# Patient Record
Sex: MALE | Race: WHITE | NOT HISPANIC OR LATINO | Employment: OTHER | URBAN - METROPOLITAN AREA
[De-identification: names, ages, dates, MRNs, and addresses within clinical notes are randomized per-mention and may not be internally consistent; named-entity substitution may affect disease eponyms.]

---

## 2017-08-17 ENCOUNTER — HISTORICAL (OUTPATIENT)
Dept: RADIOLOGY | Facility: HOSPITAL | Age: 57
End: 2017-08-17

## 2017-09-06 ENCOUNTER — HISTORICAL (OUTPATIENT)
Dept: RADIOLOGY | Facility: HOSPITAL | Age: 57
End: 2017-09-06

## 2017-10-04 ENCOUNTER — HISTORICAL (OUTPATIENT)
Dept: RADIOLOGY | Facility: HOSPITAL | Age: 57
End: 2017-10-04

## 2017-11-01 ENCOUNTER — HISTORICAL (OUTPATIENT)
Dept: RADIOLOGY | Facility: HOSPITAL | Age: 57
End: 2017-11-01

## 2018-01-12 ENCOUNTER — HISTORICAL (OUTPATIENT)
Dept: RADIOLOGY | Facility: HOSPITAL | Age: 58
End: 2018-01-12

## 2019-11-20 ENCOUNTER — HISTORICAL (OUTPATIENT)
Dept: LAB | Facility: HOSPITAL | Age: 59
End: 2019-11-20

## 2019-11-20 LAB
ABS NEUT (OLG): 5.55 X10(3)/MCL (ref 2.1–9.2)
ALBUMIN SERPL-MCNC: 4 GM/DL (ref 3.4–5)
ALBUMIN/GLOB SERPL: 1.3 RATIO (ref 1.1–2)
ALP SERPL-CCNC: 67 UNIT/L (ref 46–116)
ALT SERPL-CCNC: 67 UNIT/L (ref 12–78)
AST SERPL-CCNC: 42 UNIT/L (ref 15–37)
BASOPHILS # BLD AUTO: 0 X10(3)/MCL (ref 0–0.2)
BASOPHILS NFR BLD AUTO: 0 %
BILIRUB SERPL-MCNC: 1.8 MG/DL (ref 0.2–1)
BILIRUBIN DIRECT+TOT PNL SERPL-MCNC: 0.42 MG/DL (ref 0–0.2)
BILIRUBIN DIRECT+TOT PNL SERPL-MCNC: 1.38 MG/DL (ref 0–0.8)
BUN SERPL-MCNC: 13.2 MG/DL (ref 7–18)
CALCIUM SERPL-MCNC: 9.2 MG/DL (ref 8.5–10.1)
CHLORIDE SERPL-SCNC: 102 MMOL/L (ref 98–107)
CHOLEST SERPL-MCNC: 152 MG/DL (ref 0–200)
CHOLEST/HDLC SERPL: 4.2 {RATIO} (ref 0–5)
CO2 SERPL-SCNC: 26.8 MMOL/L (ref 21–32)
CREAT SERPL-MCNC: 0.88 MG/DL (ref 0.6–1.3)
EOSINOPHIL # BLD AUTO: 0.1 X10(3)/MCL (ref 0–0.9)
EOSINOPHIL NFR BLD AUTO: 1 %
ERYTHROCYTE [DISTWIDTH] IN BLOOD BY AUTOMATED COUNT: 11.8 % (ref 11.5–17)
FT4I SERPL CALC-MCNC: 2.9
GLOBULIN SER-MCNC: 3.1 GM/DL (ref 2.4–3.5)
GLUCOSE SERPL-MCNC: 172 MG/DL (ref 74–106)
HCT VFR BLD AUTO: 46.2 % (ref 42–52)
HDLC SERPL-MCNC: 36 MG/DL (ref 40–60)
HGB BLD-MCNC: 16 GM/DL (ref 14–18)
IMM GRANULOCYTES # BLD AUTO: 0.01 % (ref 0–0.02)
IMM GRANULOCYTES NFR BLD AUTO: 0.1 % (ref 0–0.43)
LDLC SERPL CALC-MCNC: 42 MG/DL (ref 0–129)
LYMPHOCYTES # BLD AUTO: 3.2 X10(3)/MCL (ref 0.6–4.6)
LYMPHOCYTES NFR BLD AUTO: 34 %
MCH RBC QN AUTO: 31.3 PG (ref 27–31)
MCHC RBC AUTO-ENTMCNC: 34.6 GM/DL (ref 33–36)
MCV RBC AUTO: 90.4 FL (ref 80–94)
MONOCYTES # BLD AUTO: 0.7 X10(3)/MCL (ref 0.1–1.3)
MONOCYTES NFR BLD AUTO: 7 %
NEUTROPHILS # BLD AUTO: 5.55 X10(3)/MCL (ref 1.4–7.9)
NEUTROPHILS NFR BLD AUTO: 58 %
PLATELET # BLD AUTO: 181 X10(3)/MCL (ref 130–400)
PMV BLD AUTO: 9.7 FL (ref 9.4–12.4)
POTASSIUM SERPL-SCNC: 4 MMOL/L (ref 3.5–5.1)
PROT SERPL-MCNC: 7.1 GM/DL (ref 6.4–8.2)
PSA SERPL-MCNC: 0.79 NG/ML (ref 0–4)
RBC # BLD AUTO: 5.11 X10(6)/MCL (ref 4.7–6.1)
SODIUM SERPL-SCNC: 140 MMOL/L (ref 136–145)
T3RU NFR SERPL: 33 % (ref 31–39)
T4 SERPL-MCNC: 8.8 MCG/DL (ref 4.7–13.3)
TRIGL SERPL-MCNC: 371 MG/DL
TSH SERPL-ACNC: 2.02 MIU/ML (ref 0.36–3.74)
VLDLC SERPL CALC-MCNC: 74 MG/DL
WBC # SPEC AUTO: 9.6 X10(3)/MCL (ref 4.5–11.5)

## 2021-01-26 LAB
BILIRUB SERPL-MCNC: NEGATIVE MG/DL
BLOOD URINE, POC: NEGATIVE
CLARITY, POC UA: CLEAR
COLOR, POC UA: NORMAL
GLUCOSE UR QL STRIP: NORMAL
KETONES UR QL STRIP: NEGATIVE
LEUKOCYTE EST, POC UA: NEGATIVE
NITRITE, POC UA: NEGATIVE
PH, POC UA: 6
PROTEIN, POC: NEGATIVE
SPECIFIC GRAVITY, POC UA: 1.02
UROBILINOGEN, POC UA: NORMAL

## 2021-02-03 ENCOUNTER — HISTORICAL (OUTPATIENT)
Dept: RADIOLOGY | Facility: HOSPITAL | Age: 61
End: 2021-02-03

## 2021-03-02 ENCOUNTER — HISTORICAL (OUTPATIENT)
Dept: ADMINISTRATIVE | Facility: HOSPITAL | Age: 61
End: 2021-03-02

## 2021-06-08 ENCOUNTER — HISTORICAL (OUTPATIENT)
Dept: LAB | Facility: HOSPITAL | Age: 61
End: 2021-06-08

## 2021-06-08 LAB
ALBUMIN SERPL-MCNC: 3.8 GM/DL (ref 3.4–4.8)
ALBUMIN/GLOB SERPL: 1.3 RATIO (ref 1.1–2)
ALP SERPL-CCNC: 78 UNIT/L (ref 40–150)
ALT SERPL-CCNC: 55 UNIT/L (ref 0–55)
APPEARANCE, UA: CLEAR
AST SERPL-CCNC: 38 UNIT/L (ref 5–34)
BACTERIA SPEC CULT: NORMAL
BILIRUB SERPL-MCNC: 2.1 MG/DL
BILIRUB UR QL STRIP: NEGATIVE
BILIRUBIN DIRECT+TOT PNL SERPL-MCNC: 0.7 MG/DL (ref 0–0.5)
BILIRUBIN DIRECT+TOT PNL SERPL-MCNC: 1.4 MG/DL (ref 0–0.8)
BUN SERPL-MCNC: 11.6 MG/DL (ref 8.4–25.7)
CALCIUM SERPL-MCNC: 8.9 MG/DL (ref 8.8–10)
CHLORIDE SERPL-SCNC: 103 MMOL/L (ref 98–107)
CHOLEST SERPL-MCNC: 133 MG/DL
CHOLEST/HDLC SERPL: 4 {RATIO} (ref 0–5)
CO2 SERPL-SCNC: 23 MMOL/L (ref 23–31)
COLOR UR: YELLOW
CREAT SERPL-MCNC: 0.8 MG/DL (ref 0.73–1.18)
CREAT UR-MCNC: 118.4 MG/DL (ref 58–161)
EST. AVERAGE GLUCOSE BLD GHB EST-MCNC: 182.9 MG/DL
GGT SERPL-CCNC: 125 U/L (ref 12–64)
GLOBULIN SER-MCNC: 3 GM/DL (ref 2.4–3.5)
GLUCOSE (UA): NEGATIVE
GLUCOSE SERPL-MCNC: 201 MG/DL (ref 82–115)
HBA1C MFR BLD: 8 %
HDLC SERPL-MCNC: 31 MG/DL (ref 35–60)
HGB UR QL STRIP: NEGATIVE
KETONES UR QL STRIP: NEGATIVE
LDLC SERPL CALC-MCNC: 32 MG/DL (ref 50–140)
LEUKOCYTE ESTERASE UR QL STRIP: NEGATIVE
MICROALBUMIN UR-MCNC: 8.2 UG/ML
MICROALBUMIN/CREAT RATIO PNL UR: 6.9 MG/GM CR (ref 0–30)
NITRITE UR QL STRIP: NEGATIVE
PH UR STRIP: 6 [PH] (ref 5–9)
POTASSIUM SERPL-SCNC: 4.2 MMOL/L (ref 3.5–5.1)
PROT SERPL-MCNC: 6.8 GM/DL (ref 5.8–7.6)
PROT UR QL STRIP: NEGATIVE
RBC #/AREA URNS HPF: NORMAL /[HPF]
SODIUM SERPL-SCNC: 137 MMOL/L (ref 136–145)
SP GR UR STRIP: 1.02 (ref 1–1.03)
SQUAMOUS EPITHELIAL, UA: NORMAL
TRIGL SERPL-MCNC: 348 MG/DL (ref 34–140)
TSH SERPL-ACNC: 2 UIU/ML (ref 0.35–4.94)
UROBILINOGEN UR STRIP-ACNC: 2
VLDLC SERPL CALC-MCNC: 70 MG/DL
WBC #/AREA URNS HPF: NORMAL /[HPF]

## 2022-04-11 ENCOUNTER — HISTORICAL (OUTPATIENT)
Dept: ADMINISTRATIVE | Facility: HOSPITAL | Age: 62
End: 2022-04-11

## 2022-04-29 VITALS
BODY MASS INDEX: 41.06 KG/M2 | DIASTOLIC BLOOD PRESSURE: 70 MMHG | OXYGEN SATURATION: 96 % | SYSTOLIC BLOOD PRESSURE: 110 MMHG | WEIGHT: 286.81 LBS | HEIGHT: 70 IN

## 2022-06-21 ENCOUNTER — HOSPITAL ENCOUNTER (OUTPATIENT)
Facility: HOSPITAL | Age: 62
Discharge: NURSING, REHAB OR LONG-TERM CARE | End: 2022-06-23
Attending: EMERGENCY MEDICINE | Admitting: FAMILY MEDICINE
Payer: MEDICARE

## 2022-06-21 DIAGNOSIS — R60.9 SWELLING: ICD-10-CM

## 2022-06-21 DIAGNOSIS — I73.9 PERIPHERAL ARTERY DISEASE: ICD-10-CM
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
## 2022-06-21 DIAGNOSIS — E87.5 HYPERKALEMIA: ICD-10-CM
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
## 2022-06-21 DIAGNOSIS — N17.9 ACUTE KIDNEY INJURY: ICD-10-CM

## 2022-06-21 DIAGNOSIS — I50.9 CONGESTIVE HEART FAILURE OF UNKNOWN ETIOLOGY: ICD-10-CM

## 2022-06-21 PROBLEM — M51.06 INTERVERTEBRAL DISC DISORDER OF LUMBAR REGION WITH MYELOPATHY: Status: ACTIVE | Noted: 2022-06-21

## 2022-06-21 PROBLEM — I77.9 PERIPHERAL ARTERIAL OCCLUSIVE DISEASE: Status: ACTIVE | Noted: 2022-06-21

## 2022-06-21 PROBLEM — K21.9 GASTROESOPHAGEAL REFLUX DISEASE: Status: ACTIVE | Noted: 2022-06-21

## 2022-06-21 PROBLEM — E78.5 HYPERLIPIDEMIA: Status: ACTIVE | Noted: 2022-06-21

## 2022-06-21 PROBLEM — N40.0 BENIGN PROSTATIC HYPERPLASIA: Status: ACTIVE | Noted: 2022-06-21

## 2022-06-21 PROBLEM — M79.89 SWELLING OF LOWER EXTREMITY: Status: ACTIVE | Noted: 2022-06-21

## 2022-06-21 PROBLEM — E11.9 TYPE 2 DIABETES MELLITUS: Status: ACTIVE | Noted: 2022-06-21

## 2022-06-21 PROBLEM — E66.01 MORBID OBESITY: Status: ACTIVE | Noted: 2022-06-21

## 2022-06-21 PROBLEM — R74.8 ELEVATED LIVER ENZYMES: Status: ACTIVE | Noted: 2022-06-21

## 2022-06-21 PROBLEM — I10 HYPERTENSION: Status: ACTIVE | Noted: 2022-06-21

## 2022-06-21 LAB
ALBUMIN SERPL-MCNC: 2.8 GM/DL (ref 3.4–4.8)
ALBUMIN/GLOB SERPL: 0.5 RATIO (ref 1.1–2)
ALP SERPL-CCNC: 100 UNIT/L (ref 40–150)
ALT SERPL-CCNC: 41 UNIT/L (ref 0–55)
ANION GAP SERPL CALC-SCNC: 11 MEQ/L
APPEARANCE UR: ABNORMAL
AST SERPL-CCNC: 66 UNIT/L (ref 5–34)
BACTERIA #/AREA URNS AUTO: ABNORMAL /HPF
BASOPHILS # BLD AUTO: 0.04 X10(3)/MCL (ref 0–0.2)
BASOPHILS NFR BLD AUTO: 0.3 %
BILIRUB UR QL STRIP.AUTO: NEGATIVE MG/DL
BILIRUBIN DIRECT+TOT PNL SERPL-MCNC: 0.9 MG/DL
BUN SERPL-MCNC: 45.9 MG/DL (ref 8.4–25.7)
BUN SERPL-MCNC: 46.3 MG/DL (ref 8.4–25.7)
CALCIUM SERPL-MCNC: 8.9 MG/DL (ref 8.8–10)
CALCIUM SERPL-MCNC: 8.9 MG/DL (ref 8.8–10)
CHLORIDE SERPL-SCNC: 103 MMOL/L (ref 98–107)
CHLORIDE SERPL-SCNC: 103 MMOL/L (ref 98–107)
CO2 SERPL-SCNC: 21 MMOL/L (ref 23–31)
CO2 SERPL-SCNC: 24 MMOL/L (ref 23–31)
COLOR UR AUTO: ABNORMAL
CREAT SERPL-MCNC: 2.82 MG/DL (ref 0.73–1.18)
CREAT SERPL-MCNC: 3.28 MG/DL (ref 0.73–1.18)
CREAT/UREA NIT SERPL: 16
EOSINOPHIL # BLD AUTO: 0.13 X10(3)/MCL (ref 0–0.9)
EOSINOPHIL NFR BLD AUTO: 1 %
ERYTHROCYTE [DISTWIDTH] IN BLOOD BY AUTOMATED COUNT: 12.1 % (ref 11.5–17)
GLOBULIN SER-MCNC: 5.3 GM/DL (ref 2.4–3.5)
GLUCOSE SERPL-MCNC: 82 MG/DL (ref 82–115)
GLUCOSE SERPL-MCNC: 85 MG/DL (ref 82–115)
GLUCOSE UR QL STRIP.AUTO: NEGATIVE MG/DL
HCT VFR BLD AUTO: 38.9 % (ref 42–52)
HGB BLD-MCNC: 12.5 GM/DL (ref 14–18)
KETONES UR QL STRIP.AUTO: NEGATIVE MG/DL
LEUKOCYTE ESTERASE UR QL STRIP.AUTO: ABNORMAL UNIT/L
LYMPHOCYTES # BLD AUTO: 2.1 X10(3)/MCL (ref 0.6–4.6)
LYMPHOCYTES NFR BLD AUTO: 16.7 %
MCH RBC QN AUTO: 30.8 PG (ref 27–31)
MCHC RBC AUTO-ENTMCNC: 32.1 MG/DL (ref 33–36)
MCV RBC AUTO: 95.8 FL (ref 80–94)
MONOCYTES # BLD AUTO: 1.09 X10(3)/MCL (ref 0.1–1.3)
MONOCYTES NFR BLD AUTO: 8.6 %
NEUTROPHILS # BLD AUTO: 9.2 X10(3)/MCL (ref 2.1–9.2)
NEUTROPHILS NFR BLD AUTO: 73.1 %
NITRITE UR QL STRIP.AUTO: NEGATIVE
PH UR STRIP.AUTO: 5 [PH]
PLATELET # BLD AUTO: 407 X10(3)/MCL (ref 130–400)
PMV BLD AUTO: 9.7 FL (ref 9.4–12.4)
POCT GLUCOSE: 128 MG/DL (ref 70–110)
POTASSIUM SERPL-SCNC: 4.9 MMOL/L (ref 3.5–5.1)
POTASSIUM SERPL-SCNC: 6.1 MMOL/L (ref 3.5–5.1)
PROT SERPL-MCNC: 8.1 GM/DL (ref 5.8–7.6)
PROT UR QL STRIP.AUTO: 30 MG/DL
RBC # BLD AUTO: 4.06 X10(6)/MCL (ref 4.7–6.1)
RBC #/AREA URNS AUTO: >=100 /HPF
RBC UR QL AUTO: ABNORMAL UNIT/L
SODIUM SERPL-SCNC: 136 MMOL/L (ref 136–145)
SODIUM SERPL-SCNC: 138 MMOL/L (ref 136–145)
SP GR UR STRIP.AUTO: 1.02
SQUAMOUS #/AREA URNS AUTO: ABNORMAL /HPF
UROBILINOGEN UR STRIP-ACNC: 0.2 MG/DL
WBC # SPEC AUTO: 12.6 X10(3)/MCL (ref 4.5–11.5)
WBC #/AREA URNS AUTO: ABNORMAL /HPF

## 2022-06-21 PROCEDURE — 96361 HYDRATE IV INFUSION ADD-ON: CPT

## 2022-06-21 PROCEDURE — 87635 SARS-COV-2 COVID-19 AMP PRB: CPT | Performed by: STUDENT IN AN ORGANIZED HEALTH CARE EDUCATION/TRAINING PROGRAM

## 2022-06-21 PROCEDURE — G0378 HOSPITAL OBSERVATION PER HR: HCPCS

## 2022-06-21 PROCEDURE — 63600175 PHARM REV CODE 636 W HCPCS: Performed by: STUDENT IN AN ORGANIZED HEALTH CARE EDUCATION/TRAINING PROGRAM

## 2022-06-21 PROCEDURE — 25000003 PHARM REV CODE 250: Performed by: FAMILY MEDICINE

## 2022-06-21 PROCEDURE — 85025 COMPLETE CBC W/AUTO DIFF WBC: CPT | Performed by: EMERGENCY MEDICINE

## 2022-06-21 PROCEDURE — 80053 COMPREHEN METABOLIC PANEL: CPT | Performed by: EMERGENCY MEDICINE

## 2022-06-21 PROCEDURE — 81001 URINALYSIS AUTO W/SCOPE: CPT | Performed by: EMERGENCY MEDICINE

## 2022-06-21 PROCEDURE — 25000003 PHARM REV CODE 250: Performed by: STUDENT IN AN ORGANIZED HEALTH CARE EDUCATION/TRAINING PROGRAM

## 2022-06-21 PROCEDURE — 96372 THER/PROPH/DIAG INJ SC/IM: CPT | Performed by: STUDENT IN AN ORGANIZED HEALTH CARE EDUCATION/TRAINING PROGRAM

## 2022-06-21 PROCEDURE — 96374 THER/PROPH/DIAG INJ IV PUSH: CPT

## 2022-06-21 PROCEDURE — 36415 COLL VENOUS BLD VENIPUNCTURE: CPT | Performed by: FAMILY MEDICINE

## 2022-06-21 PROCEDURE — 36415 COLL VENOUS BLD VENIPUNCTURE: CPT | Performed by: EMERGENCY MEDICINE

## 2022-06-21 PROCEDURE — 99285 EMERGENCY DEPT VISIT HI MDM: CPT | Mod: 25,CS

## 2022-06-21 RX ORDER — LISINOPRIL 10 MG/1
10 TABLET ORAL DAILY
COMMUNITY
Start: 2022-02-09 | End: 2022-06-21

## 2022-06-21 RX ORDER — INSULIN ASPART 100 [IU]/ML
1-10 INJECTION, SOLUTION INTRAVENOUS; SUBCUTANEOUS
Status: DISCONTINUED | OUTPATIENT
Start: 2022-06-21 | End: 2022-06-22

## 2022-06-21 RX ORDER — CARVEDILOL 6.25 MG/1
6.25 TABLET ORAL 2 TIMES DAILY
COMMUNITY
Start: 2022-02-09

## 2022-06-21 RX ORDER — TALC
6 POWDER (GRAM) TOPICAL NIGHTLY PRN
Status: DISCONTINUED | OUTPATIENT
Start: 2022-06-21 | End: 2022-06-23 | Stop reason: HOSPADM

## 2022-06-21 RX ORDER — DICLOFENAC SODIUM 10 MG/G
4 GEL TOPICAL 2 TIMES DAILY PRN
Status: DISCONTINUED | OUTPATIENT
Start: 2022-06-21 | End: 2022-06-23 | Stop reason: HOSPADM

## 2022-06-21 RX ORDER — ENOXAPARIN SODIUM 100 MG/ML
40 INJECTION SUBCUTANEOUS EVERY 24 HOURS
Status: DISCONTINUED | OUTPATIENT
Start: 2022-06-21 | End: 2022-06-23 | Stop reason: HOSPADM

## 2022-06-21 RX ORDER — RIFAMPIN 300 MG/1
300 CAPSULE ORAL 2 TIMES DAILY
Status: ON HOLD | COMMUNITY
Start: 2022-06-17 | End: 2022-07-08 | Stop reason: HOSPADM

## 2022-06-21 RX ORDER — LISINOPRIL 5 MG/1
5 TABLET ORAL 2 TIMES DAILY
Status: DISCONTINUED | OUTPATIENT
Start: 2022-06-21 | End: 2022-06-22

## 2022-06-21 RX ORDER — GLIPIZIDE 10 MG/1
10 TABLET, FILM COATED, EXTENDED RELEASE ORAL
Status: ON HOLD | COMMUNITY
End: 2022-07-08 | Stop reason: HOSPADM

## 2022-06-21 RX ORDER — ATORVASTATIN CALCIUM 40 MG/1
80 TABLET, FILM COATED ORAL DAILY
Status: DISCONTINUED | OUTPATIENT
Start: 2022-06-22 | End: 2022-06-23 | Stop reason: HOSPADM

## 2022-06-21 RX ORDER — CEFTRIAXONE 1 G/1
1 INJECTION, POWDER, FOR SOLUTION INTRAMUSCULAR; INTRAVENOUS
Status: DISCONTINUED | OUTPATIENT
Start: 2022-06-21 | End: 2022-06-22

## 2022-06-21 RX ORDER — ONDANSETRON 2 MG/ML
4 INJECTION INTRAMUSCULAR; INTRAVENOUS EVERY 8 HOURS PRN
Status: DISCONTINUED | OUTPATIENT
Start: 2022-06-21 | End: 2022-06-23 | Stop reason: HOSPADM

## 2022-06-21 RX ORDER — GABAPENTIN 300 MG/1
300 CAPSULE ORAL 2 TIMES DAILY
Status: DISCONTINUED | OUTPATIENT
Start: 2022-06-21 | End: 2022-06-23 | Stop reason: HOSPADM

## 2022-06-21 RX ORDER — TAMSULOSIN HYDROCHLORIDE 0.4 MG/1
1 CAPSULE ORAL DAILY
COMMUNITY
Start: 2022-04-25

## 2022-06-21 RX ORDER — IBUPROFEN 200 MG
16 TABLET ORAL
Status: DISCONTINUED | OUTPATIENT
Start: 2022-06-21 | End: 2022-06-22

## 2022-06-21 RX ORDER — INSULIN GLARGINE 300 U/ML
32 INJECTION, SOLUTION SUBCUTANEOUS DAILY
COMMUNITY
Start: 2022-04-21

## 2022-06-21 RX ORDER — CARVEDILOL 3.12 MG/1
6.25 TABLET ORAL 2 TIMES DAILY
Status: DISCONTINUED | OUTPATIENT
Start: 2022-06-21 | End: 2022-06-23 | Stop reason: HOSPADM

## 2022-06-21 RX ORDER — FUROSEMIDE 40 MG/1
40 TABLET ORAL EVERY MORNING
COMMUNITY
Start: 2022-06-09 | End: 2022-07-08

## 2022-06-21 RX ORDER — CIPROFLOXACIN 750 MG/1
750 TABLET, FILM COATED ORAL
COMMUNITY
Start: 2022-06-13 | End: 2022-06-23

## 2022-06-21 RX ORDER — LISINOPRIL 5 MG/1
5 TABLET ORAL 2 TIMES DAILY
Status: ON HOLD | COMMUNITY
End: 2022-07-08 | Stop reason: HOSPADM

## 2022-06-21 RX ORDER — TAMSULOSIN HYDROCHLORIDE 0.4 MG/1
1 CAPSULE ORAL DAILY
Status: DISCONTINUED | OUTPATIENT
Start: 2022-06-22 | End: 2022-06-23 | Stop reason: HOSPADM

## 2022-06-21 RX ORDER — GLUCAGON 1 MG
1 KIT INJECTION
Status: DISCONTINUED | OUTPATIENT
Start: 2022-06-21 | End: 2022-06-22

## 2022-06-21 RX ORDER — LIDOCAINE 50 MG/G
1 PATCH TOPICAL DAILY PRN
Status: DISCONTINUED | OUTPATIENT
Start: 2022-06-21 | End: 2022-06-23 | Stop reason: HOSPADM

## 2022-06-21 RX ORDER — SODIUM CHLORIDE 9 MG/ML
INJECTION, SOLUTION INTRAVENOUS CONTINUOUS
Status: DISCONTINUED | OUTPATIENT
Start: 2022-06-21 | End: 2022-06-23

## 2022-06-21 RX ORDER — METFORMIN HYDROCHLORIDE 500 MG/1
500 TABLET ORAL 2 TIMES DAILY
COMMUNITY
Start: 2021-05-25 | End: 2022-07-08

## 2022-06-21 RX ORDER — ATORVASTATIN CALCIUM 80 MG/1
80 TABLET, FILM COATED ORAL NIGHTLY
COMMUNITY
Start: 2022-02-09

## 2022-06-21 RX ORDER — GLIPIZIDE 10 MG/1
10 TABLET ORAL 2 TIMES DAILY
COMMUNITY
Start: 2022-02-09 | End: 2022-06-21

## 2022-06-21 RX ORDER — SODIUM CHLORIDE 0.9 % (FLUSH) 0.9 %
10 SYRINGE (ML) INJECTION
Status: DISCONTINUED | OUTPATIENT
Start: 2022-06-21 | End: 2022-06-23 | Stop reason: HOSPADM

## 2022-06-21 RX ORDER — PANTOPRAZOLE SODIUM 40 MG/1
40 TABLET, DELAYED RELEASE ORAL DAILY
COMMUNITY
Start: 2022-02-09 | End: 2022-06-21

## 2022-06-21 RX ORDER — IBUPROFEN 200 MG
24 TABLET ORAL
Status: DISCONTINUED | OUTPATIENT
Start: 2022-06-21 | End: 2022-06-22

## 2022-06-21 RX ORDER — GABAPENTIN 300 MG/1
1 CAPSULE ORAL 2 TIMES DAILY
COMMUNITY
Start: 2022-02-09

## 2022-06-21 RX ADMIN — GABAPENTIN 300 MG: 300 CAPSULE ORAL at 10:06

## 2022-06-21 RX ADMIN — ENOXAPARIN SODIUM 40 MG: 40 INJECTION SUBCUTANEOUS at 10:06

## 2022-06-21 RX ADMIN — LIDOCAINE 1 PATCH: 50 PATCH TOPICAL at 10:06

## 2022-06-21 RX ADMIN — SODIUM CHLORIDE: 9 INJECTION, SOLUTION INTRAVENOUS at 10:06

## 2022-06-21 RX ADMIN — CEFTRIAXONE 1 G: 1 INJECTION, POWDER, FOR SOLUTION INTRAMUSCULAR; INTRAVENOUS at 10:06

## 2022-06-21 RX ADMIN — SODIUM CHLORIDE 500 ML: 9 INJECTION, SOLUTION INTRAVENOUS at 06:06

## 2022-06-21 RX ADMIN — CARVEDILOL 6.25 MG: 3.12 TABLET, FILM COATED ORAL at 10:06

## 2022-06-21 RX ADMIN — LISINOPRIL 5 MG: 5 TABLET ORAL at 10:06

## 2022-06-21 NOTE — ED PROVIDER NOTES
"Encounter Date: 6/21/2022       History     Chief Complaint   Patient presents with    Fall     Fell yesterday, no loc. C/o back pain today     This 60 y/o man c/o back pain after falling yesterday. He states his right leg gave out on him. He called the ambulance then and they helped him up and he was ambulatory at home until today when he developed back pain which he states is from his shoulders down.        Review of patient's allergies indicates:   Allergen Reactions    Butorphanol Swelling     "it almost killed me"    Povidone-iodine Shortness Of Breath and Rash     History reviewed. No pertinent past medical history.  History reviewed. No pertinent surgical history.  History reviewed. No pertinent family history.     Review of Systems   Constitutional: Negative.    HENT: Negative.    Eyes: Negative.    Respiratory: Negative.    Cardiovascular: Negative.    Gastrointestinal: Negative.    Endocrine: Negative.    Genitourinary: Negative.    Musculoskeletal: Negative.    Allergic/Immunologic: Negative.    Neurological: Positive for weakness.   Hematological: Negative.    Psychiatric/Behavioral: Negative.        Physical Exam     Initial Vitals [06/21/22 1401]   BP Pulse Resp Temp SpO2   (!) 142/86 103 20 98.8 °F (37.1 °C) 95 %      MAP       --         Physical Exam    Nursing note and vitals reviewed.  Constitutional: He appears well-developed and well-nourished.   HENT:   Head: Normocephalic and atraumatic.   Mouth/Throat: Oropharynx is clear and moist.   Eyes: Conjunctivae and EOM are normal. Pupils are equal, round, and reactive to light.   Neck: Neck supple.   Normal range of motion.  Cardiovascular: Normal rate, regular rhythm and normal heart sounds.   Pulmonary/Chest: Breath sounds normal.   Abdominal: Abdomen is soft. Bowel sounds are normal.   Musculoskeletal:         General: Edema (chronic bilateral lower extremity edema) present.      Cervical back: Normal range of motion and neck supple. "     Neurological: He is alert and oriented to person, place, and time. He has normal strength.   Psychiatric: He has a normal mood and affect. His behavior is normal. Judgment and thought content normal.         ED Course   Procedures  Labs Reviewed   COMPREHENSIVE METABOLIC PANEL - Abnormal; Notable for the following components:       Result Value    Potassium Level 6.1 (*)     Carbon Dioxide 21 (*)     Blood Urea Nitrogen 46.3 (*)     Creatinine 3.28 (*)     Protein Total 8.1 (*)     Albumin Level 2.8 (*)     Globulin 5.3 (*)     Albumin/Globulin Ratio 0.5 (*)     Aspartate Aminotransferase 66 (*)     All other components within normal limits   CBC WITH DIFFERENTIAL - Abnormal; Notable for the following components:    WBC 12.6 (*)     RBC 4.06 (*)     Hgb 12.5 (*)     Hct 38.9 (*)     MCV 95.8 (*)     MCHC 32.1 (*)     Platelet 407 (*)     All other components within normal limits   BASIC METABOLIC PANEL - Abnormal; Notable for the following components:    Blood Urea Nitrogen 45.9 (*)     Creatinine 2.82 (*)     All other components within normal limits   CBC W/ AUTO DIFFERENTIAL    Narrative:     The following orders were created for panel order CBC auto differential.  Procedure                               Abnormality         Status                     ---------                               -----------         ------                     CBC with Differential[[id number]]        Abnormal            Final result                 Please view results for these tests on the individual orders.   URINALYSIS, REFLEX TO URINE CULTURE          Imaging Results          X-Ray Thoracic Spine AP And Lateral (Final result)  Result time 06/21/22 15:38:27    Final result by Juan Rodriguez MD (06/21/22 15:38:27)                 Impression:      No acute osseous abnormality.      Electronically signed by: Juan Rodriguez  Date:    06/21/2022  Time:    15:38             Narrative:    EXAMINATION:  XR THORACIC SPINE AP  LATERAL    CLINICAL HISTORY:  Unspecified fall, initial encounter    COMPARISON:  None.    FINDINGS:  No acute displaced fractures, compression deformities or subluxations.    Fused spine.    Mild multilevel disc space narrowing.    No blastic or lytic lesions.    Soft tissues within normal limits.    Partially visualized lungs clear.                               X-Ray Lumbar Spine Ap And Lateral (Final result)  Result time 06/21/22 15:37:36    Final result by Juan Rodriguez MD (06/21/22 15:37:36)                 Impression:      No acute osseous abnormality.    No significant interval change.      Electronically signed by: Juan Rodriguez  Date:    06/21/2022  Time:    15:37             Narrative:    EXAMINATION:  XR LUMBAR SPINE AP AND LATERAL    CLINICAL HISTORY:  fall with pain;    COMPARISON:  Lumbar spine radiographs 02/03/2021    FINDINGS:  There are the usual 5 nonrib bearing lumbar type vertebral bodies.    No acute displaced fractures, compression deformities or subluxations.    Mild multilevel disc space narrowing.  Lower lumbar predominant facet hypertrophy.    No blastic or lytic lesions.    Surgical clips project over the right upper abdomen.    Soft tissues within normal limits.    The sacrum is partially obscured by overlying stool and bowel gas.                                 Medications   sodium chloride 0.9% bolus 500 mL (500 mLs Intravenous New Bag 6/21/22 1830)                          Clinical Impression:   Final diagnoses:  [W19.XXXA, Y92.009] Fall at home (Primary)  [N17.9] Acute kidney injury  [E87.5] Hyperkalemia          ED Disposition Condition    Observation               Bruce Jeffries MD  06/21/22 1920

## 2022-06-22 PROBLEM — D72.829 LEUKOCYTOSIS: Status: ACTIVE | Noted: 2022-06-22

## 2022-06-22 PROBLEM — R60.0 BILATERAL LOWER EXTREMITY EDEMA: Status: ACTIVE | Noted: 2022-06-22

## 2022-06-22 LAB
ALBUMIN SERPL-MCNC: 2.2 GM/DL (ref 3.4–4.8)
ALBUMIN/GLOB SERPL: 0.5 RATIO (ref 1.1–2)
ALP SERPL-CCNC: 84 UNIT/L (ref 40–150)
ALT SERPL-CCNC: 35 UNIT/L (ref 0–55)
AST SERPL-CCNC: 67 UNIT/L (ref 5–34)
BASOPHILS # BLD AUTO: 0.03 X10(3)/MCL (ref 0–0.2)
BASOPHILS NFR BLD AUTO: 0.3 %
BILIRUBIN DIRECT+TOT PNL SERPL-MCNC: <0.5 MG/DL
BUN SERPL-MCNC: 44.2 MG/DL (ref 8.4–25.7)
CALCIUM SERPL-MCNC: 8.1 MG/DL (ref 8.8–10)
CHLORIDE SERPL-SCNC: 107 MMOL/L (ref 98–107)
CO2 SERPL-SCNC: 22 MMOL/L (ref 23–31)
CREAT SERPL-MCNC: 2.63 MG/DL (ref 0.73–1.18)
EOSINOPHIL # BLD AUTO: 0.15 X10(3)/MCL (ref 0–0.9)
EOSINOPHIL NFR BLD AUTO: 1.3 %
ERYTHROCYTE [DISTWIDTH] IN BLOOD BY AUTOMATED COUNT: 11.9 % (ref 11.5–17)
EST. AVERAGE GLUCOSE BLD GHB EST-MCNC: 111.2 MG/DL
GLOBULIN SER-MCNC: 4.3 GM/DL (ref 2.4–3.5)
GLUCOSE SERPL-MCNC: 70 MG/DL (ref 82–115)
HBA1C MFR BLD: 5.5 %
HCT VFR BLD AUTO: 36.9 % (ref 42–52)
HGB BLD-MCNC: 11.4 GM/DL (ref 14–18)
IMM GRANULOCYTES # BLD AUTO: 0.02 X10(3)/MCL (ref 0–0.02)
IMM GRANULOCYTES NFR BLD AUTO: 0.2 % (ref 0–0.43)
LYMPHOCYTES # BLD AUTO: 2.27 X10(3)/MCL (ref 0.6–4.6)
LYMPHOCYTES NFR BLD AUTO: 19.9 %
MCH RBC QN AUTO: 30.3 PG (ref 27–31)
MCHC RBC AUTO-ENTMCNC: 30.9 MG/DL (ref 33–36)
MCV RBC AUTO: 98.1 FL (ref 80–94)
MONOCYTES # BLD AUTO: 1.32 X10(3)/MCL (ref 0.1–1.3)
MONOCYTES NFR BLD AUTO: 11.6 %
NEUTROPHILS # BLD AUTO: 7.6 X10(3)/MCL (ref 2.1–9.2)
NEUTROPHILS NFR BLD AUTO: 66.7 %
PLATELET # BLD AUTO: 372 X10(3)/MCL (ref 130–400)
PMV BLD AUTO: 9.2 FL (ref 9.4–12.4)
POCT GLUCOSE: 108 MG/DL (ref 70–110)
POCT GLUCOSE: 127 MG/DL (ref 70–110)
POCT GLUCOSE: 84 MG/DL (ref 70–110)
POTASSIUM SERPL-SCNC: 4.8 MMOL/L (ref 3.5–5.1)
PROT SERPL-MCNC: 6.5 GM/DL (ref 5.8–7.6)
RBC # BLD AUTO: 3.76 X10(6)/MCL (ref 4.7–6.1)
SARS-COV-2 RDRP RESP QL NAA+PROBE: NEGATIVE
SODIUM SERPL-SCNC: 140 MMOL/L (ref 136–145)
WBC # SPEC AUTO: 11.4 X10(3)/MCL (ref 4.5–11.5)

## 2022-06-22 PROCEDURE — 94640 AIRWAY INHALATION TREATMENT: CPT

## 2022-06-22 PROCEDURE — 93005 ELECTROCARDIOGRAM TRACING: CPT

## 2022-06-22 PROCEDURE — 25000003 PHARM REV CODE 250: Performed by: STUDENT IN AN ORGANIZED HEALTH CARE EDUCATION/TRAINING PROGRAM

## 2022-06-22 PROCEDURE — 96372 THER/PROPH/DIAG INJ SC/IM: CPT | Performed by: STUDENT IN AN ORGANIZED HEALTH CARE EDUCATION/TRAINING PROGRAM

## 2022-06-22 PROCEDURE — G0378 HOSPITAL OBSERVATION PER HR: HCPCS

## 2022-06-22 PROCEDURE — 97530 THERAPEUTIC ACTIVITIES: CPT

## 2022-06-22 PROCEDURE — 96361 HYDRATE IV INFUSION ADD-ON: CPT

## 2022-06-22 PROCEDURE — 93010 EKG 12-LEAD: ICD-10-PCS | Mod: ,,, | Performed by: INTERNAL MEDICINE

## 2022-06-22 PROCEDURE — 85025 COMPLETE CBC W/AUTO DIFF WBC: CPT | Performed by: STUDENT IN AN ORGANIZED HEALTH CARE EDUCATION/TRAINING PROGRAM

## 2022-06-22 PROCEDURE — 94760 N-INVAS EAR/PLS OXIMETRY 1: CPT

## 2022-06-22 PROCEDURE — 99900035 HC TECH TIME PER 15 MIN (STAT)

## 2022-06-22 PROCEDURE — 25000242 PHARM REV CODE 250 ALT 637 W/ HCPCS: Performed by: STUDENT IN AN ORGANIZED HEALTH CARE EDUCATION/TRAINING PROGRAM

## 2022-06-22 PROCEDURE — 97535 SELF CARE MNGMENT TRAINING: CPT

## 2022-06-22 PROCEDURE — 97165 OT EVAL LOW COMPLEX 30 MIN: CPT

## 2022-06-22 PROCEDURE — 99215 OFFICE O/P EST HI 40 MIN: CPT | Mod: 25

## 2022-06-22 PROCEDURE — 93010 ELECTROCARDIOGRAM REPORT: CPT | Mod: ,,, | Performed by: INTERNAL MEDICINE

## 2022-06-22 PROCEDURE — 63600175 PHARM REV CODE 636 W HCPCS: Performed by: STUDENT IN AN ORGANIZED HEALTH CARE EDUCATION/TRAINING PROGRAM

## 2022-06-22 PROCEDURE — 99900031 HC PATIENT EDUCATION (STAT)

## 2022-06-22 PROCEDURE — 80053 COMPREHEN METABOLIC PANEL: CPT | Performed by: STUDENT IN AN ORGANIZED HEALTH CARE EDUCATION/TRAINING PROGRAM

## 2022-06-22 PROCEDURE — 97161 PT EVAL LOW COMPLEX 20 MIN: CPT

## 2022-06-22 PROCEDURE — 27000221 HC OXYGEN, UP TO 24 HOURS

## 2022-06-22 PROCEDURE — 83036 HEMOGLOBIN GLYCOSYLATED A1C: CPT | Performed by: STUDENT IN AN ORGANIZED HEALTH CARE EDUCATION/TRAINING PROGRAM

## 2022-06-22 RX ORDER — GLIPIZIDE 5 MG/1
10 TABLET, FILM COATED, EXTENDED RELEASE ORAL
Status: DISCONTINUED | OUTPATIENT
Start: 2022-06-23 | End: 2022-06-23 | Stop reason: HOSPADM

## 2022-06-22 RX ORDER — LABETALOL HCL 20 MG/4 ML
10 SYRINGE (ML) INTRAVENOUS ONCE
Status: DISCONTINUED | OUTPATIENT
Start: 2022-06-22 | End: 2022-06-22

## 2022-06-22 RX ORDER — HYDRALAZINE HYDROCHLORIDE 20 MG/ML
10 INJECTION INTRAMUSCULAR; INTRAVENOUS EVERY 4 HOURS PRN
Status: DISCONTINUED | OUTPATIENT
Start: 2022-06-22 | End: 2022-06-23 | Stop reason: HOSPADM

## 2022-06-22 RX ORDER — NIFEDIPINE 30 MG/1
30 TABLET, EXTENDED RELEASE ORAL DAILY
Status: DISCONTINUED | OUTPATIENT
Start: 2022-06-22 | End: 2022-06-23 | Stop reason: HOSPADM

## 2022-06-22 RX ORDER — ACETAMINOPHEN 325 MG/1
650 TABLET ORAL EVERY 4 HOURS PRN
Status: DISCONTINUED | OUTPATIENT
Start: 2022-06-22 | End: 2022-06-23 | Stop reason: HOSPADM

## 2022-06-22 RX ORDER — IPRATROPIUM BROMIDE AND ALBUTEROL SULFATE 2.5; .5 MG/3ML; MG/3ML
3 SOLUTION RESPIRATORY (INHALATION) EVERY 4 HOURS PRN
Status: DISCONTINUED | OUTPATIENT
Start: 2022-06-22 | End: 2022-06-23 | Stop reason: HOSPADM

## 2022-06-22 RX ADMIN — ACETAMINOPHEN 650 MG: 325 TABLET ORAL at 10:06

## 2022-06-22 RX ADMIN — LISINOPRIL 5 MG: 5 TABLET ORAL at 08:06

## 2022-06-22 RX ADMIN — NIFEDIPINE 30 MG: 30 TABLET, FILM COATED, EXTENDED RELEASE ORAL at 10:06

## 2022-06-22 RX ADMIN — GABAPENTIN 300 MG: 300 CAPSULE ORAL at 08:06

## 2022-06-22 RX ADMIN — ACETAMINOPHEN 650 MG: 325 TABLET ORAL at 04:06

## 2022-06-22 RX ADMIN — MELATONIN TAB 3 MG 6 MG: 3 TAB at 08:06

## 2022-06-22 RX ADMIN — ATORVASTATIN CALCIUM 80 MG: 40 TABLET, FILM COATED ORAL at 08:06

## 2022-06-22 RX ADMIN — CARVEDILOL 6.25 MG: 3.12 TABLET, FILM COATED ORAL at 08:06

## 2022-06-22 RX ADMIN — SODIUM CHLORIDE: 9 INJECTION, SOLUTION INTRAVENOUS at 04:06

## 2022-06-22 RX ADMIN — SODIUM CHLORIDE: 9 INJECTION, SOLUTION INTRAVENOUS at 05:06

## 2022-06-22 RX ADMIN — ENOXAPARIN SODIUM 40 MG: 40 INJECTION SUBCUTANEOUS at 04:06

## 2022-06-22 RX ADMIN — IPRATROPIUM BROMIDE AND ALBUTEROL SULFATE 3 ML: 2.5; .5 SOLUTION RESPIRATORY (INHALATION) at 10:06

## 2022-06-22 RX ADMIN — TAMSULOSIN HYDROCHLORIDE 0.4 MG: 0.4 CAPSULE ORAL at 08:06

## 2022-06-22 NOTE — ASSESSMENT & PLAN NOTE
-pt has known history of PAD, however it sounds like he has had venous ablation previously as he states he had something in his legs burned off  -patient has several pill bottles for Lasix 40 mg daily at bedside that he says was recently told to stop taking however he states his primary care disagrees and he should not have stop taking it  -at this point in time patient is requiring IV fluids therefore will hydrate however suspect patient likely needs to return back to Lasix 40 mg daily, will resume for CATY edema as condition allows

## 2022-06-22 NOTE — PROGRESS NOTES
[facility]  Wound Care    Patient Name:  Neno Watkins   MRN:  [id number]  Date: 6/22/2022  Diagnosis: Fall at home          WOC Assessment Details/Treatment   Hx DM, PAD, PVD noted.   Pt reports having wraps to BLE changed 3x weekly at Dr. Messi Weston's office in [place].  No open ulcerations noted to Bilateral lower legs, crust/scale and hemosiderin staining present to both.   DM foot ulcers present to plantar R great toe and R plantar foot.  Incontinence associated dermatitis present to groin, scrotum, gluteal cleft.  Satellite lesions present with suspected fungal involvement.   Pressure prevention measures needed while hospitalized with turn q 2 hr assist, moisture management.  External catheter in use at this time.      06/22/22 1737        Altered Skin Integrity 06/21/22 2300 Right anterior;lower Leg   Date First Assessed/Time First Assessed: 06/21/22 2300   Altered Skin Integrity Present on Admission: yes  Side: Right  Orientation: anterior;lower  Location: Leg  Is this injury device related?: No   Wound Image    Dressing Appearance Intact   Drainage Amount None   Drainage Characteristics/Odor No odor   Appearance Red;Tan;Dry;Closed/resurfaced;Other (see comments)  (stasis dermatitis/crust)   Tissue loss description Not applicable   Periwound Area Dry;Edematous;Hemosiderin Staining   Wound Width (cm) 49.5 cm   Care Cleansed with:;Sterile normal saline   Dressing Applied;Other (comment)  (zguard paste)   Compression Other (see comments)  (kerlix, light ace wrap from base of toes to below knee)   Dressing Change Due 06/23/22        Altered Skin Integrity Left anterior;distal;lower Leg   No Date First Assessed or Time First Assessed found.   Side: Left  Orientation: anterior;distal;lower  Location: Leg   Wound Image    Dressing Appearance Intact   Drainage Amount None   Appearance Red;Epithelialization;Closed/resurfaced;Other (see comments)  (stasis dermatitis/crust)   Tissue  loss description Not applicable   Periwound Area Hemosiderin Staining;Dry;Edematous   Wound Width (cm) 49 cm  (calf circumference 49cm)   Care Cleansed with:;Sterile normal saline   Dressing Applied;Other (comment)  (zguard paste to stasis dermatitis)   Compression Other (see comments)  (wrapped with kerlix/light ace wrap from base of toes to below knee)   Dressing Change Due 06/23/22        Wound 06/21/22 2300 Diabetic Ulcer Right plantar Toe, first #1   Date First Assessed/Time First Assessed: 06/21/22 2300   Pre-existing: Yes  Primary Wound Type: Diabetic Ulcer  Side: Right  Orientation: plantar  Location: Toe, first  Wound Number: #1   Dressing Appearance Open to air   Drainage Amount Scant   Drainage Characteristics/Odor No odor   Appearance Dry;Black;Tan;Smooth;Not granulating;Slough   Tissue loss description Full thickness   Wound Length (cm) 1.5 cm   Wound Width (cm) 1.2 cm   Wound Depth (cm) 0.1 cm   Wound Volume (cm^3) 0.18 cm^3   Wound Surface Area (cm^2) 1.8 cm^2   Care Cleansed with:;Wound cleanser   Dressing Applied;Honey;Sodium chloride impregnated;Bandaid   Dressing Change Due 06/23/22        Wound 06/22/22 1736 Diabetic Ulcer Right plantar Foot   Date First Assessed/Time First Assessed: 06/22/22 1736   Primary Wound Type: Diabetic Ulcer  Side: Right  Orientation: plantar  Location: Foot   Wound Image    Dressing Appearance Open to air   Drainage Amount Scant   Drainage Characteristics/Odor Serous   Appearance Pink;Red;Necrotic;Slough;Fibrin;Not granulating   Tissue loss description Full thickness   Black (%), Wound Tissue Color 5 %   Red (%), Wound Tissue Color 60 %   Yellow (%), Wound Tissue Color 10 %   Periwound Area Other (see comments)  (callus, dry)   Wound Edges Callused   Wound Length (cm) 4.5 cm   Wound Width (cm) 3.5 cm   Wound Depth (cm) 0.2 cm   Wound Volume (cm^3) 3.15 cm^3   Wound Surface Area (cm^2) 15.75 cm^2   Care Cleansed with:;Wound cleanser   Dressing Applied;Honey;Sodium  chloride impregnated;Gauze;Rolled gauze;Elastic bandage   Compression Other (see comments)  (light compression only with kerlix, ace wrap)   Dressing Change Due 06/23/22    (Insert flowsheet data here)    Recommendations made to primary team for Z-guard barrier paste to groin TID and prn.  Recommend oral or topical antifungal medication as well to area.  Medihoney/mesalt applied to DM R foot / toe plantar ulcerations.  Recommend daily dressing changes.  Applied zguard paste to stasis dermatitis of both lower legs, wrapped from base of toes to 2 fingers below knee with kerlix,light ace wrap.  Change daily.  Recommend only light wrap d/t comorbidities at this time.  06/22/2022

## 2022-06-22 NOTE — ASSESSMENT & PLAN NOTE
-PT/OT  -spine xrays did not show any acute osseous abnormality  -pending CT head is patient reports he did hit his head  -PRN pain control with lidocaine patch and voltaren; pt unable to clearly explain his allergies however states he takes tylenol at home, therefore will also use while here

## 2022-06-22 NOTE — ED NOTES
Patient lying in bed quietly. He reports that he fell yesterday and today his back and legs are hurting him. He has ace wrap bandage to his RLE that was applied by his doctor. He have compression wrap to LLE. No acute distress noted.

## 2022-06-22 NOTE — NURSING
Patient on admission found to have multiple wounds as noted to BLE. Patient reports that wound care was changed by doctor, but on appearence patient wounds dressings appearing to be soiled. Patient appearing unkept with multiple spoiled underwear. He reports that he was unable to wipe himself after voiding.     . Patient unable to turn without complaining of Pain. Provider ordered lidocaine patch for pain. At 0300- noted to be effective at this moment for pain management. Patient reports that at home he lives alone and able to perform task independently. Patient unable to perform at this time without 2 person assist.       Emergency RN reports that she obtained an urinalysis prior to patient being admitted to floor, unable to recall how much obtained. At 0300- Patient reports he has the urge to void and will try at this time. Patient with external catheter in place to monitor output.

## 2022-06-22 NOTE — ASSESSMENT & PLAN NOTE
-continue home dose of glipizide, hold metformin due to SHANDRA, sliding scale insulin and duct tumor well renal function improves

## 2022-06-22 NOTE — H&P
"[facility]  [facility]  History & Physical    Patient Name: Neno Watkins  MRN: [id number]  Patient Class: OP- Observation  Admission Date: 6/21/2022  Attending Physician: Thairy G Reyes, DO   Primary Care Provider: Primary Doctor No         Patient information was obtained from patient and ER records.     Subjective:     Principal Problem:Fall at home    Chief Complaint:   Chief Complaint   Patient presents with    Fall     Fell yesterday, no loc. C/o back pain today        HPI: 61-year-old male with a past medical history of diabetes, hypertension, BPH, morbid obesity who presents with complaint a fall at home 2 days ago.  Patient reports he was walking when his right leg gave out and he fell backwards.  Patient could not walk and decided to come to the ED as this was a new development for him.  In the ED thoracic spine x-rays showed no acute osseous abnormality and lumbar spine x-rays also showed no acute abnormality.  This morning patient affirm to hitting his head therefore will obtain CT head.  In the ED patient was found to have several electrolyte abnormalities (K of 6) and SHANDRA with a creatinine of 3.28. Pt's labs improved in the ED to a Cr of 2.82, BUN 45.9.  Admitted for rehydration under observation as patient has significant bilateral lower extremity swelling that is chronic.  Patient has documented peripheral arterial disease in his history.  Patient reports leg swelling is approximately at baseline today.      History reviewed. No pertinent past medical history.    History reviewed. No pertinent surgical history.    Review of patient's allergies indicates:   Allergen Reactions    Butorphanol Swelling     "it almost killed me"    Hydrocodone-acetaminophen Other (See Comments)     "They mess with my heart"    Povidone-iodine Shortness Of Breath and Rash       No current facility-administered medications on file prior to encounter.     Current Outpatient " Medications on File Prior to Encounter   Medication Sig    atorvastatin (LIPITOR) 80 MG tablet Take 80 mg by mouth once daily.    carvediloL (COREG) 6.25 MG tablet Take 6.25 mg by mouth 2 (two) times daily.    ciprofloxacin HCl (CIPRO) 750 MG tablet Take 750 mg by mouth every 12 (twelve) hours.    furosemide (LASIX) 40 MG tablet Take 40 mg by mouth every morning.    gabapentin (NEURONTIN) 300 MG capsule Take 1 capsule by mouth 2 (two) times a day.    glipiZIDE (GLUCOTROL) 10 MG TR24 Take 10 mg by mouth daily with breakfast.    lisinopriL (PRINIVIL,ZESTRIL) 5 MG tablet Take 5 mg by mouth 2 (two) times a day.    metFORMIN (GLUCOPHAGE) 500 MG tablet Take 500 mg by mouth 2 (two) times a day.    rifAMpin (RIFADIN) 300 MG capsule Take 300 mg by mouth 2 (two) times daily.    tamsulosin (FLOMAX) 0.4 mg Cap Take 1 capsule by mouth once daily.    TOUJEO SOLOSTAR U-300 INSULIN 300 unit/mL (1.5 mL) InPn pen Inject 32 Units into the skin once daily at 6am.     Family History    None       Tobacco Use    Smoking status: Not on file    Smokeless tobacco: Not on file   Substance and Sexual Activity    Alcohol use: Not on file    Drug use: Not on file    Sexual activity: Not on file     Review of Systems   Constitutional:  Negative for fatigue and fever.   HENT:  Negative for congestion, ear pain, postnasal drip, sinus pain and sore throat.    Eyes:  Negative for pain, redness and visual disturbance.   Respiratory:  Negative for cough, shortness of breath and wheezing.    Cardiovascular:  Negative for chest pain, palpitations and leg swelling.   Gastrointestinal:  Negative for abdominal pain, diarrhea, nausea and vomiting.   Endocrine: Negative for cold intolerance and heat intolerance.   Musculoskeletal:  Positive for back pain and gait problem. Negative for arthralgias, joint swelling and myalgias.   Skin:  Positive for color change. Negative for rash and wound.   Neurological:  Positive for weakness. Negative for  dizziness, numbness and headaches.   Objective:     Vital Signs (Most Recent):  Temp: 98.6 °F (37 °C) (22 0330)  Pulse: 93 (22 0729)  Resp: 17 (22 0330)  BP: (!) 165/83 (22 0729)  SpO2: 95 % (22 07)   Vital Signs (24h Range):  Temp:  [98 °F (36.7 °C)-98.8 °F (37.1 °C)] 98.6 °F (37 °C)  Pulse:  [] 93  Resp:  [17-20] 17  SpO2:  [95 %-96 %] 95 %  BP: (139-165)/(73-86) 165/83     Weight: 133.5 kg (294 lb 5 oz)  Body mass index is 42.23 kg/m².    Physical Exam  Constitutional:       General: He is not in acute distress.     Appearance: Normal appearance. He is morbidly obese.   HENT:      Mouth/Throat:      Mouth: Mucous membranes are moist.      Pharynx: Oropharynx is clear. No oropharyngeal exudate or posterior oropharyngeal erythema.   Eyes:      Extraocular Movements: Extraocular movements intact.      Conjunctiva/sclera: Conjunctivae normal.      Pupils: Pupils are equal, round, and reactive to light.   Neck:      Thyroid: No thyromegaly.   Cardiovascular:      Rate and Rhythm: Normal rate and regular rhythm.      Heart sounds: No murmur heard.    No friction rub. No gallop.   Pulmonary:      Breath sounds: Normal breath sounds.   Abdominal:      General: Bowel sounds are normal. There is no distension.      Palpations: Abdomen is soft.      Tenderness: There is no abdominal tenderness.   Musculoskeletal:      Right lower le+ Edema present.      Left lower le+ Edema present.   Lymphadenopathy:      Cervical: No cervical adenopathy.   Skin:     General: Skin is warm.      Findings: No rash.   Neurological:      General: No focal deficit present.      Mental Status: He is oriented to person, place, and time.      Cranial Nerves: No cranial nerve deficit.   Psychiatric:         Mood and Affect: Mood is not anxious or depressed. Affect is not inappropriate.         CRANIAL NERVES     CN III, IV, VI   Pupils are equal, round, and reactive to light.     Significant Labs: All  pertinent labs within the past 24 hours have been reviewed.    Significant Imaging: I have reviewed all pertinent imaging results/findings within the past 24 hours.    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 Labs reviewed- Renal function/electrolytes with Estimated Creatinine Clearance: 40 mL/min (A) (based on SCr of 2.63 mg/dL (H)). according to latest data. Monitor urine output and serial BMP and adjust therapy as needed. Avoid nephrotoxins and renally dose meds for GFR listed above.     -IV fluids, monitored lower extremity swelling  -hold patient's lisinopril and metformin until renal function improves, in the meantime treat with nifedipine and insulin      Type 2 diabetes mellitus  -continue home dose of glipizide, hold metformin due to SHANDRA, sliding scale insulin and duct tumor well renal function improves      Hypertension        Leukocytosis  -Patient received 1 dose of ceftriaxone in the ED, leukocytosis has resolved with hydration, do not suspect any acute infectious process at this time therefore will monitor off antibiotics      Benign prostatic hyperplasia  -continue tamsulosin      Bilateral lower extremity edema  -pt has known history of PAD, however it sounds like he has had venous ablation previously as he states he had something in his legs burned off  -patient has several pill bottles for Lasix 40 mg daily at bedside that he says was recently told to stop taking however he states his primary care disagrees and he should not  have stop taking it  -at this point in time patient is requiring IV fluids therefore will hydrate however suspect patient likely needs to return back to Lasix 40 mg daily, will resume for CATY edema as condition allows         VTE Risk Mitigation (From admission, onward)         Ordered     enoxaparin injection 40 mg  Daily         06/21/22 1925     IP VTE HIGH RISK PATIENT  Once         06/21/22 1925     Place sequential compression device  Until discontinued         06/21/22 1925                   Thairy G Reyes, DO  Department of Hospital Medicine   [facility]

## 2022-06-22 NOTE — ASSESSMENT & PLAN NOTE
Patient with acute kidney injury likely d/t Pre-renal azotemia Which is currently improving. Labs reviewed- Renal function/electrolytes with Estimated Creatinine Clearance: 40 mL/min (A) (based on SCr of 2.63 mg/dL (H)). according to latest data. Monitor urine output and serial BMP and adjust therapy as needed. Avoid nephrotoxins and renally dose meds for GFR listed above.     -IV fluids, monitored lower extremity swelling  -hold patient's lisinopril and metformin until renal function improves, in the meantime treat with nifedipine and insulin

## 2022-06-22 NOTE — HPI
[facility], EMERGENCY DEPT   [address], [place] [address]  Phone: Dept: [phone] - Fax:        [facility] Progress Report and Disability Certification  Date of Service: 10/1/2020   No Show:  No  Date / Time of Next Visit:     Claim Information   Patient Name: Fede Carrillo  Claim Number:     Employer:    Date of Injury: 7/19/2020     Insurer / TPA:   ID / SSN: [id number]    Occupation: [employer] Diagnosis: Diagnoses of Polyarthralgia and Gait instability were pertinent to this visit.    Medical Information   Related to Industrial Injury?   Comments:Unknown, difficult to blame multiple areas of arthralgia on an accident 4 years ago with exacerbation 2 months ago ***   Subjective Complaints:  Patient complaining of multiple areas of arthralgia, inability to walk secondary to pain in his neck, back, left hip, left knee, some extent ankles.  He states it was a work-related injury 4 years ago, he states 2 months ago at work he felt a twinge of pain in his back which is worsened.  Patient is currently on prednisone.  With help of , I am unable to identify a new accident at work.   Objective Findings: Polyarthralgia, with pain in thoracic and lumbar spine, left shoulder, left knee, bilateral hips.  Very difficult for patient to transfer from wheelchair to bed secondary to arthralgia.  Pain is unresponsive to Toradol and morphine.   Pre-Existing Condition(s): Patient states back injury 4 years ago   Assessment:   Condition Worsened    Status: Additional Care Required  Comments:Patient hospitalized  Permanent Disability:  Comments:Unknown    Plan:   Comments:Admitted to the hospital for ongoing work-up, patient with multiple elevated inflammatory markers    Diagnostics: [facility]  Comments:CT scan thoracic and lumbar spine evidence of degenerative disease, no acute fracture, no destructive lesions     61-year-old male with a past medical history of diabetes, hypertension, BPH, morbid obesity who presents with complaint a fall at home 2 days ago.  Patient reports he was walking when his right leg gave out and he fell backwards.  Patient could not walk and decided to come to the ED as this was a new development for him.  In the ED thoracic spine x-rays showed no acute osseous abnormality and lumbar spine x-rays also showed no acute abnormality.  This morning patient affirm to hitting his head therefore will obtain CT head.  In the ED patient was found to have several electrolyte abnormalities (K of 6) and SHANDRA with a creatinine of 3.28. Pt's labs improved in the ED to a Cr of 2.82, BUN 45.9.  Admitted for rehydration under observation as patient has significant bilateral lower extremity swelling that is chronic.  Patient has documented peripheral arterial disease in his history.  Patient reports leg swelling is approximately at baseline today.     Comments:  Elevated sedimentation rate and CRP, consider autoimmune disorder, rheumatologic disease    Disability Information   Status: Temporarily Totally Disabled    From:     Through:   Restrictions are: Temporary  Comments:Patient requires further work-up as to the exact etiology of his disability.   Physical Restrictions   Sitting:    Standing:    Stooping:    Bending:      Squatting:    Walking:    Climbing:    Pushing:      Pulling:    Other:    Reaching Above Shoulder (L):   Reaching Above Shoulder (R):       Reaching Below Shoulder (L):    Reaching Below Shoulder (R):      Not to exceed Weight Limits   Carrying(hrs):   Weight Limit(lb):   Lifting(hrs):   Weight  Limit(lb):     Comments: It is unknown whether his disability today, the polyarthralgia and increased inflammatory markers, are related to a work injury or not.  It seems given the multiple areas it is multifactorial and difficult to blame this completely on a work-related injury.  I am unable to answer several of the questions above given the lack of definitive diagnosis.  Patient is hospitalized as he is currently unable to walk secondary to pain for further evaluation and work-up    Repetitive Actions   Hands: i.e. Fine Manipulations from Grasping:     Feet: i.e. Operating Foot Controls:     Driving / Operate Machinery:     Physician Name: Killian Lucas Physician Signature: isidoro-KILLIAN Zaragoza M.D. e-Signature:  , Medical Director   Clinic Name / Location: [facility], EMERGENCY DEPT  [address] [place] [address]  [phone]     Clinic Phone Number: Dept: [phone]   Appointment Time:  Visit Start Time:    Check-In Time:  12:32 PM Visit Discharge Time:    Original-Treating Physician or Chiropractor    Page 2-Insurer/TPA    Page 3-Employer    Page 4-Employee

## 2022-06-22 NOTE — PLAN OF CARE
[facility] - Medical Surgical Unit  Initial Discharge Assessment       Primary Care Provider: Primary Doctor No    Admission Diagnosis: Hyperkalemia [E87.5]  Fall at home [W19.XXXA, Y92.009]  Acute kidney injury [N17.9]    Admission Date: 6/21/2022  Expected Discharge Date:     Discharge Barriers Identified: None    Payor: HU[employer] MANAGED MEDICARE / Plan: HU[employer] MEDICARE HMO / Product Type: Capitation /     No emergency contact information on file.    Discharge Plan A: Home with family, Home Health         [id number] - [facility], [place] - [address] AT [facility] [address]  [address]  [place] [address]  Phone: [phone] Fax: [phone]      Initial Assessment (most recent)     Adult Discharge Assessment - 06/22/22 0750        Discharge Assessment    Assessment Type Discharge Planning Assessment     Confirmed/corrected address, phone number and insurance Yes     Confirmed Demographics Correct on Facesheet     Source of Information patient     If unable to respond/provide information was family/caregiver contacted? No Contact Information Available     Does patient/caregiver understand observation status Yes     Communicated MATT with patient/caregiver Date not available/Unable to determine     Reason For Admission SHANDRA  Fall     Lives With alone     Facility Arrived From: home     Do you expect to return to your current living situation? Yes     Do you have help at home or someone to help you manage your care at home? No     Prior to hospitilization cognitive status: Alert/Oriented     Current cognitive status: Alert/Oriented     Walking or Climbing Stairs Difficulty none     Dressing/Bathing Difficulty none     Home Accessibility wheelchair accessible     Home Layout Able to live on 1st floor     Equipment Currently Used at Home none     Readmission within 30 days? No     Patient currently being followed by outpatient case management? No     Do you currently have service(s)  that help you manage your care at home? No     Do you take prescription medications? Yes     Do you have prescription coverage? Yes     Do you have any problems affording any of your prescribed medications? TBD     Is the patient taking medications as prescribed? yes     How do you get to doctors appointments? family or friend will provide     Are you on dialysis? No     Do you take coumadin? No     Discharge Plan A Home with family;Home Health     DME Needed Upon Discharge  none     Discharge Barriers Identified None

## 2022-06-22 NOTE — ASSESSMENT & PLAN NOTE
-Patient received 1 dose of ceftriaxone in the ED, leukocytosis has resolved with hydration, do not suspect any acute infectious process at this time therefore will monitor off antibiotics

## 2022-06-22 NOTE — PLAN OF CARE
Problem: Physical Therapy  Goal: Physical Therapy Goal  Description: Goals to be met by: Discharge     Patient will increase functional independence with mobility by performin. Supine to sit with Modified [place]  2. Sit to supine with Modified [place]  3. Sit to stand transfer with Modified [place]  4. Bed to chair transfer with Modified [place] using Rolling Walker  5. Gait  x 50 feet with Modified [place] using Rolling Walker.   6. Ascend/descend 5 stair with bilateral Handrails Supervision using No Assistive Device.     Outcome: Ongoing, Progressing

## 2022-06-22 NOTE — PLAN OF CARE
Problem: Adult Inpatient Plan of Care  Goal: Plan of Care Review  Outcome: Ongoing, Progressing  Goal: Patient-Specific Goal (Individualized)  Outcome: Ongoing, Progressing  Goal: Absence of Hospital-Acquired Illness or Injury  Outcome: Ongoing, Progressing  Goal: Optimal Comfort and Wellbeing  Outcome: Ongoing, Progressing  Goal: Readiness for Transition of Care  Outcome: Ongoing, Progressing     Problem: Bariatric Environmental Safety  Goal: Safety Maintained with Care  Outcome: Ongoing, Progressing     Problem: Diabetes Comorbidity  Goal: Blood Glucose Level Within Targeted Range  Outcome: Ongoing, Progressing     Problem: Fluid and Electrolyte Imbalance (Acute Kidney Injury/Impairment)  Goal: Fluid and Electrolyte Balance  Outcome: Ongoing, Progressing     Problem: Oral Intake Inadequate (Acute Kidney Injury/Impairment)  Goal: Optimal Nutrition Intake  Outcome: Ongoing, Progressing     Problem: Fall Injury Risk  Goal: Absence of Fall and Fall-Related Injury  Outcome: Ongoing, Progressing

## 2022-06-22 NOTE — PT/OT/SLP EVAL
Physical Therapy Swingbed Evaluation      Patient Name:  Neno Watkins   MRN:  [id number]    History:     History reviewed. No pertinent past medical history.    History reviewed. No pertinent surgical history.      Subjective     Chief Complaint: Upper thoracic pain  Patient/Family Comments/goals: Regain functional independence  Pain/Comfort:  · Pain Rating 1: other (see comments) (30/10)  · Location - Side 1: Bilateral  · Location - Orientation 1: upper  · Location 1: back  · Pain Addressed 1: Pre-medicate for activity, Reposition      Living Environment:  Lives with: Alone  Home Environment: mobile home  Previous level of function: Modified Independent using quad cane for mobility and Independent with ADLs/IADLs  Equipment used at home: cane, quad.        Objective:     Communicated with nursing prior to session.  Patient found supine with bed alarm, peripheral IV, PureWick  upon PT entry to room.    General Precautions: Standard, fall   Orthopedic Precautions:    Braces:    Respiratory Status: Room air     Vitals Value   x Room air  89-93% SpO2    Oxygen (L)     Blood pressure    x Heart rate  100bpm       Exams:  · RLE ROM: WFL  · RLE Strength: WFL  · LLE ROM: WFL  · LLE Strength: WFL    Functional Mobility:  · Bed Mobility:     · Supine to Sit: moderate assistance  · Sit to Supine: moderate assistance  · Transfers:     · Sit to Stand:  minimum assistance with hand-held assist and rolling walker  · Bed to Chair: minimum assistance with  rolling walker  using  Step Transfer  · Gait: pt able to take ~5 steps HOB using RW and CGA  · Balance: Fair (-)    Therapeutic Activities and Exercises:       Additional information: Patient is s/p fall at home and reports his R leg gave out resulting in him falling backwards. X-ray of lumbar and thoracic spine both negative for any acute fx    Patient left supine with all lines intact, call button in reach and bed alarm on.      Assessment:     Neno Watkins is a 61 y.o.  male admitted with a medical diagnosis of Fall at home.  He presents with the following impairments/functional limitations:  weakness, impaired endurance, impaired functional mobilty, decreased lower extremity function, gait instability, decreased safety awareness, impaired balance.    Rehab Prognosis: Good; patient would benefit from acute skilled PT services to address these deficits and reach maximum level of function.    Recent Surgery: * No surgery found *        Recommendations:     Discharge Recommendations:  home with home health   Discharge Equipment Recommendations: walker, rolling, bedside commode, wheelchair       Plan:     During this hospitalization, patient to be seen 6 x/week to address the identified rehab impairments via gait training, therapeutic activities, therapeutic exercises, wheelchair management/training and progress toward the following goals:    GOALS:   Multidisciplinary Problems     Physical Therapy Goals        Problem: Physical Therapy    Goal Priority Disciplines Outcome Goal Variances Interventions   Physical Therapy Goal     PT, PT/OT Ongoing, Progressing     Description: Goals to be met by: Discharge     Patient will increase functional independence with mobility by performin. Supine to sit with Modified [place]  2. Sit to supine with Modified [place]  3. Sit to stand transfer with Modified [place]  4. Bed to chair transfer with Modified [place] using Rolling Walker  5. Gait  x 50 feet with Modified [place] using Rolling Walker.   6. Ascend/descend 5 stair with bilateral Handrails Supervision using No Assistive Device.                      Time Tracking:       PT Start Time: 1340     PT Stop Time: 1413  PT Total Time (min): 33 min     Billable Minutes: Evaluation 33 minutes      2022

## 2022-06-22 NOTE — PT/OT/SLP EVAL
Occupational Therapy   Acute Care Evaluation    Name: Neno Watkins  MRN: [id number]  Admitting Diagnosis:  Fall at home      History:   Neno Watkins is a 61 y.o. male with a medical diagnosis of Fall at home.    History reviewed. No pertinent past medical history.    History reviewed. No pertinent surgical history.    Subjective     Chief Complaint: back pain and SOB   Patient/Family Comments/goals: decrease pain and increase strength strength      Occupational Profile:  Lives with: alone  Home Environment: Mobile home with 5 steps to enter (+) R handrail   Previous level of function: Mod (I) with quad cane for ambulation however reports lately has been getting harder to ambulate; reports performs ADLs as best as he can but has difficulty as well. Denies driving.   Equipment Used at Home:  cane, quad      Pain/Comfort:  Pain Rating 1: 10/10  Location - Orientation 1: upper  Location 1: back  Pain Addressed 1: Pre-medicate for activity, Reposition  Location - Side 2: Bilateral  Location 2: leg  Pain Addressed 2: Reposition      Objective:     Communicated with: RN and CNA prior to session.  Patient found supine with bed alarm upon OT entry to room.    General Precautions: Standard,     Orthopedic Precautions:    Braces:    Respiratory Status: Room air - O2 levels at 92-93% at rest with wheezing noted; dropped to 89% while seated EOB and Pt reported SOB. Increased HR to  bpm at rest.     Occupational Performance:    Mobility  Assist level Comments    Bed mobility maximal assist    Transfer moderate assist    Functional mobility minimal assist    Sit to stand transitions moderate assist    Other:          Activities of Daily Living Assist level Comments    Feeding independent    Grooming/hygiene set up    Bathing maximal assist    Upper body dressing minimal assist    Lower body dressing maximal assist    Toileting maximal assist    Toilet transfer moderate assist    Adaptive equipment training     Other:        Physical Exam:  Upper Extremity Range of Motion:  -       Right Upper Extremity: WFL  -       Left Upper Extremity: WFL  Upper Extremity Strength: -       Right Upper Extremity: WFL  -       Left Upper Extremity: WFL    Cognitive/Visual Perceptual:  Cognitive/Psychosocial Skills:     -       Oriented to: Person, Place, Time and Situation       Treatment & Education:  Noted increased BLE swelling/edema with BLEs wrapped with ace bandages.     Patient left supine with all lines intact, call button in reach and bed alarm on    Assessment:     He presents with overall decreased endurance and strengthening affecting his ADL performance. Performance deficits affecting function: weakness, impaired endurance, impaired self care skills, decreased upper extremity function, impaired functional mobilty, decreased lower extremity function, gait instability, decreased safety awareness, edema, impaired balance, pain.      Rehab Prognosis: Good; patient would benefit from acute skilled OT services to address these deficits and reach maximum level of function.       Plan:     Patient to be seen daily to address the above listed problems via self-care/home management, therapeutic activities, therapeutic exercises  · Plan of Care Reviewed with: patient      GOALS:   Multidisciplinary Problems     Occupational Therapy Goals        Problem: Occupational Therapy    Goal Priority Disciplines Outcome Interventions   Occupational Therapy Goal     OT, PT/OT Ongoing, Progressing    Description: Goals to be met by: 6/29/22     Patient will increase functional independence with ADLs by performing:    UE Dressing with Supervision.  LE Dressing with Moderate Assistance.  Toileting from toilet with Moderate Assistance for hygiene and clothing management.   Toilet transfer to bedside commode with Contact Guard Assistance.                       Recommendations:     Discharge Recommendations: TBD  Discharge Equipment Recommendations:  walker,  rolling, bedside commode, wheelchair      Time Tracking:     OT Date of Treatment: 06/22/22  OT Start Time: 1335  OT Stop Time: 1415  OT Total Time (min): 40 min    6/22/2022

## 2022-06-22 NOTE — SUBJECTIVE & OBJECTIVE
"History reviewed. No pertinent past medical history.    History reviewed. No pertinent surgical history.    Review of patient's allergies indicates:   Allergen Reactions    Butorphanol Swelling     "it almost killed me"    Hydrocodone-acetaminophen Other (See Comments)     "They mess with my heart"    Povidone-iodine Shortness Of Breath and Rash       No current facility-administered medications on file prior to encounter.     Current Outpatient Medications on File Prior to Encounter   Medication Sig    atorvastatin (LIPITOR) 80 MG tablet Take 80 mg by mouth once daily.    carvediloL (COREG) 6.25 MG tablet Take 6.25 mg by mouth 2 (two) times daily.    ciprofloxacin HCl (CIPRO) 750 MG tablet Take 750 mg by mouth every 12 (twelve) hours.    furosemide (LASIX) 40 MG tablet Take 40 mg by mouth every morning.    gabapentin (NEURONTIN) 300 MG capsule Take 1 capsule by mouth 2 (two) times a day.    glipiZIDE (GLUCOTROL) 10 MG TR24 Take 10 mg by mouth daily with breakfast.    lisinopriL (PRINIVIL,ZESTRIL) 5 MG tablet Take 5 mg by mouth 2 (two) times a day.    metFORMIN (GLUCOPHAGE) 500 MG tablet Take 500 mg by mouth 2 (two) times a day.    rifAMpin (RIFADIN) 300 MG capsule Take 300 mg by mouth 2 (two) times daily.    tamsulosin (FLOMAX) 0.4 mg Cap Take 1 capsule by mouth once daily.    TOUJEO SOLOSTAR U-300 INSULIN 300 unit/mL (1.5 mL) InPn pen Inject 32 Units into the skin once daily at 6am.     Family History    None       Tobacco Use    Smoking status: Not on file    Smokeless tobacco: Not on file   Substance and Sexual Activity    Alcohol use: Not on file    Drug use: Not on file    Sexual activity: Not on file     Review of Systems   Constitutional:  Negative for fatigue and fever.   HENT:  Negative for congestion, ear pain, postnasal drip, sinus pain and sore throat.    Eyes:  Negative for pain, redness and visual disturbance.   Respiratory:  Negative for cough, shortness of breath and wheezing.    Cardiovascular:  " Negative for chest pain, palpitations and leg swelling.   Gastrointestinal:  Negative for abdominal pain, diarrhea, nausea and vomiting.   Endocrine: Negative for cold intolerance and heat intolerance.   Musculoskeletal:  Positive for back pain and gait problem. Negative for arthralgias, joint swelling and myalgias.   Skin:  Positive for color change. Negative for rash and wound.   Neurological:  Positive for weakness. Negative for dizziness, numbness and headaches.   Objective:     Vital Signs (Most Recent):  Temp: 98.6 °F (37 °C) (22 033)  Pulse: 93 (22)  Resp: 17 (22)  BP: (!) 165/83 (22)  SpO2: 95 % (22)   Vital Signs (24h Range):  Temp:  [98 °F (36.7 °C)-98.8 °F (37.1 °C)] 98.6 °F (37 °C)  Pulse:  [] 93  Resp:  [17-20] 17  SpO2:  [95 %-96 %] 95 %  BP: (139-165)/(73-86) 165/83     Weight: 133.5 kg (294 lb 5 oz)  Body mass index is 42.23 kg/m².    Physical Exam  Constitutional:       General: He is not in acute distress.     Appearance: Normal appearance. He is morbidly obese.   HENT:      Mouth/Throat:      Mouth: Mucous membranes are moist.      Pharynx: Oropharynx is clear. No oropharyngeal exudate or posterior oropharyngeal erythema.   Eyes:      Extraocular Movements: Extraocular movements intact.      Conjunctiva/sclera: Conjunctivae normal.      Pupils: Pupils are equal, round, and reactive to light.   Neck:      Thyroid: No thyromegaly.   Cardiovascular:      Rate and Rhythm: Normal rate and regular rhythm.      Heart sounds: No murmur heard.    No friction rub. No gallop.   Pulmonary:      Breath sounds: Normal breath sounds.   Abdominal:      General: Bowel sounds are normal. There is no distension.      Palpations: Abdomen is soft.      Tenderness: There is no abdominal tenderness.   Musculoskeletal:      Right lower le+ Edema present.      Left lower le+ Edema present.   Lymphadenopathy:      Cervical: No cervical adenopathy.   Skin:      General: Skin is warm.      Findings: No rash.   Neurological:      General: No focal deficit present.      Mental Status: He is oriented to person, place, and time.      Cranial Nerves: No cranial nerve deficit.   Psychiatric:         Mood and Affect: Mood is not anxious or depressed. Affect is not inappropriate.         CRANIAL NERVES     CN III, IV, VI   Pupils are equal, round, and reactive to light.     Significant Labs: All pertinent labs within the past 24 hours have been reviewed.    Significant Imaging: I have reviewed all pertinent imaging results/findings within the past 24 hours.

## 2022-06-22 NOTE — PLAN OF CARE
Problem: Occupational Therapy  Goal: Occupational Therapy Goal  Description: Goals to be met by: 6/29/22     Patient will increase functional independence with ADLs by performing:    UE Dressing with Supervision.  LE Dressing with Moderate Assistance.  Toileting from toilet with Moderate Assistance for hygiene and clothing management.   Toilet transfer to bedside commode with Contact Guard Assistance.    Outcome: Ongoing, Progressing

## 2022-06-23 ENCOUNTER — HOSPITAL ENCOUNTER (INPATIENT)
Facility: HOSPITAL | Age: 62
LOS: 4 days | Discharge: ANOTHER HOSPITAL | DRG: 683 | End: 2022-06-27
Attending: INTERNAL MEDICINE | Admitting: INTERNAL MEDICINE
Payer: MEDICARE

## 2022-06-23 VITALS
BODY MASS INDEX: 42.13 KG/M2 | HEART RATE: 91 BPM | HEIGHT: 70 IN | WEIGHT: 294.31 LBS | TEMPERATURE: 98 F | DIASTOLIC BLOOD PRESSURE: 80 MMHG | SYSTOLIC BLOOD PRESSURE: 176 MMHG | RESPIRATION RATE: 20 BRPM | OXYGEN SATURATION: 97 %

## 2022-06-23 DIAGNOSIS — R33.9 URINARY RETENTION: ICD-10-CM

## 2022-06-23 DIAGNOSIS — I48.91 ATRIAL FIBRILLATION AND FLUTTER: ICD-10-CM

## 2022-06-23 DIAGNOSIS — N17.9 ACUTE KIDNEY INJURY: ICD-10-CM

## 2022-06-23 DIAGNOSIS — R07.9 CHEST PAIN: ICD-10-CM

## 2022-06-23 DIAGNOSIS — I48.92 ATRIAL FIBRILLATION AND FLUTTER: ICD-10-CM

## 2022-06-23 LAB
ANION GAP SERPL CALC-SCNC: 9 MEQ/L
BASOPHILS # BLD AUTO: 0.03 X10(3)/MCL (ref 0–0.2)
BASOPHILS NFR BLD AUTO: 0.3 %
BNP BLD-MCNC: 109.4 PG/ML
BUN SERPL-MCNC: 44.1 MG/DL (ref 8.4–25.7)
CALCIUM SERPL-MCNC: 8.2 MG/DL (ref 8.8–10)
CHLORIDE SERPL-SCNC: 107 MMOL/L (ref 98–107)
CO2 SERPL-SCNC: 22 MMOL/L (ref 23–31)
CREAT SERPL-MCNC: 2.41 MG/DL (ref 0.73–1.18)
CREAT/UREA NIT SERPL: 18
EOSINOPHIL # BLD AUTO: 0.13 X10(3)/MCL (ref 0–0.9)
EOSINOPHIL NFR BLD AUTO: 1.1 %
ERYTHROCYTE [DISTWIDTH] IN BLOOD BY AUTOMATED COUNT: 12 % (ref 11.5–17)
GLUCOSE SERPL-MCNC: 82 MG/DL (ref 82–115)
HCT VFR BLD AUTO: 38.4 % (ref 42–52)
HGB BLD-MCNC: 11.8 GM/DL (ref 14–18)
IMM GRANULOCYTES # BLD AUTO: 0.02 X10(3)/MCL (ref 0–0.02)
IMM GRANULOCYTES NFR BLD AUTO: 0.2 % (ref 0–0.43)
LYMPHOCYTES # BLD AUTO: 2.08 X10(3)/MCL (ref 0.6–4.6)
LYMPHOCYTES NFR BLD AUTO: 18.1 %
MAGNESIUM SERPL-MCNC: 2.3 MG/DL (ref 1.6–2.6)
MCH RBC QN AUTO: 30.6 PG (ref 27–31)
MCHC RBC AUTO-ENTMCNC: 30.7 MG/DL (ref 33–36)
MCV RBC AUTO: 99.5 FL (ref 80–94)
MONOCYTES # BLD AUTO: 1.27 X10(3)/MCL (ref 0.1–1.3)
MONOCYTES NFR BLD AUTO: 11 %
NEUTROPHILS # BLD AUTO: 8 X10(3)/MCL (ref 2.1–9.2)
NEUTROPHILS NFR BLD AUTO: 69.3 %
PLATELET # BLD AUTO: 367 X10(3)/MCL (ref 130–400)
PMV BLD AUTO: 9 FL (ref 9.4–12.4)
POCT GLUCOSE: 109 MG/DL (ref 70–110)
POCT GLUCOSE: 118 MG/DL (ref 70–110)
POCT GLUCOSE: 96 MG/DL (ref 70–110)
POTASSIUM SERPL-SCNC: 4.8 MMOL/L (ref 3.5–5.1)
RBC # BLD AUTO: 3.86 X10(6)/MCL (ref 4.7–6.1)
SODIUM SERPL-SCNC: 138 MMOL/L (ref 136–145)
WBC # SPEC AUTO: 11.5 X10(3)/MCL (ref 4.5–11.5)

## 2022-06-23 PROCEDURE — 25000242 PHARM REV CODE 250 ALT 637 W/ HCPCS: Performed by: INTERNAL MEDICINE

## 2022-06-23 PROCEDURE — 97116 GAIT TRAINING THERAPY: CPT

## 2022-06-23 PROCEDURE — 96375 TX/PRO/DX INJ NEW DRUG ADDON: CPT | Mod: 59

## 2022-06-23 PROCEDURE — 25000003 PHARM REV CODE 250: Performed by: STUDENT IN AN ORGANIZED HEALTH CARE EDUCATION/TRAINING PROGRAM

## 2022-06-23 PROCEDURE — 83880 ASSAY OF NATRIURETIC PEPTIDE: CPT | Performed by: NURSE PRACTITIONER

## 2022-06-23 PROCEDURE — 96361 HYDRATE IV INFUSION ADD-ON: CPT | Mod: 59

## 2022-06-23 PROCEDURE — 36415 COLL VENOUS BLD VENIPUNCTURE: CPT | Performed by: NURSE PRACTITIONER

## 2022-06-23 PROCEDURE — 97530 THERAPEUTIC ACTIVITIES: CPT

## 2022-06-23 PROCEDURE — 85025 COMPLETE CBC W/AUTO DIFF WBC: CPT | Performed by: STUDENT IN AN ORGANIZED HEALTH CARE EDUCATION/TRAINING PROGRAM

## 2022-06-23 PROCEDURE — 63600175 PHARM REV CODE 636 W HCPCS: Performed by: INTERNAL MEDICINE

## 2022-06-23 PROCEDURE — G0378 HOSPITAL OBSERVATION PER HR: HCPCS

## 2022-06-23 PROCEDURE — 25000003 PHARM REV CODE 250: Performed by: INTERNAL MEDICINE

## 2022-06-23 PROCEDURE — 97535 SELF CARE MNGMENT TRAINING: CPT

## 2022-06-23 PROCEDURE — 51702 INSERT TEMP BLADDER CATH: CPT

## 2022-06-23 PROCEDURE — 27000221 HC OXYGEN, UP TO 24 HOURS

## 2022-06-23 PROCEDURE — 51798 US URINE CAPACITY MEASURE: CPT

## 2022-06-23 PROCEDURE — 63600175 PHARM REV CODE 636 W HCPCS: Performed by: NURSE PRACTITIONER

## 2022-06-23 PROCEDURE — 80048 BASIC METABOLIC PNL TOTAL CA: CPT | Performed by: STUDENT IN AN ORGANIZED HEALTH CARE EDUCATION/TRAINING PROGRAM

## 2022-06-23 PROCEDURE — 11000004 HC SNF PRIVATE

## 2022-06-23 PROCEDURE — 94640 AIRWAY INHALATION TREATMENT: CPT

## 2022-06-23 PROCEDURE — 83735 ASSAY OF MAGNESIUM: CPT | Performed by: STUDENT IN AN ORGANIZED HEALTH CARE EDUCATION/TRAINING PROGRAM

## 2022-06-23 PROCEDURE — 94760 N-INVAS EAR/PLS OXIMETRY 1: CPT

## 2022-06-23 RX ORDER — ENOXAPARIN SODIUM 100 MG/ML
40 INJECTION SUBCUTANEOUS EVERY 24 HOURS
Status: DISCONTINUED | OUTPATIENT
Start: 2022-06-23 | End: 2022-06-24

## 2022-06-23 RX ORDER — SODIUM CHLORIDE 0.9 % (FLUSH) 0.9 %
10 SYRINGE (ML) INJECTION
Status: DISCONTINUED | OUTPATIENT
Start: 2022-06-23 | End: 2022-06-27 | Stop reason: HOSPADM

## 2022-06-23 RX ORDER — CARVEDILOL 3.12 MG/1
6.25 TABLET ORAL 2 TIMES DAILY
Status: DISCONTINUED | OUTPATIENT
Start: 2022-06-23 | End: 2022-06-27 | Stop reason: HOSPADM

## 2022-06-23 RX ORDER — ONDANSETRON 2 MG/ML
4 INJECTION INTRAMUSCULAR; INTRAVENOUS EVERY 8 HOURS PRN
Status: CANCELLED | OUTPATIENT
Start: 2022-06-23

## 2022-06-23 RX ORDER — ACETAMINOPHEN 325 MG/1
650 TABLET ORAL EVERY 4 HOURS PRN
Status: CANCELLED | OUTPATIENT
Start: 2022-06-23

## 2022-06-23 RX ORDER — ATORVASTATIN CALCIUM 40 MG/1
80 TABLET, FILM COATED ORAL DAILY
Status: DISCONTINUED | OUTPATIENT
Start: 2022-06-24 | End: 2022-06-27 | Stop reason: HOSPADM

## 2022-06-23 RX ORDER — GLUCAGON 1 MG
1 KIT INJECTION
Status: DISCONTINUED | OUTPATIENT
Start: 2022-06-23 | End: 2022-06-27 | Stop reason: HOSPADM

## 2022-06-23 RX ORDER — TALC
6 POWDER (GRAM) TOPICAL NIGHTLY PRN
Status: DISCONTINUED | OUTPATIENT
Start: 2022-06-23 | End: 2022-06-27 | Stop reason: HOSPADM

## 2022-06-23 RX ORDER — DICLOFENAC SODIUM 10 MG/G
4 GEL TOPICAL 2 TIMES DAILY PRN
Status: CANCELLED | OUTPATIENT
Start: 2022-06-23

## 2022-06-23 RX ORDER — FUROSEMIDE 10 MG/ML
40 INJECTION INTRAMUSCULAR; INTRAVENOUS ONCE
Status: COMPLETED | OUTPATIENT
Start: 2022-06-23 | End: 2022-06-23

## 2022-06-23 RX ORDER — LABETALOL HCL 20 MG/4 ML
20 SYRINGE (ML) INTRAVENOUS ONCE
Status: COMPLETED | OUTPATIENT
Start: 2022-06-23 | End: 2022-06-23

## 2022-06-23 RX ORDER — ATORVASTATIN CALCIUM 40 MG/1
80 TABLET, FILM COATED ORAL DAILY
Status: CANCELLED | OUTPATIENT
Start: 2022-06-24

## 2022-06-23 RX ORDER — IPRATROPIUM BROMIDE AND ALBUTEROL SULFATE 2.5; .5 MG/3ML; MG/3ML
3 SOLUTION RESPIRATORY (INHALATION) EVERY 4 HOURS PRN
Status: CANCELLED | OUTPATIENT
Start: 2022-06-23

## 2022-06-23 RX ORDER — GABAPENTIN 300 MG/1
300 CAPSULE ORAL 2 TIMES DAILY
Status: DISCONTINUED | OUTPATIENT
Start: 2022-06-23 | End: 2022-06-27 | Stop reason: HOSPADM

## 2022-06-23 RX ORDER — IBUPROFEN 200 MG
16 TABLET ORAL
Status: DISCONTINUED | OUTPATIENT
Start: 2022-06-23 | End: 2022-06-27 | Stop reason: HOSPADM

## 2022-06-23 RX ORDER — TAMSULOSIN HYDROCHLORIDE 0.4 MG/1
1 CAPSULE ORAL DAILY
Status: CANCELLED | OUTPATIENT
Start: 2022-06-24

## 2022-06-23 RX ORDER — TALC
6 POWDER (GRAM) TOPICAL NIGHTLY PRN
Status: CANCELLED | OUTPATIENT
Start: 2022-06-23

## 2022-06-23 RX ORDER — ONDANSETRON 2 MG/ML
4 INJECTION INTRAMUSCULAR; INTRAVENOUS EVERY 8 HOURS PRN
Status: DISCONTINUED | OUTPATIENT
Start: 2022-06-23 | End: 2022-06-27 | Stop reason: HOSPADM

## 2022-06-23 RX ORDER — LIDOCAINE 50 MG/G
1 PATCH TOPICAL DAILY PRN
Status: CANCELLED | OUTPATIENT
Start: 2022-06-23

## 2022-06-23 RX ORDER — HYDRALAZINE HYDROCHLORIDE 20 MG/ML
10 INJECTION INTRAMUSCULAR; INTRAVENOUS EVERY 4 HOURS PRN
Status: CANCELLED | OUTPATIENT
Start: 2022-06-23

## 2022-06-23 RX ORDER — ENOXAPARIN SODIUM 100 MG/ML
40 INJECTION SUBCUTANEOUS EVERY 24 HOURS
Status: CANCELLED | OUTPATIENT
Start: 2022-06-23

## 2022-06-23 RX ORDER — ACETAMINOPHEN 325 MG/1
650 TABLET ORAL EVERY 4 HOURS PRN
Status: DISCONTINUED | OUTPATIENT
Start: 2022-06-23 | End: 2022-06-27 | Stop reason: HOSPADM

## 2022-06-23 RX ORDER — INSULIN ASPART 100 [IU]/ML
0-5 INJECTION, SOLUTION INTRAVENOUS; SUBCUTANEOUS
Status: DISCONTINUED | OUTPATIENT
Start: 2022-06-23 | End: 2022-06-27 | Stop reason: HOSPADM

## 2022-06-23 RX ORDER — NIFEDIPINE 30 MG/1
30 TABLET, EXTENDED RELEASE ORAL DAILY
Status: DISCONTINUED | OUTPATIENT
Start: 2022-06-24 | End: 2022-06-24

## 2022-06-23 RX ORDER — IPRATROPIUM BROMIDE AND ALBUTEROL SULFATE 2.5; .5 MG/3ML; MG/3ML
3 SOLUTION RESPIRATORY (INHALATION) EVERY 4 HOURS PRN
Status: DISCONTINUED | OUTPATIENT
Start: 2022-06-23 | End: 2022-06-27 | Stop reason: HOSPADM

## 2022-06-23 RX ORDER — SODIUM CHLORIDE 0.9 % (FLUSH) 0.9 %
10 SYRINGE (ML) INJECTION
Status: CANCELLED | OUTPATIENT
Start: 2022-06-23

## 2022-06-23 RX ORDER — LIDOCAINE 50 MG/G
1 PATCH TOPICAL DAILY PRN
Status: DISCONTINUED | OUTPATIENT
Start: 2022-06-23 | End: 2022-06-27 | Stop reason: HOSPADM

## 2022-06-23 RX ORDER — NIFEDIPINE 30 MG/1
30 TABLET, EXTENDED RELEASE ORAL DAILY
Status: CANCELLED | OUTPATIENT
Start: 2022-06-24

## 2022-06-23 RX ORDER — HYDRALAZINE HYDROCHLORIDE 20 MG/ML
10 INJECTION INTRAMUSCULAR; INTRAVENOUS EVERY 4 HOURS PRN
Status: DISCONTINUED | OUTPATIENT
Start: 2022-06-23 | End: 2022-06-27 | Stop reason: HOSPADM

## 2022-06-23 RX ORDER — IBUPROFEN 200 MG
24 TABLET ORAL
Status: DISCONTINUED | OUTPATIENT
Start: 2022-06-23 | End: 2022-06-27 | Stop reason: HOSPADM

## 2022-06-23 RX ORDER — GLIPIZIDE 5 MG/1
10 TABLET, FILM COATED, EXTENDED RELEASE ORAL
Status: DISCONTINUED | OUTPATIENT
Start: 2022-06-24 | End: 2022-06-27 | Stop reason: HOSPADM

## 2022-06-23 RX ORDER — TAMSULOSIN HYDROCHLORIDE 0.4 MG/1
1 CAPSULE ORAL DAILY
Status: DISCONTINUED | OUTPATIENT
Start: 2022-06-24 | End: 2022-06-27 | Stop reason: HOSPADM

## 2022-06-23 RX ORDER — GLIPIZIDE 5 MG/1
10 TABLET, FILM COATED, EXTENDED RELEASE ORAL
Status: CANCELLED | OUTPATIENT
Start: 2022-06-24

## 2022-06-23 RX ORDER — DICLOFENAC SODIUM 10 MG/G
4 GEL TOPICAL 2 TIMES DAILY PRN
Status: DISCONTINUED | OUTPATIENT
Start: 2022-06-23 | End: 2022-06-27 | Stop reason: HOSPADM

## 2022-06-23 RX ORDER — CARVEDILOL 3.12 MG/1
6.25 TABLET ORAL 2 TIMES DAILY
Status: CANCELLED | OUTPATIENT
Start: 2022-06-23

## 2022-06-23 RX ORDER — GABAPENTIN 300 MG/1
300 CAPSULE ORAL 2 TIMES DAILY
Status: CANCELLED | OUTPATIENT
Start: 2022-06-23

## 2022-06-23 RX ADMIN — GLIPIZIDE 10 MG: 5 TABLET, FILM COATED, EXTENDED RELEASE ORAL at 10:06

## 2022-06-23 RX ADMIN — ATORVASTATIN CALCIUM 80 MG: 40 TABLET, FILM COATED ORAL at 10:06

## 2022-06-23 RX ADMIN — GABAPENTIN 300 MG: 300 CAPSULE ORAL at 10:06

## 2022-06-23 RX ADMIN — GABAPENTIN 300 MG: 300 CAPSULE ORAL at 08:06

## 2022-06-23 RX ADMIN — IPRATROPIUM BROMIDE AND ALBUTEROL SULFATE 3 ML: 2.5; .5 SOLUTION RESPIRATORY (INHALATION) at 08:06

## 2022-06-23 RX ADMIN — FUROSEMIDE 40 MG: 10 INJECTION, SOLUTION INTRAMUSCULAR; INTRAVENOUS at 01:06

## 2022-06-23 RX ADMIN — HYDRALAZINE HYDROCHLORIDE 10 MG: 20 INJECTION INTRAMUSCULAR; INTRAVENOUS at 04:06

## 2022-06-23 RX ADMIN — NIFEDIPINE 30 MG: 30 TABLET, FILM COATED, EXTENDED RELEASE ORAL at 10:06

## 2022-06-23 RX ADMIN — ENOXAPARIN SODIUM 40 MG: 100 INJECTION SUBCUTANEOUS at 04:06

## 2022-06-23 RX ADMIN — MELATONIN TAB 3 MG 6 MG: 3 TAB at 08:06

## 2022-06-23 RX ADMIN — SODIUM CHLORIDE: 9 INJECTION, SOLUTION INTRAVENOUS at 01:06

## 2022-06-23 RX ADMIN — LABETALOL HYDROCHLORIDE 20 MG: 5 INJECTION, SOLUTION INTRAVENOUS at 06:06

## 2022-06-23 RX ADMIN — CARVEDILOL 6.25 MG: 3.12 TABLET, FILM COATED ORAL at 08:06

## 2022-06-23 RX ADMIN — TAMSULOSIN HYDROCHLORIDE 0.4 MG: 0.4 CAPSULE ORAL at 10:06

## 2022-06-23 RX ADMIN — CARVEDILOL 6.25 MG: 3.12 TABLET, FILM COATED ORAL at 10:06

## 2022-06-23 NOTE — CONSULTS
Inpatient consult to Cardiology  Consult performed by: MARY ELLEN Marie  Consult ordered by: Thairy G Reyes, DO         [facility]  Cardiology  Consult Note    Patient Name: Neno Watkins  MRN: [id number]  Admission Date: 6/21/2022  Hospital Length of Stay: 0 days  Code Status: Full Code   Attending Provider: Thairy G Reyes, DO   Consulting Provider: MARY ELLEN Marie  Primary Care Physician: Primary Doctor No  Principal Problem:Fall at home    Patient information was obtained from patient and ER records.     Subjective:   Consultation Reason: Peripheral Workup    HPI:  Mr. Watkins is a 61 year old male, known to Dr. Dejon Dye, who presented to the hospital with worsening bilateral lower extremity edema. This precluded his ability to mobilize for any length of time. Patient is sedentary at baseline, used a cane, now having to use a walker and spends much his time in recliner and bed. He exhibits significant edema of lower extremities and scrotum on exam (6.23.22). Patient does have bilateral lower extremity ACE wrapping in place, which he reports were placed by his PCP. Patient noted to have SHANDRA and is currently with IVF(s) infusing. [facility] is consulted for peripheral workup given his lower extremity weakness and history of venous insufficiency.    PMH: Chronic Venous Insufficiency, Lower Extremity Edema, Hypertension, DM II, Elevated BMI/Obesity, Sedentary Lifestyle  PSH: Tonsillectomy, Thumb Surgery  Family History: Father- Cancer, Mother- Alzheimer's Disease, Daughter- Hyperthyroidism  Social History: Tobacco- Negative, Alcohol- Negative, Substance Abuse- Negative    Previous Cardiac Diagnostics:   Echocardiogram (2.11.21):  The study quality is below average.   Global left ventricular systolic function is normal. The left ventricular ejection fraction is 65%. Optison contrast echo was performed following the intravenous injection of 3ml of Optison diluted into 7ml of saline  "via the right forearm IV access. The ejection fraction using the Fay's equation is 65.8%.  Mild calcification of the aortic valve is noted with adequate cuspal excursion.   Trace mitral and tricuspid regurgitation.   The estimated pulmonary artery systolic pressure is 17 mmHg assuming a right atrial pressure of 3 mmHg.     Review of patient's allergies indicates:   Allergen Reactions    Butorphanol Swelling     "it almost killed me"    Hydrocodone-acetaminophen Other (See Comments)     "They mess with my heart"    Povidone-iodine Shortness Of Breath and Rash       No current facility-administered medications on file prior to encounter.     Current Outpatient Medications on File Prior to Encounter   Medication Sig    atorvastatin (LIPITOR) 80 MG tablet Take 80 mg by mouth once daily.    carvediloL (COREG) 6.25 MG tablet Take 6.25 mg by mouth 2 (two) times daily.    ciprofloxacin HCl (CIPRO) 750 MG tablet Take 750 mg by mouth every 12 (twelve) hours.    furosemide (LASIX) 40 MG tablet Take 40 mg by mouth every morning.    gabapentin (NEURONTIN) 300 MG capsule Take 1 capsule by mouth 2 (two) times a day.    glipiZIDE (GLUCOTROL) 10 MG TR24 Take 10 mg by mouth daily with breakfast.    lisinopriL (PRINIVIL,ZESTRIL) 5 MG tablet Take 5 mg by mouth 2 (two) times a day.    metFORMIN (GLUCOPHAGE) 500 MG tablet Take 500 mg by mouth 2 (two) times a day.    rifAMpin (RIFADIN) 300 MG capsule Take 300 mg by mouth 2 (two) times daily.    tamsulosin (FLOMAX) 0.4 mg Cap Take 1 capsule by mouth once daily.    TOUJEO SOLOSTAR U-300 INSULIN 300 unit/mL (1.5 mL) InPn pen Inject 32 Units into the skin once daily at 6am.     Review of Systems:  Review of Systems   Respiratory: Positive for shortness of breath. Negative for chest tightness.    Cardiovascular: Positive for leg swelling. Negative for chest pain.   All other systems reviewed and are negative.    Objective:     Vital Signs (Most Recent):  Temp: 98.3 °F (36.8 " °C) (06/23/22 0711)  Pulse: 87 (06/23/22 0711)  Resp: (!) 22 (06/23/22 0306)  BP: (!) 169/81 (06/23/22 0711)  SpO2: 98 % (06/23/22 0711) Vital Signs (24h Range):  Temp:  [97.7 °F (36.5 °C)-99.5 °F (37.5 °C)] 98.3 °F (36.8 °C)  Pulse:  [87-96] 87  Resp:  [18-22] 22  SpO2:  [92 %-98 %] 98 %  BP: (143-169)/(68-81) 169/81     Weight: 133.5 kg (294 lb 5 oz)  Body mass index is 42.23 kg/m².    SpO2: 98 %  O2 Device (Oxygen Therapy): nasal cannula      Intake/Output Summary (Last 24 hours) at 6/23/2022 0934  Last data filed at 6/23/2022 0800  Gross per 24 hour   Intake 5385 ml   Output 1600 ml   Net 3785 ml       Lines/Drains/Airways     Drain  Duration                Urethral Catheter 06/23/22 0530 Straight-tip 16 Fr. <1 day          Peripheral Intravenous Line  Duration                Peripheral IV - Single Lumen 06/21/22 1840 20 G Right Antecubital 1 day              Significant Labs:  Recent Results (from the past 72 hour(s))   Comprehensive metabolic panel    Collection Time: 06/21/22  2:45 PM   Result Value Ref Range    Sodium Level 136 136 - 145 mmol/L    Potassium Level 6.1 (H) 3.5 - 5.1 mmol/L    Chloride 103 98 - 107 mmol/L    Carbon Dioxide 21 (L) 23 - 31 mmol/L    Glucose Level 85 82 - 115 mg/dL    Blood Urea Nitrogen 46.3 (H) 8.4 - 25.7 mg/dL    Creatinine 3.28 (H) 0.73 - 1.18 mg/dL    Calcium Level Total 8.9 8.8 - 10.0 mg/dL    Protein Total 8.1 (H) 5.8 - 7.6 gm/dL    Albumin Level 2.8 (L) 3.4 - 4.8 gm/dL    Globulin 5.3 (H) 2.4 - 3.5 gm/dL    Albumin/Globulin Ratio 0.5 (L) 1.1 - 2.0 ratio    Bilirubin Total 0.9 <=1.5 mg/dL    Alkaline Phosphatase 100 40 - 150 unit/L    Alanine Aminotransferase 41 0 - 55 unit/L    Aspartate Aminotransferase 66 (H) 5 - 34 unit/L    Estimated GFR-Non  20 mls/min/1.73/m2   CBC with Differential    Collection Time: 06/21/22  2:45 PM   Result Value Ref Range    WBC 12.6 (H) 4.5 - 11.5 x10(3)/mcL    RBC 4.06 (L) 4.70 - 6.10 x10(6)/mcL    Hgb 12.5 (L) 14.0 - 18.0  gm/dL    Hct 38.9 (L) 42.0 - 52.0 %    MCV 95.8 (H) 80.0 - 94.0 fL    MCH 30.8 27.0 - 31.0 pg    MCHC 32.1 (L) 33.0 - 36.0 mg/dL    RDW 12.1 11.5 - 17.0 %    Platelet 407 (H) 130 - 400 x10(3)/mcL    MPV 9.7 9.4 - 12.4 fL    Neut % 73.1 %    Lymph % 16.7 %    Mono % 8.6 %    Eos % 1.0 %    Basophil % 0.3 %    Lymph # 2.10 0.6 - 4.6 x10(3)/mcL    Neut # 9.2 2.1 - 9.2 x10(3)/mcL    Mono # 1.09 0.1 - 1.3 x10(3)/mcL    Eos # 0.13 0 - 0.9 x10(3)/mcL    Baso # 0.04 0 - 0.2 x10(3)/mcL   Basic metabolic panel    Collection Time: 06/21/22  6:34 PM   Result Value Ref Range    Sodium Level 138 136 - 145 mmol/L    Potassium Level 4.9 3.5 - 5.1 mmol/L    Chloride 103 98 - 107 mmol/L    Carbon Dioxide 24 23 - 31 mmol/L    Glucose Level 82 82 - 115 mg/dL    Blood Urea Nitrogen 45.9 (H) 8.4 - 25.7 mg/dL    Creatinine 2.82 (H) 0.73 - 1.18 mg/dL    BUN/Creatinine Ratio 16     Calcium Level Total 8.9 8.8 - 10.0 mg/dL    Estimated GFR-Non  24 mls/min/1.73/m2    Anion Gap 11.0 mEq/L   Urinalysis, Reflex to Urine Culture Urine, Clean Catch    Collection Time: 06/21/22  8:14 PM    Specimen: Urine   Result Value Ref Range    Color, UA Lluvia (A) Yellow, Colorless, Other, Clear    Appearance, UA Cloudy (A) Clear    Specific Gravity, UA 1.020     pH, UA 5.0 5.0, 5.5, 6.0, 6.5, 7.0, 7.5, 8.0, 8.5    Protein, UA 30  (A) Negative, 300  mg/dL    Glucose, UA Negative Negative, Normal mg/dL    Ketones, UA Negative Negative, +1, +2, +3, +4, +5, >=160, >=80 mg/dL    Blood, UA Large (A) Negative unit/L    Bilirubin, UA Negative Negative mg/dL    Urobilinogen, UA 0.2 0.2, 1.0, Normal mg/dL    Nitrites, UA Negative Negative    Leukocyte Esterase, UA Small (A) Negative, 75  unit/L   Urinalysis, Microscopic    Collection Time: 06/21/22  8:14 PM   Result Value Ref Range    Bacteria, UA Moderate (A) None Seen, Rare, Occasional /HPF    RBC, UA >=100 (A) None Seen, 0-2, 3-5, 0-5 /HPF    WBC, UA 51-99 (A) None Seen, 0-2, 3-5, 0-5 /HPF     Squamous Epithelial Cells, UA None Seen None Seen, Rare, Occasional, Occ /HPF   Urine culture    Collection Time: 06/21/22  8:14 PM    Specimen: Urine   Result Value Ref Range    Urine Culture No Growth At 24 Hours    POCT glucose    Collection Time: 06/21/22 10:34 PM   Result Value Ref Range    POCT Glucose 128 (H) 70 - 110 mg/dL   COVID-19 Rapid Screening    Collection Time: 06/21/22 11:44 PM   Result Value Ref Range    SARS COV-2 MOLECULAR Negative Negative   POCT glucose    Collection Time: 06/22/22  5:28 AM   Result Value Ref Range    POCT Glucose 84 70 - 110 mg/dL   Hemoglobin A1c if not done in past 3 months    Collection Time: 06/22/22  5:33 AM   Result Value Ref Range    Hemoglobin A1c 5.5 <=7.0 %    Estimated Average Glucose 111.2 mg/dL   Comprehensive metabolic panel    Collection Time: 06/22/22  5:33 AM   Result Value Ref Range    Sodium Level 140 136 - 145 mmol/L    Potassium Level 4.8 3.5 - 5.1 mmol/L    Chloride 107 98 - 107 mmol/L    Carbon Dioxide 22 (L) 23 - 31 mmol/L    Glucose Level 70 (L) 82 - 115 mg/dL    Blood Urea Nitrogen 44.2 (H) 8.4 - 25.7 mg/dL    Creatinine 2.63 (H) 0.73 - 1.18 mg/dL    Calcium Level Total 8.1 (L) 8.8 - 10.0 mg/dL    Protein Total 6.5 5.8 - 7.6 gm/dL    Albumin Level 2.2 (L) 3.4 - 4.8 gm/dL    Globulin 4.3 (H) 2.4 - 3.5 gm/dL    Albumin/Globulin Ratio 0.5 (L) 1.1 - 2.0 ratio    Bilirubin Total <0.5 <=1.5 mg/dL    Alkaline Phosphatase 84 40 - 150 unit/L    Alanine Aminotransferase 35 0 - 55 unit/L    Aspartate Aminotransferase 67 (H) 5 - 34 unit/L    Estimated GFR-Non  26 mls/min/1.73/m2   CBC with Differential    Collection Time: 06/22/22  5:33 AM   Result Value Ref Range    WBC 11.4 4.5 - 11.5 x10(3)/mcL    RBC 3.76 (L) 4.70 - 6.10 x10(6)/mcL    Hgb 11.4 (L) 14.0 - 18.0 gm/dL    Hct 36.9 (L) 42.0 - 52.0 %    MCV 98.1 (H) 80.0 - 94.0 fL    MCH 30.3 27.0 - 31.0 pg    MCHC 30.9 (L) 33.0 - 36.0 mg/dL    RDW 11.9 11.5 - 17.0 %    Platelet 372 130 - 400  x10(3)/mcL    MPV 9.2 (L) 9.4 - 12.4 fL    Neut % 66.7 %    Lymph % 19.9 %    Mono % 11.6 %    Eos % 1.3 %    Basophil % 0.3 %    Lymph # 2.27 0.6 - 4.6 x10(3)/mcL    Neut # 7.6 2.1 - 9.2 x10(3)/mcL    Mono # 1.32 (H) 0.1 - 1.3 x10(3)/mcL    Eos # 0.15 0 - 0.9 x10(3)/mcL    Baso # 0.03 0 - 0.2 x10(3)/mcL    IG# 0.02 (H) 0 - 0.0155 x10(3)/mcL    IG% 0.2 0 - 0.43 %   POCT glucose    Collection Time: 06/22/22 10:51 AM   Result Value Ref Range    POCT Glucose 108 70 - 110 mg/dL   POCT glucose    Collection Time: 06/22/22  4:06 PM   Result Value Ref Range    POCT Glucose 127 (H) 70 - 110 mg/dL   Basic Metabolic Panel    Collection Time: 06/23/22  5:55 AM   Result Value Ref Range    Sodium Level 138 136 - 145 mmol/L    Potassium Level 4.8 3.5 - 5.1 mmol/L    Chloride 107 98 - 107 mmol/L    Carbon Dioxide 22 (L) 23 - 31 mmol/L    Glucose Level 82 82 - 115 mg/dL    Blood Urea Nitrogen 44.1 (H) 8.4 - 25.7 mg/dL    Creatinine 2.41 (H) 0.73 - 1.18 mg/dL    BUN/Creatinine Ratio 18     Calcium Level Total 8.2 (L) 8.8 - 10.0 mg/dL    Estimated GFR-Non  29 mls/min/1.73/m2    Anion Gap 9.0 mEq/L   Magnesium    Collection Time: 06/23/22  5:55 AM   Result Value Ref Range    Magnesium Level 2.30 1.60 - 2.60 mg/dL   CBC with Differential    Collection Time: 06/23/22  5:55 AM   Result Value Ref Range    WBC 11.5 4.5 - 11.5 x10(3)/mcL    RBC 3.86 (L) 4.70 - 6.10 x10(6)/mcL    Hgb 11.8 (L) 14.0 - 18.0 gm/dL    Hct 38.4 (L) 42.0 - 52.0 %    MCV 99.5 (H) 80.0 - 94.0 fL    MCH 30.6 27.0 - 31.0 pg    MCHC 30.7 (L) 33.0 - 36.0 mg/dL    RDW 12.0 11.5 - 17.0 %    Platelet 367 130 - 400 x10(3)/mcL    MPV 9.0 (L) 9.4 - 12.4 fL    Neut % 69.3 %    Lymph % 18.1 %    Mono % 11.0 %    Eos % 1.1 %    Basophil % 0.3 %    Lymph # 2.08 0.6 - 4.6 x10(3)/mcL    Neut # 8.0 2.1 - 9.2 x10(3)/mcL    Mono # 1.27 0.1 - 1.3 x10(3)/mcL    Eos # 0.13 0 - 0.9 x10(3)/mcL    Baso # 0.03 0 - 0.2 x10(3)/mcL    IG# 0.02 (H) 0 - 0.0155 x10(3)/mcL    IG% 0.2  0 - 0.43 %     Significant Imaging:   Imaging Results          X-Ray Thoracic Spine AP And Lateral (Final result)  Result time 06/21/22 15:38:27    Final result by Juan Rodriguez MD (06/21/22 15:38:27)                 Impression:      No acute osseous abnormality.      Electronically signed by: Juan Rodriguez  Date:    06/21/2022  Time:    15:38             Narrative:    EXAMINATION:  XR THORACIC SPINE AP LATERAL    CLINICAL HISTORY:  Unspecified fall, initial encounter    COMPARISON:  None.    FINDINGS:  No acute displaced fractures, compression deformities or subluxations.    Fused spine.    Mild multilevel disc space narrowing.    No blastic or lytic lesions.    Soft tissues within normal limits.    Partially visualized lungs clear.                               X-Ray Lumbar Spine Ap And Lateral (Final result)  Result time 06/21/22 15:37:36    Final result by Juan Rodriguez MD (06/21/22 15:37:36)                 Impression:      No acute osseous abnormality.    No significant interval change.      Electronically signed by: Juan Rodriguez  Date:    06/21/2022  Time:    15:37             Narrative:    EXAMINATION:  XR LUMBAR SPINE AP AND LATERAL    CLINICAL HISTORY:  fall with pain;    COMPARISON:  Lumbar spine radiographs 02/03/2021    FINDINGS:  There are the usual 5 nonrib bearing lumbar type vertebral bodies.    No acute displaced fractures, compression deformities or subluxations.    Mild multilevel disc space narrowing.  Lower lumbar predominant facet hypertrophy.    No blastic or lytic lesions.    Surgical clips project over the right upper abdomen.    Soft tissues within normal limits.    The sacrum is partially obscured by overlying stool and bowel gas.                                Last Lipids:  Lab Results   Component Value Date    CHOL 133 06/08/2021    CHOL 152 11/20/2019     Lab Results   Component Value Date    HDL 31 (L) 06/08/2021    HDL 36 (L) 11/20/2019     No results found for: LDLCALC  Lab  Results   Component Value Date    TRIG 348 (H) 06/08/2021    TRIG 371 (H) 11/20/2019     No results found for: CHOLHDL    EKG:      Results for orders placed or performed during the hospital encounter of 06/21/22   EKG 12-lead    Narrative    Test Reason : E87.5,    Vent. Rate : 093 BPM     Atrial Rate : 093 BPM     P-R Int : 142 ms          QRS Dur : 080 ms      QT Int : 354 ms       P-R-T Axes : 066 023 053 degrees     QTc Int : 440 ms    Normal sinus rhythm  Normal ECG  No previous ECGs available    Referred By: DAPHNEY   SELF           Confirmed By:      Physical Exam  Constitutional:       Appearance: He is obese.   HENT:      Head: Normocephalic.      Mouth/Throat:      Mouth: Mucous membranes are moist.      Pharynx: Oropharynx is clear.   Cardiovascular:      Rate and Rhythm: Normal rate and regular rhythm.      Heart sounds: Normal heart sounds.   Pulmonary:      Effort: No respiratory distress.      Breath sounds: Wheezing and rales present.      Comments: Nasal Cannula 2 L/Min  Genitourinary:     Comments: Scrotal Edema. Baldwin Catheter in Place.  Musculoskeletal:      Cervical back: Neck supple.      Right lower leg: Edema present.      Left lower leg: Edema present.   Skin:     General: Skin is warm and dry.      Comments: BLE Edema with wrapping in Place. BLE Warm to Touch.   Neurological:      General: No focal deficit present.      Mental Status: He is alert.   Psychiatric:         Behavior: Behavior normal.       Home Medications:   No current facility-administered medications on file prior to encounter.     Current Outpatient Medications on File Prior to Encounter   Medication Sig Dispense Refill    atorvastatin (LIPITOR) 80 MG tablet Take 80 mg by mouth once daily.      carvediloL (COREG) 6.25 MG tablet Take 6.25 mg by mouth 2 (two) times daily.      ciprofloxacin HCl (CIPRO) 750 MG tablet Take 750 mg by mouth every 12 (twelve) hours.      furosemide (LASIX) 40 MG tablet Take 40 mg by mouth  every morning.      gabapentin (NEURONTIN) 300 MG capsule Take 1 capsule by mouth 2 (two) times a day.      glipiZIDE (GLUCOTROL) 10 MG TR24 Take 10 mg by mouth daily with breakfast.      lisinopriL (PRINIVIL,ZESTRIL) 5 MG tablet Take 5 mg by mouth 2 (two) times a day.      metFORMIN (GLUCOPHAGE) 500 MG tablet Take 500 mg by mouth 2 (two) times a day.      rifAMpin (RIFADIN) 300 MG capsule Take 300 mg by mouth 2 (two) times daily.      tamsulosin (FLOMAX) 0.4 mg Cap Take 1 capsule by mouth once daily.      TOUJEO SOLOSTAR U-300 INSULIN 300 unit/mL (1.5 mL) InPn pen Inject 32 Units into the skin once daily at 6am.       Current Inpatient Medications:    Current Facility-Administered Medications:     0.9%  NaCl infusion, , Intravenous, Continuous, Thairy G Reyes, DO, Last Rate: 100 mL/hr at 06/23/22 0130, New Bag at 06/23/22 0130    acetaminophen tablet 650 mg, 650 mg, Oral, Q4H PRN, Thairy G Reyes, DO, 650 mg at 06/22/22 1607    albuterol-ipratropium 2.5 mg-0.5 mg/3 mL nebulizer solution 3 mL, 3 mL, Nebulization, Q4H PRN, Thairy G Reyes, DO, 3 mL at 06/22/22 2205    atorvastatin tablet 80 mg, 80 mg, Oral, Daily, Thairy G Reyes, DO, 80 mg at 06/22/22 0810    carvediloL tablet 6.25 mg, 6.25 mg, Oral, BID, Thairy G Reyes, DO, 6.25 mg at 06/22/22 2013    dextrose 10% bolus 125 mL, 12.5 g, Intravenous, PRN, Remigiory G Reyes, DO    dextrose 10% bolus 250 mL, 25 g, Intravenous, PRN, Remigiory G Reyes, DO    diclofenac sodium 1 % gel 4 g, 4 g, Topical (Top), BID PRN, Thairy G Reyes, DO    enoxaparin injection 40 mg, 40 mg, Subcutaneous, Daily, Remigiory G Reyes, DO, 40 mg at 06/22/22 1604    gabapentin capsule 300 mg, 300 mg, Oral, BID, Thairy G Reyes, DO, 300 mg at 06/22/22 2013    glipiZIDE 24 hr tablet 10 mg, 10 mg, Oral, Daily with breakfast, Thairy G Reyes, DO    hydrALAZINE injection 10 mg, 10 mg, Intravenous, Q4H PRN, Thairy G Reyes, DO    LIDOcaine 5 % patch 1 patch, 1 patch, Transdermal, Daily PRN,  Thairy G Reyes, DO, 1 patch at 06/21/22 2226    melatonin tablet 6 mg, 6 mg, Oral, Nightly PRN, Thairy G Reyes, DO, 6 mg at 06/22/22 2028    NIFEdipine 24 hr tablet 30 mg, 30 mg, Oral, Daily, Thairy G Reyes, DO, 30 mg at 06/22/22 1052    ondansetron injection 4 mg, 4 mg, Intravenous, Q8H PRN, Thairy G Reyes, DO    sodium chloride 0.9% flush 10 mL, 10 mL, Intravenous, PRN, Thairy G Reyes, DO    tamsulosin 24 hr capsule 0.4 mg, 1 capsule, Oral, Daily, Thairy G Reyes, DO, 0.4 mg at 06/22/22 0810    VTE Risk Mitigation (From admission, onward)         Ordered     enoxaparin injection 40 mg  Daily         06/21/22 1925     IP VTE HIGH RISK PATIENT  Once         06/21/22 1925     Place sequential compression device  Until discontinued         06/21/22 1925              Assessment:   Acute Heart Failure with Preserved EF    - Fluid Overload on Exam    -  EF 65% (February 2021)  Chronic Venous Insufficiency    - Recent Lower Extremity Weakness Precluding Ambulation/Noted Debility  Fall (Mechanical)    - CT Head Negative/Spine Radiograph Negative for Osseus Abnormality  Hypertension  Hyperlipidemia  DM II    - Hgb A1C 5.5%  Chronic Back Pain  Acute Kidney Insufficiency (SHANDRA)- ? Obstructive Etiology  Sedentary Lifestyle  Elevated BMI/Obesity  BPH with Urinary Retention    - Baldwin in Place  Anemia    Plan:   Check Venous/Arterial US of Bilateral Lower Extremities  Check Echocardiogram Re: Edema/Suspect HF  Discontinue IV Fluids  Give Lasix 40 Mg IVP x 1 Dose Today  Follow Renal Function Closely  Ensure Accurate I/O & Daily Weights  Labs in AM (BMP/Mag Level)    Thank you for your consult.     Armando Napier, MARY ELLEN  Cardiology  [facility]  06/23/2022 9:34 AM

## 2022-06-23 NOTE — PT/OT/SLP PROGRESS
Physical Therapy Treatment Note           Name: Neno Watkins    : 1960 (61 y.o.)  MRN: [id number]           TREATMENT SUMMARY AND RECOMMENDATIONS:    PT Received On: 22  PT Start Time: 1115     PT Stop Time: 1133  PT Total Time (min): 18 min     Subjective Assessment:   No complaints  Lethargic   x Awake, alert, cooperative  Uncooperative    Agitated  c/o pain    Appropriate  c/o fatigue    Confused x Treated at bedside     Emotionally labile  Treated in gym/dept.    Impulsive  Other:    Flat affect       Therapy Precautions:    Cognitive deficits  Spinal precautions    Collar - hard  Sternal precautions    Collar - soft   TLSO   x Fall risk  LSO    Hip precautions - posterior  Knee immobilizer    Hip precautions - anterior  WBAT    Impaired communication  Partial weightbearing   x Oxygen  TTWB    PEG tube  NWB    Visual deficits  Other:    Hearing deficits          Treatment Objectives:     Mobility Training:   Assist level Comments    Bed mobility Max a Supine > sit . Requires HHA to pull up to sitting as well as assist with trunk and LE's to complete transfer.    Transfer Mod A Stand pivot transfer from bed > bedside commode. Patient able to stand upright and pivot feet.    Gait     Sit to stand transitions Mod A  From EOB and Bedside commode.    Sitting balance CGA Seated @  EOB   Standing balance      Wheelchair mobility     Car transfer     Other:          Therapeutic Exercise:   Exercise Sets Reps Comments                               Additional Comments:  Patient transferred to recliner from bedside commode stand pivot transfer. Up in chair with legs elevated. Moderate SOB following treatment.     Assessment: Patient tolerated session fair. Difficulty utilizing UE's due to limitations caused by soft tissue approximation. .    PT Plan: Will continue TX plan with focus on strengthening exercises and functional mobility.   Revisions made to plan of care: No    GOALS:   Multidisciplinary  Problems     Physical Therapy Goals        Problem: Physical Therapy    Goal Priority Disciplines Outcome Goal Variances Interventions   Physical Therapy Goal     PT, PT/OT Ongoing, Progressing     Description: Goals to be met by: Discharge     Patient will increase functional independence with mobility by performin. Supine to sit with Modified [place]  2. Sit to supine with Modified [place]  3. Sit to stand transfer with Modified [place]  4. Bed to chair transfer with Modified [place] using Rolling Walker  5. Gait  x 50 feet with Modified [place] using Rolling Walker.   6. Ascend/descend 5 stair with bilateral Handrails Supervision using No Assistive Device.                      Skilled PT Minutes Provided: 18 mins   Communication with Treatment Team: cotreat with [facility] OT  Equipment recommendations:       At end of treatment, patient remained:  x Up in chair     Up in wheelchair in room    In bed    With alarm activated    Bed rails up   x Call bell in reach     Family/friends present    Restraints secured properly    In bathroom with CNA/RN notified   x Nurse aware    In gym with therapist/tech    Other:

## 2022-06-23 NOTE — PROGRESS NOTES
Occupational Therapy  Treatment    Name: Neno Watkins    : 1960 (61 y.o.)  MRN: [id number]           TREATMENT SUMMARY AND RECOMMENDATIONS:      OT Date of Treatment: 22  OT Start Time: 1115  OT Stop Time: 1133  OT Total Time (min): 18 min      Subjective Assessment:   No complaints  Lethargic   x Awake, alert, cooperative  Impulsive    Uncooperative   Flat affect    Agitated x c/o pain    Appropriate  c/o fatigue    Confused x Treated at bedside     Emotionally labile  Treated in gym/dept.      Other:        Therapy Precautions:    Cognitive deficits  Spinal precautions    Collar - hard  Sternal precautions    Collar - soft   TLSO   x Fall risk  LSO    Hip precautions - posterior  Knee immobilizer    Hip precautions - anterior  WBAT    Impaired communication  Partial weightbearing    Oxygen  TTWB    PEG tube  NWB    Visual deficits      Hearing deficits   Other:        Treatment Objectives:     Mobility Training:    Mobility task Assist level Comments    Bed mobility Max A  Supine>EOB    Transfer Mod A  Stand/pivot t/f with RW to BSchair    Sit to stands transitions     Functional mobility     Sitting balance     Standing balance      Other:        ADL Training:    ADL Assist level Comments    Feeding     Grooming/hygiene     Bathing     Upper body dressing     Lower body dressing     Toileting Max A  Pt required clothing assist and wiping following BM. Pt able to tolerate standing with RW for cleaning   Toilet transfer Mod A Stand/pivot t/f with RW to BSC   Adaptive equipment training     Other:         Additional Comments:      Assessment: Patient tolerated session fair.    OT Plan: Continue POC  Revisions made to plan of care: No    GOALS:   Multidisciplinary Problems     Occupational Therapy Goals        Problem: Occupational Therapy    Goal Priority Disciplines Outcome Interventions   Occupational Therapy Goal     OT, PT/OT Ongoing, Progressing    Description: Goals to be met by: 22      Patient will increase functional independence with ADLs by performing:    UE Dressing with Supervision.  LE Dressing with Moderate Assistance.  Toileting from toilet with Moderate Assistance for hygiene and clothing management.   Toilet transfer to bedside commode with Contact Guard Assistance.                     Skilled OT Minutes Provided: 18  Communication with Treatment Team: co-tx with PT     Equipment recommendations:       At end of treatment, patient remained:  x Up in chair     Up in wheelchair in room    In bed   x With alarm activated    Bed rails up   x Call bell in reach     Family/friends present    Restraints secured properly    In bathroom with CNA/RN notified    In gym with PT/PTA/tech    Nurse aware    Other:

## 2022-06-23 NOTE — PT/OT/SLP PROGRESS
Physical Therapy Treatment Note           Name: Neno Watkins    : 1960 (61 y.o.)  MRN: [id number]           TREATMENT SUMMARY AND RECOMMENDATIONS:    PT Received On: 22  PT Start Time: 1425     PT Stop Time: 1440  PT Total Time (min): 15 min     Subjective Assessment:   No complaints  Lethargic   x Awake, alert, cooperative  Uncooperative    Agitated  c/o pain    Appropriate  c/o fatigue    Confused x Treated at bedside     Emotionally labile  Treated in gym/dept.    Impulsive  Other:    Flat affect       Therapy Precautions:    Cognitive deficits  Spinal precautions    Collar - hard  Sternal precautions    Collar - soft   TLSO   x Fall risk  LSO    Hip precautions - posterior  Knee immobilizer    Hip precautions - anterior  WBAT    Impaired communication  Partial weightbearing   x Oxygen  TTWB    PEG tube  NWB    Visual deficits  Other:    Hearing deficits          Treatment Objectives:     Mobility Training:   Assist level Comments    Bed mobility Min A Sit>supine  . Required HHA to descend into bed. Patient was able to manage LE's with min VC. Required min A to reposition in bed.    Transfer     Gait Min A RW x 100 ft with multiple standing rest breaks. Patient reported feeling increased HR and mod SOB. No LOB noted.    Sit to stand transitions Min- Mod A  From recliner . Assisted needed to faciliate fwd lean.    Sitting balance CGA Seated @  EOB   Standing balance      Wheelchair mobility     Car transfer     Other:          Therapeutic Exercise:   Exercise Sets Reps Comments                               Additional Comments:  - Post Walk   HR = 93 bpm   O2 = 95% on 2 L     Assessment: Patient tolerated session well. Initiation of mobility presents difficulty due to trunk range limitations.     PT Plan: Will continue TX plan with focus on improving functional mobility and gait . .   Revisions made to plan of care: No    GOALS:   Multidisciplinary Problems     Physical Therapy Goals      Not on file                Skilled PT Minutes Provided: 15 mins   Communication with Treatment Team: cotreat with DEBORAH OT  Equipment recommendations:       At end of treatment, patient remained:   Up in chair     Up in wheelchair in room   x In bed   x With alarm activated   x Bed rails up   x Call bell in reach     Family/friends present    Restraints secured properly    In bathroom with CNA/RN notified   x Nurse aware    In gym with therapist/tech    Other:

## 2022-06-23 NOTE — H&P
[facility]  History & Physical Examination       Patient: Neno Watkins  MRN: [id number]  STATUS: IP- Swing   DOS: 6/23/2022   PCP: Primary Doctor No      CC: Leg swelling       HISTORY OF PRESENT ILLNESS   61 y.o. male with a history that includes, hypertension, BPH, and morbid obesity, presented to ED s/p falls at home.  Patient reports last of which, he was walking when his right leg gave out and he fell backwards.  Patient could not walk and decided to come to the ED.  In the ED thoracic spine x-rays showed no acute osseous abnormality and lumbar spine x-rays also showed no acute abnormality. CT head was negative.  In the ED patient was found to have several electrolyte abnormalities (K of 6) and SHANDRA with a creatinine of 3.28.  He was admitted to observation and started on IVF resuscitation.  Renal indices were improving, but overnight noted to have urinary retention, requiring placement of Baldwin catheter.  Also noted to have significant bilateral lower extremity swelling, though is chronic in nature.   Additionally patient has documented peripheral arterial disease in his history.  However also began requiring supplemental oxygen due to hypoxia.  Seen by Cardiology this morning, with concerns for fluid overload.  In the interim, due to recurrent falls and deconditioning, patient would benefit from ongoing therapy services, for which he's transferred to swing bed services.      REVIEW OF SYSTEMS   Except as documented, all other systems reviewed and negative       PAST MEDICAL HISTORY   HTN  DM II  Chronic LE venous insufficiency      PAST SURGICAL HISTORY   Tonsillectomy  Thumb surgery      FAMILY HISTORY   Reviewed, negative in relation to patient's current condition.      SOCIAL HISTORY   Denies any alcohol, tobacco or drug use.  Lives in private residence and previously performing ADLs independently      ALLERGIES   Butorphanol, Hydrocodone-acetaminophen, and Povidone-iodine      HOME  MEDICATIONS     Current Outpatient Medications   Medication Instructions    atorvastatin (LIPITOR) 80 mg, Oral, Daily    carvediloL (COREG) 6.25 mg, Oral, 2 times daily    ciprofloxacin HCl (CIPRO) 750 mg, Oral, Every 12 hours (non-standard times)    furosemide (LASIX) 40 mg, Oral, Every morning    gabapentin (NEURONTIN) 300 MG capsule 1 capsule, Oral, 2 times daily    glipiZIDE (GLUCOTROL) 10 mg, Oral, With breakfast    lisinopriL (PRINIVIL,ZESTRIL) 5 mg, Oral, 2 times daily    metFORMIN (GLUCOPHAGE) 500 mg, Oral, 2 times daily    rifAMpin (RIFADIN) 300 mg, Oral, 2 times daily    tamsulosin (FLOMAX) 0.4 mg Cap 1 capsule, Oral, Daily    TOUJEO SOLOSTAR U-300 INSULIN 32 Units, Subcutaneous, Daily          PHYSICAL EXAM   VITALS: T 98.2F   /80   P 91   RR 18   O2 96% on 2L  GENERAL: Awake and in NAD  HEENT: NC/AT, EOMI, PERRL.  NECK: Supple, + JVD  LUNGS: Respirations nonlabored  CVS: RRR, S1S2 positive  GI/: Soft, NT/ND, bowel sounds positive.  EXTREMITIES: Peripheral pulses 2+, 2+ peripheral edema  DERM: Warm, dry.  No rashes or lesions noted.  NEURO: AAOx3, no focal neurologic deficit  PSYCH: Cooperative, appropriate mood and affect       DIAGNOSTICS     Recent Labs     06/22/22  0533 06/23/22  0555   WBC 11.4 11.5   RBC 3.76* 3.86*   HGB 11.4* 11.8*   HCT 36.9* 38.4*   MCV 98.1* 99.5*   MCH 30.3 30.6   MCHC 30.9* 30.7*   RDW 11.9 12.0    367     Recent Labs     06/22/22  0533   HGBA1C 5.5      Recent Labs     06/21/22  1445 06/21/22  1834 06/22/22  0533 06/23/22  0555      < > 140 138   K 6.1*   < > 4.8 4.8   CHLORIDE 103   < > 107 107   CO2 21*   < > 22* 22*   BUN 46.3*   < > 44.2* 44.1*   CREATININE 3.28*   < > 2.63* 2.41*   GLUCOSE 85   < > 70* 82   CALCIUM 8.9   < > 8.1* 8.2*   MG  --   --   --  2.30   ALBUMIN 2.8*  --  2.2*  --    GLOBULIN 5.3*  --  4.3*  --    ALKPHOS 100  --  84  --    ALT 41  --  35  --    AST 66*  --  67*  --    BILITOT 0.9  --  <0.5  --     < > = values in  this interval not displayed.     Recent Labs     06/23/22  1006   .4*            US Lower Extremity Arteries Bilateral  Impression: No hemodynamically significant stenosis demonstrated in the right or left lower extremity arterial system.    US Lower Extremity Veins Bilateral  Impression: No evidence of deep venous thrombosis in either lower extremity.    X-Ray Chest 1 View  Impression: Peribronchial thickening is noted at the right hilum. Developing infectious process is not excluded.      ASSESSMENT   Acute decompensated heart failure, EF unknown  Acute urinary retention, h/o BPH  SHANDRA secondary to above  Physical deconditioning  Degenerative disease of the lumbar spine  IDDM II  Essential HTN    PLAN:   Discontinue IV fluids, will diuresis with 40 IV Lasix  Repeat renal function in AM; will continue to monitor along with Strict I&Os, and daily electrolytes  Appreciate cardiology input  Continue Flomax, and monitor urine out, voiding trial possible in next 24-48hrs.  Otherwise continue previous management, including PT/OT to facilitate functional independence      Prophylaxis: SC Lovenox  Code Status: Full      Josue Francois MD  [facility] Medicine  06/23/2022

## 2022-06-23 NOTE — PROGRESS NOTES
"[facility]  Adult Nutrition  Progress Note    SUMMARY       Recommendations    Recommendation/Intervention: 1. continue 2 gm sodium diet. 2. modified to diabetic, on diabetes medications.  Goals: 75% of meals.  Nutrition Goal Status: new    Assessment and Plan    No new Assessment & Plan notes have been filed under this hospital service since the last note was generated.  Service: Nutrition       Malnutrition Assessment                                       Reason for Assessment    Reason For Assessment: length of stay  Diagnosis: diabetes diagnosis/complications, other (see comments) (Fall at home     Benign prostatic hyperplasia Hypertension  Peripheral arterial occlusive disease    Swelling of lower extremity  Type 2 diabetes mellitus  Hyperkalemia Acute kidney injury     Leukocytosis Bilateral lower extremity edema)  General Information Comments: Pt admitted with Fall at home 6/21/2022    Benign prostatic hyperplasia 6/21/2022    Hypertension 6/21/2022    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 Pt reports intake decrease, eating lunch during rounds. Pt requested ketchup with all meals. Trial boost glucose control and connor. Will adjust meals.    Nutrition Risk Screen    Nutrition Risk Screen: no indicators present    Nutrition/Diet History    Factors Affecting Nutritional Intake: decreased appetite    Anthropometrics    Temp: 98.2 °F (36.8 °C)  Height Method: Stated  Height: 5' 10" (177.8 cm)  Height (inches): 70 in  Weight Method: Bed Scale  Weight: 133.5 kg (294 lb 5 oz)  Weight (lb): 294.32 lb  Ideal Body Weight (IBW), Male: 166 lb  % Ideal Body Weight, Male (lb): 177.3 %  BMI (Calculated): 42.2  BMI " Grade: greater than 40 - morbid obesity       Lab/Procedures/Meds    Pertinent Labs Reviewed: reviewed  Pertinent Labs Comments: 6/23/22: RBC 3.86 L, H/H 11.8/38.4 L, BUN 44.1 H, Crea 2.41 H, Ca 8.2 L  Pertinent Medications Reviewed: reviewed  Pertinent Medications Comments: metformin, glipizide, lasix    Physical Findings/Assessment         Estimated/Assessed Needs    Weight Used For Calorie Calculations: 133.5 kg (294 lb 5 oz)  Energy Calorie Requirements (kcal): 2,141.25 (MSJ x 1.0 stress factor)  Energy Need Method: [place]-St Jeor  Protein Requirements: 107.02 (0.8 gm/kg/CBW)  Weight Used For Protein Calculations: 133.5 kg (294 lb 5 oz)        RDA Method (mL): 2         Nutrition Prescription Ordered    Current Diet Order: 2gm low sodium    Evaluation of Received Nutrient/Fluid Intake    Energy Calories Required: not meeting needs  Protein Required: not meeting needs  Comments: PO intake 25-75% of meals. Pt reports having wound to both feet. Discussed nutriton supplment for wound healing. Pt willing to try anything.  Tolerance: tolerating    Nutrition Risk    Level of Risk/Frequency of Follow-up: low - moderate     Monitor and Evaluation    Food and Nutrient Intake: food and beverage intake  Food and Nutrient Adminstration: diet order     Nutrition Follow-Up    RD Follow-up?: Yes

## 2022-06-23 NOTE — PROGRESS NOTES
Occupational Therapy  Treatment    Name: Neno Watkins    : 1960 (61 y.o.)  MRN: [id number]           TREATMENT SUMMARY AND RECOMMENDATIONS:      OT Date of Treatment: 22  OT Start Time:   OT Stop Time: 1440  OT Total Time (min): 15 min      Subjective Assessment:   No complaints  Lethargic   x Awake, alert, cooperative  Impulsive    Uncooperative   Flat affect    Agitated  c/o pain    Appropriate  c/o fatigue    Confused x Treated at bedside     Emotionally labile  Treated in gym/dept.      Other:        Therapy Precautions:    Cognitive deficits  Spinal precautions    Collar - hard  Sternal precautions    Collar - soft   TLSO   x Fall risk  LSO    Hip precautions - posterior  Knee immobilizer    Hip precautions - anterior  WBAT    Impaired communication  Partial weightbearing    Oxygen  TTWB    PEG tube  NWB    Visual deficits      Hearing deficits   Other:        Treatment Objectives:     Mobility Training:    Mobility task Assist level Comments    Bed mobility Min A  EOB>supine min A   Transfer Min A  Stand/pivot t/f wth RW to EOB    Sit to stands transitions Min-mod A Lifting from low surface    Functional mobility Min A x100 feet with RW; standing rest breaks with c/o elevated HR and SOB    Sitting balance     Standing balance      Other:            Additional Comments:  HR 93 bpm and 95% O2 following gait     Assessment: Patient tolerated session well.    OT Plan: Continue POC  Revisions made to plan of care: No    GOALS:   Multidisciplinary Problems     Occupational Therapy Goals        Problem: Occupational Therapy    Goal Priority Disciplines Outcome Interventions   Occupational Therapy Goal     OT, PT/OT Ongoing, Progressing    Description: Goals to be met by: 22     Patient will increase functional independence with ADLs by performing:    UE Dressing with Supervision.  LE Dressing with Moderate Assistance.  Toileting from toilet with Moderate Assistance for hygiene and clothing  management.   Toilet transfer to bedside commode with Contact Guard Assistance.                     Skilled OT Minutes Provided: 15  Communication with Treatment Team:     Equipment recommendations:       At end of treatment, patient remained:   Up in chair     Up in wheelchair in room   x In bed   x With alarm activated   x Bed rails up   x Call bell in reach     Family/friends present    Restraints secured properly    In bathroom with CNA/RN notified    In gym with PT/PTA/tech    Nurse aware    Other:

## 2022-06-23 NOTE — PLAN OF CARE
Problem: Adult Inpatient Plan of Care  Goal: Plan of Care Review  Outcome: Ongoing, Progressing  Goal: Patient-Specific Goal (Individualized)  Outcome: Ongoing, Progressing  Goal: Absence of Hospital-Acquired Illness or Injury  Outcome: Ongoing, Progressing  Goal: Optimal Comfort and Wellbeing  Outcome: Ongoing, Progressing  Goal: Readiness for Transition of Care  Outcome: Ongoing, Progressing     Problem: Bariatric Environmental Safety  Goal: Safety Maintained with Care  Outcome: Ongoing, Progressing     Problem: Diabetes Comorbidity  Goal: Blood Glucose Level Within Targeted Range  Outcome: Ongoing, Progressing     Problem: Fluid and Electrolyte Imbalance (Acute Kidney Injury/Impairment)  Goal: Fluid and Electrolyte Balance  Outcome: Ongoing, Progressing     Problem: Oral Intake Inadequate (Acute Kidney Injury/Impairment)  Goal: Optimal Nutrition Intake  Outcome: Ongoing, Progressing     Problem: Renal Function Impairment (Acute Kidney Injury/Impairment)  Goal: Effective Renal Function  Outcome: Ongoing, Progressing     Problem: Fall Injury Risk  Goal: Absence of Fall and Fall-Related Injury  Outcome: Ongoing, Progressing     Problem: Skin Injury Risk Increased  Goal: Skin Health and Integrity  Outcome: Ongoing, Progressing     Problem: Impaired Wound Healing  Goal: Optimal Wound Healing  Outcome: Ongoing, Progressing     Problem: Infection  Goal: Absence of Infection Signs and Symptoms  Outcome: Ongoing, Progressing

## 2022-06-23 NOTE — PLAN OF CARE
[facility]  Discharge Final Note    Primary Care Provider: Primary Doctor No    Expected Discharge Date:     Final Discharge Note (most recent)     Final Note - 06/23/22 1029        Final Note    Assessment Type Final Discharge Note     Anticipated Discharge Disposition Swing Bed     What phone number can be called within the next 1-3 days to see how you are doing after discharge? [phone]     Hospital Resources/Appts/Education Provided Provided patient/caregiver with written discharge plan information        Post-Acute Status    Post-Acute Authorization Placement     Post-Acute Placement Status Set-up Complete/Auth obtained     Patient choice form signed by patient/caregiver List from System Post-Acute Care     Discharge Delays None known at this time                 Important Message from Medicare

## 2022-06-23 NOTE — DISCHARGE SUMMARY
[facility]  Discharge Summary - Hospital Medicine     Patient Name: Neno aWtkins  MRN: [id number]  Admit Date: 6/21/2022  Discharge Date:  06/23/2022  Status: OP- Observation   Length of Stay: 0      PHYSICIANS   Admitting Physician: Bruce Jeffries MD  Discharging Physician: Josue Francois MD.  Primary Care Physician: Primary Doctor No  Consults: Cardiology      DISCHARGE DIAGNOSES   Acute decompensated heart failure, EF unknown  Acute urinary retention, h/o BPH  SHANDRA secondary to above  Physical deconditioning  Degenerative disease of the lumbar spine  IDDM II  Essential HTN      PROCEDURES   None      HOSPITAL COURSE    61 y.o. male with a history that includes, hypertension, BPH, and morbid obesity, presented to ED s/p falls at home.  Patient reports last of which, he was walking when his right leg gave out and he fell backwards.  Patient could not walk and decided to come to the ED.  In the ED thoracic spine x-rays showed no acute osseous abnormality and lumbar spine x-rays also showed no acute abnormality. CT head was negative.  In the ED patient was found to have several electrolyte abnormalities (K of 6) and SHANDRA with a creatinine of 3.28.  He was admitted to observation and started on IVF resuscitation.  Renal indices were improving, but overnight noted to have urinary retention, requiring placement of Baldwin catheter.  Also noted to have significant bilateral lower extremity swelling, though is chronic in nature.   Additionally patient has documented peripheral arterial disease in his history.  However also began requiring supplemental oxygen due to hypoxia.  Seen by Cardiology this morning, with concerns for fluid overload.  In the interim, due to recurrent falls and deconditioning, patient would benefit from ongoing therapy services, for which he's transferred to swing bed services.      STATUS  Stable    DISPOSITION  Discharge to Swing bed  unit    DIET  Cardiac    ACTIVITY  As tolerated      FOLLOW-UP   None      DISCHARGE MEDICATION RECONCILIATION     atorvastatin 80 MG tablet  Commonly known as: LIPITOR     carvediloL 6.25 MG tablet  Commonly known as: COREG     ciprofloxacin HCl 750 MG tablet  Commonly known as: CIPRO     furosemide 40 MG tablet  Commonly known as: LASIX     gabapentin 300 MG capsule  Commonly known as: NEURONTIN     glipiZIDE 10 MG Tr24  Commonly known as: GLUCOTROL  Ask about: Which instructions should I use?     lisinopriL 5 MG tablet  Commonly known as: PRINIVIL,ZESTRIL  Ask about: Which instructions should I use?     metFORMIN 500 MG tablet  Commonly known as: GLUCOPHAGE     rifAMpin 300 MG capsule  Commonly known as: RIFADIN     tamsulosin 0.4 mg Cap  Commonly known as: FLOMAX     TOUJEO SOLOSTAR U-300 INSULIN 300 unit/mL (1.5 mL) Inpn pen  Generic drug: insulin glargine (TOUJEO)          PHYSICAL EXAM   VITALS:  Temp: 98.2 °F (36.8 °C) (06/23/22 1113)  Pulse: 86 (06/23/22 1203)  Resp: 20 (06/23/22 1203)  BP: (!) 149/73 (06/23/22 1203)  SpO2: 97 % (06/23/22 1203)    GENERAL: Awake and in NAD  LUNGS: CTA B/L  CVS: Normal rate  GI/: Soft, NT, bowel sounds positive.  EXTREMITIES: No peripheral edema, 1-2+ B/L LE edema  NEURO: AAOx3  PSYCH: Cooperative        Discharge time: 33 minutes     Josue Francois MD  [facility]  06/23/2022        DIAGNOSITCS   CBC:   Recent Labs   Lab 06/21/22  1445 06/22/22  0533 06/23/22  0555   WBC 12.6* 11.4 11.5   HGB 12.5* 11.4* 11.8*   HCT 38.9* 36.9* 38.4*   * 372 367     CMP:   Recent Labs   Lab 06/21/22  1445 06/21/22  1834 06/22/22  0533 06/23/22  0555   CALCIUM 8.9 8.9 8.1* 8.2*   ALBUMIN 2.8*  --  2.2*  --     138 140 138   K 6.1* 4.9 4.8 4.8   CO2 21* 24 22* 22*   BUN 46.3* 45.9* 44.2* 44.1*   CREATININE 3.28* 2.82* 2.63* 2.41*   ALKPHOS 100  --  84  --    ALT 41  --  35  --    AST 66*  --  67*  --    BILITOT 0.9  --  <0.5  --    MG  --   --   --  2.30       Recent  Labs     06/21/22  2234 06/22/22  0528 06/22/22  1051 06/22/22  1606 06/23/22  1139   POCTGLUCOSE 128* 84 108 127* 109        Microbiology Results (last 7 days)     Procedure Component Value Units Date/Time    Urine culture [[id number]] Collected: 06/21/22 2014    Order Status: Completed Specimen: Urine Updated: 06/23/22 0649     Urine Culture No Growth At 24 Hours             X-Ray Chest 1 View  Result Date: 6/23/2022  EXAMINATION: XR CHEST 1 VIEW CLINICAL HISTORY: congestion; TECHNIQUE: Single view of the chest COMPARISON: 08/31/2017 FINDINGS: Peribronchial thickening is noted at the right hilum.  Developing infectious process is not excluded. The cardiomediastinal silhouette is within normal limits. No acute osseous abnormality.   Peribronchial thickening is noted at the right hilum. Developing infectious process is not excluded. Electronically signed by: Lasha Rowe Date:    06/23/2022 Time:    07:02      X-Ray Thoracic Spine AP And Lateral  Result Date: 6/21/2022  EXAMINATION: XR THORACIC SPINE AP LATERAL CLINICAL HISTORY: Unspecified fall, initial encounter COMPARISON: None. FINDINGS: No acute displaced fractures, compression deformities or subluxations. Fused spine. Mild multilevel disc space narrowing. No blastic or lytic lesions. Soft tissues within normal limits. Partially visualized lungs clear.   No acute osseous abnormality. Electronically signed by: Juan Rodriguez Date:    06/21/2022 Time:    15:38      X-Ray Lumbar Spine Ap And Lateral  Result Date: 6/21/2022  EXAMINATION: XR LUMBAR SPINE AP AND LATERAL CLINICAL HISTORY: fall with pain; COMPARISON: Lumbar spine radiographs 02/03/2021 FINDINGS: There are the usual 5 nonrib bearing lumbar type vertebral bodies. No acute displaced fractures, compression deformities or subluxations. Mild multilevel disc space narrowing.  Lower lumbar predominant facet hypertrophy. No blastic or lytic lesions. Surgical clips project over the right upper abdomen. Soft  tissues within normal limits. The sacrum is partially obscured by overlying stool and bowel gas.   No acute osseous abnormality. No significant interval change. Electronically signed by: Juan Rodriguez Date:    06/21/2022 Time:    15:37      CT Head Without Contrast  Result Date: 6/22/2022  EXAMINATION: CT HEAD WITHOUT CONTRAST CLINICAL HISTORY: Head trauma, skull fracture or hematoma (Age 19-64y);pt fell backwards and hit head, no LOC; TECHNIQUE: Sequential axial images were performed of the brain without contrast. Dose product length of 1243 mGycm. Automated exposure control was utilized to minimize radiation dose. COMPARISON: None available. FINDINGS: There is no intracranial mass effect, midline shift, hydrocephalus or hemorrhage. There is no sulcal effacement or low attenuation changes to suggest recent large vessel territory infarction. Chronic similar periventricular and subcortical white matter low attenuation changes are present and are consistent with marked chronic microangiopathic ischemia. The ventricular system and sulcal markings prominence is consistent with atrophy. There is no acute extra axial fluid collection.  There is no acute depressed calvarial fracture.  Visualized paranasal sinuses are clear without mucosal thickening, polypoidal abnormality or air-fluid levels. Mastoid air cells aeration is optimal.   No acute intracranial findings identified. Electronically signed by: Ravi Ortiz Date:    06/22/2022 Time:    15:33      US Lower Extremity Arteries Bilateral  Result Date: 6/23/2022  EXAMINATION: US LOWER EXTREMITY ARTERIES BILATERAL CLINICAL HISTORY: PAD;  Peripheral vascular disease, unspecified TECHNIQUE: Bilateral lower extremity arterial duplex ultrasound examination performed. Multiple gray scale and color doppler images were obtained in addition to waveform analysis. COMPARISON: None FINDINGS: The peak systolic velocities on the right are as follows, in centimeters/second: Common  femoral artery: 41 Superficial femoral artery, proximal: 152 Superficial femoral artery, mid portion: 118 Superficial femoral artery, distal: 123 Popliteal artery: 117 Posterior tibial artery: 121 Anterior tibial artery: 35 The peak systolic velocities on the left are as follows, in centimeters/second: Common femoral artery: 158 Superficial femoral artery, proximal: 152 Superficial femoral artery, mid portion: 148 Superficial femoral artery, distal: 101 Popliteal artery: 140 Posterior tibial artery: 103 Anterior tibial artery: 125 Normal multiphasic arterial waveforms are demonstrated.   No hemodynamically significant stenosis demonstrated in the right or left lower extremity arterial system. Electronically signed by: Bernice Salazar Date:    06/23/2022 Time:    14:06    US Lower Extremity Veins Bilateral  Result Date: 6/23/2022  EXAMINATION: US LOWER EXTREMITY VEINS BILATERAL CLINICAL HISTORY: Swelling; Edema, unspecified TECHNIQUE: Duplex and color flow Doppler and dynamic compression was performed of the bilateral lower extremity veins was performed. COMPARISON: None FINDINGS: Right thigh veins: The common femoral, femoral, popliteal, upper greater saphenous, and deep femoral veins are patent and free of thrombus. The veins are normally compressible and have normal phasic flow and augmentation response. Right calf veins: The visualized calf veins are patent. Left thigh veins: The common femoral, femoral, popliteal, upper greater saphenous, and deep femoral veins are patent and free of thrombus. The veins are normally compressible and have normal phasic flow and augmentation response. Left calf veins: The visualized calf veins are patent. Miscellaneous: None   No evidence of deep venous thrombosis in either lower extremity. Electronically signed by: Britton Bruce MD Date:    06/23/2022 Time:    12:13

## 2022-06-24 LAB
ANION GAP SERPL CALC-SCNC: 9 MEQ/L
BACTERIA UR CULT: NO GROWTH
BSA FOR ECHO PROCEDURE: 2.57 M2
BUN SERPL-MCNC: 54.7 MG/DL (ref 8.4–25.7)
CALCIUM SERPL-MCNC: 8.1 MG/DL (ref 8.8–10)
CHLORIDE SERPL-SCNC: 106 MMOL/L (ref 98–107)
CO2 SERPL-SCNC: 22 MMOL/L (ref 23–31)
CREAT SERPL-MCNC: 2.57 MG/DL (ref 0.73–1.18)
CREAT/UREA NIT SERPL: 21
EJECTION FRACTION: 55 %
GLUCOSE SERPL-MCNC: 86 MG/DL (ref 82–115)
MAGNESIUM SERPL-MCNC: 2.3 MG/DL (ref 1.6–2.6)
POCT GLUCOSE: 105 MG/DL (ref 70–110)
POCT GLUCOSE: 89 MG/DL (ref 70–110)
POCT GLUCOSE: 95 MG/DL (ref 70–110)
POCT GLUCOSE: 98 MG/DL (ref 70–110)
POTASSIUM SERPL-SCNC: 5 MMOL/L (ref 3.5–5.1)
RA PRESSURE: 3 MMHG
SODIUM SERPL-SCNC: 137 MMOL/L (ref 136–145)

## 2022-06-24 PROCEDURE — 94640 AIRWAY INHALATION TREATMENT: CPT

## 2022-06-24 PROCEDURE — 11000004 HC SNF PRIVATE

## 2022-06-24 PROCEDURE — 94760 N-INVAS EAR/PLS OXIMETRY 1: CPT

## 2022-06-24 PROCEDURE — 27000221 HC OXYGEN, UP TO 24 HOURS

## 2022-06-24 PROCEDURE — 63600175 PHARM REV CODE 636 W HCPCS: Performed by: INTERNAL MEDICINE

## 2022-06-24 PROCEDURE — 36410 VNPNXR 3YR/> PHY/QHP DX/THER: CPT

## 2022-06-24 PROCEDURE — 97530 THERAPEUTIC ACTIVITIES: CPT

## 2022-06-24 PROCEDURE — 97165 OT EVAL LOW COMPLEX 30 MIN: CPT

## 2022-06-24 PROCEDURE — 25000242 PHARM REV CODE 250 ALT 637 W/ HCPCS: Performed by: INTERNAL MEDICINE

## 2022-06-24 PROCEDURE — C1751 CATH, INF, PER/CENT/MIDLINE: HCPCS

## 2022-06-24 PROCEDURE — 25000003 PHARM REV CODE 250: Performed by: NURSE PRACTITIONER

## 2022-06-24 PROCEDURE — 97162 PT EVAL MOD COMPLEX 30 MIN: CPT

## 2022-06-24 PROCEDURE — 25000003 PHARM REV CODE 250: Performed by: INTERNAL MEDICINE

## 2022-06-24 PROCEDURE — 51702 INSERT TEMP BLADDER CATH: CPT

## 2022-06-24 PROCEDURE — 83735 ASSAY OF MAGNESIUM: CPT | Performed by: INTERNAL MEDICINE

## 2022-06-24 PROCEDURE — 80048 BASIC METABOLIC PNL TOTAL CA: CPT | Performed by: INTERNAL MEDICINE

## 2022-06-24 RX ORDER — SODIUM CHLORIDE 0.9 % (FLUSH) 0.9 %
10 SYRINGE (ML) INJECTION EVERY 6 HOURS
Status: DISCONTINUED | OUTPATIENT
Start: 2022-06-25 | End: 2022-06-27 | Stop reason: HOSPADM

## 2022-06-24 RX ORDER — SODIUM CHLORIDE 0.9 % (FLUSH) 0.9 %
10 SYRINGE (ML) INJECTION
Status: DISCONTINUED | OUTPATIENT
Start: 2022-06-24 | End: 2022-06-27 | Stop reason: HOSPADM

## 2022-06-24 RX ORDER — NIFEDIPINE 30 MG/1
60 TABLET, EXTENDED RELEASE ORAL DAILY
Status: DISCONTINUED | OUTPATIENT
Start: 2022-06-24 | End: 2022-06-27 | Stop reason: HOSPADM

## 2022-06-24 RX ADMIN — ENOXAPARIN SODIUM 40 MG: 100 INJECTION SUBCUTANEOUS at 05:06

## 2022-06-24 RX ADMIN — NIFEDIPINE 60 MG: 30 TABLET, FILM COATED, EXTENDED RELEASE ORAL at 09:06

## 2022-06-24 RX ADMIN — CARVEDILOL 6.25 MG: 3.12 TABLET, FILM COATED ORAL at 08:06

## 2022-06-24 RX ADMIN — ATORVASTATIN CALCIUM 80 MG: 40 TABLET, FILM COATED ORAL at 09:06

## 2022-06-24 RX ADMIN — IPRATROPIUM BROMIDE AND ALBUTEROL SULFATE 3 ML: 2.5; .5 SOLUTION RESPIRATORY (INHALATION) at 03:06

## 2022-06-24 RX ADMIN — IPRATROPIUM BROMIDE AND ALBUTEROL SULFATE 3 ML: 2.5; .5 SOLUTION RESPIRATORY (INHALATION) at 06:06

## 2022-06-24 RX ADMIN — GABAPENTIN 300 MG: 300 CAPSULE ORAL at 08:06

## 2022-06-24 RX ADMIN — ACETAMINOPHEN 650 MG: 325 TABLET ORAL at 02:06

## 2022-06-24 RX ADMIN — GLIPIZIDE 10 MG: 5 TABLET, FILM COATED, EXTENDED RELEASE ORAL at 09:06

## 2022-06-24 RX ADMIN — MELATONIN TAB 3 MG 6 MG: 3 TAB at 08:06

## 2022-06-24 RX ADMIN — CARVEDILOL 6.25 MG: 3.12 TABLET, FILM COATED ORAL at 09:06

## 2022-06-24 RX ADMIN — TAMSULOSIN HYDROCHLORIDE 0.4 MG: 0.4 CAPSULE ORAL at 09:06

## 2022-06-24 RX ADMIN — GABAPENTIN 300 MG: 300 CAPSULE ORAL at 09:06

## 2022-06-24 NOTE — PLAN OF CARE
[facility]  Initial Discharge Assessment       Primary Care Provider: Primary Doctor No    Admission Diagnosis: Urinary retention [R33.9]    Admission Date: 6/23/2022  Expected Discharge Date:          Payor: [employer] MANAGED MEDICARE / Plan: [employer] MEDICARE HMO / Product Type: Capitation /     Extended Emergency Contact Information  Primary Emergency Contact: Srinivasa Richardson  Mobile Phone: [phone]  Relation: Sister  Secondary Emergency Contact: Annmarie Watkins  Mobile Phone: [phone]  Relation: Spouse  Preferred language: English    Discharge Plan A: Home with family, Home Health         [id number] - [place], [place] - [address] AT [facility] [address]  [address]  [place] [address]  Phone: [phone] Fax: [phone]      Initial Assessment (most recent)     Adult Discharge Assessment - 06/24/22 1121        Discharge Assessment    Assessment Type Discharge Planning Assessment     Confirmed/corrected address, phone number and insurance Yes     Confirmed Demographics Correct on Facesheet     Source of Information patient     If unable to respond/provide information was family/caregiver contacted? Yes     Contact Name/Number Emilie Richardson sister [phone]     Communicated MATT with patient/caregiver Date not available/Unable to determine     Reason For Admission Weakness     Lives With alone     [facility]     Do you expect to return to your current living situation? Yes     Do you have help at home or someone to help you manage your care at home? Yes     Who are your caregiver(s) and their phone number(s)? Emilie Richardson sister [phone]     Prior to hospitilization cognitive status: Alert/Oriented     Current cognitive status: Alert/Oriented     Walking or Climbing Stairs Difficulty ambulation difficulty, requires equipment     Mobility Management cane     Dressing/Bathing Difficulty none     Home Accessibility  wheelchair accessible     Home Layout Able to live on 1st floor     Equipment Currently Used at Home cane, straight     Readmission within 30 days? No     Patient currently being followed by outpatient case management? No     Do you currently have service(s) that help you manage your care at home? No     Do you take prescription medications? Yes     Do you have prescription coverage? Yes     Do you have any problems affording any of your prescribed medications? TBD     Is the patient taking medications as prescribed? yes     Who is going to help you get home at discharge? Emilie Richardson sister [phone]     How do you get to doctors appointments? family or friend will provide     Are you on dialysis? No     Do you take coumadin? No     Discharge Plan A Home with family;Home Health     DME Needed Upon Discharge  walker, standard;bedside commode     Discharge Plan discussed with: Sibling     Name(s) and Number(s) Emilie Richardson sister [phone]

## 2022-06-24 NOTE — PROGRESS NOTES
[facility]  Cardiology  Progress Note    Patient Name: Neno Watkins  MRN: [id number]  Admission Date: 6/23/2022  Hospital Length of Stay: 1 days  Code Status: Full Code   Attending Physician: Josue Francois MD   Primary Care Physician: Primary Doctor No  Expected Discharge Date:   Principal Problem:<principal problem not specified>    Subjective:     HPI:  Mr. Watkins is a 61 year old male, known to Dr. Dejon Dye, who presented to the hospital with worsening bilateral lower extremity edema. This precluded his ability to mobilize for any length of time. Patient is sedentary at baseline, used a cane, now having to use a walker and spends much his time in recliner and bed. He exhibits significant edema of lower extremities and scrotum on exam (6.23.22). Patient does have bilateral lower extremity ACE wrapping in place, which he reports were placed by his PCP. Patient noted to have SHANDRA and is currently with IVF(s) infusing. [facility] is consulted for peripheral workup given his lower extremity weakness and history of venous insufficiency.     PMH: Chronic Venous Insufficiency, Lower Extremity Edema, Hypertension, DM II, Elevated BMI/Obesity, Sedentary Lifestyle  PSH: Tonsillectomy, Thumb Surgery  Family History: Father- Cancer, Mother- Alzheimer's Disease, Daughter- Hyperthyroidism  Social History: Tobacco- Negative, Alcohol- Negative, Substance Abuse- Negative     Previous Cardiac Diagnostics:   Echocardiogram (6.23.22):  The left ventricle is normal in size with normal systolic function.  Normal left ventricular diastolic function.  The estimated ejection fraction is 55%.  Normal right ventricular size with normal right ventricular systolic function.  Normal central venous pressure (3 mmHg).    Peripheral Artery US (6.23.22):  No hemodynamically significant stenosis demonstrated in the right or left lower extremity arterial system.    Peripheral Venous US (6.23.22)  No evidence of  deep venous thrombosis in either lower extremity.     Hospital Course:  6.24.22: NAD Noted. Patient is resting. Denies SOB/CP. Renal function worse. Hemodynamically stable.    Review of Systems   Cardiovascular: Negative for chest pain.   Respiratory: Negative for shortness of breath.      Objective:     Vital Signs (Most Recent):  Temp: 98.8 °F (37.1 °C) (06/24/22 0745)  Pulse: 81 (06/24/22 0745)  Resp: 19 (06/24/22 0405)  BP: (!) 152/74 (06/24/22 0745)  SpO2: 99 % (06/24/22 0745) Vital Signs (24h Range):  Temp:  [98 °F (36.7 °C)-99.4 °F (37.4 °C)] 98.8 °F (37.1 °C)  Pulse:  [81-98] 81  Resp:  [19-22] 19  SpO2:  [94 %-99 %] 99 %  BP: (131-189)/(70-82) 152/74     Weight: (!) 137 kg (302 lb 0.5 oz)  Body mass index is 43.34 kg/m².    SpO2: 99 %  O2 Device (Oxygen Therapy): nasal cannula      Intake/Output Summary (Last 24 hours) at 6/24/2022 0833  Last data filed at 6/24/2022 0500  Gross per 24 hour   Intake 1400 ml   Output 1600 ml   Net -200 ml       Lines/Drains/Airways     Drain  Duration                Urethral Catheter 06/24/22 0230 Straight-tip 20 Fr. <1 day          Peripheral Intravenous Line  Duration                Peripheral IV - Single Lumen 06/21/22 1840 20 G Right Antecubital 2 days              Significant Labs:   Recent Results (from the past 72 hour(s))   Comprehensive metabolic panel    Collection Time: 06/21/22  2:45 PM   Result Value Ref Range    Sodium Level 136 136 - 145 mmol/L    Potassium Level 6.1 (H) 3.5 - 5.1 mmol/L    Chloride 103 98 - 107 mmol/L    Carbon Dioxide 21 (L) 23 - 31 mmol/L    Glucose Level 85 82 - 115 mg/dL    Blood Urea Nitrogen 46.3 (H) 8.4 - 25.7 mg/dL    Creatinine 3.28 (H) 0.73 - 1.18 mg/dL    Calcium Level Total 8.9 8.8 - 10.0 mg/dL    Protein Total 8.1 (H) 5.8 - 7.6 gm/dL    Albumin Level 2.8 (L) 3.4 - 4.8 gm/dL    Globulin 5.3 (H) 2.4 - 3.5 gm/dL    Albumin/Globulin Ratio 0.5 (L) 1.1 - 2.0 ratio    Bilirubin Total 0.9 <=1.5 mg/dL    Alkaline Phosphatase 100 40 - 150  unit/L    Alanine Aminotransferase 41 0 - 55 unit/L    Aspartate Aminotransferase 66 (H) 5 - 34 unit/L    Estimated GFR-Non  20 mls/min/1.73/m2   CBC with Differential    Collection Time: 06/21/22  2:45 PM   Result Value Ref Range    WBC 12.6 (H) 4.5 - 11.5 x10(3)/mcL    RBC 4.06 (L) 4.70 - 6.10 x10(6)/mcL    Hgb 12.5 (L) 14.0 - 18.0 gm/dL    Hct 38.9 (L) 42.0 - 52.0 %    MCV 95.8 (H) 80.0 - 94.0 fL    MCH 30.8 27.0 - 31.0 pg    MCHC 32.1 (L) 33.0 - 36.0 mg/dL    RDW 12.1 11.5 - 17.0 %    Platelet 407 (H) 130 - 400 x10(3)/mcL    MPV 9.7 9.4 - 12.4 fL    Neut % 73.1 %    Lymph % 16.7 %    Mono % 8.6 %    Eos % 1.0 %    Basophil % 0.3 %    Lymph # 2.10 0.6 - 4.6 x10(3)/mcL    Neut # 9.2 2.1 - 9.2 x10(3)/mcL    Mono # 1.09 0.1 - 1.3 x10(3)/mcL    Eos # 0.13 0 - 0.9 x10(3)/mcL    Baso # 0.04 0 - 0.2 x10(3)/mcL   Basic metabolic panel    Collection Time: 06/21/22  6:34 PM   Result Value Ref Range    Sodium Level 138 136 - 145 mmol/L    Potassium Level 4.9 3.5 - 5.1 mmol/L    Chloride 103 98 - 107 mmol/L    Carbon Dioxide 24 23 - 31 mmol/L    Glucose Level 82 82 - 115 mg/dL    Blood Urea Nitrogen 45.9 (H) 8.4 - 25.7 mg/dL    Creatinine 2.82 (H) 0.73 - 1.18 mg/dL    BUN/Creatinine Ratio 16     Calcium Level Total 8.9 8.8 - 10.0 mg/dL    Estimated GFR-Non  24 mls/min/1.73/m2    Anion Gap 11.0 mEq/L   Urinalysis, Reflex to Urine Culture Urine, Clean Catch    Collection Time: 06/21/22  8:14 PM    Specimen: Urine   Result Value Ref Range    Color, UA Lluvia (A) Yellow, Colorless, Other, Clear    Appearance, UA Cloudy (A) Clear    Specific Gravity, UA 1.020     pH, UA 5.0 5.0, 5.5, 6.0, 6.5, 7.0, 7.5, 8.0, 8.5    Protein, UA 30  (A) Negative, 300  mg/dL    Glucose, UA Negative Negative, Normal mg/dL    Ketones, UA Negative Negative, +1, +2, +3, +4, +5, >=160, >=80 mg/dL    Blood, UA Large (A) Negative unit/L    Bilirubin, UA Negative Negative mg/dL    Urobilinogen, UA 0.2 0.2, 1.0, Normal mg/dL     Nitrites, UA Negative Negative    Leukocyte Esterase, UA Small (A) Negative, 75  unit/L   Urinalysis, Microscopic    Collection Time: 06/21/22  8:14 PM   Result Value Ref Range    Bacteria, UA Moderate (A) None Seen, Rare, Occasional /HPF    RBC, UA >=100 (A) None Seen, 0-2, 3-5, 0-5 /HPF    WBC, UA 51-99 (A) None Seen, 0-2, 3-5, 0-5 /HPF    Squamous Epithelial Cells, UA None Seen None Seen, Rare, Occasional, Occ /HPF   Urine culture    Collection Time: 06/21/22  8:14 PM    Specimen: Urine   Result Value Ref Range    Urine Culture No Growth At 24 Hours    POCT glucose    Collection Time: 06/21/22 10:34 PM   Result Value Ref Range    POCT Glucose 128 (H) 70 - 110 mg/dL   COVID-19 Rapid Screening    Collection Time: 06/21/22 11:44 PM   Result Value Ref Range    SARS COV-2 MOLECULAR Negative Negative   POCT glucose    Collection Time: 06/22/22  5:28 AM   Result Value Ref Range    POCT Glucose 84 70 - 110 mg/dL   Hemoglobin A1c if not done in past 3 months    Collection Time: 06/22/22  5:33 AM   Result Value Ref Range    Hemoglobin A1c 5.5 <=7.0 %    Estimated Average Glucose 111.2 mg/dL   Comprehensive metabolic panel    Collection Time: 06/22/22  5:33 AM   Result Value Ref Range    Sodium Level 140 136 - 145 mmol/L    Potassium Level 4.8 3.5 - 5.1 mmol/L    Chloride 107 98 - 107 mmol/L    Carbon Dioxide 22 (L) 23 - 31 mmol/L    Glucose Level 70 (L) 82 - 115 mg/dL    Blood Urea Nitrogen 44.2 (H) 8.4 - 25.7 mg/dL    Creatinine 2.63 (H) 0.73 - 1.18 mg/dL    Calcium Level Total 8.1 (L) 8.8 - 10.0 mg/dL    Protein Total 6.5 5.8 - 7.6 gm/dL    Albumin Level 2.2 (L) 3.4 - 4.8 gm/dL    Globulin 4.3 (H) 2.4 - 3.5 gm/dL    Albumin/Globulin Ratio 0.5 (L) 1.1 - 2.0 ratio    Bilirubin Total <0.5 <=1.5 mg/dL    Alkaline Phosphatase 84 40 - 150 unit/L    Alanine Aminotransferase 35 0 - 55 unit/L    Aspartate Aminotransferase 67 (H) 5 - 34 unit/L    Estimated GFR-Non  26 mls/min/1.73/m2   CBC with Differential     Collection Time: 06/22/22  5:33 AM   Result Value Ref Range    WBC 11.4 4.5 - 11.5 x10(3)/mcL    RBC 3.76 (L) 4.70 - 6.10 x10(6)/mcL    Hgb 11.4 (L) 14.0 - 18.0 gm/dL    Hct 36.9 (L) 42.0 - 52.0 %    MCV 98.1 (H) 80.0 - 94.0 fL    MCH 30.3 27.0 - 31.0 pg    MCHC 30.9 (L) 33.0 - 36.0 mg/dL    RDW 11.9 11.5 - 17.0 %    Platelet 372 130 - 400 x10(3)/mcL    MPV 9.2 (L) 9.4 - 12.4 fL    Neut % 66.7 %    Lymph % 19.9 %    Mono % 11.6 %    Eos % 1.3 %    Basophil % 0.3 %    Lymph # 2.27 0.6 - 4.6 x10(3)/mcL    Neut # 7.6 2.1 - 9.2 x10(3)/mcL    Mono # 1.32 (H) 0.1 - 1.3 x10(3)/mcL    Eos # 0.15 0 - 0.9 x10(3)/mcL    Baso # 0.03 0 - 0.2 x10(3)/mcL    IG# 0.02 (H) 0 - 0.0155 x10(3)/mcL    IG% 0.2 0 - 0.43 %   POCT glucose    Collection Time: 06/22/22 10:51 AM   Result Value Ref Range    POCT Glucose 108 70 - 110 mg/dL   POCT glucose    Collection Time: 06/22/22  4:06 PM   Result Value Ref Range    POCT Glucose 127 (H) 70 - 110 mg/dL   Basic Metabolic Panel    Collection Time: 06/23/22  5:55 AM   Result Value Ref Range    Sodium Level 138 136 - 145 mmol/L    Potassium Level 4.8 3.5 - 5.1 mmol/L    Chloride 107 98 - 107 mmol/L    Carbon Dioxide 22 (L) 23 - 31 mmol/L    Glucose Level 82 82 - 115 mg/dL    Blood Urea Nitrogen 44.1 (H) 8.4 - 25.7 mg/dL    Creatinine 2.41 (H) 0.73 - 1.18 mg/dL    BUN/Creatinine Ratio 18     Calcium Level Total 8.2 (L) 8.8 - 10.0 mg/dL    Estimated GFR-Non  29 mls/min/1.73/m2    Anion Gap 9.0 mEq/L   Magnesium    Collection Time: 06/23/22  5:55 AM   Result Value Ref Range    Magnesium Level 2.30 1.60 - 2.60 mg/dL   CBC with Differential    Collection Time: 06/23/22  5:55 AM   Result Value Ref Range    WBC 11.5 4.5 - 11.5 x10(3)/mcL    RBC 3.86 (L) 4.70 - 6.10 x10(6)/mcL    Hgb 11.8 (L) 14.0 - 18.0 gm/dL    Hct 38.4 (L) 42.0 - 52.0 %    MCV 99.5 (H) 80.0 - 94.0 fL    MCH 30.6 27.0 - 31.0 pg    MCHC 30.7 (L) 33.0 - 36.0 mg/dL    RDW 12.0 11.5 - 17.0 %    Platelet 367 130 - 400  x10(3)/mcL    MPV 9.0 (L) 9.4 - 12.4 fL    Neut % 69.3 %    Lymph % 18.1 %    Mono % 11.0 %    Eos % 1.1 %    Basophil % 0.3 %    Lymph # 2.08 0.6 - 4.6 x10(3)/mcL    Neut # 8.0 2.1 - 9.2 x10(3)/mcL    Mono # 1.27 0.1 - 1.3 x10(3)/mcL    Eos # 0.13 0 - 0.9 x10(3)/mcL    Baso # 0.03 0 - 0.2 x10(3)/mcL    IG# 0.02 (H) 0 - 0.0155 x10(3)/mcL    IG% 0.2 0 - 0.43 %   BNP    Collection Time: 06/23/22 10:06 AM   Result Value Ref Range    Natriuretic Peptide 109.4 (H) <=100.0 pg/mL   POCT glucose    Collection Time: 06/23/22 11:39 AM   Result Value Ref Range    POCT Glucose 109 70 - 110 mg/dL   POCT glucose    Collection Time: 06/23/22  4:43 PM   Result Value Ref Range    POCT Glucose 118 (H) 70 - 110 mg/dL   Echo Saline Bubble? No    Collection Time: 06/23/22  5:14 PM   Result Value Ref Range    BSA 2.57 m2    Right Atrial Pressure (from IVC) 3 mmHg    EF 55 %   POCT glucose    Collection Time: 06/23/22  7:42 PM   Result Value Ref Range    POCT Glucose 96 70 - 110 mg/dL   Magnesium    Collection Time: 06/24/22  4:49 AM   Result Value Ref Range    Magnesium Level 2.30 1.60 - 2.60 mg/dL   Basic Metabolic Panel    Collection Time: 06/24/22  4:49 AM   Result Value Ref Range    Sodium Level 137 136 - 145 mmol/L    Potassium Level 5.0 3.5 - 5.1 mmol/L    Chloride 106 98 - 107 mmol/L    Carbon Dioxide 22 (L) 23 - 31 mmol/L    Glucose Level 86 82 - 115 mg/dL    Blood Urea Nitrogen 54.7 (H) 8.4 - 25.7 mg/dL    Creatinine 2.57 (H) 0.73 - 1.18 mg/dL    BUN/Creatinine Ratio 21     Calcium Level Total 8.1 (L) 8.8 - 10.0 mg/dL    Estimated GFR-Non  27 mls/min/1.73/m2    Anion Gap 9.0 mEq/L   POCT glucose    Collection Time: 06/24/22  5:42 AM   Result Value Ref Range    POCT Glucose 105 70 - 110 mg/dL     Telemetry:  SR 80's    Physical Exam  Constitutional:       General: He is not in acute distress.     Appearance: He is obese.   HENT:      Head: Normocephalic.      Mouth/Throat:      Mouth: Mucous membranes are moist.       Pharynx: Oropharynx is clear.   Cardiovascular:      Rate and Rhythm: Normal rate and regular rhythm.      Heart sounds: Normal heart sounds.   Pulmonary:      Effort: Pulmonary effort is normal. No respiratory distress.      Comments: Faint Expiratory Wheeze Anteriorly. NC 3 L/Min  Abdominal:      Palpations: Abdomen is soft.      Tenderness: There is no abdominal tenderness.      Comments: Obese Abdomen   Musculoskeletal:      Cervical back: Neck supple.      Right lower leg: Edema present.      Left lower leg: Edema present.   Skin:     General: Skin is warm and dry.   Neurological:      General: No focal deficit present.      Comments: Drowsy. Easily Arousable and Appropriate in Conversation.       Current Inpatient Medications:    Current Facility-Administered Medications:     acetaminophen tablet 650 mg, 650 mg, Oral, Q4H PRN, Josue Francois MD, 650 mg at 06/24/22 0257    albuterol-ipratropium 2.5 mg-0.5 mg/3 mL nebulizer solution 3 mL, 3 mL, Nebulization, Q4H PRN, Josue Francois MD, 3 mL at 06/24/22 0318    atorvastatin tablet 80 mg, 80 mg, Oral, Daily, Josue Francois MD    carvediloL tablet 6.25 mg, 6.25 mg, Oral, BID, Josue Francois MD, 6.25 mg at 06/23/22 2006    dextrose 10% bolus 125 mL, 12.5 g, Intravenous, PRN, Josue Francois MD    dextrose 10% bolus 250 mL, 25 g, Intravenous, PRN, Josue Francois MD    diclofenac sodium 1 % gel 4 g, 4 g, Topical (Top), BID PRN, Josue Francois MD    enoxaparin injection 40 mg, 40 mg, Subcutaneous, Daily, Josue Francois MD, 40 mg at 06/23/22 1613    gabapentin capsule 300 mg, 300 mg, Oral, BID, Josue Francois MD, 300 mg at 06/23/22 2006    glipiZIDE 24 hr tablet 10 mg, 10 mg, Oral, Daily with breakfast, Josue Francois MD    glucagon (human recombinant) injection 1 mg, 1 mg, Intramuscular, PRN, Josue Francois MD    glucose chewable tablet 16 g, 16 g, Oral, PRN, Josue Francois MD    glucose chewable tablet 24 g, 24 g, Oral, PRN, Josue Francois MD     hydrALAZINE injection 10 mg, 10 mg, Intravenous, Q4H PRN, Josue Francois MD, 10 mg at 06/23/22 1614    insulin aspart U-100 injection 0-5 Units, 0-5 Units, Subcutaneous, QID (AC + HS) PRN, Josue Francois MD    LIDOcaine 5 % patch 1 patch, 1 patch, Transdermal, Daily PRN, Josue Francois MD    melatonin tablet 6 mg, 6 mg, Oral, Nightly PRN, Josue Francois MD, 6 mg at 06/23/22 2006    NIFEdipine 24 hr tablet 30 mg, 30 mg, Oral, Daily, Josue Francois MD    ondansetron injection 4 mg, 4 mg, Intravenous, Q8H PRN, Josue Francois MD    sodium chloride 0.9% flush 10 mL, 10 mL, Intravenous, PRN, Josue Francois MD    tamsulosin 24 hr capsule 0.4 mg, 1 capsule, Oral, Daily, Josue Francois MD    VTE Risk Mitigation (From admission, onward)         Ordered     enoxaparin injection 40 mg  Daily         06/23/22 1542     IP VTE HIGH RISK PATIENT  Once         06/23/22 1542              Assessment:   Chronic Peripheral Edema     -  EF 55% (CVP 3 mmHg on Echo Results)    -   Chronic Venous Insufficiency    - Venous US Negative for DVT, No Hemodynamically Significant Obstruction on Peripheral Artery US (6.23.22)    - Recent Lower Extremity Weakness Precluding Ambulation  Fall (Mechanical)    - CT Head Negative/Spine Radiograph Negative for Osseus Abnormality  Hypertension  Hyperlipidemia  DM II    - Hgb A1C 5.5%  Chronic Back Pain  Acute Kidney Injury (SHANDRA)- ? Obstructive Etiology    - Worse with Diuretic Trial  Sedentary Lifestyle/Debility  Elevated BMI/Obesity  BPH with Urinary Retention    - Baldwin in Place  Anemia    Plan:   Hold further Diuretics given worsening Renal Indices.  Renal Optimization as per Primary Team  Advance Procardia XL to 60 Mg Daily  Avoid all nephrotoxic Agents including ARB/ACEI (For now)  Follow up with [facility]. Will need BMP prior to office follow up.  Will sign off. Please call if needed.    MARY ELLEN Marie  Cardiology  [facility]  06/24/2022

## 2022-06-24 NOTE — PT/OT/SLP EVAL
Physical Therapy Swingbed Evaluation      Patient Name:  Neno Watkins   MRN:  [id number]    History:     History reviewed. No pertinent past medical history.    History reviewed. No pertinent surgical history.      Subjective     Chief Complaint: Patient reports SOB and Difficulty moving around.   Patient/Family Comments/goals: return home to Mod I level of function  Pain/Comfort:  ·  lower back   · 1/10  · Reposition/ modify sequencing for mobility      Living Environment:  Lives with: wife   Home Environment: mobile home   Previous level of function: Mod I utilizing quad cane . IND with ADLS  Equipment used at home:  .  DME owned (not currently used): quad cane .        Objective:     Communicated with nurse prior to session.  Patient found HOB elevated with    upon PT entry to room.    General Precautions: Standard,     Orthopedic Precautions:    Braces:    Respiratory Status: Nasal cannula, flow 2 L/min     Vitals Value    Room air      Oxygen (L)     Blood pressure     Heart rate         Exams:  · Cognitive Exam:  Patient is oriented to Person, Place, Time and Situation  · RLE ROM: WFL  · RLE Strength: WFL  · LLE ROM: WFL  · LLE Strength: WFL    Functional Mobility:  · Bed Mobility:     · Supine to Sit: moderate assistance  · Sit to Supine: moderate assistance  · Transfers:     · Sit to Stand:  minimum assistance with rolling walker and elevated bed  · Gait: ambulated with RW x 175 ft on level surfaces with chair following.   · Balance: CGA with RW static standing     Therapeutic Activities and Exercises:     Additional information: ambulated with nasal O2 @ 2 L     Patient left HOB elevated with call button in reach.      Assessment:     Neno Watkins is a 61 y.o. male admitted with a medical diagnosis of <principal problem not specified>.  He presents with the following impairments/functional limitations:    decreased functional mobility, gait endurance, and balance. .    Rehab Prognosis: Fair; patient  would benefit from acute skilled PT services to address these deficits and reach maximum level of function.    Recent Surgery: * No surgery found *        Recommendations:     Discharge Recommendations:      Discharge Equipment Recommendations:         Plan:     During this hospitalization, patient to be seen   to address the identified rehab impairments via   and progress toward the following goals:    GOALS:   Multidisciplinary Problems     Physical Therapy Goals        Problem: Physical Therapy    Goal Priority Disciplines Outcome Goal Variances Interventions   Physical Therapy Goal     PT, PT/OT Ongoing, Progressing     Description: Goals to be met by: 07/08/2022    Patient will increase functional independence with mobility by perform:     1. Supine to sit with Modified [place]  2. Sit to supine with Modified [place]  3. Sit to stand transfer with Modified [place]  4. Bed to chair transfer with Modified [place] using Rolling Walker  5. Gait  x 50 feet with Modified [place] using Rolling Walker.   6. Ascend/descend 5 stair with bilateral Handrails Supervision using No Assistive Device.                      Time Tracking:       PT Start Time: 1105     PT Stop Time: 1133  PT Total Time (min): 28 min     Billable Minutes: Evaluation moderate complexity       06/24/2022

## 2022-06-24 NOTE — PROGRESS NOTES
Name: Neno Watkins    : 1960 (61 y.o.)  MRN: [id number]           Patient Unavailable      Patient unable to be seen at this time secondary to: Pt reported fatigue this PM and returned to bed by PT. Continue POC tomorrow.

## 2022-06-24 NOTE — PT/OT/SLP PROGRESS
Physical Therapy Treatment Note           Name: Neno Watkins    : 1960 (61 y.o.)  MRN: [id number]           TREATMENT SUMMARY AND RECOMMENDATIONS:    PT Received On: 22  PT Start Time: 1315     PT Stop Time: 1336  PT Total Time (min): 21 min     Subjective Assessment:   No complaints  Lethargic   x Awake, alert, cooperative  Uncooperative    Agitated  c/o pain    Appropriate  c/o fatigue    Confused x Treated at bedside     Emotionally labile  Treated in gym/dept.    Impulsive  Other:    Flat affect       Therapy Precautions:    Cognitive deficits  Spinal precautions    Collar - hard  Sternal precautions    Collar - soft   TLSO   x Fall risk  LSO    Hip precautions - posterior  Knee immobilizer    Hip precautions - anterior  WBAT    Impaired communication  Partial weightbearing   x Oxygen  TTWB    PEG tube  NWB    Visual deficits  Other:    Hearing deficits          Treatment Objectives:     Mobility Training:   Assist level Comments    Bed mobility Min A Sit>supine  . Assist needed in repositioning of trunk and LE's .    Transfer     Gait Min A RW x 10 ft from recliner to bed. Good balance noted during turns and positioning.    Sit to stand transitions Min- Mod A  From recliner . Assisted needed to faciliate fwd lean.    Sitting balance CGA Seated @  EOB   Standing balance      Wheelchair mobility     Car transfer     Other:          Therapeutic Exercise:   Exercise Sets Reps Comments   B LE  1 20 AAROM                          Additional Comments:  - Post Walk   HR = 95 bpm   O2 = 95% on 2 L     Assessment: Patient tolerated session well. Improved overall mobility today. Mod SOB.     PT Plan: Will continue TX plan with focus on improving functional mobility and gait . .   Revisions made to plan of care: No    GOALS:   Multidisciplinary Problems     Physical Therapy Goals        Problem: Physical Therapy    Goal Priority Disciplines Outcome Goal Variances Interventions   Physical Therapy  Goal     PT, PT/OT Ongoing, Progressing     Description: Goals to be met by: 07/08/2022    Patient will increase functional independence with mobility by perform:     1. Supine to sit with Modified [place]  2. Sit to supine with Modified [place]  3. Sit to stand transfer with Modified [place]  4. Bed to chair transfer with Modified [place] using Rolling Walker  5. Gait  x 50 feet with Modified [place] using Rolling Walker.   6. Ascend/descend 5 stair with bilateral Handrails Supervision using No Assistive Device.                      Skilled PT Minutes Provided: 21 mins   Communication with Treatment Team: cotreat with [facility] OT  Equipment recommendations:       At end of treatment, patient remained:   Up in chair     Up in wheelchair in room   x In bed   x With alarm activated   x Bed rails up   x Call bell in reach     Family/friends present    Restraints secured properly    In bathroom with CNA/RN notified   x Nurse aware    In gym with therapist/tech    Other:

## 2022-06-24 NOTE — PT/OT/SLP EVAL
Occupational Therapy   Swingbed Evaluation    Name: Neno Watkins  MRN: [id number]  Admitting Diagnosis:  Fall at home due to BLE giving out       History:   Neno Watkins is a 61 y.o. male with a medical diagnosis of debility; fall at home.     History reviewed. No pertinent past medical history.    History reviewed. No pertinent surgical history.    Subjective     Chief Complaint: SOB with mobility  Patient/Family Comments/goals: Increase strength to return home    Occupational Profile:  Lives with: alone (wife is caring for ill family members)   Home Environment: Mobile home with 5 steps to enter (+) R handrail   Previous level of function: Mod (I) with quad cane for ambulation however reports lately has been getting harder to ambulate; reports performs ADLs as best as he can but has difficulty as well. Denies driving.   Equipment Used at Home:  cane, quad       Pain/Comfort:  · Pain Rating 1: 3/10  · Location - Orientation 1: upper  · Location 1: back  · Pain Addressed 1: Pre-medicate for activity, Reposition  · Location - Side 2: Bilateral  · Location 2: leg  · Pain Addressed 2: Reposition        Objective:      Communicated with: RN and CNA prior to session.  Patient found supine with bed alarm upon OT entry to room.     General Precautions: Standard  Respiratory Status: 2L nasal cannula - O2 levels at 92-93% following gait - Pt reported SOB      Occupational Performance:     Mobility  Assist level Comments    Bed mobility maximal assist     Transfer moderate assist     Functional mobility minimal assist     Sit to stand transitions moderate assist     Other:              Activities of Daily Living Assist level Comments    Feeding independent     Grooming/hygiene set up     Bathing maximal assist     Upper body dressing minimal assist     Lower body dressing maximal assist     Toileting maximal assist     Toilet transfer moderate assist     Adaptive equipment training       Other:          Physical  Exam:  Upper Extremity Range of Motion:  -       Right Upper Extremity: WFL  -       Left Upper Extremity: WFL  Upper Extremity Strength: -       Right Upper Extremity: WFL  -       Left Upper Extremity: WFL     Cognitive/Visual Perceptual:  Cognitive/Psychosocial Skills:     -       Oriented to: Person, Place, Time and Situation         Treatment & Education:  Noted increased BLE swelling/edema with BLEs wrapped with ace bandages.      Patient left supine with all lines intact, call button in reach and bed alarm on     Assessment:      He presents with overall decreased endurance and strengthening affecting his ADL performance. Performance deficits affecting function: weakness, impaired endurance, impaired self care skills, decreased upper extremity function, impaired functional mobilty, decreased lower extremity function, gait instability, decreased safety awareness, edema, impaired balance, pain.       Rehab Prognosis: Good; patient would benefit from acute skilled OT services to address these deficits and reach maximum level of function.         Plan:     Patient to be seen daily to address the above listed problems via self-care/home management, therapeutic activities, therapeutic exercises  · Plan of Care Reviewed with: patient      GOALS:   Multidisciplinary Problems     Occupational Therapy Goals        Problem: Occupational Therapy    Goal Priority Disciplines Outcome Interventions   Occupational Therapy Goal     OT, PT/OT Ongoing, Progressing    Description: Goals to be met by: 7/8/22     Patient will increase functional independence with ADLs by performing:    UE Dressing with Modified [place].  LE Dressing with Stand-by Assistance.  Grooming while standing at sink with Stand-by Assistance.  Toileting from bedside commode with Minimal Assistance for hygiene and clothing management.   Bathing from  shower chair/bench with Minimal Assistance.  Toilet transfer to bedside commode with Stand-by  Assistance.                       Recommendations:     Discharge Recommendations: home with home health  Discharge Equipment Recommendations:  walker, rolling, wheelchair, bedside commode      Time Tracking:     OT Date of Treatment: 06/24/22  OT Start Time: 1105  OT Stop Time: 1133  OT Total Time (min): 28 min      6/24/2022

## 2022-06-24 NOTE — PROGRESS NOTES
"[facility]  Adult Nutrition  Progress Note    SUMMARY       Recommendations    Recommendation/Intervention: 1. continue 2 gm low sodium 2. add boost glucose control per patient preference  Nutrition Goal Status: progressing towards goal    Assessment and Plan    No new Assessment & Plan notes have been filed under this hospital service since the last note was generated.  Service: Nutrition       Malnutrition Assessment                                       Reason for Assessment    General Information Comments: 6/24/22: Pt eating breakfast during rounds. Pt requested ketchup with meal. Pt liked boost glucose control-chocolate flavor. Will add to menus.    Nutrition Risk Screen    Nutrition Risk Screen: no indicators present    Nutrition/Diet History         Anthropometrics    Temp: 98.8 °F (37.1 °C)  Height: 5' 10" (177.8 cm)  Height (inches): 70 in  Weight Method: Bed Scale  Weight: (!) 137 kg (302 lb 0.5 oz)  Weight (lb): (!) 302.03 lb  Ideal Body Weight (IBW), Male: 166 lb  % Ideal Body Weight, Male (lb): 177.3 %  BMI (Calculated): 43.3       Lab/Procedures/Meds         Physical Findings/Assessment         Estimated/Assessed Needs                                   Nutrition Prescription Ordered         Evaluation of Received Nutrient/Fluid Intake    Tolerance: tolerating    Nutrition Risk    Level of Risk/Frequency of Follow-up: low - moderate     Monitor and Evaluation    Food and Nutrient Intake: food and beverage intake  Food and Nutrient Adminstration: diet order     Nutrition Follow-Up    RD Follow-up?: Yes    "

## 2022-06-24 NOTE — PLAN OF CARE
Problem: Adult Inpatient Plan of Care  Goal: Plan of Care Review  Outcome: Ongoing, Progressing  Goal: Patient-Specific Goal (Individualized)  Outcome: Ongoing, Progressing  Goal: Absence of Hospital-Acquired Illness or Injury  Outcome: Ongoing, Progressing  Goal: Optimal Comfort and Wellbeing  Outcome: Ongoing, Progressing  Goal: Readiness for Transition of Care  Outcome: Ongoing, Progressing     Problem: Diabetes Comorbidity  Goal: Blood Glucose Level Within Targeted Range  Outcome: Ongoing, Progressing     Problem: Fluid and Electrolyte Imbalance (Acute Kidney Injury/Impairment)  Goal: Fluid and Electrolyte Balance  Outcome: Ongoing, Progressing     Problem: Oral Intake Inadequate (Acute Kidney Injury/Impairment)  Goal: Optimal Nutrition Intake  Outcome: Ongoing, Progressing     Problem: Renal Function Impairment (Acute Kidney Injury/Impairment)  Goal: Effective Renal Function  Outcome: Ongoing, Progressing     Problem: Bariatric Environmental Safety  Goal: Safety Maintained with Care  Outcome: Ongoing, Progressing     Problem: Impaired Wound Healing  Goal: Optimal Wound Healing  Outcome: Ongoing, Progressing     Problem: Infection  Goal: Absence of Infection Signs and Symptoms  Outcome: Ongoing, Progressing     Problem: Skin Injury Risk Increased  Goal: Skin Health and Integrity  Outcome: Ongoing, Progressing     Problem: Fall Injury Risk  Goal: Absence of Fall and Fall-Related Injury  Outcome: Ongoing, Progressing

## 2022-06-24 NOTE — PLAN OF CARE
Problem: Occupational Therapy  Goal: Occupational Therapy Goal  Description: Goals to be met by: 7/8/22     Patient will increase functional independence with ADLs by performing:    UE Dressing with Modified [place].  LE Dressing with Stand-by Assistance.  Grooming while standing at sink with Stand-by Assistance.  Toileting from bedside commode with Minimal Assistance for hygiene and clothing management.   Bathing from  shower chair/bench with Minimal Assistance.  Toilet transfer to bedside commode with Stand-by Assistance.    Outcome: Ongoing, Progressing

## 2022-06-24 NOTE — PLAN OF CARE
Problem: Physical Therapy  Goal: Physical Therapy Goal  Description: Goals to be met by: 07/08/2022    Patient will increase functional independence with mobility by perform:     1. Supine to sit with Modified [place]  2. Sit to supine with Modified [place]  3. Sit to stand transfer with Modified [place]  4. Bed to chair transfer with Modified [place] using Rolling Walker  5. Gait  x 200 feet with Modified [place] using Rolling Walker.   6. Ascend/descend 5 stair with bilateral Handrails Supervision using No Assistive Device.     Outcome: Ongoing, Progressing

## 2022-06-25 LAB
ANION GAP SERPL CALC-SCNC: 9 MEQ/L
BUN SERPL-MCNC: 65.4 MG/DL (ref 8.4–25.7)
CALCIUM SERPL-MCNC: 8.2 MG/DL (ref 8.8–10)
CHLORIDE SERPL-SCNC: 107 MMOL/L (ref 98–107)
CO2 SERPL-SCNC: 22 MMOL/L (ref 23–31)
CREAT SERPL-MCNC: 3.01 MG/DL (ref 0.73–1.18)
CREAT/UREA NIT SERPL: 22
ERYTHROCYTE [DISTWIDTH] IN BLOOD BY AUTOMATED COUNT: 12.2 % (ref 11.5–17)
GLUCOSE SERPL-MCNC: 68 MG/DL (ref 82–115)
HCT VFR BLD AUTO: 34.7 % (ref 42–52)
HGB BLD-MCNC: 10.7 GM/DL (ref 14–18)
MAGNESIUM SERPL-MCNC: 2.4 MG/DL (ref 1.6–2.6)
MCH RBC QN AUTO: 30.6 PG (ref 27–31)
MCHC RBC AUTO-ENTMCNC: 30.8 MG/DL (ref 33–36)
MCV RBC AUTO: 99.1 FL (ref 80–94)
PLATELET # BLD AUTO: 332 X10(3)/MCL (ref 130–400)
PMV BLD AUTO: 9 FL (ref 9.4–12.4)
POCT GLUCOSE: 105 MG/DL (ref 70–110)
POCT GLUCOSE: 67 MG/DL (ref 70–110)
POCT GLUCOSE: 67 MG/DL (ref 70–110)
POCT GLUCOSE: 72 MG/DL (ref 70–110)
POCT GLUCOSE: 72 MG/DL (ref 70–110)
POCT GLUCOSE: 93 MG/DL (ref 70–110)
POTASSIUM SERPL-SCNC: 5.1 MMOL/L (ref 3.5–5.1)
RBC # BLD AUTO: 3.5 X10(6)/MCL (ref 4.7–6.1)
SODIUM SERPL-SCNC: 138 MMOL/L (ref 136–145)
WBC # SPEC AUTO: 12.2 X10(3)/MCL (ref 4.5–11.5)

## 2022-06-25 PROCEDURE — 85027 COMPLETE CBC AUTOMATED: CPT | Performed by: INTERNAL MEDICINE

## 2022-06-25 PROCEDURE — 36410 VNPNXR 3YR/> PHY/QHP DX/THER: CPT

## 2022-06-25 PROCEDURE — 25000003 PHARM REV CODE 250: Performed by: NURSE PRACTITIONER

## 2022-06-25 PROCEDURE — 80048 BASIC METABOLIC PNL TOTAL CA: CPT | Performed by: INTERNAL MEDICINE

## 2022-06-25 PROCEDURE — 11000004 HC SNF PRIVATE

## 2022-06-25 PROCEDURE — 27000221 HC OXYGEN, UP TO 24 HOURS

## 2022-06-25 PROCEDURE — 25000003 PHARM REV CODE 250: Performed by: INTERNAL MEDICINE

## 2022-06-25 PROCEDURE — 94640 AIRWAY INHALATION TREATMENT: CPT

## 2022-06-25 PROCEDURE — 25000242 PHARM REV CODE 250 ALT 637 W/ HCPCS: Performed by: INTERNAL MEDICINE

## 2022-06-25 PROCEDURE — 36415 COLL VENOUS BLD VENIPUNCTURE: CPT | Performed by: INTERNAL MEDICINE

## 2022-06-25 PROCEDURE — 94760 N-INVAS EAR/PLS OXIMETRY 1: CPT

## 2022-06-25 PROCEDURE — A4216 STERILE WATER/SALINE, 10 ML: HCPCS | Performed by: INTERNAL MEDICINE

## 2022-06-25 PROCEDURE — 83735 ASSAY OF MAGNESIUM: CPT | Performed by: INTERNAL MEDICINE

## 2022-06-25 PROCEDURE — 97110 THERAPEUTIC EXERCISES: CPT

## 2022-06-25 PROCEDURE — C1751 CATH, INF, PER/CENT/MIDLINE: HCPCS

## 2022-06-25 PROCEDURE — 97530 THERAPEUTIC ACTIVITIES: CPT

## 2022-06-25 RX ORDER — SODIUM CHLORIDE 9 MG/ML
INJECTION, SOLUTION INTRAVENOUS CONTINUOUS
Status: DISCONTINUED | OUTPATIENT
Start: 2022-06-25 | End: 2022-06-27 | Stop reason: HOSPADM

## 2022-06-25 RX ADMIN — CARVEDILOL 6.25 MG: 3.12 TABLET, FILM COATED ORAL at 08:06

## 2022-06-25 RX ADMIN — TAMSULOSIN HYDROCHLORIDE 0.4 MG: 0.4 CAPSULE ORAL at 09:06

## 2022-06-25 RX ADMIN — IPRATROPIUM BROMIDE AND ALBUTEROL SULFATE 3 ML: 2.5; .5 SOLUTION RESPIRATORY (INHALATION) at 06:06

## 2022-06-25 RX ADMIN — SODIUM CHLORIDE, PRESERVATIVE FREE 10 ML: 5 INJECTION INTRAVENOUS at 06:06

## 2022-06-25 RX ADMIN — NIFEDIPINE 60 MG: 30 TABLET, FILM COATED, EXTENDED RELEASE ORAL at 09:06

## 2022-06-25 RX ADMIN — GABAPENTIN 300 MG: 300 CAPSULE ORAL at 08:06

## 2022-06-25 RX ADMIN — GABAPENTIN 300 MG: 300 CAPSULE ORAL at 09:06

## 2022-06-25 RX ADMIN — IPRATROPIUM BROMIDE AND ALBUTEROL SULFATE 3 ML: 2.5; .5 SOLUTION RESPIRATORY (INHALATION) at 12:06

## 2022-06-25 RX ADMIN — IPRATROPIUM BROMIDE AND ALBUTEROL SULFATE 3 ML: 2.5; .5 SOLUTION RESPIRATORY (INHALATION) at 07:06

## 2022-06-25 RX ADMIN — GLIPIZIDE 10 MG: 5 TABLET, FILM COATED, EXTENDED RELEASE ORAL at 09:06

## 2022-06-25 RX ADMIN — SODIUM CHLORIDE, PRESERVATIVE FREE 10 ML: 5 INJECTION INTRAVENOUS at 11:06

## 2022-06-25 RX ADMIN — SODIUM CHLORIDE: 9 INJECTION, SOLUTION INTRAVENOUS at 10:06

## 2022-06-25 RX ADMIN — ATORVASTATIN CALCIUM 80 MG: 40 TABLET, FILM COATED ORAL at 09:06

## 2022-06-25 RX ADMIN — SODIUM CHLORIDE: 9 INJECTION, SOLUTION INTRAVENOUS at 11:06

## 2022-06-25 RX ADMIN — SODIUM CHLORIDE, PRESERVATIVE FREE 10 ML: 5 INJECTION INTRAVENOUS at 12:06

## 2022-06-25 RX ADMIN — CARVEDILOL 6.25 MG: 3.12 TABLET, FILM COATED ORAL at 09:06

## 2022-06-25 RX ADMIN — MELATONIN TAB 3 MG 6 MG: 3 TAB at 08:06

## 2022-06-25 RX ADMIN — SODIUM CHLORIDE, PRESERVATIVE FREE 10 ML: 5 INJECTION INTRAVENOUS at 05:06

## 2022-06-25 NOTE — PT/OT/SLP PROGRESS
Occupational Therapy  Treatment    Name: Neno Watkins    : 1960 (61 y.o.)  MRN: [id number]          TREATMENT SUMMARY AND RECOMMENDATIONS:      Subjective Assessment:   No complaints  Lethargic   x Awake, alert, cooperative  Uncooperative    Agitated x Flat affect   x Appropriate  c/o fatigue    Confused x Treated at bedside     Emotionally liable  Treated in gym/dept.    Impulsive x Other:Requires encouragement       Pain/Comfort:  · Pain Rating 1: 4/10  · Location - Side 1: Bilateral  · Location 1: back  · Pain Addressed 1: Reposition, Distraction      Therapy Precautions:    Cognitive deficits  Spinal precautions    Collar - hard  Sternal precautions    Collar - soft   TLSO   x Fall risk  LSO    Hip precautions - posterior  Knee immobilizer    Hip precautions - anterior  WBAT    Impaired communication  Partial weightbearing   x Oxygen  TTWB    PEG tube  NWB    Visual deficits  Other:    Hearing deficits           Vitals Value    Room air     2lt Oxygen (L) 94%    Blood pressure     Heart rate         Treatment Objectives:     Mobility Training:    Mobility task Assist level Comments    Bed mobility Mod Transfer training supine too sit mod assist x2 with HOB elevated   Balance training     Transfer Min assist using RW Transfer training bed to recliner min assist for walker function and verbal cuing for hand placement   Functional mobility Min with Rw Functional mobility 150 feet min assist following with wc and O2, required 3 standing rest breaks.   Sit to stand transitions     Other:       ADL Training:    ADL Assist level Comments    Feeding     Grooming/hygiene     Bathing     Upper body dressing     Lower body dressing     Toileting     Toilet transfer     Adaptive equipment training     Other:           Therapeutic Exercise:   Exercise Sets Reps Comments   BLE there ex 2` 10 SLR, Knee flex/ext, ab/ad, ankle pumps with min tactile assist                         Additional Treatment:  Educated pt on  breath control and purse lip breathing ex's. during activity.    Assessment: Patient tolerated session fair.  Pt requiring encouragement to transfer out of bed.  Noted pt with fear of falling and anxiety with transfers.  O2 92% or better during session.    OT Plan: Cont POC.      GOALS:   Multidisciplinary Problems     Occupational Therapy Goals        Problem: Occupational Therapy    Goal Priority Disciplines Outcome Interventions   Occupational Therapy Goal     OT, PT/OT Ongoing, Progressing    Description: Goals to be met by: 7/8/22     Patient will increase functional independence with ADLs by performing:    UE Dressing with Modified [place].  LE Dressing with Stand-by Assistance.  Grooming while standing at sink with Stand-by Assistance.  Toileting from bedside commode with Minimal Assistance for hygiene and clothing management.   Bathing from  shower chair/bench with Minimal Assistance.  Toilet transfer to bedside commode with Stand-by Assistance.                     Communication with Treatment Team:     Discharge Recommendations: home with home health  Discharge Equipment Recommendations:  walker, rolling, wheelchair, bedside commode  Barriers to discharge:         At end of treatment, patient remained:  x Up in chair     In bed   x With alarm activated    Bed rails up   x Call bell in reach     Family/friends present    Restraints secured properly    In bathroom with CNA/RN notified    In gym with PT/PTA/tech   x Nurse aware    Other:         OT Date of Treatment: 06/25/22  OT Start Time: 1035  OT Stop Time: 1105  OT Total Time (min): 30 min    Billable Minutes:Therapeutic Activity 20  Therapeutic Exercise 10      6/25/2022

## 2022-06-25 NOTE — PROCEDURES
"Neno Watkins is a 61 y.o. male patient.    Temp: 98.5 °F (36.9 °C) (06/24/22 1545)  Pulse: 90 (06/24/22 1859)  Resp: 20 (06/24/22 1859)  BP: (!) 159/83 (06/24/22 1545)  SpO2: 97 % (06/24/22 1859)  Weight: (!) 137 kg (302 lb 0.5 oz) (06/24/22 0500)  Height: 5' 10" (177.8 cm) (06/23/22 1609)    PICC  Date/Time: 6/24/2022 7:55 PM  Performed by: Galo Garcia RN  Consent Done: Yes  Time out: Immediately prior to procedure a time out was called to verify the correct patient, procedure, equipment, support staff and site/side marked as required  Indications: med administration and vascular access  Local anesthetic: lidocaine 1% without epinephrine  Anesthetic Total (mL): 5  Preparation: skin prepped with ChloraPrep  Skin prep agent dried: skin prep agent completely dried prior to procedure  Sterile barriers: all five maximum sterile barriers used - cap, mask, sterile gown, sterile gloves, and large sterile sheet  Hand hygiene: hand hygiene performed prior to central venous catheter insertion  Location details: left basilic  Catheter type: single lumen  Catheter size: 4 Fr  Catheter Length: 12cm    Ultrasound guidance: yes  Vessel Caliber: medium and patent, compressibility normal  Needle advanced into vessel with real time Ultrasound guidance.  Guidewire confirmed in vessel.  Sterile sheath used.  Number of attempts: 1  Post-procedure: blood return through all ports, chlorhexidine patch and sterile dressing applied    Complications: none  Comments: Arm circumference 36cm          Galo Garcia RN  6/24/2022  "

## 2022-06-25 NOTE — PROCEDURES
"eNno Watkins is a 61 y.o. male patient.    Temp: 96.9 °F (36.1 °C) (06/25/22 0321)  Pulse: 89 (06/25/22 0733)  Resp: 20 (06/25/22 0602)  BP: (!) 165/78 (06/25/22 0733)  SpO2: (!) 94 % (06/25/22 0733)  Weight: (!) 137.2 kg (302 lb 7.5 oz) (06/25/22 0327)  Height: 5' 10" (177.8 cm) (06/23/22 1609)    PICC  Date/Time: 6/25/2022 8:58 AM  Performed by: Adalgisa Casanova RN  Consent Done: Yes  Indications: vascular access  Preparation: skin prepped with ChloraPrep  Skin prep agent dried: skin prep agent completely dried prior to procedure  Sterile barriers: all five maximum sterile barriers used - cap, mask, sterile gown, sterile gloves, and large sterile sheet  Hand hygiene: hand hygiene performed prior to central venous catheter insertion  Location details: left basilic  Catheter type: single lumen  Catheter size: 4 Fr  Catheter Length: 12cm    noNumber of attempts: 1  Post-procedure: blood return through all ports, chlorhexidine patch and sterile dressing applied    Complications: none  Comments: Midline to left upper arm placed yesterday, sluggish to flush. Assessed upper arm where location of midline terminates, vessel patent. Arm noted to be soft, No swelling or redness noted.  Midline exchange done at this time per protocol. Occluded mid removed easily measuring 12cm. No complications. Arm circumference 34cm.          Adalgisa Casanova RN  6/25/2022  "

## 2022-06-25 NOTE — PLAN OF CARE
Problem: Adult Inpatient Plan of Care  Goal: Plan of Care Review  6/25/2022 1408 by Shama Schneider RN  Outcome: Ongoing, Progressing  Flowsheets (Taken 6/25/2022 1407)  Plan of Care Reviewed With: patient  6/25/2022 1238 by Shama Schneider RN  Outcome: Ongoing, Progressing  Flowsheets (Taken 6/25/2022 1238)  Plan of Care Reviewed With: patient  Goal: Patient-Specific Goal (Individualized)  6/25/2022 1408 by Shama Schneider RN  Outcome: Ongoing, Progressing  6/25/2022 1238 by Shmaa Schneider RN  Outcome: Ongoing, Progressing  Flowsheets (Taken 6/25/2022 1238)  Anxieties, Fears or Concerns: hospitalization  Individualized Care Needs: work with therapy, sit in chair  Patient-Specific Goals (Include Timeframe): Get stronger and improve his kidneys to be able to go home  Goal: Absence of Hospital-Acquired Illness or Injury  6/25/2022 1408 by Shama Schneider RN  Outcome: Ongoing, Progressing  6/25/2022 1238 by Shama Schneider RN  Outcome: Ongoing, Progressing  Intervention: Identify and Manage Fall Risk  Flowsheets (Taken 6/25/2022 1238)  Safety Promotion/Fall Prevention:   assistive device/personal item within reach   bed alarm set   supervised activity  Intervention: Prevent Skin Injury  Flowsheets (Taken 6/25/2022 1238)  Body Position: 30 degrees  Intervention: Prevent and Manage VTE (Venous Thromboembolism) Risk  Flowsheets (Taken 6/25/2022 1238)  Activity Management: Ambulated in room - L4  VTE Prevention/Management:   bleeding precations maintained   bleeding risk assessed   fluids promoted   remove, assess skin, and reapply foot pump   ROM (passive) performed   ROM (active) performed  Intervention: Prevent Infection  Flowsheets (Taken 6/25/2022 1238)  Infection Prevention:   rest/sleep promoted   equipment surfaces disinfected   hand hygiene promoted  Goal: Optimal Comfort and Wellbeing  6/25/2022 1408 by Shama Schneider RN  Outcome: Ongoing, Progressing  6/25/2022 1238 by Shama Schneider RN  Outcome:  Ongoing, Progressing  Intervention: Monitor Pain and Promote Comfort  Flowsheets (Taken 6/25/2022 1238)  Pain Management Interventions:   prescribed exercises encouraged   quiet environment facilitated   relaxation techniques promoted   pillow support provided   position adjusted  Intervention: Provide Person-Centered Care  Flowsheets (Taken 6/25/2022 1238)  Trust Relationship/Rapport: care explained  Goal: Readiness for Transition of Care  6/25/2022 1408 by Shama Schneider RN  Outcome: Ongoing, Progressing  6/25/2022 1238 by Shama Schneider RN  Outcome: Ongoing, Progressing     Problem: Diabetes Comorbidity  Goal: Blood Glucose Level Within Targeted Range  6/25/2022 1408 by Shama Schneider RN  Outcome: Ongoing, Progressing  6/25/2022 1238 by Shama Schneider RN  Outcome: Ongoing, Progressing  Intervention: Monitor and Manage Glycemia  6/25/2022 1408 by Shama Schneider RN  Flowsheets (Taken 6/25/2022 1408)  Glycemic Management:   blood glucose monitored   supplemental insulin given  6/25/2022 1238 by Shama Schneider RN  Flowsheets (Taken 6/25/2022 1238)  Glycemic Management:   blood glucose monitored   carbohydrate replacement provided   oral hydration promoted   supplemental insulin given     Problem: Fluid and Electrolyte Imbalance (Acute Kidney Injury/Impairment)  Goal: Fluid and Electrolyte Balance  6/25/2022 1408 by Shama Schneider RN  Outcome: Ongoing, Progressing  6/25/2022 1238 by Shama Schneider RN  Outcome: Ongoing, Progressing  Intervention: Monitor and Manage Fluid and Electrolyte Balance  6/25/2022 1408 by Shama Schneider RN  Flowsheets (Taken 6/25/2022 1408)  Fluid/Electrolyte Management:   oral rehydration therapy initiated   intravenous fluid replacement initiated  6/25/2022 1238 by Shama Schneider RN  Flowsheets (Taken 6/25/2022 1238)  Fluid/Electrolyte Management:   fluids provided   intravenous fluid replacement initiated     Problem: Oral Intake Inadequate (Acute Kidney  Injury/Impairment)  Goal: Optimal Nutrition Intake  6/25/2022 1408 by Shama Schneider RN  Outcome: Ongoing, Progressing  6/25/2022 1238 by Shama Schneider RN  Outcome: Ongoing, Progressing  Intervention: Promote and Optimize Nutrition  6/25/2022 1408 by Shama Schneider RN  Flowsheets (Taken 6/25/2022 1408)  Oral Nutrition Promotion:   rest periods promoted   physical activity promoted   safe use of adaptive equipment encouraged  6/25/2022 1238 by Shama Schneider RN  Flowsheets (Taken 6/25/2022 1238)  Oral Nutrition Promotion: calorie-dense foods provided     Problem: Renal Function Impairment (Acute Kidney Injury/Impairment)  Goal: Effective Renal Function  6/25/2022 1408 by Shama Schneider RN  Outcome: Ongoing, Progressing  6/25/2022 1238 by Shama Schneider RN  Outcome: Ongoing, Progressing  Intervention: Monitor and Support Renal Function  Flowsheets (Taken 6/25/2022 1408)  Medication Review/Management:   medications reviewed   high-risk medications identified     Problem: Bariatric Environmental Safety  Goal: Safety Maintained with Care  6/25/2022 1408 by Shama Schneider RN  Outcome: Ongoing, Progressing  6/25/2022 1238 by Shama Schneider RN  Outcome: Ongoing, Progressing  Intervention: Promote Safety and Comfort  Flowsheets (Taken 6/25/2022 1408)  Bariatric Safety: other (see comments)     Problem: Impaired Wound Healing  Goal: Optimal Wound Healing  6/25/2022 1408 by Shaam Schneider RN  Outcome: Ongoing, Progressing  6/25/2022 1238 by Shama Schneider RN  Outcome: Ongoing, Progressing  Intervention: Promote Wound Healing  Flowsheets (Taken 6/25/2022 1408)  Oral Nutrition Promotion:   rest periods promoted   physical activity promoted   safe use of adaptive equipment encouraged  Activity Management: Ambulated in room - L4     Problem: Infection  Goal: Absence of Infection Signs and Symptoms  6/25/2022 1408 by Shama Schneider RN  Outcome: Ongoing, Progressing  6/25/2022 1238 by Shama Schneider  RN  Outcome: Ongoing, Progressing  Intervention: Prevent or Manage Infection  Flowsheets (Taken 6/25/2022 1408)  Fever Reduction/Comfort Measures: fluid intake increased  Infection Management: aseptic technique maintained     Problem: Skin Injury Risk Increased  Goal: Skin Health and Integrity  6/25/2022 1408 by Shama Schneider RN  Outcome: Ongoing, Progressing  6/25/2022 1238 by Shama Schneider RN  Outcome: Ongoing, Progressing  Intervention: Optimize Skin Protection  Flowsheets (Taken 6/25/2022 1408)  Head of Bed (HOB) Positioning: HOB at 30 degrees  Intervention: Promote and Optimize Oral Intake  Flowsheets (Taken 6/25/2022 1408)  Oral Nutrition Promotion:   rest periods promoted   physical activity promoted   safe use of adaptive equipment encouraged     Problem: Fall Injury Risk  Goal: Absence of Fall and Fall-Related Injury  6/25/2022 1408 by Shama Schneider RN  Outcome: Ongoing, Progressing  6/25/2022 1238 by Shama Schneider RN  Outcome: Ongoing, Progressing  Intervention: Identify and Manage Contributors  Flowsheets (Taken 6/25/2022 1408)  Self-Care Promotion:   independence encouraged   safe use of adaptive equipment encouraged  Medication Review/Management:   medications reviewed   high-risk medications identified  Intervention: Promote Injury-Free Environment  Flowsheets (Taken 6/25/2022 1408)  Safety Promotion/Fall Prevention: assistive device/personal item within reach

## 2022-06-25 NOTE — PROGRESS NOTES
Hospital Medicine  Progress Note    Patient Name: Neno Watkins  MRN: [id number]  Status: IP- Swing   Admission Date: 6/23/2022  Length of Stay: 2      CC: hospital follow-up for renal dysfunction       SUBJECTIVE   61 y.o. male with a history that includes, hypertension, BPH, and morbid obesity, presented to ED s/p falls at home.  Patient reports last of which, he was walking when his right leg gave out and he fell backwards.  Patient could not walk and decided to come to the ED.  In the ED thoracic spine x-rays showed no acute osseous abnormality and lumbar spine x-rays also showed no acute abnormality. CT head was negative.  In the ED patient was found to have several electrolyte abnormalities (K of 6) and SHANDRA with a creatinine of 3.28.  He was admitted to observation and started on IVF resuscitation.  Renal indices were improving, but overnight noted to have urinary retention, requiring placement of Baldwin catheter.  Also noted to have significant bilateral lower extremity swelling, though is chronic in nature.   Additionally patient has documented peripheral arterial disease in his history.  However also began requiring supplemental oxygen due to hypoxia.  Seen by Cardiology this morning, with concerns for fluid overload.  In the interim, due to recurrent falls and deconditioning, patient would benefit from ongoing therapy services, for which he was transferred to swing bed services.    Today: Patient seen and examined at bedside, and chart reviewed. Renal function bumped further this AM.  Off all fluids and diuretics.  Urine remains dark, blood-tinged.  Urine output has been adequate.  Also complaining of worsening SOB.    MEDICATIONS   Scheduled   atorvastatin  80 mg Oral Daily    carvediloL  6.25 mg Oral BID    gabapentin  300 mg Oral BID    glipiZIDE  10 mg Oral Daily with breakfast    NIFEdipine  60 mg Oral Daily    sodium chloride 0.9%  10 mL Intravenous Q6H    tamsulosin  1 capsule Oral Daily      Continuous Infusions   sodium chloride 0.9%     None    PRN  acetaminophen, albuterol-ipratropium, dextrose 10%, dextrose 10%, diclofenac sodium, glucagon (human recombinant), glucose, glucose, hydrALAZINE, insulin aspart U-100, LIDOcaine, melatonin, ondansetron, sodium chloride 0.9%, Flushing PICC Protocol **AND** sodium chloride 0.9% **AND** sodium chloride 0.9%       PHYSICAL EXAM   VITALS: T 96.9 °F (36.1 °C)   BP (!) 165/78   P 89   RR 20   O2 (!) 94 %  GENERAL: Awake and in NAD  LUNGS: CTA B/L  CVS: Normal rate  GI/: Soft, NT, bowel sounds positive.  EXTREMITIES: No peripheral edema, 1-2+ B/L LE edema  NEURO: AAOx3  PSYCH: Cooperative      LABS   CBC  Recent Labs     06/25/22  0456   WBC 12.2*   HGB 10.7*   HCT 34.7*         CHEM  Recent Labs     06/25/22  0456      K 5.1   MG 2.40   CO2 22*   BUN 65.4*   CREATININE 3.01*   CALCIUM 8.2*          DIAGNOSITICS   Echo  · The left ventricle is normal in size with normal systolic function.  · Normal left ventricular diastolic function.  · The estimated ejection fraction is 55%.  · Normal right ventricular size with normal right ventricular systolic function.  · Normal central venous pressure (3 mmHg).       ASSESSMENT   Acute urinary retention, h/o BPH  SHANDRA secondary to above  LE venous insufficiency  Physical deconditioning  Degenerative disease of the lumbar spine  IDDM II  Essential HTN    PLAN   Will resume IV fluids given Echo results and worsening renal function  Will continue to closely monitor renal function, urine output, and hematuria along with H&H  Continue to hold Lovenox, SCDs for prophylaxis, will add compression stocking to help with LE edema  Otherwise continue current management and cose monitoring      Prophylaxis: B/L SCDs        Josue Francois MD  [facility]  06/25/2022

## 2022-06-25 NOTE — PLAN OF CARE
Problem: Adult Inpatient Plan of Care  Goal: Plan of Care Review  Outcome: Ongoing, Progressing  Goal: Patient-Specific Goal (Individualized)  Outcome: Ongoing, Progressing  Goal: Absence of Hospital-Acquired Illness or Injury  Outcome: Ongoing, Progressing  Goal: Optimal Comfort and Wellbeing  Outcome: Ongoing, Progressing  Goal: Readiness for Transition of Care  Outcome: Ongoing, Progressing     Problem: Diabetes Comorbidity  Goal: Blood Glucose Level Within Targeted Range  Outcome: Ongoing, Progressing     Problem: Fluid and Electrolyte Imbalance (Acute Kidney Injury/Impairment)  Goal: Fluid and Electrolyte Balance  Outcome: Ongoing, Progressing     Problem: Bariatric Environmental Safety  Goal: Safety Maintained with Care  Outcome: Ongoing, Progressing     Problem: Impaired Wound Healing  Goal: Optimal Wound Healing  Outcome: Ongoing, Progressing     Problem: Infection  Goal: Absence of Infection Signs and Symptoms  Outcome: Ongoing, Progressing     Problem: Skin Injury Risk Increased  Goal: Skin Health and Integrity  Outcome: Ongoing, Progressing     Problem: Fall Injury Risk  Goal: Absence of Fall and Fall-Related Injury  Outcome: Ongoing, Progressing

## 2022-06-25 NOTE — NURSING
[facility] notified of midline flushing sluggish. Requested that someone be sent out to evaluate line. Spoke with Renetta.

## 2022-06-25 NOTE — PROGRESS NOTES
Hospital Medicine  Progress Note    Patient Name: Neno Watkins  MRN: [id number]  Status: IP- Swing   Admission Date: 6/23/2022  Length of Stay: 1      CC: hospital follow-up for leg swelling       SUBJECTIVE   61 y.o. male with a history that includes, hypertension, BPH, and morbid obesity, presented to ED s/p falls at home.  Patient reports last of which, he was walking when his right leg gave out and he fell backwards.  Patient could not walk and decided to come to the ED.  In the ED thoracic spine x-rays showed no acute osseous abnormality and lumbar spine x-rays also showed no acute abnormality. CT head was negative.  In the ED patient was found to have several electrolyte abnormalities (K of 6) and SHANDRA with a creatinine of 3.28.  He was admitted to observation and started on IVF resuscitation.  Renal indices were improving, but overnight noted to have urinary retention, requiring placement of Whitehead catheter.  Also noted to have significant bilateral lower extremity swelling, though is chronic in nature.   Additionally patient has documented peripheral arterial disease in his history.  However also began requiring supplemental oxygen due to hypoxia.  Seen by Cardiology this morning, with concerns for fluid overload.  In the interim, due to recurrent falls and deconditioning, patient would benefit from ongoing therapy services, for which he was transferred to swing bed services.    Today: Patient seen and examined at bedside, and chart reviewed. Renal function bumped overnight, though only slightly.  Also having hematuria now, required whitehead change due to clots last night.    MEDICATIONS   Scheduled   atorvastatin  80 mg Oral Daily    carvediloL  6.25 mg Oral BID    enoxaparin  40 mg Subcutaneous Daily    gabapentin  300 mg Oral BID    glipiZIDE  10 mg Oral Daily with breakfast    NIFEdipine  60 mg Oral Daily    [START ON 6/25/2022] sodium chloride 0.9%  10 mL Intravenous Q6H    tamsulosin  1 capsule  Oral Daily     Continuous Infusions  None    PRN  acetaminophen, albuterol-ipratropium, dextrose 10%, dextrose 10%, diclofenac sodium, glucagon (human recombinant), glucose, glucose, hydrALAZINE, insulin aspart U-100, LIDOcaine, melatonin, ondansetron, sodium chloride 0.9%, Flushing PICC Protocol **AND** [START ON 6/25/2022] sodium chloride 0.9% **AND** sodium chloride 0.9%       PHYSICAL EXAM   VITALS: T 98.4 °F (36.9 °C)   BP (!) 164/80   P 94   RR 16   O2 97 %  GENERAL: Awake and in NAD  LUNGS: CTA B/L  CVS: Normal rate  GI/: Soft, NT, bowel sounds positive.  EXTREMITIES: No peripheral edema, 1-2+ B/L LE edema  NEURO: AAOx3  PSYCH: Cooperative      LABS   CBC  Recent Labs     06/23/22  0555   WBC 11.5   HGB 11.8*   HCT 38.4*         CHEM  Recent Labs     06/22/22  0533 06/23/22  0555 06/24/22  0449      < > 137   K 4.8   < > 5.0   MG  --    < > 2.30   CO2 22*   < > 22*   BUN 44.2*   < > 54.7*   CREATININE 2.63*   < > 2.57*   CALCIUM 8.1*   < > 8.1*   BILITOT <0.5  --   --    AST 67*  --   --    ALT 35  --   --    ALKPHOS 84  --   --    ALBUMIN 2.2*  --   --     < > = values in this interval not displayed.          DIAGNOSITICS   Echo  · The left ventricle is normal in size with normal systolic function.  · Normal left ventricular diastolic function.  · The estimated ejection fraction is 55%.  · Normal right ventricular size with normal right ventricular systolic function.  · Normal central venous pressure (3 mmHg).       ASSESSMENT   Acute urinary retention, h/o BPH  SHANDRA secondary to above  LE venous insufficiency  Physical deconditioning  Degenerative disease of the lumbar spine  IDDM II  Essential HTN    PLAN   Hold diuresis, will continue to monitor renal function and urine output  Hold Lovenox, monitor hematuria and H&H as well  Otherwise continue current management.      Prophylaxis: B/L SCDs        Josue Francois MD  [facility]  06/24/2022

## 2022-06-26 LAB
ANION GAP SERPL CALC-SCNC: 9 MEQ/L
APPEARANCE UR: ABNORMAL
BACTERIA #/AREA URNS AUTO: ABNORMAL /HPF
BILIRUB UR QL STRIP.AUTO: ABNORMAL MG/DL
BUN SERPL-MCNC: 77.8 MG/DL (ref 8.4–25.7)
CALCIUM SERPL-MCNC: 8.3 MG/DL (ref 8.8–10)
CHLORIDE SERPL-SCNC: 108 MMOL/L (ref 98–107)
CO2 SERPL-SCNC: 21 MMOL/L (ref 23–31)
COLOR UR AUTO: ABNORMAL
CREAT SERPL-MCNC: 3.76 MG/DL (ref 0.73–1.18)
CREAT/UREA NIT SERPL: 21
ERYTHROCYTE [DISTWIDTH] IN BLOOD BY AUTOMATED COUNT: 12.3 % (ref 11.5–17)
GLUCOSE SERPL-MCNC: 60 MG/DL (ref 82–115)
GLUCOSE UR QL STRIP.AUTO: NEGATIVE MG/DL
HCT VFR BLD AUTO: 34.6 % (ref 42–52)
HGB BLD-MCNC: 10.8 GM/DL (ref 14–18)
KETONES UR QL STRIP.AUTO: NEGATIVE MG/DL
LEUKOCYTE ESTERASE UR QL STRIP.AUTO: ABNORMAL UNIT/L
MCH RBC QN AUTO: 30.8 PG (ref 27–31)
MCHC RBC AUTO-ENTMCNC: 31.2 MG/DL (ref 33–36)
MCV RBC AUTO: 98.6 FL (ref 80–94)
NITRITE UR QL STRIP.AUTO: NEGATIVE
PH UR STRIP.AUTO: 5 [PH]
PLATELET # BLD AUTO: 331 X10(3)/MCL (ref 130–400)
PMV BLD AUTO: 9.3 FL (ref 9.4–12.4)
POCT GLUCOSE: 61 MG/DL (ref 70–110)
POCT GLUCOSE: 64 MG/DL (ref 70–110)
POCT GLUCOSE: 78 MG/DL (ref 70–110)
POCT GLUCOSE: 90 MG/DL (ref 70–110)
POCT GLUCOSE: 92 MG/DL (ref 70–110)
POTASSIUM SERPL-SCNC: 5.2 MMOL/L (ref 3.5–5.1)
PROT UR QL STRIP.AUTO: >=300 MG/DL
RBC # BLD AUTO: 3.51 X10(6)/MCL (ref 4.7–6.1)
RBC #/AREA URNS AUTO: ABNORMAL /HPF
RBC UR QL AUTO: ABNORMAL UNIT/L
SODIUM SERPL-SCNC: 138 MMOL/L (ref 136–145)
SP GR UR STRIP.AUTO: 1.02
SQUAMOUS #/AREA URNS AUTO: ABNORMAL /HPF
UROBILINOGEN UR STRIP-ACNC: 0.2 MG/DL
WBC # SPEC AUTO: 12.7 X10(3)/MCL (ref 4.5–11.5)
WBC #/AREA URNS AUTO: ABNORMAL /HPF

## 2022-06-26 PROCEDURE — 93005 ELECTROCARDIOGRAM TRACING: CPT

## 2022-06-26 PROCEDURE — 25000242 PHARM REV CODE 250 ALT 637 W/ HCPCS: Performed by: INTERNAL MEDICINE

## 2022-06-26 PROCEDURE — 11000004 HC SNF PRIVATE

## 2022-06-26 PROCEDURE — 94760 N-INVAS EAR/PLS OXIMETRY 1: CPT

## 2022-06-26 PROCEDURE — 93010 EKG 12-LEAD: ICD-10-PCS | Mod: ,,, | Performed by: INTERNAL MEDICINE

## 2022-06-26 PROCEDURE — 63600175 PHARM REV CODE 636 W HCPCS: Performed by: INTERNAL MEDICINE

## 2022-06-26 PROCEDURE — 99900035 HC TECH TIME PER 15 MIN (STAT)

## 2022-06-26 PROCEDURE — 81001 URINALYSIS AUTO W/SCOPE: CPT | Performed by: INTERNAL MEDICINE

## 2022-06-26 PROCEDURE — 27000221 HC OXYGEN, UP TO 24 HOURS

## 2022-06-26 PROCEDURE — 94640 AIRWAY INHALATION TREATMENT: CPT

## 2022-06-26 PROCEDURE — A4216 STERILE WATER/SALINE, 10 ML: HCPCS | Performed by: INTERNAL MEDICINE

## 2022-06-26 PROCEDURE — 93041 RHYTHM ECG TRACING: CPT

## 2022-06-26 PROCEDURE — 93010 ELECTROCARDIOGRAM REPORT: CPT | Mod: ,,, | Performed by: INTERNAL MEDICINE

## 2022-06-26 PROCEDURE — 85027 COMPLETE CBC AUTOMATED: CPT | Performed by: INTERNAL MEDICINE

## 2022-06-26 PROCEDURE — 80048 BASIC METABOLIC PNL TOTAL CA: CPT | Performed by: INTERNAL MEDICINE

## 2022-06-26 PROCEDURE — 25000003 PHARM REV CODE 250: Performed by: NURSE PRACTITIONER

## 2022-06-26 PROCEDURE — 82570 ASSAY OF URINE CREATININE: CPT | Performed by: STUDENT IN AN ORGANIZED HEALTH CARE EDUCATION/TRAINING PROGRAM

## 2022-06-26 PROCEDURE — 25000003 PHARM REV CODE 250: Performed by: INTERNAL MEDICINE

## 2022-06-26 PROCEDURE — 36415 COLL VENOUS BLD VENIPUNCTURE: CPT | Performed by: INTERNAL MEDICINE

## 2022-06-26 RX ORDER — ALPRAZOLAM 0.25 MG/1
0.25 TABLET ORAL 4 TIMES DAILY PRN
Status: DISCONTINUED | OUTPATIENT
Start: 2022-06-26 | End: 2022-06-27 | Stop reason: HOSPADM

## 2022-06-26 RX ORDER — LABETALOL HCL 20 MG/4 ML
20 SYRINGE (ML) INTRAVENOUS EVERY 4 HOURS PRN
Status: DISCONTINUED | OUTPATIENT
Start: 2022-06-26 | End: 2022-06-27 | Stop reason: HOSPADM

## 2022-06-26 RX ORDER — FUROSEMIDE 40 MG/1
40 TABLET ORAL DAILY
Status: DISCONTINUED | OUTPATIENT
Start: 2022-06-26 | End: 2022-06-27

## 2022-06-26 RX ADMIN — TAMSULOSIN HYDROCHLORIDE 0.4 MG: 0.4 CAPSULE ORAL at 09:06

## 2022-06-26 RX ADMIN — SODIUM CHLORIDE, PRESERVATIVE FREE 10 ML: 5 INJECTION INTRAVENOUS at 11:06

## 2022-06-26 RX ADMIN — SODIUM CHLORIDE, PRESERVATIVE FREE 10 ML: 5 INJECTION INTRAVENOUS at 06:06

## 2022-06-26 RX ADMIN — GLIPIZIDE 10 MG: 5 TABLET, FILM COATED, EXTENDED RELEASE ORAL at 09:06

## 2022-06-26 RX ADMIN — HYDRALAZINE HYDROCHLORIDE 10 MG: 20 INJECTION INTRAMUSCULAR; INTRAVENOUS at 03:06

## 2022-06-26 RX ADMIN — SODIUM CHLORIDE, PRESERVATIVE FREE 10 ML: 5 INJECTION INTRAVENOUS at 12:06

## 2022-06-26 RX ADMIN — IPRATROPIUM BROMIDE AND ALBUTEROL SULFATE 3 ML: 2.5; .5 SOLUTION RESPIRATORY (INHALATION) at 06:06

## 2022-06-26 RX ADMIN — IPRATROPIUM BROMIDE AND ALBUTEROL SULFATE 3 ML: 2.5; .5 SOLUTION RESPIRATORY (INHALATION) at 03:06

## 2022-06-26 RX ADMIN — CARVEDILOL 6.25 MG: 3.12 TABLET, FILM COATED ORAL at 08:06

## 2022-06-26 RX ADMIN — FUROSEMIDE 40 MG: 40 TABLET ORAL at 11:06

## 2022-06-26 RX ADMIN — IPRATROPIUM BROMIDE AND ALBUTEROL SULFATE 3 ML: 2.5; .5 SOLUTION RESPIRATORY (INHALATION) at 11:06

## 2022-06-26 RX ADMIN — GABAPENTIN 300 MG: 300 CAPSULE ORAL at 08:06

## 2022-06-26 RX ADMIN — LABETALOL HYDROCHLORIDE 20 MG: 5 INJECTION, SOLUTION INTRAVENOUS at 07:06

## 2022-06-26 RX ADMIN — ATORVASTATIN CALCIUM 80 MG: 40 TABLET, FILM COATED ORAL at 09:06

## 2022-06-26 RX ADMIN — NIFEDIPINE 60 MG: 30 TABLET, FILM COATED, EXTENDED RELEASE ORAL at 09:06

## 2022-06-26 RX ADMIN — GABAPENTIN 300 MG: 300 CAPSULE ORAL at 09:06

## 2022-06-26 RX ADMIN — ACETAMINOPHEN 650 MG: 325 TABLET ORAL at 03:06

## 2022-06-26 RX ADMIN — CARVEDILOL 6.25 MG: 3.12 TABLET, FILM COATED ORAL at 09:06

## 2022-06-26 RX ADMIN — ALPRAZOLAM 0.25 MG: 0.25 TABLET ORAL at 07:06

## 2022-06-26 RX ADMIN — IPRATROPIUM BROMIDE AND ALBUTEROL SULFATE 3 ML: 2.5; .5 SOLUTION RESPIRATORY (INHALATION) at 09:06

## 2022-06-26 RX ADMIN — DEXTROSE 1 G: 50 INJECTION, SOLUTION INTRAVENOUS at 11:06

## 2022-06-26 NOTE — HPI
61 y.o. male with a history that includes, hypertension, BPH, and morbid obesity, presented to ED s/p falls at home.  Patient reports last of which, he was walking when his right leg gave out and he fell backwards.  Patient could not walk and decided to come to the ED.  In the ED thoracic spine x-rays showed no acute osseous abnormality and lumbar spine x-rays also showed no acute abnormality. CT head was negative.  In the ED patient was found to have several electrolyte abnormalities (K of 6) and SHANDRA with a creatinine of 3.28.  He was admitted to observation and started on IVF resuscitation.  Renal indices were improving, but overnight noted to have urinary retention, requiring placement of Baldwin catheter.  Also noted to have significant bilateral lower extremity swelling, though is chronic in nature.   Additionally patient has documented peripheral arterial disease in his history.  However also began requiring supplemental oxygen due to hypoxia.  Seen by Cardiology this morning, with concerns for fluid overload.  In the interim, due to recurrent falls and deconditioning, patient would benefit from ongoing therapy services, for which he was transferred to swing bed services.

## 2022-06-26 NOTE — HOSPITAL COURSE
Initially treated with IVFs, but had worsening edema, which required 2L supplemental oxygen.  Patient diuresed but developed worsening renal function. Resumed IV fluid hydration, but conitnues with worsening edema and renal function. CT abd/pelvis and repeat UA performed; CT abdomen and pelvis without contrast showed no hydronephrosis, no urinary calculi. However did see mild symmetric perinephric stranding as well as bladder wall thickening questioning cystitis.  Repeat UA showing no nitrites however positive for leukocyte esterase, significant amount of rbc's and wbc's.  No bacteria.  however given perinephric standing patient placed on ceftriaxone for concern of pyelonephritis, UTI.  Patient also with a persistently elevated leukocytosis therefore agree with empiric treatment at this time.  IV lfuid hydration increased. Attempting to transfer pt for nephrology +/- dialysis as K is increasing to high of 5.5 today. Giving Lokelma and insulin. Case discussed with [facility] for transfer, accepted.

## 2022-06-26 NOTE — PROGRESS NOTES
Hospital Medicine  Progress Note    Patient Name: Neno Watkins  MRN: [id number]  Status: IP- Swing   Admission Date: 6/23/2022  Length of Stay: 3      CC: hospital follow-up for renal dysfunction       SUBJECTIVE   61 y.o. male with a history that includes, hypertension, BPH, and morbid obesity, presented to ED s/p falls at home.  Patient reports last of which, he was walking when his right leg gave out and he fell backwards.  Patient could not walk and decided to come to the ED.  In the ED thoracic spine x-rays showed no acute osseous abnormality and lumbar spine x-rays also showed no acute abnormality. CT head was negative.  In the ED patient was found to have several electrolyte abnormalities (K of 6) and SHANDRA with a creatinine of 3.28.  He was admitted to observation and started on IVF resuscitation.  Renal indices were improving, but overnight noted to have urinary retention, requiring placement of Baldwin catheter.  Also noted to have significant bilateral lower extremity swelling, though is chronic in nature.   Additionally patient has documented peripheral arterial disease in his history.  However also began requiring supplemental oxygen due to hypoxia.  Seen by Cardiology this morning, with concerns for fluid overload.  In the interim, due to recurrent falls and deconditioning, patient would benefit from ongoing therapy services, for which he was transferred to swing bed services.    Today: Patient seen and examined at bedside, and chart reviewed. Renal function continues to trend in the wrong direction despite fluids overnight.  White is bumped up as well.  UOP only about 40cc/hr yesterday.  Urine remains dark, concentrated.  Still complaining of SOB and swelling as well, but saturating adequately on 2L.  .    MEDICATIONS   Scheduled   atorvastatin  80 mg Oral Daily    carvediloL  6.25 mg Oral BID    cefTRIAXone (ROCEPHIN) IVPB  1 g Intravenous Q24H    furosemide  40 mg Oral Daily    gabapentin  300 mg  Oral BID    glipiZIDE  10 mg Oral Daily with breakfast    NIFEdipine  60 mg Oral Daily    sodium chloride 0.9%  10 mL Intravenous Q6H    tamsulosin  1 capsule Oral Daily     Continuous Infusions   sodium chloride 0.9% 100 mL/hr at 06/25/22 5130   None    PRN  acetaminophen, albuterol-ipratropium, dextrose 10%, dextrose 10%, diclofenac sodium, glucagon (human recombinant), glucose, glucose, hydrALAZINE, insulin aspart U-100, LIDOcaine, melatonin, ondansetron, sodium chloride 0.9%, Flushing PICC Protocol **AND** sodium chloride 0.9% **AND** sodium chloride 0.9%       PHYSICAL EXAM   VITALS: T 98.1 °F (36.7 °C)   BP (!) 165/74   P 83   RR (!) 22   O2 95 %  GENERAL: Awake and in NAD  LUNGS: decreased BS bibasilarly  CVS: Normal rate  GI/: Soft, NT, bowel sounds positive.  EXTREMITIES: No peripheral edema, 2+ B/L LE edema  NEURO: AAOx3  PSYCH: Cooperative      LABS   CBC  Recent Labs     06/26/22  0407   WBC 12.7*   HGB 10.8*   HCT 34.6*         CHEM  Recent Labs     06/25/22  0456 06/26/22  0406    138   K 5.1 5.2*   MG 2.40  --    CO2 22* 21*   BUN 65.4* 77.8*   CREATININE 3.01* 3.76*   CALCIUM 8.2* 8.3*          ASSESSMENT   Acute urinary retention, h/o BPH  SHANDRA secondary to above  LE venous insufficiency  Physical deconditioning  Degenerative disease of the lumbar spine  IDDM II  Essential HTN    PLAN   Will continue with with IVFs and add gentle Lasix.  Continue to closely monitor renal function, urine output  Will obtain UA, treat if evidence of UTI  Otherwise continue current management and cose monitoring      Prophylaxis: B/L SCDs        Josue Francois MD  [facility]  06/26/2022

## 2022-06-27 ENCOUNTER — HOSPITAL ENCOUNTER (INPATIENT)
Facility: HOSPITAL | Age: 62
LOS: 11 days | Discharge: HOME | DRG: 682 | End: 2022-07-08
Attending: INTERNAL MEDICINE | Admitting: INTERNAL MEDICINE
Payer: MEDICARE

## 2022-06-27 ENCOUNTER — ANESTHESIA EVENT (OUTPATIENT)
Dept: SURGERY | Facility: HOSPITAL | Age: 62
DRG: 682 | End: 2022-06-27
Payer: MEDICARE

## 2022-06-27 ENCOUNTER — ANESTHESIA (OUTPATIENT)
Dept: SURGERY | Facility: HOSPITAL | Age: 62
DRG: 682 | End: 2022-06-27
Payer: MEDICARE

## 2022-06-27 VITALS
DIASTOLIC BLOOD PRESSURE: 52 MMHG | OXYGEN SATURATION: 97 % | SYSTOLIC BLOOD PRESSURE: 83 MMHG | RESPIRATION RATE: 14 BRPM | HEART RATE: 64 BPM

## 2022-06-27 VITALS
WEIGHT: 310.19 LBS | RESPIRATION RATE: 22 BRPM | SYSTOLIC BLOOD PRESSURE: 187 MMHG | TEMPERATURE: 98 F | OXYGEN SATURATION: 91 % | HEART RATE: 93 BPM | BODY MASS INDEX: 44.41 KG/M2 | DIASTOLIC BLOOD PRESSURE: 90 MMHG | HEIGHT: 70 IN

## 2022-06-27 DIAGNOSIS — N17.9 AKI (ACUTE KIDNEY INJURY): Primary | ICD-10-CM

## 2022-06-27 DIAGNOSIS — R60.0 BILATERAL LOWER EXTREMITY EDEMA: Chronic | ICD-10-CM

## 2022-06-27 DIAGNOSIS — E66.01 MORBID OBESITY: Chronic | ICD-10-CM
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
## 2022-06-27 DIAGNOSIS — M51.06 INTERVERTEBRAL DISC DISORDER OF LUMBAR REGION WITH MYELOPATHY: ICD-10-CM

## 2022-06-27 DIAGNOSIS — L03.116 CELLULITIS OF BOTH LOWER EXTREMITIES: ICD-10-CM
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
## 2022-06-27 DIAGNOSIS — R07.9 CHEST PAIN AT REST: ICD-10-CM

## 2022-06-27 DIAGNOSIS — L03.115 CELLULITIS OF BOTH LOWER EXTREMITIES: ICD-10-CM

## 2022-06-27 DIAGNOSIS — E87.5 HYPERKALEMIA: ICD-10-CM

## 2022-06-27 DIAGNOSIS — I77.9 PERIPHERAL ARTERIAL OCCLUSIVE DISEASE: ICD-10-CM

## 2022-06-27 DIAGNOSIS — R09.02 HYPOXIA: Chronic | ICD-10-CM

## 2022-06-27 PROBLEM — N12 PYELONEPHRITIS: Status: ACTIVE | Noted: 2022-06-27

## 2022-06-27 LAB
ALBUMIN SERPL-MCNC: 2.1 GM/DL (ref 3.4–4.8)
ALBUMIN SERPL-MCNC: 2.3 GM/DL (ref 3.4–4.8)
ALBUMIN/GLOB SERPL: 0.4 RATIO (ref 1.1–2)
ALLENS TEST: ABNORMAL
ALP SERPL-CCNC: 120 UNIT/L (ref 40–150)
ALT SERPL-CCNC: 76 UNIT/L (ref 0–55)
APTT PPP: 38.5 SECONDS (ref 23.2–33.7)
AST SERPL-CCNC: 78 UNIT/L (ref 5–34)
BASOPHILS # BLD AUTO: 0.02 X10(3)/MCL (ref 0–0.2)
BASOPHILS # BLD AUTO: 0.07 X10(3)/MCL (ref 0–0.2)
BASOPHILS NFR BLD AUTO: 0.2 %
BASOPHILS NFR BLD AUTO: 0.4 %
BILIRUBIN DIRECT+TOT PNL SERPL-MCNC: 0.5 MG/DL
BUN SERPL-MCNC: 84 MG/DL (ref 8.4–25.7)
BUN SERPL-MCNC: 86.6 MG/DL (ref 8.4–25.7)
CALCIUM SERPL-MCNC: 8.2 MG/DL (ref 8.8–10)
CALCIUM SERPL-MCNC: 8.3 MG/DL (ref 8.8–10)
CHLORIDE SERPL-SCNC: 105 MMOL/L (ref 98–107)
CHLORIDE SERPL-SCNC: 107 MMOL/L (ref 98–107)
CO2 SERPL-SCNC: 19 MMOL/L (ref 23–31)
CO2 SERPL-SCNC: 21 MMOL/L (ref 23–31)
CREAT SERPL-MCNC: 4.48 MG/DL (ref 0.73–1.18)
CREAT SERPL-MCNC: 4.78 MG/DL (ref 0.73–1.18)
CREAT UR-MCNC: 51.4 MG/DL (ref 63–166)
D DIMER PPP IA.FEU-MCNC: 2.24 UG/ML FEU (ref 0–0.5)
DELSYS: ABNORMAL
EOSINOPHIL # BLD AUTO: 0.06 X10(3)/MCL (ref 0–0.9)
EOSINOPHIL # BLD AUTO: 0.21 X10(3)/MCL (ref 0–0.9)
EOSINOPHIL NFR BLD AUTO: 0.3 %
EOSINOPHIL NFR BLD AUTO: 1.7 %
ERYTHROCYTE [DISTWIDTH] IN BLOOD BY AUTOMATED COUNT: 12.3 % (ref 11.5–17)
ERYTHROCYTE [DISTWIDTH] IN BLOOD BY AUTOMATED COUNT: 12.4 % (ref 11.5–17)
ERYTHROCYTE [SEDIMENTATION RATE] IN BLOOD BY WESTERGREN METHOD: 20 MM/H
FIO2: 100
FLOW: 44
GLOBULIN SER-MCNC: 4.9 GM/DL (ref 2.4–3.5)
GLUCOSE SERPL-MCNC: 139 MG/DL (ref 82–115)
GLUCOSE SERPL-MCNC: 53 MG/DL (ref 82–115)
HCO3 UR-SCNC: 22.1 MMOL/L (ref 24–28)
HCT VFR BLD AUTO: 36.5 % (ref 42–52)
HCT VFR BLD AUTO: 38.2 % (ref 42–52)
HGB BLD-MCNC: 11 GM/DL (ref 14–18)
HGB BLD-MCNC: 11.8 GM/DL (ref 14–18)
IMM GRANULOCYTES # BLD AUTO: 0.03 X10(3)/MCL (ref 0–0.02)
IMM GRANULOCYTES # BLD AUTO: 0.51 X10(3)/MCL (ref 0–0.02)
IMM GRANULOCYTES NFR BLD AUTO: 0.2 % (ref 0–0.43)
IMM GRANULOCYTES NFR BLD AUTO: 2.7 % (ref 0–0.43)
INR BLD: 1.22 (ref 0–1.3)
LACTATE SERPL-SCNC: 0.6 MMOL/L (ref 0.5–2.2)
LYMPHOCYTES # BLD AUTO: 1.53 X10(3)/MCL (ref 0.6–4.6)
LYMPHOCYTES # BLD AUTO: 1.89 X10(3)/MCL (ref 0.6–4.6)
LYMPHOCYTES NFR BLD AUTO: 15.4 %
LYMPHOCYTES NFR BLD AUTO: 8 %
MAGNESIUM SERPL-MCNC: 2.7 MG/DL (ref 1.6–2.6)
MCH RBC QN AUTO: 30.1 PG (ref 27–31)
MCH RBC QN AUTO: 30.6 PG (ref 27–31)
MCHC RBC AUTO-ENTMCNC: 30.1 MG/DL (ref 33–36)
MCHC RBC AUTO-ENTMCNC: 30.9 MG/DL (ref 33–36)
MCV RBC AUTO: 100 FL (ref 80–94)
MCV RBC AUTO: 99.2 FL (ref 80–94)
MODE: ABNORMAL
MONOCYTES # BLD AUTO: 1.27 X10(3)/MCL (ref 0.1–1.3)
MONOCYTES # BLD AUTO: 1.34 X10(3)/MCL (ref 0.1–1.3)
MONOCYTES NFR BLD AUTO: 10.9 %
MONOCYTES NFR BLD AUTO: 6.7 %
NEUTROPHILS # BLD AUTO: 15.6 X10(3)/MCL (ref 2.1–9.2)
NEUTROPHILS # BLD AUTO: 8.8 X10(3)/MCL (ref 2.1–9.2)
NEUTROPHILS NFR BLD AUTO: 71.6 %
NEUTROPHILS NFR BLD AUTO: 81.9 %
OSMOLALITY UR: 290 MOSM/KG (ref 300–1300)
PCO2 BLDA: 56.7 MMHG (ref 35–45)
PEEP: 5
PH SMN: 7.2 [PH] (ref 7.35–7.45)
PHOSPHATE SERPL-MCNC: 8.4 MG/DL (ref 2.3–4.7)
PIP: 28
PLATELET # BLD AUTO: 327 X10(3)/MCL (ref 130–400)
PLATELET # BLD AUTO: 395 X10(3)/MCL (ref 130–400)
PMV BLD AUTO: 9 FL (ref 9.4–12.4)
PMV BLD AUTO: 9.1 FL (ref 9.4–12.4)
PO2 BLDA: 205 MMHG (ref 80–100)
POC BE: -6 MMOL/L
POC SATURATED O2: 99 % (ref 95–100)
POC TCO2: 24 MMOL/L (ref 23–27)
POCT GLUCOSE: 219 MG/DL (ref 70–110)
POCT GLUCOSE: 42 MG/DL (ref 70–110)
POCT GLUCOSE: 57 MG/DL (ref 70–110)
POCT GLUCOSE: 76 MG/DL (ref 70–110)
POCT GLUCOSE: 91 MG/DL (ref 70–110)
POTASSIUM SERPL-SCNC: 5.5 MMOL/L (ref 3.5–5.1)
POTASSIUM SERPL-SCNC: 7.3 MMOL/L (ref 3.5–5.1)
PROT SERPL-MCNC: 7 GM/DL (ref 5.8–7.6)
PROTHROMBIN TIME: 15.4 SECONDS (ref 12.5–14.5)
PS: 10
RBC # BLD AUTO: 3.65 X10(6)/MCL (ref 4.7–6.1)
RBC # BLD AUTO: 3.85 X10(6)/MCL (ref 4.7–6.1)
SAMPLE: ABNORMAL
SITE: ABNORMAL
SODIUM SERPL-SCNC: 134 MMOL/L (ref 136–145)
SODIUM SERPL-SCNC: 138 MMOL/L (ref 136–145)
SODIUM UR-SCNC: 55 MMOL/L
SP02: 97
TROPONIN I SERPL-MCNC: 0.02 NG/ML (ref 0–0.04)
VT: 450
WBC # SPEC AUTO: 12.3 X10(3)/MCL (ref 4.5–11.5)
WBC # SPEC AUTO: 19.1 X10(3)/MCL (ref 4.5–11.5)

## 2022-06-27 PROCEDURE — 63600175 PHARM REV CODE 636 W HCPCS: Performed by: NURSE ANESTHETIST, CERTIFIED REGISTERED

## 2022-06-27 PROCEDURE — 85025 COMPLETE CBC W/AUTO DIFF WBC: CPT | Performed by: INTERNAL MEDICINE

## 2022-06-27 PROCEDURE — 85730 THROMBOPLASTIN TIME PARTIAL: CPT | Performed by: INTERNAL MEDICINE

## 2022-06-27 PROCEDURE — 82803 BLOOD GASES ANY COMBINATION: CPT

## 2022-06-27 PROCEDURE — 27200966 HC CLOSED SUCTION SYSTEM

## 2022-06-27 PROCEDURE — 37000009 HC ANESTHESIA EA ADD 15 MINS: Performed by: SURGERY

## 2022-06-27 PROCEDURE — 27000221 HC OXYGEN, UP TO 24 HOURS

## 2022-06-27 PROCEDURE — 63600175 PHARM REV CODE 636 W HCPCS: Performed by: INTERNAL MEDICINE

## 2022-06-27 PROCEDURE — 93010 EKG 12-LEAD: ICD-10-PCS | Mod: ,,, | Performed by: INTERNAL MEDICINE

## 2022-06-27 PROCEDURE — 37000008 HC ANESTHESIA 1ST 15 MINUTES: Performed by: SURGERY

## 2022-06-27 PROCEDURE — 25000003 PHARM REV CODE 250: Performed by: NURSE PRACTITIONER

## 2022-06-27 PROCEDURE — 80069 RENAL FUNCTION PANEL: CPT | Performed by: INTERNAL MEDICINE

## 2022-06-27 PROCEDURE — 36415 COLL VENOUS BLD VENIPUNCTURE: CPT | Performed by: INTERNAL MEDICINE

## 2022-06-27 PROCEDURE — 93005 ELECTROCARDIOGRAM TRACING: CPT

## 2022-06-27 PROCEDURE — 20000000 HC ICU ROOM

## 2022-06-27 PROCEDURE — 63600175 PHARM REV CODE 636 W HCPCS: Performed by: SURGERY

## 2022-06-27 PROCEDURE — 25000003 PHARM REV CODE 250: Performed by: NURSE ANESTHETIST, CERTIFIED REGISTERED

## 2022-06-27 PROCEDURE — 36600 WITHDRAWAL OF ARTERIAL BLOOD: CPT

## 2022-06-27 PROCEDURE — 87340 HEPATITIS B SURFACE AG IA: CPT | Performed by: INTERNAL MEDICINE

## 2022-06-27 PROCEDURE — 90935 HEMODIALYSIS ONE EVALUATION: CPT

## 2022-06-27 PROCEDURE — 94761 N-INVAS EAR/PLS OXIMETRY MLT: CPT

## 2022-06-27 PROCEDURE — 63600175 PHARM REV CODE 636 W HCPCS: Mod: JG | Performed by: INTERNAL MEDICINE

## 2022-06-27 PROCEDURE — A4216 STERILE WATER/SALINE, 10 ML: HCPCS | Performed by: INTERNAL MEDICINE

## 2022-06-27 PROCEDURE — 25000003 PHARM REV CODE 250: Performed by: SURGERY

## 2022-06-27 PROCEDURE — 83735 ASSAY OF MAGNESIUM: CPT | Performed by: INTERNAL MEDICINE

## 2022-06-27 PROCEDURE — P9047 ALBUMIN (HUMAN), 25%, 50ML: HCPCS | Mod: JG | Performed by: INTERNAL MEDICINE

## 2022-06-27 PROCEDURE — 99900031 HC PATIENT EDUCATION (STAT)

## 2022-06-27 PROCEDURE — A4216 STERILE WATER/SALINE, 10 ML: HCPCS | Performed by: STUDENT IN AN ORGANIZED HEALTH CARE EDUCATION/TRAINING PROGRAM

## 2022-06-27 PROCEDURE — 85379 FIBRIN DEGRADATION QUANT: CPT | Performed by: INTERNAL MEDICINE

## 2022-06-27 PROCEDURE — C1751 CATH, INF, PER/CENT/MIDLINE: HCPCS | Performed by: SURGERY

## 2022-06-27 PROCEDURE — 85610 PROTHROMBIN TIME: CPT | Performed by: INTERNAL MEDICINE

## 2022-06-27 PROCEDURE — 80053 COMPREHEN METABOLIC PANEL: CPT | Performed by: INTERNAL MEDICINE

## 2022-06-27 PROCEDURE — 83605 ASSAY OF LACTIC ACID: CPT | Performed by: INTERNAL MEDICINE

## 2022-06-27 PROCEDURE — 83935 ASSAY OF URINE OSMOLALITY: CPT | Performed by: STUDENT IN AN ORGANIZED HEALTH CARE EDUCATION/TRAINING PROGRAM

## 2022-06-27 PROCEDURE — 94760 N-INVAS EAR/PLS OXIMETRY 1: CPT

## 2022-06-27 PROCEDURE — 25000003 PHARM REV CODE 250: Performed by: INTERNAL MEDICINE

## 2022-06-27 PROCEDURE — 93041 RHYTHM ECG TRACING: CPT

## 2022-06-27 PROCEDURE — 36000707: Performed by: SURGERY

## 2022-06-27 PROCEDURE — 94640 AIRWAY INHALATION TREATMENT: CPT

## 2022-06-27 PROCEDURE — 99900035 HC TECH TIME PER 15 MIN (STAT)

## 2022-06-27 PROCEDURE — 93010 ELECTROCARDIOGRAM REPORT: CPT | Mod: ,,, | Performed by: INTERNAL MEDICINE

## 2022-06-27 PROCEDURE — 25000242 PHARM REV CODE 250 ALT 637 W/ HCPCS: Performed by: INTERNAL MEDICINE

## 2022-06-27 PROCEDURE — 84484 ASSAY OF TROPONIN QUANT: CPT | Performed by: INTERNAL MEDICINE

## 2022-06-27 PROCEDURE — 84300 ASSAY OF URINE SODIUM: CPT | Performed by: STUDENT IN AN ORGANIZED HEALTH CARE EDUCATION/TRAINING PROGRAM

## 2022-06-27 PROCEDURE — 36000706: Performed by: SURGERY

## 2022-06-27 PROCEDURE — 94002 VENT MGMT INPAT INIT DAY: CPT

## 2022-06-27 PROCEDURE — 25000003 PHARM REV CODE 250: Performed by: STUDENT IN AN ORGANIZED HEALTH CARE EDUCATION/TRAINING PROGRAM

## 2022-06-27 RX ORDER — IPRATROPIUM BROMIDE AND ALBUTEROL SULFATE 2.5; .5 MG/3ML; MG/3ML
3 SOLUTION RESPIRATORY (INHALATION) EVERY 4 HOURS PRN
Status: CANCELLED | OUTPATIENT
Start: 2022-06-27

## 2022-06-27 RX ORDER — LIDOCAINE 50 MG/G
1 PATCH TOPICAL DAILY PRN
Status: CANCELLED | OUTPATIENT
Start: 2022-06-27

## 2022-06-27 RX ORDER — TAMSULOSIN HYDROCHLORIDE 0.4 MG/1
1 CAPSULE ORAL DAILY
Status: CANCELLED | OUTPATIENT
Start: 2022-06-28

## 2022-06-27 RX ORDER — SODIUM CHLORIDE 0.9 % (FLUSH) 0.9 %
10 SYRINGE (ML) INJECTION
Status: CANCELLED | OUTPATIENT
Start: 2022-06-27

## 2022-06-27 RX ORDER — MORPHINE SULFATE 4 MG/ML
1 INJECTION, SOLUTION INTRAMUSCULAR; INTRAVENOUS ONCE
Status: COMPLETED | OUTPATIENT
Start: 2022-06-27 | End: 2022-06-27

## 2022-06-27 RX ORDER — LINEZOLID 2 MG/ML
600 INJECTION, SOLUTION INTRAVENOUS
Status: DISCONTINUED | OUTPATIENT
Start: 2022-06-27 | End: 2022-07-08 | Stop reason: HOSPADM

## 2022-06-27 RX ORDER — INSULIN ASPART 100 [IU]/ML
10 INJECTION, SOLUTION INTRAVENOUS; SUBCUTANEOUS ONCE
Status: DISCONTINUED | OUTPATIENT
Start: 2022-06-27 | End: 2022-06-27 | Stop reason: HOSPADM

## 2022-06-27 RX ORDER — ALPRAZOLAM 0.25 MG/1
0.25 TABLET ORAL 4 TIMES DAILY PRN
Status: DISCONTINUED | OUTPATIENT
Start: 2022-06-27 | End: 2022-07-08 | Stop reason: HOSPADM

## 2022-06-27 RX ORDER — DEXTROSE MONOHYDRATE AND SODIUM CHLORIDE 5; .45 G/100ML; G/100ML
INJECTION, SOLUTION INTRAVENOUS CONTINUOUS
Status: DISCONTINUED | OUTPATIENT
Start: 2022-06-27 | End: 2022-06-27

## 2022-06-27 RX ORDER — INSULIN ASPART 100 [IU]/ML
0-5 INJECTION, SOLUTION INTRAVENOUS; SUBCUTANEOUS
Status: DISCONTINUED | OUTPATIENT
Start: 2022-06-27 | End: 2022-06-28

## 2022-06-27 RX ORDER — MUPIROCIN 20 MG/G
OINTMENT TOPICAL 2 TIMES DAILY
Status: COMPLETED | OUTPATIENT
Start: 2022-06-27 | End: 2022-07-02

## 2022-06-27 RX ORDER — SODIUM CHLORIDE 9 MG/ML
INJECTION, SOLUTION INTRAVENOUS CONTINUOUS
Status: CANCELLED | OUTPATIENT
Start: 2022-06-27

## 2022-06-27 RX ORDER — LIDOCAINE 50 MG/G
1 PATCH TOPICAL DAILY PRN
Status: DISCONTINUED | OUTPATIENT
Start: 2022-06-27 | End: 2022-07-08 | Stop reason: HOSPADM

## 2022-06-27 RX ORDER — LIDOCAINE HCL/PF 100 MG/5ML
SYRINGE (ML) INTRAVENOUS
Status: DISCONTINUED | OUTPATIENT
Start: 2022-06-27 | End: 2022-06-27

## 2022-06-27 RX ORDER — GLIPIZIDE 5 MG/1
10 TABLET, FILM COATED, EXTENDED RELEASE ORAL
Status: DISCONTINUED | OUTPATIENT
Start: 2022-06-28 | End: 2022-06-27

## 2022-06-27 RX ORDER — NIFEDIPINE 30 MG/1
60 TABLET, EXTENDED RELEASE ORAL DAILY
Status: CANCELLED | OUTPATIENT
Start: 2022-06-28

## 2022-06-27 RX ORDER — INSULIN ASPART 100 [IU]/ML
0-5 INJECTION, SOLUTION INTRAVENOUS; SUBCUTANEOUS
Status: CANCELLED | OUTPATIENT
Start: 2022-06-27

## 2022-06-27 RX ORDER — ROCURONIUM BROMIDE 10 MG/ML
INJECTION, SOLUTION INTRAVENOUS
Status: DISCONTINUED | OUTPATIENT
Start: 2022-06-27 | End: 2022-06-27

## 2022-06-27 RX ORDER — METOPROLOL TARTRATE 1 MG/ML
5 INJECTION, SOLUTION INTRAVENOUS ONCE
Status: COMPLETED | OUTPATIENT
Start: 2022-06-27 | End: 2022-06-27

## 2022-06-27 RX ORDER — LABETALOL HCL 20 MG/4 ML
20 SYRINGE (ML) INTRAVENOUS EVERY 4 HOURS PRN
Status: CANCELLED | OUTPATIENT
Start: 2022-06-27

## 2022-06-27 RX ORDER — ACETAMINOPHEN 325 MG/1
650 TABLET ORAL EVERY 4 HOURS PRN
Status: DISCONTINUED | OUTPATIENT
Start: 2022-06-27 | End: 2022-07-08 | Stop reason: HOSPADM

## 2022-06-27 RX ORDER — ACETAMINOPHEN 325 MG/1
650 TABLET ORAL EVERY 4 HOURS PRN
Status: CANCELLED | OUTPATIENT
Start: 2022-06-27

## 2022-06-27 RX ORDER — TALC
6 POWDER (GRAM) TOPICAL NIGHTLY PRN
Status: CANCELLED | OUTPATIENT
Start: 2022-06-27

## 2022-06-27 RX ORDER — ATORVASTATIN CALCIUM 40 MG/1
80 TABLET, FILM COATED ORAL DAILY
Status: DISCONTINUED | OUTPATIENT
Start: 2022-06-28 | End: 2022-06-27

## 2022-06-27 RX ORDER — CALCIUM GLUCONATE 98 MG/ML
1 INJECTION, SOLUTION INTRAVENOUS ONCE
Status: COMPLETED | OUTPATIENT
Start: 2022-06-27 | End: 2022-06-27

## 2022-06-27 RX ORDER — CARVEDILOL 6.25 MG/1
6.25 TABLET ORAL 2 TIMES DAILY
Status: DISCONTINUED | OUTPATIENT
Start: 2022-06-27 | End: 2022-07-08 | Stop reason: HOSPADM

## 2022-06-27 RX ORDER — HYDRALAZINE HYDROCHLORIDE 20 MG/ML
10 INJECTION INTRAMUSCULAR; INTRAVENOUS EVERY 4 HOURS PRN
Status: DISCONTINUED | OUTPATIENT
Start: 2022-06-27 | End: 2022-07-08 | Stop reason: HOSPADM

## 2022-06-27 RX ORDER — PROPOFOL 10 MG/ML
VIAL (ML) INTRAVENOUS
Status: DISCONTINUED | OUTPATIENT
Start: 2022-06-27 | End: 2022-06-27

## 2022-06-27 RX ORDER — TALC
6 POWDER (GRAM) TOPICAL NIGHTLY PRN
Status: DISCONTINUED | OUTPATIENT
Start: 2022-06-27 | End: 2022-07-08 | Stop reason: HOSPADM

## 2022-06-27 RX ORDER — HYDRALAZINE HYDROCHLORIDE 20 MG/ML
10 INJECTION INTRAMUSCULAR; INTRAVENOUS EVERY 4 HOURS PRN
Status: CANCELLED | OUTPATIENT
Start: 2022-06-27

## 2022-06-27 RX ORDER — IBUPROFEN 200 MG
16 TABLET ORAL
Status: CANCELLED | OUTPATIENT
Start: 2022-06-27

## 2022-06-27 RX ORDER — ONDANSETRON 2 MG/ML
4 INJECTION INTRAMUSCULAR; INTRAVENOUS EVERY 8 HOURS PRN
Status: CANCELLED | OUTPATIENT
Start: 2022-06-27

## 2022-06-27 RX ORDER — ALBUMIN HUMAN 250 G/1000ML
12.5 SOLUTION INTRAVENOUS ONCE
Status: COMPLETED | OUTPATIENT
Start: 2022-06-27 | End: 2022-06-28

## 2022-06-27 RX ORDER — IBUPROFEN 200 MG
16 TABLET ORAL
Status: DISCONTINUED | OUTPATIENT
Start: 2022-06-27 | End: 2022-06-28

## 2022-06-27 RX ORDER — IBUPROFEN 200 MG
24 TABLET ORAL
Status: CANCELLED | OUTPATIENT
Start: 2022-06-27

## 2022-06-27 RX ORDER — SODIUM CHLORIDE 9 MG/ML
INJECTION, SOLUTION INTRAVENOUS CONTINUOUS
Status: DISCONTINUED | OUTPATIENT
Start: 2022-06-27 | End: 2022-06-27

## 2022-06-27 RX ORDER — LABETALOL HYDROCHLORIDE 5 MG/ML
20 INJECTION, SOLUTION INTRAVENOUS EVERY 4 HOURS PRN
Status: DISCONTINUED | OUTPATIENT
Start: 2022-06-27 | End: 2022-07-08 | Stop reason: HOSPADM

## 2022-06-27 RX ORDER — ONDANSETRON 2 MG/ML
4 INJECTION INTRAMUSCULAR; INTRAVENOUS EVERY 8 HOURS PRN
Status: DISCONTINUED | OUTPATIENT
Start: 2022-06-27 | End: 2022-07-08 | Stop reason: HOSPADM

## 2022-06-27 RX ORDER — SODIUM CHLORIDE 0.9 % (FLUSH) 0.9 %
10 SYRINGE (ML) INJECTION
Status: DISCONTINUED | OUTPATIENT
Start: 2022-06-27 | End: 2022-07-08 | Stop reason: HOSPADM

## 2022-06-27 RX ORDER — CARVEDILOL 3.12 MG/1
6.25 TABLET ORAL 2 TIMES DAILY
Status: CANCELLED | OUTPATIENT
Start: 2022-06-27

## 2022-06-27 RX ORDER — CEFAZOLIN SODIUM 1 G/3ML
1 INJECTION, POWDER, FOR SOLUTION INTRAMUSCULAR; INTRAVENOUS ONCE
Status: COMPLETED | OUTPATIENT
Start: 2022-06-27 | End: 2022-06-27

## 2022-06-27 RX ORDER — ALPRAZOLAM 0.25 MG/1
0.25 TABLET ORAL 4 TIMES DAILY PRN
Status: CANCELLED | OUTPATIENT
Start: 2022-06-27

## 2022-06-27 RX ORDER — IBUPROFEN 200 MG
24 TABLET ORAL
Status: DISCONTINUED | OUTPATIENT
Start: 2022-06-27 | End: 2022-06-28

## 2022-06-27 RX ORDER — SODIUM CHLORIDE 0.9 % (FLUSH) 0.9 %
10 SYRINGE (ML) INJECTION EVERY 6 HOURS
Status: CANCELLED | OUTPATIENT
Start: 2022-06-27

## 2022-06-27 RX ORDER — HEPARIN SODIUM 5000 [USP'U]/ML
INJECTION, SOLUTION INTRAVENOUS; SUBCUTANEOUS
Status: DISCONTINUED | OUTPATIENT
Start: 2022-06-27 | End: 2022-06-27 | Stop reason: HOSPADM

## 2022-06-27 RX ORDER — LIDOCAINE HYDROCHLORIDE AND EPINEPHRINE 10; 10 MG/ML; UG/ML
INJECTION, SOLUTION INFILTRATION; PERINEURAL
Status: DISCONTINUED | OUTPATIENT
Start: 2022-06-27 | End: 2022-06-27 | Stop reason: HOSPADM

## 2022-06-27 RX ORDER — CIPROFLOXACIN 750 MG/1
750 TABLET, FILM COATED ORAL 2 TIMES DAILY
Status: ON HOLD | COMMUNITY
End: 2022-07-08 | Stop reason: HOSPADM

## 2022-06-27 RX ORDER — TAMSULOSIN HYDROCHLORIDE 0.4 MG/1
1 CAPSULE ORAL DAILY
Status: DISCONTINUED | OUTPATIENT
Start: 2022-06-28 | End: 2022-07-08 | Stop reason: HOSPADM

## 2022-06-27 RX ORDER — SODIUM CHLORIDE 0.9 % (FLUSH) 0.9 %
10 SYRINGE (ML) INJECTION EVERY 6 HOURS
Status: DISCONTINUED | OUTPATIENT
Start: 2022-06-27 | End: 2022-07-08 | Stop reason: HOSPADM

## 2022-06-27 RX ORDER — MIDAZOLAM HYDROCHLORIDE 1 MG/ML
INJECTION INTRAMUSCULAR; INTRAVENOUS
Status: DISCONTINUED | OUTPATIENT
Start: 2022-06-27 | End: 2022-06-27

## 2022-06-27 RX ORDER — ATORVASTATIN CALCIUM 40 MG/1
80 TABLET, FILM COATED ORAL DAILY
Status: CANCELLED | OUTPATIENT
Start: 2022-06-28

## 2022-06-27 RX ORDER — GLIPIZIDE 5 MG/1
10 TABLET, FILM COATED, EXTENDED RELEASE ORAL
Status: CANCELLED | OUTPATIENT
Start: 2022-06-28

## 2022-06-27 RX ORDER — GLUCAGON 1 MG
1 KIT INJECTION
Status: DISCONTINUED | OUTPATIENT
Start: 2022-06-27 | End: 2022-06-28

## 2022-06-27 RX ORDER — IPRATROPIUM BROMIDE AND ALBUTEROL SULFATE 2.5; .5 MG/3ML; MG/3ML
3 SOLUTION RESPIRATORY (INHALATION) EVERY 4 HOURS PRN
Status: DISCONTINUED | OUTPATIENT
Start: 2022-06-27 | End: 2022-07-08 | Stop reason: HOSPADM

## 2022-06-27 RX ORDER — NIFEDIPINE 30 MG/1
60 TABLET, EXTENDED RELEASE ORAL DAILY
Status: DISCONTINUED | OUTPATIENT
Start: 2022-06-28 | End: 2022-07-08 | Stop reason: HOSPADM

## 2022-06-27 RX ORDER — GLUCAGON 1 MG
1 KIT INJECTION
Status: CANCELLED | OUTPATIENT
Start: 2022-06-27

## 2022-06-27 RX ADMIN — GABAPENTIN 300 MG: 300 CAPSULE ORAL at 09:06

## 2022-06-27 RX ADMIN — LIDOCAINE HYDROCHLORIDE 50 MG: 20 INJECTION, SOLUTION INTRAVENOUS at 07:06

## 2022-06-27 RX ADMIN — PROPOFOL 70 MG: 10 INJECTION, EMULSION INTRAVENOUS at 07:06

## 2022-06-27 RX ADMIN — MUPIROCIN: 20 OINTMENT TOPICAL at 10:06

## 2022-06-27 RX ADMIN — INSULIN HUMAN 10 UNITS: 100 INJECTION, SOLUTION PARENTERAL at 08:06

## 2022-06-27 RX ADMIN — SODIUM CHLORIDE, PRESERVATIVE FREE 10 ML: 5 INJECTION INTRAVENOUS at 05:06

## 2022-06-27 RX ADMIN — NIFEDIPINE 60 MG: 30 TABLET, FILM COATED, EXTENDED RELEASE ORAL at 09:06

## 2022-06-27 RX ADMIN — SODIUM CHLORIDE, PRESERVATIVE FREE 10 ML: 5 INJECTION INTRAVENOUS at 12:06

## 2022-06-27 RX ADMIN — SODIUM CHLORIDE: 9 INJECTION, SOLUTION INTRAVENOUS at 02:06

## 2022-06-27 RX ADMIN — CALCIUM GLUCONATE 1 G: 98 INJECTION, SOLUTION INTRAVENOUS at 07:06

## 2022-06-27 RX ADMIN — ROCURONIUM BROMIDE 100 MG: 10 INJECTION, SOLUTION INTRAVENOUS at 07:06

## 2022-06-27 RX ADMIN — MIDAZOLAM HYDROCHLORIDE 2 MG: 1 INJECTION, SOLUTION INTRAMUSCULAR; INTRAVENOUS at 08:06

## 2022-06-27 RX ADMIN — CARVEDILOL 6.25 MG: 3.12 TABLET, FILM COATED ORAL at 09:06

## 2022-06-27 RX ADMIN — ALPRAZOLAM 0.25 MG: 0.25 TABLET ORAL at 02:06

## 2022-06-27 RX ADMIN — IPRATROPIUM BROMIDE AND ALBUTEROL SULFATE 3 ML: 2.5; .5 SOLUTION RESPIRATORY (INHALATION) at 07:06

## 2022-06-27 RX ADMIN — CEFAZOLIN 1 G: 330 INJECTION, POWDER, FOR SOLUTION INTRAMUSCULAR; INTRAVENOUS at 07:06

## 2022-06-27 RX ADMIN — FUROSEMIDE 40 MG: 40 TABLET ORAL at 09:06

## 2022-06-27 RX ADMIN — SODIUM CHLORIDE: 9 INJECTION, SOLUTION INTRAVENOUS at 12:06

## 2022-06-27 RX ADMIN — METOPROLOL TARTRATE 5 MG: 5 INJECTION, SOLUTION INTRAVENOUS at 06:06

## 2022-06-27 RX ADMIN — DEXTROSE MONOHYDRATE 25 G: 25 INJECTION, SOLUTION INTRAVENOUS at 07:06

## 2022-06-27 RX ADMIN — DEXTROSE 1 G: 50 INJECTION, SOLUTION INTRAVENOUS at 11:06

## 2022-06-27 RX ADMIN — ALBUMIN (HUMAN) 12.5 G: 12.5 SOLUTION INTRAVENOUS at 11:06

## 2022-06-27 RX ADMIN — MORPHINE SULFATE 1 MG: 4 INJECTION INTRAVENOUS at 05:06

## 2022-06-27 RX ADMIN — METHYLPREDNISOLONE SODIUM SUCCINATE 60 MG: 40 INJECTION, POWDER, FOR SOLUTION INTRAMUSCULAR; INTRAVENOUS at 06:06

## 2022-06-27 RX ADMIN — IPRATROPIUM BROMIDE AND ALBUTEROL SULFATE 3 ML: 2.5; .5 SOLUTION RESPIRATORY (INHALATION) at 11:06

## 2022-06-27 RX ADMIN — TAMSULOSIN HYDROCHLORIDE 0.4 MG: 0.4 CAPSULE ORAL at 09:06

## 2022-06-27 RX ADMIN — ATORVASTATIN CALCIUM 80 MG: 40 TABLET, FILM COATED ORAL at 09:06

## 2022-06-27 NOTE — PROGRESS NOTES
[facility]  [facility]  Progress Note    Patient Name: Neno Watkins  MRN: [id number]  Patient Class: IP- Swing   Admission Date: 6/23/2022  Length of Stay: 4 days  Attending Physician: Josue Francois MD  Primary Care Provider: Primary Doctor No        Subjective:     Principal Problem:Acute kidney injury        HPI:  61 y.o. male with a history that includes, hypertension, BPH, and morbid obesity, presented to ED s/p falls at home.  Patient reports last of which, he was walking when his right leg gave out and he fell backwards.  Patient could not walk and decided to come to the ED.  In the ED thoracic spine x-rays showed no acute osseous abnormality and lumbar spine x-rays also showed no acute abnormality. CT head was negative.  In the ED patient was found to have several electrolyte abnormalities (K of 6) and SHANDRA with a creatinine of 3.28.  He was admitted to observation and started on IVF resuscitation.  Renal indices were improving, but overnight noted to have urinary retention, requiring placement of Baldwin catheter.  Also noted to have significant bilateral lower extremity swelling, though is chronic in nature.   Additionally patient has documented peripheral arterial disease in his history.  However also began requiring supplemental oxygen due to hypoxia.  Seen by Cardiology this morning, with concerns for fluid overload.  In the interim, due to recurrent falls and deconditioning, patient would benefit from ongoing therapy services, for which he was transferred to swing bed services.      Overview/Hospital Course:  Initially treated with IVFs, but had worsening edema, which required 2L supplemental oxygen.  Patient diuresed but developed worsening renal function. Resumed IV fluid hydration, but conitnues with worsening edema and renal function. CT abd/pelvis and repeat UA performed; CT abdomen and pelvis without contrast showed no hydronephrosis, no urinary calculi.  However did see mild symmetric perinephric stranding as well as bladder wall thickening questioning cystitis.  Repeat UA showing no nitrites however positive for leukocyte esterase, significant amount of rbc's and wbc's.  No bacteria.  however given perinephric standing patient placed on ceftriaxone for concern of pyelonephritis, UTI.  Patient also with a persistently elevated leukocytosis therefore agree with empiric treatment at this time.  IV lfuid hydration increased. Attempting to transfer pt for nephrology +/- dialysis as K is increasing to high of 5.5 today. Giving Lokelma and insulin.       Interval History:  No acute complaints, wife at bedside both aware that we are attempting to transfer and agree with current care plan.    Review of Systems   Constitutional:  Negative for activity change, appetite change, fatigue and fever.   HENT:  Negative for congestion, sore throat and trouble swallowing.    Eyes:  Negative for pain and redness.   Respiratory:  Negative for cough, chest tightness, shortness of breath and wheezing.    Cardiovascular:  Positive for leg swelling. Negative for chest pain.   Gastrointestinal:  Negative for abdominal distention, abdominal pain, diarrhea, nausea and vomiting.   Genitourinary:  Positive for scrotal swelling. Negative for dysuria and frequency.   Musculoskeletal:  Negative for back pain and neck pain.   Neurological:  Negative for weakness and headaches.   Psychiatric/Behavioral:  Negative for agitation and confusion.    Objective:     Vital Signs (Most Recent):  Temp: 97.8 °F (36.6 °C) (06/27/22 0746)  Pulse: 86 (06/27/22 0746)  Resp: 18 (06/27/22 0710)  BP: (!) 159/78 (06/27/22 0746)  SpO2: 95 % (06/27/22 0746)   Vital Signs (24h Range):  Temp:  [97.1 °F (36.2 °C)-98.6 °F (37 °C)] 97.8 °F (36.6 °C)  Pulse:  [67-90] 86  Resp:  [18-24] 18  SpO2:  [94 %-98 %] 95 %  BP: (124-178)/(71-90) 159/78     Weight: (!) 140.7 kg (310 lb 3 oz)  Body mass index is 44.51  kg/m².    Intake/Output Summary (Last 24 hours) at 6/27/2022 1035  Last data filed at 6/27/2022 0900  Gross per 24 hour   Intake 480 ml   Output 1500 ml   Net -1020 ml      Physical Exam  Constitutional:       General: He is not in acute distress.     Appearance: Normal appearance. He is morbidly obese. He is ill-appearing.   HENT:      Head: Normocephalic and atraumatic.      Nose: No congestion or rhinorrhea.      Mouth/Throat:      Mouth: Mucous membranes are moist.   Eyes:      Conjunctiva/sclera: Conjunctivae normal.      Pupils: Pupils are equal, round, and reactive to light.   Cardiovascular:      Rate and Rhythm: Normal rate and regular rhythm.      Heart sounds: No murmur heard.  Pulmonary:      Effort: Pulmonary effort is normal.      Breath sounds: Examination of the right-lower field reveals decreased breath sounds. Examination of the left-lower field reveals decreased breath sounds. Decreased breath sounds present. No wheezing.   Abdominal:      General: Bowel sounds are normal. There is distension.      Palpations: Abdomen is soft.      Tenderness: There is no abdominal tenderness.   Genitourinary:     Penis: Swelling present.       Testes:         Right: Swelling present.         Left: Swelling present.   Musculoskeletal:         General: No swelling. Normal range of motion.      Cervical back: Normal range of motion and neck supple.      Right lower leg: 3+ Edema present.      Left lower leg: 3+ Edema present.   Skin:     General: Skin is warm and dry.      Findings: No rash.   Neurological:      General: No focal deficit present.      Mental Status: He is alert and oriented to person, place, and time.   Psychiatric:         Mood and Affect: Mood normal.         Behavior: Behavior normal.       Significant Labs: All pertinent labs within the past 24 hours have been reviewed.    Significant Imaging: I have reviewed all pertinent imaging results/findings within the past 24 hours.      Assessment/Plan:       * Acute kidney injury  Patient with acute kidney injury likely d/t Pre-renal azotemia Which is currently worsening. Labs reviewed- Renal function/electrolytes with Estimated Creatinine Clearance: 22.7 mL/min (A) (based on SCr of 4.78 mg/dL (H)). according to latest data. Monitor urine output and serial BMP and adjust therapy as needed. Avoid nephrotoxins and renally dose meds for GFR listed above.   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  -PT/OT    Bilateral lower extremity edema  -wound care following       Type 2 diabetes mellitus  -well controlled with glipizide from home         Critical care time 40 min  VTE Risk Mitigation (From admission, onward)         Ordered     Place sequential compression device  Until discontinued         06/24/22 2103     IP VTE HIGH RISK PATIENT  Once         06/23/22 1542                Discharge Planning   MATT:      Code Status: Full Code   Is the patient medically ready for discharge?:     Reason for patient still in hospital (select all that apply): Patient unstable, Patient trending condition, Laboratory test, Treatment and Consult recommendations  Discharge Plan A: Home with family, Home Health                  Thairy G Reyes, DO  Department of Hospital Medicine   [facility]

## 2022-06-27 NOTE — NURSING
Patient complained of chest pain, with O2 sats in 8os.  Patient complained of feeling anxious.  PRN xanax given, STAT EKG done.  O2 sats increased to mid 90s.  Patient transferred to [facility] via [facility].  Report given to Batsheva FELIX

## 2022-06-27 NOTE — ASSESSMENT & PLAN NOTE
-uptrending, high of 5.5 today, in the setting of concurrent renal failure  -Lokelma and insulin

## 2022-06-27 NOTE — PROGRESS NOTES
"[facility]  Cardiology  Progress Note    Patient Name: Neno Watkins  MRN: [id number]  Admission Date: 6/23/2022  Hospital Length of Stay: 4 days  Code Status: Full Code   Attending Physician: Josue Francois MD   Primary Care Physician: Primary Doctor No  Expected Discharge Date:   Principal Problem:<principal problem not specified>    Subjective:     HPI:  Mr. Watkins is a 61 year old male, known to Dr. Dejon Dye, who presented to the hospital with worsening bilateral lower extremity edema. This precluded his ability to mobilize for any length of time. Patient is sedentary at baseline, used a cane, now having to use a walker and spends much his time in recliner and bed. He exhibits significant edema of lower extremities and scrotum on exam (6.23.22). Patient does have bilateral lower extremity ACE wrapping in place, which he reports were placed by his PCP. Patient noted to have SHANDRA and is currently with IVF(s) infusing. [facility] is consulted for peripheral workup given his lower extremity weakness and history of venous insufficiency.    Hospital Course:  6.24.22: NAD Noted. Patient is resting. Denies SOB/CP. Renal function worse. Hemodynamically stable.  6.27.22: NAD. Scrotal Edema/BLE Edema Worsening per Nursing Staff. "I am ok, but so weak." Family at Bedside. VSS.     PMH: Chronic Venous Insufficiency, Lower Extremity Edema, Hypertension, DM II, Elevated BMI/Obesity, Sedentary Lifestyle  PSH: Tonsillectomy, Thumb Surgery  Family History: Father- Cancer, Mother- Alzheimer's Disease, Daughter- Hyperthyroidism  Social History: Tobacco- Negative, Alcohol- Negative, Substance Abuse- Negative     Previous Cardiac Diagnostics:   Echocardiogram (6.23.22):  The left ventricle is normal in size with normal systolic function.  Normal left ventricular diastolic function.  The estimated ejection fraction is 55%.  Normal right ventricular size with normal right ventricular systolic " function.  Normal central venous pressure (3 mmHg).    Peripheral Artery US (6.23.22):  No hemodynamically significant stenosis demonstrated in the right or left lower extremity arterial system.    Peripheral Venous US (6.23.22)  No evidence of deep venous thrombosis in either lower extremity.    Review of Systems   Constitutional: Positive for malaise/fatigue and weight gain. Negative for fever.   Cardiovascular: Positive for leg swelling. Negative for chest pain.   Respiratory: Positive for wheezing. Negative for shortness of breath.    All other systems reviewed and are negative.    Objective:     Vital Signs (Most Recent):  Temp: 97.8 °F (36.6 °C) (06/27/22 0746)  Pulse: 86 (06/27/22 0746)  Resp: 18 (06/27/22 0710)  BP: (!) 159/78 (06/27/22 0746)  SpO2: 95 % (06/27/22 0746) Vital Signs (24h Range):  Temp:  [97.1 °F (36.2 °C)-98.6 °F (37 °C)] 97.8 °F (36.6 °C)  Pulse:  [67-90] 86  Resp:  [18-24] 18  SpO2:  [94 %-98 %] 95 %  BP: (124-178)/(71-90) 159/78     Weight: (!) 140.7 kg (310 lb 3 oz)  Body mass index is 44.51 kg/m².    SpO2: 95 %  O2 Device (Oxygen Therapy): nasal cannula w/ humidification      Intake/Output Summary (Last 24 hours) at 6/27/2022 1012  Last data filed at 6/27/2022 0900  Gross per 24 hour   Intake 480 ml   Output 1500 ml   Net -1020 ml       Lines/Drains/Airways     Drain  Duration                Urethral Catheter 06/24/22 0230 Straight-tip 20 Fr. 3 days          Peripheral Intravenous Line  Duration                Midline Catheter Insertion/Assessment  - Single Lumen 06/25/22 0844 Left basilic vein (medial side of arm) 2 days              Significant Labs:   Recent Results (from the past 72 hour(s))   POCT glucose    Collection Time: 06/24/22 11:51 AM   Result Value Ref Range    POCT Glucose 89 70 - 110 mg/dL   POCT glucose    Collection Time: 06/24/22  5:10 PM   Result Value Ref Range    POCT Glucose 98 70 - 110 mg/dL   POCT glucose    Collection Time: 06/24/22  8:45 PM   Result Value Ref  Range    POCT Glucose 95 70 - 110 mg/dL   CBC Without Differential    Collection Time: 06/25/22  4:56 AM   Result Value Ref Range    WBC 12.2 (H) 4.5 - 11.5 x10(3)/mcL    RBC 3.50 (L) 4.70 - 6.10 x10(6)/mcL    Hgb 10.7 (L) 14.0 - 18.0 gm/dL    Hct 34.7 (L) 42.0 - 52.0 %    Platelet 332 130 - 400 x10(3)/mcL    MCV 99.1 (H) 80.0 - 94.0 fL    MCH 30.6 27.0 - 31.0 pg    MCHC 30.8 (L) 33.0 - 36.0 mg/dL    RDW 12.2 11.5 - 17.0 %    MPV 9.0 (L) 9.4 - 12.4 fL   Basic Metabolic Panel    Collection Time: 06/25/22  4:56 AM   Result Value Ref Range    Sodium Level 138 136 - 145 mmol/L    Potassium Level 5.1 3.5 - 5.1 mmol/L    Chloride 107 98 - 107 mmol/L    Carbon Dioxide 22 (L) 23 - 31 mmol/L    Glucose Level 68 (L) 82 - 115 mg/dL    Blood Urea Nitrogen 65.4 (H) 8.4 - 25.7 mg/dL    Creatinine 3.01 (H) 0.73 - 1.18 mg/dL    BUN/Creatinine Ratio 22     Calcium Level Total 8.2 (L) 8.8 - 10.0 mg/dL    Estimated GFR-Non  23 mls/min/1.73/m2    Anion Gap 9.0 mEq/L   Magnesium    Collection Time: 06/25/22  4:56 AM   Result Value Ref Range    Magnesium Level 2.40 1.60 - 2.60 mg/dL   POCT glucose    Collection Time: 06/25/22  5:13 AM   Result Value Ref Range    POCT Glucose 67 (L) 70 - 110 mg/dL   POCT glucose    Collection Time: 06/25/22  5:47 AM   Result Value Ref Range    POCT Glucose 93 70 - 110 mg/dL   POCT glucose    Collection Time: 06/25/22 11:21 AM   Result Value Ref Range    POCT Glucose 72 70 - 110 mg/dL   POCT glucose    Collection Time: 06/25/22  4:14 PM   Result Value Ref Range    POCT Glucose 67 (L) 70 - 110 mg/dL   POCT glucose    Collection Time: 06/25/22  5:07 PM   Result Value Ref Range    POCT Glucose 105 70 - 110 mg/dL   POCT glucose    Collection Time: 06/25/22  7:56 PM   Result Value Ref Range    POCT Glucose 72 70 - 110 mg/dL   Basic Metabolic Panel    Collection Time: 06/26/22  4:06 AM   Result Value Ref Range    Sodium Level 138 136 - 145 mmol/L    Potassium Level 5.2 (H) 3.5 - 5.1 mmol/L     Chloride 108 (H) 98 - 107 mmol/L    Carbon Dioxide 21 (L) 23 - 31 mmol/L    Glucose Level 60 (L) 82 - 115 mg/dL    Blood Urea Nitrogen 77.8 (H) 8.4 - 25.7 mg/dL    Creatinine 3.76 (H) 0.73 - 1.18 mg/dL    BUN/Creatinine Ratio 21     Calcium Level Total 8.3 (L) 8.8 - 10.0 mg/dL    Estimated GFR-Non  17 mls/min/1.73/m2    Anion Gap 9.0 mEq/L   CBC Without Differential    Collection Time: 06/26/22  4:07 AM   Result Value Ref Range    WBC 12.7 (H) 4.5 - 11.5 x10(3)/mcL    RBC 3.51 (L) 4.70 - 6.10 x10(6)/mcL    Hgb 10.8 (L) 14.0 - 18.0 gm/dL    Hct 34.6 (L) 42.0 - 52.0 %    Platelet 331 130 - 400 x10(3)/mcL    MCV 98.6 (H) 80.0 - 94.0 fL    MCH 30.8 27.0 - 31.0 pg    MCHC 31.2 (L) 33.0 - 36.0 mg/dL    RDW 12.3 11.5 - 17.0 %    MPV 9.3 (L) 9.4 - 12.4 fL   POCT glucose    Collection Time: 06/26/22  5:16 AM   Result Value Ref Range    POCT Glucose 64 (L) 70 - 110 mg/dL   POCT glucose    Collection Time: 06/26/22  6:04 AM   Result Value Ref Range    POCT Glucose 92 70 - 110 mg/dL   POCT glucose    Collection Time: 06/26/22 10:27 AM   Result Value Ref Range    POCT Glucose 78 70 - 110 mg/dL   Urinalysis, Reflex to Urine Culture Urine, Clean Catch    Collection Time: 06/26/22 11:35 AM    Specimen: Urine   Result Value Ref Range    Color, UA Lluvia (A) Yellow, Colorless, Other, Clear    Appearance, UA Cloudy (A) Clear    Specific Gravity, UA 1.020     pH, UA 5.0 5.0, 5.5, 6.0, 6.5, 7.0, 7.5, 8.0, 8.5    Protein, UA >=300 (A) Negative, 300  mg/dL    Glucose, UA Negative Negative, Normal mg/dL    Ketones, UA Negative Negative, +1, +2, +3, +4, +5, >=160, >=80 mg/dL    Blood, UA Large (A) Negative unit/L    Bilirubin, UA Small (A) Negative mg/dL    Urobilinogen, UA 0.2 0.2, 1.0, Normal mg/dL    Nitrites, UA Negative Negative    Leukocyte Esterase, UA Small (A) Negative, 75  unit/L   Urinalysis, Microscopic    Collection Time: 06/26/22 11:35 AM   Result Value Ref Range    Bacteria, UA None Seen None Seen, Rare,  Occasional /HPF    RBC, UA 51-99 (A) None Seen, 0-2, 3-5, 0-5 /HPF    WBC, UA 6-10 (A) None Seen, 0-2, 3-5, 0-5 /HPF    Squamous Epithelial Cells, UA None Seen None Seen, Rare, Occasional, Occ /HPF   POCT glucose    Collection Time: 06/26/22  3:49 PM   Result Value Ref Range    POCT Glucose 61 (L) 70 - 110 mg/dL   POCT glucose    Collection Time: 06/26/22  7:33 PM   Result Value Ref Range    POCT Glucose 90 70 - 110 mg/dL   POCT glucose    Collection Time: 06/27/22  5:15 AM   Result Value Ref Range    POCT Glucose 42 (LL) 70 - 110 mg/dL   POCT glucose    Collection Time: 06/27/22  5:37 AM   Result Value Ref Range    POCT Glucose 57 (L) 70 - 110 mg/dL   Comprehensive Metabolic Panel    Collection Time: 06/27/22  5:55 AM   Result Value Ref Range    Sodium Level 138 136 - 145 mmol/L    Potassium Level 5.5 (H) 3.5 - 5.1 mmol/L    Chloride 107 98 - 107 mmol/L    Carbon Dioxide 21 (L) 23 - 31 mmol/L    Glucose Level 53 (L) 82 - 115 mg/dL    Blood Urea Nitrogen 86.6 (H) 8.4 - 25.7 mg/dL    Creatinine 4.78 (H) 0.73 - 1.18 mg/dL    Calcium Level Total 8.3 (L) 8.8 - 10.0 mg/dL    Protein Total 7.0 5.8 - 7.6 gm/dL    Albumin Level 2.1 (L) 3.4 - 4.8 gm/dL    Globulin 4.9 (H) 2.4 - 3.5 gm/dL    Albumin/Globulin Ratio 0.4 (L) 1.1 - 2.0 ratio    Bilirubin Total 0.5 <=1.5 mg/dL    Alkaline Phosphatase 120 40 - 150 unit/L    Alanine Aminotransferase 76 (H) 0 - 55 unit/L    Aspartate Aminotransferase 78 (H) 5 - 34 unit/L    Estimated GFR-Non  13 mls/min/1.73/m2   CBC with Differential    Collection Time: 06/27/22  5:55 AM   Result Value Ref Range    WBC 12.3 (H) 4.5 - 11.5 x10(3)/mcL    RBC 3.65 (L) 4.70 - 6.10 x10(6)/mcL    Hgb 11.0 (L) 14.0 - 18.0 gm/dL    Hct 36.5 (L) 42.0 - 52.0 %    .0 (H) 80.0 - 94.0 fL    MCH 30.1 27.0 - 31.0 pg    MCHC 30.1 (L) 33.0 - 36.0 mg/dL    RDW 12.3 11.5 - 17.0 %    Platelet 327 130 - 400 x10(3)/mcL    MPV 9.1 (L) 9.4 - 12.4 fL    Neut % 71.6 %    Lymph % 15.4 %    Mono % 10.9 %     Eos % 1.7 %    Basophil % 0.2 %    Lymph # 1.89 0.6 - 4.6 x10(3)/mcL    Neut # 8.8 2.1 - 9.2 x10(3)/mcL    Mono # 1.34 (H) 0.1 - 1.3 x10(3)/mcL    Eos # 0.21 0 - 0.9 x10(3)/mcL    Baso # 0.02 0 - 0.2 x10(3)/mcL    IG# 0.03 (H) 0 - 0.0155 x10(3)/mcL    IG% 0.2 0 - 0.43 %   POCT glucose    Collection Time: 06/27/22  6:08 AM   Result Value Ref Range    POCT Glucose 91 70 - 110 mg/dL     Telemetry:  SR 70s's-80's    Physical Exam  Vitals reviewed.   Constitutional:       General: He is not in acute distress.     Appearance: He is obese.   HENT:      Head: Normocephalic.      Mouth/Throat:      Mouth: Mucous membranes are moist.   Cardiovascular:      Rate and Rhythm: Normal rate and regular rhythm.      Heart sounds: Normal heart sounds.   Pulmonary:      Effort: Pulmonary effort is normal. No respiratory distress.      Comments: Faint Expiratory Wheeze Upper Anterior/Upper Airway, Clearing with Cough. NC 3 L/Min  Abdominal:      Palpations: Abdomen is soft.      Tenderness: There is no abdominal tenderness.      Comments: Obese Abdomen   Genitourinary:      Musculoskeletal:      Cervical back: Neck supple.      Right lower leg: Edema present.      Left lower leg: Edema present.   Skin:     General: Skin is warm and dry.   Neurological:      General: No focal deficit present.      Mental Status: He is oriented to person, place, and time.       Current Inpatient Medications:    Current Facility-Administered Medications:     0.9%  NaCl infusion, , Intravenous, Continuous, Josue Francois MD, Last Rate: 150 mL/hr at 06/27/22 0208, New Bag at 06/27/22 0208    acetaminophen tablet 650 mg, 650 mg, Oral, Q4H PRN, Josue Francois MD, 650 mg at 06/26/22 1555    albuterol-ipratropium 2.5 mg-0.5 mg/3 mL nebulizer solution 3 mL, 3 mL, Nebulization, Q4H PRN, Josue Francois MD, 3 mL at 06/27/22 0710    ALPRAZolam tablet 0.25 mg, 0.25 mg, Oral, QID PRN, Josue Francois MD, 0.25 mg at 06/26/22 1951    atorvastatin tablet 80 mg, 80  mg, Oral, Daily, Josue Francois MD, 80 mg at 06/27/22 0943    carvediloL tablet 6.25 mg, 6.25 mg, Oral, BID, Josue Francois MD, 6.25 mg at 06/27/22 0943    cefTRIAXone (ROCEPHIN) 1 g in dextrose 5 % in water (D5W) 5 % 50 mL IVPB (MB+), 1 g, Intravenous, Q24H, Josue Francois MD, Stopped at 06/26/22 1143    dextrose 10% bolus 125 mL, 12.5 g, Intravenous, PRN, Jouse Francois MD    dextrose 10% bolus 250 mL, 25 g, Intravenous, PRN, Josue Francois MD    diclofenac sodium 1 % gel 4 g, 4 g, Topical (Top), BID PRN, Josue Francois MD    gabapentin capsule 300 mg, 300 mg, Oral, BID, Josue Francois MD, 300 mg at 06/27/22 0942    glipiZIDE 24 hr tablet 10 mg, 10 mg, Oral, Daily with breakfast, Josue Francois MD, 10 mg at 06/26/22 0918    glucagon (human recombinant) injection 1 mg, 1 mg, Intramuscular, PRN, Josue Francois MD    glucose chewable tablet 16 g, 16 g, Oral, PRN, Josue Francois MD    glucose chewable tablet 24 g, 24 g, Oral, PRN, Josue Francois MD    hydrALAZINE injection 10 mg, 10 mg, Intravenous, Q4H PRN, Josue Francois MD, 10 mg at 06/26/22 1541    insulin aspart U-100 injection 0-5 Units, 0-5 Units, Subcutaneous, QID (AC + HS) PRN, Josue Francois MD    insulin aspart U-100 injection 10 Units, 10 Units, Subcutaneous, Once, Thairy G Reyes, DO    labetalol 20 mg/4 mL (5 mg/mL) IV syring, 20 mg, Intravenous, Q4H PRN, Josue Francois MD, 20 mg at 06/26/22 1951    LIDOcaine 5 % patch 1 patch, 1 patch, Transdermal, Daily PRN, Josue Francois MD    melatonin tablet 6 mg, 6 mg, Oral, Nightly PRN, Josue Francois MD, 6 mg at 06/25/22 2004    NIFEdipine 24 hr tablet 60 mg, 60 mg, Oral, Daily, MARY ELLEN Marie, 60 mg at 06/27/22 0943    ondansetron injection 4 mg, 4 mg, Intravenous, Q8H PRN, Josue Francois MD    sodium chloride 0.9% flush 10 mL, 10 mL, Intravenous, PRN, Josue Francois MD    Flushing PICC Protocol, , , Until Discontinued **AND** sodium chloride 0.9% flush 10 mL, 10 mL, Intravenous, Q6H, 10  mL at 06/27/22 0524 **AND** sodium chloride 0.9% flush 10 mL, 10 mL, Intravenous, PRN, Josue Francois MD    sodium zirconium cyclosilicate packet 10 g, 10 g, Oral, TID, Thairy G Reyes, DO    tamsulosin 24 hr capsule 0.4 mg, 1 capsule, Oral, Daily, Josue Francois MD, 0.4 mg at 06/27/22 0943    VTE Risk Mitigation (From admission, onward)         Ordered     Place sequential compression device  Until discontinued         06/24/22 2103     IP VTE HIGH RISK PATIENT  Once         06/23/22 1542              Assessment:   Chronic Peripheral Edema     -  EF 55% (CVP 3 mmHg on ECHO Results)    -  (This Admission)  Chronic Venous Insufficiency    - Venous US Negative for DVT, No Hemodynamically Significant Obstruction on Peripheral Artery US (6.23.22)    - Recent Lower Extremity Weakness/Edema Precluding Ambulation  Acute Kidney Injury (SHANDRA) - Worsening  Electrolyte Derangement - Hyperkalemia  Fall (Mechanical)    - CT Head Negative/Spine Radiograph Negative for Osseus Abnormality  HTN  HLD  DM II    - Hgb A1C 5.5%  Chronic Back Pain  Sedentary Lifestyle/Debility  Elevated BMI/Obesity  BPH with Urinary Retention    - Baldwin in Place  Anemia    Plan:   Hold further Diuretics given worsening Renal Indices  Recommend/Agree with Transfer to Higher Level of Care for Nephrology Consultation  Continue Procardia XL and BB for BP/HR Control  Avoid all Nephrotoxic Agents including ARB/ACEI/ARNI (For Now)  Electrolyte Management per Primary Team  Labs in AM: CBC, CMP and Mg    Titi Maurer, STEPHY  Cardiology  [facility]  06/27/2022

## 2022-06-27 NOTE — ASSESSMENT & PLAN NOTE
-questionable, b/l perinephric stranding seen on CT with bladder wall thickening and persistent leukocytosis  -empiric ceftriaxone started 6/26

## 2022-06-27 NOTE — ASSESSMENT & PLAN NOTE
Patient with acute kidney injury likely d/t Pre-renal azotemia Which is currently worsening. Labs reviewed- Renal function/electrolytes with Estimated Creatinine Clearance: 22.7 mL/min (A) (based on SCr of 4.78 mg/dL (H)). according to latest data. Monitor urine output and serial BMP and adjust therapy as needed. Avoid nephrotoxins and renally dose meds for GFR listed above.   -Continue with IVF, worsened with diuretics  -avoid nephrotoxic medications  -pending urine sodium, creatinine, osmolality  -Attempting to transfer to higher level of care ofr nephro/dialysis   -of note, pt did have home med of rifampin and has bottle in med bag, unable to state why it was prescribed

## 2022-06-27 NOTE — PLAN OF CARE
[facility]  Discharge Final Note    Primary Care Provider: Primary Doctor No    Expected Discharge Date:     Final Discharge Note (most recent)     Final Note - 06/27/22 1309        Final Note    Assessment Type Final Discharge Note     Anticipated Discharge Disposition Discharged to Other Facility with Planned Readmission     What phone number can be called within the next 1-3 days to see how you are doing after discharge? [phone]     Hospital Resources/Appts/Education Provided Provided patient/caregiver with written discharge plan information        Post-Acute Status    Post-Acute Authorization Placement;Other     Post-Acute Placement Status Set-up Complete/Auth obtained     Discharge Delays None known at this time                 Important Message from Medicare             Contact Info     Messi Weston MD   Specialty: Family Medicine    [address] [place] [address]   Phone: [phone]       Next Steps: Follow up

## 2022-06-27 NOTE — PLAN OF CARE
Patient had respiration rate of 28-30 this morning and labored breathing.  Patient O2 sats was >94% and VS were within normal limits. Patient edematous and tight throughout the body.  The scrotum very edematous, whitehead in place. CT scan of abd performed int he morning. Po lasix ordered and administered.    At 1500 BP was elevated and IV hydralazine administered. Urine more red in appearance and urine leaking from around the whitehead.    At 1600 patient complained of chest pain and tightness. BP still elevated. Physician notified.   A stat Ekg and CXR were ordered. Patient anxious, diaphoretic, with increased respiratory effort.  Respiratory was called and neb treatment administered.  Patient improved with neb treatment.    Whitehead flushed and a blood clot was  removed from the whitehead tubing.

## 2022-06-27 NOTE — SUBJECTIVE & OBJECTIVE
Interval History:  No acute complaints, wife at bedside both aware that we are attempting to transfer and agree with current care plan.    Review of Systems   Constitutional:  Negative for activity change, appetite change, fatigue and fever.   HENT:  Negative for congestion, sore throat and trouble swallowing.    Eyes:  Negative for pain and redness.   Respiratory:  Negative for cough, chest tightness, shortness of breath and wheezing.    Cardiovascular:  Positive for leg swelling. Negative for chest pain.   Gastrointestinal:  Negative for abdominal distention, abdominal pain, diarrhea, nausea and vomiting.   Genitourinary:  Positive for scrotal swelling. Negative for dysuria and frequency.   Musculoskeletal:  Negative for back pain and neck pain.   Neurological:  Negative for weakness and headaches.   Psychiatric/Behavioral:  Negative for agitation and confusion.    Objective:     Vital Signs (Most Recent):  Temp: 97.8 °F (36.6 °C) (06/27/22 0746)  Pulse: 86 (06/27/22 0746)  Resp: 18 (06/27/22 0710)  BP: (!) 159/78 (06/27/22 0746)  SpO2: 95 % (06/27/22 0746)   Vital Signs (24h Range):  Temp:  [97.1 °F (36.2 °C)-98.6 °F (37 °C)] 97.8 °F (36.6 °C)  Pulse:  [67-90] 86  Resp:  [18-24] 18  SpO2:  [94 %-98 %] 95 %  BP: (124-178)/(71-90) 159/78     Weight: (!) 140.7 kg (310 lb 3 oz)  Body mass index is 44.51 kg/m².    Intake/Output Summary (Last 24 hours) at 6/27/2022 1035  Last data filed at 6/27/2022 0900  Gross per 24 hour   Intake 480 ml   Output 1500 ml   Net -1020 ml      Physical Exam  Constitutional:       General: He is not in acute distress.     Appearance: Normal appearance. He is morbidly obese. He is ill-appearing.   HENT:      Head: Normocephalic and atraumatic.      Nose: No congestion or rhinorrhea.      Mouth/Throat:      Mouth: Mucous membranes are moist.   Eyes:      Conjunctiva/sclera: Conjunctivae normal.      Pupils: Pupils are equal, round, and reactive to light.   Cardiovascular:      Rate and  Rhythm: Normal rate and regular rhythm.      Heart sounds: No murmur heard.  Pulmonary:      Effort: Pulmonary effort is normal.      Breath sounds: Examination of the right-lower field reveals decreased breath sounds. Examination of the left-lower field reveals decreased breath sounds. Decreased breath sounds present. No wheezing.   Abdominal:      General: Bowel sounds are normal. There is distension.      Palpations: Abdomen is soft.      Tenderness: There is no abdominal tenderness.   Genitourinary:     Penis: Swelling present.       Testes:         Right: Swelling present.         Left: Swelling present.   Musculoskeletal:         General: No swelling. Normal range of motion.      Cervical back: Normal range of motion and neck supple.      Right lower leg: 3+ Edema present.      Left lower leg: 3+ Edema present.   Skin:     General: Skin is warm and dry.      Findings: No rash.   Neurological:      General: No focal deficit present.      Mental Status: He is alert and oriented to person, place, and time.   Psychiatric:         Mood and Affect: Mood normal.         Behavior: Behavior normal.       Significant Labs: All pertinent labs within the past 24 hours have been reviewed.    Significant Imaging: I have reviewed all pertinent imaging results/findings within the past 24 hours.

## 2022-06-27 NOTE — PLAN OF CARE
[facility]  Discharge Reassessment    Primary Care Provider: Primary Doctor No    Expected Discharge Date:     Reassessment (most recent)     Discharge Reassessment - 06/27/22 1114        Discharge Reassessment    Assessment Type Discharge Planning Reassessment     Did the patient's condition or plan change since previous assessment? Yes     Discharge Plan discussed with: Sibling     Name(s) and Number(s) Emilie Richardson [phone]     Communicated MATT with patient/caregiver Date not available/Unable to determine     Discharge Plan A Other   Higher level care [facility] with Renal    DME Needed Upon Discharge  none     Discharge Barriers Identified None     Why the patient remains in the hospital Requires continued medical care        Post-Acute Status    Post-Acute Authorization Placement     Post-Acute Placement Status Referrals Sent     Hospital Resources/Appts/Education Provided Provided patient/caregiver with written discharge plan information     Discharge Delays None known at this time

## 2022-06-27 NOTE — DISCHARGE SUMMARY
[facility] - [facility] Surgical Unit  Hospital Medicine  Discharge Summary      Patient Name: Neno Watkins  MRN: [id number]  Patient Class: IP- Swing  Admission Date: 6/23/2022  Hospital Length of Stay: 4 days  Discharge Date and Time:  06/27/2022 12:45 PM  Attending Physician: Josue Francois MD   Discharging Provider: Thairy G Reyes, DO  Primary Care Provider: Primary Doctor No      HPI:   61 y.o. male with a history that includes, hypertension, BPH, and morbid obesity, presented to ED s/p falls at home.  Patient reports last of which, he was walking when his right leg gave out and he fell backwards.  Patient could not walk and decided to come to the ED.  In the ED thoracic spine x-rays showed no acute osseous abnormality and lumbar spine x-rays also showed no acute abnormality. CT head was negative.  In the ED patient was found to have several electrolyte abnormalities (K of 6) and SHANDRA with a creatinine of 3.28.  He was admitted to observation and started on IVF resuscitation.  Renal indices were improving, but overnight noted to have urinary retention, requiring placement of Baldwin catheter.  Also noted to have significant bilateral lower extremity swelling, though is chronic in nature.   Additionally patient has documented peripheral arterial disease in his history.  However also began requiring supplemental oxygen due to hypoxia.  Seen by Cardiology this morning, with concerns for fluid overload.  In the interim, due to recurrent falls and deconditioning, patient would benefit from ongoing therapy services, for which he was transferred to swing bed services.      * No surgery found *      Hospital Course:   Initially treated with IVFs, but had worsening edema, which required 2L supplemental oxygen.  Patient diuresed but developed worsening renal function. Resumed IV fluid hydration, but conitnues with worsening edema and renal function. CT abd/pelvis and repeat UA performed; CT abdomen and pelvis without  contrast showed no hydronephrosis, no urinary calculi. However did see mild symmetric perinephric stranding as well as bladder wall thickening questioning cystitis.  Repeat UA showing no nitrites however positive for leukocyte esterase, significant amount of rbc's and wbc's.  No bacteria.  however given perinephric standing patient placed on ceftriaxone for concern of pyelonephritis, UTI.  Patient also with a persistently elevated leukocytosis therefore agree with empiric treatment at this time.  IV lfuid hydration increased. Attempting to transfer pt for nephrology +/- dialysis as K is increasing to high of 5.5 today. Giving Lokelma and insulin. Case discussed with [facility] for transfer, accepted.        Goals of Care Treatment Preferences:  Code Status: Full Code      Consults:   Consults (From admission, onward)        Status Ordering Provider     IP consult to case management  Once        Provider:  (Not yet assigned)    Acknowledged CJ MARSH          * Acute kidney injury  Patient with acute kidney injury likely d/t Pre-renal azotemia Which is currently worsening. Labs reviewed- Renal function/electrolytes with Estimated Creatinine Clearance: 22.7 mL/min (A) (based on SCr of 4.78 mg/dL (H)). according to latest data. Monitor urine output and serial BMP and adjust therapy as needed. Avoid nephrotoxins and renally dose meds for GFR listed above.   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    Specialty: Family Medicine  Contact information:  [place] [address] [phone]                       Patient Instructions:   No discharge procedures on file.    Significant Diagnostic Studies: Labs:   BMP:   Recent Labs   Lab 06/26/22  0406 06/27/22  0555    138   K 5.2* 5.5*   CO2 21* 21*   BUN 77.8* 86.6*   CREATININE 3.76* 4.78*   CALCIUM 8.3* 8.3*       Pending Diagnostic Studies:     Procedure Component Value Units Date/Time    Creatinine, Random Urine [[id number]] Collected: 06/26/22 1135    Order Status: Sent Lab Status: In process Updated: 06/27/22 1201    Specimen: Urine     Osmolality, Urine [[id number]] Collected: 06/27/22 1137    Order Status: Sent Lab Status: In process Updated: 06/27/22 1145    Specimen: Urine, Catheterized     Sodium, Random Urine [[id number]] Collected: 06/27/22 1137    Order Status: Sent Lab Status: In process Updated: 06/27/22 1145    Specimen: Urine, Catheterized          Medications:  Transfer Medications (for Discharge Readmit only):   Current Facility-Administered Medications   Medication Dose Route Frequency Provider Last Rate Last Admin    0.9%  NaCl infusion   Intravenous Continuous Josue Francois  mL/hr at 06/27/22 1202 New Bag at 06/27/22 1202    acetaminophen tablet 650 mg  650 mg Oral Q4H PRN Josue Francois MD   650 mg at 06/26/22 1555    albuterol-ipratropium 2.5 mg-0.5 mg/3 mL nebulizer solution 3 mL  3 mL Nebulization Q4H PRN Josue Francois MD   3 mL at 06/27/22 1114    ALPRAZolam tablet 0.25 mg  0.25 mg Oral QID PRN Josue Francois MD   0.25 mg at 06/26/22 1951    atorvastatin tablet 80 mg  80 mg Oral Daily Ojsue W  MD Priscila   80 mg at 06/27/22 0943    carvediloL tablet 6.25 mg  6.25 mg Oral BID Josue Francois MD   6.25 mg at 06/27/22 0943    cefTRIAXone (ROCEPHIN) 1 g in dextrose 5 % in water (D5W) 5 % 50 mL IVPB (MB+)  1 g Intravenous Q24H Josue Francois  mL/hr at 06/27/22 1159 1 g at 06/27/22 1159    dextrose 10% bolus 125 mL  12.5 g Intravenous PRN Josue Francois MD        dextrose 10% bolus 250 mL  25 g Intravenous PRN Josue Francois MD        diclofenac sodium 1 % gel 4 g  4 g Topical (Top) BID PRN Josue Francois MD        gabapentin capsule 300 mg  300 mg Oral BID Josue Francois MD   300 mg at 06/27/22 0942    glipiZIDE 24 hr tablet 10 mg  10 mg Oral Daily with breakfast Josue Francois MD   10 mg at 06/26/22 0918    glucagon (human recombinant) injection 1 mg  1 mg Intramuscular PRN Josue Francois MD        glucose chewable tablet 16 g  16 g Oral PRN Josue Francois MD        glucose chewable tablet 24 g  24 g Oral PRN Josue Francois MD        hydrALAZINE injection 10 mg  10 mg Intravenous Q4H PRN Josue Francois MD   10 mg at 06/26/22 1541    insulin aspart U-100 injection 0-5 Units  0-5 Units Subcutaneous QID (AC + HS) LEVAR Francois MD        insulin aspart U-100 injection 10 Units  10 Units Subcutaneous Once Thairy G Reyes, DO        labetalol 20 mg/4 mL (5 mg/mL) IV syring  20 mg Intravenous Q4H PRN Josue Francois MD   20 mg at 06/26/22 1951    LIDOcaine 5 % patch 1 patch  1 patch Transdermal Daily PRN Josue Francois MD        melatonin tablet 6 mg  6 mg Oral Nightly PRN Josue Francois MD   6 mg at 06/25/22 2004    NIFEdipine 24 hr tablet 60 mg  60 mg Oral Daily Rein SOHAN Napier, FNP   60 mg at 06/27/22 0943    ondansetron injection 4 mg  4 mg Intravenous Q8H PRN Josue Francois MD        sodium chloride 0.9% flush 10 mL  10 mL Intravenous PRN Josue Francois MD        sodium chloride 0.9% flush 10 mL  10 mL Intravenous Q6H Josue Francois MD   10 mL at 06/27/22 1200    And    sodium chloride  0.9% flush 10 mL  10 mL Intravenous PRN Josue Francois MD        sodium zirconium cyclosilicate packet 10 g  10 g Oral TID Thairy G Reyes, DO        tamsulosin 24 hr capsule 0.4 mg  1 capsule Oral Daily Josue Francois MD   0.4 mg at 06/27/22 0943       Indwelling Lines/Drains at time of discharge:   Lines/Drains/Airways     Drain  Duration                Urethral Catheter 06/24/22 0230 Straight-tip 20 Fr. 3 days                Time spent on the discharge of patient: 35 minutes         Thairy G Reyes, DO  Department of Hospital Medicine  [facility]

## 2022-06-27 NOTE — ASSESSMENT & PLAN NOTE
Patient with acute kidney injury likely d/t Pre-renal azotemia Which is currently worsening. Labs reviewed- Renal function/electrolytes with Estimated Creatinine Clearance: 22.7 mL/min (A) (based on SCr of 4.78 mg/dL (H)). according to latest data. Monitor urine output and serial BMP and adjust therapy as needed. Avoid nephrotoxins and renally dose meds for GFR listed above.   -Continue with IVF, worsened with diuretics  -avoid nephrotoxic medications  -pending urine sodium, creatinine, osmolality  -Attempting to transfer to higher level of care ofr nephro/dialysis

## 2022-06-27 NOTE — NURSING
Order received to transfer patient to a facility for a higher level of care and nephrology services. [facility] transfer center notified. Received word that patient has been accepted to transfer to [facility] to room 340. Family and patient updated and agreed to transfer. [facility] notified for transport.

## 2022-06-27 NOTE — NURSING
Informed Dr. Francois of pt respiratory status and pt feeling anxious.  Also informed Dr. francois of pt BP.  I also let dr. francois know that we had to irrigate pt whitehead and blood clots were returned and then flowed well after.

## 2022-06-28 PROBLEM — J90 PLEURAL EFFUSION, BILATERAL: Status: ACTIVE | Noted: 2022-06-28

## 2022-06-28 PROBLEM — R34 OLIGURIA: Status: ACTIVE | Noted: 2022-06-28

## 2022-06-28 PROBLEM — L03.116 CELLULITIS OF BOTH LOWER EXTREMITIES: Status: ACTIVE | Noted: 2022-06-28

## 2022-06-28 PROBLEM — N40.0 BENIGN PROSTATIC HYPERPLASIA: Chronic | Status: ACTIVE | Noted: 2022-06-21

## 2022-06-28 PROBLEM — I10 HYPERTENSION: Chronic | Status: ACTIVE | Noted: 2022-06-21

## 2022-06-28 PROBLEM — E66.01 MORBID OBESITY: Chronic | Status: ACTIVE | Noted: 2022-06-21

## 2022-06-28 PROBLEM — L03.115 CELLULITIS OF BOTH LOWER EXTREMITIES: Status: ACTIVE | Noted: 2022-06-28

## 2022-06-28 PROBLEM — R60.0 BILATERAL LOWER EXTREMITY EDEMA: Chronic | Status: ACTIVE | Noted: 2022-06-22

## 2022-06-28 PROBLEM — K21.9 GASTROESOPHAGEAL REFLUX DISEASE: Chronic | Status: ACTIVE | Noted: 2022-06-21

## 2022-06-28 PROBLEM — E78.5 HYPERLIPIDEMIA: Chronic | Status: ACTIVE | Noted: 2022-06-21

## 2022-06-28 PROBLEM — E11.9 TYPE 2 DIABETES MELLITUS: Chronic | Status: ACTIVE | Noted: 2022-06-21

## 2022-06-28 LAB
ALBUMIN SERPL-MCNC: 2.3 GM/DL (ref 3.4–4.8)
ALBUMIN SERPL-MCNC: 2.4 GM/DL (ref 3.4–4.8)
ALBUMIN/GLOB SERPL: 0.5 RATIO (ref 1.1–2)
ALLENS TEST: ABNORMAL
ALP SERPL-CCNC: 118 UNIT/L (ref 40–150)
ALT SERPL-CCNC: 89 UNIT/L (ref 0–55)
AST SERPL-CCNC: 71 UNIT/L (ref 5–34)
BASOPHILS # BLD AUTO: 0.02 X10(3)/MCL (ref 0–0.2)
BASOPHILS NFR BLD AUTO: 0.1 %
BILIRUBIN DIRECT+TOT PNL SERPL-MCNC: 0.5 MG/DL
BUN SERPL-MCNC: 78 MG/DL (ref 8.4–25.7)
BUN SERPL-MCNC: 88 MG/DL (ref 8.4–25.7)
CALCIUM SERPL-MCNC: 7.6 MG/DL (ref 8.8–10)
CALCIUM SERPL-MCNC: 8.1 MG/DL (ref 8.8–10)
CHLORIDE SERPL-SCNC: 105 MMOL/L (ref 98–107)
CHLORIDE SERPL-SCNC: 106 MMOL/L (ref 98–107)
CO2 SERPL-SCNC: 19 MMOL/L (ref 23–31)
CO2 SERPL-SCNC: 21 MMOL/L (ref 23–31)
CREAT SERPL-MCNC: 4.61 MG/DL (ref 0.73–1.18)
CREAT SERPL-MCNC: 5.31 MG/DL (ref 0.73–1.18)
DELSYS: ABNORMAL
EOSINOPHIL # BLD AUTO: 0 X10(3)/MCL (ref 0–0.9)
EOSINOPHIL NFR BLD AUTO: 0 %
ERYTHROCYTE [DISTWIDTH] IN BLOOD BY AUTOMATED COUNT: 12.2 % (ref 11.5–17)
ERYTHROCYTE [SEDIMENTATION RATE] IN BLOOD BY WESTERGREN METHOD: 16 MM/H
ERYTHROCYTE [SEDIMENTATION RATE] IN BLOOD BY WESTERGREN METHOD: 24 MM/H
ERYTHROCYTE [SEDIMENTATION RATE] IN BLOOD BY WESTERGREN METHOD: 24 MM/H
FIO2: 50
FIO2: 50
FIO2: 80
FLOW: 44
GLOBULIN SER-MCNC: 4.3 GM/DL (ref 2.4–3.5)
GLUCOSE SERPL-MCNC: 123 MG/DL (ref 82–115)
GLUCOSE SERPL-MCNC: 241 MG/DL (ref 82–115)
HBV SURFACE AG SERPL QL IA: NONREACTIVE
HCO3 UR-SCNC: 19.3 MMOL/L (ref 24–28)
HCO3 UR-SCNC: 19.5 MMOL/L (ref 24–28)
HCO3 UR-SCNC: 22.4 MMOL/L (ref 24–28)
HCO3 UR-SCNC: 24.7 MMOL/L (ref 24–28)
HCT VFR BLD AUTO: 34.1 % (ref 42–52)
HGB BLD-MCNC: 10.8 GM/DL (ref 14–18)
IMM GRANULOCYTES # BLD AUTO: 0.17 X10(3)/MCL (ref 0–0.02)
IMM GRANULOCYTES NFR BLD AUTO: 1.2 % (ref 0–0.43)
LYMPHOCYTES # BLD AUTO: 0.91 X10(3)/MCL (ref 0.6–4.6)
LYMPHOCYTES NFR BLD AUTO: 6.3 %
MAGNESIUM SERPL-MCNC: 2.6 MG/DL (ref 1.6–2.6)
MCH RBC QN AUTO: 31 PG (ref 27–31)
MCHC RBC AUTO-ENTMCNC: 31.7 MG/DL (ref 33–36)
MCV RBC AUTO: 98 FL (ref 80–94)
MIN VOL: 13.5
MODE: ABNORMAL
MONOCYTES # BLD AUTO: 0.67 X10(3)/MCL (ref 0.1–1.3)
MONOCYTES NFR BLD AUTO: 4.7 %
NEUTROPHILS # BLD AUTO: 12.6 X10(3)/MCL (ref 2.1–9.2)
NEUTROPHILS NFR BLD AUTO: 87.7 %
PCO2 BLDA: 35.6 MMHG (ref 35–45)
PCO2 BLDA: 38.6 MMHG (ref 35–45)
PCO2 BLDA: 53.6 MMHG (ref 35–45)
PCO2 BLDA: 76.1 MMHG (ref 35–45)
PEEP: 5
PH SMN: 7.12 [PH] (ref 7.35–7.45)
PH SMN: 7.17 [PH] (ref 7.35–7.45)
PH SMN: 7.31 [PH] (ref 7.35–7.45)
PH SMN: 7.41 [PH] (ref 7.35–7.45)
PHOSPHATE SERPL-MCNC: 5.7 MG/DL (ref 2.3–4.7)
PIP: 18
PIP: 26
PLATELET # BLD AUTO: 319 X10(3)/MCL (ref 130–400)
PMV BLD AUTO: 9.2 FL (ref 9.4–12.4)
PO2 BLDA: 146 MMHG (ref 80–100)
PO2 BLDA: 155 MMHG (ref 80–100)
PO2 BLDA: 161 MMHG (ref 80–100)
PO2 BLDA: 88 MMHG (ref 80–100)
POC BE: -2 MMOL/L
POC BE: -5 MMOL/L
POC BE: -7 MMOL/L
POC BE: -9 MMOL/L
POC SATURATED O2: 92 % (ref 95–100)
POC SATURATED O2: 99 % (ref 95–100)
POC TCO2: 20 MMOL/L (ref 23–27)
POC TCO2: 21 MMOL/L (ref 23–27)
POC TCO2: 23 MMOL/L (ref 23–27)
POC TCO2: 27 MMOL/L (ref 23–27)
POCT GLUCOSE: 110 MG/DL (ref 70–110)
POCT GLUCOSE: 126 MG/DL (ref 70–110)
POCT GLUCOSE: 126 MG/DL (ref 70–110)
POCT GLUCOSE: 146 MG/DL (ref 70–110)
POCT GLUCOSE: 155 MG/DL (ref 70–110)
POCT GLUCOSE: 186 MG/DL (ref 70–110)
POCT GLUCOSE: 213 MG/DL (ref 70–110)
POTASSIUM SERPL-SCNC: 5 MMOL/L (ref 3.5–5.1)
POTASSIUM SERPL-SCNC: 5.8 MMOL/L (ref 3.5–5.1)
POTASSIUM SERPL-SCNC: 7.4 MMOL/L (ref 3.5–5.1)
PROT SERPL-MCNC: 6.6 GM/DL (ref 5.8–7.6)
PS: 10
PS: 10
RBC # BLD AUTO: 3.48 X10(6)/MCL (ref 4.7–6.1)
SAMPLE: ABNORMAL
SITE: ABNORMAL
SODIUM SERPL-SCNC: 134 MMOL/L (ref 136–145)
SODIUM SERPL-SCNC: 137 MMOL/L (ref 136–145)
SP02: 100
SP02: 99
VT: 450
VT: 550
VT: 550
WBC # SPEC AUTO: 14.4 X10(3)/MCL (ref 4.5–11.5)

## 2022-06-28 PROCEDURE — 94761 N-INVAS EAR/PLS OXIMETRY MLT: CPT

## 2022-06-28 PROCEDURE — 20000000 HC ICU ROOM

## 2022-06-28 PROCEDURE — 27000221 HC OXYGEN, UP TO 24 HOURS

## 2022-06-28 PROCEDURE — 25000003 PHARM REV CODE 250: Performed by: INTERNAL MEDICINE

## 2022-06-28 PROCEDURE — 36415 COLL VENOUS BLD VENIPUNCTURE: CPT | Performed by: STUDENT IN AN ORGANIZED HEALTH CARE EDUCATION/TRAINING PROGRAM

## 2022-06-28 PROCEDURE — 97162 PT EVAL MOD COMPLEX 30 MIN: CPT

## 2022-06-28 PROCEDURE — 80053 COMPREHEN METABOLIC PANEL: CPT | Performed by: STUDENT IN AN ORGANIZED HEALTH CARE EDUCATION/TRAINING PROGRAM

## 2022-06-28 PROCEDURE — A4216 STERILE WATER/SALINE, 10 ML: HCPCS | Performed by: STUDENT IN AN ORGANIZED HEALTH CARE EDUCATION/TRAINING PROGRAM

## 2022-06-28 PROCEDURE — 84132 ASSAY OF SERUM POTASSIUM: CPT | Performed by: INTERNAL MEDICINE

## 2022-06-28 PROCEDURE — 85025 COMPLETE CBC W/AUTO DIFF WBC: CPT | Performed by: STUDENT IN AN ORGANIZED HEALTH CARE EDUCATION/TRAINING PROGRAM

## 2022-06-28 PROCEDURE — 27200966 HC CLOSED SUCTION SYSTEM

## 2022-06-28 PROCEDURE — 94150 VITAL CAPACITY TEST: CPT

## 2022-06-28 PROCEDURE — 99900026 HC AIRWAY MAINTENANCE (STAT)

## 2022-06-28 PROCEDURE — 63600175 PHARM REV CODE 636 W HCPCS: Performed by: INTERNAL MEDICINE

## 2022-06-28 PROCEDURE — 94003 VENT MGMT INPAT SUBQ DAY: CPT

## 2022-06-28 PROCEDURE — 83735 ASSAY OF MAGNESIUM: CPT | Performed by: STUDENT IN AN ORGANIZED HEALTH CARE EDUCATION/TRAINING PROGRAM

## 2022-06-28 PROCEDURE — 36600 WITHDRAWAL OF ARTERIAL BLOOD: CPT

## 2022-06-28 PROCEDURE — 25000003 PHARM REV CODE 250: Performed by: STUDENT IN AN ORGANIZED HEALTH CARE EDUCATION/TRAINING PROGRAM

## 2022-06-28 PROCEDURE — 82803 BLOOD GASES ANY COMBINATION: CPT

## 2022-06-28 PROCEDURE — 90935 HEMODIALYSIS ONE EVALUATION: CPT

## 2022-06-28 PROCEDURE — 63600175 PHARM REV CODE 636 W HCPCS: Performed by: STUDENT IN AN ORGANIZED HEALTH CARE EDUCATION/TRAINING PROGRAM

## 2022-06-28 PROCEDURE — 99900035 HC TECH TIME PER 15 MIN (STAT)

## 2022-06-28 PROCEDURE — 36415 COLL VENOUS BLD VENIPUNCTURE: CPT | Performed by: INTERNAL MEDICINE

## 2022-06-28 PROCEDURE — 93005 ELECTROCARDIOGRAM TRACING: CPT

## 2022-06-28 RX ORDER — CALCIUM GLUCONATE 20 MG/ML
1 INJECTION, SOLUTION INTRAVENOUS ONCE
Status: COMPLETED | OUTPATIENT
Start: 2022-06-28 | End: 2022-06-28

## 2022-06-28 RX ORDER — FUROSEMIDE 10 MG/ML
80 INJECTION INTRAMUSCULAR; INTRAVENOUS
Status: DISCONTINUED | OUTPATIENT
Start: 2022-06-28 | End: 2022-07-07

## 2022-06-28 RX ORDER — LORAZEPAM 2 MG/ML
2 INJECTION INTRAMUSCULAR
Status: DISCONTINUED | OUTPATIENT
Start: 2022-06-28 | End: 2022-07-08 | Stop reason: HOSPADM

## 2022-06-28 RX ORDER — CALCIUM GLUCONATE 20 MG/ML
1 INJECTION, SOLUTION INTRAVENOUS EVERY 10 MIN PRN
Status: DISCONTINUED | OUTPATIENT
Start: 2022-06-28 | End: 2022-07-08 | Stop reason: HOSPADM

## 2022-06-28 RX ORDER — FUROSEMIDE 10 MG/ML
80 INJECTION INTRAMUSCULAR; INTRAVENOUS ONCE
Status: COMPLETED | OUTPATIENT
Start: 2022-06-28 | End: 2022-07-06

## 2022-06-28 RX ORDER — METOLAZONE 2.5 MG/1
10 TABLET ORAL DAILY
Status: DISCONTINUED | OUTPATIENT
Start: 2022-06-28 | End: 2022-07-08 | Stop reason: HOSPADM

## 2022-06-28 RX ADMIN — MUPIROCIN: 20 OINTMENT TOPICAL at 11:06

## 2022-06-28 RX ADMIN — MEROPENEM 1 G: 1 INJECTION, POWDER, FOR SOLUTION INTRAVENOUS at 11:06

## 2022-06-28 RX ADMIN — INSULIN HUMAN 10 UNITS: 100 INJECTION, SOLUTION PARENTERAL at 07:06

## 2022-06-28 RX ADMIN — LINEZOLID 600 MG: 600 INJECTION, SOLUTION INTRAVENOUS at 12:06

## 2022-06-28 RX ADMIN — LORAZEPAM 2 MG: 2 INJECTION INTRAMUSCULAR; INTRAVENOUS at 02:06

## 2022-06-28 RX ADMIN — CARVEDILOL 6.25 MG: 6.25 TABLET, FILM COATED ORAL at 11:06

## 2022-06-28 RX ADMIN — TAMSULOSIN HYDROCHLORIDE 0.4 MG: 0.4 CAPSULE ORAL at 11:06

## 2022-06-28 RX ADMIN — MEROPENEM 1 G: 1 INJECTION, POWDER, FOR SOLUTION INTRAVENOUS at 12:06

## 2022-06-28 RX ADMIN — SODIUM CHLORIDE, PRESERVATIVE FREE 10 ML: 5 INJECTION INTRAVENOUS at 05:06

## 2022-06-28 RX ADMIN — LORAZEPAM 2 MG: 2 INJECTION INTRAMUSCULAR; INTRAVENOUS at 12:06

## 2022-06-28 RX ADMIN — CARVEDILOL 6.25 MG: 6.25 TABLET, FILM COATED ORAL at 09:06

## 2022-06-28 RX ADMIN — LINEZOLID 600 MG: 600 INJECTION, SOLUTION INTRAVENOUS at 09:06

## 2022-06-28 RX ADMIN — MUPIROCIN: 20 OINTMENT TOPICAL at 10:06

## 2022-06-28 RX ADMIN — CALCIUM GLUCONATE 1 G: 20 INJECTION, SOLUTION INTRAVENOUS at 06:06

## 2022-06-28 RX ADMIN — DEXTROSE MONOHYDRATE 50 G: 25 INJECTION, SOLUTION INTRAVENOUS at 06:06

## 2022-06-28 RX ADMIN — MEROPENEM 1 G: 1 INJECTION, POWDER, FOR SOLUTION INTRAVENOUS at 09:06

## 2022-06-28 RX ADMIN — METOLAZONE 10 MG: 2.5 TABLET ORAL at 11:06

## 2022-06-28 RX ADMIN — DEXTROSE MONOHYDRATE 1 G: 5 INJECTION INTRAVENOUS at 03:06

## 2022-06-28 RX ADMIN — FUROSEMIDE 80 MG: 10 INJECTION, SOLUTION INTRAMUSCULAR; INTRAVENOUS at 01:06

## 2022-06-28 RX ADMIN — HYDRALAZINE HYDROCHLORIDE 10 MG: 20 INJECTION INTRAMUSCULAR; INTRAVENOUS at 04:06

## 2022-06-28 RX ADMIN — SODIUM CHLORIDE, PRESERVATIVE FREE 10 ML: 5 INJECTION INTRAVENOUS at 06:06

## 2022-06-28 RX ADMIN — SODIUM CHLORIDE, PRESERVATIVE FREE 10 ML: 5 INJECTION INTRAVENOUS at 12:06

## 2022-06-28 RX ADMIN — LINEZOLID 600 MG: 600 INJECTION, SOLUTION INTRAVENOUS at 11:06

## 2022-06-28 NOTE — PT/OT/SLP PROGRESS
Occupational Therapy      Patient Name:  Neno Watkins   MRN:  [id number]    Patient not seen today secondary to Dialysis. Will follow-up 6/29/22.    6/28/2022

## 2022-06-28 NOTE — ANESTHESIA PREPROCEDURE EVALUATION
06/27/2022  Neno Watkins is a 61 y.o., male.      Pre-op Assessment    I have reviewed the Patient Summary Reports.     I have reviewed the Nursing Notes. I have reviewed the NPO Status.   I have reviewed the Medications.     Review of Systems  Cardiovascular:   Hypertension    Renal/:   Chronic Renal Disease    Hepatic/GI:   GERD    Endocrine:   Diabetes  Obesity / BMI > 30      Physical Exam  General: Lethargic    Dental:  Edentulous        Anesthesia Plan  Type of Anesthesia, risks & benefits discussed:    Anesthesia Type: Gen ETT  Intra-op Monitoring Plan: Standard ASA Monitors  Induction:  IV  Airway Plan: Direct  Informed Consent: Informed consent signed with the Patient representative and all parties understand the risks and agree with anesthesia plan.  All questions answered. Patient consented to blood products? Yes  ASA Score: 4 Emergent  Day of Surgery Review of History & Physical: H&P Update referred to the surgeon/provider.    Ready For Surgery From Anesthesia Perspective.     .

## 2022-06-28 NOTE — HPI
60yo WM with h/o Morbid Obesity, HTN, DM2, HLD, ?MI transferred from [facility] for SHANDRA. Pt originally presented to their ED 7/21 c/o back pain after fall at home. Noted to have SHANDRA with Hyperkalemia and admitted for tx. Pt tx'd with IVF and initially Cr improved but there was concern of CHF/fluid overload so IVF held and pt given and Cr worsened and UOP declined. ECHO however was normal with EF 55% and no e/o diastolic dysfunction. BNP was 109 as well. Pt given Lasix as well with increased UOP but worsening Cr. Today pt was more SOB with incr LE edema. BUN and Cr progressively worsening,hyperkalemic, oliguric, more hypertensive. Has moderate bilat pleural effusion on imaging as well. Transferred to [facility] for Nephrology services.     Pt finally arrived to floor this evening c/o CP/SOB and was diaphoretic, tachycardic and hypertensive. There were no labs repeated after this morning at 04:30. Ordered stat chem, Trop, ABG, EKG, Lopressor IV and IV Morphine. Pt seen soon thereafter and appears ill. Placed on 15L OxyMask for hypoxia O2 sat 88% on 4L NC. Labs returned with K+ 7.3, BUN 84, Cr 4.48, PO4 8.4, Trop 0.018, EKG NSR w/o acute ST-T wave changes (sp no peaked T-waves). Nephrology consulted for urgent HD and GenSurg consulted for urgent Dialysis Catheter placement. Pt will be brought down to OR for procedure and Anesth plans to intubate. Pt will go to ICU after procedure and then receive HD.

## 2022-06-28 NOTE — PT/OT/SLP EVAL
Physical Therapy Evaluation    Patient Name:  Neno Watkins   MRN:  [id number]    Recommendations:     Discharge Recommendations:      Discharge Equipment Recommendations:     Barriers to discharge: None    Assessment:     Neno Watkins is a 61 y.o. male admitted with a medical diagnosis of SHANDRA (acute kidney injury).  He presents with the following impairments/functional limitations:  weakness, impaired endurance, impaired functional mobilty, impaired self care skills, impaired balance, decreased lower extremity function, decreased ROM .    Patient was able to move both LE's in all planes with good strength stronger on the R than L. Patient was very fatigued and required tactile stimulation to stay alert during today's session. Patients knee ROM is limited likely due to immobility.    Rehab Prognosis: Good; patient would benefit from acute skilled PT services to address these deficits and reach maximum level of function.    Recent Surgery: Procedure(s) (LRB):  INSERTION, CATHETER, HEMODIALYSIS, DUAL LUMEN (Right) 1 Day Post-Op    Plan:     During this hospitalization, patient to be seen 5 x/week to address the identified rehab impairments via therapeutic activities, therapeutic exercises and progress toward the following goals:    · Plan of Care Expires:  07/26/22    Subjective     Chief Complaint: Patient expressed discomfort with vent  Patient/Family Comments/goals: to increase functional mobility   Pain/Comfort:  · Pain Rating 1: 0/10    Patients cultural, spiritual, Sikhism conflicts given the current situation:      Living Environment:  Living environment not assessed at this time.   Prior to admission, patients level of function was Modified Independent.  Equipment used at home: other (see comments) (patient stated he walked with a device, but could not specifiy).      Objective:     Communicated with nurse prior to session.  Patient found HOB elevated with ventilator, restraints, whitehead catheter  upon PT  entry to room.    General Precautions: Standard, fall   Orthopedic Precautions:    Braces:    Respiratory Status: vent    Exams:  · RLE ROM: Deficits: Stiff and limited to 50% of normal  · RLE Strength: Deficits: 3/5  · LLE ROM: Deficits: Stiff and limited to 50% of normal  · LLE Strength: Deficits: 3/5    Functional Mobility:  · Not assessed today due to fatigue    Therapeutic Activities and Exercises:   Leg ROM x 10 reps in all planes  SLR's x 5 reps on each leg  Ankle pumps x 20 each  Heel slides x 10 each    AM-PAC 6 CLICK MOBILITY  Total Score:      Patient left HOB elevated with all lines intact, call button in reach and restraints reapplied at end of session.    GOALS:   Multidisciplinary Problems     Physical Therapy Goals        Problem: Physical Therapy    Goal Priority Disciplines Outcome Goal Variances Interventions   Physical Therapy Goal     PT, PT/OT Ongoing, Progressing     Description: Goals to be met by: 22     Patient will increase functional independence with mobility by performin. Supine to sit with Modified [place]  2. Sit to stand transfer with Contact Guard Assistance  3. Gait  x 150 feet with Contact Guard Assistance using Rolling Walker.                      History:     Past Medical History:   Diagnosis Date    BPH (benign prostatic hyperplasia)     Essential (primary) hypertension     Obesity, unspecified     PAD (peripheral artery disease)        Past Surgical History:   Procedure Laterality Date    INSERTION OF TUNNELED CENTRAL VENOUS HEMODIALYSIS CATHETER Right 2022    Procedure: INSERTION, CATHETER, HEMODIALYSIS, DUAL LUMEN;  Surgeon: Molly Garcia MD;  Location: [facility];  Service: General;  Laterality: Right;       Time Tracking:     PT Received On: 22  PT Start Time: 1110     PT Stop Time: 1130  PT Total Time (min): 20 min     Billable Minutes: Evaluation 20 min      2022

## 2022-06-28 NOTE — PROGRESS NOTES
Medicine [facility]    Called for HyperK+ on AM labs K+ 7.4 with increased Creatinine despite HD with filtration, K+ bath and removal of 1.9L fluid last night.  Cycle was cut short 20 mins due to clot in the HD line.  No reported changes to rhythm     P:  HyperK+ treatment  Note Lokelma already started but slow to act  Calcium gluconate  Lasxi IV x 80 mg  Dextrose IV with 10 u R insulin IV  He will need another peripheral IV and/or midline if he is to remain in ICU    Kell Galvin MD

## 2022-06-28 NOTE — SUBJECTIVE & OBJECTIVE
"Past Medical History:   Diagnosis Date    BPH (benign prostatic hyperplasia)     Essential (primary) hypertension     Obesity, unspecified     PAD (peripheral artery disease)        History reviewed. No pertinent surgical history.    Review of patient's allergies indicates:   Allergen Reactions    Butorphanol Swelling     "it almost killed me"    Hydrocodone-acetaminophen Other (See Comments)     "They mess with my heart"    Povidone-iodine Shortness Of Breath and Rash       Current Facility-Administered Medications on File Prior to Encounter   Medication    [DISCONTINUED] 0.9%  NaCl infusion    [DISCONTINUED] acetaminophen tablet 650 mg    [DISCONTINUED] albuterol-ipratropium 2.5 mg-0.5 mg/3 mL nebulizer solution 3 mL    [DISCONTINUED] ALPRAZolam tablet 0.25 mg    [DISCONTINUED] atorvastatin tablet 80 mg    [DISCONTINUED] carvediloL tablet 6.25 mg    [DISCONTINUED] cefTRIAXone (ROCEPHIN) 1 g in dextrose 5 % in water (D5W) 5 % 50 mL IVPB (MB+)    [DISCONTINUED] dextrose 10% bolus 125 mL    [DISCONTINUED] dextrose 10% bolus 250 mL    [DISCONTINUED] diclofenac sodium 1 % gel 4 g    [DISCONTINUED] furosemide tablet 40 mg    [DISCONTINUED] gabapentin capsule 300 mg    [DISCONTINUED] glipiZIDE 24 hr tablet 10 mg    [DISCONTINUED] glucagon (human recombinant) injection 1 mg    [DISCONTINUED] glucose chewable tablet 16 g    [DISCONTINUED] glucose chewable tablet 24 g    [DISCONTINUED] hydrALAZINE injection 10 mg    [DISCONTINUED] insulin aspart U-100 injection 0-5 Units    [DISCONTINUED] insulin aspart U-100 injection 10 Units    [DISCONTINUED] labetalol 20 mg/4 mL (5 mg/mL) IV syring    [DISCONTINUED] LIDOcaine 5 % patch 1 patch    [DISCONTINUED] melatonin tablet 6 mg    [DISCONTINUED] NIFEdipine 24 hr tablet 60 mg    [DISCONTINUED] ondansetron injection 4 mg    [DISCONTINUED] sodium chloride 0.9% flush 10 mL    [DISCONTINUED] sodium chloride 0.9% flush 10 mL    [DISCONTINUED] sodium chloride 0.9% flush 10 mL    " [DISCONTINUED] sodium zirconium cyclosilicate packet 10 g    [DISCONTINUED] tamsulosin 24 hr capsule 0.4 mg     Current Outpatient Medications on File Prior to Encounter   Medication Sig    atorvastatin (LIPITOR) 80 MG tablet Take 80 mg by mouth once daily.    carvediloL (COREG) 6.25 MG tablet Take 6.25 mg by mouth 2 (two) times daily.    ciprofloxacin HCl (CIPRO) 750 MG tablet Take 750 mg by mouth 2 (two) times daily.    furosemide (LASIX) 40 MG tablet Take 40 mg by mouth every morning.    gabapentin (NEURONTIN) 300 MG capsule Take 1 capsule by mouth 2 (two) times a day.    glipiZIDE (GLUCOTROL) 10 MG TR24 Take 10 mg by mouth daily with breakfast.    lisinopriL (PRINIVIL,ZESTRIL) 5 MG tablet Take 5 mg by mouth 2 (two) times a day.    metFORMIN (GLUCOPHAGE) 500 MG tablet Take 500 mg by mouth 2 (two) times a day.    rifAMpin (RIFADIN) 300 MG capsule Take 300 mg by mouth 2 (two) times daily.    tamsulosin (FLOMAX) 0.4 mg Cap Take 1 capsule by mouth once daily.    TOUJEO SOLOSTAR U-300 INSULIN 300 unit/mL (1.5 mL) InPn pen Inject 32 Units into the skin once daily at 6am.     Family History    None       Tobacco Use    Smoking status: Not on file    Smokeless tobacco: Not on file   Substance and Sexual Activity    Alcohol use: Not on file    Drug use: Not on file    Sexual activity: Not on file     Review of Systems   Unable to perform ROS: Acuity of condition   Objective:     Vital Signs (Most Recent):  Pulse: 93 (06/27/22 1735)  Resp: (!) 22 (06/27/22 1742)  BP: (!) 189/90 (06/27/22 1805)  SpO2: 95 % (06/27/22 1735)   Vital Signs (24h Range):  Temp:  [97.1 °F (36.2 °C)-98.6 °F (37 °C)] 97.6 °F (36.4 °C)  Pulse:  [67-93] 93  Resp:  [18-24] 22  SpO2:  [87 %-98 %] 95 %  BP: (124-189)/(71-90) 189/90        There is no height or weight on file to calculate BMI.    Physical Exam  Constitutional:       General: He is awake.      Appearance: He is morbidly obese. He is ill-appearing and diaphoretic.   HENT:      Head:  Normocephalic and atraumatic.      Nose: Nose normal.      Mouth/Throat:      Mouth: Mucous membranes are moist.   Eyes:      Extraocular Movements: Extraocular movements intact.      Conjunctiva/sclera: Conjunctivae normal.      Pupils: Pupils are equal, round, and reactive to light.   Cardiovascular:      Rate and Rhythm: Regular rhythm. Tachycardia present.      Heart sounds: No murmur heard.    No gallop.   Pulmonary:      Effort: Tachypnea and respiratory distress (mild) present.      Breath sounds: Normal air entry. Examination of the right-middle field reveals decreased breath sounds. Examination of the left-middle field reveals decreased breath sounds. Examination of the right-lower field reveals decreased breath sounds. Examination of the left-lower field reveals decreased breath sounds. Decreased breath sounds present. No wheezing or rhonchi.   Abdominal:      General: Abdomen is protuberant. Bowel sounds are normal.      Palpations: Abdomen is soft.      Tenderness: There is no abdominal tenderness.   Genitourinary:     Comments: Massive scrotal edema, Baldwin catheter  Musculoskeletal:      Cervical back: Normal range of motion and neck supple.      Right lower leg: Edema present.      Left lower leg: Edema present.   Skin:     General: Skin is warm.   Neurological:      General: No focal deficit present.      Mental Status: He is lethargic.      Cranial Nerves: Cranial nerves 2-12 are intact.      Motor: Weakness present.   Psychiatric:         Mood and Affect: Mood is anxious.         Behavior: Behavior is cooperative.          Significant Labs: All pertinent labs within the past 24 hours have been reviewed.  ABGs:     CBC:   Recent Labs   Lab 06/26/22  0407 06/27/22  0555 06/27/22  1747   WBC 12.7* 12.3* 19.1*   HGB 10.8* 11.0* 11.8*   HCT 34.6* 36.5* 38.2*    327 395     CMP:   Recent Labs   Lab 06/26/22  0406 06/27/22  0555 06/27/22  1747    138 134*   K 5.2* 5.5* 7.3*   CO2 21* 21* 19*    BUN 77.8* 86.6* 84.0*   CREATININE 3.76* 4.78* 4.48*   CALCIUM 8.3* 8.3* 8.2*   ALBUMIN  --  2.1* 2.3*   BILITOT  --  0.5  --    ALKPHOS  --  120  --    AST  --  78*  --    ALT  --  76*  --    EGFRNONAA 17 13 14       Magnesium:   Recent Labs   Lab 06/27/22  1747   MG 2.70*     Troponin:   Recent Labs   Lab 06/27/22  1747   TROPONINI 0.018             Significant Imaging: I have reviewed all pertinent imaging results/findings within the past 24 hours.

## 2022-06-28 NOTE — PROGRESS NOTES
"[facility] - ICU  Adult Nutrition  Progress Note    SUMMARY       Recommendations    Recommendation/Intervention: 1.) As soon as medically appropriate, rec'd goal diet: Diabetic, Low Na+ as tolerated.  2.) When able to lift NPO status, rec'd adding Eloy, BID to assist with wound healing. 3.) Monitor diet advancement, tolerance, weight, and labs.  Goals: Meet >/= 75% estimated needs by d/c.  Nutrition Goal Status: new  Communication of RD Recs: reviewed with RN    Assessment and Plan    Nutrition Problem  Increased Nutrient Needs    Related to (etiology):   Increased protein needs.     Signs and Symptoms (as evidenced by):   Bilateral wounds to feet  Pt on HD     Interventions(treatment strategy):  Commercial beverage    Nutrition Diagnosis Status:   New         Malnutrition Assessment     Skin (Micronutrient): wounds unhealed                                 Reason for Assessment    Reason For Assessment: other (see comments) (SHANDRA; Bilateral wounds to feet. Was on Vent this morning; however, is now extubated.)  Diagnosis: renal disease  Relevant Medical History: BPH (benign prostatic hyperplasia)     Essential (primary) hypertension     Obesity, unspecified     PAD (peripheral artery disease)  General Information Comments: 6/28: Pt just successfully extubated. Will likely benefit from a Diabetic, Low Na+ Diet as tolerated when able to lift NPO status. Also noted Bilateral wounds to feet. Pt would benefit from Eloy, BID, to assist with wound healing when able to lift NPO status.    Nutrition Risk Screen    Nutrition Risk Screen: no indicators present    Nutrition/Diet History         Anthropometrics    Temp: 96.8 °F (36 °C)  Height Method: Stated  Height: 5' 10" (177.8 cm)  Height (inches): 70 in  Weight Method: Bed Scale  Weight: (!) 145.4 kg (320 lb 8.8 oz)  Weight (lb): (!) 320.55 lb  Ideal Body Weight (IBW), Male: 166 lb  % Ideal Body Weight, Male (lb): 193.1 %  BMI (Calculated): 46  BMI Grade: " greater than 40 - morbid obesity       Lab/Procedures/Meds    Pertinent Labs Reviewed: reviewed  Pertinent Labs Comments: 6/28: (H); BUN 78(H); Crea 4.61(H); GFR 14(L); Alb 2.4(L); Phos 5.7(H); Na+ 134(L); K+ 5.8(H); Na+ 134(L); WBC 14.4(H); H/H 10.8/34.1(L)  Pertinent Medications Reviewed: reviewed    Physical Findings/Assessment         Estimated/Assessed Needs    Weight Used For Calorie Calculations: 75.3 kg (166 lb)  Energy Calorie Requirements (kcal): 1900 to 2100 kcals/day  Energy Need Method: Kcal/kg  Protein Requirements: 110 g/day (1.5g/kg)  Weight Used For Protein Calculations: 75 kg (165 lb 5.5 oz)     Estimated Fluid Requirement Method: RDA Method  RDA Method (mL): 1900         Nutrition Prescription Ordered    Current Diet Order: NPO  Nutrition Order Comments: Pt just extubated.    Evaluation of Received Nutrient/Fluid Intake       % Intake of Estimated Energy Needs: Other: NPO.   % Meal Intake: NPO    Nutrition Risk    Level of Risk/Frequency of Follow-up: low - moderate     Monitor and Evaluation    Food and Nutrient Intake: energy intake, food and beverage intake  Food and Nutrient Adminstration: diet order  Anthropometric Measurements: weight change, weight  Biochemical Data, Medical Tests and Procedures: electrolyte and renal panel, lipid profile, gastrointestinal profile, glucose/endocrine profile, inflammatory profile     Nutrition Follow-Up    RD Follow-up?: Yes

## 2022-06-28 NOTE — H&P
[facility] - Medical Surgical Unit  Hospital Medicine  History & Physical    Patient Name: Neno Watkins  MRN: [id number]  Patient Class: IP- Inpatient  Admission Date: 6/27/2022  Attending Physician: Cam Medina MD   Primary Care Provider: Primary Doctor No         Patient information was obtained from spouse/SO, past medical records and ER records.     Subjective:     Principal Problem:SHANDRA (acute kidney injury)    Chief Complaint:   Chief Complaint   Patient presents with    Acute Renal Failure        HPI: 60yo WM with h/o Morbid Obesity, HTN, DM2, HLD, ?MI transferred from [facility] for SHANDRA. Pt originally presented to their ED 7/21 c/o back pain after fall at home. Noted to have SHANDRA with Hyperkalemia and admitted for tx. Pt tx'd with IVF and initially Cr improved but there was concern of CHF/fluid overload so IVF held and pt given and Cr worsened and UOP declined. ECHO however was normal with EF 55% and no e/o diastolic dysfunction. BNP was 109 as well. Pt given Lasix as well with increased UOP but worsening Cr. Today pt was more SOB with incr LE edema. BUN and Cr progressively worsening,hyperkalemic, oliguric, more hypertensive. Has moderate bilat pleural effusion on imaging as well. Transferred to [facility] for Nephrology services.     Pt finally arrived to floor this evening c/o CP/SOB and was diaphoretic, tachycardic and hypertensive. There were no labs repeated after this morning at 04:30. Ordered stat chem, Trop, ABG, EKG, Lopressor IV and IV Morphine. Pt seen soon thereafter and appears ill. Placed on 15L OxyMask for hypoxia O2 sat 88% on 4L NC. Labs returned with K+ 7.3, BUN 84, Cr 4.48, PO4 8.4, Trop 0.018, EKG NSR w/o acute ST-T wave changes (sp no peaked T-waves). Nephrology consulted for urgent HD and GenSurg consulted for urgent Dialysis Catheter placement. Pt will be brought down to OR for procedure and Anesth plans to intubate. Pt will go to ICU after  "procedure and then receive HD.       Past Medical History:   Diagnosis Date    BPH (benign prostatic hyperplasia)     Essential (primary) hypertension     Obesity, unspecified     PAD (peripheral artery disease)        History reviewed. No pertinent surgical history.    Review of patient's allergies indicates:   Allergen Reactions    Butorphanol Swelling     "it almost killed me"    Hydrocodone-acetaminophen Other (See Comments)     "They mess with my heart"    Povidone-iodine Shortness Of Breath and Rash       Current Facility-Administered Medications on File Prior to Encounter   Medication    [DISCONTINUED] 0.9%  NaCl infusion    [DISCONTINUED] acetaminophen tablet 650 mg    [DISCONTINUED] albuterol-ipratropium 2.5 mg-0.5 mg/3 mL nebulizer solution 3 mL    [DISCONTINUED] ALPRAZolam tablet 0.25 mg    [DISCONTINUED] atorvastatin tablet 80 mg    [DISCONTINUED] carvediloL tablet 6.25 mg    [DISCONTINUED] cefTRIAXone (ROCEPHIN) 1 g in dextrose 5 % in water (D5W) 5 % 50 mL IVPB (MB+)    [DISCONTINUED] dextrose 10% bolus 125 mL    [DISCONTINUED] dextrose 10% bolus 250 mL    [DISCONTINUED] diclofenac sodium 1 % gel 4 g    [DISCONTINUED] furosemide tablet 40 mg    [DISCONTINUED] gabapentin capsule 300 mg    [DISCONTINUED] glipiZIDE 24 hr tablet 10 mg    [DISCONTINUED] glucagon (human recombinant) injection 1 mg    [DISCONTINUED] glucose chewable tablet 16 g    [DISCONTINUED] glucose chewable tablet 24 g    [DISCONTINUED] hydrALAZINE injection 10 mg    [DISCONTINUED] insulin aspart U-100 injection 0-5 Units    [DISCONTINUED] insulin aspart U-100 injection 10 Units    [DISCONTINUED] labetalol 20 mg/4 mL (5 mg/mL) IV syring    [DISCONTINUED] LIDOcaine 5 % patch 1 patch    [DISCONTINUED] melatonin tablet 6 mg    [DISCONTINUED] NIFEdipine 24 hr tablet 60 mg    [DISCONTINUED] ondansetron injection 4 mg    [DISCONTINUED] sodium chloride 0.9% flush 10 mL    [DISCONTINUED] sodium chloride 0.9% flush " 10 mL    [DISCONTINUED] sodium chloride 0.9% flush 10 mL    [DISCONTINUED] sodium zirconium cyclosilicate packet 10 g    [DISCONTINUED] tamsulosin 24 hr capsule 0.4 mg     Current Outpatient Medications on File Prior to Encounter   Medication Sig    atorvastatin (LIPITOR) 80 MG tablet Take 80 mg by mouth once daily.    carvediloL (COREG) 6.25 MG tablet Take 6.25 mg by mouth 2 (two) times daily.    ciprofloxacin HCl (CIPRO) 750 MG tablet Take 750 mg by mouth 2 (two) times daily.    furosemide (LASIX) 40 MG tablet Take 40 mg by mouth every morning.    gabapentin (NEURONTIN) 300 MG capsule Take 1 capsule by mouth 2 (two) times a day.    glipiZIDE (GLUCOTROL) 10 MG TR24 Take 10 mg by mouth daily with breakfast.    lisinopriL (PRINIVIL,ZESTRIL) 5 MG tablet Take 5 mg by mouth 2 (two) times a day.    metFORMIN (GLUCOPHAGE) 500 MG tablet Take 500 mg by mouth 2 (two) times a day.    rifAMpin (RIFADIN) 300 MG capsule Take 300 mg by mouth 2 (two) times daily.    tamsulosin (FLOMAX) 0.4 mg Cap Take 1 capsule by mouth once daily.    TOUJEO SOLOSTAR U-300 INSULIN 300 unit/mL (1.5 mL) InPn pen Inject 32 Units into the skin once daily at 6am.     Family History    None       Tobacco Use    Smoking status: Not on file    Smokeless tobacco: Not on file   Substance and Sexual Activity    Alcohol use: Not on file    Drug use: Not on file    Sexual activity: Not on file     Review of Systems   Unable to perform ROS: Acuity of condition   Objective:     Vital Signs (Most Recent):  Pulse: 93 (06/27/22 1735)  Resp: (!) 22 (06/27/22 1742)  BP: (!) 189/90 (06/27/22 1805)  SpO2: 95 % (06/27/22 1735)   Vital Signs (24h Range):  Temp:  [97.1 °F (36.2 °C)-98.6 °F (37 °C)] 97.6 °F (36.4 °C)  Pulse:  [67-93] 93  Resp:  [18-24] 22  SpO2:  [87 %-98 %] 95 %  BP: (124-189)/(71-90) 189/90        There is no height or weight on file to calculate BMI.    Physical Exam  Constitutional:       General: He is awake.      Appearance: He is  morbidly obese. He is ill-appearing and diaphoretic.   HENT:      Head: Normocephalic and atraumatic.      Nose: Nose normal.      Mouth/Throat:      Mouth: Mucous membranes are moist.   Eyes:      Extraocular Movements: Extraocular movements intact.      Conjunctiva/sclera: Conjunctivae normal.      Pupils: Pupils are equal, round, and reactive to light.   Cardiovascular:      Rate and Rhythm: Regular rhythm. Tachycardia present.      Heart sounds: No murmur heard.    No gallop.   Pulmonary:      Effort: Tachypnea and respiratory distress (mild) present.      Breath sounds: Normal air entry. Examination of the right-middle field reveals decreased breath sounds. Examination of the left-middle field reveals decreased breath sounds. Examination of the right-lower field reveals decreased breath sounds. Examination of the left-lower field reveals decreased breath sounds. Decreased breath sounds present. No wheezing or rhonchi.   Abdominal:      General: Abdomen is protuberant. Bowel sounds are normal.      Palpations: Abdomen is soft.      Tenderness: There is no abdominal tenderness.   Genitourinary:     Comments: Massive scrotal edema, Baldwin catheter  Musculoskeletal:      Cervical back: Normal range of motion and neck supple.      Right lower leg: Edema present.      Left lower leg: Edema present.   Skin:     General: Skin is warm.   Neurological:      General: No focal deficit present.      Mental Status: He is lethargic.      Cranial Nerves: Cranial nerves 2-12 are intact.      Motor: Weakness present.   Psychiatric:         Mood and Affect: Mood is anxious.         Behavior: Behavior is cooperative.          Significant Labs: All pertinent labs within the past 24 hours have been reviewed.  ABGs:     CBC:   Recent Labs   Lab 06/26/22  0407 06/27/22  0555 06/27/22  1747   WBC 12.7* 12.3* 19.1*   HGB 10.8* 11.0* 11.8*   HCT 34.6* 36.5* 38.2*    327 395     CMP:   Recent Labs   Lab 06/26/22  0406 06/27/22  0555  06/27/22  1747    138 134*   K 5.2* 5.5* 7.3*   CO2 21* 21* 19*   BUN 77.8* 86.6* 84.0*   CREATININE 3.76* 4.78* 4.48*   CALCIUM 8.3* 8.3* 8.2*   ALBUMIN  --  2.1* 2.3*   BILITOT  --  0.5  --    ALKPHOS  --  120  --    AST  --  78*  --    ALT  --  76*  --    EGFRNONAA 17 13 14       Magnesium:   Recent Labs   Lab 06/27/22  1747   MG 2.70*     Troponin:   Recent Labs   Lab 06/27/22  1747   TROPONINI 0.018             Significant Imaging: I have reviewed all pertinent imaging results/findings within the past 24 hours.    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 06/21/2022    Morbid obesity 06/21/2022    Peripheral arterial occlusive disease 06/21/2022    Swelling of lower extremity 06/21/2022    Type 2 diabetes mellitus 06/21/2022    Hyperkalemia 06/21/2022    Fall at home 06/21/2022     - admit to ICU  - Neph consulted for urgent HD  - GenSurg consulted for temp HD catheter urgent placement  - D50 + IV Insulin + Ca Gluconate ordered for hyperkalemia  - vent mgt  - IV abx  - am labs  - wound care  - monitor for hypoglycemia  - may need thoracentesis bilat      VTE Risk Mitigation (From admission, onward)         Ordered     IP VTE HIGH RISK PATIENT  Once         06/27/22 1739     Place sequential compression device  Until discontinued         06/27/22 1739                   Cam Medina MD  Department of Hospital Medicine   [facility] - Medical Surgical Unit

## 2022-06-28 NOTE — OP NOTE
Procedure date 06/27/2022    Indications:  61-year-old white male transferred from outside facility and acute respiratory distress and renal failure in need of emergent dialysis and dialysis catheter placement      Preoperative diagnosis:1.  Acute respiratory failure 2. Acute renal failure  Postoperative diagnosis:  Same    Procedure performed:  Non tunneled central venous catheter placement to the right internal jugular vein    Procedure in detail:  Patient brought to the operative theater laid in the supine position and general endotracheal intubation anesthesia was provided for protection of the airway in a patient with acute respiratory failure and recent p.o. intake.  Patient's right neck and chest were then sterilely prepped and draped in normal surgical fashion using chlorhexidine.  1% lidocaine with epinephrine useful treat the subcutaneous tissues overlying the right anterior triangle of the neck.  Ultrasound guidance used to cannulate the right internal jugular vein on the 1st pass.  A Seldinger wire was then placed within the can identified in the correct position with fluoroscopy.  A nick was made in the right insertion point with an 11 blade.  It was then dilated serially.  15 cm dual lumen venous dialysis catheter was in place within the lumen.  It was positioned at the superior vena cava atrial junction.  It was able to flush and aspirate without difficulty.  He was then heparin locked.  It was sutured to the skin using 2-0 silk.  A sterile bandage was then placed over the wound.  The patient was then relieved of anesthesia stable condition and transferred to postanesthesia care unit.    Specimens:  None  Estimated blood loss:  5 cc  Complications none    Molly Garcia MD

## 2022-06-28 NOTE — PLAN OF CARE
Chart reviewed.  Pt came from [facility] for renal services.  D/ C disposition will be determined on whether or not pt will need HD>  Will follow.

## 2022-06-28 NOTE — ANESTHESIA PROCEDURE NOTES
Intubation    Date/Time: 6/27/2022 7:52 PM  Performed by: Jefferson Ramos CRNA  Authorized by: Henry Acevedo DO     Intubation:     Induction:  Rapid sequence induction    Intubated:  Postinduction    Mask Ventilation:  Easy mask    Attempts:  1    Attempted By:  CRNA    Method of Intubation:  Direct    Blade:  Swartz 2    Laryngeal View Grade: Grade I - full view of cords      Difficult Airway Encountered?: No      Complications:  None    Airway Device:  Oral endotracheal tube    Airway Device Size:  7.5    Style/Cuff Inflation:  Cuffed (inflated to minimal occlusive pressure)    Secured at:  The lips    Placement Verified By:  Capnometry and Colorimetric ETCO2 device    Complicating Factors:  None    Findings Post-Intubation:  BS equal bilateral and atraumatic/condition of teeth unchanged

## 2022-06-28 NOTE — ANESTHESIA POSTPROCEDURE EVALUATION
Anesthesia Post Evaluation    Patient: Neno Watkins    Procedure(s) Performed: Procedure(s) (LRB):  INSERTION, CATHETER, HEMODIALYSIS, DUAL LUMEN (Right)    Final Anesthesia Type: general      Patient location during evaluation: ICU  Patient participation: No - Unable to Participate, Intubation  Post-procedure vital signs: reviewed and stable    PONV status at discharge: No PONV  Anesthetic complications: no      Cardiovascular status: hemodynamically stable  Respiratory status: ETT and intubated  Hydration status: hypervolemic  Follow-up not needed.          Vitals Value Taken Time   BP 98/59 06/27/22 2101     06/27/22 2110   Pulse 65 06/27/22 2110   Resp 16 06/27/22 2110   SpO2 97 % 06/27/22 2110   Vitals shown include unvalidated device data.      No case tracking events are documented in the log.      Pain/Aylin Score: Pain Rating Prior to Med Admin: 10 (6/27/2022  5:42 PM)  Pain Rating Post Med Admin: 0 (6/27/2022  6:12 PM)

## 2022-06-28 NOTE — NURSING
"I received the patient around 1700 this afternoon. Within thirty minutes of admission, the patient was obtunded, liable, hypertensive, and diaphoretic. The patient c/o SOB and chest pain. CBG, ABG, EKG, cardiac enzymes and routine labs were obtained. Patient was placed on 15L via oxymask. Morphine 1 mg was given. MD Adam consulted both nephrology and surgery for placement of dialysis cath for emergent dialysis needs due to fluid volume overload and SHANDRA. Potassium was critical at a value of 7.7 and was reported to MD Adam. Orders for 1 amp of D50W, 1 amp of calcium gluconate, and 10 units of IV insulin were obtained. All orders were carried out prior to procedure. The transfer will be made to ICU bed 4 after the procedure. I gave ELVIRA Mahmood (Chuck) report via verbal method in the unit.   "

## 2022-06-28 NOTE — PROGRESS NOTES
[facility] - ICU Hospital Medicine Progress Note    Patient Name: Neno Watkins  Age: 61 y.o.   MRN: [id number]  Admission Date: 6/27/2022  5:06 PM   Hospital Length of Stay: 1 days  Code Status: Full Code  Attending Provider: Cam Medina MD  Primary Care Provider: Primary Doctor No     Chief Complaint:   Chief Complaint   Patient presents with    Transfer from [facility]     SHANDRA           SUBJECTIVE:     Follow-up For:   SHANDRA (acute kidney injury)    HPI:  60yo WM with h/o Morbid Obesity, HTN, DM2, HLD, ?MI transferred from [facility] for SHANDRA. Pt originally presented to their ED 7/21 c/o back pain after fall at home. Noted to have SHANDRA with Hyperkalemia and admitted for tx. Pt tx'd with IVF and initially Cr improved but there was concern of CHF/fluid overload so IVF held and pt given and Cr worsened and UOP declined. ECHO however was normal with EF 55% and no e/o diastolic dysfunction. BNP was 109 as well. Pt given Lasix as well with increased UOP but worsening Cr. Today pt was more SOB with incr LE edema. BUN and Cr progressively worsening,hyperkalemic, oliguric, more hypertensive. Has moderate bilat pleural effusion on imaging as well. Transferred to [facility] for Nephrology services.     Pt finally arrived to floor this evening c/o CP/SOB and was diaphoretic, tachycardic and hypertensive. There were no labs repeated after this morning at 04:30. Ordered stat chem, Trop, ABG, EKG, Lopressor IV and IV Morphine. Pt seen soon thereafter and appears ill. Placed on 15L OxyMask for hypoxia O2 sat 88% on 4L NC. Labs returned with K+ 7.3, BUN 84, Cr 4.48, PO4 8.4, Trop 0.018, EKG NSR w/o acute ST-T wave changes (sp no peaked T-waves). Nephrology consulted for urgent HD and GenSurg consulted for urgent Dialysis Catheter placement. Pt will be brought down to OR for procedure and Anesth plans to intubate. Pt will go to ICU after procedure and then receive HD.        Last  "24h: Pt had HD catheter placed in OR and admitted to ICU on vent. Had HD last night (UF 1.9L) and again this morning (UF 3L). Resp acidosis now resolved with HCO3 trending down. Pt awake/alert/responsive on vent this morning. Extubated successfully. K+ 5.0. Minimal UOP. On Zyvox/Meropenem.      Scheduled Meds:   carvediloL  6.25 mg Oral BID    cefTRIAXone (ROCEPHIN) IVPB  1 g Intravenous Q24H    dextrose 5 % and 0.45% NaCl  500 mL Intravenous Once    dextrose 50%        furosemide (LASIX) injection  80 mg Intravenous Once    furosemide (LASIX) injection  80 mg Intravenous Q12H    linezolid  600 mg Intravenous Q12H    meropenem (MERREM) IVPB  1 g Intravenous Q12H    metOLazone  10 mg Oral Daily    mupirocin   Nasal BID    NIFEdipine  60 mg Oral Daily    sodium chloride 0.9%  10 mL Intravenous Q6H    tamsulosin  1 capsule Oral Daily     Continuous Infusions:   N/A    PRN Meds:   acetaminophen    albuterol-ipratropium    ALPRAZolam    calcium gluconate IVPB    dextrose 10%    dextrose 10%    dextrose 10%    dextrose 10%    hydrALAZINE    labetalol    LIDOcaine    lorazepam    melatonin    ondansetron    sodium chloride 0.9%    sodium chloride 0.9%      Lines/Drains:   Lines/Drains/Airways     Central Venous Catheter Line  Duration                Hemodialysis Catheter 06/27/22 2025 right internal jugular <1 day          Drain  Duration                Urethral Catheter 06/24/22 0230 Straight-tip 20 Fr. 4 days                    Review of patient's allergies indicates:   Allergen Reactions    Butorphanol Swelling     "it almost killed me"    Hydrocodone-acetaminophen Other (See Comments)     "They mess with my heart"    Povidone-iodine Shortness Of Breath and Rash         Review of Systems: 10 systems reviewed all pertinent positives and negatives stated in HPI, otherwise negative.       OBJECTIVE:     Vital Signs (Most Recent)  Temp: 97.9 °F (36.6 °C) (06/28/22 1149)  Pulse: 88 (06/28/22 " 1324)  Resp: (!) 22 (06/28/22 1324)  BP: (!) 140/69 (06/28/22 1149)  SpO2: (!) 91 % (06/28/22 1324)    Vital Signs Range (Last 24H):  Temp:  [95.4 °F (35.2 °C)-97.9 °F (36.6 °C)]   Pulse:  [55-93]   Resp:  [14-25]   BP: ()/(52-95)   SpO2:  [87 %-100 %]     I & O (Last 24H):    Intake/Output Summary (Last 24 hours) at 6/28/2022 1407  Last data filed at 6/28/2022 1042  Gross per 24 hour   Intake 450 ml   Output 5450 ml   Net -5000 ml       Physical Exam:  Physical Exam  Constitutional:       General: He is awake. He is not in acute distress.     Appearance: He is well-developed. He is morbidly obese. He is not toxic-appearing.   HENT:      Head: Normocephalic and atraumatic.      Nose: Nose normal.      Mouth/Throat:      Mouth: Mucous membranes are moist.      Pharynx: Oropharynx is clear.   Eyes:      Extraocular Movements: Extraocular movements intact.      Conjunctiva/sclera: Conjunctivae normal.      Pupils: Pupils are equal, round, and reactive to light.   Cardiovascular:      Rate and Rhythm: Normal rate and regular rhythm.      Heart sounds: No murmur heard.    No gallop.   Pulmonary:      Effort: Pulmonary effort is normal.      Breath sounds: Examination of the right-lower field reveals decreased breath sounds. Examination of the left-lower field reveals decreased breath sounds. Decreased breath sounds present.   Abdominal:      General: Bowel sounds are normal.      Palpations: Abdomen is soft.      Tenderness: There is no abdominal tenderness.   Musculoskeletal:         General: Normal range of motion.      Cervical back: Normal range of motion and neck supple.      Right lower leg: Edema present.      Left lower leg: Edema present.   Skin:     General: Skin is warm and dry.      Comments: BLE bandaged below knees   Neurological:      General: No focal deficit present.      Mental Status: He is alert and oriented to person, place, and time. Mental status is at baseline.   Psychiatric:         Mood  and Affect: Mood normal.         Behavior: Behavior normal. Behavior is cooperative.         Thought Content: Thought content normal.         Judgment: Judgment normal.          Laboratory:  Recent Labs   Lab 06/27/22  0555 06/27/22  1747 06/28/22  0401   WBC 12.3* 19.1* 14.4*   HGB 11.0* 11.8* 10.8*   HCT 36.5* 38.2* 34.1*    395 319        Recent Labs   Lab 06/27/22  0555 06/27/22  1747 06/28/22  0402 06/28/22  0814 06/28/22  1156    134* 134*  --  137   K 5.5* 7.3* 7.4* 5.8* 5.0   CO2 21* 19* 19*  --  21*   BUN 86.6* 84.0* 88.0*  --  78.0*   CREATININE 4.78* 4.48* 5.31*  --  4.61*   CALCIUM 8.3* 8.2* 7.6*  --  8.1*   ALBUMIN 2.1* 2.3* 2.3*  --  2.4*   BILITOT 0.5  --  0.5  --   --    ALKPHOS 120  --  118  --   --    ALT 76*  --  89*  --   --    AST 78*  --  71*  --   --    GLUCOSE 53* 139* 241*  --  123*   MG  --  2.70* 2.60  --   --    PHOS  --  8.4*  --   --  5.7*           ASSESSMENT/PLAN:     Patient Active Problem List    Diagnosis Date Noted    Oliguria 06/28/2022    Hyperkalemia 06/21/2022    SHANDRA (acute kidney injury) 06/21/2022    Pyelonephritis 06/27/2022    Hypoxia 06/27/2022    Cellulitis of both lower extremities 06/28/2022    Leukocytosis 06/22/2022    Bilateral lower extremity edema 06/22/2022    Benign prostatic hyperplasia 06/21/2022    Elevated liver enzymes 06/21/2022    Gastroesophageal reflux disease 06/21/2022    Hyperlipidemia 06/21/2022    Hypertension 06/21/2022    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 06/21/2022    Morbid obesity 06/21/2022    Peripheral arterial occlusive disease 06/21/2022    Swelling of lower extremity 06/21/2022    Type 2 diabetes mellitus 06/21/2022    Fall at home 06/21/2022        - admitted to ICU  - likely able to transfer to floor soon  - extubated today  - Neph w/u  - HD per Neph  - Renal U/S  - strict I&O's  - ECHO 6/23 nml  - tx hyperK+ prn  - Zyvox/Meropenem  - wound care  - am labs  - PT    Critical  Care Time = 34 min        VTE Risk Mitigation (From admission, onward)         Ordered     IP VTE HIGH RISK PATIENT  Once         06/27/22 1739     Place sequential compression device  Until discontinued         06/27/22 1739                       Cam Medina MD  Department of Hospital Medicine   [facility]

## 2022-06-28 NOTE — CONSULTS
Patient with bilateral leg wraps, thick layer of zinc barrier paste noted to both lower legs. Right anterior lower leg with venous wound 2.0cm x 4.0cm x 0.2cm, multiple scattered areas,  wound bed red, non granulating tissue, draining small amounts of clear, serous fluid. Sherry wound edematous with hemosiderin staining noted. Right plantar great toe with Diabetic Foot Ulcer, 1.4cm x 1.2cm x 0.2cm, wound bed 75% granulation tissue, 25% slough, wound edges attached, draining clear, serous fluid, sherry wound callous. Right plantar mid foot with Diabetic Foot Ulcer, 2.0cm x 2.5cm x 0.2cm,multiple scattered areas, wound bed with 75% granulation tissue, 25% slough,wound edges attached, draining clear serous fluid, sherry wound callous. Left medial lower leg with venous wound, 1.0cm x 1.5cm x 0.2cm, wound bed 100% smooth non granulating tissue, draining clear, serous fluid. Sherry wound edematous with hemosiderin staining noted. Bilateral lower legs washed and dried well, remains with zinc paste well imbedded in legs, will need to gradually remove to avoid skin damage. Telfa applied to open wound, wrapped with kerlix and ace wrap. Right great toe and right plantar foot, wound bed cleansed with saline, silver alginate applied to wound bed, covered with gauze and wrapped with kerlix. right lower leg    Right plantar foot    Right plantar great toe    Left lower leg

## 2022-06-29 LAB
ALBUMIN SERPL-MCNC: 2.3 GM/DL (ref 3.4–4.8)
ALBUMIN/GLOB SERPL: 0.6 RATIO (ref 1.1–2)
ALP SERPL-CCNC: 100 UNIT/L (ref 40–150)
ALT SERPL-CCNC: 70 UNIT/L (ref 0–55)
AST SERPL-CCNC: 60 UNIT/L (ref 5–34)
BASOPHILS # BLD AUTO: 0.06 X10(3)/MCL (ref 0–0.2)
BASOPHILS NFR BLD AUTO: 0.4 %
BILIRUBIN DIRECT+TOT PNL SERPL-MCNC: 0.3 MG/DL
BUN SERPL-MCNC: 91 MG/DL (ref 8.4–25.7)
CALCIUM SERPL-MCNC: 7.3 MG/DL (ref 8.8–10)
CHLORIDE SERPL-SCNC: 105 MMOL/L (ref 98–107)
CO2 SERPL-SCNC: 22 MMOL/L (ref 23–31)
CREAT SERPL-MCNC: 5.42 MG/DL (ref 0.73–1.18)
EOSINOPHIL # BLD AUTO: 0.27 X10(3)/MCL (ref 0–0.9)
EOSINOPHIL NFR BLD AUTO: 1.7 %
ERYTHROCYTE [DISTWIDTH] IN BLOOD BY AUTOMATED COUNT: 12.7 % (ref 11.5–17)
GLOBULIN SER-MCNC: 3.9 GM/DL (ref 2.4–3.5)
GLUCOSE SERPL-MCNC: 117 MG/DL (ref 82–115)
GLUCOSE SERPL-MCNC: 88 MG/DL (ref 70–110)
HCT VFR BLD AUTO: 32.4 % (ref 42–52)
HGB BLD-MCNC: 10.1 GM/DL (ref 14–18)
IMM GRANULOCYTES # BLD AUTO: 0.11 X10(3)/MCL (ref 0–0.02)
IMM GRANULOCYTES NFR BLD AUTO: 0.7 % (ref 0–0.43)
LYMPHOCYTES # BLD AUTO: 3.4 X10(3)/MCL (ref 0.6–4.6)
LYMPHOCYTES NFR BLD AUTO: 21.1 %
MAGNESIUM SERPL-MCNC: 2.5 MG/DL (ref 1.6–2.6)
MCH RBC QN AUTO: 30.5 PG (ref 27–31)
MCHC RBC AUTO-ENTMCNC: 31.2 MG/DL (ref 33–36)
MCV RBC AUTO: 97.9 FL (ref 80–94)
MONOCYTES # BLD AUTO: 1.96 X10(3)/MCL (ref 0.1–1.3)
MONOCYTES NFR BLD AUTO: 12.1 %
NEUTROPHILS # BLD AUTO: 10.4 X10(3)/MCL (ref 2.1–9.2)
NEUTROPHILS NFR BLD AUTO: 64 %
PHOSPHATE SERPL-MCNC: 7.4 MG/DL (ref 2.3–4.7)
PLATELET # BLD AUTO: 306 X10(3)/MCL (ref 130–400)
PMV BLD AUTO: 9.2 FL (ref 9.4–12.4)
POCT GLUCOSE: 148 MG/DL (ref 70–110)
POCT GLUCOSE: 177 MG/DL (ref 70–110)
POCT GLUCOSE: 88 MG/DL (ref 70–110)
POTASSIUM SERPL-SCNC: 5 MMOL/L (ref 3.5–5.1)
PROT SERPL-MCNC: 6.2 GM/DL (ref 5.8–7.6)
RBC # BLD AUTO: 3.31 X10(6)/MCL (ref 4.7–6.1)
SODIUM SERPL-SCNC: 137 MMOL/L (ref 136–145)
WBC # SPEC AUTO: 16.2 X10(3)/MCL (ref 4.5–11.5)

## 2022-06-29 PROCEDURE — A4216 STERILE WATER/SALINE, 10 ML: HCPCS | Performed by: STUDENT IN AN ORGANIZED HEALTH CARE EDUCATION/TRAINING PROGRAM

## 2022-06-29 PROCEDURE — 25000003 PHARM REV CODE 250: Performed by: INTERNAL MEDICINE

## 2022-06-29 PROCEDURE — 27000221 HC OXYGEN, UP TO 24 HOURS

## 2022-06-29 PROCEDURE — 83735 ASSAY OF MAGNESIUM: CPT | Performed by: INTERNAL MEDICINE

## 2022-06-29 PROCEDURE — 85025 COMPLETE CBC W/AUTO DIFF WBC: CPT | Performed by: INTERNAL MEDICINE

## 2022-06-29 PROCEDURE — 51702 INSERT TEMP BLADDER CATH: CPT

## 2022-06-29 PROCEDURE — 80100014 HC HEMODIALYSIS 1:1

## 2022-06-29 PROCEDURE — 99900035 HC TECH TIME PER 15 MIN (STAT)

## 2022-06-29 PROCEDURE — 94640 AIRWAY INHALATION TREATMENT: CPT

## 2022-06-29 PROCEDURE — 63600175 PHARM REV CODE 636 W HCPCS: Performed by: INTERNAL MEDICINE

## 2022-06-29 PROCEDURE — 36415 COLL VENOUS BLD VENIPUNCTURE: CPT | Performed by: INTERNAL MEDICINE

## 2022-06-29 PROCEDURE — 80053 COMPREHEN METABOLIC PANEL: CPT | Performed by: INTERNAL MEDICINE

## 2022-06-29 PROCEDURE — 94761 N-INVAS EAR/PLS OXIMETRY MLT: CPT

## 2022-06-29 PROCEDURE — 84100 ASSAY OF PHOSPHORUS: CPT | Performed by: INTERNAL MEDICINE

## 2022-06-29 PROCEDURE — 97166 OT EVAL MOD COMPLEX 45 MIN: CPT

## 2022-06-29 PROCEDURE — 20000000 HC ICU ROOM

## 2022-06-29 PROCEDURE — 27100171 HC OXYGEN HIGH FLOW UP TO 24 HOURS

## 2022-06-29 PROCEDURE — 25000003 PHARM REV CODE 250: Performed by: STUDENT IN AN ORGANIZED HEALTH CARE EDUCATION/TRAINING PROGRAM

## 2022-06-29 PROCEDURE — 97530 THERAPEUTIC ACTIVITIES: CPT

## 2022-06-29 RX ORDER — TRAMADOL HYDROCHLORIDE 50 MG/1
50 TABLET ORAL EVERY 6 HOURS PRN
Status: DISCONTINUED | OUTPATIENT
Start: 2022-06-29 | End: 2022-07-08 | Stop reason: HOSPADM

## 2022-06-29 RX ADMIN — SODIUM CHLORIDE, PRESERVATIVE FREE 10 ML: 5 INJECTION INTRAVENOUS at 12:06

## 2022-06-29 RX ADMIN — MEROPENEM 1 G: 1 INJECTION, POWDER, FOR SOLUTION INTRAVENOUS at 08:06

## 2022-06-29 RX ADMIN — METOLAZONE 10 MG: 2.5 TABLET ORAL at 08:06

## 2022-06-29 RX ADMIN — CARVEDILOL 6.25 MG: 6.25 TABLET, FILM COATED ORAL at 08:06

## 2022-06-29 RX ADMIN — SODIUM CHLORIDE, PRESERVATIVE FREE 10 ML: 5 INJECTION INTRAVENOUS at 05:06

## 2022-06-29 RX ADMIN — FUROSEMIDE 80 MG: 10 INJECTION, SOLUTION INTRAMUSCULAR; INTRAVENOUS at 03:06

## 2022-06-29 RX ADMIN — SODIUM CHLORIDE, PRESERVATIVE FREE 10 ML: 5 INJECTION INTRAVENOUS at 10:06

## 2022-06-29 RX ADMIN — DEXTROSE MONOHYDRATE 1 G: 5 INJECTION INTRAVENOUS at 03:06

## 2022-06-29 RX ADMIN — NIFEDIPINE 60 MG: 30 TABLET, EXTENDED RELEASE ORAL at 08:06

## 2022-06-29 RX ADMIN — LABETALOL HYDROCHLORIDE 20 MG: 5 INJECTION INTRAVENOUS at 05:06

## 2022-06-29 RX ADMIN — LINEZOLID 600 MG: 600 INJECTION, SOLUTION INTRAVENOUS at 08:06

## 2022-06-29 RX ADMIN — MUPIROCIN: 20 OINTMENT TOPICAL at 08:06

## 2022-06-29 RX ADMIN — TAMSULOSIN HYDROCHLORIDE 0.4 MG: 0.4 CAPSULE ORAL at 08:06

## 2022-06-29 RX ADMIN — FUROSEMIDE 80 MG: 10 INJECTION, SOLUTION INTRAMUSCULAR; INTRAVENOUS at 01:06

## 2022-06-29 RX ADMIN — TRAMADOL HYDROCHLORIDE 50 MG: 50 TABLET, COATED ORAL at 08:06

## 2022-06-29 NOTE — CONSULTS
"[facility]  [address] [place] [address]  Phone: [phone]    Department of Nephrology  Consult Note      PATIENT NAME: Neno Watkins    MRN: [id number]  TODAY'S DATE: 6/28/2022  ADMIT DATE: 6/27/2022                         CONSULT/HD note REQUESTED BY: Cam Medina MD    SUBJECTIVE     PRINCIPAL PROBLEM: SHANDRA (acute kidney injury)/      REASON FOR CONSULT:  Volume overload, chf and hyperkalemia.      HPI:  Pt is a 60 y/o WM wih a h/o DM htn and morbid obesity with oliguric renal failure requiring emergent hd last pm/am. Pt is currently on his second run with a Qb 250,  tolerating uf well we are attempting another 3 liters today.        Review of patient's allergies indicates:   Allergen Reactions    Butorphanol Swelling     "it almost killed me"    Hydrocodone-acetaminophen Other (See Comments)     "They mess with my heart"    Povidone-iodine Shortness Of Breath and Rash       Past Medical History:   Diagnosis Date    BPH (benign prostatic hyperplasia)     Essential (primary) hypertension     Obesity, unspecified     PAD (peripheral artery disease)      Past Surgical History:   Procedure Laterality Date    INSERTION OF TUNNELED CENTRAL VENOUS HEMODIALYSIS CATHETER Right 6/27/2022    Procedure: INSERTION, CATHETER, HEMODIALYSIS, DUAL LUMEN;  Surgeon: Molly Garcia MD;  Location: [facility];  Service: General;  Laterality: Right;           Review of Systems   Unable to perform ROS: Intubated       OBJECTIVE     VITAL SIGNS (Most Recent)  Temp: 97.2 °F (36.2 °C) (06/29/22 0802)  Pulse: 77 (06/29/22 0802)  Resp: (!) 24 (06/29/22 0802)  BP: (!) 161/84 (06/29/22 0802)  SpO2: (!) 92 % (06/29/22 0802)    VENTILATION STATUS  Resp: (!) 24 (06/29/22 0802)  SpO2: (!) 92 % (06/29/22 0802)  Vent Mode: SIMV  Oxygen Concentration (%):  [50] 50  Resp Rate Total:  [16 br/min-24 br/min] 16 br/min  Vt Set:  [550 mL] 550 mL  PEEP/CPAP:  [5 cmH20] 5 cmH20  Pressure Support:  " [10 cmH20] 10 cmH20  Mean Airway Pressure:  [15 fmZ86-14 cmH20] 15 cmH20    I & O (Last 24H):    Intake/Output Summary (Last 24 hours) at 2022 0848  Last data filed at 2022 0500  Gross per 24 hour   Intake 650 ml   Output 3800 ml   Net -3150 ml       WEIGHTS  Wt Readings from Last 3 Encounters:   22 0500 (!) 144.5 kg (318 lb 9 oz)   22 1430 (!) 145.4 kg (320 lb 8.8 oz)   22 0500 (!) 145.4 kg (320 lb 8.8 oz)   22 2300 (!) 140.7 kg (310 lb 3 oz)   22 0433 (!) 140.7 kg (310 lb 3 oz)   22 0321 (!) 138.7 kg (305 lb 12.5 oz)   22 0327 (!) 137.2 kg (302 lb 7.5 oz)   22 0500 (!) 137 kg (302 lb 0.5 oz)   22 1609 133.5 kg (294 lb 5 oz)   22 1345 133.5 kg (294 lb 5 oz)   22 2355 133.5 kg (294 lb 5 oz)   22 1401 132.9 kg (293 lb)       Physical Exam  Constitutional:       General: He is in acute distress.      Appearance: He is obese.   HENT:      Head: Normocephalic and atraumatic.      Nose: Nose normal. No rhinorrhea.      Mouth/Throat:      Mouth: Mucous membranes are moist.   Eyes:      Extraocular Movements: Extraocular movements intact.      Conjunctiva/sclera: Conjunctivae normal.      Pupils: Pupils are equal, round, and reactive to light.   Neck:      Vascular: No carotid bruit.   Cardiovascular:      Rate and Rhythm: Normal rate and regular rhythm.      Heart sounds: Normal heart sounds. No murmur heard.    No gallop.   Pulmonary:      Effort: Pulmonary effort is normal.   Abdominal:      General: Bowel sounds are normal. There is distension.      Tenderness: There is no guarding.   Genitourinary:     Penis: Normal.    Musculoskeletal:      Cervical back: Normal range of motion.      Right lower le+ Edema present.      Left lower le+ Edema present.   Skin:     General: Skin is warm.      Capillary Refill: Capillary refill takes less than 2 seconds.   Neurological:      General: No focal deficit present.      Mental Status: He is  alert.      Cranial Nerves: Cranial nerves 2-12 are intact. No cranial nerve deficit.         HOME MEDICATIONS:  No current facility-administered medications on file prior to encounter.     Current Outpatient Medications on File Prior to Encounter   Medication Sig Dispense Refill    atorvastatin (LIPITOR) 80 MG tablet Take 80 mg by mouth once daily.      carvediloL (COREG) 6.25 MG tablet Take 6.25 mg by mouth 2 (two) times daily.      ciprofloxacin HCl (CIPRO) 750 MG tablet Take 750 mg by mouth 2 (two) times daily.      furosemide (LASIX) 40 MG tablet Take 40 mg by mouth every morning.      gabapentin (NEURONTIN) 300 MG capsule Take 1 capsule by mouth 2 (two) times a day.      glipiZIDE (GLUCOTROL) 10 MG TR24 Take 10 mg by mouth daily with breakfast.      lisinopriL (PRINIVIL,ZESTRIL) 5 MG tablet Take 5 mg by mouth 2 (two) times a day.      metFORMIN (GLUCOPHAGE) 500 MG tablet Take 500 mg by mouth 2 (two) times a day.      rifAMpin (RIFADIN) 300 MG capsule Take 300 mg by mouth 2 (two) times daily.      tamsulosin (FLOMAX) 0.4 mg Cap Take 1 capsule by mouth once daily.      TOUJEO SOLOSTAR U-300 INSULIN 300 unit/mL (1.5 mL) InPn pen Inject 32 Units into the skin once daily at 6am.         SCHEDULED MEDS:   carvediloL  6.25 mg Oral BID    cefTRIAXone (ROCEPHIN) IVPB  1 g Intravenous Q24H    dextrose 5 % and 0.45% NaCl  500 mL Intravenous Once    furosemide (LASIX) injection  80 mg Intravenous Once    furosemide (LASIX) injection  80 mg Intravenous Q12H    linezolid  600 mg Intravenous Q12H    meropenem (MERREM) IVPB  1 g Intravenous Q12H    metOLazone  10 mg Oral Daily    mupirocin   Nasal BID    NIFEdipine  60 mg Oral Daily    sodium chloride 0.9%  10 mL Intravenous Q6H    tamsulosin  1 capsule Oral Daily       CONTINUOUS INFUSIONS:    PRN MEDS:acetaminophen, albuterol-ipratropium, ALPRAZolam, [COMPLETED] calcium gluconate IVPB **AND** calcium gluconate IVPB, dextrose 10%, dextrose 10%,  dextrose 10%, dextrose 10%, hydrALAZINE, labetalol, LIDOcaine, lorazepam, melatonin, ondansetron, sodium chloride 0.9%, Flushing PICC Protocol **AND** sodium chloride 0.9% **AND** sodium chloride 0.9%    LABS AND DIAGNOSTICS     CBC LAST 3 DAYS  Recent Labs   Lab 06/27/22  1747 06/28/22  0401 06/29/22  0322   WBC 19.1* 14.4* 16.2*   RBC 3.85* 3.48* 3.31*   HGB 11.8* 10.8* 10.1*   HCT 38.2* 34.1* 32.4*   MCV 99.2* 98.0* 97.9*   MCH 30.6 31.0 30.5   MCHC 30.9* 31.7* 31.2*   RDW 12.4 12.2 12.7    319 306   MPV 9.0* 9.2* 9.2*       COAGULATION LAST 3 DAYS  Recent Labs   Lab 06/27/22  1759   INR 1.22       CHEMISTRY LAST 3 DAYS  Recent Labs   Lab 06/27/22  0555 06/27/22  1747 06/27/22  1850 06/28/22  0128 06/28/22  0402 06/28/22  0447 06/28/22  0814 06/28/22  1156 06/28/22  1209 06/29/22  0322    134*  --   --  134*  --   --  137  --  137   K 5.5* 7.3*  --   --  7.4*  --  5.8* 5.0  --  5.0   CO2 21* 19*  --   --  19*  --   --  21*  --  22*   BUN 86.6* 84.0*  --   --  88.0*  --   --  78.0*  --  91.0*   CREATININE 4.78* 4.48*  --   --  5.31*  --   --  4.61*  --  5.42*   CALCIUM 8.3* 8.2*  --   --  7.6*  --   --  8.1*  --  7.3*   PH  --   --    < > 7.169*  --  7.306*  --   --  7.406  --    MG  --  2.70*  --   --  2.60  --   --   --   --  2.50   ALBUMIN 2.1* 2.3*  --   --  2.3*  --   --  2.4*  --  2.3*   ALKPHOS 120  --   --   --  118  --   --   --   --  100   ALT 76*  --   --   --  89*  --   --   --   --  70*   AST 78*  --   --   --  71*  --   --   --   --  60*   BILITOT 0.5  --   --   --  0.5  --   --   --   --  0.3    < > = values in this interval not displayed.       CARDIAC PROFILE LAST 3 DAYS  Recent Labs   Lab 06/23/22  1006 06/27/22  1747   .4*  --    TROPONINI  --  0.018       ENDOCRINE LAST 3 DAYS  No results for input(s): TSH, PROCAL in the last 168 hours.    LAST ARTERIAL BLOOD GAS  ABG  Recent Labs   Lab 06/28/22  1209   PH 7.406   PO2 155*   PCO2 35.6   HCO3 22.4*   BE -2       LAST 7 DAYS  MICROBIOLOGY   Microbiology Results (last 7 days)     ** No results found for the last 168 hours. **          MOST RECENT IMAGING  US Retroperitoneal Complete  Narrative: EXAMINATION:  RENAL/RETROPERITONEAL SONOGRAM:    CLINICAL HISTORY:  SHANDRA;, .    COMPARISON:  None available    FINDINGS:  Real-time imaging was performed through the retroperitoneum evaluating the kidneys. Arterial and venous flow are identified within the kidneys. No hydronephrosis is seen.  No free fluid collection is identified.  The bladder is partially filled with fluid.  A Baldwin catheter is present.  There is bladder wall thickening versus underdistention.    MEASUREMENTS:    Right Kidney: 11.0 cm    RI: 0.73    Left Kidney: 11.5 cm    RI: 0.76  Impression: 1. Baldwin catheter within the bladder with thickened bladder mucosa versus underdistention  2. Slightly elevated renal resistive indices bilaterally.    Electronically signed by: William Mejia  Date:    06/28/2022  Time:    16:24  XR Non-Rad Performed NG/Gastric Tube Check  Narrative: EXAMINATION:  XR NON-RADIOLOGIST PERFORMED NG/GASTRIC TUBE CHECK    CLINICAL HISTORY:  ; ng tube placement;og tube placement;.    COMPARISON:  None available.    FINDINGS:  Single AP view reveals a nonspecific bowel gas pattern without evidence of obstruction.  An NG tube is present with the tip at the left upper abdomen.  Impression: 1. Nonspecific bowel gas pattern without obstruction  2. NG tube left upper abdomen    Electronically signed by: William Mejia  Date:    06/28/2022  Time:    14:46  X-Ray Chest 1 View  Narrative: EXAMINATION:  XR CHEST 1 VIEW    CLINICAL HISTORY:  medcomp placement;, .    COMPARISON:  06/26/2022    FINDINGS:  An AP view or more reveals the heart to be mildly enlarged.  There is interim placement of an ET tube with the tip below the thoracic inlet and approximately 4 cm above the noreen.  There is interim placement of a right central line with the tip superimposed over the SVC.   There is hazy opacification of the mid lower lungs bilaterally with increasing opacification of the right lung base  Impression: 1. Interim placement of ET tube and right central line  2. Mild cardiomegaly  3. Hazy opacification of the mid lower lungs with increasing opacification at the right lung base suspicious for infiltrate and pleural reaction    Electronically signed by: William Mejia  Date:    06/28/2022  Time:    09:02  FL Flouro Usage  Narrative: EXAMINATION:  FL FLOURO USAGE    CLINICAL HISTORY:  ; Surgery-dialysis catheter insertion.;.    Fluoroscopy time: 1.5 minutes    Number of images: None    COMPARISON:  None available.    FINDINGS:  Fluoroscopy was provided for a procedure with no images obtained.  Impression: 1. Fluoroscopy provided for a procedure    Electronically signed by: William Mejia  Date:    06/28/2022  Time:    09:00      ECHOCARDIOGRAM RESULTS (last 5)  Results for orders placed during the hospital encounter of 06/21/22    Echo Saline Bubble? No    Interpretation Summary  · The left ventricle is normal in size with normal systolic function.  · Normal left ventricular diastolic function.  · The estimated ejection fraction is 55%.  · Normal right ventricular size with normal right ventricular systolic function.  · Normal central venous pressure (3 mmHg).      CURRENT/PREVIOUS VISIT EKG  Results for orders placed or performed during the hospital encounter of 06/27/22   EKG 12-lead    Collection Time: 06/27/22  5:38 PM    Narrative    Test Reason : R07.9,    Vent. Rate : 093 BPM     Atrial Rate : 093 BPM     P-R Int : 158 ms          QRS Dur : 082 ms      QT Int : 354 ms       P-R-T Axes : 071 074 043 degrees     QTc Int : 440 ms    Normal sinus rhythm  Normal ECG  When compared with ECG of 27-JUN-2022 14:45,  No significant change was found    Referred By: THAIRY REYES           Confirmed By:            ASSESSMENT/PLAN:     Active Hospital Problems    Diagnosis    *SHANDRA (acute kidney  injury)    Cellulitis of both lower extremities    Oliguria    Pleural effusion, bilateral    Pyelonephritis    Hypoxia    Leukocytosis    Bilateral lower extremity edema    Hypertension    Morbid obesity    Type 2 diabetes mellitus    Hyperkalemia       ASSESSMENT & PLAN:   62 y/o male with anuric renal failure,hyperkalemia and volume overload      RECOMMENDATIONS:  Emergent hemodialysis and uf until pt is at a normal ecfv        Meir Gillis MD  Department of Nephrology  Date of Service: 6/28/2022 10:00 AM

## 2022-06-29 NOTE — SUBJECTIVE & OBJECTIVE
Interval History:     Review of Systems   Constitutional:  Positive for activity change and fatigue.   HENT: Negative.     Eyes: Negative.    Respiratory:  Positive for shortness of breath.    Cardiovascular:  Positive for leg swelling.   Gastrointestinal: Negative.    Endocrine: Negative.    Genitourinary: Negative.    Musculoskeletal: Negative.    Skin:  Positive for wound.   Allergic/Immunologic: Negative.    Neurological:  Positive for weakness.   Hematological: Negative.    Psychiatric/Behavioral: Negative.     Objective:     Vital Signs (Most Recent):  Temp: 97.7 °F (36.5 °C) (06/29/22 1112)  Pulse: 68 (06/29/22 1300)  Resp: 18 (06/29/22 1300)  BP: (!) 147/72 (06/29/22 1300)  SpO2: 95 % (06/29/22 1300)   Vital Signs (24h Range):  Temp:  [96.8 °F (36 °C)-98.8 °F (37.1 °C)] 97.7 °F (36.5 °C)  Pulse:  [68-99] 68  Resp:  [12-27] 18  SpO2:  [87 %-96 %] 95 %  BP: (119-170)/(62-84) 147/72     Weight: (!) 144.5 kg (318 lb 9 oz)  Body mass index is 45.71 kg/m².    Intake/Output Summary (Last 24 hours) at 6/29/2022 1456  Last data filed at 6/29/2022 0500  Gross per 24 hour   Intake 650 ml   Output 800 ml   Net -150 ml      Physical Exam  Constitutional:       Appearance: Normal appearance. He is obese.   HENT:      Head: Normocephalic and atraumatic.      Nose: Nose normal.      Mouth/Throat:      Mouth: Mucous membranes are dry.      Pharynx: Oropharynx is clear.   Eyes:      Extraocular Movements: Extraocular movements intact.      Conjunctiva/sclera: Conjunctivae normal.      Pupils: Pupils are equal, round, and reactive to light.   Cardiovascular:      Rate and Rhythm: Normal rate and regular rhythm.      Pulses: Normal pulses.      Heart sounds: Normal heart sounds.   Abdominal:      General: Bowel sounds are normal.      Palpations: Abdomen is soft.   Musculoskeletal:         General: Swelling present.      Cervical back: Normal range of motion and neck supple.      Right lower leg: Edema present.      Left lower  leg: Edema present.   Skin:     General: Skin is warm and dry.      Findings: Erythema and lesion present.   Neurological:      General: No focal deficit present.      Mental Status: He is alert and oriented to person, place, and time. Mental status is at baseline.   Psychiatric:         Mood and Affect: Mood normal.         Behavior: Behavior normal.         Thought Content: Thought content normal.         Judgment: Judgment normal.       Significant Labs: All pertinent labs within the past 24 hours have been reviewed.  BMP:   Recent Labs   Lab 06/29/22  0322      K 5.0   CO2 22*   BUN 91.0*   CREATININE 5.42*   CALCIUM 7.3*   MG 2.50     CBC:   Recent Labs   Lab 06/27/22 1747 06/28/22  0401 06/29/22  0322   WBC 19.1* 14.4* 16.2*   HGB 11.8* 10.8* 10.1*   HCT 38.2* 34.1* 32.4*    319 306     CMP:   Recent Labs   Lab 06/28/22  0402 06/28/22  0814 06/28/22  1156 06/29/22  0322   *  --  137 137   K 7.4* 5.8* 5.0 5.0   CO2 19*  --  21* 22*   BUN 88.0*  --  78.0* 91.0*   CREATININE 5.31*  --  4.61* 5.42*   CALCIUM 7.6*  --  8.1* 7.3*   ALBUMIN 2.3*  --  2.4* 2.3*   BILITOT 0.5  --   --  0.3   ALKPHOS 118  --   --  100   AST 71*  --   --  60*   ALT 89*  --   --  70*   EGFRNONAA 12  --  14 11     Magnesium:   Recent Labs   Lab 06/27/22 1747 06/28/22  0402 06/29/22  0322   MG 2.70* 2.60 2.50       Significant Imaging: I have reviewed all pertinent imaging results/findings within the past 24 hours.

## 2022-06-29 NOTE — PROGRESS NOTES
"[facility]  [address] [place] [address]  Phone: [phone]    Department of Nephrology  Progress Note      PATIENT NAME: Neno Watkins  MRN: [id number]  TODAY'S DATE: 06/29/2022  ADMIT DATE: 6/27/2022    SUBJECTIVE     PRINCIPLE PROBLEM: SHANDRA (acute kidney injury)    INTERVAL HISTORY:    6/29/2022  Pt tolerated second HD session well with good UF results. Pt is currently extubated.    Review of patient's allergies indicates:   Allergen Reactions    Butorphanol Swelling     "it almost killed me"    Hydrocodone-acetaminophen Other (See Comments)     "They mess with my heart"    Povidone-iodine Shortness Of Breath and Rash       Review of Systems   Constitutional: Negative.    HENT: Negative.    Eyes: Negative.    Respiratory: Positive for shortness of breath.    Cardiovascular: Positive for leg swelling.   Gastrointestinal: Negative.    Genitourinary: Negative.    Skin: Negative.    Neurological: Negative.        OBJECTIVE     VITAL SIGNS (Most Recent)  Temp: 97.2 °F (36.2 °C) (06/29/22 0802)  Pulse: 77 (06/29/22 0802)  Resp: (!) 24 (06/29/22 0802)  BP: (!) 161/84 (06/29/22 0802)  SpO2: (!) 92 % (06/29/22 0802)    VENTILATION STATUS  Resp: (!) 24 (06/29/22 0802)  SpO2: (!) 92 % (06/29/22 0802)  Vent Mode: SIMV  Oxygen Concentration (%):  [50] 50  Resp Rate Total:  [16 br/min-24 br/min] 16 br/min  Vt Set:  [550 mL] 550 mL  PEEP/CPAP:  [5 cmH20] 5 cmH20  Pressure Support:  [10 cmH20] 10 cmH20  Mean Airway Pressure:  [15 bdL25-89 cmH20] 15 cmH20    I & O (Last 24H):    Intake/Output Summary (Last 24 hours) at 6/29/2022 0901  Last data filed at 6/29/2022 0500  Gross per 24 hour   Intake 650 ml   Output 3800 ml   Net -3150 ml       WEIGHTS  Wt Readings from Last 3 Encounters:   06/29/22 0500 (!) 144.5 kg (318 lb 9 oz)   06/28/22 1430 (!) 145.4 kg (320 lb 8.8 oz)   06/28/22 0500 (!) 145.4 kg (320 lb 8.8 oz)   06/27/22 2300 (!) 140.7 kg (310 lb 3 oz)   06/27/22 0433 (!) 140.7 kg " (310 lb 3 oz)   06/26/22 0321 (!) 138.7 kg (305 lb 12.5 oz)   06/25/22 0327 (!) 137.2 kg (302 lb 7.5 oz)   06/24/22 0500 (!) 137 kg (302 lb 0.5 oz)   06/23/22 1609 133.5 kg (294 lb 5 oz)   06/23/22 1345 133.5 kg (294 lb 5 oz)   06/21/22 2355 133.5 kg (294 lb 5 oz)   06/21/22 1401 132.9 kg (293 lb)       Physical Exam  Constitutional:       Appearance: He is obese.   HENT:      Head: Normocephalic and atraumatic.      Nose: No rhinorrhea.      Mouth/Throat:      Mouth: Mucous membranes are moist.   Eyes:      Extraocular Movements: Extraocular movements intact.      Pupils: Pupils are equal, round, and reactive to light.      Comments: corneal edema   Pulmonary:      Breath sounds: Normal breath sounds.   Abdominal:      General: There is distension.      Tenderness: There is no guarding or rebound.   Genitourinary:     Penis: Normal.    Musculoskeletal:      Right lower leg: Edema present.      Left lower leg: Edema present.   Skin:     General: Skin is warm.      Capillary Refill: Capillary refill takes less than 2 seconds.   Neurological:      General: No focal deficit present.      Mental Status: He is alert and oriented to person, place, and time.   Psychiatric:         Judgment: Judgment normal.         SCHEDULED MEDS:   carvediloL  6.25 mg Oral BID    cefTRIAXone (ROCEPHIN) IVPB  1 g Intravenous Q24H    dextrose 5 % and 0.45% NaCl  500 mL Intravenous Once    furosemide (LASIX) injection  80 mg Intravenous Once    furosemide (LASIX) injection  80 mg Intravenous Q12H    linezolid  600 mg Intravenous Q12H    meropenem (MERREM) IVPB  1 g Intravenous Q12H    metOLazone  10 mg Oral Daily    mupirocin   Nasal BID    NIFEdipine  60 mg Oral Daily    sodium chloride 0.9%  10 mL Intravenous Q6H    tamsulosin  1 capsule Oral Daily       CONTINUOUS INFUSIONS:    PRN MEDS:acetaminophen, albuterol-ipratropium, ALPRAZolam, [COMPLETED] calcium gluconate IVPB **AND** calcium gluconate IVPB, dextrose 10%, dextrose  10%, dextrose 10%, dextrose 10%, hydrALAZINE, labetalol, LIDOcaine, lorazepam, melatonin, ondansetron, sodium chloride 0.9%, Flushing PICC Protocol **AND** sodium chloride 0.9% **AND** sodium chloride 0.9%    LABS AND DIAGNOSTICS     CBC LAST 3 DAYS  Recent Labs   Lab 06/27/22  1747 06/28/22  0401 06/29/22  0322   WBC 19.1* 14.4* 16.2*   RBC 3.85* 3.48* 3.31*   HGB 11.8* 10.8* 10.1*   HCT 38.2* 34.1* 32.4*   MCV 99.2* 98.0* 97.9*   MCH 30.6 31.0 30.5   MCHC 30.9* 31.7* 31.2*   RDW 12.4 12.2 12.7    319 306   MPV 9.0* 9.2* 9.2*       COAGULATION LAST 3 DAYS  Recent Labs   Lab 06/27/22  1759   INR 1.22       CHEMISTRY LAST 3 DAYS  Recent Labs   Lab 06/27/22  0555 06/27/22  1747 06/27/22  1850 06/28/22  0128 06/28/22  0402 06/28/22  0447 06/28/22  0814 06/28/22  1156 06/28/22  1209 06/29/22  0322    134*  --   --  134*  --   --  137  --  137   K 5.5* 7.3*  --   --  7.4*  --  5.8* 5.0  --  5.0   CO2 21* 19*  --   --  19*  --   --  21*  --  22*   BUN 86.6* 84.0*  --   --  88.0*  --   --  78.0*  --  91.0*   CREATININE 4.78* 4.48*  --   --  5.31*  --   --  4.61*  --  5.42*   CALCIUM 8.3* 8.2*  --   --  7.6*  --   --  8.1*  --  7.3*   PH  --   --    < > 7.169*  --  7.306*  --   --  7.406  --    MG  --  2.70*  --   --  2.60  --   --   --   --  2.50   ALBUMIN 2.1* 2.3*  --   --  2.3*  --   --  2.4*  --  2.3*   ALKPHOS 120  --   --   --  118  --   --   --   --  100   ALT 76*  --   --   --  89*  --   --   --   --  70*   AST 78*  --   --   --  71*  --   --   --   --  60*   BILITOT 0.5  --   --   --  0.5  --   --   --   --  0.3    < > = values in this interval not displayed.       CARDIAC PROFILE LAST 3 DAYS  Recent Labs   Lab 06/23/22  1006 06/27/22  1747   .4*  --    TROPONINI  --  0.018       ENDOCRINE LAST 3 DAYS  No results for input(s): TSH, PROCAL in the last 168 hours.    LAST ARTERIAL BLOOD GAS  ABG  Recent Labs   Lab 06/28/22  1209   PH 7.406   PO2 155*   PCO2 35.6   HCO3 22.4*   BE -2       LAST 7  DAYS MICROBIOLOGY   Microbiology Results (last 7 days)     ** No results found for the last 168 hours. **          MOST RECENT IMAGING  US Retroperitoneal Complete  Narrative: EXAMINATION:  RENAL/RETROPERITONEAL SONOGRAM:    CLINICAL HISTORY:  SHANDRA;, .    COMPARISON:  None available    FINDINGS:  Real-time imaging was performed through the retroperitoneum evaluating the kidneys. Arterial and venous flow are identified within the kidneys. No hydronephrosis is seen.  No free fluid collection is identified.  The bladder is partially filled with fluid.  A Baldwin catheter is present.  There is bladder wall thickening versus underdistention.    MEASUREMENTS:    Right Kidney: 11.0 cm    RI: 0.73    Left Kidney: 11.5 cm    RI: 0.76  Impression: 1. Baldwin catheter within the bladder with thickened bladder mucosa versus underdistention  2. Slightly elevated renal resistive indices bilaterally.    Electronically signed by: William Mejia  Date:    06/28/2022  Time:    16:24  XR Non-Rad Performed NG/Gastric Tube Check  Narrative: EXAMINATION:  XR NON-RADIOLOGIST PERFORMED NG/GASTRIC TUBE CHECK    CLINICAL HISTORY:  ; ng tube placement;og tube placement;.    COMPARISON:  None available.    FINDINGS:  Single AP view reveals a nonspecific bowel gas pattern without evidence of obstruction.  An NG tube is present with the tip at the left upper abdomen.  Impression: 1. Nonspecific bowel gas pattern without obstruction  2. NG tube left upper abdomen    Electronically signed by: William Mejia  Date:    06/28/2022  Time:    14:46  X-Ray Chest 1 View  Narrative: EXAMINATION:  XR CHEST 1 VIEW    CLINICAL HISTORY:  medcomp placement;, .    COMPARISON:  06/26/2022    FINDINGS:  An AP view or more reveals the heart to be mildly enlarged.  There is interim placement of an ET tube with the tip below the thoracic inlet and approximately 4 cm above the noreen.  There is interim placement of a right central line with the tip superimposed over the  SVC.  There is hazy opacification of the mid lower lungs bilaterally with increasing opacification of the right lung base  Impression: 1. Interim placement of ET tube and right central line  2. Mild cardiomegaly  3. Hazy opacification of the mid lower lungs with increasing opacification at the right lung base suspicious for infiltrate and pleural reaction    Electronically signed by: William Mejia  Date:    06/28/2022  Time:    09:02  FL Flouro Usage  Narrative: EXAMINATION:  FL FLOURO USAGE    CLINICAL HISTORY:  ; Surgery-dialysis catheter insertion.;.    Fluoroscopy time: 1.5 minutes    Number of images: None    COMPARISON:  None available.    FINDINGS:  Fluoroscopy was provided for a procedure with no images obtained.  Impression: 1. Fluoroscopy provided for a procedure    Electronically signed by: William Mejia  Date:    06/28/2022  Time:    09:00      LASTECHO  Results for orders placed during the hospital encounter of 06/21/22    Echo Saline Bubble? No    Interpretation Summary  · The left ventricle is normal in size with normal systolic function.  · Normal left ventricular diastolic function.  · The estimated ejection fraction is 55%.  · Normal right ventricular size with normal right ventricular systolic function.  · Normal central venous pressure (3 mmHg).      CURRENT/PREVIOUS VISIT EKG  Results for orders placed or performed during the hospital encounter of 06/27/22   EKG 12-lead    Collection Time: 06/27/22  5:38 PM    Narrative    Test Reason : R07.9,    Vent. Rate : 093 BPM     Atrial Rate : 093 BPM     P-R Int : 158 ms          QRS Dur : 082 ms      QT Int : 354 ms       P-R-T Axes : 071 074 043 degrees     QTc Int : 440 ms    Normal sinus rhythm  Normal ECG  When compared with ECG of 27-JUN-2022 14:45,  No significant change was found    Referred By: THAIRY REYES           Confirmed By:        ASSESSMENT/PLAN:     Active Hospital Problems    Diagnosis    *SHANDRA (acute kidney injury)    Cellulitis of  both lower extremities    Oliguria    Pleural effusion, bilateral    Pyelonephritis    Hypoxia    Leukocytosis    Bilateral lower extremity edema    Hypertension    Morbid obesity    Type 2 diabetes mellitus    Hyperkalemia       ASSESSMENT & PLAN:   60 y/o WM with hyperkalemia, respiratory failure,and chf requiring emergent HD.      RECOMMENDATIONS:  Daily HD/UF until pt at normal ecfv,avoidance of nephrotoxins.        Meir Gillis MD  Department of Nephrology  Date of Service: 06/29/2022  9:01 AM

## 2022-06-29 NOTE — PROGRESS NOTES
[facility] - ICU Hospital Medicine  Progress Note    Patient Name: Neno Watkins  MRN: [id number]  Patient Class: IP- Inpatient   Admission Date: 6/27/2022  Length of Stay: 2 days  Attending Physician: Cam Medina MD  Primary Care Provider: Primary Doctor No        Subjective:     Principal Problem:SHANDRA (acute kidney injury)        HPI:  62yo WM with h/o Morbid Obesity, HTN, DM2, HLD, ?MI transferred from [facility] for SHANDRA. Pt originally presented to their ED 7/21 c/o back pain after fall at home. Noted to have SHANDRA with Hyperkalemia and admitted for tx. Pt tx'd with IVF and initially Cr improved but there was concern of CHF/fluid overload so IVF held and pt given and Cr worsened and UOP declined. ECHO however was normal with EF 55% and no e/o diastolic dysfunction. BNP was 109 as well. Pt given Lasix as well with increased UOP but worsening Cr. Today pt was more SOB with incr LE edema. BUN and Cr progressively worsening,hyperkalemic, oliguric, more hypertensive. Has moderate bilat pleural effusion on imaging as well. Transferred to [facility] for Nephrology services.     Pt finally arrived to floor this evening c/o CP/SOB and was diaphoretic, tachycardic and hypertensive. There were no labs repeated after this morning at 04:30. Ordered stat chem, Trop, ABG, EKG, Lopressor IV and IV Morphine. Pt seen soon thereafter and appears ill. Placed on 15L OxyMask for hypoxia O2 sat 88% on 4L NC. Labs returned with K+ 7.3, BUN 84, Cr 4.48, PO4 8.4, Trop 0.018, EKG NSR w/o acute ST-T wave changes (sp no peaked T-waves). Nephrology consulted for urgent HD and GenSurg consulted for urgent Dialysis Catheter placement. Pt will be brought down to OR for procedure and Anesth plans to intubate. Pt will go to ICU after procedure and then receive HD.       Overview/Hospital Course:  6/29/22-Patient is sitting up in the chair at this time.  He will undergo HD today.  He is feeling better since  admit.  Will likely watch at least one more day in the ICU.        Interval History:     Review of Systems   Constitutional:  Positive for activity change and fatigue.   HENT: Negative.     Eyes: Negative.    Respiratory:  Positive for shortness of breath.    Cardiovascular:  Positive for leg swelling.   Gastrointestinal: Negative.    Endocrine: Negative.    Genitourinary: Negative.    Musculoskeletal: Negative.    Skin:  Positive for wound.   Allergic/Immunologic: Negative.    Neurological:  Positive for weakness.   Hematological: Negative.    Psychiatric/Behavioral: Negative.     Objective:     Vital Signs (Most Recent):  Temp: 97.7 °F (36.5 °C) (06/29/22 1112)  Pulse: 68 (06/29/22 1300)  Resp: 18 (06/29/22 1300)  BP: (!) 147/72 (06/29/22 1300)  SpO2: 95 % (06/29/22 1300)   Vital Signs (24h Range):  Temp:  [96.8 °F (36 °C)-98.8 °F (37.1 °C)] 97.7 °F (36.5 °C)  Pulse:  [68-99] 68  Resp:  [12-27] 18  SpO2:  [87 %-96 %] 95 %  BP: (119-170)/(62-84) 147/72     Weight: (!) 144.5 kg (318 lb 9 oz)  Body mass index is 45.71 kg/m².    Intake/Output Summary (Last 24 hours) at 6/29/2022 1456  Last data filed at 6/29/2022 0500  Gross per 24 hour   Intake 650 ml   Output 800 ml   Net -150 ml      Physical Exam  Constitutional:       Appearance: Normal appearance. He is obese.   HENT:      Head: Normocephalic and atraumatic.      Nose: Nose normal.      Mouth/Throat:      Mouth: Mucous membranes are dry.      Pharynx: Oropharynx is clear.   Eyes:      Extraocular Movements: Extraocular movements intact.      Conjunctiva/sclera: Conjunctivae normal.      Pupils: Pupils are equal, round, and reactive to light.   Cardiovascular:      Rate and Rhythm: Normal rate and regular rhythm.      Pulses: Normal pulses.      Heart sounds: Normal heart sounds.   Abdominal:      General: Bowel sounds are normal.      Palpations: Abdomen is soft.   Musculoskeletal:         General: Swelling present.      Cervical back: Normal range of motion and  neck supple.      Right lower leg: Edema present.      Left lower leg: Edema present.   Skin:     General: Skin is warm and dry.      Findings: Erythema and lesion present.   Neurological:      General: No focal deficit present.      Mental Status: He is alert and oriented to person, place, and time. Mental status is at baseline.   Psychiatric:         Mood and Affect: Mood normal.         Behavior: Behavior normal.         Thought Content: Thought content normal.         Judgment: Judgment normal.       Significant Labs: All pertinent labs within the past 24 hours have been reviewed.  BMP:   Recent Labs   Lab 06/29/22  0322      K 5.0   CO2 22*   BUN 91.0*   CREATININE 5.42*   CALCIUM 7.3*   MG 2.50     CBC:   Recent Labs   Lab 06/27/22  1747 06/28/22  0401 06/29/22  0322   WBC 19.1* 14.4* 16.2*   HGB 11.8* 10.8* 10.1*   HCT 38.2* 34.1* 32.4*    319 306     CMP:   Recent Labs   Lab 06/28/22  0402 06/28/22  0814 06/28/22  1156 06/29/22  0322   *  --  137 137   K 7.4* 5.8* 5.0 5.0   CO2 19*  --  21* 22*   BUN 88.0*  --  78.0* 91.0*   CREATININE 5.31*  --  4.61* 5.42*   CALCIUM 7.6*  --  8.1* 7.3*   ALBUMIN 2.3*  --  2.4* 2.3*   BILITOT 0.5  --   --  0.3   ALKPHOS 118  --   --  100   AST 71*  --   --  60*   ALT 89*  --   --  70*   EGFRNONAA 12  --  14 11     Magnesium:   Recent Labs   Lab 06/27/22 1747 06/28/22  0402 06/29/22  0322   MG 2.70* 2.60 2.50       Significant Imaging: I have reviewed all pertinent imaging results/findings within the past 24 hours.      Assessment/Plan:      * SHANDRA (acute kidney injury)  Follow labs  DVT prophylaxis  resume HD for now        Hypoxia        Pyelonephritis        Bilateral lower extremity edema        Leukocytosis        Hyperkalemia        Type 2 diabetes mellitus  ISS      Morbid obesity        Hypertension  Current meds  follow        VTE Risk Mitigation (From admission, onward)         Ordered     IP VTE HIGH RISK PATIENT  Once         06/27/22 9249      Place sequential compression device  Until discontinued         06/27/22 1739                Discharge Planning   MATT: 6/27/2022     Code Status: Full Code   Is the patient medically ready for discharge?:     Reason for patient still in hospital (select all that apply): Laboratory test, Treatment, Consult recommendations and PT / OT recommendations                     Otilio Valero MD  Department of Hospital Medicine   [facility] - ICU   R/O DVT prophylaxis

## 2022-06-29 NOTE — PT/OT/SLP PROGRESS
Physical Therapy Treatment    Patient Name:  Neno Watkins   MRN:  [id number]    Recommendations:     Discharge Recommendations:      Discharge Equipment Recommendations:     Barriers to discharge: None    Assessment:     Neno Watkins is a 61 y.o. male admitted with a medical diagnosis of SHANDRA (acute kidney injury).  He presents with the following impairments/functional limitations:  weakness, impaired endurance, gait instability, impaired balance.    Pt extubated yesterday, awake and ready to get up. He required mod-max A to get up to sitting at EOB, min A for balance in unsupported sitting. With RW and mod A, pt was able to stand and side step and then was able to transfer to reclining chair.     Rehab Prognosis: Good and Fair; patient would benefit from acute skilled PT services to address these deficits and reach maximum level of function.    Recent Surgery: Procedure(s) (LRB):  INSERTION, CATHETER, HEMODIALYSIS, DUAL LUMEN (Right) 2 Days Post-Op    Plan:     During this hospitalization, patient to be seen daily to address the identified rehab impairments via gait training, therapeutic activities, therapeutic exercises and progress toward the following goals:    · Plan of Care Expires:  07/26/22    Subjective     Chief Complaint: fatigue/weakness  Patient/Family Comments/goals: to increase I and safety with functional mobility  Pain/Comfort:  · Location - Orientation 1: lower  · Location 1: back      Objective:     Communicated with patient prior to session.  Patient found HOB elevated with telemetry, pulse ox (continuous), whitehead catheter, blood pressure cuff upon PT entry to room.     General Precautions: Standard, fall   Orthopedic Precautions:    Braces:    Respiratory Status: Nasal cannula, flow   L/min     Functional Mobility:  · Bed Mobility:     · Supine to Sit: moderate assistance  · Transfers:     · Sit to Stand:  moderate assistance with rolling walker  · Bed to Chair: moderate assistance with   rolling walker  using  Step Transfer  · Balance: mod A in standing with RW      AM-PAC 6 CLICK MOBILITY          Therapeutic Activities and Exercises:   see above    Patient left up in chair with all lines intact, call button in reach and nurse present..    GOALS:   Multidisciplinary Problems     Physical Therapy Goals        Problem: Physical Therapy    Goal Priority Disciplines Outcome Goal Variances Interventions   Physical Therapy Goal     PT, PT/OT Ongoing, Progressing     Description: Goals to be met by: 22     Patient will increase functional independence with mobility by performin. Supine to sit with Modified [place]  2. Sit to stand transfer with Contact Guard Assistance  3. Gait  x 150 feet with Contact Guard Assistance using Rolling Walker.                      Time Tracking:     PT Received On: 22  PT Start Time: 1000     PT Stop Time: 1020  PT Total Time (min): 20 min     Billable Minutes: Therapeutic Activity 20    Treatment Type: Treatment  PT/PTA: PTA           2022

## 2022-06-29 NOTE — PT/OT/SLP EVAL
Occupational Therapy   Evaluation    Name: Neno Watkins  MRN: [id number]  Admitting Diagnosis:  SHANDRA (acute kidney injury)  Recent Surgery: Procedure(s) (LRB):  INSERTION, CATHETER, HEMODIALYSIS, DUAL LUMEN (Right) 2 Days Post-Op    Recommendations:     Discharge Recommendations: LTACH (long-term acute care hospital), home, home with home health  Discharge Equipment Recommendations:  walker, rolling, wheelchair, bath bench, bedside commode  Barriers to discharge:       Assessment:     Neno Watkins is a 61 y.o. male with a medical diagnosis of SHANDRA (acute kidney injury).  He presents with sitting up in w/c recently finished with dialsysis. Performance deficits affecting function: weakness, impaired endurance, impaired balance, impaired functional mobilty, impaired self care skills.      Rehab Prognosis: Good; patient would benefit from acute skilled OT services to address these deficits and reach maximum level of function.       Plan:     Patient to be seen 3 x/week to address the above listed problems via self-care/home management, therapeutic activities, therapeutic exercises  · Plan of Care Expires: 07/26/22  · Plan of Care Reviewed with: patient    Subjective     Chief Complaint: Pt with increased weakness and debility from hospitalization  Patient/Family Comments/goals: I'm ready to get stronger and go back home.     Occupational Profile:  Living Environment: Pt lives with wife  Previous level of function: Pt was ambulating without AD per pt and was (I) with all aspects of ADLs and IADLs and still driving.    Equipment Used at Home:  other (see comments) (quad cane)  Assistance upon Discharge: Wife is unable to provide much support at home    Pain/Comfort:  · Pain Rating 1: 0/10    Patients cultural, spiritual, Yazdanism conflicts given the current situation:      Objective:     Communicated with: patient and nurse prior to session.  Patient found up in chair with blood pressure cuff, central line, whitehead  catheter, oxygen, peripheral IV, pulse ox (continuous), telemetry upon OT entry to room.    General Precautions: Standard, fall   Orthopedic Precautions:    Braces:    Respiratory Status: Nasal cannula, flow 4 L/min    Occupational Performance:    Bed Mobility:    · Patient completed Rolling/Turning to Left with  moderate assistance  · Patient completed Rolling/Turning to Right with moderate assistance    Functional Mobility/Transfers:  · Patient completed Sit <> Stand Transfer with moderate assistance  with  rolling walker and standard walker   · Patient completed Bed <> Chair Transfer using Stand Pivot technique with moderate assistance with standard walker  ·     Activities of Daily Living:  · toileting with maxA for clothing management and personal hygiene; able to manage urinal with Tramaine        Physical Exam:  Upper Extremity Strength:    -       Right Upper Extremity: Deficits: 3/5  -       Left Upper Extremity: Deficits: 3/5    AMPAC 6 Click ADL:  AMPAC Total Score:      Treatment & Education:  Pt required Mod/Tramaine form OT and nurse to transition sitting in chair to stand and then safely complete t/f to bed. Pt with maxA to reposition self in bed. Education:    Patient left HOB elevated with all lines intact and call button in reach    GOALS:   Multidisciplinary Problems     Occupational Therapy Goals        Problem: Occupational Therapy    Goal Priority Disciplines Outcome Interventions   Occupational Therapy Goal     OT, PT/OT Ongoing, Progressing    Description: Goals to be met by: 7/27/22    Patient will increase functional independence with ADLs by performing:    Toileting from bedside commode with Supervision for hygiene and clothing management.   Increased functional strength to 4/5 for increase (I) with ADLs.  Upper extremity exercise program 20 reps per handout, with assistance as needed.                     History:     Past Medical History:   Diagnosis Date    BPH (benign prostatic hyperplasia)      Essential (primary) hypertension     Obesity, unspecified     PAD (peripheral artery disease)        Past Surgical History:   Procedure Laterality Date    INSERTION OF TUNNELED CENTRAL VENOUS HEMODIALYSIS CATHETER Right 6/27/2022    Procedure: INSERTION, CATHETER, HEMODIALYSIS, DUAL LUMEN;  Surgeon: Molly Garcia MD;  Location: [facility];  Service: General;  Laterality: Right;       Time Tracking:     OT Date of Treatment: 06/29/22  OT Start Time: 1530  OT Stop Time: 1600  OT Total Time (min): 30 min    Billable Minutes:Evaluation 30    6/29/2022

## 2022-06-30 LAB
ALBUMIN SERPL-MCNC: 2.3 GM/DL (ref 3.4–4.8)
ALBUMIN/GLOB SERPL: 0.6 RATIO (ref 1.1–2)
ALP SERPL-CCNC: 95 UNIT/L (ref 40–150)
ALT SERPL-CCNC: 55 UNIT/L (ref 0–55)
AST SERPL-CCNC: 50 UNIT/L (ref 5–34)
BASOPHILS # BLD AUTO: 0.07 X10(3)/MCL (ref 0–0.2)
BASOPHILS NFR BLD AUTO: 0.5 %
BILIRUBIN DIRECT+TOT PNL SERPL-MCNC: 0.4 MG/DL
BUN SERPL-MCNC: 87 MG/DL (ref 8.4–25.7)
CALCIUM SERPL-MCNC: 7.8 MG/DL (ref 8.8–10)
CHLORIDE SERPL-SCNC: 103 MMOL/L (ref 98–107)
CO2 SERPL-SCNC: 21 MMOL/L (ref 23–31)
CREAT SERPL-MCNC: 5.2 MG/DL (ref 0.73–1.18)
EOSINOPHIL # BLD AUTO: 0.43 X10(3)/MCL (ref 0–0.9)
EOSINOPHIL NFR BLD AUTO: 3.2 %
ERYTHROCYTE [DISTWIDTH] IN BLOOD BY AUTOMATED COUNT: 12.5 % (ref 11.5–17)
GLOBULIN SER-MCNC: 3.9 GM/DL (ref 2.4–3.5)
GLUCOSE SERPL-MCNC: 102 MG/DL (ref 82–115)
HCT VFR BLD AUTO: 32.5 % (ref 42–52)
HGB BLD-MCNC: 10.1 GM/DL (ref 14–18)
IMM GRANULOCYTES # BLD AUTO: 0.12 X10(3)/MCL (ref 0–0.02)
IMM GRANULOCYTES NFR BLD AUTO: 0.9 % (ref 0–0.43)
LYMPHOCYTES # BLD AUTO: 2.61 X10(3)/MCL (ref 0.6–4.6)
LYMPHOCYTES NFR BLD AUTO: 19.2 %
MAGNESIUM SERPL-MCNC: 2.3 MG/DL (ref 1.6–2.6)
MCH RBC QN AUTO: 30.1 PG (ref 27–31)
MCHC RBC AUTO-ENTMCNC: 31.1 MG/DL (ref 33–36)
MCV RBC AUTO: 97 FL (ref 80–94)
MONOCYTES # BLD AUTO: 1.64 X10(3)/MCL (ref 0.1–1.3)
MONOCYTES NFR BLD AUTO: 12.1 %
NEUTROPHILS # BLD AUTO: 8.7 X10(3)/MCL (ref 2.1–9.2)
NEUTROPHILS NFR BLD AUTO: 64.1 %
PLATELET # BLD AUTO: 270 X10(3)/MCL (ref 130–400)
PMV BLD AUTO: 9.2 FL (ref 7.4–10.4)
POCT GLUCOSE: 104 MG/DL (ref 70–110)
POCT GLUCOSE: 146 MG/DL (ref 70–110)
POCT GLUCOSE: 147 MG/DL (ref 70–110)
POCT GLUCOSE: 164 MG/DL (ref 70–110)
POTASSIUM SERPL-SCNC: 5.3 MMOL/L (ref 3.5–5.1)
PROT SERPL-MCNC: 6.2 GM/DL (ref 5.8–7.6)
RBC # BLD AUTO: 3.35 X10(6)/MCL (ref 4.7–6.1)
SODIUM SERPL-SCNC: 136 MMOL/L (ref 136–145)
WBC # SPEC AUTO: 13.6 X10(3)/MCL (ref 4.5–11.5)

## 2022-06-30 PROCEDURE — 25000003 PHARM REV CODE 250: Performed by: STUDENT IN AN ORGANIZED HEALTH CARE EDUCATION/TRAINING PROGRAM

## 2022-06-30 PROCEDURE — 94640 AIRWAY INHALATION TREATMENT: CPT

## 2022-06-30 PROCEDURE — 25000003 PHARM REV CODE 250: Performed by: INTERNAL MEDICINE

## 2022-06-30 PROCEDURE — 80100014 HC HEMODIALYSIS 1:1

## 2022-06-30 PROCEDURE — A4216 STERILE WATER/SALINE, 10 ML: HCPCS | Performed by: STUDENT IN AN ORGANIZED HEALTH CARE EDUCATION/TRAINING PROGRAM

## 2022-06-30 PROCEDURE — 80053 COMPREHEN METABOLIC PANEL: CPT | Performed by: INTERNAL MEDICINE

## 2022-06-30 PROCEDURE — 63600175 PHARM REV CODE 636 W HCPCS: Performed by: INTERNAL MEDICINE

## 2022-06-30 PROCEDURE — 94761 N-INVAS EAR/PLS OXIMETRY MLT: CPT

## 2022-06-30 PROCEDURE — 83735 ASSAY OF MAGNESIUM: CPT | Performed by: INTERNAL MEDICINE

## 2022-06-30 PROCEDURE — 36415 COLL VENOUS BLD VENIPUNCTURE: CPT | Performed by: INTERNAL MEDICINE

## 2022-06-30 PROCEDURE — 97530 THERAPEUTIC ACTIVITIES: CPT

## 2022-06-30 PROCEDURE — 99900035 HC TECH TIME PER 15 MIN (STAT)

## 2022-06-30 PROCEDURE — 27000221 HC OXYGEN, UP TO 24 HOURS

## 2022-06-30 PROCEDURE — 85025 COMPLETE CBC W/AUTO DIFF WBC: CPT | Performed by: INTERNAL MEDICINE

## 2022-06-30 PROCEDURE — 11000001 HC ACUTE MED/SURG PRIVATE ROOM

## 2022-06-30 RX ORDER — FUROSEMIDE 10 MG/ML
INJECTION INTRAMUSCULAR; INTRAVENOUS
Status: DISPENSED
Start: 2022-06-30 | End: 2022-07-01

## 2022-06-30 RX ADMIN — Medication 6 MG: at 11:06

## 2022-06-30 RX ADMIN — MEROPENEM 1 G: 1 INJECTION, POWDER, FOR SOLUTION INTRAVENOUS at 09:06

## 2022-06-30 RX ADMIN — MEROPENEM 1 G: 1 INJECTION, POWDER, FOR SOLUTION INTRAVENOUS at 10:06

## 2022-06-30 RX ADMIN — TAMSULOSIN HYDROCHLORIDE 0.4 MG: 0.4 CAPSULE ORAL at 10:06

## 2022-06-30 RX ADMIN — SODIUM CHLORIDE, PRESERVATIVE FREE 10 ML: 5 INJECTION INTRAVENOUS at 06:06

## 2022-06-30 RX ADMIN — SODIUM CHLORIDE, PRESERVATIVE FREE 10 ML: 5 INJECTION INTRAVENOUS at 12:06

## 2022-06-30 RX ADMIN — NIFEDIPINE 60 MG: 30 TABLET, EXTENDED RELEASE ORAL at 10:06

## 2022-06-30 RX ADMIN — LINEZOLID 600 MG: 600 INJECTION, SOLUTION INTRAVENOUS at 09:06

## 2022-06-30 RX ADMIN — TRAMADOL HYDROCHLORIDE 50 MG: 50 TABLET, COATED ORAL at 02:06

## 2022-06-30 RX ADMIN — SODIUM CHLORIDE, PRESERVATIVE FREE 10 ML: 5 INJECTION INTRAVENOUS at 01:06

## 2022-06-30 RX ADMIN — FUROSEMIDE 80 MG: 10 INJECTION, SOLUTION INTRAMUSCULAR; INTRAVENOUS at 01:06

## 2022-06-30 RX ADMIN — LINEZOLID 600 MG: 600 INJECTION, SOLUTION INTRAVENOUS at 10:06

## 2022-06-30 RX ADMIN — METOLAZONE 10 MG: 2.5 TABLET ORAL at 10:06

## 2022-06-30 RX ADMIN — FUROSEMIDE 80 MG: 10 INJECTION, SOLUTION INTRAMUSCULAR; INTRAVENOUS at 12:06

## 2022-06-30 RX ADMIN — DEXTROSE MONOHYDRATE 1 G: 5 INJECTION INTRAVENOUS at 03:06

## 2022-06-30 RX ADMIN — CARVEDILOL 6.25 MG: 6.25 TABLET, FILM COATED ORAL at 10:06

## 2022-06-30 RX ADMIN — CARVEDILOL 6.25 MG: 6.25 TABLET, FILM COATED ORAL at 09:06

## 2022-06-30 RX ADMIN — MUPIROCIN: 20 OINTMENT TOPICAL at 10:06

## 2022-06-30 RX ADMIN — MUPIROCIN: 20 OINTMENT TOPICAL at 09:06

## 2022-06-30 RX ADMIN — ALPRAZOLAM 0.25 MG: 0.25 TABLET ORAL at 11:06

## 2022-06-30 RX ADMIN — ALPRAZOLAM 0.25 MG: 0.25 TABLET ORAL at 02:06

## 2022-06-30 NOTE — SUBJECTIVE & OBJECTIVE
Interval History:     Review of Systems   Constitutional:  Positive for fatigue.   HENT: Negative.     Eyes: Negative.    Respiratory:  Positive for shortness of breath.    Cardiovascular:  Positive for leg swelling.   Gastrointestinal: Negative.    Endocrine: Negative.    Genitourinary: Negative.    Musculoskeletal: Negative.    Skin: Negative.    Allergic/Immunologic: Negative.    Neurological: Negative.    Hematological: Negative.    Psychiatric/Behavioral: Negative.     Objective:     Vital Signs (Most Recent):  Temp: 97.7 °F (36.5 °C) (06/30/22 1100)  Pulse: 65 (06/30/22 1100)  Resp: 17 (06/30/22 1100)  BP: 129/67 (06/30/22 1100)  SpO2: 97 % (06/30/22 1112) Vital Signs (24h Range):  Temp:  [97.7 °F (36.5 °C)-98.8 °F (37.1 °C)] 97.7 °F (36.5 °C)  Pulse:  [60-84] 65  Resp:  [12-31] 17  SpO2:  [90 %-98 %] 97 %  BP: (109-184)/(53-84) 129/67     Weight: (!) 141.7 kg (312 lb 6.3 oz)  Body mass index is 44.82 kg/m².    Intake/Output Summary (Last 24 hours) at 6/30/2022 1240  Last data filed at 6/30/2022 1000  Gross per 24 hour   Intake 800 ml   Output 9300 ml   Net -8500 ml      Physical Exam  Constitutional:       Appearance: Normal appearance. He is obese.   HENT:      Head: Normocephalic and atraumatic.      Nose: Nose normal.      Mouth/Throat:      Mouth: Mucous membranes are moist.      Pharynx: Oropharynx is clear.   Eyes:      Extraocular Movements: Extraocular movements intact.      Conjunctiva/sclera: Conjunctivae normal.      Pupils: Pupils are equal, round, and reactive to light.   Cardiovascular:      Rate and Rhythm: Normal rate and regular rhythm.      Pulses: Normal pulses.      Heart sounds: Normal heart sounds.   Pulmonary:      Effort: Pulmonary effort is normal.      Breath sounds: Rales present.   Abdominal:      General: Bowel sounds are normal.      Palpations: Abdomen is soft.   Musculoskeletal:         General: Swelling present. Normal range of motion.      Cervical back: Normal range of  motion and neck supple.      Right lower leg: Edema present.      Left lower leg: Edema present.   Skin:     General: Skin is warm and dry.      Capillary Refill: Capillary refill takes 2 to 3 seconds.   Neurological:      General: No focal deficit present.      Mental Status: He is alert and oriented to person, place, and time. Mental status is at baseline.   Psychiatric:         Mood and Affect: Mood normal.         Behavior: Behavior normal.         Thought Content: Thought content normal.         Judgment: Judgment normal.       Significant Labs: All pertinent labs within the past 24 hours have been reviewed.  BMP:   Recent Labs   Lab 06/30/22  0402      K 5.3*   CO2 21*   BUN 87.0*   CREATININE 5.20*   CALCIUM 7.8*   MG 2.30     CBC:   Recent Labs   Lab 06/29/22 0322 06/30/22  0402   WBC 16.2* 13.6*   HGB 10.1* 10.1*   HCT 32.4* 32.5*    270     CMP:   Recent Labs   Lab 06/29/22 0322 06/30/22  0402    136   K 5.0 5.3*   CO2 22* 21*   BUN 91.0* 87.0*   CREATININE 5.42* 5.20*   CALCIUM 7.3* 7.8*   ALBUMIN 2.3* 2.3*   BILITOT 0.3 0.4   ALKPHOS 100 95   AST 60* 50*   ALT 70* 55   EGFRNONAA 11 12     Magnesium:   Recent Labs   Lab 06/29/22 0322 06/30/22  0402   MG 2.50 2.30       Significant Imaging: I have reviewed all pertinent imaging results/findings within the past 24 hours.

## 2022-06-30 NOTE — PT/OT/SLP PROGRESS
Physical Therapy Treatment    Patient Name:  Neno Watkins   MRN:  [id number]    Recommendations:     Discharge Recommendations:      Discharge Equipment Recommendations:     Barriers to discharge: None    Assessment:     Neno Watkins is a 61 y.o. male admitted with a medical diagnosis of SHANDRA (acute kidney injury).  He presents with the following impairments/functional limitations:  weakness, impaired endurance, gait instability, impaired balance.    Pt able to stand and side step along bedside L and R today with RW and min A. He had improved standing tolerance and was able to stand with decreased assistance.     Rehab Prognosis: Good; patient would benefit from acute skilled PT services to address these deficits and reach maximum level of function.    Recent Surgery: Procedure(s) (LRB):  INSERTION, CATHETER, HEMODIALYSIS, DUAL LUMEN (Right) 3 Days Post-Op    Plan:     During this hospitalization, patient to be seen daily to address the identified rehab impairments via gait training, therapeutic activities, therapeutic exercises and progress toward the following goals:    · Plan of Care Expires:  07/26/22    Subjective     Chief Complaint: weakness  Patient/Family Comments/goals: to increase strength and I with mobility  Pain/Comfort:  ·        Objective:     Communicated with patient prior to session.  Patient found right sidelying with telemetry, pulse ox (continuous), whitehead catheter, blood pressure cuff upon PT entry to room.     General Precautions: Standard, fall   Orthopedic Precautions:    Braces:    Respiratory Status: Nasal cannula, flow 2 L/min     Functional Mobility:  · Bed Mobility:     · Supine to Sit: moderate assistance  · Transfers:     · Sit to Stand:  moderate assistance with rolling walker  · Gait: side stepping L and R with min A and RW  · Balance: min A supported standing      AM-PAC 6 CLICK MOBILITY          Therapeutic Activities and Exercises:   see above    Patient left HOB elevated with  all lines intact and call button in reach..    GOALS:   Multidisciplinary Problems     Physical Therapy Goals        Problem: Physical Therapy    Goal Priority Disciplines Outcome Goal Variances Interventions   Physical Therapy Goal     PT, PT/OT Ongoing, Progressing     Description: Goals to be met by: 22     Patient will increase functional independence with mobility by performin. Supine to sit with Modified [place]  2. Sit to stand transfer with Contact Guard Assistance  3. Gait  x 150 feet with Contact Guard Assistance using Rolling Walker.                      Time Tracking:     PT Received On: 22  PT Start Time: 1520     PT Stop Time: 1540  PT Total Time (min): 20 min     Billable Minutes: Therapeutic Activity 20    Treatment Type: Treatment  PT/PTA: PTA           2022

## 2022-06-30 NOTE — PROGRESS NOTES
[facility] - ICU Hospital Medicine  Progress Note    Patient Name: Neno Watkins  MRN: [id number]  Patient Class: IP- Inpatient   Admission Date: 6/27/2022  Length of Stay: 3 days  Attending Physician: Cam Medina MD  Primary Care Provider: Primary Doctor No        Subjective:     Principal Problem:SAHNDRA (acute kidney injury)        HPI:  60yo WM with h/o Morbid Obesity, HTN, DM2, HLD, ?MI transferred from [facility] for SHANDRA. Pt originally presented to their ED 7/21 c/o back pain after fall at home. Noted to have SHANDRA with Hyperkalemia and admitted for tx. Pt tx'd with IVF and initially Cr improved but there was concern of CHF/fluid overload so IVF held and pt given and Cr worsened and UOP declined. ECHO however was normal with EF 55% and no e/o diastolic dysfunction. BNP was 109 as well. Pt given Lasix as well with increased UOP but worsening Cr. Today pt was more SOB with incr LE edema. BUN and Cr progressively worsening,hyperkalemic, oliguric, more hypertensive. Has moderate bilat pleural effusion on imaging as well. Transferred to [facility] for Nephrology services.     Pt finally arrived to floor this evening c/o CP/SOB and was diaphoretic, tachycardic and hypertensive. There were no labs repeated after this morning at 04:30. Ordered stat chem, Trop, ABG, EKG, Lopressor IV and IV Morphine. Pt seen soon thereafter and appears ill. Placed on 15L OxyMask for hypoxia O2 sat 88% on 4L NC. Labs returned with K+ 7.3, BUN 84, Cr 4.48, PO4 8.4, Trop 0.018, EKG NSR w/o acute ST-T wave changes (sp no peaked T-waves). Nephrology consulted for urgent HD and GenSurg consulted for urgent Dialysis Catheter placement. Pt will be brought down to OR for procedure and Anesth plans to intubate. Pt will go to ICU after procedure and then receive HD.       Overview/Hospital Course:  6/29/22-Patient is sitting up in the chair at this time.  He will undergo HD today.  He is feeling better since  admit.  Will likely watch at least one more day in the ICU.      6/30/22-Today is day 4 of HD.  A large amount of fluid has been removed.  He is still producing urine as well.  Overall he is feeling better.      Interval History:     Review of Systems   Constitutional:  Positive for fatigue.   HENT: Negative.     Eyes: Negative.    Respiratory:  Positive for shortness of breath.    Cardiovascular:  Positive for leg swelling.   Gastrointestinal: Negative.    Endocrine: Negative.    Genitourinary: Negative.    Musculoskeletal: Negative.    Skin: Negative.    Allergic/Immunologic: Negative.    Neurological: Negative.    Hematological: Negative.    Psychiatric/Behavioral: Negative.     Objective:     Vital Signs (Most Recent):  Temp: 97.7 °F (36.5 °C) (06/30/22 1100)  Pulse: 65 (06/30/22 1100)  Resp: 17 (06/30/22 1100)  BP: 129/67 (06/30/22 1100)  SpO2: 97 % (06/30/22 1112) Vital Signs (24h Range):  Temp:  [97.7 °F (36.5 °C)-98.8 °F (37.1 °C)] 97.7 °F (36.5 °C)  Pulse:  [60-84] 65  Resp:  [12-31] 17  SpO2:  [90 %-98 %] 97 %  BP: (109-184)/(53-84) 129/67     Weight: (!) 141.7 kg (312 lb 6.3 oz)  Body mass index is 44.82 kg/m².    Intake/Output Summary (Last 24 hours) at 6/30/2022 1240  Last data filed at 6/30/2022 1000  Gross per 24 hour   Intake 800 ml   Output 9300 ml   Net -8500 ml      Physical Exam  Constitutional:       Appearance: Normal appearance. He is obese.   HENT:      Head: Normocephalic and atraumatic.      Nose: Nose normal.      Mouth/Throat:      Mouth: Mucous membranes are moist.      Pharynx: Oropharynx is clear.   Eyes:      Extraocular Movements: Extraocular movements intact.      Conjunctiva/sclera: Conjunctivae normal.      Pupils: Pupils are equal, round, and reactive to light.   Cardiovascular:      Rate and Rhythm: Normal rate and regular rhythm.      Pulses: Normal pulses.      Heart sounds: Normal heart sounds.   Pulmonary:      Effort: Pulmonary effort is normal.      Breath sounds: Rales  present.   Abdominal:      General: Bowel sounds are normal.      Palpations: Abdomen is soft.   Musculoskeletal:         General: Swelling present. Normal range of motion.      Cervical back: Normal range of motion and neck supple.      Right lower leg: Edema present.      Left lower leg: Edema present.   Skin:     General: Skin is warm and dry.      Capillary Refill: Capillary refill takes 2 to 3 seconds.   Neurological:      General: No focal deficit present.      Mental Status: He is alert and oriented to person, place, and time. Mental status is at baseline.   Psychiatric:         Mood and Affect: Mood normal.         Behavior: Behavior normal.         Thought Content: Thought content normal.         Judgment: Judgment normal.       Significant Labs: All pertinent labs within the past 24 hours have been reviewed.  BMP:   Recent Labs   Lab 06/30/22  0402      K 5.3*   CO2 21*   BUN 87.0*   CREATININE 5.20*   CALCIUM 7.8*   MG 2.30     CBC:   Recent Labs   Lab 06/29/22  0322 06/30/22  0402   WBC 16.2* 13.6*   HGB 10.1* 10.1*   HCT 32.4* 32.5*    270     CMP:   Recent Labs   Lab 06/29/22  0322 06/30/22  0402    136   K 5.0 5.3*   CO2 22* 21*   BUN 91.0* 87.0*   CREATININE 5.42* 5.20*   CALCIUM 7.3* 7.8*   ALBUMIN 2.3* 2.3*   BILITOT 0.3 0.4   ALKPHOS 100 95   AST 60* 50*   ALT 70* 55   EGFRNONAA 11 12     Magnesium:   Recent Labs   Lab 06/29/22  0322 06/30/22  0402   MG 2.50 2.30       Significant Imaging: I have reviewed all pertinent imaging results/findings within the past 24 hours.      Assessment/Plan:      * SHANDRA (acute kidney injury)  Follow labs  DVT prophylaxis  resume HD for now        Hypoxia        Pyelonephritis        Bilateral lower extremity edema        Leukocytosis        Hyperkalemia        Type 2 diabetes mellitus  ISS      Morbid obesity        Hypertension  Current meds  follow        VTE Risk Mitigation (From admission, onward)         Ordered     IP VTE HIGH RISK PATIENT   Once         06/27/22 1739     Place sequential compression device  Until discontinued         06/27/22 1739                Discharge Planning   MATT: 6/27/2022     Code Status: Full Code   Is the patient medically ready for discharge?:     Reason for patient still in hospital (select all that apply): Laboratory test, Treatment, Consult recommendations and PT / OT recommendations   Follow labs  DVt prophylaxis  HD as per nephrology  OOB  Therapy  Resume current meds                     Otilio Valero MD  Department of Hospital Medicine   [facility]

## 2022-07-01 LAB
ALBUMIN SERPL-MCNC: 2.3 GM/DL (ref 3.4–4.8)
ALBUMIN/GLOB SERPL: 0.6 RATIO (ref 1.1–2)
ALP SERPL-CCNC: 104 UNIT/L (ref 40–150)
ALT SERPL-CCNC: 45 UNIT/L (ref 0–55)
AST SERPL-CCNC: 44 UNIT/L (ref 5–34)
BASOPHILS # BLD AUTO: 0.06 X10(3)/MCL (ref 0–0.2)
BASOPHILS NFR BLD AUTO: 0.4 %
BILIRUBIN DIRECT+TOT PNL SERPL-MCNC: 0.4 MG/DL
BUN SERPL-MCNC: 80 MG/DL (ref 8.4–25.7)
CALCIUM SERPL-MCNC: 7.9 MG/DL (ref 8.8–10)
CHLORIDE SERPL-SCNC: 101 MMOL/L (ref 98–107)
CO2 SERPL-SCNC: 22 MMOL/L (ref 23–31)
CREAT SERPL-MCNC: 5.14 MG/DL (ref 0.73–1.18)
EOSINOPHIL # BLD AUTO: 0.5 X10(3)/MCL (ref 0–0.9)
EOSINOPHIL NFR BLD AUTO: 3.3 %
ERYTHROCYTE [DISTWIDTH] IN BLOOD BY AUTOMATED COUNT: 12.2 % (ref 11.5–17)
GLOBULIN SER-MCNC: 3.9 GM/DL (ref 2.4–3.5)
GLUCOSE SERPL-MCNC: 105 MG/DL (ref 82–115)
HCT VFR BLD AUTO: 32.6 % (ref 42–52)
HGB BLD-MCNC: 10.5 GM/DL (ref 14–18)
IMM GRANULOCYTES # BLD AUTO: 0.12 X10(3)/MCL (ref 0–0.04)
IMM GRANULOCYTES NFR BLD AUTO: 0.8 %
LYMPHOCYTES # BLD AUTO: 2.53 X10(3)/MCL (ref 0.6–4.6)
LYMPHOCYTES NFR BLD AUTO: 16.5 %
MAGNESIUM SERPL-MCNC: 2.3 MG/DL (ref 1.6–2.6)
MCH RBC QN AUTO: 30.6 PG (ref 27–31)
MCHC RBC AUTO-ENTMCNC: 32.2 MG/DL (ref 33–36)
MCV RBC AUTO: 95 FL (ref 80–94)
MONOCYTES # BLD AUTO: 1.77 X10(3)/MCL (ref 0.1–1.3)
MONOCYTES NFR BLD AUTO: 11.6 %
NEUTROPHILS # BLD AUTO: 10.3 X10(3)/MCL (ref 2.1–9.2)
NEUTROPHILS NFR BLD AUTO: 67.4 %
PLATELET # BLD AUTO: 247 X10(3)/MCL (ref 130–400)
PMV BLD AUTO: 9.2 FL (ref 7.4–10.4)
POCT GLUCOSE: 122 MG/DL (ref 70–110)
POCT GLUCOSE: 126 MG/DL (ref 70–110)
POCT GLUCOSE: 131 MG/DL (ref 70–110)
POCT GLUCOSE: 187 MG/DL (ref 70–110)
POTASSIUM SERPL-SCNC: 5.6 MMOL/L (ref 3.5–5.1)
PROT SERPL-MCNC: 6.2 GM/DL (ref 5.8–7.6)
RBC # BLD AUTO: 3.43 X10(6)/MCL (ref 4.7–6.1)
SODIUM SERPL-SCNC: 135 MMOL/L (ref 136–145)
WBC # SPEC AUTO: 15.3 X10(3)/MCL (ref 4.5–11.5)

## 2022-07-01 PROCEDURE — 83735 ASSAY OF MAGNESIUM: CPT | Performed by: INTERNAL MEDICINE

## 2022-07-01 PROCEDURE — 85025 COMPLETE CBC W/AUTO DIFF WBC: CPT | Performed by: INTERNAL MEDICINE

## 2022-07-01 PROCEDURE — 94761 N-INVAS EAR/PLS OXIMETRY MLT: CPT

## 2022-07-01 PROCEDURE — 97110 THERAPEUTIC EXERCISES: CPT

## 2022-07-01 PROCEDURE — 25000003 PHARM REV CODE 250: Performed by: STUDENT IN AN ORGANIZED HEALTH CARE EDUCATION/TRAINING PROGRAM

## 2022-07-01 PROCEDURE — 99900035 HC TECH TIME PER 15 MIN (STAT)

## 2022-07-01 PROCEDURE — 11000001 HC ACUTE MED/SURG PRIVATE ROOM

## 2022-07-01 PROCEDURE — 97530 THERAPEUTIC ACTIVITIES: CPT

## 2022-07-01 PROCEDURE — 25000003 PHARM REV CODE 250: Performed by: INTERNAL MEDICINE

## 2022-07-01 PROCEDURE — 80100014 HC HEMODIALYSIS 1:1

## 2022-07-01 PROCEDURE — 63600175 PHARM REV CODE 636 W HCPCS: Performed by: INTERNAL MEDICINE

## 2022-07-01 PROCEDURE — A4216 STERILE WATER/SALINE, 10 ML: HCPCS | Performed by: STUDENT IN AN ORGANIZED HEALTH CARE EDUCATION/TRAINING PROGRAM

## 2022-07-01 PROCEDURE — 80053 COMPREHEN METABOLIC PANEL: CPT | Performed by: INTERNAL MEDICINE

## 2022-07-01 PROCEDURE — 36415 COLL VENOUS BLD VENIPUNCTURE: CPT | Performed by: INTERNAL MEDICINE

## 2022-07-01 PROCEDURE — 27000221 HC OXYGEN, UP TO 24 HOURS

## 2022-07-01 RX ADMIN — FUROSEMIDE 80 MG: 10 INJECTION, SOLUTION INTRAMUSCULAR; INTRAVENOUS at 01:07

## 2022-07-01 RX ADMIN — ALPRAZOLAM 0.25 MG: 0.25 TABLET ORAL at 09:07

## 2022-07-01 RX ADMIN — NIFEDIPINE 60 MG: 30 TABLET, EXTENDED RELEASE ORAL at 11:07

## 2022-07-01 RX ADMIN — TAMSULOSIN HYDROCHLORIDE 0.4 MG: 0.4 CAPSULE ORAL at 11:07

## 2022-07-01 RX ADMIN — LINEZOLID 600 MG: 600 INJECTION, SOLUTION INTRAVENOUS at 09:07

## 2022-07-01 RX ADMIN — METOLAZONE 10 MG: 2.5 TABLET ORAL at 11:07

## 2022-07-01 RX ADMIN — CARVEDILOL 6.25 MG: 6.25 TABLET, FILM COATED ORAL at 09:07

## 2022-07-01 RX ADMIN — MEROPENEM 1 G: 1 INJECTION, POWDER, FOR SOLUTION INTRAVENOUS at 09:07

## 2022-07-01 RX ADMIN — CARVEDILOL 6.25 MG: 6.25 TABLET, FILM COATED ORAL at 11:07

## 2022-07-01 RX ADMIN — MUPIROCIN: 20 OINTMENT TOPICAL at 09:07

## 2022-07-01 RX ADMIN — LINEZOLID 600 MG: 600 INJECTION, SOLUTION INTRAVENOUS at 12:07

## 2022-07-01 RX ADMIN — FUROSEMIDE 80 MG: 10 INJECTION, SOLUTION INTRAMUSCULAR; INTRAVENOUS at 12:07

## 2022-07-01 RX ADMIN — MUPIROCIN: 20 OINTMENT TOPICAL at 11:07

## 2022-07-01 RX ADMIN — SODIUM CHLORIDE, PRESERVATIVE FREE 10 ML: 5 INJECTION INTRAVENOUS at 06:07

## 2022-07-01 RX ADMIN — Medication 6 MG: at 09:07

## 2022-07-01 RX ADMIN — SODIUM CHLORIDE, PRESERVATIVE FREE 10 ML: 5 INJECTION INTRAVENOUS at 11:07

## 2022-07-01 RX ADMIN — MEROPENEM 1 G: 1 INJECTION, POWDER, FOR SOLUTION INTRAVENOUS at 11:07

## 2022-07-01 NOTE — PLAN OF CARE
Problem: Physical Therapy  Goal: Physical Therapy Goal  Description: Goals to be met by: 07/08/2022    Patient will increase functional independence with mobility by perform:     1. Supine to sit with Modified [place]  2. Sit to supine with Modified [place]  3. Sit to stand transfer with Modified [place]  4. Bed to chair transfer with Modified [place] using Rolling Walker  5. Gait  x 50 feet with Modified [place] using Rolling Walker.   6. Ascend/descend 5 stair with bilateral Handrails Supervision using No Assistive Device.     Outcome: Adequate for Care Transition

## 2022-07-01 NOTE — PROGRESS NOTES
[facility] - Medical Surgical Unit  Hospital Medicine  Progress Note    Patient Name: Neno Watkins  MRN: [id number]  Patient Class: IP- Inpatient   Admission Date: 6/27/2022  Length of Stay: 4 days  Attending Physician: Cam Medina MD  Primary Care Provider: Otilio Valero MD        Subjective:     Principal Problem:SHANDRA (acute kidney injury)        HPI:  62yo WM with h/o Morbid Obesity, HTN, DM2, HLD, ?MI transferred from [facility] for SHANDRA. Pt originally presented to their ED 7/21 c/o back pain after fall at home. Noted to have SHANDRA with Hyperkalemia and admitted for tx. Pt tx'd with IVF and initially Cr improved but there was concern of CHF/fluid overload so IVF held and pt given and Cr worsened and UOP declined. ECHO however was normal with EF 55% and no e/o diastolic dysfunction. BNP was 109 as well. Pt given Lasix as well with increased UOP but worsening Cr. Today pt was more SOB with incr LE edema. BUN and Cr progressively worsening,hyperkalemic, oliguric, more hypertensive. Has moderate bilat pleural effusion on imaging as well. Transferred to [facility] for Nephrology services.     Pt finally arrived to floor this evening c/o CP/SOB and was diaphoretic, tachycardic and hypertensive. There were no labs repeated after this morning at 04:30. Ordered stat chem, Trop, ABG, EKG, Lopressor IV and IV Morphine. Pt seen soon thereafter and appears ill. Placed on 15L OxyMask for hypoxia O2 sat 88% on 4L NC. Labs returned with K+ 7.3, BUN 84, Cr 4.48, PO4 8.4, Trop 0.018, EKG NSR w/o acute ST-T wave changes (sp no peaked T-waves). Nephrology consulted for urgent HD and GenSurg consulted for urgent Dialysis Catheter placement. Pt will be brought down to OR for procedure and Anesth plans to intubate. Pt will go to ICU after procedure and then receive HD.       Overview/Hospital Course:  6/29/22-Patient is sitting up in the chair at this time.  He will undergo HD today.  He is  feeling better since admit.  Will likely watch at least one more day in the ICU.      6/30/22-Today is day 4 of HD.  A large amount of fluid has been removed.  He is still producing urine as well.  Overall he is feeling better.    7/1/22-No changes today.  He is undergoing HD at this time.  His legs do appear to be softening up some with less edema.  Spoke with Dr. Gillis and will continue with ultrafiltration for now.  Will likely need OP treatment but will decide next week.      Interval History:     Review of Systems   Constitutional:  Positive for fatigue.   HENT: Negative.     Eyes: Negative.    Respiratory:  Positive for shortness of breath.    Cardiovascular:  Positive for leg swelling.   Gastrointestinal: Negative.    Endocrine: Negative.    Genitourinary: Negative.    Musculoskeletal:  Positive for gait problem.   Skin:  Positive for wound.   Allergic/Immunologic: Negative.    Hematological: Negative.    Psychiatric/Behavioral: Negative.     Objective:     Vital Signs (Most Recent):  Temp: 98.3 °F (36.8 °C) (07/01/22 1112)  Pulse: 75 (07/01/22 1112)  Resp: 18 (06/30/22 2000)  BP: (!) 142/67 (07/01/22 1112)  SpO2: 96 % (07/01/22 1112)   Vital Signs (24h Range):  Temp:  [98.2 °F (36.8 °C)-98.3 °F (36.8 °C)] 98.3 °F (36.8 °C)  Pulse:  [67-87] 75  Resp:  [13-24] 18  SpO2:  [95 %-98 %] 96 %  BP: (118-152)/(58-78) 142/67     Weight: (!) 141.7 kg (312 lb 6.3 oz)  Body mass index is 44.82 kg/m².    Intake/Output Summary (Last 24 hours) at 7/1/2022 1203  Last data filed at 7/1/2022 1121  Gross per 24 hour   Intake --   Output 5950 ml   Net -5950 ml      Physical Exam  Constitutional:       Appearance: Normal appearance. He is obese.   HENT:      Head: Normocephalic and atraumatic.      Nose: Nose normal.      Mouth/Throat:      Mouth: Mucous membranes are moist.      Pharynx: Oropharynx is clear.   Eyes:      Extraocular Movements: Extraocular movements intact.      Conjunctiva/sclera: Conjunctivae normal.       Pupils: Pupils are equal, round, and reactive to light.   Cardiovascular:      Rate and Rhythm: Normal rate and regular rhythm.      Pulses: Normal pulses.      Heart sounds: Normal heart sounds.   Pulmonary:      Effort: Pulmonary effort is normal.      Breath sounds: Normal breath sounds.   Abdominal:      General: Bowel sounds are normal.      Palpations: Abdomen is soft.   Musculoskeletal:         General: Normal range of motion.      Cervical back: Normal range of motion and neck supple.      Right lower leg: Edema present.      Left lower leg: Edema present.   Skin:     General: Skin is warm and dry.      Capillary Refill: Capillary refill takes 2 to 3 seconds.      Findings: Lesion present.   Neurological:      General: No focal deficit present.      Mental Status: He is alert and oriented to person, place, and time. Mental status is at baseline.   Psychiatric:         Mood and Affect: Mood normal.         Behavior: Behavior normal.         Thought Content: Thought content normal.         Judgment: Judgment normal.       Significant Labs: All pertinent labs within the past 24 hours have been reviewed.  BMP:   Recent Labs   Lab 07/01/22  0520   *   K 5.6*   CO2 22*   BUN 80.0*   CREATININE 5.14*   CALCIUM 7.9*   MG 2.30     CBC:   Recent Labs   Lab 06/30/22  0402 07/01/22  0520   WBC 13.6* 15.3*   HGB 10.1* 10.5*   HCT 32.5* 32.6*    247     CMP:   Recent Labs   Lab 06/30/22  0402 07/01/22  0520    135*   K 5.3* 5.6*   CO2 21* 22*   BUN 87.0* 80.0*   CREATININE 5.20* 5.14*   CALCIUM 7.8* 7.9*   ALBUMIN 2.3* 2.3*   BILITOT 0.4 0.4   ALKPHOS 95 104   AST 50* 44*   ALT 55 45   EGFRNONAA 12 12     Magnesium:   Recent Labs   Lab 06/30/22  0402 07/01/22  0520   MG 2.30 2.30       Significant Imaging: I have reviewed all pertinent imaging results/findings within the past 24 hours.      Assessment/Plan:      * SHANDRA (acute kidney injury)  Follow labs  DVT prophylaxis  resume HD for now  Will likely  require additional UF as OP  Nephrology following      Hypoxia        Pyelonephritis        Bilateral lower extremity edema        Leukocytosis        Hyperkalemia        Type 2 diabetes mellitus  ISS      Morbid obesity        Hypertension  Current meds  follow        VTE Risk Mitigation (From admission, onward)         Ordered     IP VTE HIGH RISK PATIENT  Once         06/27/22 1739     Place sequential compression device  Until discontinued         06/27/22 1739                Discharge Planning   MATT: 6/27/2022     Code Status: Full Code   Is the patient medically ready for discharge?:     Reason for patient still in hospital (select all that apply): Laboratory test, Treatment, Consult recommendations and PT / OT recommendations   PT/OT  OOB  Follow labs  Resume UF as per nephrology  Deescalate antibiotics                       Otilio Valero MD  Department of Hospital Medicine   [facility] - Medical Surgical Unit

## 2022-07-01 NOTE — SUBJECTIVE & OBJECTIVE
Interval History:     Review of Systems   Constitutional:  Positive for fatigue.   HENT: Negative.     Eyes: Negative.    Respiratory:  Positive for shortness of breath.    Cardiovascular:  Positive for leg swelling.   Gastrointestinal: Negative.    Endocrine: Negative.    Genitourinary: Negative.    Musculoskeletal:  Positive for gait problem.   Skin:  Positive for wound.   Allergic/Immunologic: Negative.    Hematological: Negative.    Psychiatric/Behavioral: Negative.     Objective:     Vital Signs (Most Recent):  Temp: 98.3 °F (36.8 °C) (07/01/22 1112)  Pulse: 75 (07/01/22 1112)  Resp: 18 (06/30/22 2000)  BP: (!) 142/67 (07/01/22 1112)  SpO2: 96 % (07/01/22 1112)   Vital Signs (24h Range):  Temp:  [98.2 °F (36.8 °C)-98.3 °F (36.8 °C)] 98.3 °F (36.8 °C)  Pulse:  [67-87] 75  Resp:  [13-24] 18  SpO2:  [95 %-98 %] 96 %  BP: (118-152)/(58-78) 142/67     Weight: (!) 141.7 kg (312 lb 6.3 oz)  Body mass index is 44.82 kg/m².    Intake/Output Summary (Last 24 hours) at 7/1/2022 1203  Last data filed at 7/1/2022 1121  Gross per 24 hour   Intake --   Output 5950 ml   Net -5950 ml      Physical Exam  Constitutional:       Appearance: Normal appearance. He is obese.   HENT:      Head: Normocephalic and atraumatic.      Nose: Nose normal.      Mouth/Throat:      Mouth: Mucous membranes are moist.      Pharynx: Oropharynx is clear.   Eyes:      Extraocular Movements: Extraocular movements intact.      Conjunctiva/sclera: Conjunctivae normal.      Pupils: Pupils are equal, round, and reactive to light.   Cardiovascular:      Rate and Rhythm: Normal rate and regular rhythm.      Pulses: Normal pulses.      Heart sounds: Normal heart sounds.   Pulmonary:      Effort: Pulmonary effort is normal.      Breath sounds: Normal breath sounds.   Abdominal:      General: Bowel sounds are normal.      Palpations: Abdomen is soft.   Musculoskeletal:         General: Normal range of motion.      Cervical back: Normal range of motion and neck  supple.      Right lower leg: Edema present.      Left lower leg: Edema present.   Skin:     General: Skin is warm and dry.      Capillary Refill: Capillary refill takes 2 to 3 seconds.      Findings: Lesion present.   Neurological:      General: No focal deficit present.      Mental Status: He is alert and oriented to person, place, and time. Mental status is at baseline.   Psychiatric:         Mood and Affect: Mood normal.         Behavior: Behavior normal.         Thought Content: Thought content normal.         Judgment: Judgment normal.       Significant Labs: All pertinent labs within the past 24 hours have been reviewed.  BMP:   Recent Labs   Lab 07/01/22  0520   *   K 5.6*   CO2 22*   BUN 80.0*   CREATININE 5.14*   CALCIUM 7.9*   MG 2.30     CBC:   Recent Labs   Lab 06/30/22 0402 07/01/22  0520   WBC 13.6* 15.3*   HGB 10.1* 10.5*   HCT 32.5* 32.6*    247     CMP:   Recent Labs   Lab 06/30/22 0402 07/01/22  0520    135*   K 5.3* 5.6*   CO2 21* 22*   BUN 87.0* 80.0*   CREATININE 5.20* 5.14*   CALCIUM 7.8* 7.9*   ALBUMIN 2.3* 2.3*   BILITOT 0.4 0.4   ALKPHOS 95 104   AST 50* 44*   ALT 55 45   EGFRNONAA 12 12     Magnesium:   Recent Labs   Lab 06/30/22 0402 07/01/22  0520   MG 2.30 2.30       Significant Imaging: I have reviewed all pertinent imaging results/findings within the past 24 hours.

## 2022-07-01 NOTE — PT/OT/SLP DISCHARGE
Physical Therapy Discharge Summary    Name: Neno Watkins  MRN: [id number]   Principal Problem: SHANDRA (acute kidney injury)     Patient Discharged from acute Physical Therapy on 6/27/2022.  Please refer to prior PT noted date on 6/26/2022 for functional status.     Assessment:     Patient was discharged unexpectedly.  Information required to complete an accurate discharge summary is unknown.  Refer to therapy initial evaluation and last progress note for initial and most recent functional status and goal achievement.  Recommendations made may be found in medical record.    Objective:     GOALS:   Multidisciplinary Problems     Physical Therapy Goals        Problem: Physical Therapy    Goal Priority Disciplines Outcome Goal Variances Interventions   Physical Therapy Goal     PT, PT/OT Adequate for Care Transition     Description: Goals to be met by: 07/08/2022    Patient will increase functional independence with mobility by perform:     1. Supine to sit with Modified [place]  2. Sit to supine with Modified [place]  3. Sit to stand transfer with Modified [place]  4. Bed to chair transfer with Modified [place] using Rolling Walker  5. Gait  x 50 feet with Modified [place] using Rolling Walker.   6. Ascend/descend 5 stair with bilateral Handrails Supervision using No Assistive Device.                      Reasons for Discontinuation of Therapy Services  Transfer to alternate level of care.      Plan:     Patient Discharged to: patient transferred out for nephrology services.      7/1/2022

## 2022-07-01 NOTE — PROGRESS NOTES
"[facility]  [address] [place] [address]  Phone: [phone]    Department of Nephrology  Progress Note      PATIENT NAME: Neno Watkins  MRN: [id number]  TODAY'S DATE: 07/01/2022  ADMIT DATE: 6/27/2022    SUBJECTIVE     PRINCIPLE PROBLEM: SHANDRA (acute kidney injury)    INTERVAL HISTORY:    7/1/2022  61-year-old  male with morbid obesity, hypertension, diabetes mellitus type 2 and dyslipidemia has been undergoing daily hemodialysis with ultrafiltration with a total of approximately 16 L remove at this point in time tolerating the treatments well very well with significant reduction in his expanded extracellular fluid volume.    Review of patient's allergies indicates:   Allergen Reactions    Butorphanol Swelling     "it almost killed me"    Hydrocodone-acetaminophen Other (See Comments)     "They mess with my heart"    Povidone-iodine Shortness Of Breath and Rash       Review of Systems   Constitutional: Negative.    HENT: Negative.    Eyes: Negative.    Respiratory: Negative.         Sob improving   Cardiovascular: Negative.         Significant improvement in edema level.   Gastrointestinal: Negative.    Genitourinary: Negative.    Musculoskeletal: Negative.    Skin: Negative.    Neurological: Negative.    Endo/Heme/Allergies: Negative.    Psychiatric/Behavioral: Negative.        OBJECTIVE     VITAL SIGNS (Most Recent)  Temp: 98.3 °F (36.8 °C) (07/01/22 1112)  Pulse: 75 (07/01/22 1112)  Resp: 18 (06/30/22 2000)  BP: (!) 142/67 (07/01/22 1112)  SpO2: 96 % (07/01/22 1112)    VENTILATION STATUS  Resp: 18 (06/30/22 2000)  SpO2: 96 % (07/01/22 1112)       I & O (Last 24H):    Intake/Output Summary (Last 24 hours) at 7/1/2022 1211  Last data filed at 7/1/2022 1121  Gross per 24 hour   Intake --   Output 5950 ml   Net -5950 ml       WEIGHTS  Wt Readings from Last 3 Encounters:   06/30/22 0600 (!) 141.7 kg (312 lb 6.3 oz)   06/29/22 0500 (!) 144.5 kg (318 lb 9 oz)   06/28/22 " 1430 (!) 145.4 kg (320 lb 8.8 oz)   22 0500 (!) 145.4 kg (320 lb 8.8 oz)   22 2300 (!) 140.7 kg (310 lb 3 oz)   22 0433 (!) 140.7 kg (310 lb 3 oz)   22 0321 (!) 138.7 kg (305 lb 12.5 oz)   22 0327 (!) 137.2 kg (302 lb 7.5 oz)   22 0500 (!) 137 kg (302 lb 0.5 oz)   22 1609 133.5 kg (294 lb 5 oz)   22 1345 133.5 kg (294 lb 5 oz)   22 2355 133.5 kg (294 lb 5 oz)   22 1401 132.9 kg (293 lb)       Physical Exam  Constitutional:       Appearance: Normal appearance. He is obese.   HENT:      Head: Normocephalic and atraumatic.      Nose: No congestion.      Mouth/Throat:      Mouth: Mucous membranes are moist.   Eyes:      General: No scleral icterus.     Extraocular Movements: Extraocular movements intact.      Conjunctiva/sclera: Conjunctivae normal.      Pupils: Pupils are equal, round, and reactive to light.   Neck:      Vascular: No carotid bruit.   Cardiovascular:      Rate and Rhythm: Normal rate and regular rhythm.      Pulses: Normal pulses.   Pulmonary:      Effort: No respiratory distress.      Breath sounds: No wheezing or rhonchi.   Abdominal:      General: There is distension.   Musculoskeletal:      Cervical back: Normal range of motion and neck supple.      Right lower le+ Edema present.      Left lower le+ Edema present.   Skin:     General: Skin is warm.   Neurological:      General: No focal deficit present.      Mental Status: He is alert.      Deep Tendon Reflexes: Reflexes normal.   Psychiatric:         Judgment: Judgment normal.         SCHEDULED MEDS:   carvediloL  6.25 mg Oral BID    dextrose 5 % and 0.45% NaCl  500 mL Intravenous Once    furosemide (LASIX) injection  80 mg Intravenous Once    furosemide (LASIX) injection  80 mg Intravenous Q12H    linezolid  600 mg Intravenous Q12H    meropenem (MERREM) IVPB  1 g Intravenous Q12H    metOLazone  10 mg Oral Daily    mupirocin   Nasal BID    NIFEdipine  60 mg Oral Daily     sodium chloride 0.9%  10 mL Intravenous Q6H    tamsulosin  1 capsule Oral Daily       CONTINUOUS INFUSIONS:    PRN MEDS:acetaminophen, albuterol-ipratropium, ALPRAZolam, [COMPLETED] calcium gluconate IVPB **AND** calcium gluconate IVPB, dextrose 10%, dextrose 10%, dextrose 10%, dextrose 10%, hydrALAZINE, labetalol, LIDOcaine, lorazepam, melatonin, ondansetron, sodium chloride 0.9%, Flushing PICC Protocol **AND** sodium chloride 0.9% **AND** sodium chloride 0.9%, traMADoL    LABS AND DIAGNOSTICS     CBC LAST 3 DAYS  Recent Labs   Lab 06/29/22  0322 06/30/22  0402 07/01/22  0520   WBC 16.2* 13.6* 15.3*   RBC 3.31* 3.35* 3.43*   HGB 10.1* 10.1* 10.5*   HCT 32.4* 32.5* 32.6*   MCV 97.9* 97.0* 95.0*   MCH 30.5 30.1 30.6   MCHC 31.2* 31.1* 32.2*   RDW 12.7 12.5 12.2    270 247   MPV 9.2* 9.2 9.2       COAGULATION LAST 3 DAYS  Recent Labs   Lab 06/27/22  1759   INR 1.22       CHEMISTRY LAST 3 DAYS  Recent Labs   Lab 06/28/22  0128 06/28/22  0402 06/28/22  0447 06/28/22  0814 06/28/22  1209 06/29/22  0322 06/30/22  0402 07/01/22  0520   NA  --    < >  --    < >  --  137 136 135*   K  --    < >  --    < >  --  5.0 5.3* 5.6*   CO2  --    < >  --    < >  --  22* 21* 22*   BUN  --    < >  --    < >  --  91.0* 87.0* 80.0*   CREATININE  --    < >  --    < >  --  5.42* 5.20* 5.14*   CALCIUM  --    < >  --    < >  --  7.3* 7.8* 7.9*   PH 7.169*  --  7.306*  --  7.406  --   --   --    MG  --    < >  --   --   --  2.50 2.30 2.30   ALBUMIN  --    < >  --    < >  --  2.3* 2.3* 2.3*   ALKPHOS  --    < >  --   --   --  100 95 104   ALT  --    < >  --   --   --  70* 55 45   AST  --    < >  --   --   --  60* 50* 44*   BILITOT  --    < >  --   --   --  0.3 0.4 0.4    < > = values in this interval not displayed.       CARDIAC PROFILE LAST 3 DAYS  Recent Labs   Lab 06/27/22  1747   TROPONINI 0.018       ENDOCRINE LAST 3 DAYS  No results for input(s): TSH, PROCAL in the last 168 hours.    LAST ARTERIAL BLOOD GAS  ABG  Recent Labs   Lab  06/28/22  1209   PH 7.406   PO2 155*   PCO2 35.6   HCO3 22.4*   BE -2       LAST 7 DAYS MICROBIOLOGY   Microbiology Results (last 7 days)     ** No results found for the last 168 hours. **          MOST RECENT IMAGING  X-Ray Chest 1 View  Narrative: EXAMINATION:  XR CHEST 1 VIEW    CLINICAL HISTORY:  PNA;, .    COMPARISON:  06/27/2022    FINDINGS:  An AP view or more reveals the heart to be mildly enlarged.  The trachea is midline.  Right central line is unchanged.  There is interim removal of the ET tube since the prior exam.  There are persistent hazy opacities throughout the mid and lower lungs.  Impression: 1. Mild cardiomegaly  2. Interim removal of ET tube  3. Right central line  4. Mild hazy opacities again evident at the mid and lower lungs suggesting infiltrate and or pleural reactions    Electronically signed by: William Mejia  Date:    06/29/2022  Time:    09:36      [facility]  Results for orders placed during the hospital encounter of 06/21/22    Echo Saline Bubble? No    Interpretation Summary  · The left ventricle is normal in size with normal systolic function.  · Normal left ventricular diastolic function.  · The estimated ejection fraction is 55%.  · Normal right ventricular size with normal right ventricular systolic function.  · Normal central venous pressure (3 mmHg).      CURRENT/PREVIOUS VISIT EKG  Results for orders placed or performed during the hospital encounter of 06/27/22   EKG 12-lead    Collection Time: 06/27/22  5:38 PM    Narrative    Test Reason : R07.9,    Vent. Rate : 093 BPM     Atrial Rate : 093 BPM     P-R Int : 158 ms          QRS Dur : 082 ms      QT Int : 354 ms       P-R-T Axes : 071 074 043 degrees     QTc Int : 440 ms    Normal sinus rhythm  Normal ECG  When compared with ECG of 27-JUN-2022 14:45,  No significant change was found    Referred By: THAIRY REYES           Confirmed By:        ASSESSMENT/PLAN:     Active Hospital Problems    Diagnosis    *SHANDRA (acute kidney  injury)    Cellulitis of both lower extremities    Oliguria    Pleural effusion, bilateral    Pyelonephritis    Hypoxia    Leukocytosis    Bilateral lower extremity edema    Hypertension    Morbid obesity    Type 2 diabetes mellitus    Hyperkalemia       ASSESSMENT & PLAN:   61-year-old  male with history of morbid obesity hypertension diabetes type 2 and dyslipidemia with an uric renal failure extreme volume overload and CHF.  Patient has been undergoing daily hemodialysis and ultrafiltration with approximately 16 kilos removed so far.  Will continue with daily HD and UF with exception of Sunday and monitor her endogenous clearances so that  We may withdrawal hemodialysis.      RECOMMENDATIONS:  Would continue with daily hemodialysis and ultrafiltration until endogenous clearances improve and patient is able to maintain electrolytes and extracellular fluid volume on his own.        Meir Gillis MD  Department of Nephrology  Date of Service: 07/01/2022  12:11 PM

## 2022-07-01 NOTE — PROGRESS NOTES
"[facility]  Medical Surgical Unit  Adult Nutrition  Progress Note    SUMMARY       Recommendations    Recommendation/Intervention: 1.) Rec'd continue current diet as tolerated.  2.) Novasource Renal, BID, to assist with intake.  3.) Eloy BID to assist with wound healing. 4.) Monitor po intake, weight, and labs.  Goals: Meet >/= 75% estimated needs by d/c.  Nutrition Goal Status: progressing towards goal  Communication of RD Recs: discussed on rounds    Assessment and Plan    Nutrition Problem  Inadequate Energy Intake and Increased Nutrient Needs    Related to (etiology):   Increased protein needs 2/2 current condition.     Signs and Symptoms (as evidenced by):   Wounds to feet.      Interventions(treatment strategy):  Commercial beverage and Multivitamin / Mineral supplement therapy    Nutrition Diagnosis Status:   New        Malnutrition Assessment     Skin (Micronutrient): wounds unhealed                                 Reason for Assessment    Reason For Assessment: RD follow-up  Diagnosis: renal disease  Relevant Medical History: BPH (benign prostatic hyperplasia)     Essential (primary) hypertension     Obesity, unspecified     PAD (peripheral artery disease)  General Information Comments: 7/1:  Pt is now on Med  Surg floor. Received Ultrafiltration today.  Fair appetite today.  Consumed 25%.  Will add Novasource Renal, BID to assist with intake and continue to monitor during stay.    Nutrition Risk Screen    Nutrition Risk Screen: no indicators present    Nutrition/Diet History         Anthropometrics    Temp: 98.4 °F (36.9 °C)  Height Method: Stated  Height: 5' 10" (177.8 cm)  Height (inches): 70 in  Weight Method: Bed Scale  Weight: (!) 141.7 kg (312 lb 6.3 oz)  Weight (lb): (!) 312.39 lb  Ideal Body Weight (IBW), Male: 166 lb  % Ideal Body Weight, Male (lb): 193.1 %  BMI (Calculated): 44.8  BMI Grade: greater than 40 - morbid obesity       Lab/Procedures/Meds    Pertinent Labs Reviewed: " reviewed  Pertinent Labs Comments: 7/1: Na+ 135(L); K+ 5.6(H); BUN 80(H); Crea 5.14(H); GFR 12(L); Ca+ 7.9(L); AST 44(H); WBC 15.3(H); H/H 10.5/32.6(L)  Pertinent Medications Reviewed: reviewed  Pertinent Medications Comments: Calcium Gluconate.    Physical Findings/Assessment         Estimated/Assessed Needs    Weight Used For Calorie Calculations: 75.3 kg (166 lb)  Energy Calorie Requirements (kcal): 1900 to 2100 kcals/day  Energy Need Method: Kcal/kg  Protein Requirements: 110 g/day (1.5g/kg)  Weight Used For Protein Calculations: 75 kg (165 lb 5.5 oz)     Estimated Fluid Requirement Method: RDA Method  RDA Method (mL): 1900         Nutrition Prescription Ordered    Current Diet Order: Renal Dialysis, Diabetic.  Nutrition Order Comments: Pt with fair appetite today. Consumed 25% today. Will add ONS.    Evaluation of Received Nutrient/Fluid Intake       % Intake of Estimated Energy Needs: 25 - 50 %  % Meal Intake: 25 - 50 %    Nutrition Risk    Level of Risk/Frequency of Follow-up: moderate     Monitor and Evaluation    Food and Nutrient Intake: energy intake, food and beverage intake  Food and Nutrient Adminstration: diet order  Anthropometric Measurements: weight change, weight  Biochemical Data, Medical Tests and Procedures: electrolyte and renal panel, lipid profile, gastrointestinal profile, glucose/endocrine profile, inflammatory profile     Nutrition Follow-Up    RD Follow-up?: Yes

## 2022-07-01 NOTE — PLAN OF CARE
Problem: Diabetes Comorbidity  Goal: Blood Glucose Level Within Targeted Range  Outcome: Ongoing, Progressing  Intervention: Monitor and Manage Glycemia  Flowsheets (Taken 7/1/2022 4529)  Glycemic Management: blood glucose monitored

## 2022-07-01 NOTE — ASSESSMENT & PLAN NOTE
Follow labs  DVT prophylaxis  resume HD for now  Will likely require additional UF as OP  Nephrology following

## 2022-07-01 NOTE — PT/OT/SLP PROGRESS
Physical Therapy Treatment    Patient Name:  Neno Watkins   MRN:  [id number]    Recommendations:     Discharge Recommendations:      Discharge Equipment Recommendations:     Barriers to discharge: None    Assessment:     Neno Watkins is a 61 y.o. male admitted with a medical diagnosis of SHANDRA (acute kidney injury).  He presents with the following impairments/functional limitations:  weakness, impaired endurance, impaired balance, gait instability, impaired functional mobilty, decreased lower extremity function, pain .    Patient was able to tolerate 20ft of walking in his room with CGA and RW. Patient ambulated slow and cautious with very short step length. Patient c/o dizziness during walk, so therapist instructed him to stop and take some deep breaths before walking back to the chair. Once seated, patient displayed SOB and signs of fatigue. All symptoms resolved after 5 min of sitting up in chair.     Rehab Prognosis: Good; patient would benefit from acute skilled PT services to address these deficits and reach maximum level of function.    Recent Surgery: Procedure(s) (LRB):  INSERTION, CATHETER, HEMODIALYSIS, DUAL LUMEN (Right) 4 Days Post-Op    Plan:     During this hospitalization, patient to be seen daily to address the identified rehab impairments via gait training, therapeutic activities, therapeutic exercises and progress toward the following goals:    · Plan of Care Expires:  07/26/22    Subjective     Chief Complaint: Pain and weakness  Patient/Family Comments/goals: to return to PLOF  Pain/Comfort:  · Pain Rating 1: 5/10  · Location - Side 1: Bilateral  · Location 1: leg      Objective:     Communicated with nurse prior to session.  Patient found HOB elevated with central line, peripheral IV, whitehead catheter, SCD, telemetry upon PT entry to room.     General Precautions: Standard, fall   Orthopedic Precautions:    Braces:    Respiratory Status: Nasal cannula, flow 2 L/min     Functional  Mobility:  · Bed Mobility:     · Scooting: moderate assistance  · Supine to Sit: moderate assistance  · Transfers:     · Sit to Stand:  moderate assistance with rolling walker  · Gait: Patient ambulated 20ft with CGA and RW      AM-PAC 6 CLICK MOBILITY          Therapeutic Activities and Exercises:   see mobility above    Patient left up in chair with all lines intact and call button in reach..    GOALS:   Multidisciplinary Problems     Physical Therapy Goals        Problem: Physical Therapy    Goal Priority Disciplines Outcome Goal Variances Interventions   Physical Therapy Goal     PT, PT/OT Ongoing, Progressing     Description: Goals to be met by: 22     Patient will increase functional independence with mobility by performin. Supine to sit with Modified [place]  2. Sit to stand transfer with Contact Guard Assistance  3. Gait  x 150 feet with Contact Guard Assistance using Rolling Walker.                      Time Tracking:     PT Received On: 22  PT Start Time: 1320     PT Stop Time: 1345  PT Total Time (min): 25 min     Billable Minutes: Therapeutic Activity 25 min    Treatment Type: Treatment  PT/PTA: PT           2022

## 2022-07-01 NOTE — PT/OT/SLP PROGRESS
Occupational Therapy   Treatment    Name: Neno Watkins  MRN: [id number]  Admitting Diagnosis:  SHANDRA (acute kidney injury)  4 Days Post-Op    Recommendations:     Discharge Recommendations: home, home health OT  Discharge Equipment Recommendations:  walker, rolling, wheelchair, bath bench, bedside commode  Barriers to discharge:       Assessment:     Neno Watkins is a 61 y.o. male with a medical diagnosis of SHANDRA (acute kidney injury).  He presents with improving motivation towards all therapy today. Pt states that he is starting to feel better and preforming BUE AROM both sitting up in bed and while supine in bed. Performance deficits affecting function are weakness, impaired endurance, impaired self care skills.     Rehab Prognosis:  Good; patient would benefit from acute skilled OT services to address these deficits and reach maximum level of function.       Plan:     Patient to be seen 3 x/week to address the above listed problems via therapeutic exercises, therapeutic activities, self-care/home management  · Plan of Care Expires: 07/26/22  · Plan of Care Reviewed with: patient    Subjective     Pain/Comfort:  · Pain Rating 1: 0/10    Objective:     Communicated with: patient prior to session.  Patient found up in chair with central line, pulse ox (continuous), telemetry upon OT entry to room.    General Precautions: Standard, fall   Orthopedic Precautions:    Braces:    Respiratory Status: Nasal cannula, flow 2 L/min     Occupational Performance:         AMPAC 6 Click ADL:      Treatment & Education:  Pt was seen for BUE AROM 1X10 reps shoulder flexion, abduction, bicep curls and tricep ext. Pt reports dizziness and woozy feeling in head follow 3 sets and requires rest. Pt rebounded to feeling back to normal with brief rest. Pt then able to complete another 2 sets with LUE only without adverse side effects.     Patient left up in chair with all lines intactEducation:      GOALS:   Multidisciplinary Problems      Occupational Therapy Goals        Problem: Occupational Therapy    Goal Priority Disciplines Outcome Interventions   Occupational Therapy Goal     OT, PT/OT Ongoing, Progressing    Description: Goals to be met by: 7/27/22    Patient will increase functional independence with ADLs by performing:    Toileting from bedside commode with Supervision for hygiene and clothing management.   Increased functional strength to 4/5 for increase (I) with ADLs.  Upper extremity exercise program 20 reps per handout, with assistance as needed.                     Time Tracking:     OT Date of Treatment: 07/01/22  OT Start Time: 1415  OT Stop Time: 1430  OT Total Time (min): 15 min    Billable Minutes:Therapeutic Exercise 15    OT/PAUL: OT          7/1/2022

## 2022-07-02 LAB
ALBUMIN SERPL-MCNC: 2.1 GM/DL (ref 3.4–4.8)
ALBUMIN/GLOB SERPL: 0.5 RATIO (ref 1.1–2)
ALP SERPL-CCNC: 100 UNIT/L (ref 40–150)
ALT SERPL-CCNC: 38 UNIT/L (ref 0–55)
AST SERPL-CCNC: 43 UNIT/L (ref 5–34)
BASOPHILS # BLD AUTO: 0.06 X10(3)/MCL (ref 0–0.2)
BASOPHILS NFR BLD AUTO: 0.4 %
BILIRUBIN DIRECT+TOT PNL SERPL-MCNC: 0.4 MG/DL
BUN SERPL-MCNC: 73 MG/DL (ref 8.4–25.7)
CALCIUM SERPL-MCNC: 8.3 MG/DL (ref 8.8–10)
CHLORIDE SERPL-SCNC: 100 MMOL/L (ref 98–107)
CO2 SERPL-SCNC: 22 MMOL/L (ref 23–31)
CREAT SERPL-MCNC: 4.77 MG/DL (ref 0.73–1.18)
EOSINOPHIL # BLD AUTO: 0.43 X10(3)/MCL (ref 0–0.9)
EOSINOPHIL NFR BLD AUTO: 2.6 %
ERYTHROCYTE [DISTWIDTH] IN BLOOD BY AUTOMATED COUNT: 12.1 % (ref 11.5–17)
GLOBULIN SER-MCNC: 4.3 GM/DL (ref 2.4–3.5)
GLUCOSE SERPL-MCNC: 127 MG/DL (ref 82–115)
HCT VFR BLD AUTO: 32.8 % (ref 42–52)
HGB BLD-MCNC: 10.8 GM/DL (ref 14–18)
IMM GRANULOCYTES # BLD AUTO: 0.16 X10(3)/MCL (ref 0–0.04)
IMM GRANULOCYTES NFR BLD AUTO: 1 %
LYMPHOCYTES # BLD AUTO: 2.95 X10(3)/MCL (ref 0.6–4.6)
LYMPHOCYTES NFR BLD AUTO: 17.6 %
MAGNESIUM SERPL-MCNC: 2.3 MG/DL (ref 1.6–2.6)
MCH RBC QN AUTO: 30.9 PG (ref 27–31)
MCHC RBC AUTO-ENTMCNC: 32.9 MG/DL (ref 33–36)
MCV RBC AUTO: 93.7 FL (ref 80–94)
MONOCYTES # BLD AUTO: 1.73 X10(3)/MCL (ref 0.1–1.3)
MONOCYTES NFR BLD AUTO: 10.3 %
NEUTROPHILS # BLD AUTO: 11.4 X10(3)/MCL (ref 2.1–9.2)
NEUTROPHILS NFR BLD AUTO: 68.1 %
PLATELET # BLD AUTO: 241 X10(3)/MCL (ref 130–400)
PMV BLD AUTO: 9.2 FL (ref 7.4–10.4)
POCT GLUCOSE: 141 MG/DL (ref 70–110)
POCT GLUCOSE: 141 MG/DL (ref 70–110)
POCT GLUCOSE: 99 MG/DL (ref 70–110)
POTASSIUM SERPL-SCNC: 4.7 MMOL/L (ref 3.5–5.1)
PROT SERPL-MCNC: 6.4 GM/DL (ref 5.8–7.6)
RBC # BLD AUTO: 3.5 X10(6)/MCL (ref 4.7–6.1)
SODIUM SERPL-SCNC: 134 MMOL/L (ref 136–145)
WBC # SPEC AUTO: 16.7 X10(3)/MCL (ref 4.5–11.5)

## 2022-07-02 PROCEDURE — 63600175 PHARM REV CODE 636 W HCPCS: Performed by: INTERNAL MEDICINE

## 2022-07-02 PROCEDURE — 85025 COMPLETE CBC W/AUTO DIFF WBC: CPT | Performed by: INTERNAL MEDICINE

## 2022-07-02 PROCEDURE — A4216 STERILE WATER/SALINE, 10 ML: HCPCS | Performed by: STUDENT IN AN ORGANIZED HEALTH CARE EDUCATION/TRAINING PROGRAM

## 2022-07-02 PROCEDURE — 25000003 PHARM REV CODE 250: Performed by: STUDENT IN AN ORGANIZED HEALTH CARE EDUCATION/TRAINING PROGRAM

## 2022-07-02 PROCEDURE — 83735 ASSAY OF MAGNESIUM: CPT | Performed by: INTERNAL MEDICINE

## 2022-07-02 PROCEDURE — 11000001 HC ACUTE MED/SURG PRIVATE ROOM

## 2022-07-02 PROCEDURE — 80053 COMPREHEN METABOLIC PANEL: CPT | Performed by: INTERNAL MEDICINE

## 2022-07-02 PROCEDURE — 25000003 PHARM REV CODE 250: Performed by: INTERNAL MEDICINE

## 2022-07-02 PROCEDURE — 36415 COLL VENOUS BLD VENIPUNCTURE: CPT | Performed by: INTERNAL MEDICINE

## 2022-07-02 PROCEDURE — 99900035 HC TECH TIME PER 15 MIN (STAT)

## 2022-07-02 PROCEDURE — 94761 N-INVAS EAR/PLS OXIMETRY MLT: CPT

## 2022-07-02 PROCEDURE — 27000221 HC OXYGEN, UP TO 24 HOURS

## 2022-07-02 PROCEDURE — 90935 HEMODIALYSIS ONE EVALUATION: CPT

## 2022-07-02 RX ADMIN — TAMSULOSIN HYDROCHLORIDE 0.4 MG: 0.4 CAPSULE ORAL at 10:07

## 2022-07-02 RX ADMIN — SODIUM CHLORIDE, PRESERVATIVE FREE 10 ML: 5 INJECTION INTRAVENOUS at 02:07

## 2022-07-02 RX ADMIN — MUPIROCIN: 20 OINTMENT TOPICAL at 10:07

## 2022-07-02 RX ADMIN — Medication 6 MG: at 09:07

## 2022-07-02 RX ADMIN — ALPRAZOLAM 0.25 MG: 0.25 TABLET ORAL at 09:07

## 2022-07-02 RX ADMIN — CARVEDILOL 6.25 MG: 6.25 TABLET, FILM COATED ORAL at 10:07

## 2022-07-02 RX ADMIN — SODIUM CHLORIDE, PRESERVATIVE FREE 10 ML: 5 INJECTION INTRAVENOUS at 01:07

## 2022-07-02 RX ADMIN — FUROSEMIDE 80 MG: 10 INJECTION, SOLUTION INTRAMUSCULAR; INTRAVENOUS at 02:07

## 2022-07-02 RX ADMIN — FUROSEMIDE 80 MG: 10 INJECTION, SOLUTION INTRAMUSCULAR; INTRAVENOUS at 01:07

## 2022-07-02 RX ADMIN — LINEZOLID 600 MG: 600 INJECTION, SOLUTION INTRAVENOUS at 01:07

## 2022-07-02 RX ADMIN — MEROPENEM 1 G: 1 INJECTION, POWDER, FOR SOLUTION INTRAVENOUS at 03:07

## 2022-07-02 RX ADMIN — CARVEDILOL 6.25 MG: 6.25 TABLET, FILM COATED ORAL at 09:07

## 2022-07-02 RX ADMIN — NIFEDIPINE 60 MG: 30 TABLET, EXTENDED RELEASE ORAL at 10:07

## 2022-07-02 RX ADMIN — METOLAZONE 10 MG: 2.5 TABLET ORAL at 10:07

## 2022-07-02 RX ADMIN — SODIUM CHLORIDE, PRESERVATIVE FREE 10 ML: 5 INJECTION INTRAVENOUS at 05:07

## 2022-07-02 NOTE — NURSING
Patient had large soft brown bowel movement. Gely and catheter care provided and dressing to medial buttocks changed.

## 2022-07-02 NOTE — NURSING
Wound care to BLE, Right great toe, groin, and buttocks done per wound care orders. Refer to wound care orders.

## 2022-07-02 NOTE — PROGRESS NOTES
[facility] - Medical Surgical Unit  Hospital Medicine  Progress Note    Patient Name: Neno Watkins  MRN: [id number]  Patient Class: IP- Inpatient   Admission Date: 6/27/2022  Length of Stay: 5 days  Attending Physician: Cam Medina MD  Primary Care Provider: No primary care provider on file.        Subjective:     Principal Problem:SHANDRA (acute kidney injury)        HPI:  62yo WM with h/o Morbid Obesity, HTN, DM2, HLD, ?MI transferred from [facility] for SHANDRA. Pt originally presented to their ED 7/21 c/o back pain after fall at home. Noted to have SHANDRA with Hyperkalemia and admitted for tx. Pt tx'd with IVF and initially Cr improved but there was concern of CHF/fluid overload so IVF held and pt given and Cr worsened and UOP declined. ECHO however was normal with EF 55% and no e/o diastolic dysfunction. BNP was 109 as well. Pt given Lasix as well with increased UOP but worsening Cr. Today pt was more SOB with incr LE edema. BUN and Cr progressively worsening,hyperkalemic, oliguric, more hypertensive. Has moderate bilat pleural effusion on imaging as well. Transferred to [facility] for Nephrology services.     Pt finally arrived to floor this evening c/o CP/SOB and was diaphoretic, tachycardic and hypertensive. There were no labs repeated after this morning at 04:30. Ordered stat chem, Trop, ABG, EKG, Lopressor IV and IV Morphine. Pt seen soon thereafter and appears ill. Placed on 15L OxyMask for hypoxia O2 sat 88% on 4L NC. Labs returned with K+ 7.3, BUN 84, Cr 4.48, PO4 8.4, Trop 0.018, EKG NSR w/o acute ST-T wave changes (sp no peaked T-waves). Nephrology consulted for urgent HD and GenSurg consulted for urgent Dialysis Catheter placement. Pt will be brought down to OR for procedure and Anesth plans to intubate. Pt will go to ICU after procedure and then receive HD.       Overview/Hospital Course:  6/29/22-Patient is sitting up in the chair at this time.  He will undergo HD today.   He is feeling better since admit.  Will likely watch at least one more day in the ICU.      6/30/22-Today is day 4 of HD.  A large amount of fluid has been removed.  He is still producing urine as well.  Overall he is feeling better.    7/1/22-No changes today.  He is undergoing HD at this time.  His legs do appear to be softening up some with less edema.  Spoke with Dr. Gillis and will continue with ultrafiltration for now.  Will likely need OP treatment but will decide next week.    7/2/22-Patient is doing good today.  His edema is improving.  He has had 16+ liters of fluid removed with UF.  He also states his breathing is improving.      Interval History:     Review of Systems   Constitutional:  Positive for activity change and fatigue.   HENT: Negative.     Eyes: Negative.    Respiratory:  Positive for shortness of breath.    Cardiovascular:  Positive for leg swelling.   Gastrointestinal: Negative.    Endocrine: Negative.    Genitourinary:  Positive for difficulty urinating.   Musculoskeletal: Negative.    Skin:  Positive for rash.   Allergic/Immunologic: Negative.    Neurological: Negative.    Hematological: Negative.    Psychiatric/Behavioral: Negative.     Objective:     Vital Signs (Most Recent):  Temp: 98.5 °F (36.9 °C) (07/02/22 1032)  Pulse: 79 (07/02/22 1248)  Resp: 18 (07/02/22 1032)  BP: 122/70 (07/02/22 1248)  SpO2: 96 % (07/02/22 1248) Vital Signs (24h Range):  Temp:  [98.4 °F (36.9 °C)-98.5 °F (36.9 °C)] 98.5 °F (36.9 °C)  Pulse:  [68-84] 79  Resp:  [18-20] 18  SpO2:  [96 %] 96 %  BP: (119-144)/(67-77) 122/70     Weight: (!) 141.7 kg (312 lb 6.3 oz)  Body mass index is 44.82 kg/m².    Intake/Output Summary (Last 24 hours) at 7/2/2022 1432  Last data filed at 7/2/2022 1322  Gross per 24 hour   Intake 730 ml   Output 3450 ml   Net -2720 ml      Physical Exam  Constitutional:       Appearance: Normal appearance. He is obese.   HENT:      Head: Normocephalic and atraumatic.      Nose: Nose normal.       Mouth/Throat:      Mouth: Mucous membranes are moist.      Pharynx: Oropharynx is clear.   Eyes:      Extraocular Movements: Extraocular movements intact.      Conjunctiva/sclera: Conjunctivae normal.      Pupils: Pupils are equal, round, and reactive to light.   Cardiovascular:      Rate and Rhythm: Normal rate and regular rhythm.      Pulses: Normal pulses.      Heart sounds: Normal heart sounds.   Pulmonary:      Effort: Pulmonary effort is normal.      Breath sounds: Normal breath sounds.   Abdominal:      General: Bowel sounds are normal.      Palpations: Abdomen is soft.   Musculoskeletal:         General: Normal range of motion.      Cervical back: Normal range of motion and neck supple.      Right lower leg: Edema present.      Left lower leg: Edema present.   Skin:     General: Skin is warm and dry.      Capillary Refill: Capillary refill takes 2 to 3 seconds.      Findings: Lesion present.   Neurological:      General: No focal deficit present.      Mental Status: He is alert and oriented to person, place, and time. Mental status is at baseline.   Psychiatric:         Mood and Affect: Mood normal.         Behavior: Behavior normal.         Thought Content: Thought content normal.         Judgment: Judgment normal.       Significant Labs: All pertinent labs within the past 24 hours have been reviewed.  BMP:   Recent Labs   Lab 07/02/22 0157   *   K 4.7   CO2 22*   BUN 73.0*   CREATININE 4.77*   CALCIUM 8.3*   MG 2.30     CBC:   Recent Labs   Lab 07/01/22 0520 07/02/22 0157   WBC 15.3* 16.7*   HGB 10.5* 10.8*   HCT 32.6* 32.8*    241     CMP:   Recent Labs   Lab 07/01/22 0520 07/02/22 0157   * 134*   K 5.6* 4.7   CO2 22* 22*   BUN 80.0* 73.0*   CREATININE 5.14* 4.77*   CALCIUM 7.9* 8.3*   ALBUMIN 2.3* 2.1*   BILITOT 0.4 0.4   ALKPHOS 104 100   AST 44* 43*   ALT 45 38   EGFRNONAA 12 13     Magnesium:   Recent Labs   Lab 07/01/22 0520 07/02/22 0157   MG 2.30 2.30       Significant  Imaging: I have reviewed all pertinent imaging results/findings within the past 24 hours.      Assessment/Plan:      * SHANDRA (acute kidney injury)  Follow labs  DVT prophylaxis  resume HD for now  Will likely require additional UF as OP  Nephrology following      Hypoxia        Pyelonephritis        Bilateral lower extremity edema        Leukocytosis        Hyperkalemia        Type 2 diabetes mellitus  ISS      Morbid obesity        Hypertension  Current meds  follow        VTE Risk Mitigation (From admission, onward)         Ordered     IP VTE HIGH RISK PATIENT  Once         06/27/22 1739     Place sequential compression device  Until discontinued         06/27/22 1739                Discharge Planning   MATT: 6/27/2022     Code Status: Full Code   Is the patient medically ready for discharge?:     Reason for patient still in hospital (select all that apply): Laboratory test, Treatment, Consult recommendations and PT / OT recommendations     Follow labs  DVT prophylaxis  Resume UF per nephrology  OOB  Wean O2                     Otilio Valero MD  Department of Hospital Medicine   [facility] - [facility]

## 2022-07-02 NOTE — PT/OT/SLP PROGRESS
Physical Therapy      Patient Name:  Neno Watkins   MRN:  [id number]    Patient not seen today secondary to Dialysis. Will follow-up tomorrow.

## 2022-07-02 NOTE — SUBJECTIVE & OBJECTIVE
Interval History:     Review of Systems   Constitutional:  Positive for activity change and fatigue.   HENT: Negative.     Eyes: Negative.    Respiratory:  Positive for shortness of breath.    Cardiovascular:  Positive for leg swelling.   Gastrointestinal: Negative.    Endocrine: Negative.    Genitourinary:  Positive for difficulty urinating.   Musculoskeletal: Negative.    Skin:  Positive for rash.   Allergic/Immunologic: Negative.    Neurological: Negative.    Hematological: Negative.    Psychiatric/Behavioral: Negative.     Objective:     Vital Signs (Most Recent):  Temp: 98.5 °F (36.9 °C) (07/02/22 1032)  Pulse: 79 (07/02/22 1248)  Resp: 18 (07/02/22 1032)  BP: 122/70 (07/02/22 1248)  SpO2: 96 % (07/02/22 1248) Vital Signs (24h Range):  Temp:  [98.4 °F (36.9 °C)-98.5 °F (36.9 °C)] 98.5 °F (36.9 °C)  Pulse:  [68-84] 79  Resp:  [18-20] 18  SpO2:  [96 %] 96 %  BP: (119-144)/(67-77) 122/70     Weight: (!) 141.7 kg (312 lb 6.3 oz)  Body mass index is 44.82 kg/m².    Intake/Output Summary (Last 24 hours) at 7/2/2022 1432  Last data filed at 7/2/2022 1322  Gross per 24 hour   Intake 730 ml   Output 3450 ml   Net -2720 ml      Physical Exam  Constitutional:       Appearance: Normal appearance. He is obese.   HENT:      Head: Normocephalic and atraumatic.      Nose: Nose normal.      Mouth/Throat:      Mouth: Mucous membranes are moist.      Pharynx: Oropharynx is clear.   Eyes:      Extraocular Movements: Extraocular movements intact.      Conjunctiva/sclera: Conjunctivae normal.      Pupils: Pupils are equal, round, and reactive to light.   Cardiovascular:      Rate and Rhythm: Normal rate and regular rhythm.      Pulses: Normal pulses.      Heart sounds: Normal heart sounds.   Pulmonary:      Effort: Pulmonary effort is normal.      Breath sounds: Normal breath sounds.   Abdominal:      General: Bowel sounds are normal.      Palpations: Abdomen is soft.   Musculoskeletal:         General: Normal range of motion.       Cervical back: Normal range of motion and neck supple.      Right lower leg: Edema present.      Left lower leg: Edema present.   Skin:     General: Skin is warm and dry.      Capillary Refill: Capillary refill takes 2 to 3 seconds.      Findings: Lesion present.   Neurological:      General: No focal deficit present.      Mental Status: He is alert and oriented to person, place, and time. Mental status is at baseline.   Psychiatric:         Mood and Affect: Mood normal.         Behavior: Behavior normal.         Thought Content: Thought content normal.         Judgment: Judgment normal.       Significant Labs: All pertinent labs within the past 24 hours have been reviewed.  BMP:   Recent Labs   Lab 07/02/22 0157   *   K 4.7   CO2 22*   BUN 73.0*   CREATININE 4.77*   CALCIUM 8.3*   MG 2.30     CBC:   Recent Labs   Lab 07/01/22 0520 07/02/22 0157   WBC 15.3* 16.7*   HGB 10.5* 10.8*   HCT 32.6* 32.8*    241     CMP:   Recent Labs   Lab 07/01/22 0520 07/02/22 0157   * 134*   K 5.6* 4.7   CO2 22* 22*   BUN 80.0* 73.0*   CREATININE 5.14* 4.77*   CALCIUM 7.9* 8.3*   ALBUMIN 2.3* 2.1*   BILITOT 0.4 0.4   ALKPHOS 104 100   AST 44* 43*   ALT 45 38   EGFRNONAA 12 13     Magnesium:   Recent Labs   Lab 07/01/22 0520 07/02/22 0157   MG 2.30 2.30       Significant Imaging: I have reviewed all pertinent imaging results/findings within the past 24 hours.

## 2022-07-03 PROBLEM — N17.9 AKI (ACUTE KIDNEY INJURY): Chronic | Status: ACTIVE | Noted: 2022-06-21

## 2022-07-03 PROBLEM — J90 PLEURAL EFFUSION, BILATERAL: Chronic | Status: ACTIVE | Noted: 2022-06-28

## 2022-07-03 LAB
ANION GAP SERPL CALC-SCNC: 10 MEQ/L
BASOPHILS # BLD AUTO: 0.07 X10(3)/MCL (ref 0–0.2)
BASOPHILS NFR BLD AUTO: 0.4 %
BUN SERPL-MCNC: 64 MG/DL (ref 8.4–25.7)
CALCIUM SERPL-MCNC: 8.4 MG/DL (ref 8.8–10)
CHLORIDE SERPL-SCNC: 101 MMOL/L (ref 98–107)
CO2 SERPL-SCNC: 23 MMOL/L (ref 23–31)
CREAT SERPL-MCNC: 4.46 MG/DL (ref 0.73–1.18)
CREAT/UREA NIT SERPL: 14
EOSINOPHIL # BLD AUTO: 0.38 X10(3)/MCL (ref 0–0.9)
EOSINOPHIL NFR BLD AUTO: 2 %
ERYTHROCYTE [DISTWIDTH] IN BLOOD BY AUTOMATED COUNT: 12.3 % (ref 11.5–17)
GLUCOSE SERPL-MCNC: 143 MG/DL (ref 82–115)
HCT VFR BLD AUTO: 33.1 % (ref 42–52)
HGB BLD-MCNC: 10.8 GM/DL (ref 14–18)
IMM GRANULOCYTES # BLD AUTO: 0.19 X10(3)/MCL (ref 0–0.04)
IMM GRANULOCYTES NFR BLD AUTO: 1 %
LYMPHOCYTES # BLD AUTO: 2.63 X10(3)/MCL (ref 0.6–4.6)
LYMPHOCYTES NFR BLD AUTO: 13.9 %
MAGNESIUM SERPL-MCNC: 2.2 MG/DL (ref 1.6–2.6)
MCH RBC QN AUTO: 30.7 PG (ref 27–31)
MCHC RBC AUTO-ENTMCNC: 32.6 MG/DL (ref 33–36)
MCV RBC AUTO: 94 FL (ref 80–94)
MONOCYTES # BLD AUTO: 1.64 X10(3)/MCL (ref 0.1–1.3)
MONOCYTES NFR BLD AUTO: 8.6 %
NEUTROPHILS # BLD AUTO: 14.1 X10(3)/MCL (ref 2.1–9.2)
NEUTROPHILS NFR BLD AUTO: 74.1 %
PLATELET # BLD AUTO: 237 X10(3)/MCL (ref 130–400)
PMV BLD AUTO: 9.2 FL (ref 7.4–10.4)
POCT GLUCOSE: 110 MG/DL (ref 70–110)
POCT GLUCOSE: 110 MG/DL (ref 70–110)
POCT GLUCOSE: 157 MG/DL (ref 70–110)
POTASSIUM SERPL-SCNC: 4.8 MMOL/L (ref 3.5–5.1)
RBC # BLD AUTO: 3.52 X10(6)/MCL (ref 4.7–6.1)
SODIUM SERPL-SCNC: 134 MMOL/L (ref 136–145)
TSH SERPL-ACNC: 1.18 UIU/ML (ref 0.35–4.94)
WBC # SPEC AUTO: 19 X10(3)/MCL (ref 4.5–11.5)

## 2022-07-03 PROCEDURE — 25000003 PHARM REV CODE 250: Performed by: INTERNAL MEDICINE

## 2022-07-03 PROCEDURE — 84443 ASSAY THYROID STIM HORMONE: CPT | Performed by: EMERGENCY MEDICINE

## 2022-07-03 PROCEDURE — 63600175 PHARM REV CODE 636 W HCPCS: Performed by: INTERNAL MEDICINE

## 2022-07-03 PROCEDURE — 83735 ASSAY OF MAGNESIUM: CPT | Performed by: INTERNAL MEDICINE

## 2022-07-03 PROCEDURE — 80048 BASIC METABOLIC PNL TOTAL CA: CPT | Performed by: INTERNAL MEDICINE

## 2022-07-03 PROCEDURE — 11000001 HC ACUTE MED/SURG PRIVATE ROOM

## 2022-07-03 PROCEDURE — 25000003 PHARM REV CODE 250: Performed by: STUDENT IN AN ORGANIZED HEALTH CARE EDUCATION/TRAINING PROGRAM

## 2022-07-03 PROCEDURE — 85025 COMPLETE CBC W/AUTO DIFF WBC: CPT | Performed by: INTERNAL MEDICINE

## 2022-07-03 PROCEDURE — 27000221 HC OXYGEN, UP TO 24 HOURS

## 2022-07-03 PROCEDURE — 99900035 HC TECH TIME PER 15 MIN (STAT)

## 2022-07-03 PROCEDURE — A4216 STERILE WATER/SALINE, 10 ML: HCPCS | Performed by: STUDENT IN AN ORGANIZED HEALTH CARE EDUCATION/TRAINING PROGRAM

## 2022-07-03 PROCEDURE — 94761 N-INVAS EAR/PLS OXIMETRY MLT: CPT

## 2022-07-03 PROCEDURE — 36415 COLL VENOUS BLD VENIPUNCTURE: CPT | Performed by: INTERNAL MEDICINE

## 2022-07-03 PROCEDURE — 97530 THERAPEUTIC ACTIVITIES: CPT

## 2022-07-03 RX ADMIN — TRAMADOL HYDROCHLORIDE 50 MG: 50 TABLET, COATED ORAL at 09:07

## 2022-07-03 RX ADMIN — CARVEDILOL 6.25 MG: 6.25 TABLET, FILM COATED ORAL at 08:07

## 2022-07-03 RX ADMIN — FUROSEMIDE 80 MG: 10 INJECTION, SOLUTION INTRAMUSCULAR; INTRAVENOUS at 02:07

## 2022-07-03 RX ADMIN — NIFEDIPINE 60 MG: 30 TABLET, EXTENDED RELEASE ORAL at 08:07

## 2022-07-03 RX ADMIN — SODIUM CHLORIDE, PRESERVATIVE FREE 10 ML: 5 INJECTION INTRAVENOUS at 02:07

## 2022-07-03 RX ADMIN — METOLAZONE 10 MG: 2.5 TABLET ORAL at 08:07

## 2022-07-03 RX ADMIN — Medication 6 MG: at 08:07

## 2022-07-03 RX ADMIN — ALPRAZOLAM 0.25 MG: 0.25 TABLET ORAL at 08:07

## 2022-07-03 RX ADMIN — MEROPENEM 1 G: 1 INJECTION, POWDER, FOR SOLUTION INTRAVENOUS at 02:07

## 2022-07-03 RX ADMIN — FUROSEMIDE 80 MG: 10 INJECTION, SOLUTION INTRAMUSCULAR; INTRAVENOUS at 01:07

## 2022-07-03 RX ADMIN — MEROPENEM 1 G: 1 INJECTION, POWDER, FOR SOLUTION INTRAVENOUS at 03:07

## 2022-07-03 RX ADMIN — TAMSULOSIN HYDROCHLORIDE 0.4 MG: 0.4 CAPSULE ORAL at 08:07

## 2022-07-03 RX ADMIN — LINEZOLID 600 MG: 600 INJECTION, SOLUTION INTRAVENOUS at 02:07

## 2022-07-03 RX ADMIN — LINEZOLID 600 MG: 600 INJECTION, SOLUTION INTRAVENOUS at 01:07

## 2022-07-03 RX ADMIN — SODIUM CHLORIDE, PRESERVATIVE FREE 10 ML: 5 INJECTION INTRAVENOUS at 06:07

## 2022-07-03 NOTE — ASSESSMENT & PLAN NOTE
GFR and 15.  Continue with hemodialysis.  Suspect patient to have underlying condition including congestive heart failure.  Outside obtain a copy of 2D echocardiogram.

## 2022-07-03 NOTE — SUBJECTIVE & OBJECTIVE
Interval History:  Patient is still weak, short of breath with ambulation denies any chest pain    Review of Systems   Constitutional:  Positive for activity change, appetite change and fatigue. Negative for chills, diaphoresis and fever.   HENT: Negative.     Eyes: Negative.    Respiratory:  Positive for cough and shortness of breath. Negative for chest tightness.    Cardiovascular:  Positive for leg swelling. Negative for chest pain.   Gastrointestinal:  Positive for abdominal distention. Negative for abdominal pain and diarrhea.   Musculoskeletal:  Positive for joint swelling.   Hematological: Negative.    All other systems reviewed and are negative.  Objective:     Vital Signs (Most Recent):  Temp: 98.5 °F (36.9 °C) (07/03/22 1625)  Pulse: 73 (07/03/22 1625)  Resp: 17 (07/03/22 1625)  BP: 128/71 (07/03/22 1625)  SpO2: 95 % (07/03/22 1625) Vital Signs (24h Range):  Temp:  [97.5 °F (36.4 °C)-98.8 °F (37.1 °C)] 98.5 °F (36.9 °C)  Pulse:  [64-87] 73  Resp:  [17-20] 17  SpO2:  [93 %-97 %] 95 %  BP: (128-162)/(58-80) 128/71     Weight: (!) 141.7 kg (312 lb 6.3 oz)  Body mass index is 44.82 kg/m².    Intake/Output Summary (Last 24 hours) at 7/3/2022 1711  Last data filed at 7/3/2022 1639  Gross per 24 hour   Intake 1230 ml   Output 2400 ml   Net -1170 ml      Physical Exam  Vitals and nursing note reviewed.   Constitutional:       Appearance: He is obese.   HENT:      Head: Normocephalic.      Nose: Nose normal.      Mouth/Throat:      Mouth: Mucous membranes are moist.   Eyes:      Extraocular Movements: Extraocular movements intact.      Pupils: Pupils are equal, round, and reactive to light.   Neck:      Comments: Positive JVD  Cardiovascular:      Rate and Rhythm: Normal rate and regular rhythm.      Comments: +3 edema  Pulmonary:      Effort: Pulmonary effort is normal.      Breath sounds: Rhonchi and rales present.      Comments: No air movement in bilateral basilar area.  Abdominal:      General: There is no  distension.      Tenderness: There is no abdominal tenderness.      Comments: Obese, no hepatosplenomegaly.   Musculoskeletal:         General: Swelling present. No deformity.      Cervical back: Normal range of motion and neck supple.      Right lower leg: Edema present.      Left lower leg: Edema present.   Skin:     Capillary Refill: Capillary refill takes less than 2 seconds.   Neurological:      General: No focal deficit present.      Mental Status: He is alert and oriented to person, place, and time.   Psychiatric:         Mood and Affect: Mood normal.       Significant Labs: All pertinent labs within the past 24 hours have been reviewed.  Recent Lab Results  (Last 5 results in the past 24 hours)        07/03/22  1622   07/03/22  1133   07/03/22  0607   07/03/22  0200   07/02/22  2151        Anion Gap       10.0         Baso #       0.07         Basophil %       0.4         BUN       64.0         BUN/CREAT RATIO       14         Calcium       8.4         Chloride       101         CO2       23         Creatinine       4.46         eGFR if non        14         Eos #       0.38         Eosinophil %       2.0         Glucose       143         Hematocrit       33.1         Hemoglobin       10.8         Immature Grans (Abs)       0.19         Immature Granulocytes       1.0         Lymph #       2.63         LYMPH %       13.9         Magnesium       2.20         MCH       30.7         MCHC       32.6         MCV       94.0         Mono #       1.64         Mono %       8.6         MPV       9.2         Neut #       14.1         Neut %       74.1         Platelets       237         POCT Glucose 157   110   110     99       Potassium       4.8         RBC       3.52         RDW       12.3         Sodium       134         WBC       19.0                                Significant Imaging: I have reviewed all pertinent imaging results/findings within the past 24 hours.

## 2022-07-03 NOTE — ASSESSMENT & PLAN NOTE
Repeat chest x-ray in the morning  If pleural effusion persists recommend thoracocentesis for evaluation of pleural effusion.  Suspect pleural effusion secondary to congestive heart failure versus renal failure.  Will follow-up with nephrology.

## 2022-07-03 NOTE — PROGRESS NOTES
[facility] - Medical Surgical Unit  Hospital Medicine  Progress Note    Patient Name: Neno Watkins  MRN: [id number]  Patient Class: IP- Inpatient   Admission Date: 6/27/2022  Length of Stay: 6 days  Attending Physician: Cam Medina MD  Primary Care Provider: No primary care provider on file.        Subjective:     Principal Problem:SHANDRA (acute kidney injury)        HPI:  60yo WM with h/o Morbid Obesity, HTN, DM2, HLD, ?MI transferred from [facility] for SHANDRA. Pt originally presented to their ED 7/21 c/o back pain after fall at home. Noted to have SHANDRA with Hyperkalemia and admitted for tx. Pt tx'd with IVF and initially Cr improved but there was concern of CHF/fluid overload so IVF held and pt given and Cr worsened and UOP declined. ECHO however was normal with EF 55% and no e/o diastolic dysfunction. BNP was 109 as well. Pt given Lasix as well with increased UOP but worsening Cr. Today pt was more SOB with incr LE edema. BUN and Cr progressively worsening,hyperkalemic, oliguric, more hypertensive. Has moderate bilat pleural effusion on imaging as well. Transferred to [facility] for Nephrology services.     Pt finally arrived to floor this evening c/o CP/SOB and was diaphoretic, tachycardic and hypertensive. There were no labs repeated after this morning at 04:30. Ordered stat chem, Trop, ABG, EKG, Lopressor IV and IV Morphine. Pt seen soon thereafter and appears ill. Placed on 15L OxyMask for hypoxia O2 sat 88% on 4L NC. Labs returned with K+ 7.3, BUN 84, Cr 4.48, PO4 8.4, Trop 0.018, EKG NSR w/o acute ST-T wave changes (sp no peaked T-waves). Nephrology consulted for urgent HD and GenSurg consulted for urgent Dialysis Catheter placement. Pt will be brought down to OR for procedure and Anesth plans to intubate. Pt will go to ICU after procedure and then receive HD.       Overview/Hospital Course:  6/29/22-Patient is sitting up in the chair at this time.  He will undergo HD today.   He is feeling better since admit.  Will likely watch at least one more day in the ICU.      6/30/22-Today is day 4 of HD.  A large amount of fluid has been removed.  He is still producing urine as well.  Overall he is feeling better.    7/1/22-No changes today.  He is undergoing HD at this time.  His legs do appear to be softening up some with less edema.  Spoke with Dr. Gillis and will continue with ultrafiltration for now.  Will likely need OP treatment but will decide next week.    7/2/22-Patient is doing good today.  His edema is improving.  He has had 16+ liters of fluid removed with UF.  He also states his breathing is improving.    07/03/2022.    Patient at present time is on room air supine.  Minimal shortness of breath with ambulation.  Underwent hemodialysis.  Baldwin catheter still with good urine output.  Chart was reviewed in detail.  Somehow patient had no diastolic dysfunction but mild decrease of ejection fraction on 2D echocardiogram  Not sure exactly patient source of pleural effusion other than congestive heart failure.  Will continue current treatment.  Repeat chest x-ray in the morning.  If patient persists to have pleural effusion will recommend the thoracocentesis for evaluation pleural effusion source.        Interval History:  Patient is still weak, short of breath with ambulation denies any chest pain    Review of Systems   Constitutional:  Positive for activity change, appetite change and fatigue. Negative for chills, diaphoresis and fever.   HENT: Negative.     Eyes: Negative.    Respiratory:  Positive for cough and shortness of breath. Negative for chest tightness.    Cardiovascular:  Positive for leg swelling. Negative for chest pain.   Gastrointestinal:  Positive for abdominal distention. Negative for abdominal pain and diarrhea.   Musculoskeletal:  Positive for joint swelling.   Hematological: Negative.    All other systems reviewed and are negative.  Objective:     Vital Signs (Most  Recent):  Temp: 98.5 °F (36.9 °C) (07/03/22 1625)  Pulse: 73 (07/03/22 1625)  Resp: 17 (07/03/22 1625)  BP: 128/71 (07/03/22 1625)  SpO2: 95 % (07/03/22 1625) Vital Signs (24h Range):  Temp:  [97.5 °F (36.4 °C)-98.8 °F (37.1 °C)] 98.5 °F (36.9 °C)  Pulse:  [64-87] 73  Resp:  [17-20] 17  SpO2:  [93 %-97 %] 95 %  BP: (128-162)/(58-80) 128/71     Weight: (!) 141.7 kg (312 lb 6.3 oz)  Body mass index is 44.82 kg/m².    Intake/Output Summary (Last 24 hours) at 7/3/2022 1711  Last data filed at 7/3/2022 1639  Gross per 24 hour   Intake 1230 ml   Output 2400 ml   Net -1170 ml      Physical Exam  Vitals and nursing note reviewed.   Constitutional:       Appearance: He is obese.   HENT:      Head: Normocephalic.      Nose: Nose normal.      Mouth/Throat:      Mouth: Mucous membranes are moist.   Eyes:      Extraocular Movements: Extraocular movements intact.      Pupils: Pupils are equal, round, and reactive to light.   Neck:      Comments: Positive JVD  Cardiovascular:      Rate and Rhythm: Normal rate and regular rhythm.      Comments: +3 edema  Pulmonary:      Effort: Pulmonary effort is normal.      Breath sounds: Rhonchi and rales present.      Comments: No air movement in bilateral basilar area.  Abdominal:      General: There is no distension.      Tenderness: There is no abdominal tenderness.      Comments: Obese, no hepatosplenomegaly.   Musculoskeletal:         General: Swelling present. No deformity.      Cervical back: Normal range of motion and neck supple.      Right lower leg: Edema present.      Left lower leg: Edema present.   Skin:     Capillary Refill: Capillary refill takes less than 2 seconds.   Neurological:      General: No focal deficit present.      Mental Status: He is alert and oriented to person, place, and time.   Psychiatric:         Mood and Affect: Mood normal.       Significant Labs: All pertinent labs within the past 24 hours have been reviewed.  Recent Lab Results  (Last 5 results in the  past 24 hours)        07/03/22  1622   07/03/22  1133   07/03/22  0607   07/03/22  0200   07/02/22  2151        Anion Gap       10.0         Baso #       0.07         Basophil %       0.4         BUN       64.0         BUN/CREAT RATIO       14         Calcium       8.4         Chloride       101         CO2       23         Creatinine       4.46         eGFR if non        14         Eos #       0.38         Eosinophil %       2.0         Glucose       143         Hematocrit       33.1         Hemoglobin       10.8         Immature Grans (Abs)       0.19         Immature Granulocytes       1.0         Lymph #       2.63         LYMPH %       13.9         Magnesium       2.20         MCH       30.7         MCHC       32.6         MCV       94.0         Mono #       1.64         Mono %       8.6         MPV       9.2         Neut #       14.1         Neut %       74.1         Platelets       237         POCT Glucose 157   110   110     99       Potassium       4.8         RBC       3.52         RDW       12.3         Sodium       134         WBC       19.0                                Significant Imaging: I have reviewed all pertinent imaging results/findings within the past 24 hours.      Assessment/Plan:      * SHANDRA (acute kidney injury)  GFR and 15.  Continue with hemodialysis.  Suspect patient to have underlying condition including congestive heart failure.  Outside obtain a copy of 2D echocardiogram.      Pleural effusion, bilateral  Repeat chest x-ray in the morning  If pleural effusion persists recommend thoracocentesis for evaluation of pleural effusion.  Suspect pleural effusion secondary to congestive heart failure versus renal failure.  Will follow-up with nephrology.      Morbid obesity  Morbid obese body mass index more than 40. Educated patient about weight loss.      Hypoxia        Pyelonephritis        Bilateral lower extremity edema        Leukocytosis        Hyperkalemia        Type 2  diabetes mellitus  ISS      Hypertension  Current meds  follow      VTE Risk Mitigation (From admission, onward)         Ordered     IP VTE HIGH RISK PATIENT  Once         06/27/22 1739     Place sequential compression device  Until discontinued         06/27/22 1739                Discharge Planning   MATT: 6/27/2022     Code Status: Full Code   Is the patient medically ready for discharge?:     Reason for patient still in hospital (select all that apply): Patient trending condition                     Jarod Roca MD  Department of Hospital Medicine   [facility] - [facility] Surgical Unit

## 2022-07-03 NOTE — PT/OT/SLP PROGRESS
Physical Therapy Treatment    Patient Name:  Neno Watkins   MRN:  [id number]    Recommendations:     Discharge Recommendations:      Discharge Equipment Recommendations:     Barriers to discharge: None    Assessment:     Neno Watkins is a 61 y.o. male admitted with a medical diagnosis of SHANDRA (acute kidney injury).  He presents with the following impairments/functional limitations:  weakness, impaired endurance, impaired balance, gait instability.    Patient motivated to participate today.  He was able to stand EOB for up to 10 minutes performing BLE marching and mini squats.  He also was able to ambulate 50 ft in room with walker, min A on room air.  He did require many verbal cues for safety awareness while performing mobility tasks to decrease fall risk such as proper  and handling of standard walker.    Rehab Prognosis: Good; patient would benefit from acute skilled PT services to address these deficits and reach maximum level of function.    Recent Surgery: Procedure(s) (LRB):  INSERTION, CATHETER, HEMODIALYSIS, DUAL LUMEN (Right) 6 Days Post-Op    Plan:     During this hospitalization, patient to be seen daily to address the identified rehab impairments via gait training, therapeutic activities, therapeutic exercises and progress toward the following goals:    · Plan of Care Expires:  07/26/22    Subjective     Chief Complaint: Weakness    Pain/Comfort:  · Pain Rating 1: 0/10      Objective:     Communicated with nurse prior to session.  Patient found HOB elevated with telemetry, whitehead catheter, oxygen upon PT entry to room.     General Precautions: Standard, fall   Orthopedic Precautions:    Braces:    Respiratory Status: Nasal cannula, flow 1 L/min     Functional Mobility:  · Bed Mobility:     · Scooting: minimum assistance  · Supine to Sit: moderate assistance  · Sit to Supine: moderate assistance  · Transfers:     · Sit to Stand:  minimum assistance with standard walker  · Gait: 50 ft, min A,  standard walker  · Balance: standing with standard walker, min A          Patient left HOB elevated with call button in reach..    GOALS:   Multidisciplinary Problems     Physical Therapy Goals        Problem: Physical Therapy    Goal Priority Disciplines Outcome Goal Variances Interventions   Physical Therapy Goal     PT, PT/OT Ongoing, Progressing     Description: Goals to be met by: 22     Patient will increase functional independence with mobility by performin. Supine to sit with Modified [place]  2. Sit to stand transfer with Contact Guard Assistance  3. Gait  x 150 feet with Contact Guard Assistance using Rolling Walker.                      Time Tracking:     PT Received On: 22  PT Start Time: 45     PT Stop Time: 15  PT Total Time (min): 30 min     Billable Minutes: Therapeutic Activity 30    Treatment Type: Treatment  PT/PTA: PT           2022

## 2022-07-04 PROBLEM — Z99.2 HEMODIALYSIS STATUS: Status: ACTIVE | Noted: 2022-07-04

## 2022-07-04 PROBLEM — Z99.2 HEMODIALYSIS STATUS: Chronic | Status: ACTIVE | Noted: 2022-07-04

## 2022-07-04 PROBLEM — R09.02 HYPOXIA: Chronic | Status: ACTIVE | Noted: 2022-06-27

## 2022-07-04 LAB
ALBUMIN SERPL-MCNC: 2.3 GM/DL (ref 3.4–4.8)
ALBUMIN/GLOB SERPL: 0.5 RATIO (ref 1.1–2)
ALP SERPL-CCNC: 109 UNIT/L (ref 40–150)
ALT SERPL-CCNC: 31 UNIT/L (ref 0–55)
AST SERPL-CCNC: 38 UNIT/L (ref 5–34)
BILIRUBIN DIRECT+TOT PNL SERPL-MCNC: 0.6 MG/DL
BUN SERPL-MCNC: 73 MG/DL (ref 8.4–25.7)
CALCIUM SERPL-MCNC: 8.5 MG/DL (ref 8.8–10)
CHLORIDE SERPL-SCNC: 98 MMOL/L (ref 98–107)
CO2 SERPL-SCNC: 23 MMOL/L (ref 23–31)
CREAT SERPL-MCNC: 5.28 MG/DL (ref 0.73–1.18)
EST. AVERAGE GLUCOSE BLD GHB EST-MCNC: 111.2 MG/DL
GLOBULIN SER-MCNC: 4.5 GM/DL (ref 2.4–3.5)
GLUCOSE SERPL-MCNC: 137 MG/DL (ref 82–115)
HBA1C MFR BLD: 5.5 %
POCT GLUCOSE: 105 MG/DL (ref 70–110)
POCT GLUCOSE: 116 MG/DL (ref 70–110)
POCT GLUCOSE: 149 MG/DL (ref 70–110)
POCT GLUCOSE: 153 MG/DL (ref 70–110)
POTASSIUM SERPL-SCNC: 5.1 MMOL/L (ref 3.5–5.1)
PROT SERPL-MCNC: 6.8 GM/DL (ref 5.8–7.6)
SODIUM SERPL-SCNC: 133 MMOL/L (ref 136–145)

## 2022-07-04 PROCEDURE — 97110 THERAPEUTIC EXERCISES: CPT

## 2022-07-04 PROCEDURE — 80100014 HC HEMODIALYSIS 1:1

## 2022-07-04 PROCEDURE — 97116 GAIT TRAINING THERAPY: CPT

## 2022-07-04 PROCEDURE — 36415 COLL VENOUS BLD VENIPUNCTURE: CPT | Performed by: EMERGENCY MEDICINE

## 2022-07-04 PROCEDURE — 63600175 PHARM REV CODE 636 W HCPCS: Performed by: INTERNAL MEDICINE

## 2022-07-04 PROCEDURE — 83036 HEMOGLOBIN GLYCOSYLATED A1C: CPT | Performed by: EMERGENCY MEDICINE

## 2022-07-04 PROCEDURE — 80053 COMPREHEN METABOLIC PANEL: CPT | Performed by: EMERGENCY MEDICINE

## 2022-07-04 PROCEDURE — 97530 THERAPEUTIC ACTIVITIES: CPT

## 2022-07-04 PROCEDURE — 11000001 HC ACUTE MED/SURG PRIVATE ROOM

## 2022-07-04 PROCEDURE — A4216 STERILE WATER/SALINE, 10 ML: HCPCS | Performed by: STUDENT IN AN ORGANIZED HEALTH CARE EDUCATION/TRAINING PROGRAM

## 2022-07-04 PROCEDURE — 94761 N-INVAS EAR/PLS OXIMETRY MLT: CPT

## 2022-07-04 PROCEDURE — 25000003 PHARM REV CODE 250: Performed by: INTERNAL MEDICINE

## 2022-07-04 PROCEDURE — 99900035 HC TECH TIME PER 15 MIN (STAT)

## 2022-07-04 PROCEDURE — 25000003 PHARM REV CODE 250: Performed by: STUDENT IN AN ORGANIZED HEALTH CARE EDUCATION/TRAINING PROGRAM

## 2022-07-04 RX ADMIN — FUROSEMIDE 80 MG: 10 INJECTION, SOLUTION INTRAMUSCULAR; INTRAVENOUS at 02:07

## 2022-07-04 RX ADMIN — NIFEDIPINE 60 MG: 30 TABLET, EXTENDED RELEASE ORAL at 08:07

## 2022-07-04 RX ADMIN — MEROPENEM 1 G: 1 INJECTION, POWDER, FOR SOLUTION INTRAVENOUS at 02:07

## 2022-07-04 RX ADMIN — TAMSULOSIN HYDROCHLORIDE 0.4 MG: 0.4 CAPSULE ORAL at 08:07

## 2022-07-04 RX ADMIN — CARVEDILOL 6.25 MG: 6.25 TABLET, FILM COATED ORAL at 09:07

## 2022-07-04 RX ADMIN — CARVEDILOL 6.25 MG: 6.25 TABLET, FILM COATED ORAL at 08:07

## 2022-07-04 RX ADMIN — METOLAZONE 10 MG: 2.5 TABLET ORAL at 08:07

## 2022-07-04 RX ADMIN — LINEZOLID 600 MG: 600 INJECTION, SOLUTION INTRAVENOUS at 01:07

## 2022-07-04 RX ADMIN — Medication 6 MG: at 09:07

## 2022-07-04 RX ADMIN — FUROSEMIDE 80 MG: 10 INJECTION, SOLUTION INTRAMUSCULAR; INTRAVENOUS at 12:07

## 2022-07-04 RX ADMIN — LINEZOLID 600 MG: 600 INJECTION, SOLUTION INTRAVENOUS at 02:07

## 2022-07-04 RX ADMIN — SODIUM CHLORIDE, PRESERVATIVE FREE 10 ML: 5 INJECTION INTRAVENOUS at 06:07

## 2022-07-04 RX ADMIN — ACETAMINOPHEN 325MG 650 MG: 325 TABLET ORAL at 09:07

## 2022-07-04 RX ADMIN — MEROPENEM 1 G: 1 INJECTION, POWDER, FOR SOLUTION INTRAVENOUS at 01:07

## 2022-07-04 RX ADMIN — SODIUM CHLORIDE, PRESERVATIVE FREE 10 ML: 5 INJECTION INTRAVENOUS at 12:07

## 2022-07-04 NOTE — PT/OT/SLP PROGRESS
Physical Therapy Treatment    Patient Name:  Neno Watkins   MRN:  [id number]    Recommendations:     Discharge Recommendations:      Discharge Equipment Recommendations:     Barriers to discharge: None    Assessment:     Neno Watkins is a 61 y.o. male admitted with a medical diagnosis of SHANDRA (acute kidney injury).  He presents with the following impairments/functional limitations:     Rehab Prognosis: Good; patient would benefit from acute skilled PT services to address these deficits and reach maximum level of function.    Recent Surgery: Procedure(s) (LRB):  INSERTION, CATHETER, HEMODIALYSIS, DUAL LUMEN (Right) 7 Days Post-Op    Plan:     During this hospitalization, patient to be seen daily to address the identified rehab impairments via gait training, therapeutic activities, therapeutic exercises and progress toward the following goals:    · Plan of Care Expires:  22    Subjective     Chief Complaint: LBP  Patient/Family Comments/goals:  Pain/Comfort:  ·        Objective:     Patient found supine with   upon PT entry to room.     General Precautions: Standard, fall   Orthopedic Precautions:    Braces:    Respiratory Status: Room air     Functional Mobility:  · Bed Mobility:     · Rolling Left:  moderate assistance  · Gait: 125'      AM-PAC 6 CLICK MOBILITY          Therapeutic Activities and Exercises:   Sit<>stands x5 trials EOB<>RW Min A  BLE seated and standing therapeutic exercises to improve pt strength and endurance to facilitate ability to return to PLOF.    Patient left up in chair with call button in reach..    GOALS:   Multidisciplinary Problems     Physical Therapy Goals        Problem: Physical Therapy    Goal Priority Disciplines Outcome Goal Variances Interventions   Physical Therapy Goal     PT, PT/OT Ongoing, Progressing     Description: Goals to be met by: 22     Patient will increase functional independence with mobility by performin. Supine to sit with Modified  [place]  2. Sit to stand transfer with Contact Guard Assistance  3. Gait  x 150 feet with Contact Guard Assistance using Rolling Walker.                      Time Tracking:     PT Received On: 07/04/22  PT Start Time: 0930     PT Stop Time: 1015  PT Total Time (min): 45 min     Billable Minutes: Gait Training 15, Therapeutic Activity 15 and Therapeutic Exercise 15                   07/04/2022

## 2022-07-04 NOTE — SUBJECTIVE & OBJECTIVE
Interval History:  Patient feeling the same.  Weak.  Denies any shortness of breath.  Denies nausea vomiting.  Wants to know if diabetes can be cured.    Review of Systems   Constitutional:  Positive for activity change, appetite change and fatigue. Negative for chills and unexpected weight change.   HENT: Negative.     Eyes: Negative.    Respiratory:  Positive for cough. Negative for shortness of breath.    Cardiovascular:  Positive for chest pain. Negative for palpitations.   Gastrointestinal:  Positive for abdominal distention. Negative for abdominal pain and anal bleeding.   Genitourinary:         Decreased urine output   Musculoskeletal:  Positive for arthralgias and back pain.   Skin: Negative.    All other systems reviewed and are negative.  Objective:     Vital Signs (Most Recent):  Temp: 98.4 °F (36.9 °C) (07/04/22 1300)  Pulse: 62 (07/04/22 1300)  Resp: 18 (07/03/22 2100)  BP: 139/65 (07/04/22 1300)  SpO2: 95 % (07/04/22 1300) Vital Signs (24h Range):  Temp:  [98.4 °F (36.9 °C)-98.8 °F (37.1 °C)] 98.4 °F (36.9 °C)  Pulse:  [62-85] 62  Resp:  [17-18] 18  SpO2:  [93 %-96 %] 95 %  BP: (128-153)/(63-75) 139/65     Weight: (!) 141.7 kg (312 lb 6.3 oz)  Body mass index is 44.82 kg/m².    Intake/Output Summary (Last 24 hours) at 7/4/2022 1443  Last data filed at 7/4/2022 1400  Gross per 24 hour   Intake 2180 ml   Output 1000 ml   Net 1180 ml      Physical Exam  Nursing note reviewed.   Constitutional:       Appearance: He is obese. He is ill-appearing.   HENT:      Head: Normocephalic and atraumatic.      Comments: Dialysis catheter on the right upper side of the neck  Eyes:      General: Lids are normal. Lids are everted, no foreign bodies appreciated.   Cardiovascular:      Rate and Rhythm: Normal rate and regular rhythm.      Chest Wall: PMI is not displaced.   Pulmonary:      Effort: Pulmonary effort is normal.      Breath sounds: Decreased air movement present.   Chest:      Chest wall: No mass or  lacerations.   Abdominal:      General: Abdomen is protuberant.      Palpations: Abdomen is soft. There is no shifting dullness, hepatomegaly or mass.      Tenderness: There is no abdominal tenderness.   Musculoskeletal:      Cervical back: Full passive range of motion without pain and normal range of motion.   Lymphadenopathy:      Head:      Right side of head: No submental adenopathy.      Left side of head: No submental adenopathy.   Neurological:      General: No focal deficit present.      Mental Status: He is alert and oriented to person, place, and time.       Significant Labs: All pertinent labs within the past 24 hours have been reviewed.  Recent Lab Results  (Last 5 results in the past 24 hours)        07/04/22  1112   07/04/22  0321   07/04/22  0253   07/03/22  2057   07/03/22  1622        Albumin/Globulin Ratio     0.5           Albumin     2.3           Alkaline Phosphatase     109           ALT     31           AST     38           BILIRUBIN TOTAL     0.6           BUN     73.0           Calcium     8.5           Chloride     98           CO2     23           Creatinine     5.28           eGFR if non      12           Estimated Avg Glucose   111.2             Globulin, Total     4.5           Glucose     137           Hemoglobin A1C External   5.5             POCT Glucose 149       116   157       Potassium     5.1           PROTEIN TOTAL     6.8           Sodium     133                                  Significant Imaging: I have reviewed all pertinent imaging results/findings within the past 24 hours.

## 2022-07-04 NOTE — PROGRESS NOTES
[facility] - [facility] Surgical Unit  Hospital Medicine  Progress Note    Patient Name: Neno Watkins  MRN: [id number]  Patient Class: IP- Inpatient   Admission Date: 6/27/2022  Length of Stay: 7 days  Attending Physician: Cam Medina MD  Primary Care Provider: No primary care provider on file.        Subjective:     Principal Problem:SHANDRA (acute kidney injury)        HPI:  62yo WM with h/o Morbid Obesity, HTN, DM2, HLD, ?MI transferred from [facility] for SHANDRA. Pt originally presented to their ED 7/21 c/o back pain after fall at home. Noted to have SHANDRA with Hyperkalemia and admitted for tx. Pt tx'd with IVF and initially Cr improved but there was concern of CHF/fluid overload so IVF held and pt given and Cr worsened and UOP declined. ECHO however was normal with EF 55% and no e/o diastolic dysfunction. BNP was 109 as well. Pt given Lasix as well with increased UOP but worsening Cr. Today pt was more SOB with incr LE edema. BUN and Cr progressively worsening,hyperkalemic, oliguric, more hypertensive. Has moderate bilat pleural effusion on imaging as well. Transferred to [facility] for Nephrology services.     Pt finally arrived to floor this evening c/o CP/SOB and was diaphoretic, tachycardic and hypertensive. There were no labs repeated after this morning at 04:30. Ordered stat chem, Trop, ABG, EKG, Lopressor IV and IV Morphine. Pt seen soon thereafter and appears ill. Placed on 15L OxyMask for hypoxia O2 sat 88% on 4L NC. Labs returned with K+ 7.3, BUN 84, Cr 4.48, PO4 8.4, Trop 0.018, EKG NSR w/o acute ST-T wave changes (sp no peaked T-waves). Nephrology consulted for urgent HD and GenSurg consulted for urgent Dialysis Catheter placement. Pt will be brought down to OR for procedure and Anesth plans to intubate. Pt will go to ICU after procedure and then receive HD.       Overview/Hospital Course:  6/29/22-Patient is sitting up in the chair at this time.  He will undergo HD today.   He is feeling better since admit.  Will likely watch at least one more day in the ICU.      6/30/22-Today is day 4 of HD.  A large amount of fluid has been removed.  He is still producing urine as well.  Overall he is feeling better.    7/1/22-No changes today.  He is undergoing HD at this time.  His legs do appear to be softening up some with less edema.  Spoke with Dr. Gillis and will continue with ultrafiltration for now.  Will likely need OP treatment but will decide next week.    7/2/22-Patient is doing good today.  His edema is improving.  He has had 16+ liters of fluid removed with UF.  He also states his breathing is improving.    07/03/2022.  Patient at present time is on room air supine.  Minimal shortness of breath with ambulation.  Underwent hemodialysis.  Baldwin catheter still with good urine output.  Chart was reviewed in detail.  Somehow patient had no diastolic dysfunction but mild decrease of ejection fraction on 2D echocardiogram  Not sure exactly patient source of pleural effusion other than congestive heart failure.  Will continue current treatment.  Repeat chest x-ray in the morning.  If patient persists to have pleural effusion will recommend the thoracocentesis for evaluation pleural effusion source.    07/04/2022.  Patient doing the same.  Appears to be weak fatigued tired.  Going through hemodialysis  Worsening BUN and creatinine  Continue current treatment.  I think patient will require long-term hemodialysis.  Follow-up with PT OT and arrange for disposition/discharge by arranging outpatient hemodialysis if okay with Nephrology.        Interval History:  Patient feeling the same.  Weak.  Denies any shortness of breath.  Denies nausea vomiting.  Wants to know if diabetes can be cured.    Review of Systems   Constitutional:  Positive for activity change, appetite change and fatigue. Negative for chills and unexpected weight change.   HENT: Negative.     Eyes: Negative.    Respiratory:   Positive for cough. Negative for shortness of breath.    Cardiovascular:  Positive for chest pain. Negative for palpitations.   Gastrointestinal:  Positive for abdominal distention. Negative for abdominal pain and anal bleeding.   Genitourinary:         Decreased urine output   Musculoskeletal:  Positive for arthralgias and back pain.   Skin: Negative.    All other systems reviewed and are negative.  Objective:     Vital Signs (Most Recent):  Temp: 98.4 °F (36.9 °C) (07/04/22 1300)  Pulse: 62 (07/04/22 1300)  Resp: 18 (07/03/22 2100)  BP: 139/65 (07/04/22 1300)  SpO2: 95 % (07/04/22 1300) Vital Signs (24h Range):  Temp:  [98.4 °F (36.9 °C)-98.8 °F (37.1 °C)] 98.4 °F (36.9 °C)  Pulse:  [62-85] 62  Resp:  [17-18] 18  SpO2:  [93 %-96 %] 95 %  BP: (128-153)/(63-75) 139/65     Weight: (!) 141.7 kg (312 lb 6.3 oz)  Body mass index is 44.82 kg/m².    Intake/Output Summary (Last 24 hours) at 7/4/2022 1443  Last data filed at 7/4/2022 1400  Gross per 24 hour   Intake 2180 ml   Output 1000 ml   Net 1180 ml      Physical Exam  Nursing note reviewed.   Constitutional:       Appearance: He is obese. He is ill-appearing.   HENT:      Head: Normocephalic and atraumatic.      Comments: Dialysis catheter on the right upper side of the neck  Eyes:      General: Lids are normal. Lids are everted, no foreign bodies appreciated.   Cardiovascular:      Rate and Rhythm: Normal rate and regular rhythm.      Chest Wall: PMI is not displaced.   Pulmonary:      Effort: Pulmonary effort is normal.      Breath sounds: Decreased air movement present.   Chest:      Chest wall: No mass or lacerations.   Abdominal:      General: Abdomen is protuberant.      Palpations: Abdomen is soft. There is no shifting dullness, hepatomegaly or mass.      Tenderness: There is no abdominal tenderness.   Musculoskeletal:      Cervical back: Full passive range of motion without pain and normal range of motion.   Lymphadenopathy:      Head:      Right side of head:  No submental adenopathy.      Left side of head: No submental adenopathy.   Neurological:      General: No focal deficit present.      Mental Status: He is alert and oriented to person, place, and time.       Significant Labs: All pertinent labs within the past 24 hours have been reviewed.  Recent Lab Results  (Last 5 results in the past 24 hours)        07/04/22  1112   07/04/22  0321   07/04/22  0253   07/03/22  2057   07/03/22  1622        Albumin/Globulin Ratio     0.5           Albumin     2.3           Alkaline Phosphatase     109           ALT     31           AST     38           BILIRUBIN TOTAL     0.6           BUN     73.0           Calcium     8.5           Chloride     98           CO2     23           Creatinine     5.28           eGFR if non      12           Estimated Avg Glucose   111.2             Globulin, Total     4.5           Glucose     137           Hemoglobin A1C External   5.5             POCT Glucose 149       116   157       Potassium     5.1           PROTEIN TOTAL     6.8           Sodium     133                                  Significant Imaging: I have reviewed all pertinent imaging results/findings within the past 24 hours.      Assessment/Plan:      * SHANDRA (acute kidney injury)  Worsening and not improving acute kidney injury.  Suspect to be not improved will and require hemodialysis.  To follow up with Nephrology      Hemodialysis status  Secondary to worsening BUN and creatinine.  Follow-up with Nephrology      Pleural effusion, bilateral  Chest x-ray in the morning      Hypoxia  Hypoxemia present on arrival improved the present time      Type 2 diabetes mellitus  ISS  Check hemoglobin A1c in the morning.    Morbid obesity  Morbid obese body mass index more than 40. Educated patient about weight loss.      Pyelonephritis        Bilateral lower extremity edema        Leukocytosis        Hyperkalemia        Hypertension  Current meds  follow      VTE Risk  Mitigation (From admission, onward)         Ordered     IP VTE HIGH RISK PATIENT  Once         06/27/22 1739     Place sequential compression device  Until discontinued         06/27/22 1739                Discharge Planning   MATT: 6/27/2022     Code Status: Full Code   Is the patient medically ready for discharge?:     Reason for patient still in hospital (select all that apply): Patient trending condition                     Jarod Roca MD  Department of Hospital Medicine   [facility]

## 2022-07-04 NOTE — ASSESSMENT & PLAN NOTE
Worsening and not improving acute kidney injury.  Suspect to be not improved will and require hemodialysis.  To follow up with Nephrology

## 2022-07-05 LAB
ALBUMIN SERPL-MCNC: 2.2 GM/DL (ref 3.4–4.8)
ALBUMIN/GLOB SERPL: 0.5 RATIO (ref 1.1–2)
ALP SERPL-CCNC: 107 UNIT/L (ref 40–150)
ALT SERPL-CCNC: 24 UNIT/L (ref 0–55)
AST SERPL-CCNC: 32 UNIT/L (ref 5–34)
BILIRUBIN DIRECT+TOT PNL SERPL-MCNC: 0.6 MG/DL
BUN SERPL-MCNC: 69 MG/DL (ref 8.4–25.7)
CALCIUM SERPL-MCNC: 8.4 MG/DL (ref 8.8–10)
CHLORIDE SERPL-SCNC: 98 MMOL/L (ref 98–107)
CO2 SERPL-SCNC: 21 MMOL/L (ref 23–31)
CREAT SERPL-MCNC: 5.88 MG/DL (ref 0.73–1.18)
GLOBULIN SER-MCNC: 4.3 GM/DL (ref 2.4–3.5)
GLUCOSE SERPL-MCNC: 135 MG/DL (ref 82–115)
POCT GLUCOSE: 108 MG/DL (ref 70–110)
POCT GLUCOSE: 127 MG/DL (ref 70–110)
POCT GLUCOSE: 131 MG/DL (ref 70–110)
POCT GLUCOSE: 159 MG/DL (ref 70–110)
POTASSIUM SERPL-SCNC: 5 MMOL/L (ref 3.5–5.1)
PROT SERPL-MCNC: 6.5 GM/DL (ref 5.8–7.6)
SODIUM SERPL-SCNC: 133 MMOL/L (ref 136–145)

## 2022-07-05 PROCEDURE — 80053 COMPREHEN METABOLIC PANEL: CPT | Performed by: EMERGENCY MEDICINE

## 2022-07-05 PROCEDURE — 25000003 PHARM REV CODE 250: Performed by: INTERNAL MEDICINE

## 2022-07-05 PROCEDURE — 25000003 PHARM REV CODE 250: Performed by: STUDENT IN AN ORGANIZED HEALTH CARE EDUCATION/TRAINING PROGRAM

## 2022-07-05 PROCEDURE — 97110 THERAPEUTIC EXERCISES: CPT

## 2022-07-05 PROCEDURE — 94761 N-INVAS EAR/PLS OXIMETRY MLT: CPT

## 2022-07-05 PROCEDURE — 36415 COLL VENOUS BLD VENIPUNCTURE: CPT | Performed by: EMERGENCY MEDICINE

## 2022-07-05 PROCEDURE — 11000001 HC ACUTE MED/SURG PRIVATE ROOM

## 2022-07-05 PROCEDURE — 63600175 PHARM REV CODE 636 W HCPCS: Performed by: INTERNAL MEDICINE

## 2022-07-05 PROCEDURE — 97530 THERAPEUTIC ACTIVITIES: CPT

## 2022-07-05 PROCEDURE — 99900035 HC TECH TIME PER 15 MIN (STAT)

## 2022-07-05 PROCEDURE — A4216 STERILE WATER/SALINE, 10 ML: HCPCS | Performed by: STUDENT IN AN ORGANIZED HEALTH CARE EDUCATION/TRAINING PROGRAM

## 2022-07-05 PROCEDURE — 97535 SELF CARE MNGMENT TRAINING: CPT

## 2022-07-05 RX ADMIN — LINEZOLID 600 MG: 600 INJECTION, SOLUTION INTRAVENOUS at 01:07

## 2022-07-05 RX ADMIN — CARVEDILOL 6.25 MG: 6.25 TABLET, FILM COATED ORAL at 09:07

## 2022-07-05 RX ADMIN — LINEZOLID 600 MG: 600 INJECTION, SOLUTION INTRAVENOUS at 03:07

## 2022-07-05 RX ADMIN — FUROSEMIDE 80 MG: 10 INJECTION, SOLUTION INTRAMUSCULAR; INTRAVENOUS at 03:07

## 2022-07-05 RX ADMIN — SODIUM CHLORIDE, PRESERVATIVE FREE 10 ML: 5 INJECTION INTRAVENOUS at 02:07

## 2022-07-05 RX ADMIN — TRAMADOL HYDROCHLORIDE 50 MG: 50 TABLET, COATED ORAL at 08:07

## 2022-07-05 RX ADMIN — FUROSEMIDE 80 MG: 10 INJECTION, SOLUTION INTRAMUSCULAR; INTRAVENOUS at 01:07

## 2022-07-05 RX ADMIN — NIFEDIPINE 60 MG: 30 TABLET, EXTENDED RELEASE ORAL at 09:07

## 2022-07-05 RX ADMIN — CARVEDILOL 6.25 MG: 6.25 TABLET, FILM COATED ORAL at 08:07

## 2022-07-05 RX ADMIN — SODIUM CHLORIDE, PRESERVATIVE FREE 10 ML: 5 INJECTION INTRAVENOUS at 05:07

## 2022-07-05 RX ADMIN — MEROPENEM 500 MG: 500 INJECTION, POWDER, FOR SOLUTION INTRAVENOUS at 02:07

## 2022-07-05 RX ADMIN — METOLAZONE 10 MG: 2.5 TABLET ORAL at 09:07

## 2022-07-05 RX ADMIN — MEROPENEM 500 MG: 500 INJECTION, POWDER, FOR SOLUTION INTRAVENOUS at 01:07

## 2022-07-05 RX ADMIN — TAMSULOSIN HYDROCHLORIDE 0.4 MG: 0.4 CAPSULE ORAL at 09:07

## 2022-07-05 NOTE — PT/OT/SLP PROGRESS
Occupational Therapy   Treatment    Name: Neno Watkins  MRN: [id number]  Admitting Diagnosis:  SHANDRA (acute kidney injury)  8 Days Post-Op    Recommendations:     Discharge Recommendations: home, home health OT  Discharge Equipment Recommendations:  walker, rolling, wheelchair, bath bench, bedside commode  Barriers to discharge:       Assessment:     Neno Watkins is a 61 y.o. male with a medical diagnosis of SHANDRA (acute kidney injury).  He presents with wife present. Performance deficits affecting function are weakness, impaired endurance, impaired self care skills, impaired balance.     Rehab Prognosis:  Good; patient would benefit from acute skilled OT services to address these deficits and reach maximum level of function.       Plan:     Patient to be seen 3 x/week to address the above listed problems via self-care/home management, therapeutic activities, therapeutic exercises  · Plan of Care Expires: 07/26/22  · Plan of Care Reviewed with: patient    Subjective     Pain/Comfort:  · Pain Rating 1: 0/10    Objective:     Communicated with: patient prior to session.  Patient found up in chair with central line, PICC line upon OT entry to room.    General Precautions: Standard, fall   Orthopedic Precautions:    Braces:    Respiratory Status: Room air     Occupational Performance:     Functional Mobility/Transfers:  · Patient completed Toilet Transfer Stand Pivot technique with minimum assistance with  standard walker  ·     Activities of Daily Living:  · Toileting: minimum assistance for personal hygiene and clothing management      [facility] 6 Click ADL:      Treatment & Education:  Pt completed BUE AROM without resistance and no c/o of shoulder or headache with arm ex. Pt able to complete 2X10 reps all joints/all planes. Pt able to complete 4 sets before requiring rest. Pt with sit<>stand X2; t/f to commode for toileting. Pt with frequent verbal cues to follow simple one step directions for ADLs tasks and required  visual demo to follow arm ex due to difficulty following verbal directions for tasks without visual cues.     Patient left up in chair with all lines intact and call button in reachEducation:      GOALS:   Multidisciplinary Problems     Occupational Therapy Goals        Problem: Occupational Therapy    Goal Priority Disciplines Outcome Interventions   Occupational Therapy Goal     OT, PT/OT Ongoing, Progressing    Description: Goals to be met by: 7/27/22    Patient will increase functional independence with ADLs by performing:    Toileting from bedside commode with Supervision for hygiene and clothing management.   Increased functional strength to 4/5 for increase (I) with ADLs.  Upper extremity exercise program 20 reps per handout, with assistance as needed.                     Time Tracking:     OT Date of Treatment: 07/05/22  OT Start Time: 1105  OT Stop Time: 1135  OT Total Time (min): 30 min    Billable Minutes:Self Care/Home Management 15  Therapeutic Exercise 15    OT/PAUL: OT          7/5/2022

## 2022-07-05 NOTE — PROGRESS NOTES
[facility]  Hospital Medicine Progress Note    Patient Name: Neno Watkins  Age: 61 y.o.   MRN: [id number]  Admission Date: 6/27/2022  5:06 PM   Hospital Length of Stay: 8 days  Code Status: Full Code  Attending Provider: Cam Medina MD  Primary Care Provider: No primary care provider on file.   Chief Complaint:   Chief Complaint   Patient presents with    Transfer from [facility]     SHANDRA    f/u hypoxemia    f/u renal failure            SUBJECTIVE:     Follow-up For:   SHANDRA (acute kidney injury)    HPI:  60yo WM with h/o Morbid Obesity, HTN, DM2, HLD, ?MI transferred from [facility] for SHANDRA. Pt originally presented to their ED 7/21 c/o back pain after fall at home. Noted to have SHANDRA with Hyperkalemia and admitted for tx. Pt tx'd with IVF and initially Cr improved but there was concern of CHF/fluid overload so IVF held and pt given and Cr worsened and UOP declined. ECHO however was normal with EF 55% and no e/o diastolic dysfunction. BNP was 109 as well. Pt given Lasix as well with increased UOP but worsening Cr. Today pt was more SOB with incr LE edema. BUN and Cr progressively worsening,hyperkalemic, oliguric, more hypertensive. Has moderate bilat pleural effusion on imaging as well. Transferred to [facility] for Nephrology services.     Pt finally arrived to floor this evening c/o CP/SOB and was diaphoretic, tachycardic and hypertensive. There were no labs repeated after this morning at 04:30. Ordered stat chem, Trop, ABG, EKG, Lopressor IV and IV Morphine. Pt seen soon thereafter and appears ill. Placed on 15L OxyMask for hypoxia O2 sat 88% on 4L NC. Labs returned with K+ 7.3, BUN 84, Cr 4.48, PO4 8.4, Trop 0.018, EKG NSR w/o acute ST-T wave changes (sp no peaked T-waves). Nephrology consulted for urgent HD and GenSurg consulted for urgent Dialysis Catheter placement. Pt will be brought down to OR for procedure and Anesth plans to  intubate. Pt will go to ICU after procedure and then receive HD.        Hospital Coarse:  6/29/22-Patient is sitting up in the chair at this time.  He will undergo HD today.  He is feeling better since admit.  Will likely watch at least one more day in the ICU.      6/30/22-Today is day 4 of HD.  A large amount of fluid has been removed.  He is still producing urine as well.  Overall he is feeling better.    7/1/22-No changes today.  He is undergoing HD at this time.  His legs do appear to be softening up some with less edema.  Spoke with Dr. Gillis and will continue with ultrafiltration for now.  Will likely need OP treatment but will decide next week.    7/2/22-Patient is doing good today.  His edema is improving.  He has had 16+ liters of fluid removed with UF.  He also states his breathing is improving.    07/03/2022.  Patient at present time is on room air supine.  Minimal shortness of breath with ambulation.  Underwent hemodialysis.  Baldwin catheter still with good urine output.  Chart was reviewed in detail.  Somehow patient had no diastolic dysfunction but mild decrease of ejection fraction on 2D echocardiogram  Not sure exactly patient source of pleural effusion other than congestive heart failure.  Will continue current treatment.  Repeat chest x-ray in the morning.  If patient persists to have pleural effusion will recommend the thoracocentesis for evaluation pleural effusion source.    07/04/2022.  Patient doing the same.  Appears to be weak fatigued tired.  Going through hemodialysis  Worsening BUN and creatinine  Continue current treatment.  I think patient will require long-term hemodialysis.  Follow-up with PT OT and arrange for disposition/discharge by arranging outpatient hemodialysis if okay with Nephrology.         Last 24h: No acute events. No new complaints. Participating with PT.         Scheduled Meds:   carvediloL  6.25 mg Oral BID    dextrose 5 % and 0.45% NaCl  500 mL Intravenous Once  "   furosemide (LASIX) injection  80 mg Intravenous Once    furosemide (LASIX) injection  80 mg Intravenous Q12H    linezolid  600 mg Intravenous Q12H    meropenem (MERREM) IVPB  500 mg Intravenous Q12H    metOLazone  10 mg Oral Daily    NIFEdipine  60 mg Oral Daily    sodium chloride 0.9%  10 mL Intravenous Q6H    tamsulosin  1 capsule Oral Daily     Continuous Infusions:  PRN Meds:   acetaminophen    albuterol-ipratropium    ALPRAZolam    calcium gluconate IVPB    dextrose 10%    dextrose 10%    dextrose 10%    dextrose 10%    hydrALAZINE    labetalol    LIDOcaine    lorazepam    melatonin    ondansetron    sodium chloride 0.9%    sodium chloride 0.9%    traMADoL      Lines/Drains:   Lines/Drains/Airways     Central Venous Catheter Line  Duration                Hemodialysis Catheter 06/27/22 2025 right internal jugular 7 days          Drain  Duration                Urethral Catheter 06/24/22 0230 Straight-tip 20 Fr. 11 days                    Review of patient's allergies indicates:   Allergen Reactions    Butorphanol Swelling     "it almost killed me"    Hydrocodone-acetaminophen Other (See Comments)     "They mess with my heart"    Povidone-iodine Shortness Of Breath and Rash         Review of Systems: 10 systems reviewed all pertinent positives and negatives stated in HPI, otherwise negative.       OBJECTIVE:     Vital Signs (Most Recent)  Temp: 98.2 °F (36.8 °C) (07/05/22 1200)  Pulse: 74 (07/05/22 1200)  Resp: 20 (07/05/22 1200)  BP: (!) 148/71 (07/05/22 1200)  SpO2: 95 % (07/05/22 1200)    Vital Signs Range (Last 24H):  Temp:  [97.9 °F (36.6 °C)-99 °F (37.2 °C)]   Pulse:  [74-82]   Resp:  [18-20]   BP: (145-156)/(57-76)   SpO2:  [94 %-97 %]     I & O (Last 24H):    Intake/Output Summary (Last 24 hours) at 7/5/2022 1650  Last data filed at 7/5/2022 1200  Gross per 24 hour   Intake 1440 ml   Output 4001 ml   Net -2561 ml       Physical Exam:  Physical Exam  Constitutional:       " General: He is awake. He is not in acute distress.     Appearance: Normal appearance. He is well-developed. He is obese. He is not toxic-appearing.   HENT:      Head: Normocephalic and atraumatic.      Nose: Nose normal. No congestion.      Mouth/Throat:      Mouth: Mucous membranes are moist.      Pharynx: Oropharynx is clear.   Eyes:      General: No scleral icterus.     Extraocular Movements: Extraocular movements intact.      Conjunctiva/sclera: Conjunctivae normal.      Pupils: Pupils are equal, round, and reactive to light.   Neck:      Vascular: No carotid bruit.   Cardiovascular:      Rate and Rhythm: Normal rate and regular rhythm.      Pulses: Normal pulses.      Heart sounds: No murmur heard.    No gallop.   Pulmonary:      Effort: Pulmonary effort is normal. No respiratory distress.      Breath sounds: Examination of the right-lower field reveals decreased breath sounds. Examination of the left-lower field reveals decreased breath sounds. Decreased breath sounds present. No wheezing or rhonchi.   Abdominal:      General: Bowel sounds are normal. There is distension.      Palpations: Abdomen is soft.      Tenderness: There is no abdominal tenderness.   Musculoskeletal:         General: Normal range of motion.      Cervical back: Normal range of motion and neck supple.      Right lower le+ Edema present.      Left lower le+ Edema present.   Skin:     General: Skin is warm and dry.   Neurological:      General: No focal deficit present.      Mental Status: He is alert and oriented to person, place, and time. Mental status is at baseline.      Deep Tendon Reflexes: Reflexes normal.   Psychiatric:         Mood and Affect: Mood normal.         Behavior: Behavior normal. Behavior is cooperative.         Thought Content: Thought content normal.         Judgment: Judgment normal.          Laboratory:  Recent Labs   Lab 22  0520 22  0157 22  0200   WBC 15.3* 16.7* 19.0*   HGB 10.5* 10.8*  10.8*   HCT 32.6* 32.8* 33.1*    241 237        Recent Labs   Lab 07/04/22  0253 07/05/22  0215   * 133*   K 5.1 5.0   CO2 23 21*   BUN 73.0* 69.0*   CREATININE 5.28* 5.88*   CALCIUM 8.5* 8.4*   ALBUMIN 2.3* 2.2*   BILITOT 0.6 0.6   ALKPHOS 109 107   ALT 31 24   AST 38* 32   GLUCOSE 137* 135*           ASSESSMENT/PLAN:     Patient Active Problem List    Diagnosis Date Noted    Oliguria 06/28/2022    Hyperkalemia 06/21/2022    SHANDRA (acute kidney injury) 06/21/2022    Pyelonephritis 06/27/2022    Hypoxia 06/27/2022    Cellulitis of both lower extremities 06/28/2022    Pleural effusion, bilateral 06/28/2022    Leukocytosis 06/22/2022    Bilateral lower extremity edema 06/22/2022    Hemodialysis status 07/04/2022    Benign prostatic hyperplasia 06/21/2022    Elevated liver enzymes 06/21/2022    Gastroesophageal reflux disease 06/21/2022    Hyperlipidemia 06/21/2022    Hypertension 06/21/2022    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 06/21/2022    Morbid obesity 06/21/2022    Peripheral arterial occlusive disease 06/21/2022    Swelling of lower extremity 06/21/2022    Type 2 diabetes mellitus 06/21/2022    Fall at home 06/21/2022        - pt requires outpt HD; will get arranged  - likely home with HH unless pt wants SNF  - cont current  - cont PT/OT  - am labs          VTE Risk Mitigation (From admission, onward)         Ordered     IP VTE HIGH RISK PATIENT  Once         06/27/22 1739     Place sequential compression device  Until discontinued         06/27/22 1739                       Cam Medina MD  Department of Hospital Medicine   [facility]

## 2022-07-05 NOTE — PROGRESS NOTES
"[facility]  Medical Surgical Unit  Adult Nutrition  Progress Note    SUMMARY       Recommendations    Recommendation/Intervention: 1.) Rec'd continue current diet as tolerated. 2.) Continue Eloy, BID.  3.) Daily weights.  4.) Monitor intake, weight, and labs.  Goals: Meet >/= 75% estimated needs by d/c.  Nutrition Goal Status: progressing towards goal  Communication of RD Recs: discussed on rounds    Assessment and Plan    No new Assessment & Plan notes have been filed under this hospital service since the last note was generated.  Service: Nutrition       Malnutrition Assessment     Skin (Micronutrient): wounds unhealed                                 Reason for Assessment    Reason For Assessment: RD follow-up  Diagnosis: renal disease  Relevant Medical History: BPH (benign prostatic hyperplasia)     Essential (primary) hypertension     Obesity, unspecified     PAD (peripheral artery disease)  General Information Comments: 7/5:  Pt with 100% po intake as noted in EMR. On HD. CM working on placement.    Nutrition Risk Screen    Nutrition Risk Screen: no indicators present    Nutrition/Diet History         Anthropometrics    Temp: 98.2 °F (36.8 °C)  Height Method: Stated  Height: 5' 10" (177.8 cm)  Height (inches): 70 in  Weight Method: Bed Scale  Weight: (!) 141.7 kg (312 lb 6.3 oz)  Weight (lb): (!) 312.39 lb  Ideal Body Weight (IBW), Male: 166 lb  % Ideal Body Weight, Male (lb): 193.1 %  BMI (Calculated): 44.8  BMI Grade: greater than 40 - morbid obesity       Lab/Procedures/Meds    Pertinent Labs Reviewed: reviewed  Pertinent Labs Comments: 7/5: Na+ 133(L); BUN 69(H); Crea 5.88(H); GFR 10(L); (H)  Pertinent Medications Reviewed: reviewed  Pertinent Medications Comments: Calcium Gluconate.    Physical Findings/Assessment         Estimated/Assessed Needs    Weight Used For Calorie Calculations: 75.3 kg (166 lb)  Energy Calorie Requirements (kcal): 1900 to 2100 kcals/day  Energy Need Method: " Kcal/kg  Protein Requirements: 110 g/day (1.5g/kg)  Weight Used For Protein Calculations: 75 kg (165 lb 5.5 oz)     Estimated Fluid Requirement Method: RDA Method  RDA Method (mL): 1900         Nutrition Prescription Ordered    Current Diet Order: Renal Dialysis, Diabetic.  Nutrition Order Comments: Pt with good appetite.  Consuming 100% avg of meals per recorded po intake.    Evaluation of Received Nutrient/Fluid Intake    Energy Calories Required: meeting needs  Protein Required: meeting needs  Fluid Required: meeting needs  Tolerance: tolerating  % Intake of Estimated Energy Needs: 75 - 100 %  % Meal Intake: 75 - 100 %    Nutrition Risk    Level of Risk/Frequency of Follow-up: low     Monitor and Evaluation    Food and Nutrient Intake: energy intake, food and beverage intake  Food and Nutrient Adminstration: diet order  Anthropometric Measurements: weight change, weight  Biochemical Data, Medical Tests and Procedures: electrolyte and renal panel, lipid profile, gastrointestinal profile, glucose/endocrine profile, inflammatory profile     Nutrition Follow-Up    RD Follow-up?: Yes

## 2022-07-05 NOTE — PLAN OF CARE
Waiting on nephrology to decide on whether or not pt will need long term HD, in order for me to let [facility]wo[facility] at [facility], HD coordinator know, that he needs to be arranged with a HD center.

## 2022-07-05 NOTE — PT/OT/SLP PROGRESS
Physical Therapy Treatment    Patient Name:  Neno Watkins   MRN:  [id number]    Recommendations:     Discharge Recommendations:      Discharge Equipment Recommendations: walker, rolling   Barriers to discharge: None    Assessment:     Neno Watkins is a 61 y.o. male admitted with a medical diagnosis of SHANDRA (acute kidney injury).  He presents with the following impairments/functional limitations:  weakness, impaired endurance, gait instability, impaired balance.    Pt reports BLE pain and discomfort but overall slowly feeling better. He required min A for mobility this morning and was able to ambulate into hallway. Pt had very slow giovanni and took several standing rest breaks with 125' of gait.Encouraged pt to increase activity throughout the day with sitting up in chair and in BLE ROM.     Rehab Prognosis: Good; patient would benefit from acute skilled PT services to address these deficits and reach maximum level of function.    Recent Surgery: Procedure(s) (LRB):  INSERTION, CATHETER, HEMODIALYSIS, DUAL LUMEN (Right) 8 Days Post-Op    Plan:     During this hospitalization, patient to be seen daily to address the identified rehab impairments via gait training, therapeutic activities, therapeutic exercises and progress toward the following goals:    · Plan of Care Expires:  07/26/22    Subjective     Chief Complaint: fatigue and weakness  Patient/Family Comments/goals: to gait strength in order to return to PLOF  Pain/Comfort:  · Pain Rating 1: 4/10  · Location - Side 1: Bilateral  · Location 1: leg      Objective:     Communicated with patient prior to session.  Patient found HOB elevated with whitehead catheter upon PT entry to room.     General Precautions: Standard, fall   Orthopedic Precautions:    Braces:    Respiratory Status: Room air     Functional Mobility:  · Bed Mobility:     · Supine to Sit: minimum assistance  · Transfers:     · Sit to Stand:  minimum assistance with rolling walker  · Gait: 125' min A  with RW  · Balance: min A in standing with RW for support      AM-PAC 6 CLICK MOBILITY          Therapeutic Activities and Exercises:   see above    Patient left up in chair with all lines intact and call button in reach..    GOALS:   Multidisciplinary Problems     Physical Therapy Goals        Problem: Physical Therapy    Goal Priority Disciplines Outcome Goal Variances Interventions   Physical Therapy Goal     PT, PT/OT Ongoing, Progressing     Description: Goals to be met by: 22     Patient will increase functional independence with mobility by performin. Supine to sit with Modified [place]  2. Sit to stand transfer with Contact Guard Assistance  3. Gait  x 150 feet with Contact Guard Assistance using Rolling Walker.                      Time Tracking:     PT Received On: 22  PT Start Time: 0900     PT Stop Time: 925  PT Total Time (min): 25 min     Billable Minutes: Therapeutic Activity 25    Treatment Type: Treatment  PT/PTA: PTA           2022

## 2022-07-06 LAB
ALBUMIN SERPL-MCNC: 2.2 GM/DL (ref 3.4–4.8)
BASOPHILS # BLD AUTO: 0.08 X10(3)/MCL (ref 0–0.2)
BASOPHILS NFR BLD AUTO: 0.5 %
BUN SERPL-MCNC: 80 MG/DL (ref 8.4–25.7)
CALCIUM SERPL-MCNC: 8.4 MG/DL (ref 8.8–10)
CHLORIDE SERPL-SCNC: 98 MMOL/L (ref 98–107)
CO2 SERPL-SCNC: 22 MMOL/L (ref 23–31)
CREAT SERPL-MCNC: 7.23 MG/DL (ref 0.73–1.18)
EOSINOPHIL # BLD AUTO: 0.23 X10(3)/MCL (ref 0–0.9)
EOSINOPHIL NFR BLD AUTO: 1.4 %
ERYTHROCYTE [DISTWIDTH] IN BLOOD BY AUTOMATED COUNT: 12.4 % (ref 11.5–17)
GLUCOSE SERPL-MCNC: 116 MG/DL (ref 82–115)
GLUCOSE SERPL-MCNC: NORMAL MG/DL (ref 70–110)
HBV CORE AB SERPL QL IA: NONREACTIVE
HBV SURFACE AB SER-ACNC: 0.57 MIU/ML
HBV SURFACE AB SERPL IA-ACNC: NONREACTIVE M[IU]/ML
HCT VFR BLD AUTO: 33.4 % (ref 42–52)
HGB BLD-MCNC: 11 GM/DL (ref 14–18)
IMM GRANULOCYTES # BLD AUTO: 0.16 X10(3)/MCL (ref 0–0.04)
IMM GRANULOCYTES NFR BLD AUTO: 0.9 %
LYMPHOCYTES # BLD AUTO: 3.01 X10(3)/MCL (ref 0.6–4.6)
LYMPHOCYTES NFR BLD AUTO: 17.8 %
MAGNESIUM SERPL-MCNC: 2.3 MG/DL (ref 1.6–2.6)
MCH RBC QN AUTO: 30.9 PG (ref 27–31)
MCHC RBC AUTO-ENTMCNC: 32.9 MG/DL (ref 33–36)
MCV RBC AUTO: 93.8 FL (ref 80–94)
MONOCYTES # BLD AUTO: 1.45 X10(3)/MCL (ref 0.1–1.3)
MONOCYTES NFR BLD AUTO: 8.6 %
NEUTROPHILS # BLD AUTO: 11.9 X10(3)/MCL (ref 2.1–9.2)
NEUTROPHILS NFR BLD AUTO: 70.8 %
PHOSPHATE SERPL-MCNC: 8.1 MG/DL (ref 2.3–4.7)
PLATELET # BLD AUTO: 224 X10(3)/MCL (ref 130–400)
PMV BLD AUTO: 8.7 FL (ref 7.4–10.4)
POCT GLUCOSE: 138 MG/DL (ref 70–110)
POCT GLUCOSE: 95 MG/DL (ref 70–110)
POCT GLUCOSE: 97 MG/DL (ref 70–110)
POTASSIUM SERPL-SCNC: 5 MMOL/L (ref 3.5–5.1)
RBC # BLD AUTO: 3.56 X10(6)/MCL (ref 4.7–6.1)
SODIUM SERPL-SCNC: 133 MMOL/L (ref 136–145)
WBC # SPEC AUTO: 16.9 X10(3)/MCL (ref 4.5–11.5)

## 2022-07-06 PROCEDURE — 97530 THERAPEUTIC ACTIVITIES: CPT

## 2022-07-06 PROCEDURE — 25000003 PHARM REV CODE 250: Performed by: INTERNAL MEDICINE

## 2022-07-06 PROCEDURE — 25000003 PHARM REV CODE 250: Performed by: STUDENT IN AN ORGANIZED HEALTH CARE EDUCATION/TRAINING PROGRAM

## 2022-07-06 PROCEDURE — 94640 AIRWAY INHALATION TREATMENT: CPT

## 2022-07-06 PROCEDURE — 63600175 PHARM REV CODE 636 W HCPCS: Performed by: INTERNAL MEDICINE

## 2022-07-06 PROCEDURE — 80100014 HC HEMODIALYSIS 1:1

## 2022-07-06 PROCEDURE — 86704 HEP B CORE ANTIBODY TOTAL: CPT | Performed by: INTERNAL MEDICINE

## 2022-07-06 PROCEDURE — 36415 COLL VENOUS BLD VENIPUNCTURE: CPT | Performed by: INTERNAL MEDICINE

## 2022-07-06 PROCEDURE — 11000001 HC ACUTE MED/SURG PRIVATE ROOM

## 2022-07-06 PROCEDURE — 85025 COMPLETE CBC W/AUTO DIFF WBC: CPT | Performed by: INTERNAL MEDICINE

## 2022-07-06 PROCEDURE — 99900035 HC TECH TIME PER 15 MIN (STAT)

## 2022-07-06 PROCEDURE — 86706 HEP B SURFACE ANTIBODY: CPT | Performed by: INTERNAL MEDICINE

## 2022-07-06 PROCEDURE — 80069 RENAL FUNCTION PANEL: CPT | Performed by: INTERNAL MEDICINE

## 2022-07-06 PROCEDURE — 83735 ASSAY OF MAGNESIUM: CPT | Performed by: INTERNAL MEDICINE

## 2022-07-06 PROCEDURE — 94761 N-INVAS EAR/PLS OXIMETRY MLT: CPT

## 2022-07-06 PROCEDURE — A4216 STERILE WATER/SALINE, 10 ML: HCPCS | Performed by: STUDENT IN AN ORGANIZED HEALTH CARE EDUCATION/TRAINING PROGRAM

## 2022-07-06 RX ADMIN — FUROSEMIDE 80 MG: 10 INJECTION, SOLUTION INTRAMUSCULAR; INTRAVENOUS at 03:07

## 2022-07-06 RX ADMIN — LINEZOLID 600 MG: 600 INJECTION, SOLUTION INTRAVENOUS at 01:07

## 2022-07-06 RX ADMIN — TRAMADOL HYDROCHLORIDE 50 MG: 50 TABLET, COATED ORAL at 10:07

## 2022-07-06 RX ADMIN — FUROSEMIDE 80 MG: 10 INJECTION, SOLUTION INTRAMUSCULAR; INTRAVENOUS at 05:07

## 2022-07-06 RX ADMIN — SODIUM CHLORIDE, PRESERVATIVE FREE 10 ML: 5 INJECTION INTRAVENOUS at 01:07

## 2022-07-06 RX ADMIN — CARVEDILOL 6.25 MG: 6.25 TABLET, FILM COATED ORAL at 11:07

## 2022-07-06 RX ADMIN — CARVEDILOL 6.25 MG: 6.25 TABLET, FILM COATED ORAL at 10:07

## 2022-07-06 RX ADMIN — NIFEDIPINE 60 MG: 30 TABLET, EXTENDED RELEASE ORAL at 11:07

## 2022-07-06 RX ADMIN — LINEZOLID 600 MG: 600 INJECTION, SOLUTION INTRAVENOUS at 02:07

## 2022-07-06 RX ADMIN — MEROPENEM 500 MG: 500 INJECTION, POWDER, FOR SOLUTION INTRAVENOUS at 01:07

## 2022-07-06 RX ADMIN — FUROSEMIDE 80 MG: 10 INJECTION, SOLUTION INTRAMUSCULAR; INTRAVENOUS at 02:07

## 2022-07-06 RX ADMIN — METOLAZONE 10 MG: 2.5 TABLET ORAL at 11:07

## 2022-07-06 RX ADMIN — TAMSULOSIN HYDROCHLORIDE 0.4 MG: 0.4 CAPSULE ORAL at 11:07

## 2022-07-06 NOTE — PT/OT/SLP PROGRESS
Physical Therapy Treatment    Patient Name:  Neno Watkins   MRN:  [id number]    Recommendations:     Discharge Recommendations:  home with home health   Discharge Equipment Recommendations: walker, rolling   Barriers to discharge: None    Assessment:     Neno Watkins is a 61 y.o. male admitted with a medical diagnosis of SHANDRA (acute kidney injury).  He presents with the following impairments/functional limitations:  weakness, impaired endurance, gait instability, impaired balance.    Pt ambulated x 165' today in hallway with CGA and RW. He had improved gait quality and increased giovanni. Pt is requiring decreased assistance with transfers and mobility tasks, mostly CGA.    Rehab Prognosis: Good; patient would benefit from acute skilled PT services to address these deficits and reach maximum level of function.    Recent Surgery: Procedure(s) (LRB):  INSERTION, CATHETER, HEMODIALYSIS, DUAL LUMEN (Right) 9 Days Post-Op    Plan:     During this hospitalization, patient to be seen daily to address the identified rehab impairments via gait training, therapeutic activities, therapeutic exercises and progress toward the following goals:    · Plan of Care Expires:  07/26/22    Subjective     Chief Complaint: no complaints  Patient/Family Comments/goals: return home at [facility]  Pain/Comfort:         Objective:     Communicated with patient prior to session.  Patient found HOB elevated with whitehead catheter upon PT entry to room.     General Precautions: Standard, fall   Orthopedic Precautions:    Braces:    Respiratory Status: Room air     Functional Mobility:  · Bed Mobility:     · Supine to Sit: minimum assistance  · Transfers:     · Bed to Chair: contact guard assistance with  rolling walker  using  Step Transfer  · Gait: 165' with CGA and RW  · Balance: CGA in supported standing      AM-PAC 6 CLICK MOBILITY          Therapeutic Activities and Exercises:   see above    Patient left up in chair with all lines intact and call  button in reach..    GOALS:   Multidisciplinary Problems     Physical Therapy Goals        Problem: Physical Therapy    Goal Priority Disciplines Outcome Goal Variances Interventions   Physical Therapy Goal     PT, PT/OT Ongoing, Progressing     Description: Goals to be met by: 22     Patient will increase functional independence with mobility by performin. Supine to sit with Modified [place]  2. Sit to stand transfer with Contact Guard Assistance  3. Gait  x 150 feet with Contact Guard Assistance using Rolling Walker.                      Time Tracking:     PT Received On: 22  PT Start Time: 1115  PT Stop Time: 1135  PT Total Time (min): 20 min     Billable Minutes: Therapeutic Activity 20    Treatment Type: Treatment  PT/PTA: PTA           2022

## 2022-07-06 NOTE — PROGRESS NOTES
[facility] - Medical Surgical Unit  Hospital Medicine Progress Note    Patient Name: Neno Watkins  Age: 61 y.o.   MRN: [id number]  Admission Date: 6/27/2022  5:06 PM   Hospital Length of Stay: 9 days  Code Status: Full Code  Attending Provider: Cam Medina MD  Primary Care Provider: No primary care provider on file.   Chief Complaint:   Chief Complaint   Patient presents with    Transfer from [facility]     SHANDRA    f/u hypoxemia    f/u renal failure            SUBJECTIVE:     Follow-up For:   SHANDRA (acute kidney injury)    HPI:  60yo WM with h/o Morbid Obesity, HTN, DM2, HLD, ?MI transferred from [facility] for SHANDRA. Pt originally presented to their ED 7/21 c/o back pain after fall at home. Noted to have SHANDRA with Hyperkalemia and admitted for tx. Pt tx'd with IVF and initially Cr improved but there was concern of CHF/fluid overload so IVF held and pt given and Cr worsened and UOP declined. ECHO however was normal with EF 55% and no e/o diastolic dysfunction. BNP was 109 as well. Pt given Lasix as well with increased UOP but worsening Cr. Today pt was more SOB with incr LE edema. BUN and Cr progressively worsening,hyperkalemic, oliguric, more hypertensive. Has moderate bilat pleural effusion on imaging as well. Transferred to [facility] for Nephrology services.     Pt finally arrived to floor this evening c/o CP/SOB and was diaphoretic, tachycardic and hypertensive. There were no labs repeated after this morning at 04:30. Ordered stat chem, Trop, ABG, EKG, Lopressor IV and IV Morphine. Pt seen soon thereafter and appears ill. Placed on 15L OxyMask for hypoxia O2 sat 88% on 4L NC. Labs returned with K+ 7.3, BUN 84, Cr 4.48, PO4 8.4, Trop 0.018, EKG NSR w/o acute ST-T wave changes (sp no peaked T-waves). Nephrology consulted for urgent HD and GenSurg consulted for urgent Dialysis Catheter placement. Pt will be brought down to OR for procedure and Anesth plans to  intubate. Pt will go to ICU after procedure and then receive HD.        Hospital Coarse:  6/29/22-Patient is sitting up in the chair at this time.  He will undergo HD today.  He is feeling better since admit.  Will likely watch at least one more day in the ICU.      6/30/22-Today is day 4 of HD.  A large amount of fluid has been removed.  He is still producing urine as well.  Overall he is feeling better.    7/1/22-No changes today.  He is undergoing HD at this time.  His legs do appear to be softening up some with less edema.  Spoke with Dr. Gillis and will continue with ultrafiltration for now.  Will likely need OP treatment but will decide next week.    7/2/22-Patient is doing good today.  His edema is improving.  He has had 16+ liters of fluid removed with UF.  He also states his breathing is improving.    07/03/2022.  Patient at present time is on room air supine.  Minimal shortness of breath with ambulation.  Underwent hemodialysis.  Baldwin catheter still with good urine output.  Chart was reviewed in detail.  Somehow patient had no diastolic dysfunction but mild decrease of ejection fraction on 2D echocardiogram  Not sure exactly patient source of pleural effusion other than congestive heart failure.  Will continue current treatment.  Repeat chest x-ray in the morning.  If patient persists to have pleural effusion will recommend the thoracocentesis for evaluation pleural effusion source.    07/04/2022.  Patient doing the same.  Appears to be weak fatigued tired.  Going through hemodialysis  Worsening BUN and creatinine  Continue current treatment.  I think patient will require long-term hemodialysis.  Follow-up with PT OT and arrange for disposition/discharge by arranging outpatient hemodialysis if okay with Nephrology.         Last 24h: No new complaints or acute events. Awaiting outpt HD to be set up. Improving performance with PT.     Scheduled Meds:   carvediloL  6.25 mg Oral BID    dextrose 5 % and  "0.45% NaCl  500 mL Intravenous Once    furosemide (LASIX) injection  80 mg Intravenous Q12H    linezolid  600 mg Intravenous Q12H    meropenem (MERREM) IVPB  500 mg Intravenous Q12H    metOLazone  10 mg Oral Daily    NIFEdipine  60 mg Oral Daily    sodium chloride 0.9%  10 mL Intravenous Q6H    tamsulosin  1 capsule Oral Daily     Continuous Infusions:  PRN Meds:   acetaminophen    albuterol-ipratropium    ALPRAZolam    calcium gluconate IVPB    dextrose 10%    dextrose 10%    dextrose 10%    dextrose 10%    hydrALAZINE    labetalol    LIDOcaine    lorazepam    melatonin    ondansetron    sodium chloride 0.9%    sodium chloride 0.9%    traMADoL      Lines/Drains:   Lines/Drains/Airways     Central Venous Catheter Line  Duration                Hemodialysis Catheter 06/27/22 2025 right internal jugular 8 days          Drain  Duration                Urethral Catheter 06/24/22 0230 Straight-tip 20 Fr. 12 days                    Review of patient's allergies indicates:   Allergen Reactions    Butorphanol Swelling     "it almost killed me"    Hydrocodone-acetaminophen Other (See Comments)     "They mess with my heart"    Povidone-iodine Shortness Of Breath and Rash         Review of Systems: 10 systems reviewed all pertinent positives and negatives stated in HPI, otherwise negative.       OBJECTIVE:     Vital Signs (Most Recent)  Temp: 98.5 °F (36.9 °C) (07/06/22 1659)  Pulse: 79 (07/06/22 1659)  Resp: 18 (07/06/22 0100)  BP: 135/68 (07/06/22 1659)  SpO2: 95 % (07/06/22 1659)    Vital Signs Range (Last 24H):  Temp:  [97.7 °F (36.5 °C)-98.5 °F (36.9 °C)]   Pulse:  [75-90]   Resp:  [18]   BP: (118-154)/(54-73)   SpO2:  [93 %-99 %]     I & O (Last 24H):    Intake/Output Summary (Last 24 hours) at 7/6/2022 1709  Last data filed at 7/6/2022 1200  Gross per 24 hour   Intake --   Output 4050 ml   Net -4050 ml       Physical Exam:  Physical Exam  Constitutional:       General: He is awake. He is not in " acute distress.     Appearance: Normal appearance. He is well-developed. He is obese. He is not toxic-appearing.   HENT:      Head: Normocephalic and atraumatic.      Nose: Nose normal. No congestion.      Mouth/Throat:      Mouth: Mucous membranes are moist.      Pharynx: Oropharynx is clear.   Eyes:      General: No scleral icterus.     Extraocular Movements: Extraocular movements intact.      Conjunctiva/sclera: Conjunctivae normal.      Pupils: Pupils are equal, round, and reactive to light.   Neck:      Vascular: No carotid bruit.   Cardiovascular:      Rate and Rhythm: Normal rate and regular rhythm.      Pulses: Normal pulses.      Heart sounds: No murmur heard.    No gallop.   Pulmonary:      Effort: Pulmonary effort is normal. No respiratory distress.      Breath sounds: Examination of the right-lower field reveals decreased breath sounds. Examination of the left-lower field reveals decreased breath sounds. Decreased breath sounds present. No wheezing or rhonchi.   Abdominal:      General: Bowel sounds are normal. There is distension.      Palpations: Abdomen is soft.      Tenderness: There is no abdominal tenderness.   Musculoskeletal:         General: Normal range of motion.      Cervical back: Normal range of motion and neck supple.      Right lower le+ Edema present.      Left lower le+ Edema present.   Skin:     General: Skin is warm and dry.      Findings: Erythema (BLE) and rash (BLE) present.   Neurological:      General: No focal deficit present.      Mental Status: He is alert and oriented to person, place, and time. Mental status is at baseline.      Cranial Nerves: Cranial nerves 2-12 are intact.      Deep Tendon Reflexes: Reflexes normal.   Psychiatric:         Attention and Perception: Attention normal.         Mood and Affect: Mood is depressed. Affect is blunt and inappropriate.         Speech: Speech normal.         Behavior: Behavior is uncooperative.         Thought Content:  Thought content normal.         Cognition and Memory: Cognition is impaired.         Judgment: Judgment normal.          Laboratory:  Recent Labs   Lab 07/02/22  0157 07/03/22  0200 07/06/22  0207   WBC 16.7* 19.0* 16.9*   HGB 10.8* 10.8* 11.0*   HCT 32.8* 33.1* 33.4*    237 224        Recent Labs   Lab 07/05/22  0215 07/06/22  0207   * 133*   K 5.0 5.0   CO2 21* 22*   BUN 69.0* 80.0*   CREATININE 5.88* 7.23*   CALCIUM 8.4* 8.4*   ALBUMIN 2.2* 2.2*   BILITOT 0.6  --    ALKPHOS 107  --    ALT 24  --    AST 32  --    GLUCOSE 135* 116*   MG  --  2.30   PHOS  --  8.1*           ASSESSMENT/PLAN:     Patient Active Problem List    Diagnosis Date Noted    Oliguria 06/28/2022    Hyperkalemia 06/21/2022    SHANDRA (acute kidney injury) 06/21/2022    Pyelonephritis 06/27/2022    Hypoxia 06/27/2022    Cellulitis of both lower extremities 06/28/2022    Pleural effusion, bilateral 06/28/2022    Leukocytosis 06/22/2022    Bilateral lower extremity edema 06/22/2022    Hemodialysis status 07/04/2022    Benign prostatic hyperplasia 06/21/2022    Elevated liver enzymes 06/21/2022    Gastroesophageal reflux disease 06/21/2022    Hyperlipidemia 06/21/2022    Hypertension 06/21/2022    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 06/21/2022    Morbid obesity 06/21/2022    Peripheral arterial occlusive disease 06/21/2022    Swelling of lower extremity 06/21/2022    Type 2 diabetes mellitus 06/21/2022    Fall at home 06/21/2022           - DC pending arrangement for outpt HD  - likely home with HH unless pt wants SNF  - cont current  - cont PT/OT  - am labs             VTE Risk Mitigation (From admission, onward)         Ordered     IP VTE HIGH RISK PATIENT  Once         06/27/22 1739     Place sequential compression device  Until discontinued         06/27/22 1739                       Cam Medina MD  Department of Hospital Medicine   [facility]

## 2022-07-06 NOTE — PT/OT/SLP PROGRESS
Physical Therapy Treatment    Patient Name:  Neno Watkins   MRN:  [id number]    Recommendations:     Discharge Recommendations:  home with home health   Discharge Equipment Recommendations: walker, rolling   Barriers to discharge: None    Assessment:     Neno Watkins is a 61 y.o. male admitted with a medical diagnosis of SHANDRA (acute kidney injury).  He presents with the following impairments/functional limitations:  weakness, impaired endurance, gait instability, impaired balance.    Pt ambulated x 165' today in hallway with CGA and RW. He had improved gait quality and increased giovanni. Pt is requiring decreased assistance with transfers and mobility tasks, mostly CGA    Rehab Prognosis: Good; patient would benefit from acute skilled PT services to address these deficits and reach maximum level of function.    Recent Surgery: Procedure(s) (LRB):  INSERTION, CATHETER, HEMODIALYSIS, DUAL LUMEN (Right) 9 Days Post-Op    Plan:     During this hospitalization, patient to be seen daily to address the identified rehab impairments via gait training, therapeutic activities, therapeutic exercises and progress toward the following goals:    · Plan of Care Expires:  07/26/22    Subjective     Chief Complaint: no complaints  Patient/Family Comments/goals: return home at [facility]  Pain/Comfort:  ·        Objective:     Communicated with patient prior to session.  Patient found HOB elevated with telemetry, whitehead catheter, oxygen upon PT entry to room.     General Precautions: Standard, fall   Orthopedic Precautions:    Braces:    Respiratory Status: Room air     Functional Mobility:  · Bed Mobility:     · Supine to Sit: minimum assistance  · Transfers:     · Bed to Chair: contact guard assistance with  rolling walker  using  Step Transfer  · Gait: 165' with CGA and RW  · Balance: CGA in supported standing      AM-PAC 6 CLICK MOBILITY          Therapeutic Activities and Exercises:   see above    Patient left up in chair with all  lines intact and call button in reach..    GOALS:   Multidisciplinary Problems     Physical Therapy Goals        Problem: Physical Therapy    Goal Priority Disciplines Outcome Goal Variances Interventions   Physical Therapy Goal     PT, PT/OT Ongoing, Progressing     Description: Goals to be met by: 22     Patient will increase functional independence with mobility by performin. Supine to sit with Modified [place]  2. Sit to stand transfer with Contact Guard Assistance  3. Gait  x 150 feet with Contact Guard Assistance using Rolling Walker.                      Time Tracking:     PT Received On: 22  PT Start Time: 1115     PT Stop Time: 1135  PT Total Time (min): 20 min     Billable Minutes: Therapeutic Activity 20    Treatment Type: Treatment  PT/PTA: PTA           2022

## 2022-07-06 NOTE — PLAN OF CARE
Pt information faxed to [facility] to fax # [phone].  Waiting on results of more labwork that is needed for outpt HD chair to be arranged.  Will send labs once results are in.

## 2022-07-07 LAB
ALBUMIN SERPL-MCNC: 2.3 GM/DL (ref 3.4–4.8)
BASOPHILS # BLD AUTO: 0.06 X10(3)/MCL (ref 0–0.2)
BASOPHILS NFR BLD AUTO: 0.4 %
BUN SERPL-MCNC: 69 MG/DL (ref 8.4–25.7)
CALCIUM SERPL-MCNC: 8.4 MG/DL (ref 8.8–10)
CHLORIDE SERPL-SCNC: 98 MMOL/L (ref 98–107)
CO2 SERPL-SCNC: 21 MMOL/L (ref 23–31)
CREAT SERPL-MCNC: 7.23 MG/DL (ref 0.73–1.18)
EOSINOPHIL # BLD AUTO: 0.28 X10(3)/MCL (ref 0–0.9)
EOSINOPHIL NFR BLD AUTO: 1.7 %
ERYTHROCYTE [DISTWIDTH] IN BLOOD BY AUTOMATED COUNT: 12.5 % (ref 11.5–17)
GLUCOSE SERPL-MCNC: 90 MG/DL (ref 82–115)
HCT VFR BLD AUTO: 33.9 % (ref 42–52)
HGB BLD-MCNC: 11.1 GM/DL (ref 14–18)
IMM GRANULOCYTES # BLD AUTO: 0.1 X10(3)/MCL (ref 0–0.04)
IMM GRANULOCYTES NFR BLD AUTO: 0.6 %
LYMPHOCYTES # BLD AUTO: 2.87 X10(3)/MCL (ref 0.6–4.6)
LYMPHOCYTES NFR BLD AUTO: 17.4 %
MAGNESIUM SERPL-MCNC: 2.3 MG/DL (ref 1.6–2.6)
MCH RBC QN AUTO: 30.7 PG (ref 27–31)
MCHC RBC AUTO-ENTMCNC: 32.7 MG/DL (ref 33–36)
MCV RBC AUTO: 93.9 FL (ref 80–94)
MONOCYTES # BLD AUTO: 1.72 X10(3)/MCL (ref 0.1–1.3)
MONOCYTES NFR BLD AUTO: 10.4 %
NEUTROPHILS # BLD AUTO: 11.5 X10(3)/MCL (ref 2.1–9.2)
NEUTROPHILS NFR BLD AUTO: 69.5 %
PHOSPHATE SERPL-MCNC: 7.5 MG/DL (ref 2.3–4.7)
PLATELET # BLD AUTO: 224 X10(3)/MCL (ref 130–400)
PMV BLD AUTO: 9 FL (ref 7.4–10.4)
POCT GLUCOSE: 107 MG/DL (ref 70–110)
POCT GLUCOSE: 113 MG/DL (ref 70–110)
POCT GLUCOSE: 132 MG/DL (ref 70–110)
POTASSIUM SERPL-SCNC: 5.1 MMOL/L (ref 3.5–5.1)
RBC # BLD AUTO: 3.61 X10(6)/MCL (ref 4.7–6.1)
SODIUM SERPL-SCNC: 134 MMOL/L (ref 136–145)
WBC # SPEC AUTO: 16.5 X10(3)/MCL (ref 4.5–11.5)

## 2022-07-07 PROCEDURE — 25000003 PHARM REV CODE 250: Performed by: INTERNAL MEDICINE

## 2022-07-07 PROCEDURE — 94761 N-INVAS EAR/PLS OXIMETRY MLT: CPT

## 2022-07-07 PROCEDURE — 63600175 PHARM REV CODE 636 W HCPCS: Performed by: INTERNAL MEDICINE

## 2022-07-07 PROCEDURE — 36415 COLL VENOUS BLD VENIPUNCTURE: CPT | Performed by: INTERNAL MEDICINE

## 2022-07-07 PROCEDURE — 97530 THERAPEUTIC ACTIVITIES: CPT

## 2022-07-07 PROCEDURE — 80069 RENAL FUNCTION PANEL: CPT | Performed by: INTERNAL MEDICINE

## 2022-07-07 PROCEDURE — 99900035 HC TECH TIME PER 15 MIN (STAT)

## 2022-07-07 PROCEDURE — 97535 SELF CARE MNGMENT TRAINING: CPT

## 2022-07-07 PROCEDURE — 94640 AIRWAY INHALATION TREATMENT: CPT

## 2022-07-07 PROCEDURE — 25000003 PHARM REV CODE 250: Performed by: STUDENT IN AN ORGANIZED HEALTH CARE EDUCATION/TRAINING PROGRAM

## 2022-07-07 PROCEDURE — 83735 ASSAY OF MAGNESIUM: CPT | Performed by: INTERNAL MEDICINE

## 2022-07-07 PROCEDURE — 11000001 HC ACUTE MED/SURG PRIVATE ROOM

## 2022-07-07 PROCEDURE — 85025 COMPLETE CBC W/AUTO DIFF WBC: CPT | Performed by: INTERNAL MEDICINE

## 2022-07-07 PROCEDURE — A4216 STERILE WATER/SALINE, 10 ML: HCPCS | Performed by: STUDENT IN AN ORGANIZED HEALTH CARE EDUCATION/TRAINING PROGRAM

## 2022-07-07 RX ORDER — FUROSEMIDE 10 MG/ML
80 INJECTION INTRAMUSCULAR; INTRAVENOUS
Status: DISCONTINUED | OUTPATIENT
Start: 2022-07-07 | End: 2022-07-08 | Stop reason: HOSPADM

## 2022-07-07 RX ORDER — SUCRALFATE 1 G/1
1 TABLET ORAL EVERY 6 HOURS PRN
Status: DISCONTINUED | OUTPATIENT
Start: 2022-07-07 | End: 2022-07-07

## 2022-07-07 RX ORDER — CALCIUM CARBONATE 200(500)MG
500 TABLET,CHEWABLE ORAL EVERY 6 HOURS PRN
Status: DISCONTINUED | OUTPATIENT
Start: 2022-07-07 | End: 2022-07-08 | Stop reason: HOSPADM

## 2022-07-07 RX ADMIN — FUROSEMIDE 80 MG: 10 INJECTION, SOLUTION INTRAMUSCULAR; INTRAVENOUS at 03:07

## 2022-07-07 RX ADMIN — SODIUM CHLORIDE, PRESERVATIVE FREE 10 ML: 5 INJECTION INTRAVENOUS at 03:07

## 2022-07-07 RX ADMIN — CALCIUM CARBONATE (ANTACID) CHEW TAB 500 MG 500 MG: 500 CHEW TAB at 02:07

## 2022-07-07 RX ADMIN — FUROSEMIDE 80 MG: 10 INJECTION, SOLUTION INTRAMUSCULAR; INTRAVENOUS at 04:07

## 2022-07-07 RX ADMIN — MEROPENEM 500 MG: 500 INJECTION, POWDER, FOR SOLUTION INTRAVENOUS at 02:07

## 2022-07-07 RX ADMIN — MEROPENEM 500 MG: 500 INJECTION, POWDER, FOR SOLUTION INTRAVENOUS at 03:07

## 2022-07-07 RX ADMIN — CARVEDILOL 6.25 MG: 6.25 TABLET, FILM COATED ORAL at 09:07

## 2022-07-07 RX ADMIN — TAMSULOSIN HYDROCHLORIDE 0.4 MG: 0.4 CAPSULE ORAL at 09:07

## 2022-07-07 RX ADMIN — LINEZOLID 600 MG: 600 INJECTION, SOLUTION INTRAVENOUS at 01:07

## 2022-07-07 RX ADMIN — SODIUM CHLORIDE, PRESERVATIVE FREE 10 ML: 5 INJECTION INTRAVENOUS at 02:07

## 2022-07-07 RX ADMIN — LINEZOLID 600 MG: 600 INJECTION, SOLUTION INTRAVENOUS at 02:07

## 2022-07-07 RX ADMIN — SODIUM CHLORIDE, PRESERVATIVE FREE 10 ML: 5 INJECTION INTRAVENOUS at 05:07

## 2022-07-07 RX ADMIN — NIFEDIPINE 60 MG: 30 TABLET, EXTENDED RELEASE ORAL at 09:07

## 2022-07-07 RX ADMIN — CALCIUM CARBONATE (ANTACID) CHEW TAB 500 MG 500 MG: 500 CHEW TAB at 09:07

## 2022-07-07 RX ADMIN — METOLAZONE 10 MG: 2.5 TABLET ORAL at 09:07

## 2022-07-07 NOTE — PROGRESS NOTES
[facility] - Medical Surgical Unit  Hospital Medicine Progress Note    Patient Name: Neno Watkins  Age: 61 y.o.   MRN: [id number]  Admission Date: 6/27/2022  5:06 PM   Hospital Length of Stay: 10 days  Code Status: Full Code  Attending Provider: Cam Medina MD  Primary Care Provider: No primary care provider on file.   Chief Complaint:   Chief Complaint   Patient presents with    Transfer from [facility]     SHANDRA    f/u hypoxemia    f/u renal failure            SUBJECTIVE:     Follow-up For:   SHANDRA (acute kidney injury)    HPI:  62yo WM with h/o Morbid Obesity, HTN, DM2, HLD, ?MI transferred from [facility] for SHANDRA. Pt originally presented to their ED 7/21 c/o back pain after fall at home. Noted to have SHANDRA with Hyperkalemia and admitted for tx. Pt tx'd with IVF and initially Cr improved but there was concern of CHF/fluid overload so IVF held and pt given and Cr worsened and UOP declined. ECHO however was normal with EF 55% and no e/o diastolic dysfunction. BNP was 109 as well. Pt given Lasix as well with increased UOP but worsening Cr. Today pt was more SOB with incr LE edema. BUN and Cr progressively worsening,hyperkalemic, oliguric, more hypertensive. Has moderate bilat pleural effusion on imaging as well. Transferred to [facility] for Nephrology services.     Pt finally arrived to floor this evening c/o CP/SOB and was diaphoretic, tachycardic and hypertensive. There were no labs repeated after this morning at 04:30. Ordered stat chem, Trop, ABG, EKG, Lopressor IV and IV Morphine. Pt seen soon thereafter and appears ill. Placed on 15L OxyMask for hypoxia O2 sat 88% on 4L NC. Labs returned with K+ 7.3, BUN 84, Cr 4.48, PO4 8.4, Trop 0.018, EKG NSR w/o acute ST-T wave changes (sp no peaked T-waves). Nephrology consulted for urgent HD and GenSurg consulted for urgent Dialysis Catheter placement. Pt will be brought down to OR for procedure and Anesth plans to  "intubate. Pt will go to ICU after procedure and then receive HD.       Last 24h: States his right foot started bleeding this morning while walking from the bathroom. Foot bandaged. No other acute complaints.         Scheduled Meds:   carvediloL  6.25 mg Oral BID    dextrose 5 % and 0.45% NaCl  500 mL Intravenous Once    furosemide (LASIX) injection  80 mg Intravenous Q12H    linezolid  600 mg Intravenous Q12H    meropenem (MERREM) IVPB  500 mg Intravenous Q12H    metOLazone  10 mg Oral Daily    NIFEdipine  60 mg Oral Daily    sodium chloride 0.9%  10 mL Intravenous Q6H    tamsulosin  1 capsule Oral Daily     Continuous Infusions:  PRN Meds:   acetaminophen    albuterol-ipratropium    ALPRAZolam    calcium carbonate    calcium gluconate IVPB    dextrose 10%    dextrose 10%    dextrose 10%    dextrose 10%    hydrALAZINE    labetalol    LIDOcaine    lorazepam    melatonin    ondansetron    sodium chloride 0.9%    sodium chloride 0.9%    traMADoL      Lines/Drains:   Lines/Drains/Airways     Central Venous Catheter Line  Duration                Hemodialysis Catheter 06/27/22 2025 right internal jugular 9 days                    Review of patient's allergies indicates:   Allergen Reactions    Butorphanol Swelling     "it almost killed me"    Hydrocodone-acetaminophen Other (See Comments)     "They mess with my heart"    Povidone-iodine Shortness Of Breath and Rash         Review of Systems: 10 systems reviewed all pertinent positives and negatives stated in HPI, otherwise negative.       OBJECTIVE:     Vital Signs (Most Recent)  Temp: 98.8 °F (37.1 °C) (07/07/22 1204)  Pulse: 64 (07/07/22 1204)  Resp: 16 (07/07/22 0727)  BP: 116/64 (07/07/22 1204)  SpO2: (!) 94 % (07/07/22 1204)    Vital Signs Range (Last 24H):  Temp:  [97.8 °F (36.6 °C)-98.8 °F (37.1 °C)]   Pulse:  [64-82]   Resp:  [16-18]   BP: (116-153)/(56-68)   SpO2:  [93 %-98 %]     I & O (Last 24H):    Intake/Output Summary (Last 24 " hours) at 2022 1500  Last data filed at 2022 1353  Gross per 24 hour   Intake 720 ml   Output 125 ml   Net 595 ml       Physical Exam:  Physical Exam  Constitutional:       General: He is awake. He is not in acute distress.     Appearance: Normal appearance. He is well-developed. He is obese. He is not toxic-appearing.   HENT:      Head: Normocephalic and atraumatic.      Nose: Nose normal. No congestion.      Mouth/Throat:      Mouth: Mucous membranes are moist.      Pharynx: Oropharynx is clear.   Eyes:      General: No scleral icterus.     Extraocular Movements: Extraocular movements intact.      Conjunctiva/sclera: Conjunctivae normal.      Pupils: Pupils are equal, round, and reactive to light.   Neck:      Vascular: No carotid bruit.   Cardiovascular:      Rate and Rhythm: Normal rate and regular rhythm.      Pulses: Normal pulses.      Heart sounds: No murmur heard.    No gallop.   Pulmonary:      Effort: Pulmonary effort is normal. No respiratory distress.      Breath sounds: Examination of the right-lower field reveals decreased breath sounds. Examination of the left-lower field reveals decreased breath sounds. Decreased breath sounds present. No wheezing or rhonchi.   Abdominal:      General: Bowel sounds are normal. There is distension.      Palpations: Abdomen is soft.      Tenderness: There is no abdominal tenderness.   Musculoskeletal:         General: Normal range of motion.      Cervical back: Normal range of motion and neck supple.      Right lower le+ Edema present.      Left lower le+ Edema present.   Skin:     General: Skin is warm and dry.      Findings: Erythema (BLE) and rash (BLE) present.   Neurological:      General: No focal deficit present.      Mental Status: He is alert and oriented to person, place, and time. Mental status is at baseline.      Cranial Nerves: Cranial nerves 2-12 are intact.      Deep Tendon Reflexes: Reflexes normal.   Psychiatric:         Attention and  Perception: Attention normal.         Mood and Affect: Mood is depressed. Affect is blunt and inappropriate.         Speech: Speech normal.         Behavior: Behavior is uncooperative.         Thought Content: Thought content normal.         Cognition and Memory: Cognition is impaired.         Judgment: Judgment normal.          Laboratory:  Recent Labs   Lab 07/03/22  0200 07/06/22  0207 07/07/22  0217   WBC 19.0* 16.9* 16.5*   HGB 10.8* 11.0* 11.1*   HCT 33.1* 33.4* 33.9*    224 224        Recent Labs   Lab 07/06/22  0207 07/07/22 0217   * 134*   K 5.0 5.1   CO2 22* 21*   BUN 80.0* 69.0*   CREATININE 7.23* 7.23*   CALCIUM 8.4* 8.4*   ALBUMIN 2.2* 2.3*   GLUCOSE 116* 90   MG 2.30 2.30   PHOS 8.1* 7.5*           ASSESSMENT/PLAN:     Patient Active Problem List    Diagnosis Date Noted    Oliguria 06/28/2022    Hyperkalemia 06/21/2022    SHANDRA (acute kidney injury) 06/21/2022    Pyelonephritis 06/27/2022    Hypoxia 06/27/2022    Cellulitis of both lower extremities 06/28/2022    Pleural effusion, bilateral 06/28/2022    Leukocytosis 06/22/2022    Bilateral lower extremity edema 06/22/2022    Hemodialysis status 07/04/2022    Benign prostatic hyperplasia 06/21/2022    Elevated liver enzymes 06/21/2022    Gastroesophageal reflux disease 06/21/2022    Hyperlipidemia 06/21/2022    Hypertension 06/21/2022    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 06/21/2022    Morbid obesity 06/21/2022    Peripheral arterial occlusive disease 06/21/2022    Swelling of lower extremity 06/21/2022    Type 2 diabetes mellitus 06/21/2022    Fall at home 06/21/2022              - DC pending arrangement for outpt HD  - home with HH when dc'd  - cont current  - cont PT/OT  - am labs                VTE Risk Mitigation (From admission, onward)         Ordered     IP VTE HIGH RISK PATIENT  Once         06/27/22 1739     Place sequential compression device  Until discontinued         06/27/22  3139                       Cam Medina MD  Department of [facility] Medicine   [facility]

## 2022-07-07 NOTE — PT/OT/SLP PROGRESS
Physical Therapy Treatment    Patient Name:  Neno Watkins   MRN:  [id number]    Recommendations:     Discharge Recommendations:  home with home health   Discharge Equipment Recommendations: walker, rolling   Barriers to discharge: None    Assessment:     Neno Watkins is a 61 y.o. male admitted with a medical diagnosis of SHANDRA (acute kidney injury).  He presents with the following impairments/functional limitations:  weakness, impaired endurance, gait instability, impaired balance.    Pt tolerated gait over 300' this afternoon with RW and CGA. He was able to perform transfers with CGA, slow to complete them but is requiring decreased assistance.    Rehab Prognosis: Good; patient would benefit from acute skilled PT services to address these deficits and reach maximum level of function.    Recent Surgery: Procedure(s) (LRB):  INSERTION, CATHETER, HEMODIALYSIS, DUAL LUMEN (Right) 10 Days Post-Op    Plan:     During this hospitalization, patient to be seen daily to address the identified rehab impairments via gait training, therapeutic activities, therapeutic exercises and progress toward the following goals:    · Plan of Care Expires:  07/26/22    Subjective     Chief Complaint: fatigue  Patient/Family Comments/goals: to return to PLOF  Pain/Comfort:  ·        Objective:     Communicated with patient prior to session.  Patient found HOB elevated with whitehead catheter upon PT entry to room.     General Precautions: Standard, fall   Orthopedic Precautions:    Braces:    Respiratory Status: Room air     Functional Mobility:  · Bed Mobility:     · Supine to Sit: minimum assistance  · Transfers:     · Sit to Stand:  contact guard assistance with rolling walker  · Gait: 300' with RW and CGA  · Balance: CGA in supported standing      AM-PAC 6 CLICK MOBILITY          Therapeutic Activities and Exercises:   see above    Patient left HOB elevated with all lines intact and call button in reach..    GOALS:   Multidisciplinary  Problems     Physical Therapy Goals        Problem: Physical Therapy    Goal Priority Disciplines Outcome Goal Variances Interventions   Physical Therapy Goal     PT, PT/OT Ongoing, Progressing     Description: Goals to be met by: 22     Patient will increase functional independence with mobility by performin. Supine to sit with Modified [place]  2. Sit to stand transfer with Contact Guard Assistance  3. Gait  x 150 feet with Contact Guard Assistance using Rolling Walker.                      Time Tracking:     PT Received On: 22  PT Start Time: 1530     PT Stop Time: 1555  PT Total Time (min): 25 min     Billable Minutes: Therapeutic Activity 25    Treatment Type: Treatment  PT/PTA: PTA           2022

## 2022-07-07 NOTE — PT/OT/SLP PROGRESS
Occupational Therapy   Treatment    Name: Neno Watkins  MRN: [id number]  Admitting Diagnosis:  SHANDRA (acute kidney injury)  10 Days Post-Op    Recommendations:     Discharge Recommendations: home with home health, home  Discharge Equipment Recommendations:  walker, rolling, wheelchair, bath bench, bedside commode  Barriers to discharge:       Assessment:     Neno Watkins is a 61 y.o. male with a medical diagnosis of SHANDRA (acute kidney injury).  He presents with incontinence and decreased balance/endurance. Performance deficits affecting function are  .     Rehab Prognosis:  Good; patient would benefit from acute skilled OT services to address these deficits and reach maximum level of function.       Plan:     Patient to be seen 3 x/week to address the above listed problems via therapeutic exercises, therapeutic activities, self-care/home management  · Plan of Care Expires: 07/26/22  · Plan of Care Reviewed with: patient    Subjective     Pain/Comfort:  · Pain Rating 1: 0/10    Objective:     Communicated with: nurse prior to session.  Patient found supine with   upon OT entry to room.    General Precautions: Standard, fall   Orthopedic Precautions:    Braces: N/A  Respiratory Status: Room air     Occupational Performance:     Bed Mobility:    · Patient completed Rolling/Turning to Left with  modified independence  · Patient completed Rolling/Turning to Right with modified independence  · Patient completed Supine to Sit with contact guard assistance and minimum assistance     Functional Mobility/Transfers:  · Patient completed Sit <> Stand Transfer with modified independence and supervision  with  rolling walker   · Functional Mobility: patient performed supine to sit with cga /min A ambulation with rolling walker to perform toileting.    Activities of Daily Living:  · Toileting: maximal assistance and total assistance for hygiene      [facility] 6 Click ADL: 11    Treatment & Education:  Patient tolerated oob with  rolling walker for ambulation to toileting with max assist for performance and hygiene tasks.     Patient left supine with all lines intact and call button in reachEducation:      GOALS:   Multidisciplinary Problems     Occupational Therapy Goals        Problem: Occupational Therapy    Goal Priority Disciplines Outcome Interventions   Occupational Therapy Goal     OT, PT/OT Ongoing, Progressing    Description: Goals to be met by: 7/27/22    Patient will increase functional independence with ADLs by performing:    Toileting from bedside commode with Supervision for hygiene and clothing management.   Increased functional strength to 4/5 for increase (I) with ADLs.  Upper extremity exercise program 20 reps per handout, with assistance as needed.                     Time Tracking:     OT Date of Treatment: 07/07/22  OT Start Time: 0940  OT Stop Time: 1005  OT Total Time (min): 25 min    Billable Minutes:Self Care/Home Management 25    OT/PAUL: OT          7/7/2022

## 2022-07-08 VITALS
DIASTOLIC BLOOD PRESSURE: 72 MMHG | HEART RATE: 82 BPM | OXYGEN SATURATION: 97 % | TEMPERATURE: 98 F | BODY MASS INDEX: 44.25 KG/M2 | HEIGHT: 70 IN | WEIGHT: 309.06 LBS | RESPIRATION RATE: 20 BRPM | SYSTOLIC BLOOD PRESSURE: 149 MMHG

## 2022-07-08 LAB
POCT GLUCOSE: 120 MG/DL (ref 70–110)
POCT GLUCOSE: 80 MG/DL (ref 70–110)

## 2022-07-08 PROCEDURE — 25000003 PHARM REV CODE 250: Performed by: INTERNAL MEDICINE

## 2022-07-08 PROCEDURE — 80100014 HC HEMODIALYSIS 1:1

## 2022-07-08 PROCEDURE — 63600175 PHARM REV CODE 636 W HCPCS: Performed by: INTERNAL MEDICINE

## 2022-07-08 PROCEDURE — 99900035 HC TECH TIME PER 15 MIN (STAT)

## 2022-07-08 PROCEDURE — A4216 STERILE WATER/SALINE, 10 ML: HCPCS | Performed by: STUDENT IN AN ORGANIZED HEALTH CARE EDUCATION/TRAINING PROGRAM

## 2022-07-08 PROCEDURE — 97530 THERAPEUTIC ACTIVITIES: CPT

## 2022-07-08 PROCEDURE — 25000003 PHARM REV CODE 250: Performed by: STUDENT IN AN ORGANIZED HEALTH CARE EDUCATION/TRAINING PROGRAM

## 2022-07-08 RX ORDER — NIFEDIPINE 60 MG/1
60 TABLET, EXTENDED RELEASE ORAL DAILY
Qty: 30 TABLET | Refills: 0 | Status: SHIPPED | OUTPATIENT
Start: 2022-07-09 | End: 2022-08-13

## 2022-07-08 RX ADMIN — LINEZOLID 600 MG: 600 INJECTION, SOLUTION INTRAVENOUS at 02:07

## 2022-07-08 RX ADMIN — MEROPENEM 500 MG: 500 INJECTION, POWDER, FOR SOLUTION INTRAVENOUS at 01:07

## 2022-07-08 RX ADMIN — SODIUM CHLORIDE, PRESERVATIVE FREE 10 ML: 5 INJECTION INTRAVENOUS at 02:07

## 2022-07-08 RX ADMIN — CARVEDILOL 6.25 MG: 6.25 TABLET, FILM COATED ORAL at 10:07

## 2022-07-08 RX ADMIN — METOLAZONE 10 MG: 2.5 TABLET ORAL at 10:07

## 2022-07-08 RX ADMIN — SODIUM CHLORIDE, PRESERVATIVE FREE 10 ML: 5 INJECTION INTRAVENOUS at 01:07

## 2022-07-08 RX ADMIN — MEROPENEM 500 MG: 500 INJECTION, POWDER, FOR SOLUTION INTRAVENOUS at 02:07

## 2022-07-08 RX ADMIN — CALCIUM CARBONATE (ANTACID) CHEW TAB 500 MG 500 MG: 500 CHEW TAB at 02:07

## 2022-07-08 RX ADMIN — TAMSULOSIN HYDROCHLORIDE 0.4 MG: 0.4 CAPSULE ORAL at 10:07

## 2022-07-08 RX ADMIN — FUROSEMIDE 80 MG: 10 INJECTION, SOLUTION INTRAMUSCULAR; INTRAVENOUS at 05:07

## 2022-07-08 RX ADMIN — NIFEDIPINE 60 MG: 30 TABLET, EXTENDED RELEASE ORAL at 10:07

## 2022-07-08 NOTE — PLAN OF CARE
Problem: Fall Injury Risk  Goal: Absence of Fall and Fall-Related Injury  Outcome: Ongoing, Progressing  Intervention: Promote Injury-Free Environment  Flowsheets (Taken 7/8/2022 1839)  Safety Promotion/Fall Prevention:   assistive device/personal item within reach   bed alarm set   side rails raised x 3   instructed to call staff for mobility

## 2022-07-08 NOTE — PLAN OF CARE
Problem: Adult Inpatient Plan of Care  Goal: Plan of Care Review  Outcome: Met  Goal: Patient-Specific Goal (Individualized)  Outcome: Met  Goal: Absence of Hospital-Acquired Illness or Injury  Outcome: Met  Goal: Optimal Comfort and Wellbeing  Outcome: Met  Goal: Readiness for Transition of Care  Outcome: Met     Problem: Diabetes Comorbidity  Goal: Blood Glucose Level Within Targeted Range  Outcome: Met     Problem: Fluid and Electrolyte Imbalance (Acute Kidney Injury/Impairment)  Goal: Fluid and Electrolyte Balance  Outcome: Met     Problem: Oral Intake Inadequate (Acute Kidney Injury/Impairment)  Goal: Optimal Nutrition Intake  Outcome: Met     Problem: Renal Function Impairment (Acute Kidney Injury/Impairment)  Goal: Effective Renal Function  Outcome: Met     Problem: Bariatric Environmental Safety  Goal: Safety Maintained with Care  Outcome: Met     Problem: Infection  Goal: Absence of Infection Signs and Symptoms  Outcome: Met     Problem: Impaired Wound Healing  Goal: Optimal Wound Healing  Outcome: Met     Problem: Skin Injury Risk Increased  Goal: Skin Health and Integrity  Outcome: Met     Problem: Device-Related Complication Risk (Hemodialysis)  Goal: Safe, Effective Therapy Delivery  Outcome: Met     Problem: Hemodynamic Instability (Hemodialysis)  Goal: Effective Tissue Perfusion  Outcome: Met     Problem: Infection (Hemodialysis)  Goal: Absence of Infection Signs and Symptoms  Outcome: Met     Problem: Nutrition Impairment (Mechanical Ventilation, Invasive)  Goal: Optimal Nutrition Delivery  Outcome: Met     Problem: Fall Injury Risk  Goal: Absence of Fall and Fall-Related Injury  7/8/2022 1553 by Deneen Sandoval RN  Outcome: Met  7/8/2022 1550 by Deneen Sandoval RN  Outcome: Ongoing, Progressing  Intervention: Promote Injury-Free Environment  Flowsheets (Taken 7/8/2022 1550)  Safety Promotion/Fall Prevention:   assistive device/personal item within reach   bed alarm set   side rails raised x 3    instructed to call staff for mobility     Problem: Balance Impairment (Functional Deficit)  Goal: Improved Balance and Postural Control  Outcome: Met     Problem: Muscle Strength Impairment (Functional Deficit)  Goal: Improved Muscle Strength  Outcome: Met     Problem: Range of Motion Impairment (Functional Deficit)  Goal: Optimal Range of Motion  Outcome: Met

## 2022-07-08 NOTE — PROGRESS NOTES
Patient family is present and stated that he was fatigued and requested therapy to come back on Monday.

## 2022-07-08 NOTE — PLAN OF CARE
[place] [facility] at # [phone], and spoke to Sweta.  She stated that she was fixing to call me and fax me the HD arrangements.  Pt will be going to [facility] and can start tomorrow July 9th, 2022 at 11:45am.  This information given to pt and wife.  Nurse also aware.  Called Dr. Medina for d/c orders.  Called [facility] for them to deliver the RW to the room for d/c today.

## 2022-07-08 NOTE — PT/OT/SLP PROGRESS
Physical Therapy Treatment    Patient Name:  Neno Watkins   MRN:  [id number]    Recommendations:     Discharge Recommendations:  home with home health   Discharge Equipment Recommendations: walker, rolling   Barriers to discharge: None    Assessment:     Neno Watkins is a 61 y.o. male admitted with a medical diagnosis of SHANDRA (acute kidney injury).  He presents with the following impairments/functional limitations:  weakness, impaired endurance, gait instability, impaired balance.    Pt states that he feels okay, but is dizzy from dialysis. He tolerated gait x 100' with CGA and RW. Pt not able to ambulate as far as he did yesterday but is still doing well. He has been able to demonstrate consistent gains with transfers and mobility.    Rehab Prognosis: Good; patient would benefit from acute skilled PT services to address these deficits and reach maximum level of function.    Recent Surgery: Procedure(s) (LRB):  INSERTION, CATHETER, HEMODIALYSIS, DUAL LUMEN (Right) 11 Days Post-Op    Plan:     During this hospitalization, patient to be seen daily to address the identified rehab impairments via gait training, therapeutic exercises and progress toward the following goals:    · Plan of Care Expires:  07/26/22    Subjective     Chief Complaint: dizziness  Patient/Family Comments/goals: to increase I with functional mobility  Pain/Comfort:  ·        Objective:     Communicated with patient prior to session.  Patient found sitting edge of bed with whitehead catheter upon PT entry to room.     General Precautions: Standard, fall   Orthopedic Precautions:    Braces:    Respiratory Status: Room air     Functional Mobility:  · Bed Mobility:     · Supine to Sit: contact guard assistance  · Transfers:     · Sit to Stand:  contact guard assistance with rolling walker  · Gait: CGA x 100' wiht RW and CGA  · Balance: CGA in standing with RW      AM-PAC 6 CLICK MOBILITY          Therapeutic Activities and Exercises:   see  above    Patient left HOB elevated with all lines intact and call button in reach..    GOALS:   Multidisciplinary Problems     Physical Therapy Goals        Problem: Physical Therapy    Goal Priority Disciplines Outcome Goal Variances Interventions   Physical Therapy Goal     PT, PT/OT Ongoing, Progressing     Description: Goals to be met by: 22     Patient will increase functional independence with mobility by performin. Supine to sit with Modified [place]  2. Sit to stand transfer with Contact Guard Assistance  3. Gait  x 150 feet with Contact Guard Assistance using Rolling Walker.                      Time Tracking:     PT Received On: 22  PT Start Time: 1250     PT Stop Time: 1310  PT Total Time (min): 20 min     Billable Minutes: Therapeutic Activity 20    Treatment Type: Treatment  PT/PTA: PTA           2022

## 2022-07-08 NOTE — PT/OT/SLP PROGRESS
Occupational Therapy      Patient Name:  Neno Watkins   MRN:  [id number]    Patient not seen today secondary to Unarousable, Other (Comment). Will follow-up on Monday per Family request.    7/8/2022

## 2022-07-08 NOTE — NURSING
Wound care done per orders. D/C paperwork discussed with wife and patient, verbalized understanding. D/C per wheelchair with wife.

## 2022-07-09 ENCOUNTER — PATIENT OUTREACH (OUTPATIENT)
Dept: ADMINISTRATIVE | Facility: CLINIC | Age: 62
End: 2022-07-09
Payer: MEDICARE

## 2022-07-09 NOTE — PROGRESS NOTES
C3 nurse attempted to contact Neno Watkins for a TCC post hospital discharge follow up call. The patient is unable to conduct the call @ this time. The patient requested a callback.    The patient does not have a scheduled HOSFU appointment within 7-14 days post hospital discharge date 7/8/2022

## 2022-07-10 NOTE — DISCHARGE SUMMARY
[facility] - Medical Surgical Unit  Hospital Medicine  Discharge Summary      Patient Name: Neno Watkins  MRN: [id number]  Patient Class: IP- Inpatient  Admission Date: 6/27/2022  5:06 PM  Hospital Length of Stay: 11 days  Discharge Date and Time: 7/8/2022  5:25 PM  Attending Physician: Cam Medina MD  Discharging Provider: Cam Medina MD  Primary Care Provider: No primary care provider on file.      HPI:   62yo WM with h/o Morbid Obesity, HTN, DM2, HLD, ?MI transferred from [facility] for SHANDRA. Pt originally presented to their ED 7/21 c/o back pain after fall at home. Noted to have SHANDRA with Hyperkalemia and admitted for tx. Pt tx'd with IVF and initially Cr improved but there was concern of CHF/fluid overload so IVF held and pt given and Cr worsened and UOP declined. ECHO however was normal with EF 55% and no e/o diastolic dysfunction. BNP was 109 as well. Pt given Lasix as well with increased UOP but worsening Cr. Today pt was more SOB with incr LE edema. BUN and Cr progressively worsening,hyperkalemic, oliguric, more hypertensive. Has moderate bilat pleural effusion on imaging as well. Transferred to [facility] for Nephrology services.     Pt finally arrived to floor this evening c/o CP/SOB and was diaphoretic, tachycardic and hypertensive. There were no labs repeated after this morning at 04:30. Ordered stat chem, Trop, ABG, EKG, Lopressor IV and IV Morphine. Pt seen soon thereafter and appears ill. Placed on 15L OxyMask for hypoxia O2 sat 88% on 4L NC. Labs returned with K+ 7.3, BUN 84, Cr 4.48, PO4 8.4, Trop 0.018, EKG NSR w/o acute ST-T wave changes (sp no peaked T-waves). Nephrology consulted for urgent HD and GenSurg consulted for urgent Dialysis Catheter placement. Pt will be brought down to OR for procedure and Anesth plans to intubate. Pt will go to ICU after procedure and then receive HD.       Procedure(s) (LRB):  INSERTION, CATHETER, HEMODIALYSIS, DUAL LUMEN  (Right)        Hospital Course:   Pt admitted to Hospital Medicine and initially treated in ICU. Pt received HD daily with large volume UF. He was eventually able to decr HD to TIW. His respiratory failure resolved as well. He remained on Zosyn/Zyvox til discharge. Cx's remained negative. He received wound care to BLE. Outpatient HD set up and pt discharged to home in stable condition.         Consults:   Consults (From admission, onward)        Status Ordering Provider     Inpatient consult to Registered Dietitian/Nutritionist  Once        Provider:  (Not yet assigned)    Completed RAMON MOORE     Inpatient consult to Nephrology  Once        Provider:  Meir Gillis MD    Completed RAMON MOORE          Final Active Diagnoses:    Diagnosis Date Noted POA    PRINCIPAL PROBLEM:  SHANDRA (acute kidney injury) [N17.9] 06/21/2022 Yes     Chronic    Oliguria [R34] 06/28/2022 Yes    Hyperkalemia [E87.5] 06/21/2022 Yes    Pyelonephritis [N12] 06/27/2022 Yes    Hypoxia [R09.02] 06/27/2022 Yes     Chronic    Cellulitis of both lower extremities [L03.115, L03.116] 06/28/2022 Yes    Pleural effusion, bilateral [J90] 06/28/2022 Yes     Chronic    Leukocytosis [D72.829] 06/22/2022 Yes    Bilateral lower extremity edema [R60.0] 06/22/2022 Yes     Chronic    Hemodialysis status [Z99.2] 07/04/2022 Not Applicable     Chronic    Hypertension [I10] 06/21/2022 Yes     Chronic    Morbid obesity [E66.01] 06/21/2022 Yes     Chronic    Type 2 diabetes mellitus [E11.9] 06/21/2022 Yes     Chronic      Problems Resolved During this Admission:       Discharged Condition: fair    Disposition: Home or Self Care    Follow Up:   Follow-up Information     Meir Gillis MD Follow up in 1 week(s).    Specialty: Nephrology  Contact information:  [facility] [facility] [address] [phone]                       Patient Instructions:      WALKER FOR HOME USE     Order Specific Question Answer Comments   Type  "of Walker: Heavy duty (300+ lbs)    With wheels? Yes    Height: 5' 10" (1.778 m)    Weight: 141.7 kg (312 lb 6.3 oz)    Length of need (1-99 months): 99    Does patient have medical equipment at home? other (see comments) quad cane   Please check all that apply: Patient's condition impairs ambulation.    Please check all that apply: Patient is unable to safely ambulate without equipment.    Please check all that apply: Patient needs help to get in and out of chair.    Please check all that apply: Walker will be used for gait training.      Diet renal     Diet diabetic     Activity as tolerated     Physical Exam  Constitutional:       General: He is awake. He is not in acute distress.     Appearance: Normal appearance. He is well-developed. He is obese. He is not toxic-appearing.   HENT:      Head: Normocephalic and atraumatic.      Nose: Nose normal. No congestion.      Mouth/Throat:      Mouth: Mucous membranes are moist.      Pharynx: Oropharynx is clear.   Eyes:      General: No scleral icterus.     Extraocular Movements: Extraocular movements intact.      Conjunctiva/sclera: Conjunctivae normal.      Pupils: Pupils are equal, round, and reactive to light.   Neck:      Vascular: No carotid bruit.   Cardiovascular:      Rate and Rhythm: Normal rate and regular rhythm.      Pulses: Normal pulses.      Heart sounds: No murmur heard.    No gallop.   Pulmonary:      Effort: Pulmonary effort is normal. No respiratory distress.      Breath sounds: Examination of the right-lower field reveals decreased breath sounds. Examination of the left-lower field reveals decreased breath sounds. Decreased breath sounds present. No wheezing or rhonchi.   Abdominal:      General: Bowel sounds are normal. There is distension.      Palpations: Abdomen is soft.      Tenderness: There is no abdominal tenderness.   Musculoskeletal:         General: Normal range of motion.      Cervical back: Normal range of motion and neck supple.      " Right lower le+ Edema present.      Left lower le+ Edema present.   Skin:     General: Skin is warm and dry.      Findings: Erythema (BLE) and rash (BLE) present.   Neurological:      General: No focal deficit present.      Mental Status: He is alert and oriented to person, place, and time. Mental status is at baseline.      Cranial Nerves: Cranial nerves 2-12 are intact.      Deep Tendon Reflexes: Reflexes normal.   Psychiatric:         Attention and Perception: Attention normal.         Mood and Affect: Mood is depressed. Affect is blunt.         Speech: Speech normal.         Behavior: Behavior is slightly arumentative.         Thought Content: Thought content normal.         Cognition and Memory: Cognition is normal         Judgment: Judgment normal.          Significant Diagnostic Studies:    RENAL/RETROPERITONEAL SONOGRAM:     CLINICAL HISTORY:  SHANDRA;, .     COMPARISON:  None available     FINDINGS:  Real-time imaging was performed through the retroperitoneum evaluating the kidneys. Arterial and venous flow are identified within the kidneys. No hydronephrosis is seen.  No free fluid collection is identified.  The bladder is partially filled with fluid.  A Baldwin catheter is present.  There is bladder wall thickening versus underdistention.     MEASUREMENTS:     Right Kidney: 11.0 cm     RI: 0.73     Left Kidney: 11.5 cm     RI: 0.76     Impression:     1. Baldwin catheter within the bladder with thickened bladder mucosa versus underdistention  2. Slightly elevated renal resistive indices bilaterally.        Electronically signed by: William Mejia  Date:                                            2022  Time:                                           16:24          CT ABDOMEN PELVIS WITHOUT CONTRAST     CLINICAL HISTORY:  Flank pain, kidney stone suspected;     TECHNIQUE:  Helical acquisition through the abdomen and pelvis without IV contrast.  Lack of contrast limits evaluation of solid organs and  vascular structures .  Three plane reconstructions were provided for review. DLP 1644 mGycm. Automatic exposure control, adjustment of mA/kV or iterative reconstruction technique was used to reduce radiation.     COMPARISON:  No prior CT     FINDINGS:  There are moderate bilateral pleural effusions.  There is bibasilar consolidation most likely compressive atelectasis.  There are coronary artery calcifications.     Liver is mildly enlarged.  Lobular hepatic contour, question chronic liver disease.  Gallbladder surgically absent.  No significant abnormality of the spleen, pancreas or adrenals.     No hydronephrosis.  No urinary tract calculi are seen.  There is mild symmetric perinephric stranding.     No bowel obstruction. No significant inflammatory changes of the bowel.  Moderate stool scattered in the colon.  No free air.     There is a Baldwin catheter in place.  There is bladder wall thickening.  Prostate not significantly enlarged.  No pelvic free fluid.  Abdominal aorta normal in caliber.  Mild atherosclerotic disease.  Scattered retroperitoneal lymph nodes may be reactive.     Moderate degenerative change of the spine.     Impression:     1. Bladder wall thickening, question cystitis.  2. Moderate bilateral pleural effusions.  3. Other chronic findings above.        Latest Reference Range & Units 07/07/22 02:17   Sodium 136 - 145 mmol/L 134 (L)   Potassium 3.5 - 5.1 mmol/L 5.1   Chloride 98 - 107 mmol/L 98   CO2 23 - 31 mmol/L 21 (L)   BUN 8.4 - 25.7 mg/dL 69.0 (H)   Creatinine 0.73 - 1.18 mg/dL 7.23 (H)   eGFR if non African American mls/min/1.73/m2 8   Glucose 82 - 115 mg/dL 90   Calcium 8.8 - 10.0 mg/dL 8.4 (L)   Phosphorus 2.3 - 4.7 mg/dL 7.5 (H)   Magnesium 1.60 - 2.60 mg/dL 2.30   Albumin 3.4 - 4.8 gm/dL 2.3 (L)      Latest Reference Range & Units 07/07/22 02:17   WBC 4.5 - 11.5 x10(3)/mcL 16.5 (H)   RBC 4.70 - 6.10 x10(6)/mcL 3.61 (L)   Hemoglobin 14.0 - 18.0 gm/dL 11.1 (L)   Hematocrit 42.0 - 52.0 %  33.9 (L)   MCV 80.0 - 94.0 fL 93.9   MCH 27.0 - 31.0 pg 30.7   MCHC 33.0 - 36.0 mg/dL 32.7 (L)   RDW 11.5 - 17.0 % 12.5   Platelets 130 - 400 x10(3)/mcL 224   MPV 7.4 - 10.4 fL 9.0   Neut % % 69.5   LYMPH % % 17.4   Mono % % 10.4   Eosinophil % % 1.7   Basophil % % 0.4   Immature Granulocytes % 0.6   Neut # 2.1 - 9.2 x10(3)/mcL 11.5 (H)   Lymph # 0.6 - 4.6 x10(3)/mcL 2.87   Mono # 0.1 - 1.3 x10(3)/mcL 1.72 (H)   Eos # 0 - 0.9 x10(3)/mcL 0.28   Baso # 0 - 0.2 x10(3)/mcL 0.06   Immature Grans (Abs) 0 - 0.04 x10(3)/mcL 0.10 (H)        Latest Reference Range & Units 07/03/22 02:00   Thyroid Stimulating Hormone 0.3500 - 4.9400 uIU/mL 1.1757      Latest Reference Range & Units 07/04/22 03:21   Hemoglobin A1C External <=7.0 % 5.5         Pending Diagnostic Studies:     Procedure Component Value Units Date/Time    Hemoglobin A1C [[id number]] Collected: 07/05/22 0215    Order Status: Sent Lab Status: No result     Specimen: Blood          Medications:  Reconciled Home Medications:      Medication List      START taking these medications    NIFEdipine 60 MG (OSM) 24 hr tablet  Commonly known as: PROCARDIA-XL  Take 1 tablet (60 mg total) by mouth once daily.        CONTINUE taking these medications    atorvastatin 80 MG tablet  Commonly known as: LIPITOR  Take 80 mg by mouth once daily.     carvediloL 6.25 MG tablet  Commonly known as: COREG  Take 6.25 mg by mouth 2 (two) times daily.     gabapentin 300 MG capsule  Commonly known as: NEURONTIN  Take 1 capsule by mouth 2 (two) times a day.     tamsulosin 0.4 mg Cap  Commonly known as: FLOMAX  Take 1 capsule by mouth once daily.     TOUJEO SOLOSTAR U-300 INSULIN 300 unit/mL (1.5 mL) Inpn pen  Generic drug: insulin glargine (TOUJEO)  Inject 32 Units into the skin once daily at 6am.        STOP taking these medications    ciprofloxacin HCl 750 MG tablet  Commonly known as: CIPRO     furosemide 40 MG tablet  Commonly known as: LASIX     glipiZIDE 10 MG Tr24  Commonly known as:  GLUCOTROL     lisinopriL 5 MG tablet  Commonly known as: PRINIVIL,ZESTRIL     metFORMIN 500 MG tablet  Commonly known as: GLUCOPHAGE     rifAMpin 300 MG capsule  Commonly known as: RIFADIN            Indwelling Lines/Drains at time of discharge:   Lines/Drains/Airways     Central Venous Catheter Line  Duration                Hemodialysis Catheter 06/27/22 2025 right internal jugular 12 days                Time spent on the discharge of patient: 40 minutes         Cam Medina MD  Department of Hospital Medicine  [facility] - Medical Surgical Unit

## 2022-07-11 NOTE — PROGRESS NOTES
C3 nurse attempted to contact Neno Watkins for a TCC post hospital discharge follow up call. No answer. LVM with CB information. The patient does not have a scheduled HOSFU appointment, and the pt does not have an [facility] PCP.

## 2022-07-12 NOTE — PROGRESS NOTES
C3 nurse spoke with Neno Watkins's wife, Nikki, for a TCC post hospital discharge follow up call. The patient reports does not have a scheduled HOSFU appointment. C3 nurse was unable to schedule [facility]OSFU appointment [facility]. Patient advised to contact their PCP to schedule a HOSPFU within 7-14 days. Patient's wife states she will call to make a HOSFU appt with his PCP and cardiologist.

## 2022-07-19 NOTE — PHYSICIAN QUERY
PT Name: Neno Watkins  MR #: [id number]    DOCUMENTATION CLARIFICATION     CDS/: Fadi GARIBAY, RN-BC  Contact information: [email or web address]  This form is a permanent document in the medical record.    Query Date: July 19, 2022      By submitting this query, we are merely seeking further clarification of documentation.  Please utilize your independent clinical judgment when addressing the question(s) below.    The Medical Record reflects the following:    Clinical Information Location in Medical Record   Right anterior lower leg with venous wound 2.0cm x 4.0cm x 0.2cm, multiple scattered areas,  wound bed red, non granulating tissue, draining small amounts of clear, serous fluid. Gely wound edematous with hemosiderin staining noted.     Right plantar great toe with Diabetic Foot Ulcer, 1.4cm x 1.2cm x 0.2cm, wound bed 75% granulation tissue, 25% slough, wound edges attached, draining clear, serous fluid, gely wound callous.    Right plantar mid foot with Diabetic Foot Ulcer, 2.0cm x 2.5cm x 0.2cm,multiple scattered areas, wound bed with 75% granulation tissue, 25% slough,wound edges attached, draining clear serous fluid, gely wound callous.    Left medial lower leg with venous wound, 1.0cm x 1.5cm x 0.2cm, wound bed 100% smooth non granulating tissue, draining clear, serous fluid. Gely wound edematous with hemosiderin staining noted.     Bilateral lower legs washed and dried well, remains with zinc paste well imbedded in legs, will need to gradually remove to avoid skin damage. Telfa applied to open wound, wrapped with kerlix and ace wrap. Right great toe and right plantar foot, wound bed cleansed with saline, silver alginate applied to wound bed, covered with gauze and wrapped with kerlix.  Wound Care 6/28     Please clarify/confirm the Consultants diagnosis of each of the Altered skin integrity below:     #1 Right anterior lower leg venous wound  [ x ] Diagnosis ruled in   [  ] Diagnosis ruled out   [  ]  Other diagnosis (please specify): _____________________________   [  ] Clinically undetermined     #2  Right plantar great toe with Diabetic Foot Ulcer  [ x ] Diagnosis ruled in   [  ] Diagnosis ruled out   [  ] Other diagnosis (please specify): _____________________________   [  ] Clinically undetermined     #3 Right plantar mid foot with Diabetic Foot Ulcer  [ x ] Diagnosis ruled in   [  ] Diagnosis ruled out   [  ] Other diagnosis (please specify): _____________________________   [  ] Clinically undetermined     #4 Left medial lower leg with venous wound  [ x ] Diagnosis ruled in   [  ] Diagnosis ruled out   [  ] Other diagnosis (please specify): _____________________________   [  ] Clinically undetermined

## 2022-07-27 ENCOUNTER — HOSPITAL ENCOUNTER (INPATIENT)
Facility: HOSPITAL | Age: 62
LOS: 1 days | Discharge: NURSING, REHAB OR LONG-TERM CARE | DRG: 684 | End: 2022-07-28
Attending: EMERGENCY MEDICINE | Admitting: INTERNAL MEDICINE
Payer: MEDICARE

## 2022-07-27 DIAGNOSIS — R52 PAIN: ICD-10-CM

## 2022-07-27 DIAGNOSIS — L89.322 PRESSURE INJURY OF LEFT BUTTOCK, STAGE 2: ICD-10-CM

## 2022-07-27 DIAGNOSIS — R26.2 AMBULATORY DYSFUNCTION: ICD-10-CM

## 2022-07-27 DIAGNOSIS — N17.9 AKI (ACUTE KIDNEY INJURY): ICD-10-CM

## 2022-07-27 DIAGNOSIS — R53.1 WEAKNESS: Primary | ICD-10-CM

## 2022-07-27 DIAGNOSIS — Z99.2 DIALYSIS PATIENT: ICD-10-CM
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
## 2022-07-27 LAB
ANION GAP SERPL CALC-SCNC: 13 MEQ/L
BASOPHILS # BLD AUTO: 0.04 X10(3)/MCL (ref 0–0.2)
BASOPHILS NFR BLD AUTO: 0.3 %
BUN SERPL-MCNC: 52.9 MG/DL (ref 8.4–25.7)
CALCIUM SERPL-MCNC: 8.2 MG/DL (ref 8.8–10)
CHLORIDE SERPL-SCNC: 96 MMOL/L (ref 98–107)
CO2 SERPL-SCNC: 28 MMOL/L (ref 23–31)
CREAT SERPL-MCNC: 8.5 MG/DL (ref 0.73–1.18)
CREAT/UREA NIT SERPL: 6
EOSINOPHIL # BLD AUTO: 0.27 X10(3)/MCL (ref 0–0.9)
EOSINOPHIL NFR BLD AUTO: 2.1 %
ERYTHROCYTE [DISTWIDTH] IN BLOOD BY AUTOMATED COUNT: 12.7 % (ref 11.5–17)
GLUCOSE SERPL-MCNC: 99 MG/DL (ref 82–115)
HCT VFR BLD AUTO: 29.5 % (ref 42–52)
HGB BLD-MCNC: 9.4 GM/DL (ref 14–18)
IMM GRANULOCYTES # BLD AUTO: 0.08 X10(3)/MCL (ref 0–0.04)
IMM GRANULOCYTES NFR BLD AUTO: 0.6 %
LYMPHOCYTES # BLD AUTO: 2.54 X10(3)/MCL (ref 0.6–4.6)
LYMPHOCYTES NFR BLD AUTO: 19.6 %
MCH RBC QN AUTO: 30.3 PG (ref 27–31)
MCHC RBC AUTO-ENTMCNC: 31.9 MG/DL (ref 33–36)
MCV RBC AUTO: 95.2 FL (ref 80–94)
MONOCYTES # BLD AUTO: 1.06 X10(3)/MCL (ref 0.1–1.3)
MONOCYTES NFR BLD AUTO: 8.2 %
NEUTROPHILS # BLD AUTO: 9 X10(3)/MCL (ref 2.1–9.2)
NEUTROPHILS NFR BLD AUTO: 69.2 %
PLATELET # BLD AUTO: 292 X10(3)/MCL (ref 130–400)
PMV BLD AUTO: 9 FL (ref 7.4–10.4)
POTASSIUM SERPL-SCNC: 4.5 MMOL/L (ref 3.5–5.1)
RBC # BLD AUTO: 3.1 X10(6)/MCL (ref 4.7–6.1)
SARS-COV-2 RDRP RESP QL NAA+PROBE: NEGATIVE
SODIUM SERPL-SCNC: 137 MMOL/L (ref 136–145)
WBC # SPEC AUTO: 13 X10(3)/MCL (ref 4.5–11.5)

## 2022-07-27 PROCEDURE — 85025 COMPLETE CBC W/AUTO DIFF WBC: CPT | Performed by: EMERGENCY MEDICINE

## 2022-07-27 PROCEDURE — 99285 EMERGENCY DEPT VISIT HI MDM: CPT | Mod: 25

## 2022-07-27 PROCEDURE — 93010 EKG 12-LEAD: ICD-10-PCS | Mod: ,,, | Performed by: INTERNAL MEDICINE

## 2022-07-27 PROCEDURE — 93010 ELECTROCARDIOGRAM REPORT: CPT | Mod: ,,, | Performed by: INTERNAL MEDICINE

## 2022-07-27 PROCEDURE — 93005 ELECTROCARDIOGRAM TRACING: CPT

## 2022-07-27 PROCEDURE — 80048 BASIC METABOLIC PNL TOTAL CA: CPT | Performed by: EMERGENCY MEDICINE

## 2022-07-27 PROCEDURE — 36415 COLL VENOUS BLD VENIPUNCTURE: CPT | Performed by: EMERGENCY MEDICINE

## 2022-07-27 PROCEDURE — 87635 SARS-COV-2 COVID-19 AMP PRB: CPT | Performed by: EMERGENCY MEDICINE

## 2022-07-27 PROCEDURE — 11000001 HC ACUTE MED/SURG PRIVATE ROOM

## 2022-07-27 NOTE — ED PROVIDER NOTES
"Encounter Date: 7/27/2022       History     Chief Complaint   Patient presents with    Extremity Weakness     Aasi reports that pt has leg weakness after doctor's appointment pt also hypotensive      This 62 y/o man fell while trying to climb the 5 steps into his house. He was recently begun on dialysis while inpatient in [place]. He reports that they removed 80 pound of water. He has been home for a month. His wife reports that he is total care. She has to pull him into standing. He waddles with a walker to the restroom and she has to help him to sit down and then get up. He was able to go to the podiatrist this morning and have his toenails ground down. He dialyses on Tuesday Thursday and Saturday. EMS reported that his BP was slightly low when they picked him up but improved with 500 bolus of saline. He denies injuring himself in the fall. He wishes to go home. He states he has had right hip pain and that his right leg gave out on him and that is why he fell.        Review of patient's allergies indicates:   Allergen Reactions    Butorphanol Swelling     "it almost killed me"    Hydrocodone-acetaminophen Other (See Comments)     "They mess with my heart"    Povidone-iodine Shortness Of Breath and Rash     Past Medical History:   Diagnosis Date    BPH (benign prostatic hyperplasia)     Essential (primary) hypertension     Obesity, unspecified     PAD (peripheral artery disease)      Past Surgical History:   Procedure Laterality Date    INSERTION OF TUNNELED CENTRAL VENOUS HEMODIALYSIS CATHETER Right 6/27/2022    Procedure: INSERTION, CATHETER, HEMODIALYSIS, DUAL LUMEN;  Surgeon: Molly Garcia MD;  Location: [facility];  Service: General;  Laterality: Right;     History reviewed. No pertinent family history.  Social History     Tobacco Use    Smoking status: Never Smoker    Smokeless tobacco: Never Used   Substance Use Topics    Alcohol use: Never    Drug use: Never     Review of Systems "   Constitutional: Negative for fever.   HENT: Negative for sore throat.    Respiratory: Negative for shortness of breath.    Cardiovascular: Negative for chest pain.   Gastrointestinal: Negative for nausea.   Genitourinary: Negative for dysuria.   Musculoskeletal: Positive for back pain and myalgias.        C/o right hip pain that has been going on for some time   Skin: Negative for rash.   Neurological: Positive for weakness (generalized).   Hematological: Does not bruise/bleed easily.       Physical Exam     Initial Vitals   BP Pulse Resp Temp SpO2   07/27/22 1137 07/27/22 1137 07/27/22 1137 07/28/22 0310 07/27/22 1137   (!) 106/54 75 18 98.4 °F (36.9 °C) 97 %      MAP       --                Physical Exam    Constitutional: He appears well-developed and well-nourished.   HENT:   Head: Normocephalic and atraumatic.   Mouth/Throat: Mucous membranes are normal.   Eyes: EOM are normal. Pupils are equal, round, and reactive to light.   Neck: Neck supple.   Normal range of motion.  Cardiovascular: Normal rate, regular rhythm, normal heart sounds and intact distal pulses.   Pulmonary/Chest: Breath sounds normal.   Abdominal: Abdomen is soft. Bowel sounds are normal.   Musculoskeletal:         General: Normal range of motion.      Cervical back: Normal range of motion and neck supple.      Comments: The patient is unable to sit up in bed unassisted. He refused to cooperate with orthostatics because it hurts to much. We were eventually able to stand him on the side of the bed but his balance is very bad and it took him 10-15 minutes to shuffle 6 steps about 12 inches.     Neurological: He is alert and oriented to person, place, and time. GCS score is 15. GCS eye subscore is 4. GCS verbal subscore is 5. GCS motor subscore is 6.   Skin: Skin is warm and dry. Capillary refill takes less than 2 seconds.   The patient has a 1 x 2 inch  stage 2 decubitus on his left medial buttock.   Psychiatric: He has a normal mood and  affect. His behavior is normal. Judgment and thought content normal.         ED Course   Procedures  Labs Reviewed   BASIC METABOLIC PANEL - Abnormal; Notable for the following components:       Result Value    Chloride 96 (*)     Blood Urea Nitrogen 52.9 (*)     Creatinine 8.50 (*)     Calcium Level Total 8.2 (*)     All other components within normal limits   CBC WITH DIFFERENTIAL - Abnormal; Notable for the following components:    WBC 13.0 (*)     RBC 3.10 (*)     Hgb 9.4 (*)     Hct 29.5 (*)     MCV 95.2 (*)     MCHC 31.9 (*)     IG# 0.08 (*)     All other components within normal limits   SARS-COV-2 RNA AMPLIFICATION, QUAL - Normal   CBC W/ AUTO DIFFERENTIAL    Narrative:     The following orders were created for panel order CBC auto differential.  Procedure                               Abnormality         Status                     ---------                               -----------         ------                     CBC with Differential[[id number]]        Abnormal            Final result                 Please view results for these tests on the individual orders.        ECG Results          EKG 12-lead (Final result)  Result time 07/28/22 09:44:02    Final result by Interface, Lab In [facility] (07/28/22 09:44:02)                 Narrative:    Test Reason : R53.1,    Vent. Rate : 075 BPM     Atrial Rate : 075 BPM     P-R Int : 146 ms          QRS Dur : 074 ms      QT Int : 380 ms       P-R-T Axes : 045 032 050 degrees     QTc Int : 424 ms    Normal sinus rhythm  Normal ECG  Confirmed by Qing Santacruz MD ([id number]) on 7/28/2022 9:43:53 AM    Referred By: [email or web address]   SELF           Confirmed By:Qing Santacruz MD                             EKG 12-LEAD (Final result)  Result time 07/29/22 14:21:04    Final result by Unknown User (07/29/22 14:21:04)                                Imaging Results          X-Ray Hip 2 or 3 views Right (with Pelvis when performed) (Final result)  Result time 07/27/22 14:45:04     Final result by Mike Morris MD (07/27/22 14:45:04)                 Impression:      Degenerative changes.      Electronically signed by: Mike Morris  Date:    07/27/2022  Time:    14:45             Narrative:    EXAMINATION:  XR HIP WITH PELVIS WHEN PERFORMED, 2 OR 3  VIEWS RIGHT    CLINICAL HISTORY:  Pain, unspecified    COMPARISON:  None.    FINDINGS:  No acute displaced fractures or dislocations.    There are degenerative changes of the inferior medial and superolateral aspect of the hip joint with some degenerative changes of the lumbosacral spine articular spaces otherwise preserved with smooth articular surfaces    There are degenerative changes of the contralateral hip    Soft tissues within normal limits.                                 Medications - No data to display              ED Course as of 07/30/22 0614   Wed Jul 27, 2022   1547 We have contacted all the available rehabilitation facilities that have dialysis capacity. [facility] is the only one who has a bed opening in 24 hours. I have spent 60 minutes explaining to the patient who is adamant that he wants to go home. [GA]   1607 The patients wife has convinced him of the necessity of his going to rehab and so we will hold him here in the ER until the bed is available tomorrow and transfer him from here to there. [GA]   u Jul 28, 2022   1633 Pt accepted for transfer at [facility]- Dr Alvarado [MG]      ED Course User Index  [GA] Doyle Hernandez MD  [MG] Ynes Vaz MD             Clinical Impression:   Final diagnoses:  [R52] Pain  [R53.1] Weakness (Primary)  [R26.2] Ambulatory dysfunction  [W10.8XXA] Fall (on) (from) other stairs and steps, initial encounter  [L89.322] Pressure injury of left buttock, stage 2  [Z99.2] Dialysis patient  [N17.9] SHANDRA (acute kidney injury)          ED Disposition Condition    Transfer to Another Facility Stable              Doyle Hernandez MD  07/30/22 0615

## 2022-07-27 NOTE — ED NOTES
Nora Tran with case management able to find placement for rehab in [place] however no beds available until tomorrow. Dr. Hernandez spoke with patient and wife at length came to a decision will stay over night in ER for extended stay until bed becomes available tomorrow in [place]. Dr. Hernandez and staff was able to stand patient up bilateral legs weak unable to discharge patient to home due to deconditioning. Pt can benefit going to rehab.

## 2022-07-27 NOTE — ED NOTES
Patient arrived by [facility] reports has a bad back and legs gave out on him.  Fell hit back of head denies loc small superficial cut back of head . Wound cleaned pt denies taking blood thinners GCS 15.Medics reports patient was hypotensive upon arrival gave 500ml Normal saline bolus.

## 2022-07-27 NOTE — Clinical Note
Diagnosis: SHANDRA (acute kidney injury) [[id number]]  Admitting Provider:: RAMON MOORE [[id number]]  Future Attending Provider: RAMON MOORE [[id number]]  Reason for IP Medical Treatment  (Clinical interventions that can only be accomplished in the IP sett ing? ) :: medical treatment  Estimated Length of Stay:: 2 midnights  I certify that Inpatient services for greater than or equal to 2 midnights are medically necessary:: Yes  Plans for Post-Acute care--if anticipated (pick the single best option):: A . No post acute care anticipated at this time  Special Needs:: No Special Needs [1]

## 2022-07-28 ENCOUNTER — HOSPITAL ENCOUNTER (INPATIENT)
Facility: HOSPITAL | Age: 62
LOS: 10 days | Discharge: HOME | DRG: 682 | End: 2022-08-07
Attending: INTERNAL MEDICINE | Admitting: INTERNAL MEDICINE
Payer: MEDICARE

## 2022-07-28 VITALS
OXYGEN SATURATION: 94 % | RESPIRATION RATE: 23 BRPM | HEIGHT: 69 IN | TEMPERATURE: 98 F | BODY MASS INDEX: 32.73 KG/M2 | SYSTOLIC BLOOD PRESSURE: 148 MMHG | HEART RATE: 87 BPM | DIASTOLIC BLOOD PRESSURE: 70 MMHG | WEIGHT: 221 LBS

## 2022-07-28 DIAGNOSIS — L03.115 CELLULITIS OF BOTH LOWER EXTREMITIES: ICD-10-CM

## 2022-07-28 DIAGNOSIS — L03.116 CELLULITIS OF BOTH LOWER EXTREMITIES: ICD-10-CM

## 2022-07-28 DIAGNOSIS — Z99.2 HEMODIALYSIS STATUS: Chronic | ICD-10-CM

## 2022-07-28 DIAGNOSIS — R34 OLIGURIA: ICD-10-CM

## 2022-07-28 DIAGNOSIS — N17.9 AKI (ACUTE KIDNEY INJURY): Primary | Chronic | ICD-10-CM

## 2022-07-28 DIAGNOSIS — R53.81 DEBILITY: ICD-10-CM

## 2022-07-28 PROCEDURE — 11000001 HC ACUTE MED/SURG PRIVATE ROOM

## 2022-07-28 PROCEDURE — 97162 PT EVAL MOD COMPLEX 30 MIN: CPT

## 2022-07-28 PROCEDURE — 25000003 PHARM REV CODE 250: Performed by: INTERNAL MEDICINE

## 2022-07-28 PROCEDURE — 11000004 HC SNF PRIVATE

## 2022-07-28 RX ORDER — TAMSULOSIN HYDROCHLORIDE 0.4 MG/1
0.4 CAPSULE ORAL DAILY
Status: DISCONTINUED | OUTPATIENT
Start: 2022-07-29 | End: 2022-08-07 | Stop reason: HOSPADM

## 2022-07-28 RX ORDER — CALCIUM CARBONATE 200(500)MG
500 TABLET,CHEWABLE ORAL 2 TIMES DAILY PRN
Status: DISCONTINUED | OUTPATIENT
Start: 2022-07-28 | End: 2022-08-07 | Stop reason: HOSPADM

## 2022-07-28 RX ORDER — NIFEDIPINE 30 MG/1
30 TABLET, EXTENDED RELEASE ORAL NIGHTLY
Status: DISCONTINUED | OUTPATIENT
Start: 2022-07-28 | End: 2022-08-07 | Stop reason: HOSPADM

## 2022-07-28 RX ORDER — CARVEDILOL 3.12 MG/1
6.25 TABLET ORAL 2 TIMES DAILY
Status: DISCONTINUED | OUTPATIENT
Start: 2022-07-28 | End: 2022-08-07 | Stop reason: HOSPADM

## 2022-07-28 RX ORDER — NIFEDIPINE 30 MG/1
30 TABLET, EXTENDED RELEASE ORAL DAILY
Status: DISCONTINUED | OUTPATIENT
Start: 2022-07-29 | End: 2022-07-28

## 2022-07-28 RX ORDER — ATORVASTATIN CALCIUM 40 MG/1
80 TABLET, FILM COATED ORAL NIGHTLY
Status: DISCONTINUED | OUTPATIENT
Start: 2022-07-28 | End: 2022-08-07 | Stop reason: HOSPADM

## 2022-07-28 RX ORDER — GABAPENTIN 300 MG/1
300 CAPSULE ORAL 2 TIMES DAILY
Status: DISCONTINUED | OUTPATIENT
Start: 2022-07-28 | End: 2022-08-07 | Stop reason: HOSPADM

## 2022-07-28 RX ADMIN — CARVEDILOL 6.25 MG: 3.12 TABLET, FILM COATED ORAL at 11:07

## 2022-07-28 RX ADMIN — ATORVASTATIN CALCIUM 80 MG: 40 TABLET, FILM COATED ORAL at 11:07

## 2022-07-28 RX ADMIN — NIFEDIPINE 30 MG: 30 TABLET, FILM COATED, EXTENDED RELEASE ORAL at 11:07

## 2022-07-28 RX ADMIN — GABAPENTIN 300 MG: 300 CAPSULE ORAL at 11:07

## 2022-07-28 NOTE — ED NOTES
[facility]

## 2022-07-28 NOTE — ED NOTES
Pt saturated with urine pt cleaned, changed linens and gown. Pt dressing reinforced. Pt made comfortable call light within reach.

## 2022-07-28 NOTE — PLAN OF CARE
Patient requiring rehab placement due to decreased physical function. Patient is on dialysis.Referral sent to both [facility] and [place] rehab facilities. Calli at [facility] stated that patient is medically accepted and that auth was submitted to [facility]. As soon as authorization is approved by [facility] will transfer patient. Calli also working to set up dialysis for patient when he arrives

## 2022-07-28 NOTE — PT/OT/SLP EVAL
Physical Therapy Acute Care Evaluation    Patient Name:  Neno Watkins   MRN:  [id number]    History:     Past Medical History:   Diagnosis Date    BPH (benign prostatic hyperplasia)     Essential (primary) hypertension     Obesity, unspecified     PAD (peripheral artery disease)        Past Surgical History:   Procedure Laterality Date    INSERTION OF TUNNELED CENTRAL VENOUS HEMODIALYSIS CATHETER Right 6/27/2022    Procedure: INSERTION, CATHETER, HEMODIALYSIS, DUAL LUMEN;  Surgeon: Molly Garcia MD;  Location: [facility];  Service: General;  Laterality: Right;         Subjective     Chief Complaint: Impaired mobility  Patient/Family Comments/goals: To return home  Pain/Comfort:  ·        Living Environment:  Lives with: Spouse  Home Environment:   Previous level of function: Modified Independent for mobility using RW and Some assistance required for ADLs from wife. Pt with recent hospital stay at [facility] and then pt transferred out and then dc back home  Equipment used at home: walker, rolling.        Objective:     Communicated with nunrsing prior to session.  Patient found supine with bed alarm, PureWick, telemetry  upon PT entry to room.    General Precautions: Standard, fall   Orthopedic Precautions:    Braces:    Respiratory Status: Room air     Vitals Value   x Room air  92% SPO2 with mobility    Oxygen (L)     Blood pressure     Heart rate         Exams:  · RLE ROM: WFL  · RLE Strength: WFL  · LLE ROM: WFL  · LLE Strength: WFL    Functional Mobility:  · Bed Mobility:     · Supine to Sit: moderate assistance  · Sit to Supine: maximal assistance  · Transfers:     · Sit to Stand:  moderate assistance with rolling walker  · Gait: pt limited to gait due to many lines in ER room, however able to take some side steps towards HOB with CGA using RW  · Balance: Fair (-)    Therapeutic Activities and Exercises:       Additional information:     Patient left supine with all lines intact, call button in reach, bed alarm  on and nursing notified.      Assessment:     Neno Watkins is a 62 y.o. male admitted with a medical diagnosis of <principal problem not specified>.  He presents with the following impairments/functional limitations:  weakness, impaired endurance, decreased upper extremity function, impaired functional mobility, decreased lower extremity function, gait instability, impaired balance, decreased safety awareness, pain. Patient evaluated by PT in the ER d/t difficulties with mobility at home and to determine need for physical therapy services. Patient is currently not functioning at his baseline and requires significant functional and ADL assistance. If patient were to be prematurely discharged home, this places him at a high fall risk. Although, pt will not be admitted to [facility] for therapy services, due to medical reasons, pt would benefit from skilled PT services at whichever hospital admitted to return to [facility] and safely return home.     Rehab Prognosis: Good; patient would benefit from acute skilled PT services to address these deficits and reach maximum level of function.    Recent Surgery: * No surgery found *        Recommendations:     Discharge Recommendations:  nursing facility, skilled, other (see comments) (IP swing bed)   Discharge Equipment Recommendations:         Plan:     During this hospitalization, patient to be seen   to address the identified rehab impairments via   and progress toward the following goals:    GOALS:   Multidisciplinary Problems     Physical Therapy Goals        Problem: Physical Therapy    Goal Priority Disciplines Outcome Goal Variances Interventions   Physical Therapy Goal     PT, PT/OT                      Time Tracking:       PT Start Time: 1520     PT Stop Time: 1540  PT Total Time (min): 20 min     Billable Minutes: Evaluation Moderate complexity      07/28/2022

## 2022-07-28 NOTE — ED PROVIDER NOTES
"Encounter Date: 7/27/2022       History     Chief Complaint   Patient presents with    Extremity Weakness     Aasi reports that pt has leg weakness after doctor's appointment pt also hypotensive      Patient brought in by EMS with worsening leg weakness and low blood pressure; patient recently started on dialysis and his wife states he is total home care, unable to do things for himself needs assistance with all activities of daily living        Review of patient's allergies indicates:   Allergen Reactions    Butorphanol Swelling     "it almost killed me"    Hydrocodone-acetaminophen Other (See Comments)     "They mess with my heart"    Povidone-iodine Shortness Of Breath and Rash     Past Medical History:   Diagnosis Date    BPH (benign prostatic hyperplasia)     Essential (primary) hypertension     Obesity, unspecified     PAD (peripheral artery disease)      Past Surgical History:   Procedure Laterality Date    INSERTION OF TUNNELED CENTRAL VENOUS HEMODIALYSIS CATHETER Right 6/27/2022    Procedure: INSERTION, CATHETER, HEMODIALYSIS, DUAL LUMEN;  Surgeon: Molly Garcia MD;  Location: [facility];  Service: General;  Laterality: Right;     History reviewed. No pertinent family history.  Social History     Tobacco Use    Smoking status: Never Smoker    Smokeless tobacco: Never Used   Substance Use Topics    Alcohol use: Never    Drug use: Never     Review of Systems   Constitutional: Positive for fatigue.   Respiratory: Negative.    Cardiovascular: Negative.    Gastrointestinal: Negative.    Genitourinary: Negative.    Musculoskeletal: Positive for arthralgias, back pain and myalgias.   Neurological: Positive for weakness.       Physical Exam     Initial Vitals   BP Pulse Resp Temp SpO2   07/27/22 1137 07/27/22 1137 07/27/22 1137 07/28/22 0310 07/27/22 1137   (!) 106/54 75 18 98.4 °F (36.9 °C) 97 %      MAP       --                Physical Exam    Nursing note and vitals reviewed.  Constitutional: He " appears well-developed and well-nourished.   HENT:   Head: Normocephalic and atraumatic.   Eyes: EOM are normal. Pupils are equal, round, and reactive to light.   Neck: Neck supple.   Normal range of motion.  Cardiovascular: Normal rate, regular rhythm and normal heart sounds.   Pulmonary/Chest: Breath sounds normal.   Abdominal: Abdomen is soft. Bowel sounds are normal.   Musculoskeletal:         General: Normal range of motion.      Cervical back: Normal range of motion and neck supple.     Neurological: He is alert and oriented to person, place, and time.   Trouble sitting up by himself and difficulty standing on his feet without assistance   Skin: Skin is warm and dry.         ED Course   Procedures  Labs Reviewed   BASIC METABOLIC PANEL - Abnormal; Notable for the following components:       Result Value    Chloride 96 (*)     Blood Urea Nitrogen 52.9 (*)     Creatinine 8.50 (*)     Calcium Level Total 8.2 (*)     All other components within normal limits   CBC WITH DIFFERENTIAL - Abnormal; Notable for the following components:    WBC 13.0 (*)     RBC 3.10 (*)     Hgb 9.4 (*)     Hct 29.5 (*)     MCV 95.2 (*)     MCHC 31.9 (*)     IG# 0.08 (*)     All other components within normal limits   SARS-COV-2 RNA AMPLIFICATION, QUAL - Normal   CBC W/ AUTO DIFFERENTIAL    Narrative:     The following orders were created for panel order CBC auto differential.  Procedure                               Abnormality         Status                     ---------                               -----------         ------                     CBC with Differential[[id number]]        Abnormal            Final result                 Please view results for these tests on the individual orders.        ECG Results          EKG 12-lead (In process)  Result time 07/28/22 05:23:36    In process by Interface, Lab In [facility] (07/28/22 05:23:36)                 Narrative:    Test Reason : R53.1,    Vent. Rate : 075 BPM     Atrial Rate : 075  BPM     P-R Int : 146 ms          QRS Dur : 074 ms      QT Int : 380 ms       P-R-T Axes : 045 032 050 degrees     QTc Int : 424 ms    Normal sinus rhythm  Normal ECG  When compared with ECG of 28-JUN-2022 06:26,  Nonspecific T wave abnormality no longer evident in Lateral leads    Referred By: [email or web address]   SELF           Confirmed By:                             Imaging Results          X-Ray Hip 2 or 3 views Right (with Pelvis when performed) (Final result)  Result time 07/27/22 14:45:04    Final result by Mike Morris MD (07/27/22 14:45:04)                 Impression:      Degenerative changes.      Electronically signed by: Mike Morris  Date:    07/27/2022  Time:    14:45             Narrative:    EXAMINATION:  XR HIP WITH PELVIS WHEN PERFORMED, 2 OR 3  VIEWS RIGHT    CLINICAL HISTORY:  Pain, unspecified    COMPARISON:  None.    FINDINGS:  No acute displaced fractures or dislocations.    There are degenerative changes of the inferior medial and superolateral aspect of the hip joint with some degenerative changes of the lumbosacral spine articular spaces otherwise preserved with smooth articular surfaces    There are degenerative changes of the contralateral hip    Soft tissues within normal limits.                                 Medications - No data to display              ED Course as of 07/28/22 1633   Wed Jul 27, 2022   1547 We have contacted all the available rehabilitation facilities that have dialysis capacity. [facility] is the only one who has a bed opening in 24 hours. I have spent 60 minutes explaining to the patient who is adamant that he wants to go home. [GA]   1607 The patients wife has convinced him of the necessity of his going to rehab and so we will hold him here in the ER until the bed is available tomorrow and transfer him from here to there. [GA]   u Jul 28, 2022   1633 Pt accepted for transfer at [facility]- Dr Alvarado [MG]      ED Course User Index  [GA] Doyle Hernandez,  "MD  [MG] Ynes Vaz MD            Patient Vitals for the past 24 hrs:   BP Temp Pulse Resp SpO2 Height Weight   07/28/22 0310 122/72 98.4 °F (36.9 °C) 72 17 (!) 92 % -- --   07/28/22 0048 (!) 116/55 -- 69 19 (!) 93 % -- --   07/27/22 2319 116/69 -- 71 18 (!) 92 % -- --   07/27/22 2255 (!) 90/58 -- 71 19 (!) 94 % -- --   07/27/22 2219 128/67 -- -- -- -- -- --   07/27/22 2216 123/67 -- 76 (!) 23 (!) 92 % -- --   07/27/22 2010 138/62 -- 82 20 95 % -- --   07/27/22 1832 (!) 162/85 -- 83 (!) 21 95 % -- --   07/27/22 1802 (!) 165/79 -- 84 (!) 23 (!) 92 % -- --   07/27/22 1732 (!) 153/76 -- 85 (!) 25 95 % -- --   07/27/22 1702 (!) 146/72 -- 83 19 (!) 94 % -- --   07/27/22 1635 (!) 141/76 -- 76 19 96 % -- --   07/27/22 1434 (!) 106/52 -- 84 (!) 24 95 % -- --   07/27/22 1402 124/61 -- 82 15 95 % -- --   07/27/22 1332 (!) 147/66 -- 80 19 96 % -- --   07/27/22 1301 134/70 -- 81 (!) 24 95 % -- --   07/27/22 1257 132/60 -- 79 (!) 23 95 % -- --   07/27/22 1231 132/60 -- 78 20 96 % -- --   07/27/22 1205 (!) 115/59 -- 75 19 96 % -- --   07/27/22 1201 124/63 -- 77 19 (!) 93 % -- --   07/27/22 1147 117/61 -- 76 20 95 % -- --   07/27/22 1137 (!) 106/54 -- 75 18 97 % -- --   07/27/22 1120 -- -- -- -- -- 5' 9" (1.753 m) 100.2 kg (221 lb)         Clinical Impression:   Final diagnoses:  [R52] Pain  [R53.1] Weakness (Primary)  [R26.2] Ambulatory dysfunction  [W10.8XXA] Fall (on) (from) other stairs and steps, initial encounter  [L89.322] Pressure injury of left buttock, stage 2  [Z99.2] Dialysis patient  [N17.9] SHANDRA (acute kidney injury)          ED Disposition Condition    Transfer to Another Facility Stable              Ynes Vaz MD  07/28/22 1633    "

## 2022-07-29 LAB
HBV SURFACE AG SERPL QL IA: NONREACTIVE
POCT GLUCOSE: 100 MG/DL (ref 70–110)
POCT GLUCOSE: 122 MG/DL (ref 70–110)
POCT GLUCOSE: 87 MG/DL (ref 70–110)
POCT GLUCOSE: 91 MG/DL (ref 70–110)
POCT GLUCOSE: 92 MG/DL (ref 70–110)

## 2022-07-29 PROCEDURE — 97166 OT EVAL MOD COMPLEX 45 MIN: CPT

## 2022-07-29 PROCEDURE — 36415 COLL VENOUS BLD VENIPUNCTURE: CPT | Performed by: INTERNAL MEDICINE

## 2022-07-29 PROCEDURE — 11000004 HC SNF PRIVATE

## 2022-07-29 PROCEDURE — 87340 HEPATITIS B SURFACE AG IA: CPT | Performed by: INTERNAL MEDICINE

## 2022-07-29 PROCEDURE — 25000003 PHARM REV CODE 250: Performed by: INTERNAL MEDICINE

## 2022-07-29 PROCEDURE — 97162 PT EVAL MOD COMPLEX 30 MIN: CPT

## 2022-07-29 PROCEDURE — 80100016 HC MAINTENANCE HEMODIALYSIS

## 2022-07-29 PROCEDURE — 63600175 PHARM REV CODE 636 W HCPCS: Mod: JG | Performed by: INTERNAL MEDICINE

## 2022-07-29 RX ORDER — INSULIN ASPART 100 [IU]/ML
0-5 INJECTION, SOLUTION INTRAVENOUS; SUBCUTANEOUS
Status: DISCONTINUED | OUTPATIENT
Start: 2022-07-29 | End: 2022-08-07 | Stop reason: HOSPADM

## 2022-07-29 RX ORDER — IBUPROFEN 200 MG
24 TABLET ORAL
Status: DISCONTINUED | OUTPATIENT
Start: 2022-07-29 | End: 2022-08-07 | Stop reason: HOSPADM

## 2022-07-29 RX ORDER — IBUPROFEN 200 MG
16 TABLET ORAL
Status: DISCONTINUED | OUTPATIENT
Start: 2022-07-29 | End: 2022-08-07 | Stop reason: HOSPADM

## 2022-07-29 RX ORDER — GLUCAGON 1 MG
1 KIT INJECTION
Status: DISCONTINUED | OUTPATIENT
Start: 2022-07-29 | End: 2022-08-07 | Stop reason: HOSPADM

## 2022-07-29 RX ADMIN — TAMSULOSIN HYDROCHLORIDE 0.4 MG: 0.4 CAPSULE ORAL at 01:07

## 2022-07-29 RX ADMIN — ATORVASTATIN CALCIUM 80 MG: 40 TABLET, FILM COATED ORAL at 08:07

## 2022-07-29 RX ADMIN — CALCIUM CARBONATE (ANTACID) CHEW TAB 500 MG 500 MG: 500 CHEW TAB at 08:07

## 2022-07-29 RX ADMIN — GABAPENTIN 300 MG: 300 CAPSULE ORAL at 01:07

## 2022-07-29 RX ADMIN — CARVEDILOL 6.25 MG: 3.12 TABLET, FILM COATED ORAL at 08:07

## 2022-07-29 RX ADMIN — CARVEDILOL 6.25 MG: 3.12 TABLET, FILM COATED ORAL at 01:07

## 2022-07-29 RX ADMIN — EPOETIN ALFA-EPBX 20000 UNITS: 40000 INJECTION, SOLUTION INTRAVENOUS; SUBCUTANEOUS at 11:07

## 2022-07-29 RX ADMIN — NIFEDIPINE 30 MG: 30 TABLET, FILM COATED, EXTENDED RELEASE ORAL at 08:07

## 2022-07-29 RX ADMIN — GABAPENTIN 300 MG: 300 CAPSULE ORAL at 08:07

## 2022-07-29 NOTE — PT/OT/SLP EVAL
"Physical Therapy Swing Bed Evaluation      Patient Name:  Neno Watkins   MRN:  [id number]    History:     Per MD:  Patient is a 62 y.o. male presents with c/o weakness and debility with h/o esrd on hd with h/o htn dm bph pad with obesity was transferred from [facility] to the swing bed program for therapy medical management and esrd on hd     Past Medical History:   Diagnosis Date    BPH (benign prostatic hyperplasia)     Essential (primary) hypertension     Obesity, unspecified     PAD (peripheral artery disease)        Past Surgical History:   Procedure Laterality Date    INSERTION OF TUNNELED CENTRAL VENOUS HEMODIALYSIS CATHETER Right 6/27/2022    Procedure: INSERTION, CATHETER, HEMODIALYSIS, DUAL LUMEN;  Surgeon: Molly Garcia MD;  Location: [facility] OR;  Service: General;  Laterality: Right;       Recent Surgery: * No surgery found *      Subjective     Chief Complaint: "I need to use the bathroom"  Patient/Family Comments/goals: "to get my legs stronger to walk again"  Pain/Comfort:  · Location 1: back      Living Environment:  Pt currently resides at home with his spouse and mother in law in a mobile home.  Pt has 5 steps with B railings to enter.  Pt describes both his mother in law and spouse as "not being in good health" and stated that very limited assistance is available.  Pt was requiring assistance with ADLs since his D/C from [facility] 3 weeks ago. Spouse is able to drive and does the cooking and cleaning.   Equipment used at home: walker, rolling, shower chair.       Objective:     Patient found supine with bed alarm  upon PT entry to room.    General Precautions: Standard, fall   Orthopedic Precautions:N/A   Braces:  (surgical shoe RLE)  Respiratory Status: Room air      Functional Mobility:     Assist Level Assistive Device Comments    Bed Mobility MaxA-modA  MaxA required for pt to reach sitting at EOB from supine.  Pt appeared to put forth limited effort and was likely capable of increased " "independence. Pt c/o back pain throughout this transition.  Upon returning to bed, pt required assistance with BLE.   Sit to stand/Stand to sit ModA RW Sit to stand/stand to sit from an elevated bed and from the BSC. Pt did not abide by cues for hand placement and instead pulled up from the RW.  Pt again appeared capable of increased independence.    Transfers Demetria RW BSC t/f performed for a +BM.  After taking a few small steps toward the commode, pt began to have a BM while standing.  Pt refused to sit on the commode stating "I can't sit".  PT and OT provided cueing for safety and eventually had to assist pt into a sitting position.  Assistance required with hygiene.    Gait Demetria RW Pt able to ambulate approximately 2 ft to and from the BSC. Pt demonstrated short steps with limited foot clearance.  Flexed posture observed throughout.  Cues provided for RW proximity. Distance limited due to c/o pain and "inability".   Balance Training      Wheelchair Mobility      Stair Climbing      Car Transfer      Other:           Assessment:     Neno Watkins is a 62 y.o. male admitted with a medical diagnosis of weakness.  He presents with the following impairments/functional limitations:  weakness, gait instability, impaired endurance, impaired functional mobility, pain, impaired balance, decreased lower extremity function.    Rehab Prognosis: Fair; patient would benefit from acute skilled PT services to address these deficits and reach maximum level of function.      Additional information:  Initially attempted to see pt for PT evaluation at 12:28. Pt had not yet completed dialysis.  Returned at 13:03 and performed a co-evaluation with OT.  Pt immediately expressed the need to use the restroom and insisted on ambulating to the toilet.  Upon removing pt's bedsheets, it was observed that pt's chux and gown were completely saturated with urine, yet pt had not called for assistance.  PT retrieved a BSC and pt stated "I can't " "use that". Pt unable to provide a reason for why he could not utilize this device.  Pt demonstrated difficulty standing at the EOB and requested that the bed be elevated to ease the transition into upright. As mentioned above, pt was likely capable of increased independence.  Pt eventually agreed that he was unable to t/f onto the toilet and instead stepped toward the BSC.  See above for the description of pt's BSC t/f.  After completion of using the BSC, pt returned to bed with assistance.  Will continue to see pt for PT services in an effort to maximize pt's independence and safety with mobility.  Pt expressed motivation to improve and return home; however, his willingness to cooperate with PT/OT recommendations is yet to be determined.     Patient left supine with call button in reach and bed alarm on.      Plan:     During this hospitalization, patient to be seen 6 x/week to address the identified rehab impairments via gait training, therapeutic activities, therapeutic exercises and progress toward the following goals:    GOALS:   Multidisciplinary Problems     Physical Therapy Goals        Problem: Physical Therapy    Goal Priority Disciplines Outcome Goal Variances Interventions   Physical Therapy Goal     PT, PT/OT      Description: Patient will increase functional independence with mobility by performin. Supine to sit with Modified [place]  2. Sit to supine with Modified [place]  3. Sit to stand transfer with Supervision  4. Gait  x 150 feet with Supervision using Rolling Walker.   5. Ascend/descend 5 stair with bilateral Handrails Supervision.                      Recommendations:     Discharge Recommendations:  nursing facility, skilled, home with home health     Time Tracking:       PT Start Time: 1303     PT Stop Time: 1342  PT Total Time (min): 39 min     Billable Minutes: Evaluation 39      2022   "

## 2022-07-29 NOTE — PLAN OF CARE
Goals to be met by: 8/26/22     Patient will increase functional independence with ADLs by performing: LE Dressing with Minimal Assistance. Toileting from bedside commode with Minimal Assistance for hygiene and clothing management. Bathing from edge of bed with Minimal Assistance. Supine to sit with Contact Guard Assistance. Toilet transfer to bedside commode with Contact Guard Assistance.

## 2022-07-29 NOTE — NURSING
07/29/22 1245   Post-Hemodialysis Assessment   Blood Volume Processed (Liters) 72.1 L   Dialyzer Clearance Lightly streaked   Total UF (mL) 3000 mL   Patient Response to Treatment Tolerated well   Post-Hemodialysis Comments Treatment completed x3.5 hours. No issues during treatment. Right CVC functioned well

## 2022-07-29 NOTE — H&P
"[facility] - Medical Surgical Unit  History & Physical    SUBJECTIVE:     Chief Complaint/Reason for Admission: weakness and debility with esrd on hd     History of Present Illness:  Patient is a 62 y.o. male presents with c/o weakness and debility with h/o esrd on hd with h/o htn dm bph pad with obesity was transferred from [facility] to the [facility] for therapy medical management and esrd on hd     PTA Medications   Medication Sig    atorvastatin (LIPITOR) 80 MG tablet Take 80 mg by mouth every evening.    carvediloL (COREG) 6.25 MG tablet Take 6.25 mg by mouth 2 (two) times daily.    gabapentin (NEURONTIN) 300 MG capsule Take 1 capsule by mouth 2 (two) times a day.    NIFEdipine (PROCARDIA-XL) 60 MG (OSM) 24 hr tablet Take 1 tablet (60 mg total) by mouth once daily.    tamsulosin (FLOMAX) 0.4 mg Cap Take 1 capsule by mouth once daily.    TOUJEO SOLOSTAR U-300 INSULIN 300 unit/mL (1.5 mL) InPn pen Inject 32 Units into the skin once daily at 6am.       Review of patient's allergies indicates:   Allergen Reactions    Butorphanol Swelling     "it almost killed me"    Hydrocodone-acetaminophen Other (See Comments)     "They mess with my heart"    Povidone-iodine Shortness Of Breath and Rash       Past Medical History:   Diagnosis Date    BPH (benign prostatic hyperplasia)     Essential (primary) hypertension     Obesity, unspecified     PAD (peripheral artery disease)      Past Surgical History:   Procedure Laterality Date    INSERTION OF TUNNELED CENTRAL VENOUS HEMODIALYSIS CATHETER Right 6/27/2022    Procedure: INSERTION, CATHETER, HEMODIALYSIS, DUAL LUMEN;  Surgeon: Molly Garcia MD;  Location: [facility];  Service: General;  Laterality: Right;     No family history on file.  Social History     Tobacco Use    Smoking status: Never Smoker    Smokeless tobacco: Never Used   Substance Use Topics    Alcohol use: Never    Drug use: Never        Review of Systems:  Constitutional: " no fever or chills  Eyes: no visual changes  ENT: no nasal congestion or sore throat  Respiratory: no cough or shortness of breath  Cardiovascular: no chest pain or palpitations  Gastrointestinal: no nausea or vomiting, no abdominal pain or change in bowel habits  Genitourinary: no hematuria or dysuria  Integument/Breast: no rash or pruritis  Hematologic/Lymphatic: no easy bruising or lymphadenopathy  Allergy/Immunology: seasonal allergies  Musculoskeletal: no arthralgias or myalgias  Neurological: no seizures or tremors  Behavioral/Psych: no auditory or visual hallucinations  Endocrine: no heat or cold intolerance  Debility and weakness   OBJECTIVE:     Vital Signs (Most Recent):  Temp: 99 °F (37.2 °C) (07/29/22 0152)  Pulse: 73 (07/29/22 0152)  Resp: 18 (07/29/22 0152)  BP: (!) 152/73 (07/29/22 0152)  SpO2: 98 % (07/29/22 0152)    Physical Exam:  General obesity  CVS S1S2 n  Abd soft obese no tenderness  Resp clear dec ae at bases  HEENT  NAD  Msk dec rom weakness debility  Ext no cyanosis no clubbing dec rom  Neuro weakness no new fnd gait unsteady  Psych nad    Laboratory:  I have reviewed all pertinent lab results within the past 24 hours.  CBC:   Recent Labs   Lab 07/27/22  1220   WBC 13.0*   RBC 3.10*   HGB 9.4*   HCT 29.5*      MCV 95.2*   MCH 30.3   MCHC 31.9*     BMP:   Recent Labs   Lab 07/27/22  1220      K 4.5   CO2 28   BUN 52.9*   CREATININE 8.50*   CALCIUM 8.2*     CMP:   Recent Labs   Lab 07/27/22  1220   CALCIUM 8.2*      K 4.5   CO2 28   BUN 52.9*   CREATININE 8.50*     LFTs: No results for input(s): ALT, AST, ALKPHOS, BILITOT, PROT, ALBUMIN in the last 168 hours.  Coagulation: No results for input(s): LABPROT, INR, APTT in the last 168 hours.  Cardiac markers: No results for input(s): CKMB, CPKMB, TROPONINT, TROPONINI, MYOGLOBIN in the last 168 hours.  ABGs: No results for input(s): PH, PCO2, PO2, HCO3, POCSATURATED, BE in the last 168 hours.  Microbiology Results (last 7 days)      ** No results found for the last 168 hours. **        Specimen (24h ago, onward)            None        No results for input(s): COLORU, CLARITYU, SPECGRAV, PHUR, PROTEINUA, GLUCOSEU, BILIRUBINCON, BLOODU, WBCU, RBCU, BACTERIA, MUCUS, NITRITE, LEUKOCYTESUR, UROBILINOGEN, HYALINECASTS in the last 168 hours.    Diagnostic Results:  reviewed    ASSESSMENT/PLAN:     1. Weakness and Debility  Place in swing bed  PT OT consulted  Old records reviewed  Labs reviewed    2. HTN  /DM  meds restarted will monitor  Will monitor    3. ESRD on HD  Renal on board   HD today  dw him in detail    GI and DVT prophylaxis

## 2022-07-29 NOTE — PT/OT/SLP EVAL
Occupational Therapy   Initial Evaluation    Name: Neno Watkins  MRN: [id number]  Admitting Diagnosis:  <principal problem not specified>      History:   Patient is a 62 y.o. male presents with c/o weakness and debility with h/o esrd on hd with h/o htn dm bph pad with obesity was transferred from [facility] to the [facility] bed program for therapy medical management and esrd on hd.  Pt had SHANDRA, in [place], since then his renal function did not recover, he has been maintained on dialysis in [place]. We are consulted for consulted for HD  Pt will be started on MWF HD    Past Medical History:   Diagnosis Date    BPH (benign prostatic hyperplasia)     Essential (primary) hypertension     Obesity, unspecified     PAD (peripheral artery disease)        Past Surgical History:   Procedure Laterality Date    INSERTION OF TUNNELED CENTRAL VENOUS HEMODIALYSIS CATHETER Right 6/27/2022    Procedure: INSERTION, CATHETER, HEMODIALYSIS, DUAL LUMEN;  Surgeon: Molly Garcia MD;  Location: [facility];  Service: General;  Laterality: Right;         Subjective     Chief Complaint: Back Pain, weakness  Patient/Family Comments/goals: To increase BLE strength and mobility    Occupational Profile:  Lives with: Wife  Home Environment: Mobile home with 4 steps to enter with rail  Previous level of function: Mod I mobility getting assist from wife with ADLs  Equipment Used at Home:  walker, rolling, shower chair      Objective:     Communicated with: PT prior to session.  Patient found supine with bed alarm, arterial line upon OT entry to room.    General Precautions: Standard, fall, vision impaired (BLind Right eye)   Orthopedic Precautions:    Braces:    Respiratory Status: Room air    Occupational Performance:    Mobility  Assist level Comments    Bed mobility MOD    Balance training     Transfer Mod Stand pivot with RW   Functional mobility     Sit to stand transitions     Other:         Activities of Daily Living Assist level  Comments    Feeding     Grooming/hygiene     Bathing     Upper body dressing     Lower body dressing     Toileting Dep    Toilet transfer Mod    Adaptive equipment training     Other:       Upper Extremity Strength:  Right Upper Extremity: WFL except 4/5 strength  Left Upper Extremity: WFL except 4/5 strength    Cognitive/Visual Perceptual:  Noted decreased sequencing and safety awareness.  Pt requiring max encourgement to perform functional activities.      Treatment & Education:  Transfer training supine to sit mod assist from right. Transfer training bed to C mod assist and verbal cuing for safety with RW.  Performed toiletting with dep assist for hygiene after BM.      Assessment:     Neno Watkins is a 62 y.o. male with a medical diagnosis of <principal problem not specified>.  He presents with decreased functional transferes, ADL ability, strength/endurance, cognition, and increased pain reports secondary to recent medical decline. Performance deficits affecting function: weakness, impaired endurance, impaired self care skills, impaired functional mobility, gait instability, impaired balance, visual deficits, impaired cognition, decreased safety awareness, pain.      Rehab Prognosis: Fair; patient would benefit from acute skilled OT services to address these deficits and reach maximum level of function.     Pain/Comfort:  ·      Blood Pressure:        Plan:     Patient to be seen daily (5-7x/wk) to address the above listed problems via self-care/home management, therapeutic activities, therapeutic exercises  · Plan of Care Reviewed with: patient      GOALS:   Multidisciplinary Problems     Occupational Therapy Goals        Problem: Occupational Therapy    Goal Priority Disciplines Outcome Interventions   Occupational Therapy Goal     OT, PT/OT     Description: Goals to be met by: 8/26/22     Patient will increase functional independence with ADLs by performing:    LE Dressing with Minimal  Assistance.  Toileting from bedside commode with Minimal Assistance for hygiene and clothing management.   Bathing from  edge of bed with Minimal Assistance.  Supine to sit with Contact Guard Assistance.  Toilet transfer to bedside commode with Contact Guard Assistance.                         Patient left supine with all lines intact, call button in reach and bed alarm on      Recommendations:     Discharge Recommendations: LTACH (long-term acute care hospital), nursing facility, skilled, home health OT  Discharge Equipment Recommendations:  bedside commode, dressing device  Barriers to discharge:  Decreased caregiver support, Inaccessible home environment      Time Tracking:     OT Date of Treatment: 08/01/22  OT Start Time: 1300  OT Stop Time: 1330  OT Total Time (min): 30 min    Billable Minutes:Evaluation 30      7/29/2022

## 2022-07-29 NOTE — PLAN OF CARE
07/29/22 1201   Discharge Assessment   Assessment Type Discharge Planning Assessment   Confirmed/corrected address, phone number and insurance Yes   Confirmed Demographics Correct on Facesheet   Source of Information family   If unable to respond/provide information was family/caregiver contacted? Yes   Contact Name/Number Annmarie Watkins (Spouse) [phone]   Communicated MATT with patient/caregiver Date not available/Unable to determine   Reason For Admission Debility   Lives With spouse   Facility Arrived From: [facility]   Do you expect to return to your current living situation? Yes   Do you have help at home or someone to help you manage your care at home? Yes   Who are your caregiver(s) and their phone number(s)? Annmarie Watkins [phone] (Spouse)   Prior to hospitilization cognitive status: Alert/Oriented   Current cognitive status: Alert/Oriented   Walking or Climbing Stairs Difficulty ambulation difficulty, requires equipment   Mobility Management limited walking ability   Dressing/Bathing Difficulty dressing difficulty, assistance 1 person;bathing difficulty, assistance 1 person   Dressing/Bathing Management Spouse has to assist him with bathing & dressing   Home Accessibility stairs to enter home   Number of Stairs, Main Entrance five   Surface of Stairs, Main Entrance hardwood   Stair Railings, Main Entrance railings safe and in good condition   Landing, Stairs, Main Entrance adequate turning radius   Stairs Comment, Main Entrance porch is the landing after 4th stair, needs ramp   Home Layout Able to live on 1st floor   Equipment Currently Used at Home walker, rolling;bath bench   Readmission within 30 days? No   Patient currently being followed by outpatient case management? No   Do you currently have service(s) that help you manage your care at home? No   Do you take prescription medications? Yes   Do you have prescription coverage? Yes   Coverage [employer]   Do you have any problems  affording any of your prescribed medications? No   Is the patient taking medications as prescribed? yes   Who is going to help you get home at discharge? Spouse, Annmarie Watkins   How do you get to doctors appointments? family or friend will provide   Are you on dialysis? Yes   Dialysis Name and Scheduled days [facility] located in [place], 3 days per week   Do you take coumadin? No   Discharge Plan A Home with family   DME Needed Upon Discharge  bedside commode;wheelchair   Discharge Plan discussed with: Spouse/sig other   Name(s) and Number(s) Annmarie Watkins  [phone]   Relationship/Environment   Name(s) of Who Lives With Patient Annmarie Watkins [phone] ( spouse)

## 2022-07-29 NOTE — CONSULTS
"[facility] New Consult Note    Patient Name: Neno Watkins  Admission Date: 7/28/2022  Hospital Length of Stay: 1 days  Code Status: Prior   Attending Physician: Kelby Lazar MD   Primary Care Physician: Messi Weston MD  Principal Problem:<principal problem not specified>   VIRTUAL TELENOTE        HPI:    Patient is a 62 y.o. male presents with c/o weakness and debility with h/o esrd on hd with h/o htn dm bph pad with obesity was transferred from [facility] to the [facility] for therapy medical management and esrd on hd.  Pt had SHANDRA, in [place], since then his renal function did not recover, [place]. We are consulted for consulted for HD  Pt will be started on MWF HD    Medical History:   Past Medical History:   Diagnosis Date    BPH (benign prostatic hyperplasia)     Essential (primary) hypertension     Obesity, unspecified     PAD (peripheral artery disease)    type 2 DM  ?CHF  Hx of volume overload    Surgical History:   Past Surgical History:   Procedure Laterality Date    INSERTION OF TUNNELED CENTRAL VENOUS HEMODIALYSIS CATHETER Right 6/27/2022    Procedure: INSERTION, CATHETER, HEMODIALYSIS, DUAL LUMEN;  Surgeon: Molly Garcia MD;  Location: [facility];  Service: General;  Laterality: Right;       Family History:   Hypertension.     Social History:   Social History     Tobacco Use    Smoking status: Never Smoker    Smokeless tobacco: Never Used   Substance Use Topics    Alcohol use: Never       Allergies:  Review of patient's allergies indicates:   Allergen Reactions    Butorphanol Swelling     "it almost killed me"    Hydrocodone-acetaminophen Other (See Comments)     "They mess with my heart"    Povidone-iodine Shortness Of Breath and Rash         Review of Systems:  Constitutional: Denies fever, fatigue, chills, or generalized weakness  Skin: Denies wounds, rashes, no skin lesions or itching  EENT: Denies acute hearing/vision " changes, tinnitus, or dysphagia  Respiratory:  Denies cough, shortness of breath, or wheezing  Cardiovascular: Denies chest pain, palpitations, or swelling  Gastrointestional: Denies abdominal pain, nausea, vomiting, diarrhea or constipation  Genitourinary: Denies dysuria, hematuria, or incontinence; able to empty bladder  Musculoskeletal: Denies myalgias,decreased ROM or focal weakness  Neurological: Denies headaches, seizures, paresthesias, dizziness or weakness  Hematological: Denies unusual bruising or bleeding  Psychiatric: Denies psychiatric concerns, hallucinations, or depression; no confusion    Medications:    Current Facility-Administered Medications:     atorvastatin tablet 80 mg, 80 mg, Oral, QHS, Kelby Lazar MD, 80 mg at 07/28/22 2311    calcium carbonate 200 mg calcium (500 mg) chewable tablet 500 mg, 500 mg, Oral, BID PRN, Kelby Lazar MD    carvediloL tablet 6.25 mg, 6.25 mg, Oral, BID, Kelby Lazra MD, 6.25 mg at 07/28/22 2311    dextrose 50% injection 12.5 g, 12.5 g, Intravenous, PRN, Kelby Lazar MD    dextrose 50% injection 25 g, 25 g, Intravenous, PRN, Kelby Lazar MD    gabapentin capsule 300 mg, 300 mg, Oral, BID, Kelby Lazar MD, 300 mg at 07/28/22 2311    glucagon (human recombinant) injection 1 mg, 1 mg, Intramuscular, PRN, Kelby Lazar MD    glucose chewable tablet 16 g, 16 g, Oral, PRN, Kelby Lazar MD    glucose chewable tablet 24 g, 24 g, Oral, PRN, Kelby Lazar MD    insulin aspart U-100 injection 0-5 Units, 0-5 Units, Subcutaneous, QID (AC + HS) PRN, Kelby Lazar MD    NIFEdipine 24 hr tablet 30 mg, 30 mg, Oral, QHS, Kelby Lazar MD, 30 mg at 07/28/22 2311    tamsulosin 24 hr capsule 0.4 mg, 0.4 mg, Oral, Daily, Kelby Lazar MD     Scheduled Meds:   atorvastatin  80 mg Oral QHS    carvediloL  6.25 mg Oral BID    gabapentin  300 mg Oral BID    NIFEdipine  30 mg Oral QHS    tamsulosin  0.4 mg Oral Daily  "    Continuous Infusions:      Objective:  BP (!) 152/73   Pulse 73   Temp 99 °F (37.2 °C)   Resp 18   Ht 5' 9" (1.753 m)   Wt 120.8 kg (266 lb 5.1 oz)   SpO2 98%   BMI 39.33 kg/m²  Body mass index is 39.33 kg/m².    I/O last 3 completed shifts:  In: 120 [P.O.:120]  Out: -   No intake/output data recorded.        Physical Exam: ( via telemedicine)  General: no acute distress, awake, alert  Eyes: PERRLA, EOMI, conjunctiva clear, mild periorbital edema  HENT: atraumatic, oropharynx and nasal mucosa patent, no difficulty hearing  Neck: full ROM, no obvious JVD  Respiratory: equal, unlabored, clear to auscultation anteriorly  Cardiovascular: RRR without r rub; BL radial and pedal pulses felt  Edema: +1+2 peripherally  Gastrointestinal: soft, non-tender, non-distended; positive bowel sounds; no masses to palpation  Musculoskeletal: ROM without major limitation   Integumentary: warm, dry; no obvious wounds, or skin lesions  Neurological: oriented, appropriate, no acute deficits  Dialysis access:   R  catheter    Labs:  Chemistries:   Recent Labs   Lab 07/27/22  1220      K 4.5   CO2 28   BUN 52.9*   CREATININE 8.50*   CALCIUM 8.2*   GLUCOSE 99        CBC/Anemia Labs: Coags:    Recent Labs   Lab 07/27/22  1220   WBC 13.0*   HGB 9.4*   HCT 29.5*      MCV 95.2*   RDW 12.7    No results for input(s): PT, INR, APTT in the last 168 hours.     POCT Glucose: HbA1c:    Recent Labs   Lab 07/28/22  2317 07/29/22  0624   POCTGLUCOSE 100 91    Hemoglobin A1c   Date Value Ref Range Status   07/04/2022 5.5 <=7.0 % Final   06/22/2022 5.5 <=7.0 % Final   06/08/2021 8.0 (H) <<=7.0 % Final        No results for input(s): COLORU, CLARITYU, SPECGRAV, PHUR, PROTEINUA, GLUCOSEU, BILIRUBINCON, BLOODU, WBCU, RBCU, BACTERIA, MUCUS, NITRITE, LEUKOCYTESUR, UROBILINOGEN, HYALINECASTS in the last 24 hours.      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

## 2022-07-30 LAB
POCT GLUCOSE: 113 MG/DL (ref 70–110)
POCT GLUCOSE: 113 MG/DL (ref 70–110)
POCT GLUCOSE: 139 MG/DL (ref 70–110)
POCT GLUCOSE: 77 MG/DL (ref 70–110)

## 2022-07-30 PROCEDURE — 97116 GAIT TRAINING THERAPY: CPT

## 2022-07-30 PROCEDURE — 25000003 PHARM REV CODE 250: Performed by: INTERNAL MEDICINE

## 2022-07-30 PROCEDURE — 63600175 PHARM REV CODE 636 W HCPCS: Performed by: INTERNAL MEDICINE

## 2022-07-30 PROCEDURE — 97530 THERAPEUTIC ACTIVITIES: CPT

## 2022-07-30 PROCEDURE — 11000004 HC SNF PRIVATE

## 2022-07-30 RX ORDER — DICLOFENAC SODIUM 10 MG/G
2 GEL TOPICAL 2 TIMES DAILY PRN
Status: DISCONTINUED | OUTPATIENT
Start: 2022-07-30 | End: 2022-08-07 | Stop reason: HOSPADM

## 2022-07-30 RX ORDER — HEPARIN SODIUM 5000 [USP'U]/ML
5000 INJECTION, SOLUTION INTRAVENOUS; SUBCUTANEOUS EVERY 12 HOURS
Status: DISCONTINUED | OUTPATIENT
Start: 2022-07-30 | End: 2022-08-07 | Stop reason: HOSPADM

## 2022-07-30 RX ADMIN — ATORVASTATIN CALCIUM 80 MG: 40 TABLET, FILM COATED ORAL at 08:07

## 2022-07-30 RX ADMIN — CARVEDILOL 6.25 MG: 3.12 TABLET, FILM COATED ORAL at 09:07

## 2022-07-30 RX ADMIN — GABAPENTIN 300 MG: 300 CAPSULE ORAL at 09:07

## 2022-07-30 RX ADMIN — DICLOFENAC SODIUM TOPICAL GEL, 1%, 2 G: 10 GEL TOPICAL at 09:07

## 2022-07-30 RX ADMIN — GABAPENTIN 300 MG: 300 CAPSULE ORAL at 08:07

## 2022-07-30 RX ADMIN — HEPARIN SODIUM 5000 UNITS: 5000 INJECTION INTRAVENOUS; SUBCUTANEOUS at 08:07

## 2022-07-30 RX ADMIN — TAMSULOSIN HYDROCHLORIDE 0.4 MG: 0.4 CAPSULE ORAL at 09:07

## 2022-07-30 RX ADMIN — NIFEDIPINE 30 MG: 30 TABLET, FILM COATED, EXTENDED RELEASE ORAL at 08:07

## 2022-07-30 RX ADMIN — CARVEDILOL 6.25 MG: 3.12 TABLET, FILM COATED ORAL at 08:07

## 2022-07-30 RX ADMIN — CALCIUM CARBONATE (ANTACID) CHEW TAB 500 MG 500 MG: 500 CHEW TAB at 08:07

## 2022-07-30 NOTE — PLAN OF CARE
Problem: Adult Inpatient Plan of Care  Goal: Plan of Care Review  Outcome: Ongoing, Progressing  Goal: Patient-Specific Goal (Individualized)  Outcome: Ongoing, Progressing  Goal: Absence of Hospital-Acquired Illness or Injury  Outcome: Ongoing, Progressing  Goal: Optimal Comfort and Wellbeing  Outcome: Ongoing, Progressing  Goal: Readiness for Transition of Care  Outcome: Ongoing, Progressing     Problem: Diabetes Comorbidity  Goal: Blood Glucose Level Within Targeted Range  Outcome: Ongoing, Progressing     Problem: Fluid and Electrolyte Imbalance (Acute Kidney Injury/Impairment)  Goal: Fluid and Electrolyte Balance  Outcome: Ongoing, Progressing     Problem: Oral Intake Inadequate (Acute Kidney Injury/Impairment)  Goal: Optimal Nutrition Intake  Outcome: Ongoing, Progressing     Problem: Renal Function Impairment (Acute Kidney Injury/Impairment)  Goal: Effective Renal Function  Outcome: Ongoing, Progressing     Problem: Impaired Wound Healing  Goal: Optimal Wound Healing  Outcome: Ongoing, Progressing     Problem: Fall Injury Risk  Goal: Absence of Fall and Fall-Related Injury  Outcome: Ongoing, Progressing     Problem: Pain Acute  Goal: Acceptable Pain Control and Functional Ability  Outcome: Ongoing, Progressing     Problem: Device-Related Complication Risk (Hemodialysis)  Goal: Safe, Effective Therapy Delivery  Outcome: Ongoing, Progressing     Problem: Hemodynamic Instability (Hemodialysis)  Goal: Effective Tissue Perfusion  Outcome: Ongoing, Progressing     Problem: Infection (Hemodialysis)  Goal: Absence of Infection Signs and Symptoms  Outcome: Ongoing, Progressing

## 2022-07-30 NOTE — PROGRESS NOTES
Hospital Medicine Progress Note        Chief Complaint: Inpatient Follow-up for     HPI: this is a 62-year-old  male with history of end-stage renal disease on hemodialysis, hypertension, type 2 diabetes, peripheral vascular disease, BPH, obesity, deconditioning/debility. He was recently admitted to [facility] for acute renal failure, due to no improvement in renal function he was started on dialysis and continued to stay on dialysis.  On 7/27/2022 he presented to [place] complaining of significant weakness and was also noted to be hypotensive, blood pressure improved with IV fluids and patient has been transferred to [facility] swing bed for therapy and continuation of hemodialysis    Today's information-patient seen and examined this morning.  No acute events overnight.  Patient is noted to have chronic ulcer on the right great toe and right heel, no significant drainage, denies any pain.  Reports that he did not work much with physical therapy yesterday as he underwent dialysis but is willing to. Vitals are stable      Objective/physical exam:  General: In no acute distress, afebrile, morbidly obese  Chest: Clear to auscultation bilaterally  Heart: RRR, +S1, S2, no appreciable murmur  Abdomen: Soft, nontender, BS +  Skin-small nonhealing ulcer on the right great toe and right heel, no tenderness to palpation, no increased warmth, dressing in place  MSK: Warm, no lower extremity edema, no clubbing or cyanosis  Neurologic: Alert and oriented x4, Cranial nerve II-XII intact, Strength 5/5 in all 4 extremities    VITAL SIGNS: 24 HRS MIN & MAX LAST   Temp  Min: 97.9 °F (36.6 °C)  Max: 99.3 °F (37.4 °C) 97.9 °F (36.6 °C)   BP  Min: 115/66  Max: 131/70 115/66   Pulse  Min: 65  Max: 78  65   Resp  Min: 16  Max: 18 18   SpO2  Min: 92 %  Max: 95 % 95 %       Recent Labs   Lab 07/27/22  1220   WBC 13.0*   RBC 3.10*   HGB 9.4*   HCT 29.5*   MCV 95.2*   MCH 30.3   MCHC 31.9*   RDW 12.7       MPV 9.0       Recent Labs   Lab 07/27/22  1220      K 4.5   CO2 28   BUN 52.9*   CREATININE 8.50*   CALCIUM 8.2*          Microbiology Results (last 7 days)     ** No results found for the last 168 hours. **           See below for Radiology    Scheduled Med:   atorvastatin  80 mg Oral QHS    carvediloL  6.25 mg Oral BID    gabapentin  300 mg Oral BID    NIFEdipine  30 mg Oral QHS    tamsulosin  0.4 mg Oral Daily        Continuous Infusions:       PRN Meds:  calcium carbonate, dextrose 50%, dextrose 50%, glucagon (human recombinant), glucose, glucose, insulin aspart U-100, sodium chloride 0.9%       Assessment/Plan:  Severe deconditioning/debility  End-stage renal disease on hemodialysis  Type 2 diabetes with hyperglycemia  Chronic diabetic foot ulcer-no signs of infection  Hypertension  BPH  Hyperlipidemia  Diabetic neuropathy    Plan  Continue hemodialysis as scheduled, nephrology following  PT/OT  Local wound care for right toe and foot ulcer, no signs of infection, will continue to monitor closely  Continue remaining medications    VTE prophylaxis: heparin 5000 u sc bid       All diagnosis and differential diagnosis have been reviewed; assessment and plan has been documented; I have personally reviewed the labs and test results that are presently available; I have reviewed the patients medication list; I have reviewed the consulting providers response and recommendations. I have reviewed or attempted to review medical records based upon their availability    All of the patient's questions have been  addressed and answered. Patient's is agreeable to the above stated plan. I will continue to monitor closely and make adjustments to medical management as needed.  _____________________________________________________________________    Nutrition Status:    Radiology:  X-Ray Hip 2 or 3 views Right (with Pelvis when performed)  Narrative: EXAMINATION:  XR HIP WITH PELVIS WHEN PERFORMED, 2 OR 3   VIEWS RIGHT    CLINICAL HISTORY:  Pain, unspecified    COMPARISON:  None.    FINDINGS:  No acute displaced fractures or dislocations.    There are degenerative changes of the inferior medial and superolateral aspect of the hip joint with some degenerative changes of the lumbosacral spine articular spaces otherwise preserved with smooth articular surfaces    There are degenerative changes of the contralateral hip    Soft tissues within normal limits.  Impression: Degenerative changes.    Electronically signed by: Mike Morris  Date:    07/27/2022  Time:    14:45      Day Urbina MD   07/30/2022

## 2022-07-30 NOTE — CONSULTS
"  Nutrition Progress Note      Recommendations    - Continue Renal dialysis / Diabetic diet as appropriate.   - Ordered Eloy BID to promote wound healing (provides 90 kcal, 2.5 g protein per serving)  - Monitor renal labs.     Nutrition History    Reason Seen: continuous nutrition monitoring and physician consult    Diagnosis:  1. SHANDRA (acute kidney injury)    2. Debility    3. Hemodialysis status      Past Medical History:   Diagnosis Date    BPH (benign prostatic hyperplasia)     Essential (primary) hypertension     Obesity, unspecified     PAD (peripheral artery disease)       Medications:   atorvastatin  80 mg Oral QHS    carvediloL  6.25 mg Oral BID    gabapentin  300 mg Oral BID    NIFEdipine  30 mg Oral QHS    tamsulosin  0.4 mg Oral Daily       Nutrition-Related Labs:  Recent Labs     07/27/22  1220      K 4.5   CHLORIDE 96*   CO2 28   BUN 52.9*   CREATININE 8.50*   GLUCOSE 99   CALCIUM 8.2*      Current Nutrition Therapy: Renal on dialysis / Diabetic  Appetite/Intake: good/% of meals    Comments:    7/30: Pt transferred to swing bed, noted with wounds on hemodialysis. Modified diet to Renal on dialysis with diabetic restriction - monitor renal labs. Will send Eloy to aid in wound healing, noted pt drinking at previous facility. Increase protein as renal function allows. PO intake improving - 100% intake x last two meals.     Anthropometrics    Temp: 97.9 °F (36.6 °C)  Height Method: Stated  Height: 5' 9" (175.3 cm)  Height (inches): 69 in  Weight Method: Bed Scale  Weight: 117.1 kg (258 lb 2.5 oz)  Weight (lb): 258.16 lb  Ideal Body Weight (IBW), Male: 160 lb  % Ideal Body Weight, Male (lb): 166.45 %  BMI (Calculated): 38.1  BMI Classification: obese grade II (BMI 35-39.9)    Wt Readings from Last 5 Encounters:   07/30/22 117.1 kg (258 lb 2.5 oz)   07/27/22 100.2 kg (221 lb)   07/08/22 (!) 140.2 kg (309 lb 1.4 oz)   06/27/22 (!) 140.7 kg (310 lb 3 oz)   06/23/22 133.5 kg (294 lb 5 oz) "     Estimated/Assessed Needs     Weight Used For Calorie Calculations: 117kg  Energy Calorie Requirements (kcal): 2148kcal  Energy Need Method: MSJ  (1953) x SF 1.1  Protein Requirements: 145-155 gm   Weight Used For Protein Calculations: 73 kg (2.0gm/kg IBW)  Fluid Requirements (mL): 2148mL  Estimated Fluid Requirement Method: RDA    Monitoring and Evaluation    Nutrition Problem  Increased Nutrient Needs    Related to (etiology):   Increased protein needs    Signs and Symptoms (as evidenced by):   Wounds    Interventions(treatment strategy):  Modified composition of meals / snacks, Commercial beverage and Collaboration with other providers    Nutrition Diagnosis Status:   New      Dietitian will monitor food and beverage intake and weight change.  Patient is low nutrition risk, will plan for follow-up in 5-7 days.

## 2022-07-30 NOTE — PT/OT/SLP PROGRESS
"         Physical Therapy Treatment Note           Name: Neno Watkins    : 1960 (62 y.o.)  MRN: [id number]             Subjective Assessment:     No complaints  Lethargic   X Awake, alert, cooperative  Uncooperative    Agitated X c/o pain    Appropriate  c/o fatigue    Confused  Treated at bedside     Emotionally labile  Treated in gym/dept.    Impulsive  Other:    Flat affect       Pain/Comfort:    Location 1: back    Therapy Precautions:      Cognitive deficits  Spinal precautions    Collar - hard  Sternal precautions    Collar - soft   TLSO   X Fall risk  LSO    Hip precautions - posterior  Knee immobilizer    Hip precautions - anterior  WBAT    Impaired communication  Partial weightbearing    Oxygen  TTWB    PEG tube  NWB    Visual deficits  Other:    Hearing deficits           Mobility Training:     Assist Level Assistive Device Comments    Bed Mobility ModA  Semi-supine to sitting at EOB.  Pt was able to mobilize his LEs toward the EOB on his own.  Pt did require lifting assistance to bring his trunk into upright, even with the HOB elevated. While sitting at EOB, pt allowed himself to lean backward very forcefully demonstrating very little core effort to avoid this posterior LOB. PT had to again provide modA to raise pt back into upright sitting.   Sit to stand/Stand to sit Tramaine-SBA RW Sit to stand/stand to sit.  Pt stood from the EOB and from the BSC. Pt insisted that the bed be elevated for him to reach standing.  PT encouraged pt to attempt standing from the low height of the bed prior to resorting to elevating the surface. Excessive time required for pt to prepare to stand.  Maximal encouragement required and pt repeatedly denied that he could stand from this height. Pt made several excuses to which PT provided multiple solutions. Pt eventually stood with Tramaine provided as he told PT "don't pull on me". Increased time required for pt to transition his hands from the bed to the RW. Upon standing " from the [facility], pt was able to do so with SBA.   Transfers CGA RW Stand step t/f performed from the [facility] following a +loose BM in brief and commode.  PT provided assistance with hygiene and clothing management. Pt able to step toward the WC with CGA using the RW.   Gait CGA RW Pt able to ambulate 125 ft with a step through gait pattern and slow gait speed.  Pt demonstrated flexed posture throughout along with limited foot clearance.  PT provided cues for maintenance of RW proximity.  Two standing rest breaks requested.  WC kept in tow. Surgical shoe donned to the R foot.   Balance Training      Wheelchair Mobility      Stair Climbing      Car Transfer      Other:           Assessment:     Patient tolerated session fairly but required maximal encouragement for participation. Despite expressing motivation to improve, PT had to provide reassurance for basic aspects of mobility.  Pt requested that the bed positioning be modified for both bed mobility and standing. PT encouraged pt to stand from the traditional height of the bed since he stood with ease from the [facility] during initial evaluation.  As documented above, pt provided several excuses as to why he could not accomplish this task.  PT did not elevate the bed and pt eventually stood with Tramaine only. Pt will require extensive therapy in order to return home safely.     PT Plan:     Will continue to see pt for PT services in an effort to maximize his overall function and independence with mobility prior to D/C.     GOALS:   Multidisciplinary Problems     Physical Therapy Goals        Problem: Physical Therapy    Goal Priority Disciplines Outcome Goal Variances Interventions   Physical Therapy Goal     PT, PT/OT Ongoing, Progressing     Description: Patient will increase functional independence with mobility by performin. Supine to sit with Modified [place]  2. Sit to supine with Modified [place]  3. Sit to stand transfer with Supervision  4. Gait  x 150  feet with Supervision using Rolling Walker.   5. Ascend/descend 5 stair with bilateral Handrails Supervision.                        Discharge Recommendations:      nursing facility, skilled, home with home health       At end of treatment, patient remained:     X Up in chair     In bed   X With alarm activated    Bed rails up   X Call bell in reach     X Family/friends present     Restraints secured properly     In bathroom with CNA/RN notified     In gym with PT/PTA/tech     Nurse aware     Other:           PT Start Time: 1025     PT Stop Time: 1140  PT Total Time (min): 75 min     Billable Minutes: Gait Training 15 and Therapeutic Activity 60      07/30/2022

## 2022-07-31 LAB
ANION GAP SERPL CALC-SCNC: 12 MEQ/L
BASOPHILS # BLD AUTO: 0.11 X10(3)/MCL (ref 0–0.2)
BASOPHILS NFR BLD AUTO: 0.9 %
BUN SERPL-MCNC: 68 MG/DL (ref 8.4–25.7)
CALCIUM SERPL-MCNC: 8.2 MG/DL (ref 8.8–10)
CHLORIDE SERPL-SCNC: 101 MMOL/L (ref 98–107)
CO2 SERPL-SCNC: 23 MMOL/L (ref 23–31)
CREAT SERPL-MCNC: 9.02 MG/DL (ref 0.73–1.18)
CREAT/UREA NIT SERPL: 8
EOSINOPHIL # BLD AUTO: 0.44 X10(3)/MCL (ref 0–0.9)
EOSINOPHIL NFR BLD AUTO: 3.5 %
ERYTHROCYTE [DISTWIDTH] IN BLOOD BY AUTOMATED COUNT: 13.2 % (ref 11.5–17)
GLUCOSE SERPL-MCNC: 78 MG/DL (ref 82–115)
HCT VFR BLD AUTO: 27.8 % (ref 42–52)
HGB BLD-MCNC: 9 GM/DL (ref 14–18)
IMM GRANULOCYTES # BLD AUTO: 0.14 X10(3)/MCL (ref 0–0.04)
IMM GRANULOCYTES NFR BLD AUTO: 1.1 %
LYMPHOCYTES # BLD AUTO: 4.32 X10(3)/MCL (ref 0.6–4.6)
LYMPHOCYTES NFR BLD AUTO: 34.7 %
MCH RBC QN AUTO: 30.8 PG (ref 27–31)
MCHC RBC AUTO-ENTMCNC: 32.4 MG/DL (ref 33–36)
MCV RBC AUTO: 95.2 FL (ref 80–94)
MONOCYTES # BLD AUTO: 1.05 X10(3)/MCL (ref 0.1–1.3)
MONOCYTES NFR BLD AUTO: 8.4 %
NEUTROPHILS # BLD AUTO: 6.4 X10(3)/MCL (ref 2.1–9.2)
NEUTROPHILS NFR BLD AUTO: 51.4 %
NRBC BLD AUTO-RTO: 0 %
PLATELET # BLD AUTO: 262 X10(3)/MCL (ref 130–400)
PMV BLD AUTO: 9.7 FL (ref 7.4–10.4)
POCT GLUCOSE: 121 MG/DL (ref 70–110)
POCT GLUCOSE: 73 MG/DL (ref 70–110)
POCT GLUCOSE: 93 MG/DL (ref 70–110)
POTASSIUM SERPL-SCNC: 4.5 MMOL/L (ref 3.5–5.1)
RBC # BLD AUTO: 2.92 X10(6)/MCL (ref 4.7–6.1)
SODIUM SERPL-SCNC: 136 MMOL/L (ref 136–145)
WBC # SPEC AUTO: 12.4 X10(3)/MCL (ref 4.5–11.5)

## 2022-07-31 PROCEDURE — 25000003 PHARM REV CODE 250: Performed by: INTERNAL MEDICINE

## 2022-07-31 PROCEDURE — 11000004 HC SNF PRIVATE

## 2022-07-31 PROCEDURE — 80048 BASIC METABOLIC PNL TOTAL CA: CPT | Performed by: INTERNAL MEDICINE

## 2022-07-31 PROCEDURE — 63600175 PHARM REV CODE 636 W HCPCS: Performed by: INTERNAL MEDICINE

## 2022-07-31 PROCEDURE — 85025 COMPLETE CBC W/AUTO DIFF WBC: CPT | Performed by: INTERNAL MEDICINE

## 2022-07-31 PROCEDURE — 36415 COLL VENOUS BLD VENIPUNCTURE: CPT | Performed by: INTERNAL MEDICINE

## 2022-07-31 RX ADMIN — TAMSULOSIN HYDROCHLORIDE 0.4 MG: 0.4 CAPSULE ORAL at 09:07

## 2022-07-31 RX ADMIN — NIFEDIPINE 30 MG: 30 TABLET, FILM COATED, EXTENDED RELEASE ORAL at 08:07

## 2022-07-31 RX ADMIN — GABAPENTIN 300 MG: 300 CAPSULE ORAL at 08:07

## 2022-07-31 RX ADMIN — CARVEDILOL 6.25 MG: 3.12 TABLET, FILM COATED ORAL at 08:07

## 2022-07-31 RX ADMIN — CARVEDILOL 6.25 MG: 3.12 TABLET, FILM COATED ORAL at 09:07

## 2022-07-31 RX ADMIN — GABAPENTIN 300 MG: 300 CAPSULE ORAL at 09:07

## 2022-07-31 RX ADMIN — HEPARIN SODIUM 5000 UNITS: 5000 INJECTION INTRAVENOUS; SUBCUTANEOUS at 09:07

## 2022-07-31 RX ADMIN — DICLOFENAC SODIUM TOPICAL GEL, 1%, 2 G: 10 GEL TOPICAL at 08:07

## 2022-07-31 RX ADMIN — DEXTROSE MONOHYDRATE 1 G: 50 INJECTION, SOLUTION INTRAVENOUS at 10:07

## 2022-07-31 RX ADMIN — ATORVASTATIN CALCIUM 80 MG: 40 TABLET, FILM COATED ORAL at 08:07

## 2022-07-31 RX ADMIN — HEPARIN SODIUM 5000 UNITS: 5000 INJECTION INTRAVENOUS; SUBCUTANEOUS at 08:07

## 2022-07-31 RX ADMIN — CALCIUM CARBONATE (ANTACID) CHEW TAB 500 MG 500 MG: 500 CHEW TAB at 08:07

## 2022-07-31 NOTE — PLAN OF CARE
Problem: Adult Inpatient Plan of Care  Goal: Plan of Care Review  Outcome: Ongoing, Progressing  Goal: Patient-Specific Goal (Individualized)  Outcome: Ongoing, Progressing  Goal: Absence of Hospital-Acquired Illness or Injury  Outcome: Ongoing, Progressing  Goal: Optimal Comfort and Wellbeing  Outcome: Ongoing, Progressing  Goal: Readiness for Transition of Care  Outcome: Ongoing, Progressing     Problem: Diabetes Comorbidity  Goal: Blood Glucose Level Within Targeted Range  Outcome: Ongoing, Progressing     Problem: Fluid and Electrolyte Imbalance (Acute Kidney Injury/Impairment)  Goal: Fluid and Electrolyte Balance  Outcome: Ongoing, Progressing     Problem: Oral Intake Inadequate (Acute Kidney Injury/Impairment)  Goal: Optimal Nutrition Intake  Outcome: Ongoing, Progressing     Problem: Renal Function Impairment (Acute Kidney Injury/Impairment)  Goal: Effective Renal Function  Outcome: Ongoing, Progressing     Problem: Infection  Goal: Absence of Infection Signs and Symptoms  Outcome: Ongoing, Progressing     Problem: Impaired Wound Healing  Goal: Optimal Wound Healing  Outcome: Ongoing, Progressing     Problem: Fall Injury Risk  Goal: Absence of Fall and Fall-Related Injury  Outcome: Ongoing, Progressing     Problem: Pain Acute  Goal: Acceptable Pain Control and Functional Ability  Outcome: Ongoing, Progressing     Problem: Skin Injury Risk Increased  Goal: Skin Health and Integrity  Outcome: Ongoing, Progressing     Problem: Device-Related Complication Risk (Hemodialysis)  Goal: Safe, Effective Therapy Delivery  Outcome: Ongoing, Progressing     Problem: Hemodynamic Instability (Hemodialysis)  Goal: Effective Tissue Perfusion  Outcome: Ongoing, Progressing     Problem: Infection (Hemodialysis)  Goal: Absence of Infection Signs and Symptoms  Outcome: Ongoing, Progressing

## 2022-07-31 NOTE — PROGRESS NOTES
Hospital Medicine Progress Note        Chief Complaint: Inpatient Follow-up for     HPI: this is a 62-year-old  male with history of end-stage renal disease on hemodialysis, hypertension, type 2 diabetes, peripheral vascular disease, BPH, obesity, deconditioning/debility. He was recently admitted to [facility] for acute renal failure, due to no improvement in renal function he was started on dialysis and continued to stay on dialysis.  On 7/27/2022 he presented to [place] complaining of significant weakness and was also noted to be hypotensive, blood pressure improved with IV fluids and patient has been transferred to [facility] swing bed for therapy and continuation of hemodialysis     Today's information-patient seen and examined this morning.  No acute events overnight. Sleeping this morning, arousable but reports he is drowsy      Objective/physical exam:  General: In no acute distress, afebrile, morbidly obese  Chest: Clear to auscultation bilaterally  Heart: RRR, +S1, S2, no appreciable murmur  Abdomen: Soft, nontender, BS +  Skin-small nonhealing ulcer on the right great toe and right heel, no tenderness to palpation, no increased warmth, mild purulent drainage from right toe ulcer  MSK: Warm, no lower extremity edema, no clubbing or cyanosis  Neurologic: Alert and oriented x4, Cranial nerve II-XII intact, Strength 5/5 in all 4 extremities      VITAL SIGNS: 24 HRS MIN & MAX LAST   Temp  Min: 97.7 °F (36.5 °C)  Max: 98.4 °F (36.9 °C) 97.7 °F (36.5 °C)   BP  Min: 109/62  Max: 117/64 109/62   Pulse  Min: 64  Max: 73  64   Resp  Min: 16  Max: 20 16   SpO2  Min: 92 %  Max: 96 % (!) 94 %       Recent Labs   Lab 07/27/22  1220 07/31/22  0455   WBC 13.0* 12.4*   RBC 3.10* 2.92*   HGB 9.4* 9.0*   HCT 29.5* 27.8*   MCV 95.2* 95.2*   MCH 30.3 30.8   MCHC 31.9* 32.4*   RDW 12.7 13.2    262   MPV 9.0 9.7       Recent Labs   Lab 07/27/22  1220 07/31/22  0455    136   K 4.5  4.5   CO2 28 23   BUN 52.9* 68.0*   CREATININE 8.50* 9.02*   CALCIUM 8.2* 8.2*          Microbiology Results (last 7 days)     ** No results found for the last 168 hours. **           See below for Radiology    Scheduled Med:   atorvastatin  80 mg Oral QHS    carvediloL  6.25 mg Oral BID    gabapentin  300 mg Oral BID    heparin (porcine)  5,000 Units Subcutaneous Q12H    NIFEdipine  30 mg Oral QHS    tamsulosin  0.4 mg Oral Daily        Continuous Infusions:       PRN Meds:  calcium carbonate, dextrose 50%, dextrose 50%, diclofenac sodium, glucagon (human recombinant), glucose, glucose, insulin aspart U-100, sodium chloride 0.9%       Assessment/Plan:  Severe deconditioning/debility  End-stage renal disease on hemodialysis  Type 2 diabetes with hyperglycemia  Chronic diabetic foot ulcer  Hypertension  BPH  Hyperlipidemia  Diabetic neuropathy     Plan  Mild purulent drainage noted from right toe ulcer, start rocephin for 7 days   Continue hemodialysis as scheduled, nephrology following  PT/OT  Local wound care for right toe and foot ulcer,  Continue remaining medications     VTE prophylaxis: heparin 5000 u sc bid         All diagnosis and differential diagnosis have been reviewed; assessment and plan has been documented; I have personally reviewed the labs and test results that are presently available; I have reviewed the patients medication list; I have reviewed the consulting providers response and recommendations. I have reviewed or attempted to review medical records based upon their availability    All of the patient's questions have been  addressed and answered. Patient's is agreeable to the above stated plan. I will continue to monitor closely and make adjustments to medical management as needed.  _____________________________________________________________________    Nutrition Status:    Radiology:  X-Ray Hip 2 or 3 views Right (with Pelvis when performed)  Narrative: EXAMINATION:  XR HIP WITH PELVIS  WHEN PERFORMED, 2 OR 3  VIEWS RIGHT    CLINICAL HISTORY:  Pain, unspecified    COMPARISON:  None.    FINDINGS:  No acute displaced fractures or dislocations.    There are degenerative changes of the inferior medial and superolateral aspect of the hip joint with some degenerative changes of the lumbosacral spine articular spaces otherwise preserved with smooth articular surfaces    There are degenerative changes of the contralateral hip    Soft tissues within normal limits.  Impression: Degenerative changes.    Electronically signed by: Mkie Morris  Date:    07/27/2022  Time:    14:45      Day Urbina MD   07/31/2022

## 2022-08-01 LAB
POCT GLUCOSE: 114 MG/DL (ref 70–110)
POCT GLUCOSE: 147 MG/DL (ref 70–110)
POCT GLUCOSE: 83 MG/DL (ref 70–110)
POCT GLUCOSE: 97 MG/DL (ref 70–110)

## 2022-08-01 PROCEDURE — 63600175 PHARM REV CODE 636 W HCPCS: Mod: JG | Performed by: INTERNAL MEDICINE

## 2022-08-01 PROCEDURE — 97535 SELF CARE MNGMENT TRAINING: CPT

## 2022-08-01 PROCEDURE — 11000004 HC SNF PRIVATE

## 2022-08-01 PROCEDURE — 63600175 PHARM REV CODE 636 W HCPCS: Performed by: INTERNAL MEDICINE

## 2022-08-01 PROCEDURE — 80100014 HC HEMODIALYSIS 1:1

## 2022-08-01 PROCEDURE — 97530 THERAPEUTIC ACTIVITIES: CPT

## 2022-08-01 PROCEDURE — 97116 GAIT TRAINING THERAPY: CPT

## 2022-08-01 PROCEDURE — 25000003 PHARM REV CODE 250: Performed by: INTERNAL MEDICINE

## 2022-08-01 RX ADMIN — NIFEDIPINE 30 MG: 30 TABLET, FILM COATED, EXTENDED RELEASE ORAL at 10:08

## 2022-08-01 RX ADMIN — HEPARIN SODIUM 5000 UNITS: 5000 INJECTION INTRAVENOUS; SUBCUTANEOUS at 10:08

## 2022-08-01 RX ADMIN — CARVEDILOL 6.25 MG: 3.12 TABLET, FILM COATED ORAL at 10:08

## 2022-08-01 RX ADMIN — ALTEPLASE 4 MG: 2.2 INJECTION, POWDER, LYOPHILIZED, FOR SOLUTION INTRAVENOUS at 12:08

## 2022-08-01 RX ADMIN — SODIUM CHLORIDE 1 G: 900 INJECTION INTRAVENOUS at 03:08

## 2022-08-01 RX ADMIN — CARVEDILOL 6.25 MG: 3.12 TABLET, FILM COATED ORAL at 02:08

## 2022-08-01 RX ADMIN — ATORVASTATIN CALCIUM 80 MG: 40 TABLET, FILM COATED ORAL at 10:08

## 2022-08-01 RX ADMIN — HEPARIN SODIUM 5000 UNITS: 5000 INJECTION INTRAVENOUS; SUBCUTANEOUS at 02:08

## 2022-08-01 RX ADMIN — TAMSULOSIN HYDROCHLORIDE 0.4 MG: 0.4 CAPSULE ORAL at 02:08

## 2022-08-01 RX ADMIN — GABAPENTIN 300 MG: 300 CAPSULE ORAL at 02:08

## 2022-08-01 RX ADMIN — GABAPENTIN 300 MG: 300 CAPSULE ORAL at 10:08

## 2022-08-01 NOTE — PROGRESS NOTES
[facility] - Medical Surgical Unit  [facility] Medicine  Progress Note    Patient Name: Neno Watkins  MRN: [id number]  Patient Class: IP- Swing   Admission Date: 7/28/2022  Length of Stay: 4 days  Attending Physician: Kelby Lazar MD  Primary Care Provider: Messi Weston MD        Subjective:     Principal Problem:<principal problem not specified>        HPI:  this is a 62-year-old  male with history of end-stage renal disease on hemodialysis, hypertension, type 2 diabetes, peripheral vascular disease, BPH, obesity, deconditioning/debility. He was recently admitted to [place] for acute renal failure, due to no improvement in renal function he was started on dialysis and continued to stay on dialysis.  On 7/27/2022 he presented to [place] complaining of significant weakness and was also noted to be hypotensive, blood pressure improved with IV fluids and patient has been transferred to [facility] swing bed for therapy and continuation of hemodialysis         Overview/Hospital Course:  8/1/22-Patient is on dialysis at this time.  He states that he is feeling better and feels stronger.  Will continue with therapy.      Interval History:     Review of Systems   Constitutional:  Positive for activity change.   HENT: Negative.     Eyes: Negative.    Respiratory: Negative.     Cardiovascular: Negative.    Gastrointestinal: Negative.    Endocrine: Negative.    Genitourinary: Negative.    Musculoskeletal: Negative.    Skin: Negative.    Allergic/Immunologic: Negative.    Neurological:  Positive for weakness.   Hematological: Negative.    Psychiatric/Behavioral: Negative.     Objective:     Vital Signs (Most Recent):  Temp: 98.1 °F (36.7 °C) (08/01/22 0800)  Pulse: 62 (08/01/22 0800)  Resp: 18 (08/01/22 0800)  BP: 121/67 (08/01/22 0800)  SpO2: 95 % (08/01/22 0800) Vital Signs (24h Range):  Temp:  [98.1 °F (36.7 °C)-98.6 °F (37 °C)] 98.1 °F (36.7 °C)  Pulse:  [62-69]  62  Resp:  [16-18] 18  SpO2:  [95 %-97 %] 95 %  BP: (117-122)/(67-68) 121/67     Weight: 116.6 kg (257 lb 0.9 oz)  Body mass index is 38.07 kg/m².    Intake/Output Summary (Last 24 hours) at 8/1/2022 1315  Last data filed at 8/1/2022 1200  Gross per 24 hour   Intake 240 ml   Output 3000 ml   Net -2760 ml      Physical Exam  Constitutional:       Appearance: Normal appearance. He is obese.   HENT:      Head: Normocephalic and atraumatic.      Nose: Nose normal.      Mouth/Throat:      Mouth: Mucous membranes are moist.      Pharynx: Oropharynx is clear.   Eyes:      Extraocular Movements: Extraocular movements intact.      Conjunctiva/sclera: Conjunctivae normal.      Pupils: Pupils are equal, round, and reactive to light.   Cardiovascular:      Rate and Rhythm: Normal rate and regular rhythm.      Pulses: Normal pulses.      Heart sounds: Normal heart sounds.   Pulmonary:      Effort: Pulmonary effort is normal.      Breath sounds: Normal breath sounds.   Abdominal:      General: Bowel sounds are normal.      Palpations: Abdomen is soft.   Musculoskeletal:         General: Normal range of motion.      Cervical back: Normal range of motion and neck supple.   Skin:     General: Skin is warm and dry.      Capillary Refill: Capillary refill takes 2 to 3 seconds.   Neurological:      General: No focal deficit present.      Mental Status: He is alert and oriented to person, place, and time. Mental status is at baseline.   Psychiatric:         Mood and Affect: Mood normal.         Behavior: Behavior normal.         Thought Content: Thought content normal.         Judgment: Judgment normal.       Significant Labs: All pertinent labs within the past 24 hours have been reviewed.  BMP:   Recent Labs   Lab 07/31/22 0455      K 4.5   CO2 23   BUN 68.0*   CREATININE 9.02*   CALCIUM 8.2*     CBC:   Recent Labs   Lab 07/31/22 0455   WBC 12.4*   HGB 9.0*   HCT 27.8*        CMP:   Recent Labs   Lab 07/31/22 0455   NA  136   K 4.5   CO2 23   BUN 68.0*   CREATININE 9.02*   CALCIUM 8.2*   EGFRNONAA 6     Magnesium: No results for input(s): MG in the last 48 hours.    Significant Imaging: I have reviewed all pertinent imaging results/findings within the past 24 hours.      Assessment/Plan:    1.  Deconditioning  2.  ESRD- on HD  3.  HTN  4.  DM  5.  GERD    No notes have been filed under this hospital service.  Service: Hospital Medicine    VTE Risk Mitigation (From admission, onward)         Ordered     heparin (porcine) injection 5,000 Units  Every 12 hours         07/30/22 1241            resume current meds  DVT prophylaxis  Therapy  OOB  HD as scheduled    Discharge Planning   MATT:      Code Status: Prior   Is the patient medically ready for discharge?:     Reason for patient still in hospital (select all that apply): Laboratory test, Treatment and PT / OT recommendations  Discharge Plan A: Home with family                  Otilio Valero MD  Department of Hospital Medicine   [facility] - Medical Surgical Unit

## 2022-08-01 NOTE — HOSPITAL COURSE
8/1/22-Patient is on dialysis at this time.  He states that he is feeling better and feels stronger.  Will continue with therapy.    8/2/22-Sitting up in the chair today.  He is c/o pain to his butt.  Nurses also state that his hemorrhoids are inflamed.  He does have a crack in the skin as well.  Will order some meds to help.    8/3/22-Patient states his gluteal region feels better today.  He is doing well with PT but still needs more OT.  No new issues today.

## 2022-08-01 NOTE — CONSULTS
Consults: Swing bed consult.  Pt is a 62 year old CM referred to swing bed on 7/29/22 for therapy after being in the hospital on two occasions in the last two months for complications of DM and renal failure.  Pt is currently a hemodialysis patient and receives services in [place]. Pt was referred to swing [facility] for PT/OT for debilitation and to continue hemodialysis.    Pt has a history of DM, renal failure, BPH, HTN, Obesity, PAD and arthritis.  Pt does not smoke and denies alcohol or substance use.  Prior to this currently decompensation pat states he was mostly independent with ADL's.    Pt lives in his home in [place] with his wife of 27 years.  Pt has one child from that marriage and she had one previous child.  Pt has been on disability of for several years but prior to that he worked in the [employer].  Pt reports good family support throughout this ordeal.  Pt states his plan is tor return home on discharge after completion of therapy with hopes of regaining previous levels of function.  Case management will coordinate discharge and follow up care planning per MD orders.

## 2022-08-01 NOTE — NURSING
08/01/22 1200   Post-Hemodialysis Assessment   Blood Volume Processed (Liters) 49 L   Dialyzer Clearance Heavily streaked   Total UF (mL) 3000 mL   Patient Response to Treatment 3.5 hour treatment, system clotted requiring new setup after 2 hours of treatment, cvc sluggish, cathflo instilled at end of treatment, dwelled for 1 hour and aspirated, cvc flushed, clamped and capped per policy and procedure, Dr. ralph notified

## 2022-08-01 NOTE — HPI
this is a 62-year-old  male with history of end-stage renal disease on hemodialysis, hypertension, type 2 diabetes, peripheral vascular disease, BPH, obesity, deconditioning/debility. He was recently admitted to [place] for acute renal failure, due to no improvement in renal function he was started on dialysis and continued to stay on dialysis.  On 7/27/2022 he presented to [place] complaining of significant weakness and was also noted to be hypotensive, blood pressure improved with IV fluids and patient has been transferred to [facility] swing bed for therapy and continuation of hemodialysis

## 2022-08-01 NOTE — PT/OT/SLP PROGRESS
"         Physical Therapy Treatment Note           Name: Neno Watkins    : 1960 (62 y.o.)  MRN: [id number]             Subjective Assessment:     No complaints  Lethargic   X Awake, alert, cooperative  Uncooperative    Agitated  c/o pain    Appropriate  c/o fatigue    Confused  Treated at bedside     Emotionally labile  Treated in gym/dept.   X Impulsive  Other:    Flat affect       Pain/Comfort:    Location 1: back  Location - Side 2: Right  Location 2: shoulder    Therapy Precautions:      Cognitive deficits  Spinal precautions    Collar - hard  Sternal precautions    Collar - soft   TLSO   X Fall risk  LSO    Hip precautions - posterior  Knee immobilizer    Hip precautions - anterior  WBAT    Impaired communication  Partial weightbearing    Oxygen  TTWB    PEG tube  NWB    Visual deficits  Other:    Hearing deficits           Mobility Training:     Assist Level Assistive Device Comments    Bed Mobility SBA  Semi-supine to sitting at EOB using the bed railing for support.    Sit to stand/Stand to sit Demetria RW Sit to stand/stand to sit from the low height of the bed. Pt utilized the foot board for leverage but did attempt to pull up on the front of the RW.  Demetria required for lifting pt into upright, but much less time was required to reach a standing position.    Transfers      Gait CGA RW Pt able to ambulate 300 ft with a step through gait pattern and slow gait speed.  A few short standing rest breaks taken due to fatigue. Surgical shoe donned on the R.  Cues provided for RW proximity for safety.  No overt LOB noted.    Balance Training      Wheelchair Mobility      Stair Climbing      Car Transfer      Other:           Assessment:     PT arrived as dialysis was being concluded.  Pt had not yet eaten his lunch, but was agreeable to mobilize into the chair.  Once seated at EOB, pt reached for the RW and stated "I'm going walk in that chaudhary."  Patient tolerated session fairly well and was much more " "cooperative during today's treatment. Pt stated "I'm feeling better than I was and I'm not hurting as badly".  Pt expressed motivation to improve and return home.      PT Plan:     Will continue to see pt for PT services in an effort to maximize his safety with mobility and functional independence prior to D/C.     GOALS:   Multidisciplinary Problems     Physical Therapy Goals        Problem: Physical Therapy    Goal Priority Disciplines Outcome Goal Variances Interventions   Physical Therapy Goal     PT, PT/OT Ongoing, Progressing     Description: Patient will increase functional independence with mobility by performin. Supine to sit with Modified [place]  2. Sit to supine with Modified [place]  3. Sit to stand transfer with Supervision  4. Gait  x 150 feet with Supervision using Rolling Walker.   5. Ascend/descend 5 stair with bilateral Handrails Supervision.                        Discharge Recommendations:      nursing facility, skilled, home with home health       At end of treatment, patient remained:     X Up in chair     In bed    With alarm activated    Bed rails up   X Call bell in reach      Family/friends present     Restraints secured properly     In bathroom with CNA/RN notified     In gym with PT/PTA/tech     Nurse aware     Other:           PT Start Time: 1306     PT Stop Time: 1339  PT Total Time (min): 33 min     Billable Minutes: Gait Training 20 and Therapeutic Activity 13      2022   "

## 2022-08-01 NOTE — SUBJECTIVE & OBJECTIVE
Interval History:     Review of Systems   Constitutional:  Positive for activity change.   HENT: Negative.     Eyes: Negative.    Respiratory: Negative.     Cardiovascular: Negative.    Gastrointestinal: Negative.    Endocrine: Negative.    Genitourinary: Negative.    Musculoskeletal: Negative.    Skin: Negative.    Allergic/Immunologic: Negative.    Neurological:  Positive for weakness.   Hematological: Negative.    Psychiatric/Behavioral: Negative.     Objective:     Vital Signs (Most Recent):  Temp: 98.1 °F (36.7 °C) (08/01/22 0800)  Pulse: 62 (08/01/22 0800)  Resp: 18 (08/01/22 0800)  BP: 121/67 (08/01/22 0800)  SpO2: 95 % (08/01/22 0800) Vital Signs (24h Range):  Temp:  [98.1 °F (36.7 °C)-98.6 °F (37 °C)] 98.1 °F (36.7 °C)  Pulse:  [62-69] 62  Resp:  [16-18] 18  SpO2:  [95 %-97 %] 95 %  BP: (117-122)/(67-68) 121/67     Weight: 116.6 kg (257 lb 0.9 oz)  Body mass index is 38.07 kg/m².    Intake/Output Summary (Last 24 hours) at 8/1/2022 1315  Last data filed at 8/1/2022 1200  Gross per 24 hour   Intake 240 ml   Output 3000 ml   Net -2760 ml      Physical Exam  Constitutional:       Appearance: Normal appearance. He is obese.   HENT:      Head: Normocephalic and atraumatic.      Nose: Nose normal.      Mouth/Throat:      Mouth: Mucous membranes are moist.      Pharynx: Oropharynx is clear.   Eyes:      Extraocular Movements: Extraocular movements intact.      Conjunctiva/sclera: Conjunctivae normal.      Pupils: Pupils are equal, round, and reactive to light.   Cardiovascular:      Rate and Rhythm: Normal rate and regular rhythm.      Pulses: Normal pulses.      Heart sounds: Normal heart sounds.   Pulmonary:      Effort: Pulmonary effort is normal.      Breath sounds: Normal breath sounds.   Abdominal:      General: Bowel sounds are normal.      Palpations: Abdomen is soft.   Musculoskeletal:         General: Normal range of motion.      Cervical back: Normal range of motion and neck supple.   Skin:     General:  Skin is warm and dry.      Capillary Refill: Capillary refill takes 2 to 3 seconds.   Neurological:      General: No focal deficit present.      Mental Status: He is alert and oriented to person, place, and time. Mental status is at baseline.   Psychiatric:         Mood and Affect: Mood normal.         Behavior: Behavior normal.         Thought Content: Thought content normal.         Judgment: Judgment normal.       Significant Labs: All pertinent labs within the past 24 hours have been reviewed.  BMP:   Recent Labs   Lab 07/31/22 0455      K 4.5   CO2 23   BUN 68.0*   CREATININE 9.02*   CALCIUM 8.2*     CBC:   Recent Labs   Lab 07/31/22 0455   WBC 12.4*   HGB 9.0*   HCT 27.8*        CMP:   Recent Labs   Lab 07/31/22 0455      K 4.5   CO2 23   BUN 68.0*   CREATININE 9.02*   CALCIUM 8.2*   EGFRNONAA 6     Magnesium: No results for input(s): MG in the last 48 hours.    Significant Imaging: I have reviewed all pertinent imaging results/findings within the past 24 hours.

## 2022-08-01 NOTE — PT/OT/SLP PROGRESS
Occupational Therapy  Treatment    Name: Neno Watkins    : 1960 (62 y.o.)  MRN: [id number]          TREATMENT SUMMARY AND RECOMMENDATIONS:      Subjective Assessment:   No complaints  Lethargic   x Awake, alert, cooperative  Uncooperative    Agitated x Flat affect   x Appropriate  c/o fatigue    Confused x Treated at bedside     Emotionally liable  Treated in gym/dept.    Impulsive  Other:       Pain/Comfort:  ·        Therapy Precautions:    Cognitive deficits  Spinal precautions    Collar - hard  Sternal precautions    Collar - soft   TLSO   x Fall risk  LSO    Hip precautions - posterior  Knee immobilizer    Hip precautions - anterior  WBAT    Impaired communication  Partial weightbearing    Oxygen  TTWB    PEG tube  NWB   x Visual deficits  Other:    Hearing deficits           Vitals Value   x Room air      Oxygen (L)     Blood pressure     Heart rate         Treatment Objectives:     Mobility Training:    Mobility task Assist level Comments    Bed mobility     Balance training     Transfer     Functional mobility     Sit to stand transitions     Other:       ADL Training:    ADL Assist level Comments    Feeding     Grooming/hygiene Min ADL grooming activities from  setup for oral care, facewashing, and hair grooming.  Min assist with shaving activity secondary to visual deficits right eye.   Bathing Mod ADL bathing activity sitting in wheelchair sponge bath.  Pt able to wash 60% of body, assist with remaining.   Upper body dressing Min tie gown   Lower body dressing     Toileting     Toilet transfer     Adaptive equipment training     Other:         Additional Treatment:  Reinforced call bell functional and call before you fall, pt verbalized understanding    Assessment: Patient tolerated session well.  Pt in better spirits today, compliant with all activities.  Noted pt very slow requiring extended time for all activities.    OT Plan: Cont POC, may perform showering tomorrow if able.      GOALS:    Multidisciplinary Problems     Occupational Therapy Goals        Problem: Occupational Therapy    Goal Priority Disciplines Outcome Interventions   Occupational Therapy Goal     OT, PT/OT Ongoing, Progressing    Description: Goals to be met by: 8/26/22     Patient will increase functional independence with ADLs by performing:    LE Dressing with Minimal Assistance.  Toileting from bedside commode with Minimal Assistance for hygiene and clothing management.   Bathing from  edge of bed with Minimal Assistance.  Supine to sit with Contact Guard Assistance.  Toilet transfer to bedside commode with Contact Guard Assistance.                     Communication with Treatment Team:     Discharge Recommendations: LTACH (long-term acute care hospital), nursing facility, skilled, home health OT  Discharge Equipment Recommendations:  bedside commode, dressing device  Barriers to discharge:  Decreased caregiver support, Inaccessible home environment      At end of treatment, patient remained:  x Up in chair     In bed   x With alarm activated    Bed rails up   x Call bell in reach     Family/friends present    Restraints secured properly    In bathroom with CNA/RN notified    In gym with PT/PTA/tech   x Nurse aware    Other:         OT Date of Treatment: 08/01/22  OT Start Time: 1400  OT Stop Time: 1430  OT Total Time (min): 30 min    Billable Minutes:Self Care/Home Management 30      8/1/2022

## 2022-08-02 LAB
POCT GLUCOSE: 142 MG/DL (ref 70–110)
POCT GLUCOSE: 79 MG/DL (ref 70–110)
POCT GLUCOSE: 99 MG/DL (ref 70–110)

## 2022-08-02 PROCEDURE — 63600175 PHARM REV CODE 636 W HCPCS: Performed by: INTERNAL MEDICINE

## 2022-08-02 PROCEDURE — 11000004 HC SNF PRIVATE

## 2022-08-02 PROCEDURE — 97116 GAIT TRAINING THERAPY: CPT

## 2022-08-02 PROCEDURE — 25000003 PHARM REV CODE 250: Performed by: INTERNAL MEDICINE

## 2022-08-02 PROCEDURE — 97535 SELF CARE MNGMENT TRAINING: CPT

## 2022-08-02 PROCEDURE — 92523 SPEECH SOUND LANG COMPREHEN: CPT

## 2022-08-02 PROCEDURE — 97530 THERAPEUTIC ACTIVITIES: CPT

## 2022-08-02 RX ORDER — HYDROCORTISONE ACETATE 25 MG/1
25 SUPPOSITORY RECTAL 2 TIMES DAILY
Status: COMPLETED | OUTPATIENT
Start: 2022-08-02 | End: 2022-08-04

## 2022-08-02 RX ADMIN — SODIUM CHLORIDE 1 G: 900 INJECTION INTRAVENOUS at 01:08

## 2022-08-02 RX ADMIN — NIFEDIPINE 30 MG: 30 TABLET, FILM COATED, EXTENDED RELEASE ORAL at 09:08

## 2022-08-02 RX ADMIN — HYDROCORTISONE ACETATE 25 MG: 25 SUPPOSITORY RECTAL at 11:08

## 2022-08-02 RX ADMIN — CARVEDILOL 6.25 MG: 3.12 TABLET, FILM COATED ORAL at 09:08

## 2022-08-02 RX ADMIN — HEPARIN SODIUM 5000 UNITS: 5000 INJECTION INTRAVENOUS; SUBCUTANEOUS at 09:08

## 2022-08-02 RX ADMIN — GABAPENTIN 300 MG: 300 CAPSULE ORAL at 09:08

## 2022-08-02 RX ADMIN — CARVEDILOL 6.25 MG: 3.12 TABLET, FILM COATED ORAL at 08:08

## 2022-08-02 RX ADMIN — TAMSULOSIN HYDROCHLORIDE 0.4 MG: 0.4 CAPSULE ORAL at 08:08

## 2022-08-02 RX ADMIN — HEPARIN SODIUM 5000 UNITS: 5000 INJECTION INTRAVENOUS; SUBCUTANEOUS at 08:08

## 2022-08-02 RX ADMIN — ATORVASTATIN CALCIUM 80 MG: 40 TABLET, FILM COATED ORAL at 09:08

## 2022-08-02 RX ADMIN — GABAPENTIN 300 MG: 300 CAPSULE ORAL at 08:08

## 2022-08-02 RX ADMIN — HYDROCORTISONE ACETATE 25 MG: 25 SUPPOSITORY RECTAL at 09:08

## 2022-08-02 NOTE — PT/OT/SLP PROGRESS
"         Physical Therapy Treatment Note           Name: Neno Watkins    : 1960 (62 y.o.)  MRN: [id number]             Subjective Assessment:     No complaints  Lethargic   X Awake, alert, cooperative  Uncooperative    Agitated X c/o pain    Appropriate X c/o fatigue    Confused  Treated at bedside     Emotionally labile  Treated in gym/dept.    Impulsive  Other:    Flat affect       Pain/Comfort:    Location 1: back    Therapy Precautions:      Cognitive deficits  Spinal precautions    Collar - hard  Sternal precautions    Collar - soft   TLSO   X Fall risk  LSO    Hip precautions - posterior  Knee immobilizer    Hip precautions - anterior  WBAT    Impaired communication  Partial weightbearing    Oxygen  TTWB    PEG tube  NWB    Visual deficits  Other:    Hearing deficits           Mobility Training:     Assist Level Assistive Device Comments    Bed Mobility      Sit to stand/Stand to sit SBA-CGA RW Sit to stand performed from both the BSC and the EOB. Pt was seated on the BSC upon PT arrival. Pt completed a BM and assistance was provided with hygiene and clothing management. Pt transitioned to the EOB while PT cleaned the commode. Pt also able to standing from the EOB without any lifting assistance required. Pt utilized the foot board of the bed for UE leverage.    Transfers      Gait CGA RW Pt able to ambulate 200 ft with two brief standing rest breaks. Pt demonstrated a step through gait pattern and slow gait speed.  Limited foot clearance and flexed posture observed throughout.  Cues provided for RW proximity. Pt c/o increased back pain and declined additional ambulation.   Balance Training      Wheelchair Mobility      Stair Climbing      Car Transfer      Other:           Assessment:     Patient tolerated session fairly, but c/o increased back pain which limited his activity tolerance.  Upon returning to his room, pt declined sitting up in the chair stating that it "hurts his butt".  A cushion has " "been placed in pt's WC and education was provided regarding the importance of limiting the time spent in bed. Pt expressed understanding stating "I know exactly what yall are trying to get me to do". Pt requested to sit at the EOB to eat his meal. Needs left in reach and CNA notified.    PT Plan:     Will continue to see pt for PT services in an effort to maximize pt's overall activity tolerance and independence with mobility prior to D/C home.    GOALS:   Multidisciplinary Problems     Physical Therapy Goals        Problem: Physical Therapy    Goal Priority Disciplines Outcome Goal Variances Interventions   Physical Therapy Goal     PT, PT/OT Ongoing, Progressing     Description: Patient will increase functional independence with mobility by performin. Supine to sit with Modified [place]  2. Sit to supine with Modified [place]  3. Sit to stand transfer with Supervision  4. Gait  x 150 feet with Supervision using Rolling Walker.   5. Ascend/descend 5 stair with bilateral Handrails Supervision.                        Discharge Recommendations:      nursing facility, skilled, home with home health     At end of treatment, patient remained:      Up in chair     In bed    With alarm activated    Bed rails up   X Call bell in reach      Family/friends present     Restraints secured properly     In bathroom with CNA/RN notified     In gym with PT/PTA/tech     Nurse aware    X Other:  Sitting at EOB           PT Start Time: 1051     PT Stop Time: 1112  PT Total Time (min): 21 min     Billable Minutes: Gait Training 2022   "

## 2022-08-02 NOTE — PT/OT/SLP EVAL
Speech Language Pathology Evaluation  Cognitive Communication    Patient Name:  Neno Watkins   MRN:  [id number]  Admitting Diagnosis: <principal problem not specified>    Recommendations:     Recommendations:                General Recommendations:  Follow-up not indicated  Diet recommendations:   ,     Aspiration Precautions: None recommended. Speech therapy not warranted.    General Precautions: Standard, fall, hearing impaired, vision impaired  Communication strategies: Increased volume and decreased distance recommended when speaking with patient due to patient's hearing impairment without hearing aids available at this time.     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 Patient reported highest level of education 9th grade with difficulty in academic subjects.         Subjective   Patient seen at bedside. Patient interacted and participated in evaluation fully.   Patient goals: Patient would like to return home with his wife and live independently.      Pain/Comfort:  · Pain Rating 1: 0/10    Respiratory Status: Room air    Objective:   Orientation: 4x4  No cues  Attention: sustained attention without cues needed.      LANGUAGE:   Receptive Language:  1 Step Directions: 100%   2 Step Directions: :100%  Complex Directions:  90%  Simple y/n Questions: 100%  Complex y/n Questions: 100%  Paragraph Comprehension: 100%    Expressive Language:   Automatic Speech:  WNL  Repetition: WNL  Naming:   · Divergent: No difficulty noted  · Convergent: No difficulty noted  · Confrontational: No difficulty noted  Phrase completion: No difficulty noted  WH questions: WNL  Picture Description: WNL    COGNITIVE STATUS:  Behavioral Observations:   Memory:  · Immediate: 5 out of 6 items recalled.  · Short Term: 4 out of 5 items recalled.  · Long Term: % of 5 items recalled.  ORGANIZATION:   Convergent: WNL   Divergent: WNL  Sequencing:   · Functional Events:   · Mental Manipulation:   Problem Solving: Patient completed simple task with no cues. With complex task and calculations cues were needed.   Insight Awareness:   Pragmatics: WNL   Drawing Conclusion: WNL    Assessment:   Neno Watkins is a 62 y.o. male presents with cognitive and communication function at prior level with no recent change in abilities.    Goals:   Multidisciplinary Problems     SLP Goals     Not on file                Plan:   · Patient to be seen:   (Eval only. Speech therapy is not warranted at this time.)   · Plan of Care expires:     · Plan of Care reviewed with:  patient   · SLP Follow-Up:          Discharge recommendations:  Discharge Facility/Level of Care Needs: home with home health   Barriers to Discharge:  None    Time Tracking:   SLP Treatment Date:   08/02/22  Speech Start Time:  0930  Speech Stop Time:  0955     Speech Total Time (min):  25 min    Billable Minutes: Eval 25 minutes    Savannah Daley MS, CCC-SLP  08/02/2022

## 2022-08-02 NOTE — PROGRESS NOTES
[facility] - Medical Surgical Unit  [facility] Medicine  Progress Note    Patient Name: Neno Watkins  MRN: [id number]  Patient Class: IP- Swing   Admission Date: 7/28/2022  Length of Stay: 5 days  Attending Physician: Kelby Lazar MD  Primary Care Provider: Messi Weston MD        Subjective:     Principal Problem:<principal problem not specified>        HPI:  this is a 62-year-old  male with history of end-stage renal disease on hemodialysis, hypertension, type 2 diabetes, peripheral vascular disease, BPH, obesity, deconditioning/debility. He was recently admitted to [facility] for acute renal failure, due to no improvement in renal function he was started on dialysis and continued to stay on dialysis.  On 7/27/2022 he presented to [place] complaining of significant weakness and was also noted to be hypotensive, blood pressure improved with IV fluids and patient has been transferred to [facility] swing bed for therapy and continuation of hemodialysis         Overview/Hospital Course:  8/1/22-Patient is on dialysis at this time.  He states that he is feeling better and feels stronger.  Will continue with therapy.    8/2/22-Sitting up in the chair today.  He is c/o pain to his butt.  Nurses also state that his hemorrhoids are inflamed.  He does have a crack in the skin as well.  Will order some meds to help.      Interval History:     Review of Systems   Constitutional:  Positive for activity change.   HENT: Negative.     Eyes: Negative.    Respiratory: Negative.     Cardiovascular: Negative.    Gastrointestinal: Negative.    Endocrine: Negative.    Genitourinary: Negative.    Musculoskeletal:  Positive for back pain.   Skin:  Positive for wound.   Allergic/Immunologic: Negative.    Neurological:  Positive for weakness.   Hematological: Negative.    Objective:     Vital Signs (Most Recent):  Temp: 98.4 °F (36.9 °C) (08/02/22 0800)  Pulse: 63 (08/02/22 0800)  Resp:  18 (08/02/22 0800)  BP: 115/61 (08/02/22 0820)  SpO2: 95 % (08/02/22 0800) Vital Signs (24h Range):  Temp:  [98.1 °F (36.7 °C)-98.4 °F (36.9 °C)] 98.4 °F (36.9 °C)  Pulse:  [63-69] 63  Resp:  [18-20] 18  SpO2:  [95 %] 95 %  BP: (115-122)/(61-73) 115/61     Weight: 116.6 kg (257 lb 0.9 oz)  Body mass index is 38.07 kg/m².    Intake/Output Summary (Last 24 hours) at 8/2/2022 1115  Last data filed at 8/2/2022 0800  Gross per 24 hour   Intake 1020 ml   Output 3000 ml   Net -1980 ml      Physical Exam  Constitutional:       Appearance: Normal appearance. He is obese.   HENT:      Head: Normocephalic and atraumatic.      Nose: Nose normal.      Mouth/Throat:      Mouth: Mucous membranes are moist.      Pharynx: Oropharynx is clear.   Eyes:      Extraocular Movements: Extraocular movements intact.      Conjunctiva/sclera: Conjunctivae normal.      Pupils: Pupils are equal, round, and reactive to light.   Cardiovascular:      Rate and Rhythm: Normal rate and regular rhythm.      Pulses: Normal pulses.      Heart sounds: Normal heart sounds.   Pulmonary:      Effort: Pulmonary effort is normal.      Breath sounds: Normal breath sounds.   Abdominal:      General: Bowel sounds are normal.      Palpations: Abdomen is soft.   Musculoskeletal:         General: Normal range of motion.      Cervical back: Normal range of motion and neck supple.   Skin:     General: Skin is warm and dry.      Capillary Refill: Capillary refill takes 2 to 3 seconds.      Findings: Lesion present.   Neurological:      General: No focal deficit present.      Mental Status: He is alert and oriented to person, place, and time. Mental status is at baseline.   Psychiatric:         Mood and Affect: Mood normal.         Behavior: Behavior normal.         Thought Content: Thought content normal.         Judgment: Judgment normal.       Significant Labs: All pertinent labs within the past 24 hours have been reviewed.  BMP: No results for input(s): GLU, NA, K,  CL, CO2, BUN, CREATININE, CALCIUM, MG in the last 48 hours.  CBC: No results for input(s): WBC, HGB, HCT, PLT in the last 48 hours.  CMP: No results for input(s): NA, K, CL, CO2, GLU, BUN, CREATININE, CALCIUM, PROT, ALBUMIN, BILITOT, ALKPHOS, AST, ALT, ANIONGAP, EGFRNONAA in the last 48 hours.    Invalid input(s): ESTGFAFRICA  Magnesium: No results for input(s): MG in the last 48 hours.    Significant Imaging: I have reviewed all pertinent imaging results/findings within the past 24 hours.      Assessment/Plan:    1.  Deconditioning  2.  ESRD  3.  HTN  4.  DM  5.  Obese  No notes have been filed under this hospital service.  Service: Hospital Medicine    VTE Risk Mitigation (From admission, onward)         Ordered     heparin (porcine) injection 5,000 Units  Every 12 hours         07/30/22 1241            HD as scheduled  DVT prophylaxis  Add Anusol  Therapy  OOB  Labs as needed    Discharge Planning   MATT:      Code Status: Prior   Is the patient medically ready for discharge?:     Reason for patient still in hospital (select all that apply): Laboratory test, Treatment and PT / OT recommendations  Discharge Plan A: Home with family                  Otilio Valero MD  Department of Hospital Medicine   [facility] - Medical Surgical Unit

## 2022-08-02 NOTE — PT/OT/SLP PROGRESS
Occupational Therapy  Treatment    Name: Neno Watkins    : 1960 (62 y.o.)  MRN: [id number]          TREATMENT SUMMARY AND RECOMMENDATIONS:      Subjective Assessment:   No complaints  Lethargic   x Awake, alert, cooperative  Uncooperative    Agitated x Flat affect   x Appropriate  c/o fatigue    Confused  Treated at bedside     Emotionally liable  Treated in gym/dept.    Impulsive x Other: Noted slow processing requiring verbal comands for sequencing.       Pain/Comfort:  · Pain Rating 1: 4/10  · Location 1: back  · Pain Addressed 1: Reposition, Distraction      Therapy Precautions:    Cognitive deficits  Spinal precautions    Collar - hard  Sternal precautions    Collar - soft   TLSO   x Fall risk  LSO    Hip precautions - posterior  Knee immobilizer    Hip precautions - anterior  WBAT    Impaired communication  Partial weightbearing    Oxygen  TTWB    PEG tube  NWB   x Visual deficits  Other:   x Hearing deficits           Vitals Value   x Room air      Oxygen (L)     Blood pressure     Heart rate         Treatment Objectives:     Mobility Training:    Mobility task Assist level Comments    Bed mobility Min Transfer training supine to sit min assist,  pt held OT hand to pullup into sitting.  Pt required verbal cuing for breath control   Balance training SBA Dynamic standing bal during showering/dressing activity, holding onto grab bar for bal   Transfer Min Transfer training in<>out shower min assist using grab bar and TTB   Functional mobility     Sit to stand transitions     Other:       ADL Training:    ADL Assist level Comments    Feeding     Grooming/hygiene Setup ADL groomng; setup for oral care, face washing, hair grooming   Bathing Mod ADL bathing activity;  showering using TTB and HH shower.  Pt required assist with back, bottom, and LE knee down.   Upper body dressing Min tie gown   Lower body dressing Max LE dressing training, max assist to brandyn pull up, and slipper sock, darco shoe.    Toileting     Toilet transfer     Adaptive equipment training     Other:           Additional Treatment:  Educated pt on safety during functional transfers.    Assessment: Patient tolerated session fair.  Pt seen for ADL training including showering activity.  Pt with slow processing requiring verbal cuing for sequencing and extended time for all activities.  Presently pt requiring 24 hour care.    OT Plan: Cont POC.      GOALS:   Multidisciplinary Problems     Occupational Therapy Goals        Problem: Occupational Therapy    Goal Priority Disciplines Outcome Interventions   Occupational Therapy Goal     OT, PT/OT Ongoing, Progressing    Description: Goals to be met by: 8/26/22     Patient will increase functional independence with ADLs by performing:    LE Dressing with Minimal Assistance.  Toileting from bedside commode with Minimal Assistance for hygiene and clothing management.   Bathing from  edge of bed with Minimal Assistance.  Supine to sit with Contact Guard Assistance.  Toilet transfer to bedside commode with Contact Guard Assistance.                     Communication with Treatment Team:     Discharge Recommendations: LTACH (long-term acute care hospital), nursing facility, skilled, home health OT  Discharge Equipment Recommendations:  bedside commode, dressing device  Barriers to discharge:  Decreased caregiver support, Inaccessible home environment      At end of treatment, patient remained:  x Up in chair     In bed   x With alarm activated    Bed rails up   x Call bell in reach     Family/friends present    Restraints secured properly    In bathroom with CNA/RN notified    In gym with PT/PTA/tech    Nurse aware    Other:         OT Date of Treatment: 08/02/22  OT Start Time: 0800  OT Stop Time: 0900  OT Total Time (min): 60 min    Billable Minutes:Self Care/Home Management 45  Therapeutic Activity 15      8/2/2022

## 2022-08-02 NOTE — PT/OT/SLP PROGRESS
Occupational Therapy  Treatment    Name: Neno Watkins    : 1960 (62 y.o.)  MRN: [id number]          TREATMENT SUMMARY AND RECOMMENDATIONS:      Subjective Assessment:   No complaints  Lethargic   x Awake, alert, cooperative  Uncooperative    Agitated  Flat affect   x Appropriate  c/o fatigue    Confused  Treated at bedside     Emotionally liable  Treated in gym/dept.    Impulsive x Other: Pt reported bottom sore and requested mobility.       Pain/Comfort:  · Pain Rating 1: 6/10  · Location 1: perirectal  · Pain Addressed 1: Reposition, Distraction, Other (see comments) (Supplied foam wc cushion)      Therapy Precautions:    Cognitive deficits  Spinal precautions    Collar - hard  Sternal precautions    Collar - soft   TLSO   x Fall risk  LSO    Hip precautions - posterior  Knee immobilizer    Hip precautions - anterior  WBAT    Impaired communication  Partial weightbearing    Oxygen  TTWB    PEG tube  NWB   x Visual deficits  Other:   x Hearing deficits           Vitals Value   x Room air      Oxygen (L)     Blood pressure     Heart rate         Treatment Objectives:     Mobility Training:    Mobility task Assist level Comments    Bed mobility     Balance training     Transfer     Functional mobility CGA Functional mobility with RW 50 ft with CGA.  Noted bradykenisia LEs, also required verbal cuing to look up.   Sit to stand transitions SBA Transfer traing sit to stand 3xs with SBA wc to RW    Other:       Additional Treatment:  Educated pt on skin breakdown and the importance of pressure relief every 30 mins while sitting.    Assessment: Patient tolerated session fair.  Therapist met in hallway by nursing and asked if we had wheelchair cushion for pt.  Wheelchair cushion retreived and placed in wc.  Pt requesting mobility, performed functional mobility 50ft with rw.    OT Plan: Cont POC.      GOALS:   Multidisciplinary Problems     Occupational Therapy Goals        Problem: Occupational Therapy    Goal  Priority Disciplines Outcome Interventions   Occupational Therapy Goal     OT, PT/OT Ongoing, Progressing    Description: Goals to be met by: 8/26/22     Patient will increase functional independence with ADLs by performing:    LE Dressing with Minimal Assistance.  Toileting from bedside commode with Minimal Assistance for hygiene and clothing management.   Bathing from  edge of bed with Minimal Assistance.  Supine to sit with Contact Guard Assistance.  Toilet transfer to bedside commode with Contact Guard Assistance.                     Communication with Treatment Team:     Discharge Recommendations: LTACH (long-term acute care hospital), nursing facility, skilled, home health OT  Discharge Equipment Recommendations:  bedside commode, dressing device  Barriers to discharge:  Decreased caregiver support, Inaccessible home environment      At end of treatment, patient remained:  x Up in chair     In bed   x With alarm activated    Bed rails up   x Call bell in reach     Family/friends present    Restraints secured properly    In bathroom with CNA/RN notified    In gym with PT/PTA/tech    Nurse aware    Other:         OT Date of Treatment: 08/02/22  OT Start Time: 1040  OT Stop Time: 1055  OT Total Time (min): 15 min    Billable Minutes:Therapeutic Activity 15      8/2/2022

## 2022-08-02 NOTE — PLAN OF CARE
No plan of care. ST evaluation only. Patient presents at prior level of function in areas of cognition and communication, therefore speech therapy is not warranted at this time.

## 2022-08-02 NOTE — SUBJECTIVE & OBJECTIVE
Interval History:     Review of Systems   Constitutional:  Positive for activity change.   HENT: Negative.     Eyes: Negative.    Respiratory: Negative.     Cardiovascular: Negative.    Gastrointestinal: Negative.    Endocrine: Negative.    Genitourinary: Negative.    Musculoskeletal:  Positive for back pain.   Skin:  Positive for wound.   Allergic/Immunologic: Negative.    Neurological:  Positive for weakness.   Hematological: Negative.    Objective:     Vital Signs (Most Recent):  Temp: 98.4 °F (36.9 °C) (08/02/22 0800)  Pulse: 63 (08/02/22 0800)  Resp: 18 (08/02/22 0800)  BP: 115/61 (08/02/22 0820)  SpO2: 95 % (08/02/22 0800) Vital Signs (24h Range):  Temp:  [98.1 °F (36.7 °C)-98.4 °F (36.9 °C)] 98.4 °F (36.9 °C)  Pulse:  [63-69] 63  Resp:  [18-20] 18  SpO2:  [95 %] 95 %  BP: (115-122)/(61-73) 115/61     Weight: 116.6 kg (257 lb 0.9 oz)  Body mass index is 38.07 kg/m².    Intake/Output Summary (Last 24 hours) at 8/2/2022 1115  Last data filed at 8/2/2022 0800  Gross per 24 hour   Intake 1020 ml   Output 3000 ml   Net -1980 ml      Physical Exam  Constitutional:       Appearance: Normal appearance. He is obese.   HENT:      Head: Normocephalic and atraumatic.      Nose: Nose normal.      Mouth/Throat:      Mouth: Mucous membranes are moist.      Pharynx: Oropharynx is clear.   Eyes:      Extraocular Movements: Extraocular movements intact.      Conjunctiva/sclera: Conjunctivae normal.      Pupils: Pupils are equal, round, and reactive to light.   Cardiovascular:      Rate and Rhythm: Normal rate and regular rhythm.      Pulses: Normal pulses.      Heart sounds: Normal heart sounds.   Pulmonary:      Effort: Pulmonary effort is normal.      Breath sounds: Normal breath sounds.   Abdominal:      General: Bowel sounds are normal.      Palpations: Abdomen is soft.   Musculoskeletal:         General: Normal range of motion.      Cervical back: Normal range of motion and neck supple.   Skin:     General: Skin is warm  and dry.      Capillary Refill: Capillary refill takes 2 to 3 seconds.      Findings: Lesion present.   Neurological:      General: No focal deficit present.      Mental Status: He is alert and oriented to person, place, and time. Mental status is at baseline.   Psychiatric:         Mood and Affect: Mood normal.         Behavior: Behavior normal.         Thought Content: Thought content normal.         Judgment: Judgment normal.       Significant Labs: All pertinent labs within the past 24 hours have been reviewed.  BMP: No results for input(s): GLU, NA, K, CL, CO2, BUN, CREATININE, CALCIUM, MG in the last 48 hours.  CBC: No results for input(s): WBC, HGB, HCT, PLT in the last 48 hours.  CMP: No results for input(s): NA, K, CL, CO2, GLU, BUN, CREATININE, CALCIUM, PROT, ALBUMIN, BILITOT, ALKPHOS, AST, ALT, ANIONGAP, EGFRNONAA in the last 48 hours.    Invalid input(s): ESTGFAFRICA  Magnesium: No results for input(s): MG in the last 48 hours.    Significant Imaging: I have reviewed all pertinent imaging results/findings within the past 24 hours.

## 2022-08-03 LAB
POCT GLUCOSE: 141 MG/DL (ref 70–110)
POCT GLUCOSE: 169 MG/DL (ref 70–110)
POCT GLUCOSE: 90 MG/DL (ref 70–110)
POCT GLUCOSE: 93 MG/DL (ref 70–110)

## 2022-08-03 PROCEDURE — 25000003 PHARM REV CODE 250: Performed by: INTERNAL MEDICINE

## 2022-08-03 PROCEDURE — 97530 THERAPEUTIC ACTIVITIES: CPT

## 2022-08-03 PROCEDURE — 63600175 PHARM REV CODE 636 W HCPCS: Performed by: INTERNAL MEDICINE

## 2022-08-03 PROCEDURE — 80100014 HC HEMODIALYSIS 1:1

## 2022-08-03 PROCEDURE — 11000004 HC SNF PRIVATE

## 2022-08-03 PROCEDURE — 97116 GAIT TRAINING THERAPY: CPT

## 2022-08-03 RX ORDER — HEPARIN SODIUM 5000 [USP'U]/ML
2000 INJECTION, SOLUTION INTRAVENOUS; SUBCUTANEOUS ONCE
Status: COMPLETED | OUTPATIENT
Start: 2022-08-03 | End: 2022-08-03

## 2022-08-03 RX ADMIN — DICLOFENAC SODIUM TOPICAL GEL, 1%, 2 G: 10 GEL TOPICAL at 08:08

## 2022-08-03 RX ADMIN — GABAPENTIN 300 MG: 300 CAPSULE ORAL at 08:08

## 2022-08-03 RX ADMIN — HEPARIN SODIUM 5000 UNITS: 5000 INJECTION INTRAVENOUS; SUBCUTANEOUS at 09:08

## 2022-08-03 RX ADMIN — HYDROCORTISONE ACETATE 25 MG: 25 SUPPOSITORY RECTAL at 08:08

## 2022-08-03 RX ADMIN — HEPARIN SODIUM 5000 UNITS: 5000 INJECTION INTRAVENOUS; SUBCUTANEOUS at 08:08

## 2022-08-03 RX ADMIN — HEPARIN SODIUM 2000 UNITS: 5000 INJECTION, SOLUTION INTRAVENOUS; SUBCUTANEOUS at 09:08

## 2022-08-03 RX ADMIN — GABAPENTIN 300 MG: 300 CAPSULE ORAL at 02:08

## 2022-08-03 RX ADMIN — TAMSULOSIN HYDROCHLORIDE 0.4 MG: 0.4 CAPSULE ORAL at 02:08

## 2022-08-03 RX ADMIN — CARVEDILOL 6.25 MG: 3.12 TABLET, FILM COATED ORAL at 08:08

## 2022-08-03 RX ADMIN — CALCIUM CARBONATE (ANTACID) CHEW TAB 500 MG 500 MG: 500 CHEW TAB at 08:08

## 2022-08-03 RX ADMIN — HYDROCORTISONE ACETATE 25 MG: 25 SUPPOSITORY RECTAL at 09:08

## 2022-08-03 RX ADMIN — NIFEDIPINE 30 MG: 30 TABLET, FILM COATED, EXTENDED RELEASE ORAL at 08:08

## 2022-08-03 RX ADMIN — SODIUM CHLORIDE 1 G: 900 INJECTION INTRAVENOUS at 05:08

## 2022-08-03 RX ADMIN — ATORVASTATIN CALCIUM 80 MG: 40 TABLET, FILM COATED ORAL at 08:08

## 2022-08-03 RX ADMIN — CARVEDILOL 6.25 MG: 3.12 TABLET, FILM COATED ORAL at 02:08

## 2022-08-03 NOTE — PT/OT/SLP PROGRESS
Name: Neno Watkins    : 1960 (62 y.o.)  MRN: [id number]            Case Conference     Case conference held with patient/family and care team to discuss progress, plan of care, and discharge planning. Communicated therapy progress with MD, therapy clinicians and case management. All questions answered.

## 2022-08-03 NOTE — PT/OT/SLP PROGRESS
Name: Neno Watkins    : 1960 (62 y.o.)  MRN: [id number]            Case Conference     Case conference held with patient/family and care team to discuss progress, plan of care, and discharge planning. Communicated therapy progress with MD, therapy clinicians and case management. All questions answered.

## 2022-08-03 NOTE — PLAN OF CARE
Spoke to patient regarding the need for further support at home. He currently does not have home health services and requested that we call his wife and discuss the available choices with her.   Did follow up with Annmarie (wife) and she is requesting home health services. [facility] is currently reviewing this case.

## 2022-08-03 NOTE — PROGRESS NOTES
"  Nutrition Progress Note    Recommendations    - Continue Renal dialysis / Diabetic diet as appropriate.   - Continue Eloy BID to promote wound healing (provides 90 kcal, 2.5 g protein per serving)  - Monitor renal labs.     Nutrition History    Reason Seen: continuous nutrition monitoring and physician consult    Diagnosis:  1. SHANDRA (acute kidney injury)    2. Debility    3. Hemodialysis status    4. Oliguria      Past Medical History:   Diagnosis Date    BPH (benign prostatic hyperplasia)     Essential (primary) hypertension     Obesity, unspecified     PAD (peripheral artery disease)       Medications:   atorvastatin  80 mg Oral QHS    carvediloL  6.25 mg Oral BID    cefTRIAXone (ROCEPHIN) IVPB  1 g Intravenous Q24H    gabapentin  300 mg Oral BID    heparin (porcine)  5,000 Units Subcutaneous Q12H    hydrocortisone  25 mg Rectal BID    NIFEdipine  30 mg Oral QHS    tamsulosin  0.4 mg Oral Daily       Nutrition-Related Labs:  No results for input(s): NA, K, MG, PHOS, CHLORIDE, CO2, BUN, CREATININE, GLUCOSE, CALCIUM, ALKPHOS, AST, ALT, ALBUMIN in the last 72 hours.   Current Nutrition Therapy: Renal on dialysis / Diabetic  Appetite/Intake: good/% of meals    Comments:    7/30: Pt transferred to swing bed, noted with wounds on hemodialysis. Modified diet to Renal on dialysis with diabetic restriction - monitor renal labs. Will send Eloy to aid in wound healing, which was ordered at previous facility. Increase protein as renal function allows. PO intake improving - 100% intake x last two meals.   8/3: Pt with adequate oral intake. Pt reports not liking orange flavored Eloy, offered fruit punch - pt receptive to try. Noted wt increase - likely fluid fluctuations. Monitor renal labs - No updated labs since 7/31    Anthropometrics    Temp: 98.4 °F (36.9 °C)  Height Method: Stated  Height: 5' 8.9" (175 cm)  Height (inches): 68.9 in  Weight Method: Bed Scale  Weight: 117.2 kg (258 lb 6.1 oz)  Weight " (lb): 258.38 lb  Ideal Body Weight (IBW), Male: 159.4 lb  % Ideal Body Weight, Male (lb): 166.45 %  BMI (Calculated): 38.3  BMI Classification: obese grade II (BMI 35-39.9)    Wt Readings from Last 5 Encounters:   08/03/22 117.2 kg (258 lb 6.1 oz)   07/27/22 100.2 kg (221 lb)   07/08/22 (!) 140.2 kg (309 lb 1.4 oz)   06/27/22 (!) 140.7 kg (310 lb 3 oz)   06/23/22 133.5 kg (294 lb 5 oz)     Estimated/Assessed Needs     Weight Used For Calorie Calculations: 117kg  Energy Calorie Requirements (kcal): 2148kcal  Energy Need Method: MSJ  (1953) x SF 1.1  Protein Requirements: 145-155 gm   Weight Used For Protein Calculations: 73 kg (2.0gm/kg IBW)  Fluid Requirements (mL): 2148mL  Estimated Fluid Requirement Method: RDA    Monitoring and Evaluation    Nutrition Problem  Increased Nutrient Needs    Related to (etiology):   Increased protein needs    Signs and Symptoms (as evidenced by):   Wounds    Interventions(treatment strategy):  Modified composition of meals / snacks, Commercial beverage and Collaboration with other providers    Nutrition Diagnosis Status:   Continues    Dietitian will monitor food and beverage intake and weight change.  Patient is low nutrition risk, will plan for follow-up in 5-7 days.

## 2022-08-03 NOTE — PROGRESS NOTES
[facility] - Medical Surgical Unit  [facility] Medicine  Progress Note    Patient Name: Neno Watkins  MRN: [id number]  Patient Class: IP- Swing   Admission Date: 7/28/2022  Length of Stay: 6 days  Attending Physician: Kelby Lazar MD  Primary Care Provider: Messi Weston MD        Subjective:     Principal Problem:<principal problem not specified>        HPI:  this is a 62-year-old  male with history of end-stage renal disease on hemodialysis, hypertension, type 2 diabetes, peripheral vascular disease, BPH, obesity, deconditioning/debility. He was recently admitted to [facility] for acute renal failure, due to no improvement in renal function he was started on dialysis and continued to stay on dialysis.  On 7/27/2022 he presented to [place] complaining of significant weakness and was also noted to be hypotensive, blood pressure improved with IV fluids and patient has been transferred to [facility] swing bed for therapy and continuation of hemodialysis         Overview/Hospital Course:  8/1/22-Patient is on dialysis at this time.  He states that he is feeling better and feels stronger.  Will continue with therapy.    8/2/22-Sitting up in the chair today.  He is c/o pain to his butt.  Nurses also state that his hemorrhoids are inflamed.  He does have a crack in the skin as well.  Will order some meds to help.    8/3/22-Patient states his gluteal region feels better today.  He is doing well with PT but still needs more OT.  No new issues today.      Interval History:     Review of Systems   Constitutional:  Positive for activity change.   HENT: Negative.     Eyes: Negative.    Respiratory: Negative.     Cardiovascular: Negative.    Gastrointestinal: Negative.    Endocrine: Negative.    Genitourinary: Negative.    Musculoskeletal:  Positive for arthralgias.   Skin:  Positive for wound.   Allergic/Immunologic: Negative.    Neurological: Negative.    Hematological:  Negative.    Psychiatric/Behavioral: Negative.     Objective:     Vital Signs (Most Recent):  Temp: 99 °F (37.2 °C) (08/03/22 0005)  Pulse: 76 (08/03/22 0005)  Resp: 20 (08/03/22 0005)  BP: 126/70 (08/03/22 0005)  SpO2: 97 % (08/03/22 0005) Vital Signs (24h Range):  Temp:  [98.6 °F (37 °C)-99 °F (37.2 °C)] 99 °F (37.2 °C)  Pulse:  [69-76] 76  Resp:  [20] 20  SpO2:  [96 %-97 %] 97 %  BP: (126-127)/(60-70) 126/70     Weight: 117.2 kg (258 lb 6.1 oz)  Body mass index is 38.27 kg/m².    Intake/Output Summary (Last 24 hours) at 8/3/2022 1143  Last data filed at 8/3/2022 0800  Gross per 24 hour   Intake 720 ml   Output --   Net 720 ml      Physical Exam  Constitutional:       Appearance: Normal appearance. He is obese.   HENT:      Head: Normocephalic and atraumatic.      Nose: Nose normal.      Mouth/Throat:      Mouth: Mucous membranes are moist.      Pharynx: Oropharynx is clear.   Eyes:      Extraocular Movements: Extraocular movements intact.      Conjunctiva/sclera: Conjunctivae normal.      Pupils: Pupils are equal, round, and reactive to light.   Cardiovascular:      Rate and Rhythm: Normal rate and regular rhythm.      Pulses: Normal pulses.      Heart sounds: Normal heart sounds.   Pulmonary:      Effort: Pulmonary effort is normal.      Breath sounds: Normal breath sounds.   Abdominal:      General: Bowel sounds are normal.      Palpations: Abdomen is soft.   Musculoskeletal:         General: Normal range of motion.      Cervical back: Normal range of motion and neck supple.   Skin:     General: Skin is warm and dry.      Capillary Refill: Capillary refill takes 2 to 3 seconds.   Neurological:      General: No focal deficit present.      Mental Status: He is alert and oriented to person, place, and time. Mental status is at baseline.   Psychiatric:         Mood and Affect: Mood normal.         Behavior: Behavior normal.         Thought Content: Thought content normal.         Judgment: Judgment normal.        Significant Labs: All pertinent labs within the past 24 hours have been reviewed.  BMP: No results for input(s): GLU, NA, K, CL, CO2, BUN, CREATININE, CALCIUM, MG in the last 48 hours.  CBC: No results for input(s): WBC, HGB, HCT, PLT in the last 48 hours.  CMP: No results for input(s): NA, K, CL, CO2, GLU, BUN, CREATININE, CALCIUM, PROT, ALBUMIN, BILITOT, ALKPHOS, AST, ALT, ANIONGAP, EGFRNONAA in the last 48 hours.    Invalid input(s): ESTGFAFRICA  Magnesium: No results for input(s): MG in the last 48 hours.    Significant Imaging: I have reviewed all pertinent imaging results/findings within the past 24 hours.      Assessment/Plan:    1.  Deconditioning  2.  ESRD  3.  HTN  4.  DM  5.  GERD  No notes have been filed under this hospital service.  Service: Hospital Medicine    VTE Risk Mitigation (From admission, onward)         Ordered     heparin (porcine) injection 5,000 Units  Every 12 hours         07/30/22 1241            HD as scheduled  Therapy  OOB  DVT prophylaxis  Labs in the am  Resume current meds    Discharge Planning   MATT:      Code Status: Prior   Is the patient medically ready for discharge?:     Reason for patient still in hospital (select all that apply): Laboratory test, Treatment and PT / OT recommendations  Discharge Plan A: Home with family, Home Health                  Otilio Valero MD  Department of Hospital Medicine   [facility]

## 2022-08-03 NOTE — NURSING
08/03/22 1200   Post-Hemodialysis Assessment   Total UF (mL) 2500 mL   Patient Response to Treatment tolerated tx well. vss throughout. cvc fx well and able to meet ordered bfr. pt howevery did begin to clot off after 3 hours despite heparin pre tx. dr ralph notified.

## 2022-08-03 NOTE — PT/OT/SLP PROGRESS
"         Physical Therapy Treatment Note           Name: Nneo Watkins    : 1960 (62 y.o.)  MRN: [id number]             Subjective Assessment:     No complaints  Lethargic   X Awake, alert, cooperative  Uncooperative    Agitated  c/o pain    Appropriate  c/o fatigue    Confused  Treated at bedside     Emotionally labile  Treated in gym/dept.    Impulsive  Other:    Flat affect       Pain/Comfort:    Location - Side 1: Bilateral  Location 1: leg    Therapy Precautions:      Cognitive deficits  Spinal precautions    Collar - hard  Sternal precautions    Collar - soft   TLSO   X Fall risk  LSO    Hip precautions - posterior  Knee immobilizer    Hip precautions - anterior  WBAT    Impaired communication  Partial weightbearing    Oxygen  TTWB    PEG tube  NWB    Visual deficits  Other:    Hearing deficits           Mobility Training:     Assist Level Assistive Device Comments    Bed Mobility Demetria-SBA  Semi-supine to sitting at EOB.  Pt required assistance to mobilize his trunk into upright. Increased time required for this transitional movement. Pt expressed increased difficulty with this skill due to "stiffness".  Pt returned to semi-supine with SBA only.   Sit to stand/Stand to sit Demetria-SBA RW Sit to stand/stand to sit.  Demetria required for pt to rise into standing from the EOB. Pt performed lateral scooting to utilize the foot board of the bed for UE leverage prior to standing.  Pt able to stand from the BSC with SBA only.  Pt continues to reach for the front of the RW for support despite cueing.   Transfers CGA-SBA RW BSC t/f performed for a +void and BM.  Pt required assistance with both hygiene and clothing management. After performing a BM, pt mobilized back to bed for HD.   Gait CGA RW Pt able to ambulate 200 ft with a step through gait pattern and slowed gait speed. Pt required reminders for RW proximity and upright posture.  No LOB noted.  Pt c/o increased LE stiffness which limited his mobility. " "  Balance Training      Wheelchair Mobility      Stair Climbing      Car Transfer      Other:           Assessment:     Patient tolerated session fairly well but did c/o increased "stiffness" which appeared to limit his mobility.  Pt scheduled for HD this AM and was returned to bed for this reason. Pt encouraged to mobilize into the chair with nsg upon completion of dialysis.     PT Plan:     Will continue to see pt for PT services in an effort to maximize pt's function and promote a return of independence prior to D/C home.  Continued aggressive therapy services warranted.     GOALS:   Multidisciplinary Problems     Physical Therapy Goals        Problem: Physical Therapy    Goal Priority Disciplines Outcome Goal Variances Interventions   Physical Therapy Goal     PT, PT/OT Ongoing, Progressing     Description: Patient will increase functional independence with mobility by performin. Supine to sit with Modified Patterson  2. Sit to supine with Modified Patterson  3. Sit to stand transfer with Supervision  4. Gait  x 150 feet with Supervision using Rolling Walker.   5. Ascend/descend 5 stair with bilateral Handrails Supervision.                        Discharge Recommendations:      nursing facility, skilled, home with home health       At end of treatment, patient remained:      Up in chair    X In bed   X With alarm activated    Bed rails up   X Call bell in reach     X Family/friends present     Restraints secured properly     In bathroom with CNA/RN notified     In gym with PT/PTA/tech     Nurse aware     Other:           PT Start Time: 801     PT Stop Time: 832  PT Total Time (min): 31 min     Billable Minutes: Gait Training 16 and Therapeutic Activity 15      2022   "

## 2022-08-03 NOTE — SUBJECTIVE & OBJECTIVE
Interval History:     Review of Systems   Constitutional:  Positive for activity change.   HENT: Negative.     Eyes: Negative.    Respiratory: Negative.     Cardiovascular: Negative.    Gastrointestinal: Negative.    Endocrine: Negative.    Genitourinary: Negative.    Musculoskeletal:  Positive for arthralgias.   Skin:  Positive for wound.   Allergic/Immunologic: Negative.    Neurological: Negative.    Hematological: Negative.    Psychiatric/Behavioral: Negative.     Objective:     Vital Signs (Most Recent):  Temp: 99 °F (37.2 °C) (08/03/22 0005)  Pulse: 76 (08/03/22 0005)  Resp: 20 (08/03/22 0005)  BP: 126/70 (08/03/22 0005)  SpO2: 97 % (08/03/22 0005) Vital Signs (24h Range):  Temp:  [98.6 °F (37 °C)-99 °F (37.2 °C)] 99 °F (37.2 °C)  Pulse:  [69-76] 76  Resp:  [20] 20  SpO2:  [96 %-97 %] 97 %  BP: (126-127)/(60-70) 126/70     Weight: 117.2 kg (258 lb 6.1 oz)  Body mass index is 38.27 kg/m².    Intake/Output Summary (Last 24 hours) at 8/3/2022 1143  Last data filed at 8/3/2022 0800  Gross per 24 hour   Intake 720 ml   Output --   Net 720 ml      Physical Exam  Constitutional:       Appearance: Normal appearance. He is obese.   HENT:      Head: Normocephalic and atraumatic.      Nose: Nose normal.      Mouth/Throat:      Mouth: Mucous membranes are moist.      Pharynx: Oropharynx is clear.   Eyes:      Extraocular Movements: Extraocular movements intact.      Conjunctiva/sclera: Conjunctivae normal.      Pupils: Pupils are equal, round, and reactive to light.   Cardiovascular:      Rate and Rhythm: Normal rate and regular rhythm.      Pulses: Normal pulses.      Heart sounds: Normal heart sounds.   Pulmonary:      Effort: Pulmonary effort is normal.      Breath sounds: Normal breath sounds.   Abdominal:      General: Bowel sounds are normal.      Palpations: Abdomen is soft.   Musculoskeletal:         General: Normal range of motion.      Cervical back: Normal range of motion and neck supple.   Skin:     General:  Skin is warm and dry.      Capillary Refill: Capillary refill takes 2 to 3 seconds.   Neurological:      General: No focal deficit present.      Mental Status: He is alert and oriented to person, place, and time. Mental status is at baseline.   Psychiatric:         Mood and Affect: Mood normal.         Behavior: Behavior normal.         Thought Content: Thought content normal.         Judgment: Judgment normal.       Significant Labs: All pertinent labs within the past 24 hours have been reviewed.  BMP: No results for input(s): GLU, NA, K, CL, CO2, BUN, CREATININE, CALCIUM, MG in the last 48 hours.  CBC: No results for input(s): WBC, HGB, HCT, PLT in the last 48 hours.  CMP: No results for input(s): NA, K, CL, CO2, GLU, BUN, CREATININE, CALCIUM, PROT, ALBUMIN, BILITOT, ALKPHOS, AST, ALT, ANIONGAP, EGFRNONAA in the last 48 hours.    Invalid input(s): ESTGFAFRICA  Magnesium: No results for input(s): MG in the last 48 hours.    Significant Imaging: I have reviewed all pertinent imaging results/findings within the past 24 hours.

## 2022-08-03 NOTE — PT/OT/SLP PROGRESS
Occupational Therapy  Treatment    Name: Neno Watkins    : 1960 (62 y.o.)  MRN: [id number]          TREATMENT SUMMARY AND RECOMMENDATIONS:      Subjective Assessment:   No complaints  Lethargic   x Awake, alert, cooperative  Uncooperative    Agitated x Flat affect   x Appropriate  c/o fatigue    Confused x Treated at bedside     Emotionally liable  Treated in gym/dept.    Impulsive x Pt reported feeling fatigued from dialysis       Pain/Comfort:  · Pain Rating 1: 4/10  · Location - Side 1: Bilateral  · Location 1: back  · Pain Addressed 1: Reposition, Distraction      Therapy Precautions:    Cognitive deficits  Spinal precautions    Collar - hard  Sternal precautions    Collar - soft   TLSO   x Fall risk  LSO    Hip precautions - posterior  Knee immobilizer    Hip precautions - anterior  WBAT    Impaired communication  Partial weightbearing    Oxygen  TTWB    PEG tube  NWB   x Visual deficits  Other:   x Hearing deficits           Vitals Value   x Room air      Oxygen (L)     Blood pressure     Heart rate         Treatment Objectives:     Mobility Training:    Mobility task Assist level Comments    Bed mobility SBa Using bed rail with HOB elevated   Balance training CGA Dynamic standing bal/wanda at sink 3 mins while performing oral care.   Transfer     Functional mobility CGA Functional mobility with rw 50 ft with verbal cuing for safety   Sit to stand transitions Min Transfer training bed to RW min assist and verbal cuing for forward weight shift.   Other:       ADL Training:    ADL Assist level Comments    Feeding     Grooming/hygiene Setup ADL grooming activity standing at sink setup oral care   Bathing     Upper body dressing     Lower body dressing     Toileting     Toilet transfer     Adaptive equipment training     Other:           Assessment: Patient tolerated session fair.  Noted pt required a little more assist with sit to stand secondary to fatigue from dialysis.  Continues to require 24 hour  care.  AM case conference with pt and wife, discussed pt level of care at this time.    OT Plan: Cont POC      GOALS:   Multidisciplinary Problems     Occupational Therapy Goals        Problem: Occupational Therapy    Goal Priority Disciplines Outcome Interventions   Occupational Therapy Goal     OT, PT/OT Ongoing, Progressing    Description: Goals to be met by: 8/26/22     Patient will increase functional independence with ADLs by performing:    LE Dressing with Minimal Assistance.  Toileting from bedside commode with Minimal Assistance for hygiene and clothing management.   Bathing from  edge of bed with Minimal Assistance.  Supine to sit with Contact Guard Assistance.  Toilet transfer to bedside commode with Contact Guard Assistance.                     Communication with Treatment Team:     Discharge Recommendations: LTACH (long-term acute care hospital), nursing facility, skilled, home health OT  Discharge Equipment Recommendations:  bedside commode, dressing device  Barriers to discharge:  Decreased caregiver support, Inaccessible home environment      At end of treatment, patient remained:   Up in chair    x In bed   x With alarm activated    Bed rails up   x Call bell in reach     Family/friends present    Restraints secured properly    In bathroom with CNA/RN notified    In gym with PT/PTA/tech    Nurse aware   x Other: Left side pressure relief Position         OT Date of Treatment: 08/03/22  OT Start Time: 1410  OT Stop Time: 1430  OT Total Time (min): 20 min    Billable Minutes:Therapeutic Activity 20      8/3/2022

## 2022-08-03 NOTE — PLAN OF CARE
08/03/22 1132   Discharge Reassessment   Assessment Type Discharge Planning Reassessment   Did the patient's condition or plan change since previous assessment? No   Discharge Plan discussed with: Patient   Communicated MATT with patient/caregiver Date not available/Unable to determine   Discharge Plan A Home with family;Home Health   DME Needed Upon Discharge  other (see comments)  (Needs assistance with entry into the home due to stairs as part of entry way. Discussed options for ramps with patient & spouse. Possible financial barriers noted. Will continue to look for options to access into home.)   Discharge Barriers Identified Other (see comments)  (Needs ramp to enter into home. See DME comments.)   Why the patient remains in the hospital Requires continued medical care   Post-Acute Status   Post-Acute Authorization Home Health  ([facility])   Home Health Status Referrals Sent   Coverage [facility]

## 2022-08-03 NOTE — PLAN OF CARE
Clinicals faxed to [facility] for next review date, Successful fax confirmation received.

## 2022-08-04 LAB
ALBUMIN SERPL-MCNC: 2.2 GM/DL (ref 3.4–4.8)
ALBUMIN/GLOB SERPL: 0.5 RATIO (ref 1.1–2)
ALP SERPL-CCNC: 178 UNIT/L (ref 40–150)
ALT SERPL-CCNC: 19 UNIT/L (ref 0–55)
AST SERPL-CCNC: 23 UNIT/L (ref 5–34)
BASOPHILS # BLD AUTO: 0.09 X10(3)/MCL (ref 0–0.2)
BASOPHILS NFR BLD AUTO: 0.7 %
BILIRUBIN DIRECT+TOT PNL SERPL-MCNC: 0.4 MG/DL
BUN SERPL-MCNC: 52 MG/DL (ref 8.4–25.7)
CALCIUM SERPL-MCNC: 8.4 MG/DL (ref 8.8–10)
CHLORIDE SERPL-SCNC: 105 MMOL/L (ref 98–107)
CO2 SERPL-SCNC: 23 MMOL/L (ref 23–31)
CREAT SERPL-MCNC: 7.34 MG/DL (ref 0.73–1.18)
EOSINOPHIL # BLD AUTO: 0.47 X10(3)/MCL (ref 0–0.9)
EOSINOPHIL NFR BLD AUTO: 3.6 %
ERYTHROCYTE [DISTWIDTH] IN BLOOD BY AUTOMATED COUNT: 14.3 % (ref 11.5–17)
GLOBULIN SER-MCNC: 4.3 GM/DL (ref 2.4–3.5)
GLUCOSE SERPL-MCNC: 83 MG/DL (ref 82–115)
HCT VFR BLD AUTO: 30 % (ref 42–52)
HGB BLD-MCNC: 9.6 GM/DL (ref 14–18)
IMM GRANULOCYTES # BLD AUTO: 0.12 X10(3)/MCL (ref 0–0.04)
IMM GRANULOCYTES NFR BLD AUTO: 0.9 %
LYMPHOCYTES # BLD AUTO: 3.44 X10(3)/MCL (ref 0.6–4.6)
LYMPHOCYTES NFR BLD AUTO: 26.3 %
MAGNESIUM SERPL-MCNC: 2.2 MG/DL (ref 1.6–2.6)
MCH RBC QN AUTO: 30.7 PG (ref 27–31)
MCHC RBC AUTO-ENTMCNC: 32 MG/DL (ref 33–36)
MCV RBC AUTO: 95.8 FL (ref 80–94)
MONOCYTES # BLD AUTO: 1.1 X10(3)/MCL (ref 0.1–1.3)
MONOCYTES NFR BLD AUTO: 8.4 %
NEUTROPHILS # BLD AUTO: 7.8 X10(3)/MCL (ref 2.1–9.2)
NEUTROPHILS NFR BLD AUTO: 60.1 %
NRBC BLD AUTO-RTO: 0 %
PHOSPHATE SERPL-MCNC: 6.4 MG/DL (ref 2.3–4.7)
PLATELET # BLD AUTO: 275 X10(3)/MCL (ref 130–400)
PMV BLD AUTO: 9.3 FL (ref 7.4–10.4)
POCT GLUCOSE: 110 MG/DL (ref 70–110)
POCT GLUCOSE: 112 MG/DL (ref 70–110)
POCT GLUCOSE: 134 MG/DL (ref 70–110)
POCT GLUCOSE: 84 MG/DL (ref 70–110)
POTASSIUM SERPL-SCNC: 4.7 MMOL/L (ref 3.5–5.1)
PROT SERPL-MCNC: 6.5 GM/DL (ref 5.8–7.6)
RBC # BLD AUTO: 3.13 X10(6)/MCL (ref 4.7–6.1)
SODIUM SERPL-SCNC: 140 MMOL/L (ref 136–145)
WBC # SPEC AUTO: 13.1 X10(3)/MCL (ref 4.5–11.5)

## 2022-08-04 PROCEDURE — 63600175 PHARM REV CODE 636 W HCPCS: Performed by: INTERNAL MEDICINE

## 2022-08-04 PROCEDURE — 97530 THERAPEUTIC ACTIVITIES: CPT

## 2022-08-04 PROCEDURE — 80053 COMPREHEN METABOLIC PANEL: CPT | Performed by: INTERNAL MEDICINE

## 2022-08-04 PROCEDURE — 11000004 HC SNF PRIVATE

## 2022-08-04 PROCEDURE — 25000003 PHARM REV CODE 250: Performed by: INTERNAL MEDICINE

## 2022-08-04 PROCEDURE — 85025 COMPLETE CBC W/AUTO DIFF WBC: CPT | Performed by: INTERNAL MEDICINE

## 2022-08-04 PROCEDURE — 36415 COLL VENOUS BLD VENIPUNCTURE: CPT | Performed by: INTERNAL MEDICINE

## 2022-08-04 PROCEDURE — 97116 GAIT TRAINING THERAPY: CPT

## 2022-08-04 PROCEDURE — 84100 ASSAY OF PHOSPHORUS: CPT | Performed by: INTERNAL MEDICINE

## 2022-08-04 PROCEDURE — 97535 SELF CARE MNGMENT TRAINING: CPT

## 2022-08-04 PROCEDURE — 83735 ASSAY OF MAGNESIUM: CPT | Performed by: INTERNAL MEDICINE

## 2022-08-04 RX ADMIN — HEPARIN SODIUM 5000 UNITS: 5000 INJECTION INTRAVENOUS; SUBCUTANEOUS at 08:08

## 2022-08-04 RX ADMIN — CARVEDILOL 6.25 MG: 3.12 TABLET, FILM COATED ORAL at 08:08

## 2022-08-04 RX ADMIN — GABAPENTIN 300 MG: 300 CAPSULE ORAL at 08:08

## 2022-08-04 RX ADMIN — HYDROCORTISONE ACETATE 25 MG: 25 SUPPOSITORY RECTAL at 09:08

## 2022-08-04 RX ADMIN — TAMSULOSIN HYDROCHLORIDE 0.4 MG: 0.4 CAPSULE ORAL at 08:08

## 2022-08-04 RX ADMIN — CALCIUM CARBONATE (ANTACID) CHEW TAB 500 MG 500 MG: 500 CHEW TAB at 08:08

## 2022-08-04 RX ADMIN — HYDROCORTISONE ACETATE 25 MG: 25 SUPPOSITORY RECTAL at 08:08

## 2022-08-04 RX ADMIN — DICLOFENAC SODIUM TOPICAL GEL, 1%, 2 G: 10 GEL TOPICAL at 08:08

## 2022-08-04 RX ADMIN — NIFEDIPINE 30 MG: 30 TABLET, FILM COATED, EXTENDED RELEASE ORAL at 08:08

## 2022-08-04 RX ADMIN — ATORVASTATIN CALCIUM 80 MG: 40 TABLET, FILM COATED ORAL at 08:08

## 2022-08-04 RX ADMIN — SODIUM CHLORIDE 1 G: 900 INJECTION INTRAVENOUS at 02:08

## 2022-08-04 NOTE — PT/OT/SLP PROGRESS
Occupational Therapy  Treatment    Name: Neno Watkins    : 1960 (62 y.o.)  MRN: [id number]          TREATMENT SUMMARY AND RECOMMENDATIONS:      Subjective Assessment:   No complaints  Lethargic   x Awake, alert, cooperative  Uncooperative    Agitated x Flat affect   x Appropriate  c/o fatigue    Confused x Treated at bedside     Emotionally liable  Treated in gym/dept.    Impulsive  Other:       Pain/Comfort:  · Pain Rating 1: 0/10      Therapy Precautions:   x Cognitive deficits  Spinal precautions    Collar - hard  Sternal precautions    Collar - soft   TLSO   x Fall risk  LSO    Hip precautions - posterior  Knee immobilizer    Hip precautions - anterior  WBAT    Impaired communication  Partial weightbearing    Oxygen  TTWB    PEG tube  NWB   x Visual deficits  Other:   x Hearing deficits           Vitals Value   x Room air      Oxygen (L)     Blood pressure     Heart rate         Treatment Objectives:     Mobility Training:    Mobility task Assist level Comments    Bed mobility SBA Transfer training supine to sit SBA using bed rail with HOB elevated   Balance training CGA Dynamic standing bal/wanda at sink.  Pt stood 8 mins with CGA using One UE support while performing ADL activities   Transfer     Functional mobility CGA Functional mobility with RW 5 feet to<>from sink   Sit to stand transitions CGA  Sit to stand from bed to RW CGA and verbal cuing for proper hand placement   Other:       ADL Training:    ADL Assist level Comments    Feeding     Grooming/hygiene Setup ADl grooming activity standing at sink; setup with oral care, face washing, hair grooming and shaving with electric razor.   Bathing     Upper body dressing     Lower body dressing     Toileting     Toilet transfer     Adaptive equipment training     Other:           Therapeutic Exercise:   Exercise Sets Reps Comments   BUE endurance activity with towel dowel 1 30 Performed shoulder flex, chest press, and horiz ab/ad                          Additional Treatment:  Review safety during functional transfers and mobility with RW    Assessment: Patient tolerated session Fair.  Pt continues to require assist with functional transfers and ADLs    OT Plan: Cont POC      GOALS:   Multidisciplinary Problems     Occupational Therapy Goals        Problem: Occupational Therapy    Goal Priority Disciplines Outcome Interventions   Occupational Therapy Goal     OT, PT/OT Ongoing, Progressing    Description: Goals to be met by: 8/26/22     Patient will increase functional independence with ADLs by performing:    LE Dressing with Minimal Assistance.  Toileting from bedside commode with Minimal Assistance for hygiene and clothing management.   Bathing from  edge of bed with Minimal Assistance.  Supine to sit with Contact Guard Assistance.  Toilet transfer to bedside commode with Contact Guard Assistance.                     Communication with Treatment Team:     Discharge Recommendations: LTACH (long-term acute care hospital), nursing facility, skilled, home health OT  Discharge Equipment Recommendations:  bedside commode, dressing device  Barriers to discharge:  Decreased caregiver support, Inaccessible home environment      At end of treatment, patient remained:  x Up in chair     In bed   x With alarm activated    Bed rails up   x Call bell in reach     Family/friends present    Restraints secured properly    In bathroom with CNA/RN notified    In gym with PT/PTA/tech    Nurse aware    Other:         OT Date of Treatment: 08/04/22  OT Start Time: 0900  OT Stop Time: 0930  OT Total Time (min): 30 min    Billable Minutes:Self Care/Home Management 15  Therapeutic Activity 15      8/4/2022

## 2022-08-04 NOTE — PROGRESS NOTES
[facility] - Medical Surgical Unit  [facility] Medicine  Progress Note    Patient Name: Neno Watkins  MRN: [id number]  Patient Class: IP- Swing         Admission Date: 7/28/2022  Attending Physician: Kelby Lazar MD  Primary Care Provider: Messi Weston MD      HPI:  this is a 62-year-old  male with history of end-stage renal disease on hemodialysis, hypertension, type 2 diabetes, peripheral vascular disease, BPH, obesity, deconditioning/debility. He was recently admitted to [facility] for acute renal failure, due to no improvement in renal function he was started on dialysis and continued to stay on dialysis.  On 7/27/2022 he presented to [place] complaining of significant weakness and was also noted to be hypotensive, blood pressure improved with IV fluids and patient has been transferred to [facility] swing bed for therapy and continuation of hemodialysis  Subjective:    Neno Watkins is a 62 y.o. male who is being followed by the Medicine service for    Objective:     8/2/22-Sitting up in the chair today.  He is c/o pain to his butt.  Nurses also state that his hemorrhoids are inflamed.  He does have a crack in the skin as well.  Will order some meds to help.     8/3/22-Patient states his gluteal region feels better today.  He is doing well with PT but still needs more OT.  No new issues today.    8/4- No AEO. AFebrile. No CP. Still has hemorrhoid pain but improving    Vitals:    Vitals:    08/03/22 1600 08/03/22 2311 08/04/22 0600 08/04/22 0857   BP: 122/66 (!) 122/53  (!) 102/53   Pulse: 77 64  65   Resp: 20 16     Temp: 98.2 °F (36.8 °C) 98.2 °F (36.8 °C)     TempSrc:       SpO2: 97% (!) 94%     Weight:   114.2 kg (251 lb 12.3 oz)    Height:           IOs:      Intake/Output Summary (Last 24 hours) at 8/4/2022 1042  Last data filed at 8/4/2022 0600  Gross per 24 hour   Intake 840 ml   Output 3025 ml   Net -2185 ml       I/O last 3 completed shifts:  In:  1200 [P.O.:1200]  Out: 3025 [Urine:525; Other:2500]    ROS: No chest pain, no dyspnea, no abdominal pain, no nausea, vomiting, no fevers      Physical Examination:    GEN: AOx3, NAD, obese    HEENT: NC AT, PERRL, conjunctivae pink/moist, no sclera icterus, EOMi. TM normal. OP clear. No LAD.    Neck:No bruits. No JVD, RIGHT IJ HD LINE     CV: RRR, NO MRG    RESP: LCTA, no wheezes or crackles    ABD: S, NT, ND, +BS, no organomegaly, no scars    EXT: Pulses +2 U/LE, no edema    NEURO: CN 2-12 grossly intact. Motor 5/5. Sensation to light touch wnl    SKIN:No rashes or lesions, stage 2 pressure wound     PSYCH: Normal affect. Normal mood.        Laboratory:    Laboratory Data Reviewed: Yes    Microbiology Data Reviewed: Yes    Imaging personally reviewed    Tests ordered    Chart reviewed      Recent Labs   Lab 07/31/22  0455 08/04/22  0532   WBC 12.4* 13.1*   HGB 9.0* 9.6*    275    140   K 4.5 4.7   CO2 23 23   BUN 68.0* 52.0*   CALCIUM 8.2* 8.4*   ALBUMIN  --  2.2*   BILITOT  --  0.4   AST  --  23   ALT  --  19       Other Results:    EKG (my interpretation):    Radiology Data Reviewed: Yes    Current Medications:    Infusions:        Scheduled:     atorvastatin  80 mg Oral QHS    carvediloL  6.25 mg Oral BID    cefTRIAXone (ROCEPHIN) IVPB  1 g Intravenous Q24H    gabapentin  300 mg Oral BID    heparin (porcine)  5,000 Units Subcutaneous Q12H    hydrocortisone  25 mg Rectal BID    NIFEdipine  30 mg Oral QHS    tamsulosin  0.4 mg Oral Daily       PRN:    calcium carbonate, dextrose 50%, dextrose 50%, diclofenac sodium, glucagon (human recombinant), glucose, glucose, insulin aspart U-100, sodium chloride 0.9%      Assessment:    Neno Watkins is a 62 y.o.male with    Present on Admission:  - Debility  - Volume overload , requiring dialysis  - HTN  - HLD  - ESRD  - Morbid obesity  - Sacral pressure wound stage 2         Plan:    - Continue HD per nephrology/qMWF   - Needs tunnel line as outpatient  then fistula/graft next 3-6 months     - Resumed home medications    - PT OT, OOB , bowel regimen      DVT ppx - SQHq12      DISPO - pending PT progression, plan for d/c home with home health     Romain Harvey

## 2022-08-04 NOTE — PT/OT/SLP PROGRESS
Physical Therapy Treatment Note           Name: Neno Watkins    : 1960 (62 y.o.)  MRN: [id number]             Subjective Assessment:     No complaints  Lethargic   X Awake, alert, cooperative  Uncooperative    Agitated  c/o pain    Appropriate  c/o fatigue    Confused  Treated at bedside     Emotionally labile  Treated in gym/dept.    Impulsive  Other:    Flat affect       Pain/Comfort:    Location - Side 1: Bilateral  Location 1: leg    Therapy Precautions:      Cognitive deficits  Spinal precautions    Collar - hard  Sternal precautions    Collar - soft   TLSO   X Fall risk  LSO    Hip precautions - posterior  Knee immobilizer    Hip precautions - anterior  WBAT    Impaired communication  Partial weightbearing    Oxygen  TTWB    PEG tube  NWB    Visual deficits  Other:    Hearing deficits        Mobility Training:     Assist Level Assistive Device Comments    Bed Mobility SBA  Sitting EOB to supine.   Sit to stand/Stand to sit Tramaine-SBA RW Sit to stand/stand to sit from EOB, BSC, and WC level. Pt required Tramaine to rise into standing from the low height of the bed.  Pt able to rise from the BSC and the WC with SBA only.   Transfers CGA RW BSC t/f performed for a +BM.  Assistance required with hygiene and clothing management. Pt reported that his wife is able to assist with these skills at home.   Gait CGA RW Pt able to ambulate approximately 125 ft to the therapy gym. Cues provided for RW proximity and posture.  No significant LOB observed, but pt's posture does remain rather flexed despite cueing.   Balance Training      Wheelchair Mobility      Stair Climbing Tramaine  Pt able to negotiate up and down 4 steps utilizing the railing on the R for support.  Pt ascended and descended laterally. Pt expressed confidence with this skill but he remains at a heightened risk for falls.    Car Transfer      Other:             Assessment:     Patient tolerated session fairly and was able to practice stair  negotiation in preparation for potential D/C. As per CM, pt's insurance is denying coverage past 22. Pt has 5 steps to enter his home and is unable to get a ramp constructed at this time.  Concerns remain regarding pt's ability to safely negotiate up and down these steps, especially on days in which he has dialysis.  Will f/u with CM tomorrow for more information regarding potential D/C..    PT Plan:     Will continue to see pt for PT services in an effort to maximize his safety with mobility prior to potential D/C home.    GOALS:   Multidisciplinary Problems     Physical Therapy Goals        Problem: Physical Therapy    Goal Priority Disciplines Outcome Goal Variances Interventions   Physical Therapy Goal     PT, PT/OT Ongoing, Progressing     Description: Patient will increase functional independence with mobility by performin. Supine to sit with Modified [place]  2. Sit to supine with Modified [place]  3. Sit to stand transfer with Supervision  4. Gait  x 150 feet with Supervision using Rolling Walker.   5. Ascend/descend 5 stair with bilateral Handrails Supervision.                        Discharge Recommendations:      nursing facility, skilled, home with home health       At end of treatment, patient remained:      Up in chair    X In bed   X With alarm activated    Bed rails up   X Call bell in reach      Family/friends present     Restraints secured properly     In bathroom with CNA/RN notified     In gym with PT/PTA/tech     Nurse aware     Other:           PT Start Time: 1258     PT Stop Time: 1339  PT Total Time (min): 41 min     Billable Minutes: Gait Training 15 and Therapeutic Activity 26      2022

## 2022-08-05 LAB
POCT GLUCOSE: 111 MG/DL (ref 70–110)
POCT GLUCOSE: 173 MG/DL (ref 70–110)
POCT GLUCOSE: 90 MG/DL (ref 70–110)
POCT GLUCOSE: 96 MG/DL (ref 70–110)

## 2022-08-05 PROCEDURE — 97530 THERAPEUTIC ACTIVITIES: CPT

## 2022-08-05 PROCEDURE — 80100016 HC MAINTENANCE HEMODIALYSIS

## 2022-08-05 PROCEDURE — 97535 SELF CARE MNGMENT TRAINING: CPT

## 2022-08-05 PROCEDURE — 97116 GAIT TRAINING THERAPY: CPT

## 2022-08-05 PROCEDURE — 11000004 HC SNF PRIVATE

## 2022-08-05 PROCEDURE — 87040 BLOOD CULTURE FOR BACTERIA: CPT | Performed by: INTERNAL MEDICINE

## 2022-08-05 PROCEDURE — 25000003 PHARM REV CODE 250: Performed by: INTERNAL MEDICINE

## 2022-08-05 PROCEDURE — 63600175 PHARM REV CODE 636 W HCPCS: Performed by: INTERNAL MEDICINE

## 2022-08-05 PROCEDURE — 36415 COLL VENOUS BLD VENIPUNCTURE: CPT | Performed by: INTERNAL MEDICINE

## 2022-08-05 RX ORDER — HEPARIN SODIUM 5000 [USP'U]/ML
2000 INJECTION, SOLUTION INTRAVENOUS; SUBCUTANEOUS
Status: DISCONTINUED | OUTPATIENT
Start: 2022-08-05 | End: 2022-08-07 | Stop reason: HOSPADM

## 2022-08-05 RX ADMIN — CALCIUM CARBONATE (ANTACID) CHEW TAB 500 MG 500 MG: 500 CHEW TAB at 08:08

## 2022-08-05 RX ADMIN — HEPARIN SODIUM 5000 UNITS: 5000 INJECTION INTRAVENOUS; SUBCUTANEOUS at 12:08

## 2022-08-05 RX ADMIN — NIFEDIPINE 30 MG: 30 TABLET, FILM COATED, EXTENDED RELEASE ORAL at 08:08

## 2022-08-05 RX ADMIN — HEPARIN SODIUM 5000 UNITS: 5000 INJECTION INTRAVENOUS; SUBCUTANEOUS at 08:08

## 2022-08-05 RX ADMIN — GABAPENTIN 300 MG: 300 CAPSULE ORAL at 12:08

## 2022-08-05 RX ADMIN — TAMSULOSIN HYDROCHLORIDE 0.4 MG: 0.4 CAPSULE ORAL at 12:08

## 2022-08-05 RX ADMIN — ATORVASTATIN CALCIUM 80 MG: 40 TABLET, FILM COATED ORAL at 08:08

## 2022-08-05 RX ADMIN — HEPARIN SODIUM 2000 UNITS: 5000 INJECTION, SOLUTION INTRAVENOUS; SUBCUTANEOUS at 08:08

## 2022-08-05 RX ADMIN — GABAPENTIN 300 MG: 300 CAPSULE ORAL at 08:08

## 2022-08-05 RX ADMIN — CARVEDILOL 6.25 MG: 3.12 TABLET, FILM COATED ORAL at 08:08

## 2022-08-05 RX ADMIN — CARVEDILOL 6.25 MG: 3.12 TABLET, FILM COATED ORAL at 12:08

## 2022-08-05 NOTE — PROGRESS NOTES
[facility] Medical Surgical [facility]  [facility] Medicine  Progress Note    Patient Name: Neno Watkins  MRN: [id number]  Patient Class: IP- Swing         Admission Date: 7/28/2022  Attending Physician: Kelby Lazar MD  Primary Care Provider: Messi Weston MD      HPI:  this is a 62-year-old  male with history of end-stage renal disease on hemodialysis, hypertension, type 2 diabetes, peripheral vascular disease, BPH, obesity, deconditioning/debility. He was recently admitted to [place] for acute renal failure, due to no improvement in renal function he was started on dialysis and continued to stay on dialysis.  On 7/27/2022 he presented to [place] complaining of significant weakness and was also noted to be hypotensive, blood pressure improved with IV fluids and patient has been transferred to [facility] swing bed for therapy and continuation of hemodialysis  Subjective:    Neno Watkins is a 62 y.o. male who is being followed by the Medicine service for    Objective:     8/2/22-Sitting up in the chair today.  He is c/o pain to his butt.  Nurses also state that his hemorrhoids are inflamed.  He does have a crack in the skin as well.  Will order some meds to help.     8/3/22-Patient states his gluteal region feels better today.  He is doing well with PT but still needs more OT.  No new issues today.    8/5 No AEO. AFebrile. No CP.    Vitals:    Vitals:    08/04/22 1700 08/04/22 2302 08/05/22 0500 08/05/22 0800   BP: 110/60 124/70  118/65   Pulse: 72 78  (!) 59   Resp: 18 17  16   Temp: 98.6 °F (37 °C) 98.6 °F (37 °C)  98.1 °F (36.7 °C)   TempSrc: Oral      SpO2: 99% 95%  97%   Weight:   116 kg (255 lb 11.7 oz)    Height:           IOs:      Intake/Output Summary (Last 24 hours) at 8/5/2022 1015  Last data filed at 8/5/2022 0800  Gross per 24 hour   Intake 768 ml   Output 325 ml   Net 443 ml       I/O last 3 completed shifts:  In: 888 [P.O.:888]  Out: 325  [Urine:325]    ROS: No chest pain, no dyspnea, no abdominal pain, no nausea, vomiting, no fevers      Physical Examination:    GEN: AOx3, NAD, obese    HEENT: NC AT, PERRL, conjunctivae pink/moist, no sclera icterus, EOMi. TM normal. OP clear. No LAD.    Neck:No bruits. No JVD, RIGHT IJ HD LINE     CV: RRR, NO MRG    RESP: LCTA, no wheezes or crackles    ABD: S, NT, ND, +BS, no organomegaly, no scars    EXT: Pulses +2 U/LE, no edema    NEURO: CN 2-12 grossly intact. Motor 5/5. Sensation to light touch wnl    SKIN:No rashes or lesions, stage 2 pressure wound     PSYCH: Normal affect. Normal mood.        Laboratory:    Laboratory Data Reviewed: Yes    Microbiology Data Reviewed: Yes    Imaging personally reviewed    Tests ordered    Chart reviewed      Recent Labs   Lab 07/31/22  0455 08/04/22  0532   WBC 12.4* 13.1*   HGB 9.0* 9.6*    275    140   K 4.5 4.7   CO2 23 23   BUN 68.0* 52.0*   CALCIUM 8.2* 8.4*   ALBUMIN  --  2.2*   PHOS  --  6.4*   BILITOT  --  0.4   AST  --  23   ALT  --  19       Other Results:    EKG (my interpretation):    Radiology Data Reviewed: Yes    Current Medications:    Infusions:        Scheduled:     atorvastatin  80 mg Oral QHS    carvediloL  6.25 mg Oral BID    gabapentin  300 mg Oral BID    heparin (porcine)  2,000 Units Intravenous Every Mon, Wed, Fri    heparin (porcine)  5,000 Units Subcutaneous Q12H    NIFEdipine  30 mg Oral QHS    tamsulosin  0.4 mg Oral Daily       PRN:    calcium carbonate, dextrose 50%, dextrose 50%, diclofenac sodium, glucagon (human recombinant), glucose, glucose, insulin aspart U-100, sodium chloride 0.9%      Assessment:    Neno Watkins is a 62 y.o.male with    Present on Admission:  - Debility  - Volume overload , requiring dialysis  - HTN  - HLD  - ESRD  - Morbid obesity  - Sacral pressure wound stage 2         Plan:    8/5 - Check Blood culture and repeat labs in AM. If he spikes fever or labs do not improve, will need transfer this  weekend for tunnel line placement prior to discharge Dr Velasquez (nephrology). Otherwise if stable, will plan for outpatient permacath     - Continue HD per nephrology/qMWF     - Resumed home medications    - PT OT, OOB , bowel regimen      DVT ppx - SQHq12      DISPO - Plan for DC this weekend    Romain Harvey

## 2022-08-05 NOTE — PLAN OF CARE
Scheduled patient for removal of temporary dialysis catheter and insertion of permanent dialysis catheter on Monday 8/8/22 with arrival time of 10 am.  Patient's wife (Annmarie) and [facility] were notified of the facility discharge date of 8/7 with subsequent return home and scheduled Monday procedure of insertion of permanent dialysis catheter.

## 2022-08-05 NOTE — PT/OT/SLP PROGRESS
Occupational Therapy  Treatment     Name: Neno Watkins    : 1960 (62 y.o.)  MRN: [id number]            TREATMENT SUMMARY AND RECOMMENDATIONS:        Subjective Assessment:    No complaints   Lethargic   x Awake, alert, cooperative   Uncooperative     Agitated x Flat affect   x Appropriate   c/o fatigue     Confused x Treated at bedside      Emotionally liable   Treated in gym/dept.     Impulsive   Other:         Pain/Comfort:  · Pain Rating 1: 0/10        Therapy Precautions:   x Cognitive deficits   Spinal precautions     Collar - hard   Sternal precautions     Collar - soft    TLSO   x Fall risk   LSO     Hip precautions - posterior   Knee immobilizer     Hip precautions - anterior   WBAT     Impaired communication   Partial weightbearing     Oxygen   TTWB     PEG tube   NWB   x Visual deficits   Other:   x Hearing deficits                Vitals Value   x Room air        Oxygen (L)       Blood pressure       Heart rate            Treatment Objectives:     Mobility Training:     Mobility task Assist level Comments    Bed mobility     Balance training CGA Dynamic standing bal/wanda at sink.  Pt stood 5 mins with CGA using One UE support while performing ADL activities.  Educated pt on retrieving items from floor with LH reacher and able to perform with SBA.   Transfer       Functional mobility CGA Functional mobility with RW 5 feet to<>from sink   Sit to stand transitions CGA  Sit to stand from bed to RW CGA and verbal cuing for proper hand placement   Other:          ADL Training:     ADL Assist level Comments    Feeding       Grooming/hygiene Setup ADl grooming activity standing at sink; setup with oral care, face washing, hair grooming and shaving with electric razor.   Bathing       Upper body dressing       Lower body dressing    Educated pt on Donning le socks with sock aid.   Toileting       Toilet transfer       Adaptive equipment training    Issued pt reacher and sock aid   Other:               Additional Treatment:  Review safety during functional transfers and mobility with RW while retrievng items from floor with reacher.     Assessment: Patient tolerated session Fair.  Pt continues to require assist with functional transfers and ADLs.     OT Plan: Cont POC        GOALS:       Multidisciplinary Problems           Occupational Therapy Goals                 Problem: Occupational Therapy      Goal Priority Disciplines Outcome Interventions   Occupational Therapy Goal      OT, PT/OT Ongoing, Progressing     Description: Goals to be met by: 8/26/22      Patient will increase functional independence with ADLs by performing:     LE Dressing with Minimal Assistance.  Toileting from bedside commode with Minimal Assistance for hygiene and clothing management.   Bathing from  edge of bed with Minimal Assistance.  Supine to sit with Contact Guard Assistance.  Toilet transfer to bedside commode with Contact Guard Assistance.                           Communication with Treatment Team:      Discharge Recommendations: LTACH (long-term acute care hospital), nursing facility, skilled, home health OT  Discharge Equipment Recommendations:  bedside commode, dressing device  Barriers to discharge:  Decreased caregiver support, Inaccessible home environment        At end of treatment, patient remained:  x Up in chair      In bed   x With alarm activated     Bed rails up   x Call bell in reach      Family/friends present     Restraints secured properly     In bathroom with CNA/RN notified     In gym with PT/PTA/tech     Nurse aware     Other:            OT Date of Treatment: 08/05/22  OT Start Time: 1400  OT Stop Time: 1445  OT Total Time (min): 25 min     Billable Minutes:Self Care/Home Management 10  Therapeutic Activity 15

## 2022-08-05 NOTE — NURSING
08/05/22 1155        Hemodialysis Catheter 06/27/22 2025 right internal jugular   Placement Date/Time: 06/27/22 2025   Present Prior to Hospital Arrival?: No  Skin Antisepsis: (c)   Hemodialysis Catheter Type: Temporary catheter  Location: right internal jugular  Insertion attempts (enter comment if more than 2 attempts): 1  Patien...   Site Assessment No drainage;No redness;No swelling;No warmth   Dressing Type Central line dressing   Dressing Status Clean;Dry;Intact   Dressing Intervention Integrity maintained   Date on Dressing 08/02/22   Line Necessity Review CRRT/HD   Post-Hemodialysis Assessment   Rinseback Volume (mL) 250 mL   Blood Volume Processed (Liters) 71.1 L   Dialyzer Clearance Heavily streaked   Total UF (mL) 3500 mL   Net Fluid Removal 3000   Patient Response to Treatment Tolerated tx well.   Post-Hemodialysis Comments Blood rinsed back per P&P. Lines capped and secured.

## 2022-08-05 NOTE — PT/OT/SLP PROGRESS
Physical Therapy Treatment Note           Name: Neno Watkins    : 1960 (62 y.o.)  MRN: [id number]             Subjective Assessment:     No complaints  Lethargic   X Awake, alert, cooperative  Uncooperative    Agitated  c/o pain    Appropriate  c/o fatigue    Confused  Treated at bedside     Emotionally labile  Treated in gym/dept.    Impulsive  Other:    Flat affect       Pain/Comfort:    Location 1: back    Therapy Precautions:      Cognitive deficits  Spinal precautions    Collar - hard  Sternal precautions    Collar - soft   TLSO   X Fall risk  LSO    Hip precautions - posterior  Knee immobilizer    Hip precautions - anterior  WBAT    Impaired communication  Partial weightbearing    Oxygen  TTWB    PEG tube  NWB    Visual deficits  Other:    Hearing deficits           Mobility Training:     Assist Level Assistive Device Comments    Bed Mobility SBA  Semi-supine to sitting at EOB with heavy reliance on the bed railing to reach upright sitting.  Increased time required to transition into upright and for lateral scooting to be near the bed railing for transfers.   Sit to stand/Stand to sit Demetria-SBA RW Sit to stand/stand to sit.  Demetria required for pt to reach upright standing from the EOB. Pt utilized the bed railing for increased UE leverage.  Cues provided for body positioning and increased anterior weight shifting.  Pt able to stand from WC level with SBA.   Transfers      Gait CGA RW Pt able to ambulate approximately 150 ft to the therapy gym. Pt demonstrated a step through gait pattern with limited foot clearance and flexed posture.  WC kept in tow.  Surgical shoe donned on the R.   Balance Training      Wheelchair Mobility      Stair Climbing CGA  Pt able to negotiate up and down 4 steps using the railing on the R.  Pt ascended and descended the steps laterally. Cues required to insure adequate foot placement prior to descent.    Car Transfer      Other:           Assessment:     Patient  tolerated session fairly and is scheduled for D/C on  due to denial of continued days by his insurance.  Pt has been unable to get a ramp installed and will have to negotiate up and down 5 steps to enter and exit his home. Pt was thoroughly educated regarding mobility within the home and safety with step negotiation. Pt expressed understanding of each of PT's recommendations.  PT remains concerned that pt may have difficulty with this skill post-dialysis; however, he was able to perform the task during today's treatment which occurred approximately 1-2 hours after HD.  Pt remains at a very heightened risk for falls and further decompensation.    PT Plan:     Will attempt to see pt for PT services tomorrow for further preparation for D/C on .    GOALS:   Multidisciplinary Problems     Physical Therapy Goals        Problem: Physical Therapy    Goal Priority Disciplines Outcome Goal Variances Interventions   Physical Therapy Goal     PT, PT/OT Ongoing, Progressing     Description: Patient will increase functional independence with mobility by performin. Supine to sit with Modified [place]  2. Sit to supine with Modified [place]  3. Sit to stand transfer with Supervision  4. Gait  x 150 feet with Supervision using Rolling Walker.   5. Ascend/descend 5 stair with bilateral Handrails Supervision.                        Discharge Recommendations:      nursing facility, skilled, home with home health       At end of treatment, patient remained:     X Up in chair     In bed   X With alarm activated    Bed rails up   X Call bell in reach      Family/friends present     Restraints secured properly     In bathroom with CNA/RN notified     In gym with PT/PTA/tech     Nurse aware     Other:           PT Start Time: 1303     PT Stop Time: 1348  PT Total Time (min): 45 min     Billable Minutes: Gait Training 15 and Therapeutic Activity 30      2022

## 2022-08-06 LAB
ALBUMIN SERPL-MCNC: 2.1 GM/DL (ref 3.4–4.8)
ALBUMIN/GLOB SERPL: 0.5 RATIO (ref 1.1–2)
ALP SERPL-CCNC: 175 UNIT/L (ref 40–150)
ALT SERPL-CCNC: 20 UNIT/L (ref 0–55)
AST SERPL-CCNC: 23 UNIT/L (ref 5–34)
BASOPHILS # BLD AUTO: 0.07 X10(3)/MCL (ref 0–0.2)
BASOPHILS NFR BLD AUTO: 0.7 %
BILIRUBIN DIRECT+TOT PNL SERPL-MCNC: 0.4 MG/DL
BUN SERPL-MCNC: 53 MG/DL (ref 8.4–25.7)
CALCIUM SERPL-MCNC: 8 MG/DL (ref 8.8–10)
CHLORIDE SERPL-SCNC: 105 MMOL/L (ref 98–107)
CO2 SERPL-SCNC: 23 MMOL/L (ref 23–31)
CREAT SERPL-MCNC: 6.9 MG/DL (ref 0.73–1.18)
EOSINOPHIL # BLD AUTO: 0.54 X10(3)/MCL (ref 0–0.9)
EOSINOPHIL NFR BLD AUTO: 5.1 %
ERYTHROCYTE [DISTWIDTH] IN BLOOD BY AUTOMATED COUNT: 14.4 % (ref 11.5–17)
GLOBULIN SER-MCNC: 4.3 GM/DL (ref 2.4–3.5)
GLUCOSE SERPL-MCNC: 80 MG/DL (ref 82–115)
HCT VFR BLD AUTO: 28.5 % (ref 42–52)
HGB BLD-MCNC: 9.2 GM/DL (ref 14–18)
IMM GRANULOCYTES # BLD AUTO: 0.08 X10(3)/MCL (ref 0–0.04)
IMM GRANULOCYTES NFR BLD AUTO: 0.8 %
LYMPHOCYTES # BLD AUTO: 3.25 X10(3)/MCL (ref 0.6–4.6)
LYMPHOCYTES NFR BLD AUTO: 30.9 %
MCH RBC QN AUTO: 30.8 PG (ref 27–31)
MCHC RBC AUTO-ENTMCNC: 32.3 MG/DL (ref 33–36)
MCV RBC AUTO: 95.3 FL (ref 80–94)
MONOCYTES # BLD AUTO: 0.95 X10(3)/MCL (ref 0.1–1.3)
MONOCYTES NFR BLD AUTO: 9 %
NEUTROPHILS # BLD AUTO: 5.6 X10(3)/MCL (ref 2.1–9.2)
NEUTROPHILS NFR BLD AUTO: 53.5 %
NRBC BLD AUTO-RTO: 0 %
PLATELET # BLD AUTO: 256 X10(3)/MCL (ref 130–400)
PMV BLD AUTO: 10 FL (ref 7.4–10.4)
POCT GLUCOSE: 106 MG/DL (ref 70–110)
POCT GLUCOSE: 119 MG/DL (ref 70–110)
POCT GLUCOSE: 119 MG/DL (ref 70–110)
POCT GLUCOSE: 74 MG/DL (ref 70–110)
POTASSIUM SERPL-SCNC: 4.6 MMOL/L (ref 3.5–5.1)
PROT SERPL-MCNC: 6.4 GM/DL (ref 5.8–7.6)
RBC # BLD AUTO: 2.99 X10(6)/MCL (ref 4.7–6.1)
SODIUM SERPL-SCNC: 139 MMOL/L (ref 136–145)
WBC # SPEC AUTO: 10.5 X10(3)/MCL (ref 4.5–11.5)

## 2022-08-06 PROCEDURE — 25000003 PHARM REV CODE 250: Performed by: INTERNAL MEDICINE

## 2022-08-06 PROCEDURE — C1752 CATH,HEMODIALYSIS,SHORT-TERM: HCPCS

## 2022-08-06 PROCEDURE — 80100014 HC HEMODIALYSIS 1:1

## 2022-08-06 PROCEDURE — 63600175 PHARM REV CODE 636 W HCPCS: Performed by: INTERNAL MEDICINE

## 2022-08-06 PROCEDURE — 97116 GAIT TRAINING THERAPY: CPT

## 2022-08-06 PROCEDURE — 97110 THERAPEUTIC EXERCISES: CPT

## 2022-08-06 PROCEDURE — 36415 COLL VENOUS BLD VENIPUNCTURE: CPT | Performed by: INTERNAL MEDICINE

## 2022-08-06 PROCEDURE — 97530 THERAPEUTIC ACTIVITIES: CPT

## 2022-08-06 PROCEDURE — 85025 COMPLETE CBC W/AUTO DIFF WBC: CPT | Performed by: INTERNAL MEDICINE

## 2022-08-06 PROCEDURE — 80053 COMPREHEN METABOLIC PANEL: CPT | Performed by: INTERNAL MEDICINE

## 2022-08-06 PROCEDURE — 11000004 HC SNF PRIVATE

## 2022-08-06 RX ADMIN — CALCIUM CARBONATE (ANTACID) CHEW TAB 500 MG 500 MG: 500 CHEW TAB at 08:08

## 2022-08-06 RX ADMIN — CARVEDILOL 6.25 MG: 3.12 TABLET, FILM COATED ORAL at 12:08

## 2022-08-06 RX ADMIN — CARVEDILOL 6.25 MG: 3.12 TABLET, FILM COATED ORAL at 08:08

## 2022-08-06 RX ADMIN — GABAPENTIN 300 MG: 300 CAPSULE ORAL at 08:08

## 2022-08-06 RX ADMIN — NIFEDIPINE 30 MG: 30 TABLET, FILM COATED, EXTENDED RELEASE ORAL at 08:08

## 2022-08-06 RX ADMIN — TAMSULOSIN HYDROCHLORIDE 0.4 MG: 0.4 CAPSULE ORAL at 12:08

## 2022-08-06 RX ADMIN — ATORVASTATIN CALCIUM 80 MG: 40 TABLET, FILM COATED ORAL at 08:08

## 2022-08-06 RX ADMIN — HEPARIN SODIUM 5000 UNITS: 5000 INJECTION INTRAVENOUS; SUBCUTANEOUS at 08:08

## 2022-08-06 RX ADMIN — GABAPENTIN 300 MG: 300 CAPSULE ORAL at 12:08

## 2022-08-06 RX ADMIN — HEPARIN SODIUM 5000 UNITS: 5000 INJECTION INTRAVENOUS; SUBCUTANEOUS at 12:08

## 2022-08-06 NOTE — PROGRESS NOTES
[facility] Medicine  Progress Note    Patient Name: Neno Watkins  MRN: [id number]  Patient Class: IP- Swing         Admission Date: 7/28/2022  Attending Physician: Kelby Lazar MD  Primary Care Provider: Messi Weston MD      HPI:  this is a 62-year-old  male with history of end-stage renal disease on hemodialysis, hypertension, type 2 diabetes, peripheral vascular disease, BPH, obesity, deconditioning/debility. He was recently admitted to [place] for acute renal failure, due to no improvement in renal function he was started on dialysis and continued to stay on dialysis.  On 7/27/2022 he presented to [place] complaining of significant weakness and was also noted to be hypotensive, blood pressure improved with IV fluids and patient has been transferred to [facility] swing bed for therapy and continuation of hemodialysis  Subjective:    Neno Watkins is a 62 y.o. male who is being followed by the Medicine service for    Objective:     8/2/22-Sitting up in the chair today.  He is c/o pain to his butt.  Nurses also state that his hemorrhoids are inflamed.  He does have a crack in the skin as well.  Will order some meds to help.     8/3/22-Patient states his gluteal region feels better today.  He is doing well with PT but still needs more OT.  No new issues today.    8/6 No AEO. AFebrile. No CP.    Vitals:    Vitals:    08/05/22 2115 08/06/22 0000 08/06/22 0517 08/06/22 0800   BP: 113/67 109/60  112/65   Pulse: 72 67  66   Resp: 18 18  18   Temp:  97.2 °F (36.2 °C)  98.8 °F (37.1 °C)   TempSrc:       SpO2:  96%  97%   Weight:   114.9 kg (253 lb 4.9 oz)    Height:           IOs:      Intake/Output Summary (Last 24 hours) at 8/6/2022 0914  Last data filed at 8/6/2022 0800  Gross per 24 hour   Intake 840 ml   Output 3500 ml   Net -2660 ml       I/O last 3 completed shifts:  In: 1080 [P.O.:1080]  Out: 3825 [Urine:325;  Other:3500]    ROS: No chest pain, no dyspnea, no abdominal pain, no nausea, vomiting, no fevers      Physical Examination:    GEN: AOx3, NAD, obese    HEENT: NC AT, PERRL, conjunctivae pink/moist, no sclera icterus, EOMi. TM normal. OP clear. No LAD.    Neck:No bruits. No JVD, RIGHT IJ HD LINE     CV: RRR, NO MRG    RESP: LCTA, no wheezes or crackles    ABD: S, NT, ND, +BS, no organomegaly, no scars    EXT: Pulses +2 U/LE, no edema    NEURO: CN 2-12 grossly intact. Motor 5/5. Sensation to light touch wnl    SKIN:No rashes or lesions, stage 2 pressure wound     PSYCH: Normal affect. Normal mood.        Laboratory:    Laboratory Data Reviewed: Yes    Microbiology Data Reviewed: Yes    Imaging personally reviewed    Tests ordered    Chart reviewed      Recent Labs   Lab 07/31/22  0455 08/04/22  0532 08/06/22  0500   WBC 12.4* 13.1* 10.5   HGB 9.0* 9.6* 9.2*    275 256    140 139   K 4.5 4.7 4.6   CO2 23 23 23   BUN 68.0* 52.0* 53.0*   CALCIUM 8.2* 8.4* 8.0*   ALBUMIN  --  2.2* 2.1*   PHOS  --  6.4*  --    BILITOT  --  0.4 0.4   AST  --  23 23   ALT  --  19 20       Other Results:    EKG (my interpretation):    Radiology Data Reviewed: Yes    Current Medications:    Infusions:        Scheduled:     atorvastatin  80 mg Oral QHS    carvediloL  6.25 mg Oral BID    gabapentin  300 mg Oral BID    heparin (porcine)  2,000 Units Intravenous Every Mon, Wed, Fri    heparin (porcine)  5,000 Units Subcutaneous Q12H    NIFEdipine  30 mg Oral QHS    tamsulosin  0.4 mg Oral Daily       PRN:    calcium carbonate, dextrose 50%, dextrose 50%, diclofenac sodium, glucagon (human recombinant), glucose, glucose, insulin aspart U-100, sodium chloride 0.9%, sodium chloride 0.9%      Assessment:    Neno Watkins is a 62 y.o.male with    Present on Admission:  - Debility  - Volume overload , requiring dialysis  - HTN  - HLD  - ESRD  - Morbid obesity  - Sacral pressure wound stage 2         Plan:    8/6 - No AEO. Afebrile.  Leukocytosis improved. Cultures negative thsu far. Plan for D/C tomorrow with outpaitent nephrology appt for permacath.     8/5 - Check Blood culture and repeat labs in AM. If he spikes fever or labs do not improve, will need transfer this weekend for tunnel line placement prior to discharge Dr Velasquez (nephrology). Otherwise if stable, will plan for outpatient permacath     - Continue HD per nephrology/qMWF     - Resumed home medications    - PT OT, OOB , bowel regimen      DVT ppx - [id number]      DISPO - Plan for DC this weekend    Romain Harvey

## 2022-08-06 NOTE — PT/OT/SLP PROGRESS
Physical Therapy Treatment Note           Name: Neno Watkins    : 1960 (62 y.o.)  MRN: [id number]             Subjective Assessment:     No complaints  Lethargic   X Awake, alert, cooperative  Uncooperative    Agitated  c/o pain    Appropriate  c/o fatigue    Confused  Treated at bedside     Emotionally labile  Treated in gym/dept.    Impulsive  Other:    Flat affect       Pain/Comfort:    Pain Rating 1: 2/10  Location - Side 1: Bilateral  Location 1: back  Pain Addressed 1: Distraction  Location - Side 2: Right  Location 2: shoulder  Pain Addressed 2: Distraction    Therapy Precautions:      Cognitive deficits  Spinal precautions    Collar - hard  Sternal precautions    Collar - soft   TLSO   X Fall risk  LSO    Hip precautions - posterior  Knee immobilizer    Hip precautions - anterior  WBAT    Impaired communication  Partial weightbearing    Oxygen  TTWB    PEG tube  NWB    Visual deficits  Other:    Hearing deficits           Mobility Training:     Assist Level Assistive Device Comments    Bed Mobility SBA  Semi-supine to sitting at EOB with heavy reliance on the bed railing to reach upright sitting.  Increased time required to transition into upright and for lateral scooting to be near the bed railing for transfers.   Sit to stand/Stand to sit Demetria-SBA RW Sit to stand/stand to sit.  Demetria required for pt to reach upright standing from the EOB. Pt utilized the bed railing for increased UE leverage.  Cues provided for body positioning and increased anterior weight shifting.  Pt able to stand from WC level with SBA.   Transfers      Gait CGA RW Pt able to ambulate approximately 120 ft in the [employer] hallway. Pt demonstrated a step through gait pattern with limited foot clearance and flexed posture.  WC kept in tow.  Surgical shoe donned on the R.   Balance Training      Wheelchair Mobility      Stair Climbing      Car Transfer      Other:           Assessment:     Patient tolerated session  fairly well though pretty tired from his recent completion of hemodialysis. Struggled to attain upright sitting EOB with feet on the floor and without the use of the bed rail would not have been able to do so. Gait distance a little limited as well due to fatigue from hemodialysis.  Pt has been unable to get a ramp installed and will have to negotiate up and down 5 steps to enter and exit his home. Pt was thoroughly educated regarding mobility within the home and safety with step negotiation. Pt expressed understanding of each of PT's recommendations.  PT remains concerned that pt may have difficulty with this skill post-dialysis; however, he was able to perform the task during today's treatment which occurred approximately 1 hour after HD.  Pt remains at a very heightened risk for falls and further decompensation.    PT Plan:     Will attempt to see pt for PT services tomorrow for further preparation for D/C on .    GOALS:   Multidisciplinary Problems     Physical Therapy Goals        Problem: Physical Therapy    Goal Priority Disciplines Outcome Goal Variances Interventions   Physical Therapy Goal     PT, PT/OT Ongoing, Progressing     Description: Patient will increase functional independence with mobility by performin. Supine to sit with Modified [place]  2. Sit to supine with Modified [place]  3. Sit to stand transfer with Supervision  4. Gait  x 150 feet with Supervision using Rolling Walker.   5. Ascend/descend 5 stair with bilateral Handrails Supervision.                        Discharge Recommendations:      nursing facility, skilled, home with home health       At end of treatment, patient remained:      Up in chair    X In bed   X With alarm activated    Bed rails up   X Call bell in reach      Family/friends present     Restraints secured properly     In bathroom with CNA/RN notified     In gym with PT/PTA/tech     Nurse aware     Other:           PT Start Time: 1205     PT Stop  Time: 1233  PT Total Time (min): 28 min     Billable Minutes:  Gait Training 15 and Therapeutic Activity 15      08/06/2022

## 2022-08-06 NOTE — NURSING
08/06/22 1100   Post-Hemodialysis Assessment   Total UF (mL) 3000 mL   Patient Response to Treatment tolerated tx. vss throughout. no c/o's noted. cvc fx fairly well with minimal positional suction alarms noted within the arterial port with coughing/movement.

## 2022-08-07 VITALS
SYSTOLIC BLOOD PRESSURE: 103 MMHG | RESPIRATION RATE: 18 BRPM | OXYGEN SATURATION: 96 % | DIASTOLIC BLOOD PRESSURE: 59 MMHG | TEMPERATURE: 99 F | HEIGHT: 69 IN | WEIGHT: 252.19 LBS | BODY MASS INDEX: 37.35 KG/M2 | HEART RATE: 63 BPM

## 2022-08-07 PROCEDURE — 25000003 PHARM REV CODE 250: Performed by: INTERNAL MEDICINE

## 2022-08-07 PROCEDURE — C1752 CATH,HEMODIALYSIS,SHORT-TERM: HCPCS

## 2022-08-07 PROCEDURE — 63600175 PHARM REV CODE 636 W HCPCS: Performed by: INTERNAL MEDICINE

## 2022-08-07 RX ADMIN — TAMSULOSIN HYDROCHLORIDE 0.4 MG: 0.4 CAPSULE ORAL at 09:08

## 2022-08-07 RX ADMIN — CARVEDILOL 6.25 MG: 3.12 TABLET, FILM COATED ORAL at 09:08

## 2022-08-07 RX ADMIN — HEPARIN SODIUM 5000 UNITS: 5000 INJECTION INTRAVENOUS; SUBCUTANEOUS at 09:08

## 2022-08-07 RX ADMIN — GABAPENTIN 300 MG: 300 CAPSULE ORAL at 09:08

## 2022-08-07 NOTE — DISCHARGE SUMMARY
[facility] Discharge Summary      Attending Physician: Kelby Lazar MD      Date of Admit: 7/28/2022  Date of Discharge: 8/7/2022    Discharge to: Home  Condition: Stable    Discharge Diagnoses     Patient Active Problem List   Diagnosis    Benign prostatic hyperplasia    Elevated liver enzymes    Gastroesophageal reflux disease    Hyperlipidemia    Hypertension    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    Morbid obesity    Peripheral arterial occlusive disease    Swelling of lower extremity    Type 2 diabetes mellitus    Hyperkalemia    Fall at home    SHANDRA (acute kidney injury)    Leukocytosis    Bilateral lower extremity edema    Pyelonephritis    Hypoxia    Cellulitis of both lower extremities    Oliguria    Pleural effusion, bilateral    Hemodialysis status           Brief History of Present Illness       62-year-old  male with history of end-stage renal disease on hemodialysis, hypertension, type 2 diabetes, peripheral vascular disease, BPH, obesity, deconditioning/debility. He was recently admitted to [place] for acute renal failure, due to no improvement in renal function he was started on dialysis and continued to stay on dialysis.  On 7/27/2022 he presented to [place] complaining of significant weakness and was also noted to be hypotensive, blood pressure improved with IV fluids and patient has been transferred to [facility] swing bed for therapy and continuation of hemodialysis. No acute events at [place]. Blood cultures remained negative. No fevers. No evidence of CLABSI. Set up for permacath placement for 8/8/22 per Nephrology recommendations Dr Velasquez.. Home health at D/C. FOllow up with PCP in 1-2 weeks after discharge.        Discharge Medications        Medication List      CONTINUE taking these medications    atorvastatin 80 MG tablet  Commonly known as: LIPITOR     carvediloL 6.25 MG tablet  Commonly known  as: COREG     gabapentin 300 MG capsule  Commonly known as: NEURONTIN     NIFEdipine 60 MG (OSM) 24 hr tablet  Commonly known as: PROCARDIA-XL  Take 1 tablet (60 mg total) by mouth once daily.     tamsulosin 0.4 mg Cap  Commonly known as: FLOMAX     TOUJEO SOLOSTAR U-300 INSULIN 300 unit/mL (1.5 mL) Inpn pen  Generic drug: insulin glargine (TOUJEO)        ASK your doctor about these medications    furosemide 40 MG tablet  Commonly known as: LASIX     metFORMIN 500 MG tablet  Commonly known as: GLUCOPHAGE            Discharge Information:   Diet:  Renal     Physical Activity:  AAT    Follow-Up Appointments:    Permacath placement on 8/8 at 10 am  PCP follow up, see d/c     Time Spent 30 minutes           Romain Harvey

## 2022-08-07 NOTE — DISCHARGE INSTRUCTIONS
Keep all appointments and take all medications as prescribed. Report to the ER if any emergent conditions arise.

## 2022-08-07 NOTE — PLAN OF CARE
Problem: Adult Inpatient Plan of Care  Goal: Plan of Care Review  Outcome: Adequate for Care Transition  Goal: Patient-Specific Goal (Individualized)  Outcome: Adequate for Care Transition  Goal: Absence of Hospital-Acquired Illness or Injury  Outcome: Adequate for Care Transition  Goal: Optimal Comfort and Wellbeing  Outcome: Adequate for Care Transition  Goal: Readiness for Transition of Care  Outcome: Adequate for Care Transition     Problem: Diabetes Comorbidity  Goal: Blood Glucose Level Within Targeted Range  Outcome: Adequate for Care Transition     Problem: Fluid and Electrolyte Imbalance (Acute Kidney Injury/Impairment)  Goal: Fluid and Electrolyte Balance  Outcome: Adequate for Care Transition     Problem: Oral Intake Inadequate (Acute Kidney Injury/Impairment)  Goal: Optimal Nutrition Intake  Outcome: Adequate for Care Transition     Problem: Renal Function Impairment (Acute Kidney Injury/Impairment)  Goal: Effective Renal Function  Outcome: Adequate for Care Transition     Problem: Infection  Goal: Absence of Infection Signs and Symptoms  Outcome: Adequate for Care Transition     Problem: Impaired Wound Healing  Goal: Optimal Wound Healing  Outcome: Adequate for Care Transition     Problem: Infection (Hemodialysis)  Goal: Absence of Infection Signs and Symptoms  Outcome: Adequate for Care Transition     Problem: Hemodynamic Instability (Hemodialysis)  Goal: Effective Tissue Perfusion  Outcome: Adequate for Care Transition     Problem: Device-Related Complication Risk (Hemodialysis)  Goal: Safe, Effective Therapy Delivery  Outcome: Adequate for Care Transition     Problem: Skin Injury Risk Increased  Goal: Skin Health and Integrity  Outcome: Adequate for Care Transition     Problem: Pain Acute  Goal: Acceptable Pain Control and Functional Ability  Outcome: Adequate for Care Transition     Problem: Fall Injury Risk  Goal: Absence of Fall and Fall-Related Injury  Outcome: Adequate for Care Transition      Problem: Infection  Goal: Absence of Infection Signs and Symptoms  Outcome: Adequate for Care Transition     Problem: Renal Function Impairment (Acute Kidney Injury/Impairment)  Goal: Effective Renal Function  Outcome: Adequate for Care Transition

## 2022-08-08 LAB — POCT GLUCOSE: 96 MG/DL (ref 70–110)

## 2022-08-08 NOTE — PT/OT/SLP DISCHARGE
Physical Therapy Discharge Summary    Name: Neno Watkins  MRN: [id number]   Principal Problem: weakness    Patient Discharged from acute Physical Therapy on 2022.  Please refer to prior PT noted date on 2022 for functional status.     Assessment:     Patient appropriate for care in another setting.    Objective:     GOALS:   Multidisciplinary Problems     Physical Therapy Goals        Problem: Physical Therapy    Goal Priority Disciplines Outcome Goal Variances Interventions   Physical Therapy Goal     PT, PT/OT Adequate for Care Transition     Description: Patient will increase functional independence with mobility by performin. Supine to sit with Modified [place]  2. Sit to supine with Modified [place]  3. Sit to stand transfer with Supervision  4. Gait  x 150 feet with Supervision using Rolling Walker.   5. Ascend/descend 5 stair with bilateral Handrails Supervision.                      Reasons for Discontinuation of Therapy Services  Transfer to alternate level of care.      Plan:     Patient Discharged to: Home with Home Health Service.    Pt received a denial from insurance and was required to transition home with  services and family care.      2022

## 2022-08-09 NOTE — PT/OT/SLP DISCHARGE
Occupational Therapy Discharge Summary    Neno Watkins  MRN: [id number]   Principal Problem: <principal problem not specified>      Patient Discharged from acute Occupational Therapy on 8/7/2022.  Please refer to prior OT note dated 8/5/2022 for functional status.    Assessment:      Patient appropriate for care in another setting.    Objective:     GOALS:   Multidisciplinary Problems     Occupational Therapy Goals        Problem: Occupational Therapy    Goal Priority Disciplines Outcome Interventions   Occupational Therapy Goal     OT, PT/OT Adequate for Care Transition    Description: Goals to be met by: 8/26/22     Patient will increase functional independence with ADLs by performing:    LE Dressing with Minimal Assistance. (Not Met)  Toileting from bedside commode with Minimal Assistance for hygiene and clothing management. (Met)  Bathing from  edge of bed with Minimal Assistance.(Not Met)  Supine to sit with Contact Guard Assistance. (Met)  Toilet transfer to bedside commode with Contact Guard Assistance.(Met)                     Reasons for Discontinuation of Therapy Services  Transfer to alternate level of care.      Plan:     Patient Discharged to: Home with Home Health Service    8/9/2022

## 2022-08-10 LAB — BACTERIA BLD CULT: NORMAL

## 2022-08-26 NOTE — PLAN OF CARE
.  Weekly Interdisciplinary Meeting      Date Admitted: 7/28/2022 :   Interdisciplinary Meeting Date: 8/3/2022     Patient Active Problem List   Diagnosis    Benign prostatic hyperplasia    Elevated liver enzymes    Gastroesophageal reflux disease    Hyperlipidemia    Hypertension    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    Morbid obesity    Peripheral arterial occlusive disease    Swelling of lower extremity    Type 2 diabetes mellitus    Hyperkalemia    Fall at home    SHANDRA (acute kidney injury)    Leukocytosis    Bilateral lower extremity edema    Pyelonephritis    Hypoxia    Cellulitis of both lower extremities    Oliguria    Pleural effusion, bilateral    Hemodialysis status            Team Members Present:    [x] Esmer Umaña RN    [x] Patricia Bhatti, RD    [x] Colt Marshall, OT    [x] Vasquez Calzada, PT    [x] ST Leonor    [] Other:      Physician Present   [x] Otilio Valero    [] Roslyn Quijano    [] Romain Harvey    [] Other:       Family Present    [] Adult Children    [x] Spouse    [] POA    [] Friend/ Caregiver    [] Other       Interdisciplinary Meeting Notes:  Weekly interdisciplinary meeting held with active participation per patient and Family. Communicated Therapy Progress, Plan of care and Discharge Planning with MD, and Therapy Clinicians. All Questions and Concerns Answered and Addressed.            Please see Documented PT/OT/ST, Dietary, Nursing, and Physician notes for detailed treatment information.

## 2022-09-11 ENCOUNTER — HOSPITAL ENCOUNTER (EMERGENCY)
Facility: HOSPITAL | Age: 62
Discharge: HOME | End: 2022-09-11
Attending: SPECIALIST
Payer: MEDICARE

## 2022-09-11 VITALS
RESPIRATION RATE: 20 BRPM | BODY MASS INDEX: 35.55 KG/M2 | HEIGHT: 69 IN | SYSTOLIC BLOOD PRESSURE: 123 MMHG | HEART RATE: 96 BPM | TEMPERATURE: 98 F | DIASTOLIC BLOOD PRESSURE: 75 MMHG | WEIGHT: 240 LBS | OXYGEN SATURATION: 97 %

## 2022-09-11 DIAGNOSIS — K59.00 CONSTIPATION: ICD-10-CM

## 2022-09-11 DIAGNOSIS — K59.00 CONSTIPATION, UNSPECIFIED CONSTIPATION TYPE: Primary | ICD-10-CM

## 2022-09-11 PROCEDURE — 25000003 PHARM REV CODE 250: Performed by: SPECIALIST

## 2022-09-11 PROCEDURE — 99283 EMERGENCY DEPT VISIT LOW MDM: CPT

## 2022-09-11 RX ORDER — ADHESIVE BANDAGE
10 BANDAGE TOPICAL
Status: COMPLETED | OUTPATIENT
Start: 2022-09-11 | End: 2022-09-11

## 2022-09-11 RX ORDER — HYOSCYAMINE SULFATE 0.125 MG
125 TABLET ORAL EVERY 4 HOURS PRN
Qty: 20 TABLET | Refills: 0 | Status: SHIPPED | OUTPATIENT
Start: 2022-09-11 | End: 2022-10-11

## 2022-09-11 RX ORDER — HYOSCYAMINE SULFATE 0.12 MG/1
0.12 TABLET SUBLINGUAL
Status: COMPLETED | OUTPATIENT
Start: 2022-09-11 | End: 2022-09-11

## 2022-09-11 RX ADMIN — MAGNESIUM HYDROXIDE 800 MG: 1200 LIQUID ORAL at 07:09

## 2022-09-11 RX ADMIN — HYOSCYAMINE SULFATE 0.12 MG: 0.12 TABLET ORAL at 08:09

## 2022-09-12 NOTE — DISCHARGE INSTRUCTIONS
Take Magnesium citrate as directed or you may increase your lactulose to 3-4 times a day as needed

## 2022-09-12 NOTE — ED PROVIDER NOTES
"Encounter Date: 9/11/2022       History     Chief Complaint   Patient presents with    Constipation     Pt has been constipated for about a month. Laxatives or enemas are not helping. Dialysis pt.      Patient reports constipation symptoms over the last month and possibly longer than that in which she has taken Ex-Lax and milk of magnesia without relief; he does report that he was prescribed lactulose but only took it 1 time a day and this did not help; no fever or chills      Review of patient's allergies indicates:   Allergen Reactions    Butorphanol Swelling     "it almost killed me"    Hydrocodone-acetaminophen Other (See Comments)     "They mess with my heart"    Povidone-iodine Shortness Of Breath and Rash     Past Medical History:   Diagnosis Date    BPH (benign prostatic hyperplasia)     Essential (primary) hypertension     Obesity, unspecified     PAD (peripheral artery disease)      Past Surgical History:   Procedure Laterality Date    INSERTION OF TUNNELED CENTRAL VENOUS HEMODIALYSIS CATHETER Right 6/27/2022    Procedure: INSERTION, CATHETER, HEMODIALYSIS, DUAL LUMEN;  Surgeon: Molly Garcia MD;  Location: [facility];  Service: General;  Laterality: Right;     History reviewed. No pertinent family history.  Social History     Tobacco Use    Smoking status: Never    Smokeless tobacco: Never   Substance Use Topics    Alcohol use: Never    Drug use: Never     Review of Systems   Constitutional: Negative.    Respiratory: Negative.     Cardiovascular: Negative.    Gastrointestinal:  Positive for constipation.   Genitourinary: Negative.    Musculoskeletal: Negative.    Neurological: Negative.      Physical Exam     Initial Vitals [09/11/22 1850]   BP Pulse Resp Temp SpO2   123/75 96 20 98.3 °F (36.8 °C) 97 %      MAP       --         Physical Exam    Nursing note and vitals reviewed.  Constitutional: He appears well-developed and well-nourished.   HENT:   Head: Normocephalic and atraumatic.   Eyes: EOM are " normal. Pupils are equal, round, and reactive to light.   Neck: Neck supple.   Normal range of motion.  Cardiovascular:  Normal rate, regular rhythm and normal heart sounds.           Pulmonary/Chest: Breath sounds normal.   Abdominal: Abdomen is soft. Bowel sounds are normal. He exhibits no distension. There is abdominal tenderness (Mild, generalized). There is no rebound and no guarding.   Musculoskeletal:         General: Normal range of motion.      Cervical back: Normal range of motion and neck supple.     Neurological: He is alert and oriented to person, place, and time.   Skin: Skin is warm and dry.       ED Course   Procedures  Labs Reviewed - No data to display       Imaging Results              X-Ray Abdomen AP 1 View (KUB) (Preliminary result)  Result time 09/11/22 19:29:36      ED Interpretation by Riaz Tomlinson MD (09/11/22 19:29:36, [facility], Emergency Medicine)    Moderate constipation                                     Medications   magnesium hydroxide 400 mg/5 ml suspension 800 mg (800 mg Oral Given 9/11/22 1940)   hyoscyamine ODT 0.125 mg (0.125 mg Oral Given 9/11/22 2003)                          Clinical Impression:   Final diagnoses:  [K59.00] Constipation  [K59.00] Constipation, unspecified constipation type (Primary)        ED Disposition Condition    Discharge Stable          ED Prescriptions       Medication Sig Dispense Start Date End Date Auth. Provider    hyoscyamine (ANASPAZ,LEVSIN) 0.125 mg Tab Take 1 tablet (125 mcg total) by mouth every 4 (four) hours as needed. 20 tablet 9/11/2022 10/11/2022 Riaz Tomlinson MD          Follow-up Information       Follow up With Specialties Details Why Contact Info    Messi Weston MD Family Medicine In 2 days As needed [address]  [place] [address]  [phone]      [facility] Emergency Medicine  As needed [address] [facility] [address] [phone]              Riaz Tomlinson MD  09/11/22 0271

## 2022-09-22 ENCOUNTER — HISTORICAL (OUTPATIENT)
Dept: ADMINISTRATIVE | Facility: HOSPITAL | Age: 62
End: 2022-09-22
Payer: MEDICARE

## 2022-10-07 ENCOUNTER — HOSPITAL ENCOUNTER (OUTPATIENT)
Dept: CARDIOLOGY | Facility: HOSPITAL | Age: 62
Discharge: HOME | End: 2022-10-07
Attending: SURGERY
Payer: MEDICARE

## 2022-10-07 ENCOUNTER — HOSPITAL ENCOUNTER (OUTPATIENT)
Dept: RADIOLOGY | Facility: HOSPITAL | Age: 62
Discharge: HOME | End: 2022-10-07
Attending: SURGERY
Payer: MEDICARE

## 2022-10-07 DIAGNOSIS — N18.9 CHRONIC RENAL FAILURE: ICD-10-CM

## 2022-10-07 DIAGNOSIS — Z01.818 PRE-OPERATIVE EXAM: Primary | ICD-10-CM

## 2022-10-07 DIAGNOSIS — Z01.812 PRE-OPERATIVE LABORATORY EXAMINATION: Primary | ICD-10-CM

## 2022-10-07 DIAGNOSIS — Z01.812 PRE-OPERATIVE LABORATORY EXAMINATION: ICD-10-CM

## 2022-10-07 DIAGNOSIS — Z01.818 PRE-OPERATIVE EXAM: ICD-10-CM

## 2022-10-07 PROCEDURE — 93041 RHYTHM ECG TRACING: CPT

## 2022-10-07 PROCEDURE — 93010 EKG 12-LEAD: ICD-10-PCS | Mod: ,,, | Performed by: INTERNAL MEDICINE

## 2022-10-07 PROCEDURE — 99900031 HC PATIENT EDUCATION (STAT)

## 2022-10-07 PROCEDURE — 71046 X-RAY EXAM CHEST 2 VIEWS: CPT | Mod: TC

## 2022-10-07 PROCEDURE — 93010 ELECTROCARDIOGRAM REPORT: CPT | Mod: ,,, | Performed by: INTERNAL MEDICINE

## 2022-10-07 PROCEDURE — 93005 ELECTROCARDIOGRAM TRACING: CPT

## 2022-10-10 RX ORDER — NIFEDIPINE 60 MG/1
TABLET, EXTENDED RELEASE ORAL
Qty: 90 TABLET | OUTPATIENT
Start: 2022-10-10

## 2022-12-12 PROCEDURE — 87070 CULTURE OTHR SPECIMN AEROBIC: CPT

## 2022-12-12 PROCEDURE — 87205 SMEAR GRAM STAIN: CPT

## 2022-12-12 PROCEDURE — 87075 CULTR BACTERIA EXCEPT BLOOD: CPT

## 2022-12-13 ENCOUNTER — LAB REQUISITION (OUTPATIENT)
Dept: LAB | Facility: HOSPITAL | Age: 62
End: 2022-12-13
Payer: MEDICARE

## 2022-12-13 DIAGNOSIS — L97.522 NON-PRESSURE CHRONIC ULCER OF OTHER PART OF LEFT FOOT WITH FAT LAYER EXPOSED: ICD-10-CM

## 2022-12-13 DIAGNOSIS — L08.89 OTHER SPECIFIED LOCAL INFECTIONS OF THE SKIN AND SUBCUTANEOUS TISSUE: ICD-10-CM

## 2022-12-13 DIAGNOSIS — E11.621 TYPE 2 DIABETES MELLITUS WITH FOOT ULCER (CODE): ICD-10-CM

## 2022-12-14 LAB
GRAM STN SPEC: NORMAL
GRAM STN SPEC: NORMAL

## 2022-12-16 LAB
BACTERIA SPEC ANAEROBE CULT: NORMAL
BACTERIA SPEC CULT: NORMAL

## 2023-01-27 ENCOUNTER — HOSPITAL ENCOUNTER (OUTPATIENT)
Dept: RADIOLOGY | Facility: HOSPITAL | Age: 63
Discharge: HOME | End: 2023-01-27
Attending: SURGERY
Payer: MEDICARE

## 2023-01-27 ENCOUNTER — HOSPITAL ENCOUNTER (OUTPATIENT)
Dept: CARDIOLOGY | Facility: HOSPITAL | Age: 63
Discharge: HOME | End: 2023-01-27
Attending: SURGERY
Payer: MEDICARE

## 2023-01-27 DIAGNOSIS — Z01.810 PRE-OPERATIVE CARDIOVASCULAR EXAMINATION, HIGH RISK SURGERY: ICD-10-CM

## 2023-01-27 DIAGNOSIS — Z01.810 PRE-OPERATIVE CARDIOVASCULAR EXAMINATION, HIGH RISK SURGERY: Primary | ICD-10-CM

## 2023-01-27 DIAGNOSIS — N18.6 END STAGE RENAL DISEASE: Primary | ICD-10-CM

## 2023-01-27 DIAGNOSIS — N18.6 END STAGE RENAL DISEASE: ICD-10-CM

## 2023-01-27 PROCEDURE — 93010 ELECTROCARDIOGRAM REPORT: CPT | Mod: ,,, | Performed by: INTERNAL MEDICINE

## 2023-01-27 PROCEDURE — 99900031 HC PATIENT EDUCATION (STAT)

## 2023-01-27 PROCEDURE — 93010 EKG 12-LEAD: ICD-10-PCS | Mod: ,,, | Performed by: INTERNAL MEDICINE

## 2023-01-27 PROCEDURE — 93041 RHYTHM ECG TRACING: CPT

## 2023-01-27 PROCEDURE — 93005 ELECTROCARDIOGRAM TRACING: CPT

## 2023-01-27 PROCEDURE — 71046 X-RAY EXAM CHEST 2 VIEWS: CPT | Mod: TC

## 2023-09-19 ENCOUNTER — TELEPHONE (OUTPATIENT)
Dept: CARDIOLOGY | Facility: HOSPITAL | Age: 63
End: 2023-09-19
Payer: MEDICARE

## 2023-09-19 NOTE — TELEPHONE ENCOUNTER
[facility] Bryan Archuleta referred Mr. Watkins to us for a second opinion of his dialysis access. Mr. Watkins is s/p Left upper arm AV Fistula creation on 2/3/23 with Dr. Lieberman and is still dysfunctional and unreliable for dialysis treatments. He previously had a Left RC Avf creation on 10/13/22 by Dr. Lieberman which ultimately failed. The center reports difficulty with cannulations/clotting, infiltrations, weak thrill and bruit. They have been able to use 1-1 cannulation sometimes but most treatments are with his tunneled catheter. Per MR, Mr. Watkins was initiated on HD in 2022. Contacted patient-reached his wife and spoke to her about the referral and recommended procedure for evaluation. She agreed with the plan and was willing to schedule procedure with me. She was given all pre op instructions and faxed information to HD center. [place]

## 2023-09-25 ENCOUNTER — HOSPITAL ENCOUNTER (OUTPATIENT)
Facility: HOSPITAL | Age: 63
Discharge: HOME | End: 2023-09-25
Attending: STUDENT IN AN ORGANIZED HEALTH CARE EDUCATION/TRAINING PROGRAM | Admitting: STUDENT IN AN ORGANIZED HEALTH CARE EDUCATION/TRAINING PROGRAM
Payer: MEDICARE

## 2023-09-25 VITALS
OXYGEN SATURATION: 98 % | TEMPERATURE: 98 F | SYSTOLIC BLOOD PRESSURE: 181 MMHG | HEIGHT: 70 IN | DIASTOLIC BLOOD PRESSURE: 85 MMHG | HEART RATE: 85 BPM | BODY MASS INDEX: 40.17 KG/M2 | WEIGHT: 280.63 LBS

## 2023-09-25 DIAGNOSIS — T82.590A MECHANICAL COMPLICATION OF ARTERIOVENOUS FISTULA SURGICALLY CREATED, INITIAL ENCOUNTER: ICD-10-CM

## 2023-09-25 LAB — POCT GLUCOSE: 101 MG/DL (ref 70–110)

## 2023-09-25 PROCEDURE — 99203 PR OFFICE/OUTPT VISIT, NEW, LEVL III, 30-44 MIN: ICD-10-PCS | Mod: 25,,, | Performed by: STUDENT IN AN ORGANIZED HEALTH CARE EDUCATION/TRAINING PROGRAM

## 2023-09-25 PROCEDURE — 63600175 PHARM REV CODE 636 W HCPCS: Performed by: STUDENT IN AN ORGANIZED HEALTH CARE EDUCATION/TRAINING PROGRAM

## 2023-09-25 PROCEDURE — C1725 CATH, TRANSLUMIN NON-LASER: HCPCS | Performed by: STUDENT IN AN ORGANIZED HEALTH CARE EDUCATION/TRAINING PROGRAM

## 2023-09-25 PROCEDURE — 99152 MOD SED SAME PHYS/QHP 5/>YRS: CPT | Mod: ,,, | Performed by: STUDENT IN AN ORGANIZED HEALTH CARE EDUCATION/TRAINING PROGRAM

## 2023-09-25 PROCEDURE — C1726 CATH, BAL DIL, NON-VASCULAR: HCPCS | Performed by: STUDENT IN AN ORGANIZED HEALTH CARE EDUCATION/TRAINING PROGRAM

## 2023-09-25 PROCEDURE — 99152 MOD SED SAME PHYS/QHP 5/>YRS: CPT | Performed by: STUDENT IN AN ORGANIZED HEALTH CARE EDUCATION/TRAINING PROGRAM

## 2023-09-25 PROCEDURE — 99203 OFFICE O/P NEW LOW 30 MIN: CPT | Mod: 25,,, | Performed by: STUDENT IN AN ORGANIZED HEALTH CARE EDUCATION/TRAINING PROGRAM

## 2023-09-25 PROCEDURE — C1874 STENT, COATED/COV W/DEL SYS: HCPCS | Performed by: STUDENT IN AN ORGANIZED HEALTH CARE EDUCATION/TRAINING PROGRAM

## 2023-09-25 PROCEDURE — 99152 PR MOD CONSCIOUS SEDATION, SAME PHYS, 5+ YRS, FIRST 15 MIN: ICD-10-PCS | Mod: ,,, | Performed by: STUDENT IN AN ORGANIZED HEALTH CARE EDUCATION/TRAINING PROGRAM

## 2023-09-25 PROCEDURE — C1769 GUIDE WIRE: HCPCS | Performed by: STUDENT IN AN ORGANIZED HEALTH CARE EDUCATION/TRAINING PROGRAM

## 2023-09-25 PROCEDURE — 99153 MOD SED SAME PHYS/QHP EA: CPT | Performed by: STUDENT IN AN ORGANIZED HEALTH CARE EDUCATION/TRAINING PROGRAM

## 2023-09-25 PROCEDURE — C1894 INTRO/SHEATH, NON-LASER: HCPCS | Performed by: STUDENT IN AN ORGANIZED HEALTH CARE EDUCATION/TRAINING PROGRAM

## 2023-09-25 PROCEDURE — 36903 INTRO CATH DIALYSIS CIRCUIT: CPT | Mod: LT,,, | Performed by: STUDENT IN AN ORGANIZED HEALTH CARE EDUCATION/TRAINING PROGRAM

## 2023-09-25 PROCEDURE — 25500020 PHARM REV CODE 255: Performed by: STUDENT IN AN ORGANIZED HEALTH CARE EDUCATION/TRAINING PROGRAM

## 2023-09-25 PROCEDURE — 25000003 PHARM REV CODE 250: Performed by: STUDENT IN AN ORGANIZED HEALTH CARE EDUCATION/TRAINING PROGRAM

## 2023-09-25 PROCEDURE — 36903 PR INTRO CATH, DIALYSIS CIRCUIT W/TRANSCATH PLCMNT, STENT: ICD-10-PCS | Mod: LT,,, | Performed by: STUDENT IN AN ORGANIZED HEALTH CARE EDUCATION/TRAINING PROGRAM

## 2023-09-25 PROCEDURE — 27201423 OPTIME MED/SURG SUP & DEVICES STERILE SUPPLY: Performed by: STUDENT IN AN ORGANIZED HEALTH CARE EDUCATION/TRAINING PROGRAM

## 2023-09-25 PROCEDURE — 36903 INTRO CATH DIALYSIS CIRCUIT: CPT | Mod: LT | Performed by: STUDENT IN AN ORGANIZED HEALTH CARE EDUCATION/TRAINING PROGRAM

## 2023-09-25 DEVICE — COVERA™ VASCULAR COVERED STENT  8 MM X 60 MM (80 CM DELIVERY CATHETER) (FLARED)
Type: IMPLANTABLE DEVICE | Site: ARM | Status: FUNCTIONAL
Brand: COVERA

## 2023-09-25 RX ORDER — MELATONIN 5 MG
5 CAPSULE ORAL DAILY PRN
COMMUNITY

## 2023-09-25 RX ORDER — MIDAZOLAM HYDROCHLORIDE 1 MG/ML
INJECTION INTRAMUSCULAR; INTRAVENOUS
Status: DISCONTINUED | OUTPATIENT
Start: 2023-09-25 | End: 2023-09-25 | Stop reason: HOSPADM

## 2023-09-25 RX ORDER — MULTIVITAMIN
1 TABLET ORAL DAILY
COMMUNITY

## 2023-09-25 RX ORDER — HEPARIN SODIUM 1000 [USP'U]/ML
INJECTION, SOLUTION INTRAVENOUS; SUBCUTANEOUS
Status: DISCONTINUED | OUTPATIENT
Start: 2023-09-25 | End: 2023-09-25 | Stop reason: HOSPADM

## 2023-09-25 RX ORDER — DOCUSATE SODIUM 100 MG/1
100 CAPSULE, LIQUID FILLED ORAL 2 TIMES DAILY
COMMUNITY

## 2023-09-25 RX ORDER — LIDOCAINE HYDROCHLORIDE 10 MG/ML
INJECTION INFILTRATION; PERINEURAL
Status: DISCONTINUED | OUTPATIENT
Start: 2023-09-25 | End: 2023-09-25 | Stop reason: HOSPADM

## 2023-09-25 RX ORDER — SODIUM CHLORIDE 0.9 % (FLUSH) 0.9 %
10 SYRINGE (ML) INJECTION
Status: DISCONTINUED | OUTPATIENT
Start: 2023-09-25 | End: 2023-09-25 | Stop reason: HOSPADM

## 2023-09-25 RX ORDER — DIPHENHYDRAMINE HYDROCHLORIDE 50 MG/ML
50 INJECTION INTRAMUSCULAR; INTRAVENOUS
Status: COMPLETED | OUTPATIENT
Start: 2023-09-25 | End: 2023-09-25

## 2023-09-25 RX ORDER — FENTANYL CITRATE 50 UG/ML
INJECTION, SOLUTION INTRAMUSCULAR; INTRAVENOUS
Status: DISCONTINUED | OUTPATIENT
Start: 2023-09-25 | End: 2023-09-25 | Stop reason: HOSPADM

## 2023-09-25 RX ADMIN — DIPHENHYDRAMINE HYDROCHLORIDE 50 MG: 50 INJECTION INTRAMUSCULAR; INTRAVENOUS at 10:09

## 2023-09-25 RX ADMIN — DEXAMETHASONE SODIUM PHOSPHATE 20 MG: 4 INJECTION, SOLUTION INTRA-ARTICULAR; INTRALESIONAL; INTRAMUSCULAR; INTRAVENOUS; SOFT TISSUE at 10:09

## 2023-09-25 NOTE — Clinical Note
The balloon was inflated with indeflator in the  . The balloon max pressure was 20 luis e for 55 seconds

## 2023-09-25 NOTE — Clinical Note
The balloon was inflated with indeflator in the  . The balloon max pressure was 14 luis e for 38 seconds

## 2023-09-25 NOTE — Clinical Note
The balloon was inflated with indeflator in the  . The balloon max pressure was 15 luis e for 14 seconds

## 2023-09-25 NOTE — Clinical Note
The balloon was inflated with indeflator in the  . The balloon max pressure was 18 luis e for 28 seconds

## 2023-09-25 NOTE — Clinical Note
The balloon was inflated with indeflator in the  . The balloon max pressure was 14 luis e for 120 seconds

## 2023-09-25 NOTE — Clinical Note
The balloon was inflated with indeflator in the  . The balloon max pressure was 15 luis e for 48 seconds

## 2023-09-25 NOTE — Clinical Note
The balloon was inflated with indeflator in the  . The balloon max pressure was 16 luis e for 50 seconds

## 2023-09-25 NOTE — PROCEDURES
INTERVENTIONAL NEPHROLOGY PROCEDURE NOTE: FISTULOGRAM/GRAFTOGRAM         Patient Name: Neno HAYES Dogirish CADET 1960    Procedure Date:    2023    Performing Physician:   Dr. Soliman    Access History: Pt is with ESRD on HD typically via right internal jugular (RIJ)/right chest wall (RCW) tunneled dialysis catheter (TDC) who presents today with immature L BC AVF.    Pre-Op Diagnosis: T82.590A Mechanical complication of surgically created arteriovenous shunt, initial encounter, N18.6 End Stage Renal Disease (ESRD)  Post-Op Diagnosis: Same    Procedure: Fistulogram with possible angioplasty and stent placement.    Indication: Nonfunctional/Dysfunctional/Malfunctioning HD access.    Informed Consent:  The patient was evaluated in the pre-operative area with assessment including the American Society of Anesthesia risk classification. The procedure is discussed in detail including risks, benefits alternatives and options and the patient agrees to proceed. Informed consent was obtained from the patient.     Maximum sterile barrier technique: The patient was prepped and draped using chlorhexidine prep and maximum sterile barrier technique.    Sedation Note:  Risks and benefits of sedation were reviewed with the patient or surrogate, including bleeding, infection, nausea, vomiting, dizziness, instability, damage to a nerve, damage to a blood vessel, cellulitis, reaction to medications, amnesia, loss of consciousness, respiratory arrest, cardiac arrest.     The patient received the following medications: Versed 2 mg IV and Fentanyl 100 mcg IV; patient did remain alert, responsive, and conversational throughout the procedure. I was personally responsible for supervising the administration of moderate sedation services during the procedure performed and I affirm all the guidelines and requirements described in the CPT 2023 section on moderate sedation were followed, including the use of an independent trained  observer who had no other duties during the procedure. The total face-to-face time was 55 minutes.    Procedure Steps:     The patient was prepped and draped in sterile fashion. Procedure ultrasound revealed patent vascular HD access.    Local anesthesia was administered by injecting 1% lidocaine at the intended site of cannulation. With use of live ultrasonographic visualization, the vascular access was successfully cannulated with a mini-stick needle aimed towards the outflow. After blood flashback was noted, the mini-stick wire was advanced through the needle. Via Seldinger technique, the needle was exchanged for the mini-stick sheath.     Angiogram was performed under fluoroscopy which revealed:  - Approximately 40% stenosis in the proximal cephalic vein (this encompasses a valve).  - Approximately 40% stenosis in the distal cephalic vein.  - Approximately 95% stenosis in the left cephalic arch.  - Otherwise central veins appeared patent without notable lesions.   * Retrograde angiogram showed patent inflow segment without notable lesions.    A 150 cm glide wire was advanced through the mini-stick sheath with the tip parked in the superior vena cava. A 6 Fr sheath was exchanged via Seldinger technique for the mini-stick sheath.    Angioplasty was performed at the left cephalic arch using a [employer]  7 mm x 60 mm balloon. Approximately 80% effacement was obtained at 10-15 ANI and was held for 1-5 seconds. Post-angioplasty angiogram revealed 100% residual stenosis (likely vasospasm). We administered heparin 2000 units IV to prevent thrombosis. We then swapped our balloon to a [employer] Chameleon 6 mm x 4 cm and placed it at the cephalic arch, inflating it to about 8-14 ANI and committing to prolonged angioplasty for 1 minute. Multiple angiogram revealed improved patency, however still revealed a 50 % stenosis.    Angioplasty was performed at the distal cephalic vein and proximal cephalic vein using  a [employer] Chameleon 6 mm x 40 mm balloon. Approximately 100% effacement was obtained at 15-20 ANI and was held for 1-5 seconds. Post-angioplasty angiogram revealed 15% residual stenosis.    We then decided to treat the above three lesions with a Chameleon 7 mm x 4 cm balloon. We found the cephalic arch to still have a 40% stenosis with vessel wall irregularities, and the cephalic vein portions to have some residual vasospastic lesions.    The cephalic arch became our concern, as leaving this area unattended may lead to future thrombosis. We decided that placing a stent-graft in this location was our best option. The sheath was upgraded to an 8 Fr size. A BD Covera Flared 8 mm x 60 mm stent-graft was placed at the left cephalic arch. The stent-graft was posted with a [employer] Chameleon 7 mm x 40 mm balloon. Post-angioplasty angiogram revealed appropriate deployment of stent-graft and complete resolution of stenosis.    We then treated the cephalic vein lesions with the Chameleon 7 mm x 4 cm balloon. We did not achieve much improvement, and therefore attempted use of a larger  8 mm x 4 cm balloon, inflating it to about 8-10 ANI and achieving 95% effacement. This caused vasospasm as seen on follow-up imaging. We therefore replaced our balloon with a  7 mm x 6 cm balloon and inflated it to 8-10 ANI, holding position for 30 seconds. This resolved the vasospasm, however left a residual stenosis of about 15-20%. Despite this, contrast seemed to flow much more evenly throughout the access. Pulsatility in the access was much improved.    Wire and sheath were removed and hemostasis was achieved using a purse-string stitch and light digital pressure at the site of cannulation.    ASSESSMENT/PLAN:  - Successful angioplasty of the cephalic arch, distal cephalic vein, and proximal cephalic vein; residual 15-20% stenosis remains in the distal/proximal cephalic vein.  - Successful stent-graft placement in the  cephalic arch.    EBL: 5 ml    Contrast: 70 ml    Complications: None    Post-op Instructions: The patient was given both verbal and written post-op instructions. If excessive bleeding at the site, they have been instructed to call their physician or proceed to a local emergency room.    Orders to the dialysis unit: OK to use access for dialysis needs.    Thank you for allowing me the opportunity in taking care of this patient. Please reach me with any questions.    Rodriog Soliman DO  Interventional Nephrology  Cell: [phone]

## 2023-09-25 NOTE — H&P
INTERVENTIONAL NEPHROLOGY HISTORY & PHYSICAL       Patient Name: Neno HAYES Dogirish CADET 1960    Date: 2023  Time: 10:15 AM         HPI: 63 y.o. male with ESRD on HD via right internal jugular (RIJ)/right chest wall (RCW) tunneled dialysis catheter (TDC) who presents with immature/unusable L BC AVF. His L BC AVF was created by Dr. Lieberman on 2/3/23. He has been evaluated by their office with an ultrasound and was given clearance for cannulation some time ago. The HD unit reports difficulty with cannulation and clot aspiration. Though they have been successful with 1:1 cannulation of his fistula and catheter use, they recently have only been using his catheter (per the pt). The pt has not followed-up with Dr. Lieberman since clearance for cannulation. He therefore has never had a follow-up fistulogram. The pt and his wife desire a second opinion. Pt is being prepared for fistulogram with possible intervention today.    [facility] Family is present. Risks and benefits of fistulogram with possible intervention and intravenous conscious sedation was reviewed with the patient. The patient agrees to proceed with the intended procedure. Consents for both intravenous conscious sedation and procedure were signed and placed within the chart.       Review of Systems:  General:  No fatigue  Skin: No rashes  HEENT: No vision changes  CVS: No CP  RS: No SOB  GIT: No abdominal pain  Extremities: No swelling  Neurological:  No focal weakness  Psych: No depression    Past Medical History:   Diagnosis Date    BPH (benign prostatic hyperplasia)     Essential (primary) hypertension     Obesity, unspecified     PAD (peripheral artery disease)       Past Surgical History:   Procedure Laterality Date    INSERTION OF TUNNELED CENTRAL VENOUS HEMODIALYSIS CATHETER Right 2022    Procedure: INSERTION, CATHETER, HEMODIALYSIS, DUAL LUMEN;  Surgeon: Molly Garcia MD;  Location: [facility];  Service:  "General;  Laterality: Right;      Review of patient's allergies indicates:   Allergen Reactions    Butorphanol Swelling     "it almost killed me"    Hydrocodone-acetaminophen Other (See Comments)     "They mess with my heart"    Povidone-iodine Shortness Of Breath and Rash      Social History     Tobacco Use    Smoking status: Never    Smokeless tobacco: Never   Substance Use Topics    Alcohol use: Never    Drug use: Never      No family history on file.      Current Facility-Administered Medications:     dexAMETHasone (DECADRON) 20 mg in sodium chloride 0.9% 50 mL IVPB, 20 mg, Intravenous, Once Pre-Op, Hasan, Rodrigo, DO    diphenhydrAMINE injection 50 mg, 50 mg, Intravenous, Once Pre-Op, Hasan, Rodrigo, DO    sodium chloride 0.9% flush 10 mL, 10 mL, Intravenous, PRN, Hasan, Rodrigo, DO    Vital Signs:  Temp:  [98.1 °F (36.7 °C)] 98.1 °F (36.7 °C)  Pulse:  [69] 69  SpO2:  [97 %] 97 %  BP: (153)/(75) 153/75     Physical Exam:  General: NAD  HEENT: NC/AT, EOMI  CVS: RRR.  RS: breathing easily.  Abdominal: Soft, NT/ND.  Extremities: No edema b/l LE  Skin: No rash, no lesions.  Neurological: No focal deficits.  Psych: Normal affect  Dialysis Access: left brachiocephalic arteriovenous fistula with moderate-severe pulsatility. Not visually appreciable. Thrill palpated. RIJ/RCW TDC without signs of infection/bleeding.    Results:    Lab Results   Component Value Date     01/27/2023     10/13/2022    K 4.2 01/27/2023    K 3.0 (L) 10/13/2022     10/13/2022    CO2 25 01/27/2023    CO2 26 10/13/2022    BUN 21.3 01/27/2023    BUN 16 10/13/2022    CREATININE 2.23 (H) 01/27/2023    CREATININE 2.94 (H) 10/13/2022     Lab Results   Component Value Date    WBC 9.5 01/27/2023    HGB 12.2 (L) 01/27/2023     01/27/2023    MCV 91.1 01/27/2023       Assessment and Plan:      ESRD on HD via right internal jugular (RIJ)/right chest wall (RCW) tunneled dialysis catheter (TDC).  Mechanical complication of AVF.  Pt with " ESRD on HD via right internal jugular (RIJ)/right chest wall (RCW) tunneled dialysis catheter (TDC) who presents today with immature L BC AVF. The pt is being prepared for fistulogram with possible intervention today.  - Consents obtained and placed within chart.  - Will proceed in cath lab setting today.    Please feel free to reach me with any questions.    Rodrigo Soliman,   Interventional Nephrology  Cell: [phone]

## 2023-09-25 NOTE — Clinical Note
An angiogram was performed Lesion documentation: POST STENT PLACEMENT. The left cephalic arch stenosis was resloved.

## 2023-09-25 NOTE — Clinical Note
The balloon was inflated with indeflator in the  . The balloon max pressure was 14 luis e for 48 seconds

## 2023-09-25 NOTE — Clinical Note
The balloon was inflated with indeflator in the  . The balloon max pressure was 14 luis e for 20 seconds

## 2023-09-25 NOTE — Clinical Note
The balloon was inflated with indeflator in the  . The balloon max pressure was 14 luis e for 16 seconds

## 2023-09-25 NOTE — Clinical Note
The balloon was inflated with indeflator in the  . The balloon max pressure was 10 luis e for 16 seconds

## 2023-09-25 NOTE — DISCHARGE SUMMARY
INTERVENTIONAL NEPHROLOGY DISCHARGE SUMMARY         Patient Name: Neno Watkins   1960    Procedure Date: 2023      In brief, Mr. Watkins underwent fistulogram of his L BC AVF 2/ inability to cannulate and clot aspiration. This was in second opinion. Pre-procedure ultrasound showed the proximal, middle, and distal fistula with adequate diameter, usually in the 7-8 mm size. Unfortunately, the fistula in these locations did appear deep, often times in excess of 6.5 mm from the skin, and mostly > 7 mm from skin. For this reason, the pt would benefit from revision of his access (superficialization).    Despite this, the access still appeared to be pulsatile. Angiogram revealed the following lesions:  A proximal cephalic vein stenosis that encompassed a valve. We were able to improve this area moderately, however left a 15-20% residual stenosis. This area did have recurrent vasospasm.  A distal cephalic vein stenosis that encompassed a valve. We were able to improve this area moderately, however left a 15-20% residual stenosis. This area did have recurrent vasospasm.  A severe, nearly occlusive lesion at the cephalic arch. After initial intervention, this area did seem to have severe vasospasm that completed occluded the flow in the fistula. We were able to restore flow with prolonged angioplasty, however, doing so did reveal abnormal vessel wall after angioplasty. As we became concerned for re-stenosis and thrombosis, it was ultimately decided to place a stent-graft. This area had several different diameters to the vessel wall, making it somewhat difficult to choose a stent size. A Flared Covera 8 mm x 6 cm stent-graft was used, and was well-approximated to the vessel as intended.     I believe this fistula is certainly salvageable. It will require at the very least a superficialization. It may require repeat fistulogram as these stenotic areas did remain relatively stubborn.    I have  recommended to the patient and wife to seek  from his previous vascular surgeon in regards to today's findings and intervention, as this would provide the best possible continuity of care. If for some reason they desire to continue follow-up with our service, the pt will require a clinic visit with formal ultrasound and possible plans for a superficialization vs repeat fistulogram followed by superficialization.     NOTE: Patient was noted to have an iodine allergy (swelling). He was premedicated with Benadryl 50 mg IV and Dexamethasone 20 mg IV.    Pre-Op Diagnosis: T82.590A Mechanical complication of surgically created arteriovenous shunt, initial encounter, N18.6 End Stage Renal Disease (ESRD)  Post-Op Diagnosis: Same.    Discharge Instructions/Recommendations:  - Continue use of tunneled dialysis catheter. Pt should be referred to his original vascular surgeon for potential superficialization and/or repeat fistulogram.  - Pt may be discharged after successful monitoring in post-op area.  - Pt may resume home medications.    Thank you for allowing me the opportunity in taking care of this patient. Please reach me with any questions.    Rodrigo Soliman DO  Interventional Nephrology  Cell: [phone]

## 2023-09-25 NOTE — Clinical Note
The balloon was inflated with indeflator in the  . The balloon max pressure was 14 luis e for 46 seconds

## 2023-09-25 NOTE — Clinical Note
The left radial and left brachial was prepped. The site was prepped with ChloraPrep. The site was clipped. The patient was draped.

## 2024-02-12 ENCOUNTER — HOSPITAL ENCOUNTER (EMERGENCY)
Facility: HOSPITAL | Age: 64
Discharge: HOME | End: 2024-02-12
Attending: EMERGENCY MEDICINE
Payer: MEDICARE

## 2024-02-12 VITALS
OXYGEN SATURATION: 98 % | HEIGHT: 70 IN | RESPIRATION RATE: 20 BRPM | SYSTOLIC BLOOD PRESSURE: 154 MMHG | DIASTOLIC BLOOD PRESSURE: 71 MMHG | BODY MASS INDEX: 40.66 KG/M2 | TEMPERATURE: 98 F | HEART RATE: 84 BPM | WEIGHT: 284 LBS

## 2024-02-12 DIAGNOSIS — M54.31 SCIATICA OF RIGHT SIDE: Primary | ICD-10-CM

## 2024-02-12 PROCEDURE — 99284 EMERGENCY DEPT VISIT MOD MDM: CPT | Mod: 25

## 2024-02-12 PROCEDURE — 63600175 PHARM REV CODE 636 W HCPCS

## 2024-02-12 PROCEDURE — 96372 THER/PROPH/DIAG INJ SC/IM: CPT

## 2024-02-12 RX ORDER — DIAZEPAM 2 MG/1
2 TABLET ORAL EVERY 6 HOURS PRN
Qty: 12 TABLET | Refills: 0 | Status: SHIPPED | OUTPATIENT
Start: 2024-02-12 | End: 2024-03-13

## 2024-02-12 RX ORDER — DIAZEPAM 10 MG/2ML
5 INJECTION INTRAMUSCULAR
Status: COMPLETED | OUTPATIENT
Start: 2024-02-12 | End: 2024-02-12

## 2024-02-12 RX ADMIN — DIAZEPAM 5 MG: 5 INJECTION, SOLUTION INTRAMUSCULAR; INTRAVENOUS at 12:02

## 2024-02-12 NOTE — ED PROVIDER NOTES
"Encounter Date: 2/12/2024       History     Chief Complaint   Patient presents with    Hip Pain     R hip pain for a few years states pain increase after his dialysis treatments on Tues, Thurs and Sat -      Right hip pain    The history is provided by the patient. No  was used.     Review of patient's allergies indicates:   Allergen Reactions    Butorphanol Swelling     "it almost killed me"    Hydrocodone-acetaminophen Other (See Comments)     "They mess with my heart"    Povidone-iodine Shortness Of Breath and Rash     Past Medical History:   Diagnosis Date    BPH (benign prostatic hyperplasia)     Essential (primary) hypertension     Obesity, unspecified     PAD (peripheral artery disease)      Past Surgical History:   Procedure Laterality Date    FISTULOGRAM Left 9/25/2023    Procedure: Fistulogram;  Surgeon: Rodrigo Soliman DO;  Location: [facility] CATH LAB;  Service: Nephrology;  Laterality: Left;    INSERTION OF TUNNELED CENTRAL VENOUS HEMODIALYSIS CATHETER Right 6/27/2022    Procedure: INSERTION, CATHETER, HEMODIALYSIS, DUAL LUMEN;  Surgeon: Molly Garcia MD;  Location: [facility];  Service: General;  Laterality: Right;     No family history on file.  Social History     Tobacco Use    Smoking status: Never    Smokeless tobacco: Never   Substance Use Topics    Alcohol use: Never    Drug use: Never     Review of Systems   Constitutional: Negative.    HENT: Negative.     Eyes: Negative.    Respiratory: Negative.     Cardiovascular: Negative.    Gastrointestinal: Negative.    Endocrine: Negative.    Genitourinary: Negative.    Musculoskeletal:  Positive for back pain, gait problem and myalgias.   Skin: Negative.    Allergic/Immunologic: Negative.    Hematological: Negative.    Psychiatric/Behavioral: Negative.     All other systems reviewed and are negative.      Physical Exam     Initial Vitals [02/12/24 1151]   BP Pulse Resp Temp SpO2   (!) 154/71 84 20 97.9 °F (36.6 °C) 98 %      MAP       --    "      Physical Exam    Nursing note and vitals reviewed.  Constitutional: He appears well-developed and well-nourished.   HENT:   Head: Normocephalic and atraumatic.   Right Ear: External ear normal.   Left Ear: External ear normal.   Nose: Nose normal.   Mouth/Throat: Oropharynx is clear and moist.   Eyes: Conjunctivae and EOM are normal. Pupils are equal, round, and reactive to light.   Neck: Neck supple.   Normal range of motion.  Cardiovascular:  Normal rate, regular rhythm, normal heart sounds and intact distal pulses.           Pulmonary/Chest: Breath sounds normal.   Abdominal: Abdomen is soft. Bowel sounds are normal.   Musculoskeletal:         General: Tenderness present.      Cervical back: Normal range of motion and neck supple.     Neurological: He is alert and oriented to person, place, and time. GCS score is 15. GCS eye subscore is 4. GCS verbal subscore is 5. GCS motor subscore is 6.   Skin: Skin is warm and dry. Capillary refill takes less than 2 seconds.   Psychiatric: He has a normal mood and affect. His behavior is normal. Judgment normal.         ED Course   Procedures  Labs Reviewed - No data to display       Imaging Results    None          Medications   diazePAM injection 5 mg (5 mg Intramuscular Given 2/12/24 1223)     Medical Decision Making  Awake alert and oriented no acute distress 63-year-old male presents to the ER complaints of right side pain with sciatica radiating down right leg.  Patient a dialysis patient who sits a lot..  Vital signs stable.  Skin pink warm dry patient is able to ambulate to treatment area with assistance of a walker.   There is no surface trauma.  No abrasions scars ecchymosis.  Soft tissue swelling.  Positive muscle tenderness to palpation on the right.  Spasming with movement.  No Pt step-off or deformity of the bony cervical thoracic or L-spine.  No CVA tenderness to percussion no SI notch tenderness no saddle anesthesia.  Patient is able to stand erect.   Normal flexion extension lateral bending and rotation with some limitation / complaint of pain bilateral leg lifts normal on the leg limited on the right.  Right  leg resistance reduces pain that radiates to the right hip and right lower back and buttock.  Good dorsalis pedal pulses and posterior tibial pulses.  Skin warm dry and intact all other review of systems within limits GCS of 15 cranial nerves 2-12 intact neuro.    Differential diagnoses:  Sciatica, Lumbar strain, musculoskeletal injury, obesity    Pain improved with medication    Risk  Prescription drug management.                                      Clinical Impression:  Final diagnoses:  [M54.31] Sciatica of right side (Primary)          ED Disposition Condition    Discharge Stable          ED Prescriptions       Medication Sig Dispense Start Date End Date Auth. Provider    diazePAM (VALIUM) 2 MG tablet Take 1 tablet (2 mg total) by mouth every 6 (six) hours as needed for Anxiety (spasm). 12 tablet 2/12/2024 3/13/2024 Madelyn Cobos NP          Follow-up Information       Follow up With Specialties Details Why Contact Info    Messi Weston MD Family Medicine  As needed [address] [place] [address]  [phone]               Madelyn Cobos NP  02/12/24 [id number]

## 2024-02-12 NOTE — DISCHARGE INSTRUCTIONS
Patient will be discharged home.  Instructed to sits in dialysis chair on a hard cushion apply cool compresses.  Take medication as needed.  Do not combine with the gabapentin.  Discuss further interventions with nephrologist

## 2024-09-05 ENCOUNTER — HOSPITAL ENCOUNTER (INPATIENT)
Facility: HOSPITAL | Age: 64
LOS: 5 days | Discharge: HOME HEALTH | DRG: 602 | End: 2024-09-10
Attending: STUDENT IN AN ORGANIZED HEALTH CARE EDUCATION/TRAINING PROGRAM | Admitting: INTERNAL MEDICINE
Payer: MEDICARE

## 2024-09-05 DIAGNOSIS — Z99.2 HEMODIALYSIS STATUS: Chronic | ICD-10-CM

## 2024-09-05 DIAGNOSIS — N18.6 ESRD (END STAGE RENAL DISEASE) ON DIALYSIS: ICD-10-CM

## 2024-09-05 DIAGNOSIS — N17.9 AKI (ACUTE KIDNEY INJURY): Chronic | ICD-10-CM

## 2024-09-05 DIAGNOSIS — Z99.2 ESRD (END STAGE RENAL DISEASE) ON DIALYSIS: ICD-10-CM

## 2024-09-05 DIAGNOSIS — N40.0 BENIGN PROSTATIC HYPERPLASIA, UNSPECIFIED WHETHER LOWER URINARY TRACT SYMPTOMS PRESENT: Primary | Chronic | ICD-10-CM

## 2024-09-05 DIAGNOSIS — M79.89 RIGHT LEG SWELLING: ICD-10-CM

## 2024-09-05 DIAGNOSIS — R07.9 CHEST PAIN: ICD-10-CM

## 2024-09-05 LAB
ALBUMIN SERPL-MCNC: 4.1 G/DL (ref 3.4–4.8)
ALBUMIN/GLOB SERPL: 0.9 RATIO (ref 1.1–2)
ALP SERPL-CCNC: 122 UNIT/L (ref 40–150)
ALT SERPL-CCNC: 27 UNIT/L (ref 0–55)
ANION GAP SERPL CALC-SCNC: 9 MEQ/L
AST SERPL-CCNC: 30 UNIT/L (ref 5–34)
BASOPHILS # BLD AUTO: 0.05 X10(3)/MCL
BASOPHILS NFR BLD AUTO: 0.4 %
BILIRUB SERPL-MCNC: 1.1 MG/DL
BUN SERPL-MCNC: 12.2 MG/DL (ref 8.4–25.7)
CALCIUM SERPL-MCNC: 9.9 MG/DL (ref 8.8–10)
CHLORIDE SERPL-SCNC: 98 MMOL/L (ref 98–107)
CO2 SERPL-SCNC: 30 MMOL/L (ref 23–31)
CREAT SERPL-MCNC: 1.89 MG/DL (ref 0.73–1.18)
CREAT/UREA NIT SERPL: 6
EOSINOPHIL # BLD AUTO: 0.09 X10(3)/MCL (ref 0–0.9)
EOSINOPHIL NFR BLD AUTO: 0.7 %
ERYTHROCYTE [DISTWIDTH] IN BLOOD BY AUTOMATED COUNT: 12.3 % (ref 11.5–17)
GFR SERPLBLD CREATININE-BSD FMLA CKD-EPI: 39 ML/MIN/1.73/M2
GLOBULIN SER-MCNC: 4.7 GM/DL (ref 2.4–3.5)
GLUCOSE SERPL-MCNC: 123 MG/DL (ref 82–115)
HCT VFR BLD AUTO: 40.3 % (ref 42–52)
HGB BLD-MCNC: 13.3 G/DL (ref 14–18)
IMM GRANULOCYTES # BLD AUTO: 0.06 X10(3)/MCL (ref 0–0.04)
IMM GRANULOCYTES NFR BLD AUTO: 0.5 %
LACTATE SERPL-SCNC: 1.6 MMOL/L (ref 0.5–2.2)
LYMPHOCYTES # BLD AUTO: 1.82 X10(3)/MCL (ref 0.6–4.6)
LYMPHOCYTES NFR BLD AUTO: 15.1 %
MCH RBC QN AUTO: 31.4 PG (ref 27–31)
MCHC RBC AUTO-ENTMCNC: 33 G/DL (ref 33–36)
MCV RBC AUTO: 95.3 FL (ref 80–94)
MONOCYTES # BLD AUTO: 0.73 X10(3)/MCL (ref 0.1–1.3)
MONOCYTES NFR BLD AUTO: 6.1 %
NEUTROPHILS # BLD AUTO: 9.29 X10(3)/MCL (ref 2.1–9.2)
NEUTROPHILS NFR BLD AUTO: 77.2 %
NRBC BLD AUTO-RTO: 0 %
PLATELET # BLD AUTO: 221 X10(3)/MCL (ref 130–400)
PMV BLD AUTO: 9.6 FL (ref 7.4–10.4)
POTASSIUM SERPL-SCNC: 4.2 MMOL/L (ref 3.5–5.1)
PROT SERPL-MCNC: 8.8 GM/DL (ref 5.8–7.6)
RBC # BLD AUTO: 4.23 X10(6)/MCL (ref 4.7–6.1)
SODIUM SERPL-SCNC: 137 MMOL/L (ref 136–145)
WBC # BLD AUTO: 12.04 X10(3)/MCL (ref 4.5–11.5)

## 2024-09-05 PROCEDURE — 87040 BLOOD CULTURE FOR BACTERIA: CPT | Performed by: PHYSICIAN ASSISTANT

## 2024-09-05 PROCEDURE — 63600175 PHARM REV CODE 636 W HCPCS: Performed by: STUDENT IN AN ORGANIZED HEALTH CARE EDUCATION/TRAINING PROGRAM

## 2024-09-05 PROCEDURE — 83605 ASSAY OF LACTIC ACID: CPT | Performed by: PHYSICIAN ASSISTANT

## 2024-09-05 PROCEDURE — 85025 COMPLETE CBC W/AUTO DIFF WBC: CPT | Performed by: PHYSICIAN ASSISTANT

## 2024-09-05 PROCEDURE — 80053 COMPREHEN METABOLIC PANEL: CPT | Performed by: PHYSICIAN ASSISTANT

## 2024-09-05 PROCEDURE — 11000001 HC ACUTE MED/SURG PRIVATE ROOM

## 2024-09-05 PROCEDURE — 96365 THER/PROPH/DIAG IV INF INIT: CPT

## 2024-09-05 PROCEDURE — 25000003 PHARM REV CODE 250: Performed by: STUDENT IN AN ORGANIZED HEALTH CARE EDUCATION/TRAINING PROGRAM

## 2024-09-05 PROCEDURE — 99285 EMERGENCY DEPT VISIT HI MDM: CPT | Mod: 25

## 2024-09-05 RX ORDER — VANCOMYCIN HCL IN 5 % DEXTROSE 1G/250ML
1000 PLASTIC BAG, INJECTION (ML) INTRAVENOUS
Status: DISCONTINUED | OUTPATIENT
Start: 2024-09-05 | End: 2024-09-05

## 2024-09-05 RX ADMIN — VANCOMYCIN HYDROCHLORIDE 2500 MG: 1.25 INJECTION, POWDER, LYOPHILIZED, FOR SOLUTION INTRAVENOUS at 11:09

## 2024-09-05 NOTE — FIRST PROVIDER EVALUATION
"Medical screening examination initiated.  I have conducted a focused provider triage encounter, findings are as follows:  Chief Complaint   Patient presents with    Leg Swelling     Sent from dialysis for RLE edema for the last few weeks. Sent for eval of poss R leg cellulitis. Denies fever at home.        Brief history of present illness:  64 y.o. male presents to the E.D. with c/o right lower extremity edema for several weeks. He goes to dialysis on Tu/thur/sat and ran his full run today. He has not had any fever. He has been on antibiotics per the patient with his dialysis .     Vitals:    09/05/24 1251   BP: (!) 126/56   Pulse: 98   Resp: 18   Temp: 98.7 °F (37.1 °C)   TempSrc: Oral   SpO2: 96%   Weight: 127 kg (280 lb)   Height: 5' 10" (1.778 m)       Pertinent physical exam:  Awake, Alert, Oriented, Non labored breathing, swelling/lymphedema/cellulitis of right lower extremity     Brief workup plan:  labs, imaging     Preliminary workup initiated; this workup will be continued and followed by the physician or advanced practice provider that is assigned to the patient when roomed.  "

## 2024-09-06 LAB
ALBUMIN SERPL-MCNC: 3.1 G/DL (ref 3.4–4.8)
ALBUMIN/GLOB SERPL: 0.9 RATIO (ref 1.1–2)
ALP SERPL-CCNC: 97 UNIT/L (ref 40–150)
ALT SERPL-CCNC: 19 UNIT/L (ref 0–55)
ANION GAP SERPL CALC-SCNC: 8 MEQ/L
AST SERPL-CCNC: 22 UNIT/L (ref 5–34)
BASOPHILS # BLD AUTO: 0.02 X10(3)/MCL
BASOPHILS NFR BLD AUTO: 0.2 %
BILIRUB SERPL-MCNC: 0.9 MG/DL
BUN SERPL-MCNC: 21 MG/DL (ref 8.4–25.7)
CALCIUM SERPL-MCNC: 8.6 MG/DL (ref 8.8–10)
CHLORIDE SERPL-SCNC: 101 MMOL/L (ref 98–107)
CO2 SERPL-SCNC: 26 MMOL/L (ref 23–31)
CREAT SERPL-MCNC: 2.27 MG/DL (ref 0.73–1.18)
CREAT/UREA NIT SERPL: 9
EOSINOPHIL # BLD AUTO: 0.06 X10(3)/MCL (ref 0–0.9)
EOSINOPHIL NFR BLD AUTO: 0.6 %
ERYTHROCYTE [DISTWIDTH] IN BLOOD BY AUTOMATED COUNT: 12.2 % (ref 11.5–17)
GFR SERPLBLD CREATININE-BSD FMLA CKD-EPI: 31 ML/MIN/1.73/M2
GLOBULIN SER-MCNC: 3.5 GM/DL (ref 2.4–3.5)
GLUCOSE SERPL-MCNC: 168 MG/DL (ref 82–115)
HCT VFR BLD AUTO: 35.9 % (ref 42–52)
HGB BLD-MCNC: 12.1 G/DL (ref 14–18)
IMM GRANULOCYTES # BLD AUTO: 0.05 X10(3)/MCL (ref 0–0.04)
IMM GRANULOCYTES NFR BLD AUTO: 0.5 %
LYMPHOCYTES # BLD AUTO: 2.45 X10(3)/MCL (ref 0.6–4.6)
LYMPHOCYTES NFR BLD AUTO: 23 %
MCH RBC QN AUTO: 31.3 PG (ref 27–31)
MCHC RBC AUTO-ENTMCNC: 33.7 G/DL (ref 33–36)
MCV RBC AUTO: 93 FL (ref 80–94)
MONOCYTES # BLD AUTO: 0.89 X10(3)/MCL (ref 0.1–1.3)
MONOCYTES NFR BLD AUTO: 8.3 %
NEUTROPHILS # BLD AUTO: 7.2 X10(3)/MCL (ref 2.1–9.2)
NEUTROPHILS NFR BLD AUTO: 67.4 %
NRBC BLD AUTO-RTO: 0 %
PLATELET # BLD AUTO: 183 X10(3)/MCL (ref 130–400)
PMV BLD AUTO: 9.5 FL (ref 7.4–10.4)
POCT GLUCOSE: 153 MG/DL (ref 70–110)
POTASSIUM SERPL-SCNC: 3.8 MMOL/L (ref 3.5–5.1)
PROT SERPL-MCNC: 6.6 GM/DL (ref 5.8–7.6)
RBC # BLD AUTO: 3.86 X10(6)/MCL (ref 4.7–6.1)
SODIUM SERPL-SCNC: 135 MMOL/L (ref 136–145)
WBC # BLD AUTO: 10.67 X10(3)/MCL (ref 4.5–11.5)

## 2024-09-06 PROCEDURE — 85025 COMPLETE CBC W/AUTO DIFF WBC: CPT | Performed by: NURSE PRACTITIONER

## 2024-09-06 PROCEDURE — 97166 OT EVAL MOD COMPLEX 45 MIN: CPT

## 2024-09-06 PROCEDURE — 63600175 PHARM REV CODE 636 W HCPCS: Performed by: NURSE PRACTITIONER

## 2024-09-06 PROCEDURE — 63600175 PHARM REV CODE 636 W HCPCS: Performed by: INTERNAL MEDICINE

## 2024-09-06 PROCEDURE — 97162 PT EVAL MOD COMPLEX 30 MIN: CPT

## 2024-09-06 PROCEDURE — 63600175 PHARM REV CODE 636 W HCPCS: Performed by: PHYSICIAN ASSISTANT

## 2024-09-06 PROCEDURE — 25000003 PHARM REV CODE 250: Performed by: PHYSICIAN ASSISTANT

## 2024-09-06 PROCEDURE — 80053 COMPREHEN METABOLIC PANEL: CPT | Performed by: NURSE PRACTITIONER

## 2024-09-06 PROCEDURE — 25000003 PHARM REV CODE 250: Performed by: INTERNAL MEDICINE

## 2024-09-06 PROCEDURE — 11000001 HC ACUTE MED/SURG PRIVATE ROOM

## 2024-09-06 RX ORDER — INSULIN ASPART 100 [IU]/ML
0-5 INJECTION, SOLUTION INTRAVENOUS; SUBCUTANEOUS
Status: DISCONTINUED | OUTPATIENT
Start: 2024-09-06 | End: 2024-09-10 | Stop reason: HOSPADM

## 2024-09-06 RX ORDER — ACETAMINOPHEN 325 MG/1
650 TABLET ORAL EVERY 4 HOURS PRN
Status: DISCONTINUED | OUTPATIENT
Start: 2024-09-06 | End: 2024-09-10 | Stop reason: HOSPADM

## 2024-09-06 RX ORDER — NALOXONE HCL 0.4 MG/ML
0.02 VIAL (ML) INJECTION
Status: DISCONTINUED | OUTPATIENT
Start: 2024-09-06 | End: 2024-09-10 | Stop reason: HOSPADM

## 2024-09-06 RX ORDER — GLUCAGON 1 MG
1 KIT INJECTION
Status: DISCONTINUED | OUTPATIENT
Start: 2024-09-06 | End: 2024-09-10 | Stop reason: HOSPADM

## 2024-09-06 RX ORDER — IBUPROFEN 200 MG
24 TABLET ORAL
Status: DISCONTINUED | OUTPATIENT
Start: 2024-09-06 | End: 2024-09-10 | Stop reason: HOSPADM

## 2024-09-06 RX ORDER — PROCHLORPERAZINE EDISYLATE 5 MG/ML
5 INJECTION INTRAMUSCULAR; INTRAVENOUS EVERY 6 HOURS PRN
Status: DISCONTINUED | OUTPATIENT
Start: 2024-09-06 | End: 2024-09-10 | Stop reason: HOSPADM

## 2024-09-06 RX ORDER — ACETAMINOPHEN 500 MG
1000 TABLET ORAL EVERY 6 HOURS PRN
Status: DISCONTINUED | OUTPATIENT
Start: 2024-09-06 | End: 2024-09-10 | Stop reason: HOSPADM

## 2024-09-06 RX ORDER — IBUPROFEN 200 MG
16 TABLET ORAL
Status: DISCONTINUED | OUTPATIENT
Start: 2024-09-06 | End: 2024-09-10 | Stop reason: HOSPADM

## 2024-09-06 RX ORDER — SODIUM CHLORIDE 0.9 % (FLUSH) 0.9 %
10 SYRINGE (ML) INJECTION
Status: DISCONTINUED | OUTPATIENT
Start: 2024-09-06 | End: 2024-09-10 | Stop reason: HOSPADM

## 2024-09-06 RX ORDER — ONDANSETRON HYDROCHLORIDE 2 MG/ML
4 INJECTION, SOLUTION INTRAVENOUS EVERY 4 HOURS PRN
Status: DISCONTINUED | OUTPATIENT
Start: 2024-09-06 | End: 2024-09-10 | Stop reason: HOSPADM

## 2024-09-06 RX ORDER — MUPIROCIN 20 MG/G
OINTMENT TOPICAL 2 TIMES DAILY
Status: DISCONTINUED | OUTPATIENT
Start: 2024-09-08 | End: 2024-09-10 | Stop reason: HOSPADM

## 2024-09-06 RX ORDER — HEPARIN SODIUM 5000 [USP'U]/ML
5000 INJECTION, SOLUTION INTRAVENOUS; SUBCUTANEOUS EVERY 12 HOURS
Status: DISCONTINUED | OUTPATIENT
Start: 2024-09-06 | End: 2024-09-10 | Stop reason: HOSPADM

## 2024-09-06 RX ORDER — HYDRALAZINE HYDROCHLORIDE 20 MG/ML
10 INJECTION INTRAMUSCULAR; INTRAVENOUS EVERY 6 HOURS PRN
Status: DISCONTINUED | OUTPATIENT
Start: 2024-09-06 | End: 2024-09-10 | Stop reason: HOSPADM

## 2024-09-06 RX ADMIN — CEFEPIME 1 G: 1 INJECTION, POWDER, FOR SOLUTION INTRAMUSCULAR; INTRAVENOUS at 01:09

## 2024-09-06 RX ADMIN — CEFEPIME 1 G: 1 INJECTION, POWDER, FOR SOLUTION INTRAMUSCULAR; INTRAVENOUS at 02:09

## 2024-09-06 RX ADMIN — HEPARIN SODIUM 5000 UNITS: 5000 INJECTION, SOLUTION INTRAVENOUS; SUBCUTANEOUS at 09:09

## 2024-09-06 NOTE — PLAN OF CARE
CM met with pt at bedside for initial discharge assessment. Pt is  with three adult children. Pt has no Living Will or POA. Pt had no identified needs.   09/06/24 1310   Discharge Assessment   Assessment Type Discharge Planning Assessment   Confirmed/corrected address, phone number and insurance Yes   Confirmed Demographics Correct on Facesheet   Source of Information patient   When was your last doctors appointment?   (DR Weston, [facility])   Communicated MATT with patient/caregiver Date not available/Unable to determine   People in Home child(rose), adult;spouse   Do you expect to return to your current living situation? Yes   Do you have help at home or someone to help you manage your care at home? Yes   Who are your caregiver(s) and their phone number(s)? Annmarie Watkins at [phone]   Prior to hospitilization cognitive status: Unable to Assess   Current cognitive status: Alert/Oriented   Walking or Climbing Stairs Difficulty yes   Walking or Climbing Stairs ambulation difficulty, assistance 1 person   Dressing/Bathing Difficulty yes   Dressing/Bathing bathing difficulty, assistance 1 person   Home Layout Able to live on 1st floor   Equipment Currently Used at Home walker, rolling;blood pressure machine;glucometer;cane, straight;shower chair   Readmission within 30 days? No   Patient currently being followed by outpatient case management? No   Do you currently have service(s) that help you manage your care at home? No   Do you take prescription medications? Yes   Do you have prescription coverage? Yes   Do you have any problems affording any of your prescribed medications? No   Is the patient taking medications as prescribed? yes   Who is going to help you get home at discharge? pending   How do you get to doctors appointments? family or friend will provide;car, drives self   Are you on dialysis? Yes   Dialysis Name and Scheduled days MWF in [facility]   Do you take coumadin? No   Discharge  Plan A   (TBD)   DME Needed Upon Discharge    (tbd)   Transition of Care Barriers   (none at this time)   Physical Activity   On average, how many days per week do you engage in moderate to strenuous exercise (like a brisk walk)? 0 days   On average, how many minutes do you engage in exercise at this level? 0 min   Financial Resource Strain   How hard is it for you to pay for the very basics like food, housing, medical care, and heating? Not hard   Housing Stability   In the last 12 months, was there a time when you were not able to pay the mortgage or rent on time? N   At any time in the past 12 months, were you homeless or living in a shelter (including now)? N   Transportation Needs   Has the lack of transportation kept you from medical appointments, meetings, work or from getting things needed for daily living? No   Food Insecurity   Within the past 12 months, you worried that your food would run out before you got the money to buy more. Never true   Within the past 12 months, the food you bought just didn't last and you didn't have money to get more. Never true   Stress   Do you feel stress - tense, restless, nervous, or anxious, or unable to sleep at night because your mind is troubled all the time - these days? Only a littl   Social Isolation   How often do you feel lonely or isolated from those around you?  Never   Alcohol Use   Q1: How often do you have a drink containing alcohol? Never   Q2: How many drinks containing alcohol do you have on a typical day when you are drinking? None   Q3: How often do you have six or more drinks on one occasion? Never   Utilities   In the past 12 months has the electric, gas, oil, or water company threatened to shut off services in your home? No   Health Literacy   How often do you need to have someone help you when you read instructions, pamphlets, or other written material from your doctor or pharmacy? Never   OTHER   Name(s) of People in Home Annmarie June, Spouse

## 2024-09-06 NOTE — PROGRESS NOTES
"Pharmacokinetic Initial Assessment: IV Vancomycin    Assessment/Plan:    Initiate intravenous vancomycin with loading dose of 2500 mg once with subsequent doses when random concentrations are less than 20 mcg/mL  Desired empiric serum trough concentration is 15 to 20 mcg/mL  Draw vancomycin random level on 09/07 at 0430.  Pharmacy will continue to follow and monitor vancomycin.      Please contact pharmacy at extension [phone] with any questions regarding this assessment.     Thank you for the consult,   Martin Nassary       Patient brief summary:  Neno Watkins is a 64 y.o. male initiated on antimicrobial therapy with IV Vancomycin for treatment of suspected skin & soft tissue infection    Drug Allergies:   Review of patient's allergies indicates:   Allergen Reactions    Butorphanol Swelling     "it almost killed me"    Hydrocodone-acetaminophen Other (See Comments)     "They mess with my heart"    Povidone-iodine Shortness Of Breath and Rash       Actual Body Weight:   127kg    Renal Function:   Estimated Creatinine Clearance: 52.1 mL/min (A) (based on SCr of 1.89 mg/dL (H)).,     Dialysis Method (if applicable):  ESRD T.Th, Sat    CBC (last 72 hours):  Recent Labs   Lab Result Units 09/05/24  1324   WBC x10(3)/mcL 12.04*   Hgb g/dL 13.3*   Hct % 40.3*   Platelet x10(3)/mcL 221   Mono % % 6.1   Eos % % 0.7   Basophil % % 0.4       Metabolic Panel (last 72 hours):  Recent Labs   Lab Result Units 09/05/24  1324   Sodium mmol/L 137   Potassium mmol/L 4.2   Chloride mmol/L 98   CO2 mmol/L 30   Glucose mg/dL 123*   Blood Urea Nitrogen mg/dL 12.2   Creatinine mg/dL 1.89*   Albumin g/dL 4.1   Bilirubin Total mg/dL 1.1   ALP unit/L 122   AST unit/L 30   ALT unit/L 27       Drug levels (last 3 results):  No results for input(s): "VANCOMYCINRA", "VANCORANDOM", "VANCOMYCINPE", "VANCOPEAK", "VANCOMYCINTR", "VANCOTROUGH" in the last 72 hours.    Microbiologic Results:  Microbiology Results (last 7 days)       Procedure Component " Value Units Date/Time    Blood Culture #2 **CANNOT BE ORDERED STAT** [[id number]] Collected: 09/05/24 1327    Order Status: Resulted Specimen: Blood Updated: 09/05/24 1343    Blood Culture #1 **CANNOT BE ORDERED STAT** [[id number]] Collected: 09/05/24 1327    Order Status: Resulted Specimen: Blood Updated: 09/05/24 1343

## 2024-09-06 NOTE — PLAN OF CARE
Problem: Occupational Therapy  Goal: Occupational Therapy Goal  Description: Goals to be met by: 10/6/24     Patient will increase functional independence with ADLs by performing:    UE Dressing with Modified [place].  LE Dressing with Modified [place] using AE prn.  Grooming while standing at sink with Modified [place].  Toileting from toilet with Modified [place] for hygiene and clothing management.   Toilet transfer to toilet with Modified [place].    Outcome: Progressing

## 2024-09-06 NOTE — ED NOTES
Attempted to call Annmarie (wife) for med rec. Wasn't able to reach her due to calls not going through.

## 2024-09-06 NOTE — ED NOTES
"Assumed care, patient standing at bedside, trying to use urinal, aaox4, gcs 15. Patient still in own pajamas, told we will dress in a gown "I'd rather stay in my own clothes", performed head to toe skin assessment and let patient keep pajamas on. VS stable, afebrile, RA, denies any complaints aside from BLE swelling with more discomfort to right foot.  "

## 2024-09-06 NOTE — PLAN OF CARE
Problem: Physical Therapy  Goal: Physical Therapy Goal  Description: Goals to be met by: 10/6/24     Patient will increase functional independence with mobility by performin. Supine to sit with Modified [place]  2. Sit to stand transfer with Modified [place]  3. Gait  x 100 feet with Modified [place] using Rolling Walker.     Outcome: Progressing

## 2024-09-06 NOTE — PROGRESS NOTES
[facility] [address] Surg  Wound Care    Patient Name:  Neno Watkins   MRN:  [id number]  Date: 9/6/2024  Diagnosis: <principal problem not specified>    History:     Past Medical History:   Diagnosis Date    BPH (benign prostatic hyperplasia)     Essential (primary) hypertension     Obesity, unspecified     PAD (peripheral artery disease)        Social History     Socioeconomic History    Marital status:    Tobacco Use    Smoking status: Never    Smokeless tobacco: Never   Substance and Sexual Activity    Alcohol use: Never    Drug use: Never       Precautions:     Allergies as of 09/05/2024 - Reviewed 09/05/2024   Allergen Reaction Noted    Butorphanol Swelling 06/21/2022    Hydrocodone-acetaminophen Other (See Comments) 06/21/2022    Povidone-iodine Shortness Of Breath and Rash 06/21/2022       WOC Assessment Details/Treatment      09/06/24 1125   WOCN Assessment   Visit Date 09/06/24   Visit Time 1125   Consult Type New   [facility] Wound   Intervention chart review;assessed;orders        Wound 09/06/24 1030 Other (comment) Right anterior Leg   Date First Assessed/Time First Assessed: 09/06/24 1030   Present on Original Admission: Yes  Primary Wound Type: (c) Other (comment)  Side: Right  Orientation: anterior  Location: Leg   Wound Image     Dressing Appearance No dressing   Drainage Amount None   Appearance Intact;Red;Pink;Other (see comments)  (cobblestone appearance, chronic skin changes)   Tissue loss description Not applicable   Black (%), Wound Tissue Color 0 %   Red (%), Wound Tissue Color 100 %   Yellow (%), Wound Tissue Color 0 %   Periwound Area Edematous;Dry;Intact;Redness;Swelling   Wound Edges Other (see comments)   Wound Length (cm) 0 cm   Wound Width (cm) 0 cm   Wound Depth (cm) 0 cm   Wound Volume (cm^3) 0 cm^3   Wound Surface Area (cm^2) 0 cm^2   Care Cleansed with:;Sterile normal saline;Other (see comments)  (left jarek)     WOCN consulted for right lower extremity  blisters. Introduced myself and Jona RN to patient with reason for visit. Pt states having these chronic skin changes for some time. Appearance of lower legs are cobblestone. No open areas present. Photos obtained for EMR. Treatment recommendations include: Cleanse right lower extremity with hibiclens , apply aquaphor to leg daily. Education given to pt to elevate leg while sitting in bedside chair. Answered all questions for pt. Nursing to continue to treatment recommendations. Will follow up.   09/06/2024

## 2024-09-06 NOTE — ED PROVIDER NOTES
"Encounter Date: 9/5/2024       History     Chief Complaint   Patient presents with    Leg Swelling     Sent from dialysis for RLE edema for the last few weeks. Sent for eval of poss R leg cellulitis. Denies fever at home.      64-year-old male with ESRD dialysis tues/thurs/sat -full run today- with history of peripheral artery disease, BPH, hypertension presents to the emergency room with right lower extremity swelling and edema for the last few months.  Patient states it is now getting increasingly red swollen and tender for the last 3 days.  Patient is sent by dialysis physician Dr. Alanis for further evaluation.  Patient denies any nausea, vomiting, fever, chills, chest pain, or difficulty breathing. Given 3doses of unknown antibiotic from dialysis clinic pta and over the last 3 months.    The history is provided by the patient. No  was used.     Review of patient's allergies indicates:   Allergen Reactions    Butorphanol Swelling     "it almost killed me"    Hydrocodone-acetaminophen Other (See Comments)     "They mess with my heart"    Povidone-iodine Shortness Of Breath and Rash     Past Medical History:   Diagnosis Date    BPH (benign prostatic hyperplasia)     Essential (primary) hypertension     Obesity, unspecified     PAD (peripheral artery disease)      Past Surgical History:   Procedure Laterality Date    FISTULOGRAM Left 9/25/2023    Procedure: Fistulogram;  Surgeon: Rodrigo Soliman DO;  Location: [facility] CATH LAB;  Service: Nephrology;  Laterality: Left;    INSERTION OF TUNNELED CENTRAL VENOUS HEMODIALYSIS CATHETER Right 6/27/2022    Procedure: INSERTION, CATHETER, HEMODIALYSIS, DUAL LUMEN;  Surgeon: Molly Garcia MD;  Location: [facility] OR;  Service: General;  Laterality: Right;     No family history on file.  Social History     Tobacco Use    Smoking status: Never    Smokeless tobacco: Never   Substance Use Topics    Alcohol use: Never    Drug use: Never     Review of Systems "   Constitutional: Negative.  Negative for activity change, appetite change, diaphoresis, fatigue and fever.   HENT:  Negative for rhinorrhea and sinus pressure.    Eyes: Negative.    Respiratory: Negative.  Negative for chest tightness.    Cardiovascular:  Positive for leg swelling. Negative for chest pain and palpitations.   Gastrointestinal: Negative.  Negative for abdominal distention and abdominal pain.   Endocrine: Negative.    Genitourinary: Negative.    Musculoskeletal: Negative.  Negative for arthralgias.   Allergic/Immunologic: Negative.    Neurological:  Negative for dizziness and headaches.   Hematological: Negative.    Psychiatric/Behavioral: Negative.         Physical Exam     Initial Vitals [09/05/24 1251]   BP Pulse Resp Temp SpO2   (!) 126/56 98 18 98.7 °F (37.1 °C) 96 %      MAP       --         Physical Exam    Nursing note and vitals reviewed.  Constitutional: He appears well-developed and well-nourished. He is cooperative. No distress.   HENT:   Head: Normocephalic and atraumatic. Not macrocephalic.   Right Ear: Tympanic membrane normal. Tympanic membrane is not erythematous.   Left Ear: Tympanic membrane normal. Tympanic membrane is not erythematous.   Nose: No mucosal edema. Right sinus exhibits no frontal sinus tenderness. Left sinus exhibits no frontal sinus tenderness.   Mouth/Throat: Mucous membranes are normal. No oropharyngeal exudate.   Eyes: Conjunctivae and EOM are normal. Pupils are equal, round, and reactive to light. Right eye exhibits no discharge. Left eye exhibits no discharge.   Neck: Neck supple. No tracheal deviation present.   Normal range of motion.  Cardiovascular:  Normal rate and regular rhythm.           Pulses:       Dorsalis pedis pulses are 1+ on the right side and 1+ on the left side.   Pulmonary/Chest: Effort normal and breath sounds normal. No respiratory distress. He has no decreased breath sounds. He has no wheezes.   Abdominal: Abdomen is soft. Bowel sounds are  normal.   Musculoskeletal:         General: Normal range of motion.      Cervical back: Normal range of motion and neck supple.     Lymphadenopathy:        Head (right side): No submental adenopathy present.        Head (left side): No submental adenopathy present.     He has no cervical adenopathy.   Neurological: He is alert and oriented to person, place, and time. He has normal strength. No cranial nerve deficit. GCS score is 15. GCS eye subscore is 4. GCS verbal subscore is 5. GCS motor subscore is 6.   Skin: Skin is warm.   Psychiatric: He has a normal mood and affect. His behavior is normal. Judgment and thought content normal.         ED Course   Procedures  Labs Reviewed   COMPREHENSIVE METABOLIC PANEL - Abnormal       Result Value    Sodium 137      Potassium 4.2      Chloride 98      CO2 30      Glucose 123 (*)     Blood Urea Nitrogen 12.2      Creatinine 1.89 (*)     Calcium 9.9      Protein Total 8.8 (*)     Albumin 4.1      Globulin 4.7 (*)     Albumin/Globulin Ratio 0.9 (*)     Bilirubin Total 1.1            ALT 27      AST 30      eGFR 39      Anion Gap 9.0      BUN/Creatinine Ratio 6     CBC WITH DIFFERENTIAL - Abnormal    WBC 12.04 (*)     RBC 4.23 (*)     Hgb 13.3 (*)     Hct 40.3 (*)     MCV 95.3 (*)     MCH 31.4 (*)     MCHC 33.0      RDW 12.3      Platelet 221      MPV 9.6      Neut % 77.2      Lymph % 15.1      Mono % 6.1      Eos % 0.7      Basophil % 0.4      Lymph # 1.82      Neut # 9.29 (*)     Mono # 0.73      Eos # 0.09      Baso # 0.05      IG# 0.06 (*)     IG% 0.5      NRBC% 0.0     LACTIC ACID, PLASMA - Normal    Lactic Acid Level 1.6     BLOOD CULTURE O[facility]   BLOOD CULTURE [facility]   CBC W/ AUTO DIFFERENTIAL    Narrative:     The following orders were created for panel order CBC auto differential.  Procedure                               Abnormality         Status                     ---------                               -----------         ------                     CBC with  Differential[[id number]]       Abnormal            Final result                 Please view results for these tests on the individual orders.          Imaging Results              X-Ray Tibia Fibula 2 View Right (In process)                      Medications   ceFEPIme (MAXIPIME) 1 g in D5W 100 mL IVPB (MB+) (has no administration in time range)   vancomycin (VANCOCIN) 2,500 mg in D5W 500 mL IVPB (2,500 mg Intravenous New Bag 9/5/24 2331)     Medical Decision Making  64-year-old male with ESRD dialysis tues/thurs/sat -full run today- with history of peripheral artery disease, BPH, hypertension presents to the emergency room with right lower extremity swelling and edema for the last few months.  Patient states it is now getting increasingly red swollen and tender for the last 3 days.  Patient is sent by dialysis physician Dr. Alanis for further evaluation.  Patient denies any nausea, vomiting, fever, chills, chest pain, or difficulty breathing. Given 3doses of unknown antibiotic from dialysis clinic pta?    Problems Addressed:  Right leg swelling: chronic illness or injury with exacerbation, progression, or side effects of treatment     Details: Differential diagnosis included but not limited to:  DVT, cellulitis, vasculitis, dependent edema, sepsis    Admitted to hospital medicine for cellulitis    Amount and/or Complexity of Data Reviewed  Labs: ordered. Decision-making details documented in ED Course.  Radiology: ordered. Decision-making details documented in ED Course.    Risk  Prescription drug management.                         ED Course as of 09/05/24 2357   Thu Sep 05, 2024   2139 WBC(!): 12.04 [ST]   2140 Lactic Acid Level: 1.6 [ST]   2303 Hospital medicine paged [ST]      ED Course User Index  [ST] Yesi Malik PA                           Clinical Impression:  Final diagnoses:  [M79.89] Right leg swelling          ED Disposition Condition    Admit         This note was typed partially using voice  recognition software.  Please be reminded that not all corrections/addendums to grammar may have been made prior to closing of this chart.          Yesi Malik PA  09/05/24 5009

## 2024-09-06 NOTE — PT/OT/SLP EVAL
"Physical Therapy Evaluation    Patient Name:  Neno Watkins   MRN:  [id number]    Recommendations:     Discharge therapy intensity: Moderate Intensity Therapy (TBD pending progress)   Discharge Equipment Recommendations: none   Barriers to discharge: Impaired mobility and Ongoing medical needs    Assessment:     Neno Watkins is a 64 y.o. male admitted with a medical diagnosis of RLE Ceullulitis, Leukocytosis.  He presents with the following impairments/functional limitations: weakness, impaired endurance, impaired functional mobility, decreased coordination, decreased lower extremity function, pain, decreased safety awareness.    Pt required CGA for all mobility today and increased time. 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 He ambulates with RW and needs assist for self care/dressing. Will update discharge therapy recs as appropriate.    Rehab Prognosis: Good; patient would benefit from acute skilled PT services to address these deficits and reach maximum level of function.    Recent Surgery: * No surgery found *      Plan:     During this hospitalization, patient would benefit from acute PT services 5 x/week to address the identified rehab impairments via gait training, therapeutic activities, therapeutic exercises and progress toward the following goals:    Plan of Care Expires:  10/06/24    Subjective     Chief Complaint: R leg pain  Patient/Family Comments/goals:   Pain/Comfort:  Location - Side 1: Right  Location 1: leg    Patients cultural, spiritual, Denominational conflicts given the current situation:      Living Environment:  Pt lives with his wife and daughter in [facility] with ramped entrance  Prior to admission, patients level of function was mod I mobility with RW, assist for self care/ADLs.  Equipment used at home: walker, rolling.  DME " "owned (not currently used): none.  Upon discharge, patient will have assistance from wife & dtr?.    Objective:     Communicated with nurse prior to session.  Patient found HOB elevated with peripheral IV  upon PT entry to room.    General Precautions: Standard, fall  Orthopedic Precautions:N/A   Braces: N/A  Respiratory Status: Room air  Blood Pressure: NT      Exams:  RLE ROM: WFL  RLE Strength: WFL however generalized weakness with functional mobility  LLE ROM: WFL  LLE Strength: WFL however generalized weakness with functional mobility  Skin integrity:  R LE swelling & redness      Functional Mobility:  Bed Mobility:     Supine to Sit: contact guard assistance  Transfers:     Sit to Stand:  contact guard assistance with rolling walker. Cues for hand placement, pt refused stating "I'm independent, I know how to do it"  Gait: ~18 ft in room with RW CGA. WBOS, toe out, short steps bilaterally      AM-PAC 6 CLICK MOBILITY  Total Score:18       Treatment & Education:  Education Provided:  Role and goals of PT, transfer training, bed mobility, gait training, balance training, safety awareness, assistive device, strengthening exercises, and importance of participating in PT to return to PLOF.        Patient left up in chair with all lines intact and call button in reach.    GOALS:   Multidisciplinary Problems       Physical Therapy Goals          Problem: Physical Therapy    Goal Priority Disciplines Outcome Goal Variances Interventions   Physical Therapy Goal     PT, PT/OT Progressing     Description: Goals to be met by: 10/6/24     Patient will increase functional independence with mobility by performin. Supine to sit with Modified [place]  2. Sit to stand transfer with Modified [place]  3. Gait  x 100 feet with Modified [place] using Rolling Walker.                          History:     Past Medical History:   Diagnosis Date    BPH (benign prostatic hyperplasia)     Essential (primary) " hypertension     Obesity, unspecified     PAD (peripheral artery disease)        Past Surgical History:   Procedure Laterality Date    FISTULOGRAM Left 9/25/2023    Procedure: Fistulogram;  Surgeon: Rodrigo Soliman DO;  Location: [facility] CATH LAB;  Service: Nephrology;  Laterality: Left;    INSERTION OF TUNNELED CENTRAL VENOUS HEMODIALYSIS CATHETER Right 6/27/2022    Procedure: INSERTION, CATHETER, HEMODIALYSIS, DUAL LUMEN;  Surgeon: Molly Garcia MD;  Location: [facility];  Service: General;  Laterality: Right;       Time Tracking:     PT Received On: 09/06/24  PT Start Time: 1106     PT Stop Time: 1126  PT Total Time (min): 20 min     Billable Minutes: Evaluation 20 09/06/2024

## 2024-09-06 NOTE — CONSULTS
"Nephrology Consult    Reason for Consult:     ESRD on HD    HPI:  Neno Watkins is a 64 y.o. male pt of Dr. Alanis, with hx of ESRD on HD in [facility] via Left AVF, PAD, BPH, HTN. He presented to the ED yesterday with a complaint of right leg swelling/cellulitis. He did have a full HD run yesterday 9/5. States they are going to switch him to MWF, but has been on TTS until now. He also has a right tunneled cath he states has been there for three years but they are using the left AVF without issue.     His right leg has been red, swollen and blistered for "months." He has no edema to the LLE, no complaints SOB, chest pain, fever, n/v, diarrhea, abd pain, dysuria, etc.    PCP:  Messi Weston MD    Review of patient's allergies indicates:   Allergen Reactions    Butorphanol Swelling     "it almost killed me"    Hydrocodone-acetaminophen Other (See Comments)     "They mess with my heart"    Povidone-iodine Shortness Of Breath and Rash       Current Facility-Administered Medications   Medication Dose Route Frequency Provider Last Rate Last Admin    acetaminophen tablet 1,000 mg  1,000 mg Oral Q6H PRN Ayden Leggett, FERDINANDP        acetaminophen tablet 650 mg  650 mg Oral Q4H PRN Ayden Leggett FNP        ceFEPIme (MAXIPIME) 1 g in D5W 100 mL IVPB (MB+)  1 g Intravenous Q12H Nikhil Dubon MD        dextrose 10% bolus 125 mL 125 mL  12.5 g Intravenous PRN Ayden Leggett FNP        dextrose 10% bolus 250 mL 250 mL  25 g Intravenous PRN Ayden Leggett, FERDINANDP        glucagon (human recombinant) injection 1 mg  1 mg Intramuscular PRN Ayden Leggett, FERDINANDP        glucose chewable tablet 16 g  16 g Oral PRN Ayden Leggett, FERDINANDP        glucose chewable tablet 24 g  24 g Oral PRN Ayden Leggett, FERDINANDP        heparin (porcine) injection 5,000 Units  5,000 Units Subcutaneous Q12H Ayden Leggett FNP        insulin aspart U-100 injection 0-5 Units  0-5 Units " Subcutaneous QID (AC + HS) PRN Ayden Leggett FNP        [START ON 9/8/2024] mupirocin 2 % ointment   Nasal BID Nikhil Dubon MD        naloxone 0.4 mg/mL injection 0.02 mg  0.02 mg Intravenous PRN Ayden Leggett FNP        ondansetron injection 4 mg  4 mg Intravenous Q4H PRN Ayden Leggett FNP        prochlorperazine injection Soln 5 mg  5 mg Intravenous Q6H PRN Ayden Leggett FNP        sodium chloride 0.9% flush 10 mL  10 mL Intravenous PRN Ayden Leggett FNP        vancomycin - pharmacy to dose   Intravenous pharmacy to manage frequency Ayden Leggett FNP         Current Outpatient Medications   Medication Sig Dispense Refill    atorvastatin (LIPITOR) 80 MG tablet Take 80 mg by mouth every evening.      carvediloL (COREG) 6.25 MG tablet Take 6.25 mg by mouth 2 (two) times daily.      diazePAM (VALIUM) 2 MG tablet Take 1 tablet (2 mg total) by mouth every 6 (six) hours as needed for Anxiety (spasm). 12 tablet 0    docusate sodium (COLACE) 100 MG capsule Take 100 mg by mouth 2 (two) times daily.      gabapentin (NEURONTIN) 300 MG capsule Take 1 capsule by mouth 2 (two) times a day.      hyoscyamine (ANASPAZ,LEVSIN) 0.125 mg Tab Take 1 tablet (125 mcg total) by mouth every 4 (four) hours as needed. 20 tablet 0    ibuprofen (MOTRIN IB ORAL) Take 375 mg by mouth daily as needed (as needed).      melatonin 5 mg Cap Take 5 mg by mouth daily as needed (as needed).      multivitamin (THERAGRAN) per tablet Take 1 tablet by mouth once daily.      NIFEdipine (PROCARDIA-XL) 60 MG (OSM) 24 hr tablet TAKE 1 TABLET(60 MG) BY MOUTH EVERY DAY 30 tablet 0    tamsulosin (FLOMAX) 0.4 mg Cap Take 1 capsule by mouth once daily.      TOUJEO SOLOSTAR U-300 INSULIN 300 unit/mL (1.5 mL) InPn pen Inject 32 Units into the skin once daily at 6am.       (Not in a hospital admission)      Past Medical History:  Past Medical History:   Diagnosis Date    BPH (benign prostatic hyperplasia)      Essential (primary) hypertension     Obesity, unspecified     PAD (peripheral artery disease)        Past Surgical History:  Past Surgical History:   Procedure Laterality Date    FISTULOGRAM Left 9/25/2023    Procedure: Fistulogram;  Surgeon: Rodrigo Soliman DO;  Location: [facility] CATH LAB;  Service: Nephrology;  Laterality: Left;    INSERTION OF TUNNELED CENTRAL VENOUS HEMODIALYSIS CATHETER Right 6/27/2022    Procedure: INSERTION, CATHETER, HEMODIALYSIS, DUAL LUMEN;  Surgeon: Molly Garcia MD;  Location: [facility];  Service: General;  Laterality: Right;       Family History:  No family history on file.    Social History:  Social History     Tobacco Use    Smoking status: Never    Smokeless tobacco: Never   Substance Use Topics    Alcohol use: Never          Review of Systems:    Review of Systems   Constitutional:  Negative for appetite change, fever and unexpected weight change.   Respiratory:  Negative for cough and shortness of breath.    Cardiovascular:  Positive for leg swelling. Negative for chest pain.   Gastrointestinal:  Negative for abdominal pain, diarrhea and vomiting.   Genitourinary:  Negative for decreased urine volume, difficulty urinating, dysuria, enuresis and hematuria.   Musculoskeletal:  Negative for back pain and myalgias.   Skin:  Positive for color change. Negative for pallor.   Neurological:  Negative for speech difficulty and weakness.   All other systems reviewed and are negative.      Objective:    VITAL SIGNS: 24 HR MIN & MAX LAST  Temp  Min: 98.7 °F (37.1 °C)  Max: 98.7 °F (37.1 °C) 98.7 °F (37.1 °C)  BP  Min: 126/56  Max: 191/103 (!) 142/71  Pulse  Min: 81  Max: 101 87  Resp  Min: 12  Max: 20 20  SpO2  Min: 95 %  Max: 100 % 97 %      Intake/Output Summary (Last 24 hours) at 9/6/2024 0837  Last data filed at 9/6/2024 0222  Gross per 24 hour   Intake 600 ml   Output --   Net 600 ml       Physical Exam  Vitals and nursing note reviewed.   HENT:      Head: Normocephalic and atraumatic.       Ears:      Comments: Hard of hearing  Eyes:      General: No scleral icterus.     Extraocular Movements: Extraocular movements intact.   Cardiovascular:      Rate and Rhythm: Normal rate and regular rhythm.      Heart sounds: Normal heart sounds.      Comments: L AVF with palpable thrill, audible bruit  Pulmonary:      Effort: Pulmonary effort is normal. No respiratory distress.      Breath sounds: Normal breath sounds.   Chest:      Comments: Right chest wall tunneled cath  Abdominal:      General: Abdomen is flat. Bowel sounds are normal. There is no distension.      Palpations: Abdomen is soft.      Tenderness: There is no abdominal tenderness.   Musculoskeletal:         General: No swelling or deformity. Normal range of motion.      Right lower leg: Edema present.      Left lower leg: No edema.      Comments: Right lower leg significant enlarged compared to left, no pitting edema. Skin on BLE have chronic changes. RLE with erythema, warmth, and significant blistering   Skin:     General: Skin is warm and dry.   Neurological:      General: No focal deficit present.      Mental Status: He is alert and oriented to person, place, and time.   Psychiatric:         Mood and Affect: Mood normal.         Behavior: Behavior normal.         Recent Results (from the past 48 hour(s))   Comprehensive metabolic panel    Collection Time: 09/05/24  1:24 PM   Result Value Ref Range    Sodium 137 136 - 145 mmol/L    Potassium 4.2 3.5 - 5.1 mmol/L    Chloride 98 98 - 107 mmol/L    CO2 30 23 - 31 mmol/L    Glucose 123 (H) 82 - 115 mg/dL    Blood Urea Nitrogen 12.2 8.4 - 25.7 mg/dL    Creatinine 1.89 (H) 0.73 - 1.18 mg/dL    Calcium 9.9 8.8 - 10.0 mg/dL    Protein Total 8.8 (H) 5.8 - 7.6 gm/dL    Albumin 4.1 3.4 - 4.8 g/dL    Globulin 4.7 (H) 2.4 - 3.5 gm/dL    Albumin/Globulin Ratio 0.9 (L) 1.1 - 2.0 ratio    Bilirubin Total 1.1 <=1.5 mg/dL     40 - 150 unit/L    ALT 27 0 - 55 unit/L    AST 30 5 - 34 unit/L    eGFR 39  mL/min/1.73/m2    Anion Gap 9.0 mEq/L    BUN/Creatinine Ratio 6    Lactic acid, plasma    Collection Time: 09/05/24  1:24 PM   Result Value Ref Range    Lactic Acid Level 1.6 0.5 - 2.2 mmol/L   CBC with Differential    Collection Time: 09/05/24  1:24 PM   Result Value Ref Range    WBC 12.04 (H) 4.50 - 11.50 x10(3)/mcL    RBC 4.23 (L) 4.70 - 6.10 x10(6)/mcL    Hgb 13.3 (L) 14.0 - 18.0 g/dL    Hct 40.3 (L) 42.0 - 52.0 %    MCV 95.3 (H) 80.0 - 94.0 fL    MCH 31.4 (H) 27.0 - 31.0 pg    MCHC 33.0 33.0 - 36.0 g/dL    RDW 12.3 11.5 - 17.0 %    Platelet 221 130 - 400 x10(3)/mcL    MPV 9.6 7.4 - 10.4 fL    Neut % 77.2 %    Lymph % 15.1 %    Mono % 6.1 %    Eos % 0.7 %    Basophil % 0.4 %    Lymph # 1.82 0.6 - 4.6 x10(3)/mcL    Neut # 9.29 (H) 2.1 - 9.2 x10(3)/mcL    Mono # 0.73 0.1 - 1.3 x10(3)/mcL    Eos # 0.09 0 - 0.9 x10(3)/mcL    Baso # 0.05 <=0.2 x10(3)/mcL    IG# 0.06 (H) 0 - 0.04 x10(3)/mcL    IG% 0.5 %    NRBC% 0.0 %   Comprehensive Metabolic Panel (CMP)    Collection Time: 09/06/24  4:14 AM   Result Value Ref Range    Sodium 135 (L) 136 - 145 mmol/L    Potassium 3.8 3.5 - 5.1 mmol/L    Chloride 101 98 - 107 mmol/L    CO2 26 23 - 31 mmol/L    Glucose 168 (H) 82 - 115 mg/dL    Blood Urea Nitrogen 21.0 8.4 - 25.7 mg/dL    Creatinine 2.27 (H) 0.73 - 1.18 mg/dL    Calcium 8.6 (L) 8.8 - 10.0 mg/dL    Protein Total 6.6 5.8 - 7.6 gm/dL    Albumin 3.1 (L) 3.4 - 4.8 g/dL    Globulin 3.5 2.4 - 3.5 gm/dL    Albumin/Globulin Ratio 0.9 (L) 1.1 - 2.0 ratio    Bilirubin Total 0.9 <=1.5 mg/dL    ALP 97 40 - 150 unit/L    ALT 19 0 - 55 unit/L    AST 22 5 - 34 unit/L    eGFR 31 mL/min/1.73/m2    Anion Gap 8.0 mEq/L    BUN/Creatinine Ratio 9    CBC with Differential    Collection Time: 09/06/24  4:14 AM   Result Value Ref Range    WBC 10.67 4.50 - 11.50 x10(3)/mcL    RBC 3.86 (L) 4.70 - 6.10 x10(6)/mcL    Hgb 12.1 (L) 14.0 - 18.0 g/dL    Hct 35.9 (L) 42.0 - 52.0 %    MCV 93.0 80.0 - 94.0 fL    MCH 31.3 (H) 27.0 - 31.0 pg    MCHC 33.7  33.0 - 36.0 g/dL    RDW 12.2 11.5 - 17.0 %    Platelet 183 130 - 400 x10(3)/mcL    MPV 9.5 7.4 - 10.4 fL    Neut % 67.4 %    Lymph % 23.0 %    Mono % 8.3 %    Eos % 0.6 %    Basophil % 0.2 %    Lymph # 2.45 0.6 - 4.6 x10(3)/mcL    Neut # 7.20 2.1 - 9.2 x10(3)/mcL    Mono # 0.89 0.1 - 1.3 x10(3)/mcL    Eos # 0.06 0 - 0.9 x10(3)/mcL    Baso # 0.02 <=0.2 x10(3)/mcL    IG# 0.05 (H) 0 - 0.04 x10(3)/mcL    IG% 0.5 %    NRBC% 0.0 %       Assessment & Plan:    ESRD on HD TTS  RLE cellulitis - mgmt per primary team - on cefepime  Diabetes mellitus type 2   Hypertension   Chronic lower extremities venous insufficiency  Obesity    Recommendations:  Hemodialysis TTS  Continue antibiotic  Continue nutrition  Will consult Dr. Dunne to help remove right IJ tunneled dialysis catheter whenever possible, prior to patient's discharge.    Thank you for allowing us to participate in this patient's care.

## 2024-09-06 NOTE — H&P
[facility]  Hospital Medicine History & Physical Examination       Patient Name: Neno Watkins  MRN: [id number]  Patient Class: IP- Inpatient   Admission Date: 09/06/2024   Admitting Service: Hospital Medicine   Length of Stay: 1  Attending Physician: William Ramos MD  Primary Care Provider: Messi Weston MD  Face-to-Face encounter date: 09/06/2024  Code Status: Full  Chief Complaint: Leg Swelling (Sent from dialysis for RLE edema for the last few weeks. Sent for eval of poss R leg cellulitis. Denies fever at home. )      Patient information was obtained from patient, patient's family, past medical records and ER records.    HISTORY OF PRESENT ILLNESS:   Neno Watkins is a 64 y.o. male with a PMHx of essential hypertension, type 2 DM, BPH, PID, chronic BLE venous insufficiency, ESRD on HD TTS and obesity who presented to [facility] on 9/5/2024 with c/o right lower extremity swelling for the past few months that worsened over the past 3 days with redness and tenderness.  Denied any nausea, vomiting, fever, chills, chest pain or shortness for breath.    Vital Signs upon presentation to the ED included /56, HR 90, RR 18, SpO2 96% on room air, temperature 98.7° F.  Labs notable for WBC 12.04, hemoglobin 13.3, hematocrit 40.3, creatinine 1.89, glucose 123.  Lactic acid normal.  Blood cultures x2 obtained.  Right tib-fib x-ray unremarkable.  RLE venous ultrasound negative for DVT.  He was started on IV vancomycin and cefepime.  Admitted to hospital medicine service for further medical management.    REVIEW OF SYSTEMS:   Except as documented, all other systems reviewed and negative.    PAST MEDICAL HISTORY:   Essential hypertension   Type 2 DM   BPH   PAD   Chronic BLE venous insufficiency   ESRD on HD  Obesity     PAST SURGICAL HISTORY:     Past Surgical History:   Procedure Laterality Date    FISTULOGRAM Left 9/25/2023    Procedure: Fistulogram;  Surgeon: Rodrigo Soliman DO;   Location: [facility] CATH LAB;  Service: Nephrology;  Laterality: Left;    INSERTION OF TUNNELED CENTRAL VENOUS HEMODIALYSIS CATHETER Right 6/27/2022    Procedure: INSERTION, CATHETER, HEMODIALYSIS, DUAL LUMEN;  Surgeon: Molly Garcia MD;  Location: [facility] OR;  Service: General;  Laterality: Right;       FAMILY HISTORY:   Reviewed and negative    SOCIAL HISTORY:   Denied alcohol, tobacco or illicit drug use.     SCREENING FOR SOCIAL DRIVERS FOR HEALTH:   Patient screened for food insecurity, housing instability, transportation needs, utility difficulties, and interpersonal safety (select all that apply as identified as concern):  []Housing or Food  []Transportation Needs  []Utility Difficulties  []Interpersonal safety  [x]None    ALLERGIES:   Butorphanol, Hydrocodone-acetaminophen, and Povidone-iodine    HOME MEDICATIONS:     Prior to Admission medications    Medication Sig Start Date End Date Taking? Authorizing Provider   atorvastatin (LIPITOR) 80 MG tablet Take 80 mg by mouth every evening. 2/9/22   Provider, Historical   carvediloL (COREG) 6.25 MG tablet Take 6.25 mg by mouth 2 (two) times daily. 2/9/22   Provider, Historical   diazePAM (VALIUM) 2 MG tablet Take 1 tablet (2 mg total) by mouth every 6 (six) hours as needed for Anxiety (spasm). 2/12/24 3/13/24  Madelyn Cobos, ZAC   docusate sodium (COLACE) 100 MG capsule Take 100 mg by mouth 2 (two) times daily.    Provider, Historical   gabapentin (NEURONTIN) 300 MG capsule Take 1 capsule by mouth 2 (two) times a day. 2/9/22   Provider, Historical   hyoscyamine (ANASPAZ,LEVSIN) 0.125 mg Tab Take 1 tablet (125 mcg total) by mouth every 4 (four) hours as needed. 9/11/22 10/11/22  Riaz Tomlinson MD   ibuprofen (MOTRIN IB ORAL) Take 375 mg by mouth daily as needed (as needed).    Provider, Historical   melatonin 5 mg Cap Take 5 mg by mouth daily as needed (as needed).    Provider, Historical   multivitamin (THERAGRAN) per tablet Take 1 tablet by mouth once daily.     Provider, Historical   NIFEdipine (PROCARDIA-XL) 60 MG (OSM) 24 hr tablet TAKE 1 TABLET(60 MG) BY MOUTH EVERY DAY 8/13/22   Cam Medina MD   tamsulosin (FLOMAX) 0.4 mg Cap Take 1 capsule by mouth once daily. 4/25/22   Provider, Historical   TOUJEO SOLOSTAR U-300 INSULIN 300 unit/mL (1.5 mL) InPn pen Inject 32 Units into the skin once daily at 6am. 4/21/22   Provider, Historical   furosemide (LASIX) 40 MG tablet Take 40 mg by mouth every morning. 6/9/22 7/8/22  Provider, Historical   metFORMIN (GLUCOPHAGE) 500 MG tablet Take 500 mg by mouth 2 (two) times a day. 5/25/21 7/8/22  Provider, Historical     ________________________________________________________________________  INPATIENT LIST OF MEDICATIONS     Current Facility-Administered Medications:     acetaminophen tablet 1,000 mg, 1,000 mg, Oral, Q6H PRN, Ayden Leggett FNP    acetaminophen tablet 650 mg, 650 mg, Oral, Q4H PRN, Ayden Leggett FNP    ceFEPIme (MAXIPIME) 1 g in D5W 100 mL IVPB (MB+), 1 g, Intravenous, Q12H, Nikhil Dubon MD    dextrose 10% bolus 125 mL 125 mL, 12.5 g, Intravenous, PRN, Ayden Leggett FNP    dextrose 10% bolus 250 mL 250 mL, 25 g, Intravenous, PRN, Ayden Leggett FNP    glucagon (human recombinant) injection 1 mg, 1 mg, Intramuscular, PRN, Ayden Leggett FNP    glucose chewable tablet 16 g, 16 g, Oral, PRN, Ayden Leggett FNP    glucose chewable tablet 24 g, 24 g, Oral, PRN, Ayden Leggett FNP    heparin (porcine) injection 5,000 Units, 5,000 Units, Subcutaneous, Q12H, Ayden Leggett FNP    insulin aspart U-100 injection 0-5 Units, 0-5 Units, Subcutaneous, QID (AC + HS) PRN, Ayden Leggett FNP    [START ON 9/8/2024] mupirocin 2 % ointment, , Nasal, BID, Jagdish, Nikhil DEXTER MD    naloxone 0.4 mg/mL injection 0.02 mg, 0.02 mg, Intravenous, PRN, Ayden Leggett FNP    ondansetron injection 4 mg, 4 mg, Intravenous, Q4H PRN, Ayden Leggett,  FNP    prochlorperazine injection Soln 5 mg, 5 mg, Intravenous, Q6H PRN, Ayden Leggett, MARY ELLEN    sodium chloride 0.9% flush 10 mL, 10 mL, Intravenous, PRN, Ayden Leggett, MARY ELLEN    vancomycin - pharmacy to dose, , Intravenous, pharmacy to manage frequency, Ayden Leggett, MARY ELLEN    Current Outpatient Medications:     atorvastatin (LIPITOR) 80 MG tablet, Take 80 mg by mouth every evening., Disp: , Rfl:     carvediloL (COREG) 6.25 MG tablet, Take 6.25 mg by mouth 2 (two) times daily., Disp: , Rfl:     diazePAM (VALIUM) 2 MG tablet, Take 1 tablet (2 mg total) by mouth every 6 (six) hours as needed for Anxiety (spasm)., Disp: 12 tablet, Rfl: 0    docusate sodium (COLACE) 100 MG capsule, Take 100 mg by mouth 2 (two) times daily., Disp: , Rfl:     gabapentin (NEURONTIN) 300 MG capsule, Take 1 capsule by mouth 2 (two) times a day., Disp: , Rfl:     hyoscyamine (ANASPAZ,LEVSIN) 0.125 mg Tab, Take 1 tablet (125 mcg total) by mouth every 4 (four) hours as needed., Disp: 20 tablet, Rfl: 0    ibuprofen (MOTRIN IB ORAL), Take 375 mg by mouth daily as needed (as needed)., Disp: , Rfl:     melatonin 5 mg Cap, Take 5 mg by mouth daily as needed (as needed)., Disp: , Rfl:     multivitamin (THERAGRAN) per tablet, Take 1 tablet by mouth once daily., Disp: , Rfl:     NIFEdipine (PROCARDIA-XL) 60 MG (OSM) 24 hr tablet, TAKE 1 TABLET(60 MG) BY MOUTH EVERY DAY, Disp: 30 tablet, Rfl: 0    tamsulosin (FLOMAX) 0.4 mg Cap, Take 1 capsule by mouth once daily., Disp: , Rfl:     TOUJEO SOLOSTAR U-300 INSULIN 300 unit/mL (1.5 mL) InPn pen, Inject 32 Units into the skin once daily at 6am., Disp: , Rfl:     Scheduled Meds:   ceFEPime IV (PEDS and ADULTS)  1 g Intravenous Q12H    heparin (porcine)  5,000 Units Subcutaneous Q12H    [START ON 9/8/2024] mupirocin   Nasal BID     Continuous Infusions:  PRN Meds:.  Current Facility-Administered Medications:     acetaminophen, 1,000 mg, Oral, Q6H PRN    acetaminophen, 650 mg, Oral, Q4H  PRN    dextrose 10%, 12.5 g, Intravenous, PRN    dextrose 10%, 25 g, Intravenous, PRN    glucagon (human recombinant), 1 mg, Intramuscular, PRN    glucose, 16 g, Oral, PRN    glucose, 24 g, Oral, PRN    insulin aspart U-100, 0-5 Units, Subcutaneous, QID (AC + HS) PRN    naloxone, 0.02 mg, Intravenous, PRN    ondansetron, 4 mg, Intravenous, Q4H PRN    prochlorperazine, 5 mg, Intravenous, Q6H PRN    sodium chloride 0.9%, 10 mL, Intravenous, PRN    vancomycin - pharmacy to dose, , Intravenous, pharmacy to manage frequency    PHYSICAL EXAM:     VITAL SIGNS: 24 HRS MIN & MAX LAST   Temp  Min: 98.7 °F (37.1 °C)  Max: 98.7 °F (37.1 °C) 98.7 °F (37.1 °C)   BP  Min: 126/56  Max: 191/103 (!) 142/71   Pulse  Min: 81  Max: 101  87   Resp  Min: 12  Max: 20 20   SpO2  Min: 95 %  Max: 100 % 97 %       General appearance: Well-developed male in no apparent distress.  HENT: Atraumatic head. Moist mucous membranes of oral cavity.  Eyes: Normal extraocular movements.   Neck: Supple.   Lungs: Clear to auscultation bilaterally.   Heart: Regular rate and rhythm. S1 and S2 present. No pedal edema.  Abdomen: Soft, non-distended, non-tender.  Extremities: RLE erythema and edema     Skin: No Rash.   Neuro: Motor and sensory exams grossly intact.   Psych/mental status: Awake and alert. Appropriate mood and affect. Responds appropriately to questions.     LABS AND IMAGING:     Recent Labs   Lab 09/03/24  0000 09/05/24  1324 09/06/24  0414   WBC  --  12.04* 10.67   RBC  --  4.23* 3.86*   HGB 11.7* 13.3* 12.1*   HCT  --  40.3* 35.9*   MCV  --  95.3* 93.0   MCH  --  31.4* 31.3*   MCHC  --  33.0 33.7   RDW  --  12.3 12.2   PLT  --  221 183   MPV  --  9.6 9.5       Recent Labs   Lab 09/05/24  1324 09/06/24  0414    135*   K 4.2 3.8   CL 98 101   CO2 30 26   BUN 12.2 21.0   CREATININE 1.89* 2.27*   CALCIUM 9.9 8.6*   ALBUMIN 4.1 3.1*   ALKPHOS 122 97   ALT 27 19   AST 30 22   BILITOT 1.1 0.9       Microbiology Results (last 7 days)        Procedure Component Value Units Date/Time    Blood Culture #2 **CANNOT BE ORDERED STAT** [[id number]] Collected: 09/05/24 1327    Order Status: Resulted Specimen: Blood Updated: 09/05/24 1343    Blood Culture #1 **CANNOT BE ORDERED STAT** [[id number]] Collected: 09/05/24 1327    Order Status: Resulted Specimen: Blood Updated: 09/05/24 1343             X-Ray Tibia Fibula 2 View Right  Narrative: EXAMINATION:  Four images right tibia and fibula    CLINICAL HISTORY:  Swelling    COMPARISON:  None    FINDINGS:  Diffuse subcutaneous edema is present.  No fracture or dislocation.  No aggressive osseous process.  Impression: No acute osseous findings    Electronically signed by: Santiago Castro MD  Date:    09/06/2024  Time:    06:45        ASSESSMENT:   RLE Cellulitis   Leukocytosis   ESRD on HD-TTS     History:  essential hypertension, type 2 DM, BPH, PID, chronic BLE venous insufficiency, and obesity       PLAN:   Broad-spectrum IV antibiotics with vancomycin and cefepime   IV vancomycin due to high suspicion for MRSA  Follow blood cultures   Wound care consultation   Nephrology consultation  Accu-Cheks with insulin sliding scale  Resume home medications as deemed appropriate once medication reconciliation is updated  Labs in AM    VTE Prophylaxis: Heparin subcutaneous     Discharge Planning and Disposition: TBD    I, Maribell Harp NP have reviewed and discussed the case with William Ramos MD  Please see the attending MD's addendum for further assessment and plan.    Maribell Harp [facility]  Department of Hospital Medicine   [facility]   09/06/2024    This note was created with the assistance of [employer] Voice Recognition Software. There may be transcription errors as a result of using this technology however minimal, and effort has been made to assure accuracy of transcription, but any obvious errors or omissions should be clarified with the author of the document.     _______________________________________________________________________________  MD Addendum:  I, Dr.Praneet Richard MD , assumed care of this patient today   For the patient encounter, I performed the substantive portion of the visit, I reviewed the NP/PA documentation, treatment plan, and medical decision making.  I had face to face time with this patient       64-year-old male with the above-stated past medical history presented to ED from dialysis center for evaluation of worsening right lower extremity swelling with increased redness, pain.  Patient reported noticing pain, redness over the past few days to 2 weeks.     Labs 09/05/2024 revealed mild leukocytosis 12 K improved to 10.6 K, lactic acid normal.  DVT study negative x-ray soft tissue swelling with no acute osseous findings.    Exam   General: NAD   Extremity:  Cobblestone appearance right lower extremity with the redness, hard to touch, tenderness, noted ulcer plantar lateral surface below 5th toe, some refer to images  Neuro: Alert, responding appropriate   Chest: Unlabored breathing on room air   Heart:  Normal rate      MDM  RLE cellulitis  Wound plantar lateral surface below 5th toe  ESRD on HD    Continue IV vanc, cefepime, follow  blood cultures   wound care, monitor response to antibiotics  Obtain foot x-ray   Podiatry evaluation  Nephro on board, HD per Nephro  Monitor CBGS, needs glycemic control  Monitor BP  Home medications listed in EMR noted, resumed once updated           09/06/2024

## 2024-09-06 NOTE — PT/OT/SLP EVAL
"Occupational Therapy  Evaluation    Name: Neno Watkins  MRN: [id number]  Admitting Diagnosis: RLE edema  Recent Surgery: * No surgery found *      Recommendations:     Discharge therapy intensity: Moderate Intensity Therapy (pending progress)   Discharge Equipment Recommendations:  none  Barriers to discharge:       Assessment:     Neno Watkins is a 64 y.o. male with a medical diagnosis of RLE cellulitis, ESRD on HD TTS. He presents with the following performance deficits affecting function: weakness, impaired endurance, impaired self care skills, decreased safety awareness, gait instability, impaired balance, impaired functional mobility, pain.     Pt tolerated OT evaluation well, overall required CGA for sit>stand transfers and functional mobility with RW. Pt with decreased safety awareness, using RW to pull up on for sit>stand transfers and not cooperative with instruction for hand placement to increase safety as pt reporting "I got it". Pt required max A for donning socks and reports that his wife has health issues of her own, limiting physical assist. Recommending moderate intensity therapy at this time, pending pt progress as he may be able to progress to low intensity therapy.    Rehab Prognosis: Good; patient would benefit from acute skilled OT services to address these deficits and reach maximum level of function.       Plan:     Patient to be seen 5 x/week to address the above listed problems via self-care/home management, therapeutic activities, therapeutic exercises  Plan of Care Expires: 10/07/24  Plan of Care Reviewed with: patient    Subjective     Chief Complaint: RLE pain  Patient/Family Comments/goals: to get better and go home    Occupational Profile:  Living Environment: lives with wife and daughter in mobile home with ramp entrance; no bathroom DME as pt completes sponge baths due to HD port  Previous level of function: mod I for mobility, requires assist for LB dressing/bathing  Roles and " "Routines: , father  Equipment Used at Home: walker, rolling  Assistance upon Discharge: daughter can assist sometimes, pt reports she is often not home    Pain/Comfort:  Pain Rating 1: other (see comments) (reports "enough" when asked how much pain in RLE)    Patients cultural, spiritual, Jain conflicts given the current situation: no    Objective:     OT communicated with RN prior to session.      Patient was found HOB elevated with peripheral IV, telemetry upon OT entry to room.    General Precautions: Standard, fall  Orthopedic Precautions: N/A  Braces: N/A    Bed Mobility:    Patient completed Supine to Sit with contact guard assistance, increased time, and use of bed rails and elevating HOB    Functional Mobility/Transfers:  Patient completed Sit <> Stand Transfer with contact guard assistance  with  rolling walker   Patient completed Bed <> Chair Transfer using Step Transfer technique with contact guard assistance with rolling walker  Functional Mobility: CGA for mobility ~15 ft using RW from EOB to bedside chair    Activities of Daily Living:  Lower Body Dressing: maximal assistance for donning bilateral socks    Functional Cognition:  Safety Awareness: Impaired. Cues for RW mgmt and hand placement for sit>stand    Visual Perceptual Skills:  Intact    Upper Extremity Function:  Right Upper Extremity:   Strength: WFL    Left Upper Extremity:  Strength: WFL    Balance:   Dynamic standing balance:CGA at RW    Therapeutic Positioning  Risk for acquired pressure injuries is decreased due to ability to get to BSC/toilet with assist.    OT interventions performed during the course of today's session:   Education was provided on benefits of and recommendations for therapeutic positioning    Skin assessment: known altered skin at RLE, all other visible skin intact    OT recommendations for therapeutic positioning throughout hospitalization:   Follow [facility] Pressure Injury Prevention Protocol    Patient " Education:  Patient provided with verbal education education regarding OT role/goals/POC, fall prevention, safety awareness, and Discharge/DME recommendations.  Understanding was verbalized.     Patient left up in chair with all lines intact, call button in reach, and RN notified.    GOALS:   Multidisciplinary Problems       Occupational Therapy Goals          Problem: Occupational Therapy    Goal Priority Disciplines Outcome Interventions   Occupational Therapy Goal     OT, PT/OT Progressing    Description: Goals to be met by: 10/6/24     Patient will increase functional independence with ADLs by performing:    UE Dressing with Modified [place]ony.  LE Dressing with Modified Harmony using AE prn.  Grooming while standing at sink with Modified [place].  Toileting from toilet with Modified [place] for hygiene and clothing management.   Toilet transfer to toilet with Modified [place].                         History:     Past Medical History:   Diagnosis Date    BPH (benign prostatic hyperplasia)     Essential (primary) hypertension     Obesity, unspecified     PAD (peripheral artery disease)          Past Surgical History:   Procedure Laterality Date    FISTULOGRAM Left 9/25/2023    Procedure: Fistulogram;  Surgeon: Rodrigo Soliman DO;  Location: [facility] CATH LAB;  Service: Nephrology;  Laterality: Left;    INSERTION OF TUNNELED CENTRAL VENOUS HEMODIALYSIS CATHETER Right 6/27/2022    Procedure: INSERTION, CATHETER, HEMODIALYSIS, DUAL LUMEN;  Surgeon: Molly Garcia MD;  Location: [facility] OR;  Service: General;  Laterality: Right;       Time Tracking:     OT Date of Treatment: 09/06/24  OT Start Time: 1105  OT Stop Time: 1122  OT Total Time (min): 17 min    Billable Minutes:Evaluation mod complexity    9/6/2024

## 2024-09-07 LAB
ANION GAP SERPL CALC-SCNC: 9 MEQ/L
BASOPHILS # BLD AUTO: 0.03 X10(3)/MCL
BASOPHILS NFR BLD AUTO: 0.3 %
BUN SERPL-MCNC: 29.7 MG/DL (ref 8.4–25.7)
CALCIUM SERPL-MCNC: 8.6 MG/DL (ref 8.8–10)
CHLORIDE SERPL-SCNC: 102 MMOL/L (ref 98–107)
CO2 SERPL-SCNC: 27 MMOL/L (ref 23–31)
CREAT SERPL-MCNC: 2.56 MG/DL (ref 0.73–1.18)
CREAT/UREA NIT SERPL: 12
EOSINOPHIL # BLD AUTO: 0.13 X10(3)/MCL (ref 0–0.9)
EOSINOPHIL NFR BLD AUTO: 1.1 %
ERYTHROCYTE [DISTWIDTH] IN BLOOD BY AUTOMATED COUNT: 12.2 % (ref 11.5–17)
GFR SERPLBLD CREATININE-BSD FMLA CKD-EPI: 27 ML/MIN/1.73/M2
GLUCOSE SERPL-MCNC: 100 MG/DL (ref 82–115)
HCT VFR BLD AUTO: 35.4 % (ref 42–52)
HGB BLD-MCNC: 11.9 G/DL (ref 14–18)
IMM GRANULOCYTES # BLD AUTO: 0.04 X10(3)/MCL (ref 0–0.04)
IMM GRANULOCYTES NFR BLD AUTO: 0.3 %
LYMPHOCYTES # BLD AUTO: 2.94 X10(3)/MCL (ref 0.6–4.6)
LYMPHOCYTES NFR BLD AUTO: 24.7 %
MCH RBC QN AUTO: 31.5 PG (ref 27–31)
MCHC RBC AUTO-ENTMCNC: 33.6 G/DL (ref 33–36)
MCV RBC AUTO: 93.7 FL (ref 80–94)
MONOCYTES # BLD AUTO: 0.82 X10(3)/MCL (ref 0.1–1.3)
MONOCYTES NFR BLD AUTO: 6.9 %
NEUTROPHILS # BLD AUTO: 7.94 X10(3)/MCL (ref 2.1–9.2)
NEUTROPHILS NFR BLD AUTO: 66.7 %
NRBC BLD AUTO-RTO: 0 %
PLATELET # BLD AUTO: 199 X10(3)/MCL (ref 130–400)
PMV BLD AUTO: 9.8 FL (ref 7.4–10.4)
POCT GLUCOSE: 107 MG/DL (ref 70–110)
POCT GLUCOSE: 156 MG/DL (ref 70–110)
POCT GLUCOSE: 175 MG/DL (ref 70–110)
POTASSIUM SERPL-SCNC: 3.6 MMOL/L (ref 3.5–5.1)
RBC # BLD AUTO: 3.78 X10(6)/MCL (ref 4.7–6.1)
SODIUM SERPL-SCNC: 138 MMOL/L (ref 136–145)
VANCOMYCIN SERPL-MCNC: 14.8 UG/ML (ref 15–20)
WBC # BLD AUTO: 11.9 X10(3)/MCL (ref 4.5–11.5)

## 2024-09-07 PROCEDURE — 63600175 PHARM REV CODE 636 W HCPCS: Performed by: NURSE PRACTITIONER

## 2024-09-07 PROCEDURE — 63600175 PHARM REV CODE 636 W HCPCS: Performed by: INTERNAL MEDICINE

## 2024-09-07 PROCEDURE — 36415 COLL VENOUS BLD VENIPUNCTURE: CPT | Performed by: PHYSICIAN ASSISTANT

## 2024-09-07 PROCEDURE — 25000003 PHARM REV CODE 250: Performed by: INTERNAL MEDICINE

## 2024-09-07 PROCEDURE — 80202 ASSAY OF VANCOMYCIN: CPT | Performed by: INTERNAL MEDICINE

## 2024-09-07 PROCEDURE — 11000001 HC ACUTE MED/SURG PRIVATE ROOM

## 2024-09-07 PROCEDURE — 80100016 HC MAINTENANCE HEMODIALYSIS

## 2024-09-07 PROCEDURE — 85025 COMPLETE CBC W/AUTO DIFF WBC: CPT | Performed by: PHYSICIAN ASSISTANT

## 2024-09-07 PROCEDURE — 80048 BASIC METABOLIC PNL TOTAL CA: CPT | Performed by: PHYSICIAN ASSISTANT

## 2024-09-07 PROCEDURE — 5A1D70Z PERFORMANCE OF URINARY FILTRATION, INTERMITTENT, LESS THAN 6 HOURS PER DAY: ICD-10-PCS | Performed by: INTERNAL MEDICINE

## 2024-09-07 RX ORDER — VANCOMYCIN HCL IN 5 % DEXTROSE 1G/250ML
1000 PLASTIC BAG, INJECTION (ML) INTRAVENOUS ONCE
Status: COMPLETED | OUTPATIENT
Start: 2024-09-07 | End: 2024-09-07

## 2024-09-07 RX ORDER — CARVEDILOL 3.12 MG/1
6.25 TABLET ORAL 2 TIMES DAILY
Status: DISCONTINUED | OUTPATIENT
Start: 2024-09-07 | End: 2024-09-10 | Stop reason: HOSPADM

## 2024-09-07 RX ORDER — CALCIUM CARBONATE 200(500)MG
500 TABLET,CHEWABLE ORAL 3 TIMES DAILY PRN
Status: DISCONTINUED | OUTPATIENT
Start: 2024-09-07 | End: 2024-09-10 | Stop reason: HOSPADM

## 2024-09-07 RX ADMIN — HYDRALAZINE HYDROCHLORIDE 10 MG: 20 INJECTION INTRAMUSCULAR; INTRAVENOUS at 07:09

## 2024-09-07 RX ADMIN — VANCOMYCIN HYDROCHLORIDE 1000 MG: 1 INJECTION, POWDER, LYOPHILIZED, FOR SOLUTION INTRAVENOUS at 08:09

## 2024-09-07 RX ADMIN — CARVEDILOL 6.25 MG: 3.12 TABLET, FILM COATED ORAL at 08:09

## 2024-09-07 RX ADMIN — CEFEPIME 1 G: 1 INJECTION, POWDER, FOR SOLUTION INTRAMUSCULAR; INTRAVENOUS at 12:09

## 2024-09-07 RX ADMIN — WHITE PETROLATUM: 1.75 OINTMENT TOPICAL at 08:09

## 2024-09-07 RX ADMIN — CEFEPIME 1 G: 1 INJECTION, POWDER, FOR SOLUTION INTRAMUSCULAR; INTRAVENOUS at 01:09

## 2024-09-07 RX ADMIN — HEPARIN SODIUM 5000 UNITS: 5000 INJECTION, SOLUTION INTRAVENOUS; SUBCUTANEOUS at 08:09

## 2024-09-07 NOTE — NURSING
Nurses Note -- 4 Eyes      9/6/2024   7:50 PM      Skin assessed during: Admit      [] No Altered Skin Integrity Present    []Prevention Measures Documented      [x] Yes- Altered Skin Integrity Present or Discovered   [x] LDA Added if Not in Epic (Describe Wound)   [x] New Altered Skin Integrity was Present on Admit and Documented in LDA   [x] Wound Image Taken    Wound Care Consulted? Yes    Attending Nurse:  Rafiq Edwards RN/Staff Member:  Jennifer

## 2024-09-07 NOTE — PROGRESS NOTES
[facility]  Hospital Medicine Progress Note        Chief Complaint: Inpatient Follow-up     HPI:   64 y.o. male with a PMHx of essential hypertension, type 2 DM, BPH, PID, chronic BLE venous insufficiency, ESRD on HD TTS and obesity who presented to [facility] on 9/5/2024 with c/o right lower extremity swelling for the past few months that worsened over the past 3 days with redness and tenderness.  Denied any nausea, vomiting, fever, chills, chest pain or shortness for breath.     Vital Signs upon presentation to the ED included /56, HR 90, RR 18, SpO2 96% on room air, temperature 98.7° F.  Labs notable for WBC 12.04, hemoglobin 13.3, hematocrit 40.3, creatinine 1.89, glucose 123.  Lactic acid normal.  Blood cultures x2 obtained.  Right tib-fib x-ray unremarkable.  RLE venous ultrasound negative for DVT.  He was started on IV vancomycin and cefepime.  Admitted to hospital medicine service for further medical management.    Nephrology consulted for HD, Podiatry consulted for plantar wound eval.    Interval Hx:   No acute events reported overnight.    Seen at bedside this morning patient resting on bed he voiced no new complaints pain in right lower extreme improving  Case was discussed with patient's nurse     Patient HDS on room air    Objective/physical exam:  General: NAD   Extremity:  Unchanged exam from yesterday, [place] appearance right lower extremity with the redness, hard to touch, tenderness, noted ulcer plantar lateral surface below 5th toe, refer to images  Neuro: Alert, responding appropriate   Chest: Unlabored breathing on room air   Heart:  Normal rate    VITAL SIGNS: 24 HRS MIN & MAX LAST   Temp  Min: 97.4 °F (36.3 °C)  Max: 98.8 °F (37.1 °C) 98.3 °F (36.8 °C)   BP  Min: 139/62  Max: 165/82 (!) 154/72   Pulse  Min: 78  Max: 92  83   Resp  Min: 16  Max: 16 16   SpO2  Min: 96 %  Max: 99 % 97 %     I have reviewed the following labs:  Recent Labs   Lab 09/05/24  0270  09/06/24 0414 09/07/24 0333   WBC 12.04* 10.67 11.90*   RBC 4.23* 3.86* 3.78*   HGB 13.3* 12.1* 11.9*   HCT 40.3* 35.9* 35.4*   MCV 95.3* 93.0 93.7   MCH 31.4* 31.3* 31.5*   MCHC 33.0 33.7 33.6   RDW 12.3 12.2 12.2    183 199   MPV 9.6 9.5 9.8     Recent Labs   Lab 09/05/24  1324 09/06/24 0414 09/07/24 0333    135* 138   K 4.2 3.8 3.6   CL 98 101 102   CO2 30 26 27   BUN 12.2 21.0 29.7*   CREATININE 1.89* 2.27* 2.56*   CALCIUM 9.9 8.6* 8.6*   ALBUMIN 4.1 3.1*  --    ALKPHOS 122 97  --    ALT 27 19  --    AST 30 22  --    BILITOT 1.1 0.9  --      Microbiology Results (last 7 days)       Procedure Component Value Units Date/Time    Blood Culture #1 **CANNOT BE ORDERED STAT** [[id number]]  (Normal) Collected: 09/05/24 1327    Order Status: Completed Specimen: Blood Updated: 09/06/24 1401     Blood Culture No Growth At 24 Hours    Blood Culture #2 **CANNOT BE ORDERED STAT** [[id number]]  (Normal) Collected: 09/05/24 1327    Order Status: Completed Specimen: Blood Updated: 09/06/24 1401     Blood Culture No Growth At 24 Hours             See below for Radiology    Assessment/Plan:  RLE cellulitis  Wound plantar lateral surface below 5th toe-POA  ESRD on HD  Chronic venous insufficiency     Cont iv abx cefepime, vancomycin  Pharmacy to monitor vanc trough, adjust dosing  Continue HD per Nephro   Podiatry consulted,appreciate recs   Recommend to obtain home medications, update in the EMR  Discussed with pharmacist,allergy flag alert with every medication being ordered is error in system, not all the meds have iodine component, recommended to override  Monitor CBGS, use iss  Monitor bp, Ordered coreg,use iv prns    Supportive care      VTE prophylaxis: hep sc    Patient condition:  Fair    Anticipated discharge and Disposition:   tbd          Portions of this note dictated using EMR integrated voice recognition software, and may be subject to voice recognition errors not corrected at proofreading. Please  contact writer for clarification if needed.   _____________________________________________________________________    Malnutrition Status:    Scheduled Med:   ceFEPime IV (PEDS and ADULTS)  1 g Intravenous Q12H    [START ON 9/8/2024] epoetin jesus-ebpx (RETACRIT) injection  50 Units/kg Subcutaneous Once    heparin (porcine)  5,000 Units Subcutaneous Q12H    [START ON 9/8/2024] mupirocin   Nasal BID    vancomycin (VANCOCIN) IV (PEDS and ADULTS)  1,000 mg Intravenous Once    white petrolatum   Topical (Top) Daily      Continuous Infusions:     PRN Meds:    Current Facility-Administered Medications:     acetaminophen, 1,000 mg, Oral, Q6H PRN    acetaminophen, 650 mg, Oral, Q4H PRN    dextrose 10%, 12.5 g, Intravenous, PRN    dextrose 10%, 25 g, Intravenous, PRN    glucagon (human recombinant), 1 mg, Intramuscular, PRN    glucose, 16 g, Oral, PRN    glucose, 24 g, Oral, PRN    hydrALAZINE, 10 mg, Intravenous, Q6H PRN    insulin aspart U-100, 0-5 Units, Subcutaneous, QID (AC + HS) PRN    naloxone, 0.02 mg, Intravenous, PRN    ondansetron, 4 mg, Intravenous, Q4H PRN    prochlorperazine, 5 mg, Intravenous, Q6H PRN    sodium chloride 0.9%, 250 mL, Intravenous, PRN    sodium chloride 0.9%, 10 mL, Intravenous, PRN    vancomycin - pharmacy to dose, , Intravenous, pharmacy to manage frequency     Radiology:  I have personally reviewed the following imaging and agree with the radiologist.     CV Ultrasound doppler venous DVT leg right    There is a right subcutaneous edema located in the proximal calf and   distal calf veins.    Negative for deep and superficial vein thrombosis in the visualized   vessels of the right lower extremity.      Deep veins of the lower leg were not well visualized secondary to edema   and skin thickening. Unable to rule out thrombosis in those vessels.      William Ramos MD  Department of Hospital Medicine   [facility]   09/07/2024

## 2024-09-07 NOTE — NURSING
Nurses Note -- 4 Eyes      9/6/2024  8:00 PM      Skin assessed during: Q Shift Change      [] No Altered Skin Integrity Present    []Prevention Measures Documented      [x] Yes- Altered Skin Integrity Present or Discovered   [x] LDA Added if Not in Epic (Describe Wound)   [x] New Altered Skin Integrity was Present on Admit and Documented in LDA   [x] Wound Image Taken    Wound Care Consulted? No    Attending Nurse: Roseann Birch RN    Second RN/Staff Member:   Star KRAUS

## 2024-09-07 NOTE — PROGRESS NOTES
"Pharmacokinetic Assessment Follow Up: IV Vancomycin    Vancomycin serum concentration assessment(s):    The random level was drawn correctly and can be used to guide therapy at this time. The measurement is below the desired definitive target range of 15 to 20 mcg/mL.    Vancomycin Regimen Plan:    Will pulse dose due to poor renal function. Patient on HD on Tues, Thurs and Sat.   Vancomycin 1000 mg once on 09/07/2024 at 20:00  Random level to be drawn with AM labs on 09/10/2024    Drug levels (last 3 results):  Recent Labs   Lab Result Units 09/07/24  0333   Vancomycin Random ug/ml 14.8*       Pharmacy will continue to follow and monitor vancomycin.    Please contact pharmacy at extension [phone] for questions regarding this assessment.    Thank you for the consult,   Jasmine Wong       Patient brief summary:  Neno Watkins is a 64 y.o. male initiated on antimicrobial therapy with IV Vancomycin for treatment of skin & soft tissue infection    The patient's current regimen is Pulse Dosing, Vancomycin 1000 mg once at 2000 on 09/07/2024    Drug Allergies:   Review of patient's allergies indicates:   Allergen Reactions    Butorphanol Swelling     "it almost killed me"    Hydrocodone-acetaminophen Other (See Comments)     "They mess with my heart"    Povidone-iodine Shortness Of Breath and Rash       Actual Body Weight:   127 kg    Renal Function:   Estimated Creatinine Clearance: 38.5 mL/min (A) (based on SCr of 2.56 mg/dL (H)).,     Dialysis Method (if applicable):  intermittent HD    CBC (last 72 hours):  Recent Labs   Lab Result Units 09/05/24  1324 09/06/24  0414 09/07/24  0333   WBC x10(3)/mcL 12.04* 10.67 11.90*   Hgb g/dL 13.3* 12.1* 11.9*   Hct % 40.3* 35.9* 35.4*   Platelet x10(3)/mcL 221 183 199   Mono % % 6.1 8.3 6.9   Eos % % 0.7 0.6 1.1   Basophil % % 0.4 0.2 0.3       Metabolic Panel (last 72 hours):  Recent Labs   Lab Result Units 09/05/24  1324 09/06/24  0414 09/07/24  0333   Sodium mmol/L 137 135* 138 "   Potassium mmol/L 4.2 3.8 3.6   Chloride mmol/L 98 101 102   CO2 mmol/L 30 26 27   Glucose mg/dL 123* 168* 100   Blood Urea Nitrogen mg/dL 12.2 21.0 29.7*   Creatinine mg/dL 1.89* 2.27* 2.56*   Albumin g/dL 4.1 3.1*  --    Bilirubin Total mg/dL 1.1 0.9  --    ALP unit/L 122 97  --    AST unit/L 30 22  --    ALT unit/L 27 19  --        Vancomycin Administrations:  vancomycin given in the last 96 hours                     vancomycin (VANCOCIN) 2,500 mg in D5W 500 mL IVPB (mg) 2,500 mg New Bag 09/05/24 2331                    Microbiologic Results:  Microbiology Results (last 7 days)       Procedure Component Value Units Date/Time    Blood Culture #1 **CANNOT BE ORDERED STAT** [[id number]]  (Normal) Collected: 09/05/24 1327    Order Status: Completed Specimen: Blood Updated: 09/06/24 1401     Blood Culture No Growth At 24 Hours    Blood Culture #2 **CANNOT BE ORDERED STAT** [[id number]]  (Normal) Collected: 09/05/24 1327    Order Status: Completed Specimen: Blood Updated: 09/06/24 1401     Blood Culture No Growth At 24 Hours

## 2024-09-07 NOTE — NURSING
09/07/24 1700   Post-Hemodialysis Assessment   Blood Volume Processed (Liters) 63.5 L   Dialyzer Clearance Clear   Duration of Treatment 180 minutes   Total UF (mL) 2000 mL   Patient Response to Treatment pt tolerated tx well   Post-Hemodialysis Comments NAD noted, VSS, Net UF 2,000 mL, Last BP  155/83 and HR 80

## 2024-09-07 NOTE — PROGRESS NOTES
Nephrology follow up progress note    HPI:      Neno Watkins is a 64 y.o. male  pt of Dr. Alanis, with hx of ESRD on HD in [facility] via Left AVF, PAD, BPH, HTN. He presented to the ED yesterday with a complaint of right leg swelling/cellulitis. He did have a full HD run yesterday 9/5. States they are going to switch him to MWF, but has been on TTS until now. He also has a right tunneled cath he states has been there for three years but they are using the left AVF without issue.     Interval history:     Seen on HD  No c/o     Review of Systems:       Past medical, family, surgical, and social history reviewed and unchanged from initial consult note.     Objective:       VITAL SIGNS: 24 HR MIN & MAX LAST    Temp  Min: 97.4 °F (36.3 °C)  Max: 98.8 °F (37.1 °C)  98.6 °F (37 °C)        BP  Min: 139/62  Max: 165/82  (!) 161/55     Pulse  Min: 78  Max: 85  79     Resp  Min: 16  Max: 16  16    SpO2  Min: 96 %  Max: 99 %  97 %      GEN: Chronically ill appearing in NAD  HEENT: Conjunctiva anicteric, pupils equal, MMM, OP benign  CV: RRR +S1,S2 without murmur  PULM: CTAB, unlabored  ABD: Soft, NT/ND abdomen with NABS  EXT: No cyanosis with 1+  edema  SKIN: Warm and dry  PSYCH: Awake, alert, and appropriately conversant  Vascular access:          Component Value Date/Time     09/07/2024 0333     (L) 09/06/2024 0414     10/13/2022 0652    K 3.6 09/07/2024 0333    K 3.8 09/06/2024 0414    K 3.0 (L) 10/13/2022 0652    CO2 27 09/07/2024 0333    CO2 26 09/06/2024 0414    CO2 26 10/13/2022 0652    BUN 29.7 (H) 09/07/2024 0333    BUN 21.0 09/06/2024 0414    BUN 29 (H) 08/27/2024 0000    BUN 31 (H) 08/13/2024 0000    CREATININE 2.56 (H) 09/07/2024 0333    CREATININE 2.27 (H) 09/06/2024 0414    CREATININE 2.94 (H) 10/13/2022 0652    CALCIUM 8.6 (L) 09/07/2024 0333    CALCIUM 8.6 (L) 09/06/2024 0414    CALCIUM 9.6 10/13/2022 0652    PHOS 6.4 (H) 08/04/2022 0532            Component Value Date/Time    WBC  11.90 (H) 09/07/2024 0333    WBC 10.67 09/06/2024 0414    HGB 11.9 (L) 09/07/2024 0333    HGB 12.1 (L) 09/06/2024 0414    HGB 11.7 (L) 09/03/2024 0000    HGB 12.0 (L) 08/27/2024 0000    HCT 35.4 (L) 09/07/2024 0333    HCT 35.9 (L) 09/06/2024 0414     09/07/2024 0333     09/06/2024 0414       Imaging reviewed      Assessment / Plan:     ESRD on HD TTS  RLE cellulitis - mgmt per primary team - on cefepime  Diabetes mellitus type 2   Hypertension   Chronic lower extremities venous insufficiency  Obesity     Recommendations:  Hemodialysis TTS  Continue antibiotic  Continue nutrition  Will consult Dr. Dunne to help remove right IJ tunneled dialysis catheter whenever possible, prior to patient's discharge.

## 2024-09-07 NOTE — CONSULTS
[facility]                       [address] [place] [address]    PATIENT NAME:       RUFINO WATKINS  YOB: 1960  CSN:                [id number]   MRN:                [id number]  ADMIT DATE:         09/05/2024 12:53:00  PHYSICIAN:          Rolf Hobbs DPM                            CONSULTATION    DATE OF CONSULT:  09/07/2024 00:00:00    HISTORY OF PRESENT ILLNESS:  Mr. Watkins is 64-year-old gentleman who was sent to   the emergency room yesterday from dialysis due to increased right lower   extremity swelling and possible cellulitis.  The patient has known history of   chronic venous insufficiency.  He is supposed to wear compression stockings, but   refuses.  He sleeps in a chair and sits throughout the day with his legs   dependent.  He does not elevate.  He has seen Dr. Lieberman in the past for his   fistulas, but not for the extremities.  He has no history of peripheral artery   disease.  He has been started on vancomycin due to suspected cellulitis in the   leg.  He does not relate any fevers.  No other issues at this point.    PAST MEDICAL HISTORY:  Notable for morbid obesity, hypertension, diabetes with   apparent neuropathy, BPH, chronic venous insufficiency, peripheral artery   disease, and end-stage renal disease.    PAST SURGICAL HISTORY:  He has had fistula placements, fistulogram, and   temporary dialysis catheters.    MEDICATIONS:  As per the chart.    ALLERGIES:  TO BUTORPHANOL, IODINE, AND NORCO.     SOCIAL HISTORY:  Denies tobacco, alcohol, or IV drug abuse.    FAMILY HISTORY:  Noncontributory.    PHYSICAL EXAMINATION:  GENERAL:  Reveals an obese gentleman, currently lying in bed, does not appear to   be in any distress.   VITAL SIGNS:  His current vital signs are stable and he is afebrile.  HEART:  Deferred.  LUNGS:  Deferred.  ABDOMEN:  Deferred.  EXTREMITIES:  He has profound edema to both  extremities with significant   lichenification of the skin.  Stasis hyperpigmentation, brawny induration.  The   legs and the feet are warm.  Difficulty palpating pedal pulses.  Does have   Doppler signals.  Good capillary refill.  NEUROLOGIC:  He has some diminished sensorium in both feet.  MUSCULOSKELETAL:  No gross pedal deformities.  No contractures.  DERM:  Again, skin changes as noted in the vascular exam.  He has no open wounds   or large bullous areas on either limb.  He does have a little bit of   inflammatory change to the right leg with some warmth.  There is no fluctuance,   crepitation, or bogginess to the tissues.  Web spaces are clear.  Trophic skin   changes to the feet and nails.    LABORATORY DATA:  Labs reviewed.  White cell counts were elevated slightly at   11.9.    ASSESSMENT:  Chronic venous insufficiency with early lymphedema type changes to   both lower extremities with secondary cellulitis in right lower extremity.    PLAN:  The patient instructed as to the findings of physical exam.    Unfortunately, he has not been compliant with the use of compression garments   that he was given by his primary care physician.  I explained to the gentleman   that he needs to elevate his legs at all times, especially in the evening hours.    He should not be hanging his legs dependent all night.  Highly recommend   compression garments.  He may actually benefit from compression pumps once he is   at home as long as he does not have any recurrent CHF issues.  Noncompliance,   but this is only going to result in worsening edematous changes to the legs and   recurrent infectious process.  I suspect Strep type issue with the leg.    Continue with the antibiotics, but should be able to transition to something by   mouth to go home soon.        ______________________________  Rolf Hobbs DPM    GAS/AQS  DD:  09/07/2024  Time:  08:45AM  DT:  09/07/2024  Time:  09:04AM  Job #:   [id number]      CONSULTATION

## 2024-09-07 NOTE — PLAN OF CARE
Problem: Adult Inpatient Plan of Care  Goal: Plan of Care Review  Outcome: Progressing  Goal: Patient-Specific Goal (Individualized)  Outcome: Progressing  Goal: Absence of Hospital-Acquired Illness or Injury  Outcome: Progressing  Goal: Optimal Comfort and Wellbeing  Outcome: Progressing  Goal: Readiness for Transition of Care  Outcome: Progressing     Problem: Bariatric Environmental Safety  Goal: Safety Maintained with Care  Outcome: Progressing     Problem: Hemodialysis  Goal: Safe, Effective Therapy Delivery  Outcome: Progressing  Goal: Effective Tissue Perfusion  Outcome: Progressing  Goal: Absence of Infection Signs and Symptoms  Outcome: Progressing     Problem: Diabetes Comorbidity  Goal: Blood Glucose Level Within Targeted Range  Outcome: Progressing     Problem: Acute Kidney Injury/Impairment  Goal: Fluid and Electrolyte Balance  Outcome: Progressing  Goal: Improved Oral Intake  Outcome: Progressing  Goal: Effective Renal Function  Outcome: Progressing     Problem: Infection  Goal: Absence of Infection Signs and Symptoms  Outcome: Progressing     Problem: Wound  Goal: Optimal Coping  Outcome: Progressing  Goal: Optimal Functional Ability  Outcome: Progressing  Goal: Absence of Infection Signs and Symptoms  Outcome: Progressing  Goal: Improved Oral Intake  Outcome: Progressing  Goal: Optimal Pain Control and Function  Outcome: Progressing  Goal: Skin Health and Integrity  Outcome: Progressing  Goal: Optimal Wound Healing  Outcome: Progressing

## 2024-09-08 LAB
ALBUMIN SERPL-MCNC: 3.4 G/DL (ref 3.4–4.8)
ALBUMIN/GLOB SERPL: 1 RATIO (ref 1.1–2)
ALP SERPL-CCNC: 113 UNIT/L (ref 40–150)
ALT SERPL-CCNC: 25 UNIT/L (ref 0–55)
ANION GAP SERPL CALC-SCNC: 8 MEQ/L
AST SERPL-CCNC: 34 UNIT/L (ref 5–34)
BILIRUB SERPL-MCNC: 1.1 MG/DL
BUN SERPL-MCNC: 21.9 MG/DL (ref 8.4–25.7)
CALCIUM SERPL-MCNC: 8.7 MG/DL (ref 8.8–10)
CHLORIDE SERPL-SCNC: 105 MMOL/L (ref 98–107)
CO2 SERPL-SCNC: 23 MMOL/L (ref 23–31)
CREAT SERPL-MCNC: 1.99 MG/DL (ref 0.73–1.18)
CREAT/UREA NIT SERPL: 11
GFR SERPLBLD CREATININE-BSD FMLA CKD-EPI: 37 ML/MIN/1.73/M2
GLOBULIN SER-MCNC: 3.3 GM/DL (ref 2.4–3.5)
GLUCOSE SERPL-MCNC: 130 MG/DL (ref 82–115)
POCT GLUCOSE: 128 MG/DL (ref 70–110)
POCT GLUCOSE: 145 MG/DL (ref 70–110)
POCT GLUCOSE: 150 MG/DL (ref 70–110)
POCT GLUCOSE: 158 MG/DL (ref 70–110)
POTASSIUM SERPL-SCNC: 4.3 MMOL/L (ref 3.5–5.1)
PROT SERPL-MCNC: 6.7 GM/DL (ref 5.8–7.6)
SODIUM SERPL-SCNC: 136 MMOL/L (ref 136–145)

## 2024-09-08 PROCEDURE — 63600175 PHARM REV CODE 636 W HCPCS: Performed by: INTERNAL MEDICINE

## 2024-09-08 PROCEDURE — 36415 COLL VENOUS BLD VENIPUNCTURE: CPT | Performed by: INTERNAL MEDICINE

## 2024-09-08 PROCEDURE — 97116 GAIT TRAINING THERAPY: CPT | Mod: CQ

## 2024-09-08 PROCEDURE — 80053 COMPREHEN METABOLIC PANEL: CPT | Performed by: INTERNAL MEDICINE

## 2024-09-08 PROCEDURE — 63600175 PHARM REV CODE 636 W HCPCS: Performed by: NURSE PRACTITIONER

## 2024-09-08 PROCEDURE — 25000003 PHARM REV CODE 250: Performed by: INTERNAL MEDICINE

## 2024-09-08 PROCEDURE — 11000001 HC ACUTE MED/SURG PRIVATE ROOM

## 2024-09-08 PROCEDURE — 63600175 PHARM REV CODE 636 W HCPCS: Mod: JZ,EC,JG | Performed by: NURSE PRACTITIONER

## 2024-09-08 RX ORDER — ATORVASTATIN CALCIUM 40 MG/1
80 TABLET, FILM COATED ORAL NIGHTLY
Status: DISCONTINUED | OUTPATIENT
Start: 2024-09-08 | End: 2024-09-10 | Stop reason: HOSPADM

## 2024-09-08 RX ORDER — NIFEDIPINE 60 MG/1
60 TABLET, EXTENDED RELEASE ORAL DAILY
Status: DISCONTINUED | OUTPATIENT
Start: 2024-09-08 | End: 2024-09-10 | Stop reason: HOSPADM

## 2024-09-08 RX ORDER — GABAPENTIN 300 MG/1
300 CAPSULE ORAL 2 TIMES DAILY
Status: DISCONTINUED | OUTPATIENT
Start: 2024-09-08 | End: 2024-09-10 | Stop reason: HOSPADM

## 2024-09-08 RX ORDER — TAMSULOSIN HYDROCHLORIDE 0.4 MG/1
1 CAPSULE ORAL DAILY
Status: DISCONTINUED | OUTPATIENT
Start: 2024-09-08 | End: 2024-09-10 | Stop reason: HOSPADM

## 2024-09-08 RX ADMIN — HEPARIN SODIUM 5000 UNITS: 5000 INJECTION, SOLUTION INTRAVENOUS; SUBCUTANEOUS at 10:09

## 2024-09-08 RX ADMIN — EPOETIN ALFA-EPBX 6400 UNITS: 10000 INJECTION, SOLUTION INTRAVENOUS; SUBCUTANEOUS at 10:09

## 2024-09-08 RX ADMIN — HYDRALAZINE HYDROCHLORIDE 10 MG: 20 INJECTION INTRAMUSCULAR; INTRAVENOUS at 04:09

## 2024-09-08 RX ADMIN — TAMSULOSIN HYDROCHLORIDE 0.4 MG: 0.4 CAPSULE ORAL at 10:09

## 2024-09-08 RX ADMIN — GABAPENTIN 300 MG: 300 CAPSULE ORAL at 08:09

## 2024-09-08 RX ADMIN — CEFEPIME 1 G: 1 INJECTION, POWDER, FOR SOLUTION INTRAMUSCULAR; INTRAVENOUS at 12:09

## 2024-09-08 RX ADMIN — HEPARIN SODIUM 5000 UNITS: 5000 INJECTION, SOLUTION INTRAVENOUS; SUBCUTANEOUS at 08:09

## 2024-09-08 RX ADMIN — GABAPENTIN 300 MG: 300 CAPSULE ORAL at 10:09

## 2024-09-08 RX ADMIN — CARVEDILOL 6.25 MG: 3.12 TABLET, FILM COATED ORAL at 08:09

## 2024-09-08 RX ADMIN — ATORVASTATIN CALCIUM 80 MG: 40 TABLET, FILM COATED ORAL at 08:09

## 2024-09-08 RX ADMIN — MUPIROCIN: 20 OINTMENT TOPICAL at 10:09

## 2024-09-08 RX ADMIN — WHITE PETROLATUM: 1.75 OINTMENT TOPICAL at 10:09

## 2024-09-08 RX ADMIN — CARVEDILOL 6.25 MG: 3.12 TABLET, FILM COATED ORAL at 10:09

## 2024-09-08 RX ADMIN — NIFEDIPINE 60 MG: 60 TABLET, FILM COATED, EXTENDED RELEASE ORAL at 10:09

## 2024-09-08 RX ADMIN — MUPIROCIN: 20 OINTMENT TOPICAL at 08:09

## 2024-09-08 NOTE — PT/OT/SLP PROGRESS
Physical Therapy Treatment    Patient Name:  Neno Watkins   MRN:  [id number]    Recommendations:     Discharge therapy intensity: Moderate Intensity Therapy (TBD pending progress)   Discharge Equipment Recommendations: none  Barriers to discharge: Decreased caregiver support, Impaired mobility, and Ongoing medical needs    Assessment:     Neno Watkins is a 64 y.o. male admitted with a medical diagnosis of cellulitis.  He presents with the following impairments/functional limitations: weakness, impaired endurance, impaired functional mobility, decreased coordination, decreased lower extremity function, pain, decreased safety awareness.    Rehab Prognosis: Good; patient would benefit from acute skilled PT services to address these deficits and reach maximum level of function.    Recent Surgery: * No surgery found *      Plan:     During this hospitalization, patient would benefit from acute PT services 5 x/week to address the identified rehab impairments via gait training, therapeutic activities, therapeutic exercises and progress toward the following goals:    Plan of Care Expires:  10/06/24    Subjective     Chief Complaint: none    Objective:     Communicated with nurse prior to session.  Patient found supine with peripheral IV upon PT entry to room.     General Precautions: Standard, fall  Orthopedic Precautions: N/A  Braces: N/A  Respiratory Status: Room air  Blood Pressure:   Skin Integrity:  LE edema      Functional Mobility:  Mod assist to get EOB and same for STS  Gait 100 ft x 3 with RW min/CGA.   Cues for hand placement during STS transfers to improve independence.     Education Provided:  Role and goals of PT, transfer training, bed mobility, gait training, balance training, safety awareness, assistive device, strengthening exercises, and importance of participating in PT to return to PLOF.    Patient left up in chair with call button in reach    GOALS:   Multidisciplinary Problems       Physical  Therapy Goals          Problem: Physical Therapy    Goal Priority Disciplines Outcome Goal Variances Interventions   Physical Therapy Goal     PT, PT/OT Progressing     Description: Goals to be met by: 10/6/24     Patient will increase functional independence with mobility by performin. Supine to sit with Modified [place]  2. Sit to stand transfer with Modified [place]  3. Gait  x 100 feet with Modified [place] using Rolling Walker.                          Time Tracking:     Billable Minutes: Gait training 24    Treatment Type: Treatment  PT/PTA: PTA     Number of PTA visits since last PT visit: 2024

## 2024-09-08 NOTE — PROGRESS NOTES
[facility]                       [address] [place] [address]    PATIENT NAME:       RUFINO REAL  YOB: 1960  CSN:                [id number]   MRN:                [id number]  ADMIT DATE:         09/05/2024 12:53:00  PHYSICIAN:          Rolf Hobbs DPM                            PROGRESS NOTE    DATE:  09/08/2024 00:00:00    SUBJECTIVE:  Mr. Real is seen again this morning.  I had seen him yesterday   for cellulitis and wound on the right foot.  He is not voicing any complaints   today.  No reported fevers or chills.  Tolerating antibiotics.  No GI   complaints.    PHYSICAL EXAMINATION:  Vital signs are stable and afebrile.    Again, no labs posted.    Extremities are stable.  Edema is a little bit improved.  Still with brawny   induration consistent with chronic venous insufficiency.  Less warmth in the   right leg.  Lesion sites dry, not only to the leg, but also of the foot.  Some   notable fat pad atrophy.  No bone exposure, although directly subjacent that   lesion site.  No drainage.    His current shoes were evaluated, which he did not see yesterday and he is   wearing Crocs.    Again x-rays and venous ultrasounds were evaluated.    Blood cultures negative.    ASSESSMENT:  Right lower extremity cellulitis due to possible chronic stasis   changes and foot lesion.    PLAN:  Again, wound care orders are in.  I am going to get him a Darco shoe.  I   think we will provide him a little bit further offloading in that site.  I am   not inclined to do any significant debridements on any of these areas.    Unfortunately, my concern is going to be compliance once at home to limit   weightbearing on this foot.  Gentleman may ultimately require head resection to   allow this lesion to heal completely.  Again, there is nothing to culture, again   would cover staph and strep.  Potential to discharge soon.  I  have explained   that we will probably have him follow up with the [facility] due to his current residence being in [place].        ______________________________  JUAN PABLO Oleary  DD:  09/08/2024  Time:  07:06AM  DT:  09/08/2024  Time:  07:53AM  Job #:  [id number]      PROGRESS NOTE

## 2024-09-08 NOTE — NURSING
Nurses Note -- 4 Eyes      9/7/2024   08:00PM      Skin assessed during: Q Shift Change      [] No Altered Skin Integrity Present    []Prevention Measures Documented      [x] Yes- Altered Skin Integrity Present or Discovered   [] LDA Added if Not in Epic (Describe Wound)   [] New Altered Skin Integrity was Present on Admit and Documented in LDA   [] Wound Image Taken    Wound Care Consulted? No    Attending Nurse:  Roseann Edwards RN/Staff Member:     ELVIRA Reyna

## 2024-09-08 NOTE — PROGRESS NOTES
[facility] Hospital Medicine Progress Note        Chief Complaint: Inpatient Follow-up     HPI:   64 y.o. male with a PMHx of essential hypertension, type 2 DM, BPH, PID, chronic BLE venous insufficiency, ESRD on HD TTS and obesity who presented to [facility] on 9/5/2024 with c/o right lower extremity swelling for the past few months that worsened over the past 3 days with redness and tenderness.  Denied any nausea, vomiting, fever, chills, chest pain or shortness for breath.     Vital Signs upon presentation to the ED included /56, HR 90, RR 18, SpO2 96% on room air, temperature 98.7° F.  Labs notable for WBC 12.04, hemoglobin 13.3, hematocrit 40.3, creatinine 1.89, glucose 123.  Lactic acid normal.  Blood cultures x2 obtained.  Right tib-fib x-ray unremarkable.  RLE venous ultrasound negative for DVT.  He was started on IV vancomycin and cefepime.  Admitted to hospital medicine service for further medical management.    Nephrology consulted for HD, Podiatry consulted for plantar wound eval.  Recommended to cover for staph and strep, recommended compliance to limit weight-bearing on his foot.    Interval Hx:   No acute events reported overnight.  Seen at bedside this morning, reported right lower extremity feels improving, pain and swelling improving    Blood pressure accelerated this morning received IV hydralazine.    Objective/physical exam:  General: NAD   Extremity:  RLE-Some improvement in redness swelling still with  redness, induration, tenderness, noted ulcer plantar lateral surface below 5th toe, refer to images  Neuro: Alert, responding appropriate   Chest: Unlabored breathing on room air   Heart:  Normal rate    VITAL SIGNS: 24 HRS MIN & MAX LAST   Temp  Min: 97.7 °F (36.5 °C)  Max: 98.9 °F (37.2 °C) 97.8 °F (36.6 °C)   BP  Min: 131/70  Max: 179/84 131/70   Pulse  Min: 78  Max: 90  78   Resp  Min: 18  Max: 18 18   SpO2  Min: 94 %  Max: 99 % 96 %     I have reviewed the  following labs:  Recent Labs   Lab 09/05/24  1324 09/06/24  0414 09/07/24  0333   WBC 12.04* 10.67 11.90*   RBC 4.23* 3.86* 3.78*   HGB 13.3* 12.1* 11.9*   HCT 40.3* 35.9* 35.4*   MCV 95.3* 93.0 93.7   MCH 31.4* 31.3* 31.5*   MCHC 33.0 33.7 33.6   RDW 12.3 12.2 12.2    183 199   MPV 9.6 9.5 9.8     Recent Labs   Lab 09/05/24  1324 09/06/24  0414 09/07/24 0333 09/08/24  0739    135* 138 136   K 4.2 3.8 3.6 4.3   CL 98 101 102 105   CO2 30 26 27 23   BUN 12.2 21.0 29.7* 21.9   CREATININE 1.89* 2.27* 2.56* 1.99*   CALCIUM 9.9 8.6* 8.6* 8.7*   ALBUMIN 4.1 3.1*  --  3.4   ALKPHOS 122 97  --  113   ALT 27 19  --  25   AST 30 22  --  34   BILITOT 1.1 0.9  --  1.1     Microbiology Results (last 7 days)       Procedure Component Value Units Date/Time    Blood Culture #1 **CANNOT BE ORDERED STAT** [[id number]]  (Normal) Collected: 09/05/24 1327    Order Status: Completed Specimen: Blood Updated: 09/07/24 1400     Blood Culture No Growth At 48 Hours    Blood Culture #2 **CANNOT BE ORDERED STAT** [[id number]]  (Normal) Collected: 09/05/24 1327    Order Status: Completed Specimen: Blood Updated: 09/07/24 1400     Blood Culture No Growth At 48 Hours             See below for Radiology    Assessment/Plan:  RLE cellulitis  Wound plantar lateral surface below 5th toe-POA  ESRD on HD  Chronic venous insufficiency     Cont iv abx cefepime, vancomycin  Pharmacy to monitor vanc trough, adjust dosing  Podiatry inputs noted, appreciate recs   Continue HD per Nephro   Nephrology planning removal right IJ tunneled dialysis catheter prior to patient's discharge.   Home meds reviewed, resumed appropriate  Monitor BP, use IV  monitor CBGS, use iss  Mobility, PT eval  Supportive care  Labs this morning showed sodium 136, glucose 130, creatinine 1.99  Case discussed with the patient's nurse    VTE prophylaxis: hep sc    Patient condition:  Fair    Anticipated discharge and Disposition:   tbd          Portions of this note dictated  using EMR integrated voice recognition software, and may be subject to voice recognition errors not corrected at proofreading. Please contact writer for clarification if needed.   _____________________________________________________________________    Malnutrition Status:    Scheduled Med:   carvediloL  6.25 mg Oral BID    ceFEPime IV (PEDS and ADULTS)  1 g Intravenous Q12H    epoetin jesus-ebpx (RETACRIT) injection  50 Units/kg Subcutaneous Once    heparin (porcine)  5,000 Units Subcutaneous Q12H    mupirocin   Nasal BID    white petrolatum   Topical (Top) Daily      Continuous Infusions:     PRN Meds:    Current Facility-Administered Medications:     acetaminophen, 1,000 mg, Oral, Q6H PRN    acetaminophen, 650 mg, Oral, Q4H PRN    calcium carbonate, 500 mg, Oral, TID PRN    dextrose 10%, 12.5 g, Intravenous, PRN    dextrose 10%, 25 g, Intravenous, PRN    glucagon (human recombinant), 1 mg, Intramuscular, PRN    glucose, 16 g, Oral, PRN    glucose, 24 g, Oral, PRN    hydrALAZINE, 10 mg, Intravenous, Q6H PRN    insulin aspart U-100, 0-5 Units, Subcutaneous, QID (AC + HS) PRN    naloxone, 0.02 mg, Intravenous, PRN    ondansetron, 4 mg, Intravenous, Q4H PRN    prochlorperazine, 5 mg, Intravenous, Q6H PRN    sodium chloride 0.9%, 250 mL, Intravenous, PRN    sodium chloride 0.9%, 10 mL, Intravenous, PRN    vancomycin - pharmacy to dose, , Intravenous, pharmacy to manage frequency     Radiology:  I have personally reviewed the following imaging and agree with the radiologist.     CV Ultrasound doppler venous DVT leg right    There is a right subcutaneous edema located in the proximal calf and   distal calf veins.    Negative for deep and superficial vein thrombosis in the visualized   vessels of the right lower extremity.      Deep veins of the lower leg were not well visualized secondary to edema   and skin thickening. Unable to rule out thrombosis in those vessels.      William Ramos MD  Department [facility]  Medicine   [facility]   09/08/2024

## 2024-09-09 LAB
POCT GLUCOSE: 114 MG/DL (ref 70–110)
POCT GLUCOSE: 115 MG/DL (ref 70–110)
POCT GLUCOSE: 121 MG/DL (ref 70–110)
POCT GLUCOSE: 164 MG/DL (ref 70–110)

## 2024-09-09 PROCEDURE — 25000003 PHARM REV CODE 250: Performed by: INTERNAL MEDICINE

## 2024-09-09 PROCEDURE — 63600175 PHARM REV CODE 636 W HCPCS: Performed by: INTERNAL MEDICINE

## 2024-09-09 PROCEDURE — 97116 GAIT TRAINING THERAPY: CPT | Mod: CQ

## 2024-09-09 PROCEDURE — 63600175 PHARM REV CODE 636 W HCPCS: Performed by: NURSE PRACTITIONER

## 2024-09-09 PROCEDURE — 11000001 HC ACUTE MED/SURG PRIVATE ROOM

## 2024-09-09 PROCEDURE — 97535 SELF CARE MNGMENT TRAINING: CPT

## 2024-09-09 RX ADMIN — MUPIROCIN: 20 OINTMENT TOPICAL at 09:09

## 2024-09-09 RX ADMIN — CARVEDILOL 6.25 MG: 3.12 TABLET, FILM COATED ORAL at 08:09

## 2024-09-09 RX ADMIN — GABAPENTIN 300 MG: 300 CAPSULE ORAL at 08:09

## 2024-09-09 RX ADMIN — ATORVASTATIN CALCIUM 80 MG: 40 TABLET, FILM COATED ORAL at 08:09

## 2024-09-09 RX ADMIN — HEPARIN SODIUM 5000 UNITS: 5000 INJECTION, SOLUTION INTRAVENOUS; SUBCUTANEOUS at 08:09

## 2024-09-09 RX ADMIN — WHITE PETROLATUM: 1.75 OINTMENT TOPICAL at 08:09

## 2024-09-09 RX ADMIN — CEFEPIME 1 G: 1 INJECTION, POWDER, FOR SOLUTION INTRAMUSCULAR; INTRAVENOUS at 12:09

## 2024-09-09 RX ADMIN — NIFEDIPINE 60 MG: 60 TABLET, FILM COATED, EXTENDED RELEASE ORAL at 08:09

## 2024-09-09 RX ADMIN — HYDRALAZINE HYDROCHLORIDE 10 MG: 20 INJECTION INTRAMUSCULAR; INTRAVENOUS at 04:09

## 2024-09-09 RX ADMIN — MUPIROCIN: 20 OINTMENT TOPICAL at 08:09

## 2024-09-09 RX ADMIN — TAMSULOSIN HYDROCHLORIDE 0.4 MG: 0.4 CAPSULE ORAL at 08:09

## 2024-09-09 NOTE — PROGRESS NOTES
[facility] Hospital Medicine Progress Note        Chief Complaint: Inpatient Follow-up     HPI:   64 y.o. male with a PMHx of essential hypertension, type 2 DM, BPH, PID, chronic BLE venous insufficiency, ESRD on HD TTS and obesity who presented to [facility] on 9/5/2024 with c/o right lower extremity swelling for the past few months that worsened over the past 3 days with redness and tenderness.  Denied any nausea, vomiting, fever, chills, chest pain or shortness for breath.     Vital Signs upon presentation to the ED included /56, HR 90, RR 18, SpO2 96% on room air, temperature 98.7° F.  Labs notable for WBC 12.04, hemoglobin 13.3, hematocrit 40.3, creatinine 1.89, glucose 123.  Lactic acid normal.  Blood cultures x2 obtained.  Right tib-fib x-ray unremarkable.  RLE venous ultrasound negative for DVT.  He was started on IV vancomycin and cefepime.  Admitted to hospital medicine service for further medical management.    Nephrology consulted for HD, Podiatry consulted for plantar wound eval.  Recommended to cover for staph and strep, recommended compliance to limit weight-bearing on his foot.    Interval Hx:   No acute events reported overnight.    Seen at bedside this morning,lying in bed,  reported feeling improving ,otherwise no new complaints  Tolerating po,worked with therapy   Discussed therapy recs, pt refused snf placement     Objective/physical exam:  General: NAD   Chest: Unlabored breathing on room air   Heart:  Normal rate  Extremity:  RLE exam more or less remains the same -some improvement in redness , induration, tenderness, noted ulcer plantar lateral surface below 5th toe, refer to images  Neuro: Alert, responding appropriate     VITAL SIGNS: 24 HRS MIN & MAX LAST   Temp  Min: 97.5 °F (36.4 °C)  Max: 98.5 °F (36.9 °C) 97.5 °F (36.4 °C)   BP  Min: 131/70  Max: 174/99 136/75   Pulse  Min: 78  Max: 94  94   Resp  Min: 18  Max: 18 18   SpO2  Min: 96 %  Max: 98 % 98 %      I have reviewed the following labs:  Recent Labs   Lab 09/05/24  1324 09/06/24 0414 09/07/24 0333   WBC 12.04* 10.67 11.90*   RBC 4.23* 3.86* 3.78*   HGB 13.3* 12.1* 11.9*   HCT 40.3* 35.9* 35.4*   MCV 95.3* 93.0 93.7   MCH 31.4* 31.3* 31.5*   MCHC 33.0 33.7 33.6   RDW 12.3 12.2 12.2    183 199   MPV 9.6 9.5 9.8     Recent Labs   Lab 09/05/24  1324 09/06/24 0414 09/07/24 0333 09/08/24  0739    135* 138 136   K 4.2 3.8 3.6 4.3   CL 98 101 102 105   CO2 30 26 27 23   BUN 12.2 21.0 29.7* 21.9   CREATININE 1.89* 2.27* 2.56* 1.99*   CALCIUM 9.9 8.6* 8.6* 8.7*   ALBUMIN 4.1 3.1*  --  3.4   ALKPHOS 122 97  --  113   ALT 27 19  --  25   AST 30 22  --  34   BILITOT 1.1 0.9  --  1.1     Microbiology Results (last 7 days)       Procedure Component Value Units Date/Time    Blood Culture #1 **CANNOT BE ORDERED STAT** [[id number]]  (Normal) Collected: 09/05/24 1327    Order Status: Completed Specimen: Blood Updated: 09/08/24 1401     Blood Culture No Growth At 72 Hours    Blood Culture #2 **CANNOT BE ORDERED STAT** [[id number]]  (Normal) Collected: 09/05/24 1327    Order Status: Completed Specimen: Blood Updated: 09/08/24 1401     Blood Culture No Growth At 72 Hours             See below for Radiology    Assessment/Plan:  RLE cellulitis  Wound plantar lateral surface below 5th toe-POA  ESRD on HD  Chronic venous insufficiency   HTN     Cont iv abx cefepime, vancomycin  Pharmacy to monitor vanc trough, adjust dosing  Podiatry inputs noted, appreciate recs   Continue HD per Nephro   Follow interventional nephro recs   Monitor BP, use IV prn  monitor CBGS, use iss  Cont therapy services   Supportive care  Case discussed with the patient's nurse,case mngt.      VTE prophylaxis: hep sc    Patient condition:  Fair    Anticipated discharge and Disposition:   tbd/HH           Portions of this note dictated using EMR integrated voice recognition software, and may be subject to voice recognition errors not corrected at  proofreading. Please contact writer for clarification if needed.   _____________________________________________________________________    Malnutrition Status:    Scheduled Med:   atorvastatin  80 mg Oral QHS    carvediloL  6.25 mg Oral BID    ceFEPime IV (PEDS and ADULTS)  1 g Intravenous Q12H    gabapentin  300 mg Oral BID    heparin (porcine)  5,000 Units Subcutaneous Q12H    mupirocin   Nasal BID    NIFEdipine  60 mg Oral Daily    tamsulosin  1 capsule Oral Daily    white petrolatum   Topical (Top) Daily      Continuous Infusions:     PRN Meds:    Current Facility-Administered Medications:     acetaminophen, 1,000 mg, Oral, Q6H PRN    acetaminophen, 650 mg, Oral, Q4H PRN    calcium carbonate, 500 mg, Oral, TID PRN    dextrose 10%, 12.5 g, Intravenous, PRN    dextrose 10%, 25 g, Intravenous, PRN    glucagon (human recombinant), 1 mg, Intramuscular, PRN    glucose, 16 g, Oral, PRN    glucose, 24 g, Oral, PRN    hydrALAZINE, 10 mg, Intravenous, Q6H PRN    insulin aspart U-100, 0-5 Units, Subcutaneous, QID (AC + HS) PRN    naloxone, 0.02 mg, Intravenous, PRN    ondansetron, 4 mg, Intravenous, Q4H PRN    prochlorperazine, 5 mg, Intravenous, Q6H PRN    sodium chloride 0.9%, 250 mL, Intravenous, PRN    sodium chloride 0.9%, 10 mL, Intravenous, PRN    vancomycin - pharmacy to dose, , Intravenous, pharmacy to manage frequency     Radiology:  I have personally reviewed the following imaging and agree with the radiologist.     CV Ultrasound doppler venous DVT leg right    There is a right subcutaneous edema located in the proximal calf and   distal calf veins.    Negative for deep and superficial vein thrombosis in the visualized   vessels of the right lower extremity.      Deep veins of the lower leg were not well visualized secondary to edema   and skin thickening. Unable to rule out thrombosis in those vessels.      William Ramos MD  Department of Hospital Medicine   [facility]    09/09/2024

## 2024-09-09 NOTE — PT/OT/SLP PROGRESS
Physical Therapy Treatment    Patient Name:  Neno Watkins   MRN:  [id number]    Recommendations:     Discharge therapy intensity: Moderate Intensity Therapy   Discharge Equipment Recommendations: none  Barriers to discharge: Decreased caregiver support, Impaired mobility, and Ongoing medical needs    Assessment:     Neno Watkins is a 64 y.o. male admitted with a medical diagnosis of RLE Ceullulitis, Leukocytosis.  He presents with the following impairments/functional limitations: weakness, impaired endurance, impaired functional mobility, decreased coordination, decreased lower extremity function, pain, decreased safety awareness.    Rehab Prognosis: Good; patient would benefit from acute skilled PT services to address these deficits and reach maximum level of function.    Recent Surgery: * No surgery found *      Plan:     During this hospitalization, patient would benefit from acute PT services 5 x/week to address the identified rehab impairments via gait training, therapeutic activities, therapeutic exercises and progress toward the following goals:    Plan of Care Expires:  10/06/24    Subjective     Chief Complaint: none stated  Patient/Family Comments/goals: none stated  Pain/Comfort:         Objective:     Communicated with nursing prior to session.  Patient found supine with peripheral IV upon PT entry to room.     General Precautions: Standard, fall  Orthopedic Precautions: N/A  Braces: N/A  Respiratory Status: Room air  Blood Pressure: NT    Functional Mobility:  Bed Mobility:     Supine to Sit: minimum assistance  Sit to Supine: minimum assistance  Transfers:     Sit to Stand:  moderate assistance with rolling walker  Gait: 75' x3 w/RW, CGA  Standing rest breaks between trials  1 LOB noted, pt recovered  Pt with decrease safety awarness    Therapeutic Activities/Exercises:      Education:  Patient provided with verbal education education regarding PT role/goals/POC, fall prevention, and safety  awareness.  Understanding was verbalized.     Patient left HOB elevated with all lines intact, call button in reach, and nurse notified    GOALS:   Multidisciplinary Problems       Physical Therapy Goals          Problem: Physical Therapy    Goal Priority Disciplines Outcome Goal Variances Interventions   Physical Therapy Goal     PT, PT/OT Progressing     Description: Goals to be met by: 10/6/24     Patient will increase functional independence with mobility by performin. Supine to sit with Modified [place]  2. Sit to stand transfer with Modified [place]  3. Gait  x 100 feet with Modified [place] using Rolling Walker.                          Time Tracking:     PT Received On: 24  PT Start Time: 1435     PT Stop Time: 1458  PT Total Time (min): 23 min     Billable Minutes: Gait Training 23    Treatment Type: Treatment  PT/PTA: PTA     Number of PTA visits since last PT visit: 2     2024

## 2024-09-09 NOTE — NURSING
Nurses Note -- 4 Eyes      9/8/2024   08:00 PM      Skin assessed during: Q Shift Change      [] No Altered Skin Integrity Present    []Prevention Measures Documented      [x] Yes- Altered Skin Integrity Present or Discovered   [] LDA Added if Not in Epic (Describe Wound)   [] New Altered Skin Integrity was Present on Admit and Documented in LDA   [] Wound Image Taken    Wound Care Consulted? No    Attending Nurse:  Roseann FELIX    Second RN/Staff Member:   Rafiq KRAUS

## 2024-09-09 NOTE — PT/OT/SLP PROGRESS
"Occupational Therapy   Treatment    Name: Neno Watkins  MRN: [id number]  Admitting Diagnosis:  RLE edema     Recommendations:     Recommended therapy intensity at discharge: Moderate Intensity Therapy   Discharge Equipment Recommendations:  none  Barriers to discharge:  Other (Comment) (ongoing medical needs)    Assessment:     Neno Watkins is a 64 y.o. male with a medical diagnosis of RLE cellulitis, ESRD on HD TTS. Performance deficits affecting function are weakness, impaired endurance, impaired self care skills, impaired functional mobility, gait instability, impaired balance, decreased safety awareness, decreased lower extremity function, decreased upper extremity function, decreased coordination. He required mod A for sit<>stand and CGA for functional mobility within room and hallway using rolling walker. He continues to present with decreased safety awareness and required education throughout session for safe RW management. Pt left up in chair with all needs met and call bell within reach; educated pt to call RN when ready to return to bed.     Therapist passed pt's room at 10:31 and pt was attempting to return back to bed without assist. Pt stated "I called nsg, but they took too long." Educated pt on importance of waiting for staff assistance to ensure safety with mobility; pt verbalized understanding. Pt required mod A for t/fs using rolling walker and min A for bed mobility. Return time 10:31-10:40.     Rehab Prognosis:  Good; patient would benefit from acute skilled OT services to address these deficits and reach maximum level of function.       Plan:     Patient to be seen 5 x/week to address the above listed problems via self-care/home management, therapeutic activities, therapeutic exercises  Plan of Care Expires: 10/07/24  Plan of Care Reviewed with: patient    Subjective     Pain/Comfort:  Pain Rating 1: 0/10    Objective:     Communicated with: RN prior to session.  Patient found HOB elevated " with peripheral IV upon OT entry to room.    General Precautions: Standard, fall    Orthopedic Precautions:N/A  Braces: N/A  Respiratory Status: Room air     Occupational Performance:     Bed Mobility:    Patient completed Supine to Sit with minimum assistance  Patient completed Sit to Supine with minimum assistance     Functional Mobility/Transfers:  Patient completed Sit <> Stand Transfer with moderate assistance  with  rolling walker   Patient completed Bed <> Chair Transfer using Step Transfer technique with moderate assistance with rolling walker  Patient completed Toilet Transfer Step Transfer technique with contact guard assistance with  rolling walker  Functional Mobility: pt required mod A for sit<>stand t/fs and CGA for mobility using rolling walker.     Activities of Daily Living:  Grooming: contact guard assistance for oral care while standing at the sink.   Lower Body Dressing: maximal assistance to don shoes while sitting EOB.   Toileting: contact guard assistance to void while standing at the toilet.     Therapeutic Positioning    OT interventions performed during the course of today's session in an effort to prevent and/or reduce acquired pressure injuries:   Therapeutic positioning was provided at the conclusion of session to offload all bony prominences for the prevention and/or reduction of pressure injuries    Skin assessment: all bony prominences were assessed    Findings:  Visible skin intact.     [facility] 6 Click ADL: 18    Patient Education:  Patient provided with verbal education education regarding OT role/goals/POC, fall prevention, safety awareness, Discharge/DME recommendations, and pressure ulcer prevention.  Understanding was verbalized.      Patient left HOB elevated with all lines intact, call button in reach, and RN notified.    GOALS:   Multidisciplinary Problems       Occupational Therapy Goals          Problem: Occupational Therapy    Goal Priority Disciplines Outcome Interventions    Occupational Therapy Goal     OT, PT/OT Progressing    Description: Goals to be met by: 10/6/24     Patient will increase functional independence with ADLs by performing:    UE Dressing with Modified [place].  LE Dressing with Modified [place] using AE prn.  Grooming while standing at sink with Modified [place].  Toileting from toilet with Modified [place] for hygiene and clothing management.   Toilet transfer to toilet with Modified [place].                         Time Tracking:     OT Date of Treatment: 09/09/24  OT Start Time: 0911 + 10:31  OT Stop Time: 0942 + 10:40  OT Total Time (min): 31 min    Billable Minutes:Self Care/Home Management 40 minutes.     OT/PAUL: OT     Number of PAUL visits since last OT visit: 1 9/9/2024

## 2024-09-09 NOTE — PLAN OF CARE
1027: Spoke to patient regarding SNF placement. Patient is refusing SNF placement stating he is wanting to go home. Educated patient on safety concerns and concerns for falls at home. Patient understands and stated his mobility is the same prior to hospitalization and his daughter and spouse will be able to assist at home. Patient in agreement with home health referral. Spoke to patient's daughter, she would like patient to go to SNF but understands it is patient choice. She will be available to assist patient at home. Patient would like home health used in the past from [facility]. Referral sent via [employer].     Discussed setting patient up with outpatient wound care in [place], patient reports he will have difficulty with transportation and would like Home Health to manage.    1553: Dr. Hobbs spoke with patient, he is in agreement to follow-up with wound care clinic in [place]. Called [facility] and left a voicemail to schedule appointment.

## 2024-09-09 NOTE — PLAN OF CARE
INTERVENTIONAL NEPHROLOGY PLAN OF CARE UPDATE         Patient Name: Neno Watkins   1960    Date: 2024      Received consultation regarding removal of tunneled dialysis catheter. Reviewed chart and found:  - Discovered that L BC AVF was created by Dr. Lieberman > 1 year ago.  - The pt had been referred to me for a second opinion thereafter where the fistula was found to be under-developed and required stent-graft placement. The pt desired to follow-up with Dr. Lieberman thereafter.    The pt gives history that he had consistent problems with his fistula but had not followed up with Dr. Lieberman.    Discussed function of the fistula with inpatient HD unit. They have notified me that the catheter had to be used as cannulating the fistula was difficult and did not function well for dialysis.    I recommend the following:  - Please consult Dr. Lieberman's service ([facility]) for evaluation of fistula.  - Would avoid tunneled catheter removal until the fistula has been found to be functioning well.  - If a second opinion is desired by the pt, please do not hesitate to re-consult our service PRN.    Please reach me with any questions.    Rodrigo Soliman DO  Interventional Nephrology  Cell: [phone]

## 2024-09-09 NOTE — PROGRESS NOTES
[facility]                       [address] [address]    PATIENT NAME:       RUFINO REAL  YOB: 1960  CSN:                [id number]   MRN:                [id number]  ADMIT DATE:         09/05/2024 12:53:00  PHYSICIAN:          Rolf Hobbs DPM                            PROGRESS NOTE    DATE:  09/09/2024 00:00:00    SUBJECTIVE:  The patient was seen today and not voicing any complaints.  He is   basically refusing to follow up with the W[facility] Care Center, simply just wanted   somebody come to his house to change dressings.  Yesterday, I had recommended he   follow up with the Wound Care Center in [place], since he has problems   with transportation.  Currently on antibiotics.  No other issues.    PHYSICAL EXAMINATION:  VITAL SIGNS:  Stable and afebrile.  EXTREMITIES:  The extremity remains about the same.  Generalized edema persists.    The leg is warm.  All inflammatory changes much improved.  No crepitation to   any site.  Web spaces are clear.  Heel stable.    LABORATORY DATA:  No labs posted for today.    ASSESSMENT:  Right foot wound with secondary cellulitis, currently stable,   improved.    PLAN:  Continue with the current care.  Wound care orders written.  I ordered a   Darco shoe.  Again, I have reiterated to him that he needs to follow up with the   center at [facility] in an effort to make sure that this area   improves.  I do not think, he needs any extensive debridements at this point,   but if the area breaks down or gets worse, he may require met head resection.    The patient is now in agreement to follow up with him.        ______________________________  Rolf Hobbs DPM    GAS/AQS  DD:  09/09/2024  Time:  04:03PM  DT:  09/09/2024  Time:  05:14PM  Job #:  [id number]      PROGRESS NOTE

## 2024-09-10 VITALS
BODY MASS INDEX: 40.09 KG/M2 | DIASTOLIC BLOOD PRESSURE: 76 MMHG | HEART RATE: 88 BPM | SYSTOLIC BLOOD PRESSURE: 146 MMHG | RESPIRATION RATE: 20 BRPM | WEIGHT: 280 LBS | TEMPERATURE: 99 F | OXYGEN SATURATION: 96 % | HEIGHT: 70 IN

## 2024-09-10 LAB
ANION GAP SERPL CALC-SCNC: 9 MEQ/L
BACTERIA BLD CULT: NORMAL
BACTERIA BLD CULT: NORMAL
BUN SERPL-MCNC: 43.1 MG/DL (ref 8.4–25.7)
CALCIUM SERPL-MCNC: 8.9 MG/DL (ref 8.8–10)
CHLORIDE SERPL-SCNC: 107 MMOL/L (ref 98–107)
CO2 SERPL-SCNC: 20 MMOL/L (ref 23–31)
CREAT SERPL-MCNC: 2.85 MG/DL (ref 0.73–1.18)
CREAT/UREA NIT SERPL: 15
GFR SERPLBLD CREATININE-BSD FMLA CKD-EPI: 24 ML/MIN/1.73/M2
GLUCOSE SERPL-MCNC: 113 MG/DL (ref 82–115)
GLUCOSE SERPL-MCNC: 132 MG/DL (ref 70–110)
POCT GLUCOSE: 132 MG/DL (ref 70–110)
POTASSIUM SERPL-SCNC: 4.3 MMOL/L (ref 3.5–5.1)
SODIUM SERPL-SCNC: 136 MMOL/L (ref 136–145)
VANCOMYCIN SERPL-MCNC: 7.7 UG/ML (ref 15–20)

## 2024-09-10 PROCEDURE — 25000003 PHARM REV CODE 250: Performed by: INTERNAL MEDICINE

## 2024-09-10 PROCEDURE — 80100016 HC MAINTENANCE HEMODIALYSIS

## 2024-09-10 PROCEDURE — 63600175 PHARM REV CODE 636 W HCPCS: Performed by: INTERNAL MEDICINE

## 2024-09-10 PROCEDURE — 36415 COLL VENOUS BLD VENIPUNCTURE: CPT | Performed by: PHYSICIAN ASSISTANT

## 2024-09-10 PROCEDURE — 63600175 PHARM REV CODE 636 W HCPCS: Performed by: NURSE PRACTITIONER

## 2024-09-10 PROCEDURE — 80048 BASIC METABOLIC PNL TOTAL CA: CPT | Performed by: PHYSICIAN ASSISTANT

## 2024-09-10 PROCEDURE — 80202 ASSAY OF VANCOMYCIN: CPT | Performed by: INTERNAL MEDICINE

## 2024-09-10 RX ORDER — SULFAMETHOXAZOLE AND TRIMETHOPRIM 800; 160 MG/1; MG/1
1 TABLET ORAL DAILY
Qty: 5 TABLET | Refills: 0 | Status: SHIPPED | OUTPATIENT
Start: 2024-09-10 | End: 2024-09-15

## 2024-09-10 RX ORDER — CEPHALEXIN 500 MG/1
500 CAPSULE ORAL EVERY 6 HOURS
Qty: 20 CAPSULE | Refills: 0 | Status: SHIPPED | OUTPATIENT
Start: 2024-09-10 | End: 2024-09-15

## 2024-09-10 RX ADMIN — CEFEPIME 1 G: 1 INJECTION, POWDER, FOR SOLUTION INTRAMUSCULAR; INTRAVENOUS at 12:09

## 2024-09-10 RX ADMIN — WHITE PETROLATUM: 1.75 OINTMENT TOPICAL at 01:09

## 2024-09-10 RX ADMIN — NIFEDIPINE 60 MG: 60 TABLET, FILM COATED, EXTENDED RELEASE ORAL at 01:09

## 2024-09-10 RX ADMIN — HEPARIN SODIUM 5000 UNITS: 5000 INJECTION, SOLUTION INTRAVENOUS; SUBCUTANEOUS at 01:09

## 2024-09-10 RX ADMIN — TAMSULOSIN HYDROCHLORIDE 0.4 MG: 0.4 CAPSULE ORAL at 01:09

## 2024-09-10 RX ADMIN — CARVEDILOL 6.25 MG: 3.12 TABLET, FILM COATED ORAL at 01:09

## 2024-09-10 RX ADMIN — GABAPENTIN 300 MG: 300 CAPSULE ORAL at 01:09

## 2024-09-10 NOTE — PROGRESS NOTES
"Nephrology Progress Note      HPI:    Neno Watkins is a 64 y.o. male  pt of Dr. Alanis, with hx of ESRD on HD in [place] TTS via Left AVF, PAD, BPH, HTN. He presented to the ED yesterday with a complaint of right leg swelling/cellulitis. He did have a full HD run yesterday 9/5. States they are going to switch him to MWF, but has been on TTS until now. He also has a right tunneled cath he states has been there for three years but they are using the left AVF without issue.     Interval History:    9/7  Seen on HD  No c/o    9/10/24  Pt seen on HD, tolerating it well. No complaints. VSS. Chemistry labs reviewed, all stable.  No new complaints.  Tolerating oral nutrition.    ROS:    Review of Systems   Constitutional:  Negative for fever, malaise/fatigue and weight loss.   Respiratory:  Negative for cough and shortness of breath.    Cardiovascular:  Negative for chest pain, palpitations, orthopnea and leg swelling.   Gastrointestinal:  Negative for diarrhea, nausea and vomiting.   Genitourinary:  Negative for dysuria, frequency, hematuria and urgency.   Musculoskeletal:  Negative for back pain and myalgias.   Skin:  Negative for rash.   Neurological:  Negative for speech change and focal weakness.   All other systems reviewed and are negative.      Vital Signs:  BP (!) 135/59   Pulse 83   Temp 98.5 °F (36.9 °C) (Oral)   Resp 20   Ht 5' 10" (1.778 m)   Wt 127 kg (279 lb 15.8 oz)   SpO2 96%   BMI 40.17 kg/m²   Body mass index is 40.17 kg/m².    Physical Exam:    Physical Exam  Vitals and nursing note reviewed.   Constitutional:       Appearance: Normal appearance.   HENT:      Head: Normocephalic and atraumatic.   Eyes:      General: No scleral icterus.     Extraocular Movements: Extraocular movements intact.   Cardiovascular:      Rate and Rhythm: Normal rate and regular rhythm.      Heart sounds: Normal heart sounds.      Comments: Left AVF   Pulmonary:      Effort: Pulmonary effort is normal. No " respiratory distress.      Breath sounds: Normal breath sounds.   Abdominal:      General: Abdomen is flat. Bowel sounds are normal. There is no distension.      Palpations: Abdomen is soft.      Tenderness: There is no abdominal tenderness.   Musculoskeletal:         General: No swelling or deformity. Normal range of motion.      Right lower leg: Edema present.      Left lower leg: No edema.      Comments: Right LE with blistering, erythema and generalized swelling, improved since admission   Skin:     General: Skin is warm and dry.   Neurological:      General: No focal deficit present.      Mental Status: He is alert and oriented to person, place, and time.   Psychiatric:         Mood and Affect: Mood normal.         Behavior: Behavior normal.         Labs:  Recent Results (from the past 48 hour(s))   POCT glucose    Collection Time: 09/08/24 11:28 AM   Result Value Ref Range    POCT Glucose 158 (H) 70 - 110 mg/dL   POCT glucose    Collection Time: 09/08/24  4:51 PM   Result Value Ref Range    POCT Glucose 145 (H) 70 - 110 mg/dL   POCT glucose    Collection Time: 09/08/24  8:30 PM   Result Value Ref Range    POCT Glucose 150 (H) 70 - 110 mg/dL   POCT glucose    Collection Time: 09/09/24  6:19 AM   Result Value Ref Range    POCT Glucose 121 (H) 70 - 110 mg/dL   POCT glucose    Collection Time: 09/09/24 12:23 PM   Result Value Ref Range    POCT Glucose 164 (H) 70 - 110 mg/dL   POCT glucose    Collection Time: 09/09/24  4:56 PM   Result Value Ref Range    POCT Glucose 114 (H) 70 - 110 mg/dL   POCT glucose    Collection Time: 09/09/24  7:43 PM   Result Value Ref Range    POCT Glucose 115 (H) 70 - 110 mg/dL   Vancomycin, Random    Collection Time: 09/10/24  4:57 AM   Result Value Ref Range    Vancomycin Random 7.7 (L) 15.0 - 20.0 ug/ml   Basic Metabolic Panel    Collection Time: 09/10/24  4:57 AM   Result Value Ref Range    Sodium 136 136 - 145 mmol/L    Potassium 4.3 3.5 - 5.1 mmol/L    Chloride 107 98 - 107 mmol/L     CO2 20 (L) 23 - 31 mmol/L    Glucose 113 82 - 115 mg/dL    Blood Urea Nitrogen 43.1 (H) 8.4 - 25.7 mg/dL    Creatinine 2.85 (H) 0.73 - 1.18 mg/dL    BUN/Creatinine Ratio 15     Calcium 8.9 8.8 - 10.0 mg/dL    Anion Gap 9.0 mEq/L    eGFR 24 mL/min/1.73/m2   POCT glucose    Collection Time: 09/10/24  5:55 AM   Result Value Ref Range    POCT Glucose 132 (H) 70 - 110 mg/dL   POCT glucose    Collection Time: 09/10/24  6:06 AM   Result Value Ref Range    POC Glucose 132 (A) 70 - 110 MG/DL         Assessment/Plan:       ESRD on HD TTS   RLE cellulitis - mgmt per primary team - on cefepime and vanc  Diabetes mellitus type 2   Hypertension - well controlled  Chronic lower extremities venous insufficiency  Obesity    Recommendations:    - continue HD TTS while inpatient  - his leg is looking better   - nursing initially had trouble using the fistula, though it is functioning now. Probably not a good idea to remove the tunneled cath yet

## 2024-09-10 NOTE — DISCHARGE SUMMARY
[facility] Hospital Medicine Discharge Summary    Admit Date: 9/5/2024  Discharge Date and Time: 9/10/980146:06 AM  Admitting Physician:  Team  Discharging Physician: William Ramos MD.  Primary Care Physician: Messi Weston MD  Consults: Nephrology,podiatry    Discharge Diagnoses:  RLE cellulitis  Wound plantar lateral surface below 5th toe-POA  ESRD on HD  Chronic venous insufficiency   HTN     Hospital Course:   64 y.o. male with a PMHx of essential hypertension, type 2 DM, BPH, PID, chronic BLE venous insufficiency, ESRD on HD TTS and obesity who presented to [facility] on 9/5/2024 with c/o right lower extremity swelling for the past few months that worsened over the past 3 days with redness and tenderness.  Denied any nausea, vomiting, fever, chills, chest pain or shortness for breath.     Vital Signs upon presentation to the ED included /56, HR 90, RR 18, SpO2 96% on room air, temperature 98.7° F.  Labs notable for WBC 12.04, hemoglobin 13.3, hematocrit 40.3, creatinine 1.89, glucose 123.  Lactic acid normal.  Blood cultures x2 obtained.  Right tib-fib x-ray unremarkable.  RLE venous ultrasound negative for DVT.  He was started on IV vancomycin and cefepime.  Admitted to hospital medicine service for further medical management.       Pt was treated with broad spectrum iv abx with improvement in symptoms. Podiatry consulted for plantar wound eval.  Recommended to cover for staph and strep, recommended compliance to limit weight-bearing on his foot.    Nephrology consulted for HD. Interventional nephrology was consulted for removal of tunneled dialysis catheter ,recommended vascular surgery consultation for removal once fistula has been found to be functioning well. Pt has reportedly vascular surgery follow up appointment.    Pt was cleared to [place] from nephrology,podiatry stand point    Pt was seen and examined on the day of discharge, pt reported improving symptoms,  expressed his wish to be discharged, pt discharged to home with home health.Prescriptions sent.        Vitals:  VITAL SIGNS: 24 HRS MIN & MAX LAST   Temp  Min: 97.9 °F (36.6 °C)  Max: 99.1 °F (37.3 °C) 98.5 °F (36.9 °C)   BP  Min: 109/61  Max: 135/59 (!) 135/59   Pulse  Min: 74  Max: 83  83   Resp  Min: 20  Max: 188 20   SpO2  Min: 95 %  Max: 98 % 96 %       Physical Exam:  General: NAD   Chest: Unlabored breathing on room air   Heart:  Normal rate  Extremity:  RLE exam redness , induration, tenderness improved,ulcer plantar lateral surface below 5th toe  Neuro: Alert, responding appropriate     Procedures Performed: No admission procedures for hospital encounter.     Significant Diagnostic Studies: See Full reports for all details    Recent Labs   Lab 09/05/24  1324 09/06/24  0414 09/07/24  0333   WBC 12.04* 10.67 11.90*   RBC 4.23* 3.86* 3.78*   HGB 13.3* 12.1* 11.9*   HCT 40.3* 35.9* 35.4*   MCV 95.3* 93.0 93.7   MCH 31.4* 31.3* 31.5*   MCHC 33.0 33.7 33.6   RDW 12.3 12.2 12.2    183 199   MPV 9.6 9.5 9.8       Recent Labs   Lab 09/05/24  1324 09/06/24  0414 09/07/24  0333 09/08/24  0739 09/10/24  0457    135* 138 136 136   K 4.2 3.8 3.6 4.3 4.3   CL 98 101 102 105 107   CO2 30 26 27 23 20*   BUN 12.2 21.0 29.7* 21.9 43.1*   CREATININE 1.89* 2.27* 2.56* 1.99* 2.85*   CALCIUM 9.9 8.6* 8.6* 8.7* 8.9   ALBUMIN 4.1 3.1*  --  3.4  --    ALKPHOS 122 97  --  113  --    ALT 27 19  --  25  --    AST 30 22  --  34  --    BILITOT 1.1 0.9  --  1.1  --         Microbiology Results (last 7 days)       Procedure Component Value Units Date/Time    Blood Culture #2 **CANNOT BE ORDERED STAT** [[id number]]  (Normal) Collected: 09/05/24 1327    Order Status: Completed Specimen: Blood Updated: 09/09/24 1401     Blood Culture No Growth At 96 Hours    Blood Culture #1 **CANNOT BE ORDERED STAT** [[id number]]  (Normal) Collected: 09/05/24 1327    Order Status: Completed Specimen: Blood Updated: 09/09/24 1401     Blood  Culture No Growth At 96 Hours             CV Ultrasound doppler venous DVT leg right    There is a right subcutaneous edema located in the proximal calf and   distal calf veins.    Negative for deep and superficial vein thrombosis in the visualized   vessels of the right lower extremity.      Deep veins of the lower leg were not well visualized secondary to edema   and skin thickening. Unable to rule out thrombosis in those vessels.         Medication List        START taking these medications      cephALEXin 500 MG capsule  Commonly known as: KEFLEX  Take 1 capsule (500 mg total) by mouth every 6 (six) hours. for 5 days     sulfamethoxazole-trimethoprim 800-160mg 800-160 mg Tab  Commonly known as: BACTRIM DS  Take 1 tablet by mouth once daily. for 5 days     white petrolatum 41 % Oint  Apply topically once daily.            CONTINUE taking these medications      atorvastatin 80 MG tablet  Commonly known as: LIPITOR     carvediloL 6.25 MG tablet  Commonly known as: COREG     docusate sodium 100 MG capsule  Commonly known as: COLACE     gabapentin 300 MG capsule  Commonly known as: NEURONTIN     melatonin 5 mg Cap     MOTRIN IB ORAL     multivitamin per tablet  Commonly known as: THERAGRAN     NIFEdipine 60 MG (OSM) 24 hr tablet  Commonly known as: PROCARDIA-XL  TAKE 1 TABLET(60 MG) BY MOUTH EVERY DAY     tamsulosin 0.4 mg Cap  Commonly known as: FLOMAX            STOP taking these medications      diazePAM 2 MG tablet  Commonly known as: VALIUM     hyoscyamine 0.125 mg Tab  Commonly known as: ANASPAZ,LEVSIN     TOUJEO SOLOSTAR U-300 INSULIN 300 unit/mL (1.5 mL) Inpn pen  Generic drug: insulin glargine (TOUJEO)               Where to Get Your Medications        These medications were sent to [id number] - [address] AT [facility] [address]  [facility] [address]      Phone: [phone]   cephALEXin 500 MG capsule  sulfamethoxazole-trimethoprim 800-160mg  800-160 mg Tab  white petrolatum 41 % Oint          Explained in detail to the patient about the discharge plan, medications, and follow-up visits. Pt understands and agrees with the treatment plan  Discharge Disposition: Home with Home health   Discharged Condition: stable  Diet-   Dietary Orders (From admission, onward)       Start     Ordered    09/06/24 0026  Diet Renal On Dialysis Diabetic; 1800 Calorie  (Diet/Nutrition - [facility])  Diet effective now        Question Answer Comment   Diet Modifier: Diabetic    Total calories: 1800 Calorie        09/06/24 0027                   Medications Per DC med rec  Activities as tolerated   Follow-up Information       Messi Weston MD. Go on 9/24/2024.    Specialty: Family Medicine  Why: @1pm :)  Contact information:  [address] [place] [address]  [phone]               [facility]. Go on 9/17/2024.    Specialty: Wound Care  Why: @8am :)  Contact information:  [address]  [facility] [address] [phone]  Additional information:  Britton Fontana MD. Go on 9/25/2024.    Specialty: Vascular Surgery  Why: @10:20am :)  Contact information:  [address] [facility] [address]  [phone]                           For further questions contact hospitalist office    Discharge time 33 minutes    For worsening symptoms, chest pain, shortness of breath, increased abdominal pain, high grade fever, stroke or stroke like symptoms, immediately go to the nearest Emergency Room or call 911 as soon as possible.      William Vidales M.D, on 9/10/2024. at 10:06 AM.

## 2024-09-10 NOTE — NURSING
Spoke on the telephone with Dr. Lieberman regarding AVF malfunctioning, stated for the patient to follow up outpatient in 2 weeks, pt has an appointment for 9/25 @1020.

## 2024-09-10 NOTE — PLAN OF CARE
0756: Kyra with [facility] reports that Dr. Weston is not willing to sign home health orders due to patient not being seen since 4/5/23. Notified patient's daughter patient would need to go to appointment with Dr. Weston prior to receiving home health services. She verbalized understanding. Dr. Latoya kan.     1022: OP Wound Care in [place] scheduled from 9/17.     1516: Dr. Hobbs willing to sign for home health. Notified Kyra with [facility].

## 2024-09-10 NOTE — PROGRESS NOTES
"Pharmacokinetic Assessment Follow Up: IV Vancomycin    Vancomycin serum concentration assessment(s):    The random level was drawn correctly and can be used to guide therapy at this time. The measurement is below the desired definitive target range of 10 to 20 mcg/mL.    Vancomycin Regimen Plan:    Will plan for 1250 mg once today after dialysis. A level is scheduled to be drawn on 9/12 with morning labs.    Drug levels (last 3 results):  Recent Labs   Lab Result Units 09/10/24  0457   Vancomycin Random ug/ml 7.7*       Vancomycin Administrations:  vancomycin given in the last 96 hours                     vancomycin in dextrose 5 % 1 gram/250 mL IVPB 1,000 mg (mg) 1,000 mg New Bag 09/07/24 2025                    Pharmacy will continue to follow and monitor vancomycin.    Please contact pharmacy at extension [phone] for questions regarding this assessment.    Thank you for the consult,   Mike Allen       Patient brief summary:  Neno Watkins is a 64 y.o. male initiated on antimicrobial therapy with IV Vancomycin for treatment of skin & soft tissue infection    Drug Allergies:   Review of patient's allergies indicates:   Allergen Reactions    Butorphanol Swelling     "it almost killed me"    Hydrocodone-acetaminophen Other (See Comments)     "They mess with my heart"    Povidone-iodine Shortness Of Breath and Rash       Actual Body Weight:  Wt Readings from Last 1 Encounters:   09/10/24 127 kg (279 lb 15.8 oz)       Renal Function:   Estimated Creatinine Clearance: 34.6 mL/min (A) (based on SCr of 2.85 mg/dL (H)).,     Dialysis Method (if applicable):  intermittent HD TTS    Metabolic Panel (last 72 hours):  Recent Labs   Lab Result Units 09/08/24  0739 09/10/24  0457   Sodium mmol/L 136 136   Potassium mmol/L 4.3 4.3   Chloride mmol/L 105 107   CO2 mmol/L 23 20*   Glucose mg/dL 130* 113   Blood Urea Nitrogen mg/dL 21.9 43.1*   Creatinine mg/dL 1.99* 2.85*   Albumin g/dL 3.4  --    Bilirubin Total mg/dL 1.1  --  "   ALP unit/L 113  --    AST unit/L 34  --    ALT unit/L 25  --        Microbiologic Results:  Microbiology Results (last 7 days)       Procedure Component Value Units Date/Time    Blood Culture #2 **CANNOT BE ORDERED STAT** [[id number]]  (Normal) Collected: 09/05/24 1327    Order Status: Completed Specimen: Blood Updated: 09/09/24 1401     Blood Culture No Growth At 96 Hours    Blood Culture #1 **CANNOT BE ORDERED STAT** [[id number]]  (Normal) Collected: 09/05/24 1327    Order Status: Completed Specimen: Blood Updated: 09/09/24 1401     Blood Culture No Growth At 96 Hours

## 2024-09-10 NOTE — PROGRESS NOTES
[facility]                       [address] [id number]    PATIENT NAME:       RUFINO REAL  YOB: 1960  CSN:                [id number]   MRN:                [id number]  ADMIT DATE:         09/05/2024 12:53:00  PHYSICIAN:          Rolf Hobbs DPM                            PROGRESS NOTE    DATE:  09/10/2024 00:00:00    SUBJECTIVE:  The patient is seen today.  Plans are for discharge.  He has been   set up with the wound care clinic in [place].  No other issues.    PHYSICAL EXAMINATION:  VITAL SIGNS:  Stable.  He is afebrile.  EXTREMITIES:  No changes in the extremity.  A little dry eschar subjacent to the   5th metatarsal head, but no fluctuance, crepitation, or bogginess to the area.    No bone exposure.  Notable edema to both extremities.  Inflammatory changes are   improved to the right leg.  Brawny induration still persist.  Difficult to   palpate any pedal pulses.    ASSESSMENT:  Right foot lesion with cellulitic process in the right lower   extremity, improved.    PLAN:  Again, the patient will be discharged today.  We will follow up with the   Wound Care Clinic.  Has a surgical shoe.  Wound care orders have been placed.    Also being seen by home health.        ______________________________  Rolf Hobbs DPM    GAS/AQS  DD:  09/10/2024  Time:  03:22PM  DT:  09/10/2024  Time:  05:12PM  Job #:  [id number]      PROGRESS NOTE

## 2024-09-10 NOTE — NURSING
Pt's HD access in upper left arm is not functioning properly. Notified Dr. Soliman, stated that Dr. Lieberman should be consulted and we should not access the pt using the AVF, from not on until Dr. Lieberman looks at the fistula, use the R CVC for HD. Thank you!

## 2024-09-10 NOTE — NURSING
09/10/24 1205   Post-Hemodialysis Assessment   Blood Volume Processed (Liters) 62.3 L   Dialyzer Clearance Clear   Duration of Treatment 180 minutes   Total UF (mL) 2000 mL   Patient Response to Treatment pt tolerated tx well   Post-Hemodialysis Comments NAD noted, VSS, Net 2,000 mL, Last /77 and HR 80.

## 2024-09-10 NOTE — PROGRESS NOTES
Inpatient Nutrition Assessment    Admit Date: 9/5/2024   Total duration of encounter: 5 days   Patient Age: 64 y.o.    Nutrition Recommendation/Prescription     Continue renal dialysis, diabetic diet as tolerated  If intake decreased, consider Novasource Renal as tolerated to provide 475 kcal and 22 g protein per serving  Monitor labs, intake and weight    Communication of Recommendations:  EMR    Nutrition Assessment     Malnutrition Assessment/Nutrition-Focused Physical Exam    Malnutrition Context: chronic illness (09/10/24 1105)  Malnutrition Level: other (see comments) (unable to determine) (09/10/24 1105)  Energy Intake (Malnutrition): other (see comments) (does not meet criteria) (09/10/24 1105)  Weight Loss (Malnutrition): other (see comments) (does not meet criteria) (09/10/24 1105)  Subcutaneous Fat (Malnutrition): other (see comments) (unable to assess) (09/10/24 1105)           Muscle Mass (Malnutrition): other (see comments) (unable to assess) (09/10/24 1105)                                   A minimum of two characteristics is recommended for diagnosis of either severe or non-severe malnutrition.     Chart Review    Reason Seen: continuous nutrition monitoring    Malnutrition Screening Tool Results   Have you recently lost weight without trying?: No  Have you been eating poorly because of a decreased appetite?: No   MST Score: 0   Diagnosis:  RLE cellulitis   Chronic lower extremities venous insufficiency     Relevant Medical History: ESRD on HD, PAD, BPH, HTN, DM 2    Scheduled Medications:  atorvastatin, 80 mg, QHS  carvediloL, 6.25 mg, BID  ceFEPime IV (PEDS and ADULTS), 1 g, Q12H  gabapentin, 300 mg, BID  heparin (porcine), 5,000 Units, Q12H  mupirocin, , BID  NIFEdipine, 60 mg, Daily  tamsulosin, 1 capsule, Daily  vancomycin (VANCOCIN) IV (PEDS and ADULTS), 1,250 mg, Once  white petrolatum, , Daily    Continuous Infusions:   PRN Medications:  acetaminophen, 1,000 mg, Q6H PRN  acetaminophen, 650  mg, Q4H PRN  calcium carbonate, 500 mg, TID PRN  dextrose 10%, 12.5 g, PRN  dextrose 10%, 25 g, PRN  glucagon (human recombinant), 1 mg, PRN  glucose, 16 g, PRN  glucose, 24 g, PRN  hydrALAZINE, 10 mg, Q6H PRN  insulin aspart U-100, 0-5 Units, QID (AC + HS) PRN  naloxone, 0.02 mg, PRN  ondansetron, 4 mg, Q4H PRN  prochlorperazine, 5 mg, Q6H PRN  sodium chloride 0.9%, 250 mL, PRN  sodium chloride 0.9%, 10 mL, PRN  vancomycin - pharmacy to dose, , pharmacy to manage frequency    Calorie Containing IV Medications: no significant kcals from medications at this time    Recent Labs   Lab 09/05/24  1324 09/06/24  0414 09/07/24  0333 09/08/24  0739 09/10/24  0457    135* 138 136 136   K 4.2 3.8 3.6 4.3 4.3   CALCIUM 9.9 8.6* 8.6* 8.7* 8.9   CL 98 101 102 105 107   CO2 30 26 27 23 20*   BUN 12.2 21.0 29.7* 21.9 43.1*   CREATININE 1.89* 2.27* 2.56* 1.99* 2.85*   EGFRNORACEVR 39 31 27 37 24   GLUCOSE 123* 168* 100 130* 113   BILITOT 1.1 0.9  --  1.1  --    ALKPHOS 122 97  --  113  --    ALT 27 19  --  25  --    AST 30 22  --  34  --    ALBUMIN 4.1 3.1*  --  3.4  --    WBC 12.04* 10.67 11.90*  --   --    HGB 13.3* 12.1* 11.9*  --   --    HCT 40.3* 35.9* 35.4*  --   --      Nutrition Orders:  Diet Renal On Dialysis Diabetic; 1800 Calorie      Appetite/Oral Intake: good/% of meals  Factors Affecting Nutritional Intake: none identified  Social Needs Impacting Access to Food: unable to assess at this time; will attempt on follow-up  Food/Taoism/Cultural Preferences: unable to obtain  Food Allergies: no known food allergies  Last Bowel Movement: 09/09/24  Wound(s):     Wound 09/06/24 1030 Other (comment) Right anterior Leg-Tissue loss description: Not applicable     Comments    9/10/24: Pt in dialysis at time of visit. Per notes, pt tolerating po intake. No reports of decreased intake. Last BM 9/9 noted. Per MST, no reports of decreased appetite or unintentional weight loss PTA. Estimated dry weight documented 129 kg.  "Per EMR weight history, weight appears stable.    Anthropometrics    Height: 5' 10" (177.8 cm), Height Method: Stated  Last Weight: 127 kg (279 lb 15.8 oz) (09/10/24 0600), Weight Method: Bed Scale  BMI (Calculated): 40.2  BMI Classification: obese grade III (BMI >/=40)        Ideal Body Weight (IBW), Male: 166 lb     % Ideal Body Weight, Male (lb): 168.67 %                 Usual Body Weight (UBW), k.3 kg  % Usual Body Weight: 99.97     Usual Weight Provided By: EMR weight history    Wt Readings from Last 5 Encounters:   09/10/24 127 kg (279 lb 15.8 oz)   24 128.8 kg (284 lb)   23 127.3 kg (280 lb 10.3 oz)   22 108.9 kg (240 lb)   22 114.4 kg (252 lb 3.3 oz)     Weight Change(s) Since Admission:   Wt Readings from Last 1 Encounters:   09/10/24 0600 127 kg (279 lb 15.8 oz)   24 1251 127 kg (280 lb)   Admit Weight: 127 kg (280 lb) (24 1251), Weight Method: Stated    Estimated Needs    Weight Used For Calorie Calculations: 127 kg (279 lb 15.8 oz)  Energy Calorie Requirements (kcal): 2061 kcal (1.0 SF)  Energy Need Method: [place] Jeor  Weight Used For Protein Calculations: 96.2 kg (212 lb 1.3 oz) (AdjBW used)  Protein Requirements: 115-125 g/d (1.2-1.3 g/kg AdjBW)  Fluid Requirements (mL): urine output + 1,000 mL  CHO Requirement: 232-309 g/d (45-60%)     Enteral Nutrition     Patient not receiving enteral nutrition at this time.    Parenteral Nutrition     Patient not receiving parenteral nutrition support at this time.    Evaluation of Received Nutrient Intake    Calories: meeting estimated needs  Protein: meeting estimated needs    Patient Education     Not applicable.    Nutrition Diagnosis     PES: Increased nutrient needs (protein) related to chronic illness as evidenced by ESRD on HD. (new)     Nutrition Interventions     Intervention(s): general/healthful diet, commercial beverage, and collaboration with other providers    Goal: Meet greater than 80% of nutritional " needs by follow-up. (new)  Goal: Maintain weight throughout hospitalization. (new)    Nutrition Goals & Monitoring     Dietitian will monitor: food and beverage intake, weight, electrolyte/renal panel, glucose/endocrine profile, and gastrointestinal profile  Discharge planning: continue renal dialysis, diabetic diet  Nutrition Risk/Follow-Up: moderate (follow-up in 3-5 days)   Please consult if re-assessment needed sooner.

## 2024-09-17 ENCOUNTER — HOSPITAL ENCOUNTER (OUTPATIENT)
Dept: WOUND CARE | Facility: HOSPITAL | Age: 64
Discharge: HOME | End: 2024-09-17
Attending: PEDIATRICS
Payer: MEDICARE

## 2024-09-17 VITALS
RESPIRATION RATE: 18 BRPM | HEART RATE: 78 BPM | TEMPERATURE: 98 F | DIASTOLIC BLOOD PRESSURE: 56 MMHG | SYSTOLIC BLOOD PRESSURE: 114 MMHG | OXYGEN SATURATION: 93 %

## 2024-09-17 DIAGNOSIS — E08.621 DIABETIC ULCER OF RIGHT MIDFOOT ASSOCIATED WITH DIABETES MELLITUS DUE TO UNDERLYING CONDITION, LIMITED TO BREAKDOWN OF SKIN: Primary | ICD-10-CM

## 2024-09-17 DIAGNOSIS — I87.2 CHRONIC VENOUS INSUFFICIENCY: ICD-10-CM

## 2024-09-17 DIAGNOSIS — L97.411 DIABETIC ULCER OF RIGHT MIDFOOT ASSOCIATED WITH DIABETES MELLITUS DUE TO UNDERLYING CONDITION, LIMITED TO BREAKDOWN OF SKIN: Primary | ICD-10-CM

## 2024-09-17 PROCEDURE — 87070 CULTURE OTHR SPECIMN AEROBIC: CPT

## 2024-09-17 PROCEDURE — 87184 SC STD DISK METHOD PER PLATE: CPT

## 2024-09-17 PROCEDURE — 99214 OFFICE O/P EST MOD 30 MIN: CPT

## 2024-09-17 PROCEDURE — 27000999 HC MEDICAL RECORD PHOTO DOCUMENTATION

## 2024-09-17 PROCEDURE — 99203 OFFICE O/P NEW LOW 30 MIN: CPT | Mod: ,,, | Performed by: PEDIATRICS

## 2024-09-17 RX ORDER — DOXYCYCLINE 100 MG/1
100 CAPSULE ORAL EVERY 12 HOURS
Qty: 20 CAPSULE | Refills: 0 | Status: SHIPPED | OUTPATIENT
Start: 2024-09-17 | End: 2024-09-27

## 2024-09-17 NOTE — PROGRESS NOTES
Subjective:       Patient ID: Neno Watkins is a 64 y.o. male.    Chief Complaint: Wound Consult (New wound consult obtained for Right lower leg cellulitis and R foot ulcer.  Hospitalized at [facility] from 9/5 to 9/10 for IV antibiotics.   Dr. Anjel ALMEIDA managed care while inpatient.  Discharged from hospital with oral Keflex x 5 days and Bactrim x 5 days.  Wife reports they were not aware of these orders and that the patient hasn't been taking them.   Discharged home with [facility] health.  Here for evaluation with md. )    HPI  Review of Systems      Objective:      Temp:  [97.6 °F (36.4 °C)]   Pulse:  [78]   Resp:  [18]   BP: (114)/(56)   SpO2:  [93 %]   Physical Exam       Wound 09/06/24 1030 Other (comment) Right anterior Leg (Active)   09/06/24 1030   Present on Original Admission: Y   Primary Wound Type: Other   Side: Right   Orientation: anterior   Location: Leg   Wound Approximate Age at First Assessment (Weeks):    Wound Number:    Is this injury device related?:    Incision Type:    Closure Method:    Wound Description (Comments):    Type:    Additional Comments:    Ankle-Brachial Index:    Pulses:    Removal Indication and Assessment:    Wound Outcome:    Wound Image   09/17/24 0837   Dressing Appearance Open to air 09/17/24 0837   Drainage Amount None 09/17/24 0837   Appearance Red;Tan;Closed/resurfaced;Dry 09/17/24 0837   Tissue loss description Not applicable 09/17/24 0837   Periwound Area Dry;Hemosiderin Staining;Edematous;Indurated;Redness;Warm 09/17/24 0837   Wound Length (cm) 0 cm 09/17/24 0837   Wound Width (cm) 0 cm 09/17/24 0837   Wound Surface Area (cm^2) 0 cm^2 09/17/24 0837   Care Cleansed with:;Soap and water;Applied:;Moisturizing agent 09/17/24 0837   Periwound Care Dry periwound area maintained 09/17/24 0837   Compression Tubular elasticized bandage 09/17/24 0837   Off Loading Off loading shoe 09/17/24 0837            Wound 09/17/24 0835 Diabetic Ulcer Right plantar Foot (Active)    09/17/24 0835   Present on Original Admission:    Primary Wound Type: Diabetic ulc   Side: Right   Orientation: plantar   Location: Foot   Wound Approximate Age at First Assessment (Weeks): 12 weeks   Wound Number:    Is this injury device related?:    Incision Type:    Closure Method:    Wound Description (Comments):    Type:    Additional Comments:    Ankle-Brachial Index:    Pulses: palpable DP pulse with strong doppler to DP/PT   Removal Indication and Assessment:    Wound Outcome:    Wound Image   09/17/24 0837   Dressing Appearance Intact;Dried drainage 09/17/24 0837   Drainage Amount Moderate 09/17/24 0837   Drainage Characteristics/Odor Evans 09/17/24 0837   Appearance Red;Granulating 09/17/24 0837   Tissue loss description Full thickness 09/17/24 0837   Red (%), Wound Tissue Color 100 % 09/17/24 0837   Periwound Area Dry;Edematous 09/17/24 0837   Wound Edges Defined;Callused 09/17/24 0837   Wound Length (cm) 1.6 cm 09/17/24 0837   Wound Width (cm) 1.7 cm 09/17/24 0837   Wound Depth (cm) 0.1 cm 09/17/24 0837   Wound Volume (cm^3) 0.272 cm^3 09/17/24 0837   Wound Surface Area (cm^2) 2.72 cm^2 09/17/24 0837   Care Cleansed with:;Sterile normal saline;Other (see comments) 09/17/24 0837   Dressing Applied;Hydrofiber;Silver;Gauze;Rolled gauze 09/17/24 0837   Periwound Care Dry periwound area maintained 09/17/24 0837   Compression Tubular elasticized bandage 09/17/24 0837   Off Loading Off loading shoe 09/17/24 0837         Assessment:     Patient here today for consult of his right lower leg cellulitis and right foot ulcer.  Patient was in the hospital from 09/05 02/09/2010 with IV antibiotics.  Patient was sent home on oral antibiotics but he did not get these filled.  Patient was sent home with Darco shoe but the family has no knowledge of this.  Patient has venous insufficiency, end-stage renal disease and is on dialysis.  Patient does not wear compression because he does not feel comfortable.  Patient also  sits with his legs dependent at all times.  Leg itself is swollen with edema and evidence of venous stasis and prior cellulitis.  Diabetic ulcer of right plantar lateral foot is 1.6 cm x 1.7 cm with a depth of 0.1 cm.  Patient states that this has been there about 3 months.  This is reddened granulating.  Surrounding area is dry.  Slight callous.  Because of patient not having any cultures we decided to do a tissue culture of the site.  The area was cleaned and number 7 curette to obtain tissue sample.  There was some bleeding and this was stopped with pressure.  Aquacel Ag was used and applied to the wound with bandage.  Patient was in instructed that home health was to change this every other day.  Patient was advised that his current shoes( Crocs ) are not proper shoes for his condition.  Tubigrip was applied to the leg and patient was instructed to continue to keep legs elevated at all times and to maintain offloading.  Patient was given a Darco shoe and instructed in its used.  These measures were reinforced several times.  Patient was also started on doxycycline.  Patient will return in 1 week for MD visit    ICD-10-CM ICD-9-CM   1. Diabetic ulcer of right midfoot associated with diabetes mellitus due to underlying condition, limited to breakdown of skin  E08.621 249.80    L97.411 707.14   2. Chronic venous insufficiency  I87.2 459.81         Plan:   Tissue pathology and/or culture taken:  [x] Yes [] No   Sharp debridement performed:   [] Yes [x] No   Labs ordered this visit:   [] Yes [x] No   Imaging ordered this visit:   [] Yes [x] No           Orders Placed This Encounter   Procedures    Tissue Culture - Aerobic    Change dressing     Cleanse right lower leg with mild soap and water, apply aquaphor to lower leg    R foot plantar ulceration  Cleanse with Vashe, pat dry  Primary dressing: Apply Aquacel Ag to plantar ulcer  Secondary dressing: Cover with gauze, wrap with kerlix, secutre with tape  Frequency:  Change every other day    Offloading: Offload extensively, if ambulating, use Darco shoe provided     Edema control: Use Tubigrip G for edema control, elevate legs at rest for control  Compression for control of chronic venous insufficiency has been ordered by the physician with  Tubigrip  G   Apply first thing in the morning when legs are the smallest, may remove for sleeping at night.  Elevate lower legs when resting for additional edema control    If compression isn't performed, blistering and new ulcerations  may / will occur.        A wound culture was obtained at today's visit.   Antibiotic ( Doxycycline) have been ordered, pickup and take as prescribed.   Antibiotics may be changed if needed when final culture results are obtained.        Home health: [facility]    Follow-up: 1 week        Follow up in about 1 week (around 9/24/2024) for MD visit.

## 2024-09-17 NOTE — PATIENT INSTRUCTIONS
Cleanse right lower leg with mild soap and water, apply aquaphor to lower leg    R foot plantar ulceration  Cleanse with Vashe, pat dry  Primary dressing: Apply Aquacel Ag to plantar ulcer  Secondary dressing: Cover with gauze, wrap with kerlix, secutre with tape  Frequency: Change every other day    Offloading: Offload extensively, if ambulating, use Darco shoe provided     Edema control: Use Tubigrip G for edema control, elevate legs at rest for control  Compression for control of chronic venous insufficiency has been ordered by the physician with  Tubigrip  G   Apply first thing in the morning when legs are the smallest, may remove for sleeping at night if you are sleeping with legs elevated    If compression isn't performed, blistering and new ulcerations  may / will occur.        A wound culture was obtained at today's visit.   Antibiotic ( Doxycycline) have been ordered, pickup and take as prescribed.   Antibiotics may be changed if needed when final culture results are obtained.        Home health: [facility]    Follow-up: 1 week

## 2024-09-19 LAB — BACTERIA TISS AEROBE CULT: ABNORMAL

## 2024-09-19 NOTE — PROGRESS NOTES
Final culture results received.   Pt is currently on Doxycycline not effective against bacteria.   Dr. Deal reviewed culture result.  New orders obtained for Amoxicillin 875mg po 1 tab BID x 10 days.   Sent electronically to pt's pharmacy.   Attempted to reach pt by phone at [phone], female that answered stated that it was a wrong number.  Alt contact Isidra's number, able to leave voicemail.   Asked to have call back to our clinic.

## 2024-09-19 NOTE — PROGRESS NOTES
Previous filed note indicates we sent Amoxicillin to pharmacy.   When sending electronically , allergic fired regarding Povidone - iodine allergy and contraindication.    Unable to sent to pharmacy until speak to patient regarding allergy.  Left msg for pt on Daughter's phone

## 2024-09-26 NOTE — PATIENT INSTRUCTIONS
Cleanse both lower legs with mild soap and water, apply aquaphor ( get over the counter at [facility] or any pharmacy) to  dry skin on lower legs ( Do not put on R foot open ulcer)     R foot plantar ulceration  Cleanse with Vashe, pat dry  Primary dressing: Apply Aquacel Ag to plantar ulcer  Secondary dressing: Cover with gauze, wrap with kerlix, secure with tape  Frequency: Change daily     Offloading: Offload extensively, if ambulating, use Darco shoe provided      Edema control: Use Tubigrip G for edema control to both lower legs, elevate legs at rest for control  Compression for control of chronic venous insufficiency has been ordered by the physician with  Tubigrip  G   Apply first thing in the morning when legs are the smallest, may remove for sleeping at night if you are sleeping with legs elevated     If compression isn't performed, blistering and new ulcerations  may / will occur.      Stop taking Doxycycline.   new antibiotic and take as prescribed.   Amoxicillin has been prescribed.   Take 3 times daily until complete  Home health: [facility]     Follow-up: 1 week

## 2024-10-01 ENCOUNTER — HOSPITAL ENCOUNTER (OUTPATIENT)
Dept: WOUND CARE | Facility: HOSPITAL | Age: 64
Discharge: HOME | End: 2024-10-01
Attending: PEDIATRICS
Payer: MEDICARE

## 2024-10-01 VITALS
HEART RATE: 75 BPM | RESPIRATION RATE: 20 BRPM | SYSTOLIC BLOOD PRESSURE: 163 MMHG | TEMPERATURE: 99 F | OXYGEN SATURATION: 96 % | DIASTOLIC BLOOD PRESSURE: 75 MMHG

## 2024-10-01 DIAGNOSIS — I87.2 CHRONIC VENOUS INSUFFICIENCY: ICD-10-CM

## 2024-10-01 DIAGNOSIS — I87.2 VENOUS STASIS DERMATITIS OF BOTH LOWER EXTREMITIES: ICD-10-CM

## 2024-10-01 DIAGNOSIS — L97.411 DIABETIC ULCER OF RIGHT MIDFOOT ASSOCIATED WITH DIABETES MELLITUS DUE TO UNDERLYING CONDITION, LIMITED TO BREAKDOWN OF SKIN: Primary | ICD-10-CM

## 2024-10-01 DIAGNOSIS — E08.621 DIABETIC ULCER OF RIGHT MIDFOOT ASSOCIATED WITH DIABETES MELLITUS DUE TO UNDERLYING CONDITION, LIMITED TO BREAKDOWN OF SKIN: Primary | ICD-10-CM

## 2024-10-01 PROCEDURE — 11042 DBRDMT SUBQ TIS 1ST 20SQCM/<: CPT

## 2024-10-01 PROCEDURE — 99213 OFFICE O/P EST LOW 20 MIN: CPT | Mod: 25,,, | Performed by: PEDIATRICS

## 2024-10-01 PROCEDURE — 27000999 HC MEDICAL RECORD PHOTO DOCUMENTATION

## 2024-10-01 PROCEDURE — 11042 DBRDMT SUBQ TIS 1ST 20SQCM/<: CPT | Mod: ,,, | Performed by: PEDIATRICS

## 2024-10-01 PROCEDURE — 99213 OFFICE O/P EST LOW 20 MIN: CPT

## 2024-10-01 RX ORDER — AMOXICILLIN 500 MG/1
500 CAPSULE ORAL 3 TIMES DAILY
Qty: 30 CAPSULE | Refills: 0 | Status: SHIPPED | OUTPATIENT
Start: 2024-10-01 | End: 2024-10-11

## 2024-10-01 NOTE — PROCEDURES
"Debridement    Date/Time: 10/1/2024 10:30 AM    Performed by: Doyle Maria MD  Authorized by: Doyle Maria MD    Associated wounds:        Wound 09/17/24 0835 Diabetic Ulcer Right plantar Foot  Time out: Immediately prior to procedure a "time out" was called to verify the correct patient, procedure, equipment, support staff and site/side marked as required.    Consent Done?:  Yes (Verbal) and Yes (Written)    Preparation: Patient was prepped and draped with clean technique    Local anesthesia used?: Yes    Local anesthetic:  Lidocaine 4%    Wound Details:    Location:  Right foot    Location:  Right Plantar    Type of Debridement:  Excisional       Length (cm):  2.5       Area (sq cm):  6.25       Width (cm):  2.5       Percent Debrided (%):  100       Depth (cm):  0.3       Total Area Debrided (sq cm):  6.25    Depth of debridement:  Subcutaneous tissue    Tissue debrided:  Subcutaneous, Epidermis and Dermis    Devitalized tissue debrided:  Necrotic/Eschar, Slough and Exudate    Instruments:  Curette  Bleeding:  Moderate  Hemostasis Achieved: Yes  Method Used:  Pressure and Alginate  Patient tolerance:  Patient tolerated the procedure well with no immediate complications  "

## 2024-10-01 NOTE — PROGRESS NOTES
Subjective:       Patient ID: Neno Watkins is a 64 y.o. male.    Chief Complaint: Diabetic Foot Ulcer (Right plantar foot diabetic ulcer; right lower leg.  Left lower leg with dry crust/scale.  Reports blister in area ruptured.  Patient reports taking Doxycycline. Here for re-evaluation and treatment with MD )    HPI  Review of Systems      Objective:      Temp:  [98.5 °F (36.9 °C)]   Pulse:  [75]   Resp:  [20]   BP: (163)/(75)   SpO2:  [96 %]   Physical Exam       Wound 09/06/24 1030 Other (comment) Right anterior Leg (Active)   09/06/24 1030   Present on Original Admission: Y   Primary Wound Type: Other   Side: Right   Orientation: anterior   Location: Leg   Wound Approximate Age at First Assessment (Weeks):    Wound Number:    Is this injury device related?:    Incision Type:    Closure Method:    Wound Description (Comments):    Type:    Additional Comments:    Ankle-Brachial Index:    Pulses:    Removal Indication and Assessment:    Wound Outcome:    Wound Image     10/01/24 1044   Dressing Appearance Open to air 10/01/24 1044   Drainage Amount None 10/01/24 1044   Appearance Red;Dry;Evans 10/01/24 1044   Tissue loss description Not applicable 10/01/24 1044   Periwound Area Dry;Hemosiderin Staining;Edematous;Other (see comments) 10/01/24 1044   Wound Length (cm) 0 cm 10/01/24 1044   Wound Width (cm) 0 cm 10/01/24 1044   Wound Depth (cm) 0 cm 10/01/24 1044   Wound Volume (cm^3) 0 cm^3 10/01/24 1044   Wound Surface Area (cm^2) 0 cm^2 10/01/24 1044   Care Cleansed with:;Soap and water;Applied:;Moisturizing agent;Other (see comments) 10/01/24 1044   Periwound Care Dry periwound area maintained 10/01/24 1044   Compression Tubular elasticized bandage 10/01/24 1044   Off Loading Off loading shoe 10/01/24 1044            Wound 09/17/24 0835 Diabetic Ulcer Right plantar Foot (Active)   09/17/24 0835   Present on Original Admission:    Primary Wound Type: Diabetic ulc   Side: Right   Orientation: plantar   Location:  Foot   Wound Approximate Age at First Assessment (Weeks): 12 weeks   Wound Number:    Is this injury device related?:    Incision Type:    Closure Method:    Wound Description (Comments):    Type:    Additional Comments:    Ankle-Brachial Index:    Pulses: palpable DP pulse with strong doppler to DP/PT   Removal Indication and Assessment:    Wound Outcome:    Wound Image      10/01/24 1044   Dressing Appearance Intact;Moist drainage 10/01/24 1044   Drainage Amount Large 10/01/24 1044   Drainage Characteristics/Odor Serosanguineous;Tan 10/01/24 1044   Appearance Red;Not granulating;Maroon;Purple;Eschar 10/01/24 1044   Tissue loss description Full thickness 10/01/24 1044   Red (%), Wound Tissue Color 100 % 10/01/24 1044   Periwound Area Dry;Edematous 10/01/24 1044   Wound Edges Defined;Callused 10/01/24 1044   Wound Length (cm) 1.7 cm 10/01/24 1044   Wound Width (cm) 2.1 cm 10/01/24 1044   Wound Depth (cm) 0.1 cm 10/01/24 1044   Wound Volume (cm^3) 0.357 cm^3 10/01/24 1044   Wound Surface Area (cm^2) 3.57 cm^2 10/01/24 1044   Care Cleansed with:;Antimicrobial agent;Wound cleanser;Debrided 10/01/24 1044   Dressing Applied;Hydrofiber;Silver;Gauze;Absorptive Pad;Rolled gauze;Other (comment) 10/01/24 1044   Periwound Care Dry periwound area maintained 10/01/24 1044   Compression Tubular elasticized bandage 10/01/24 1044   Off Loading Off loading shoe 10/01/24 1044            Wound 10/01/24 1100 Venous Ulcer Left lower Leg (Active)   10/01/24 1100   Present on Original Admission: Y   Primary Wound Type: Venous ulcer   Side: Left   Orientation: lower   Location: Leg   Wound Approximate Age at First Assessment (Weeks): 1 weeks   Wound Number:    Is this injury device related?:    Incision Type:    Closure Method:    Wound Description (Comments):    Type:    Additional Comments:    Ankle-Brachial Index:    Pulses: strong doppler signal, non palpable DP/ PT due to edema   Removal Indication and Assessment:    Wound Outcome:     Wound Image   10/01/24 1044   Dressing Appearance Open to air 10/01/24 1044   Drainage Amount None 10/01/24 1044   Appearance Pink;Dry;Fibrin 10/01/24 1044   Periwound Area Hemosiderin Staining 10/01/24 1044   Wound Edges Undefined 10/01/24 1044   Wound Length (cm) 3 cm 10/01/24 1044   Wound Width (cm) 3 cm 10/01/24 1044   Wound Surface Area (cm^2) 9 cm^2 10/01/24 1044   Care Cleansed with:;Soap and water;Applied:;Moisturizing agent 10/01/24 1044   Periwound Care Moisturizer applied 10/01/24 1044   Compression Tubular elasticized bandage 10/01/24 1044         Assessment:     Today venous ulcer left lower extremity is 3 cm x 3 cm this was cleaned and area is well granulated.  Moisturize her was applied and Tubigrip E. right ulceration is closed.  Skin is dry with hemosiderin staining.  This also was moisturize and Tubigrip E was applied.  Today right plantar diabetic ulcer is open.  Is purple and has eschar.  There is dry callused area.  Because of this an excisional debridement was done.  See procedure note.  Area was cleaned and Aquacel Ag was applied.  This will be changed daily.  Patient will continue with compression.  Patient return in 1 week for MD visit patient will continue on antibiotic which is amoxicillin.      ICD-10-CM ICD-9-CM   1. Diabetic ulcer of right midfoot associated with diabetes mellitus due to underlying condition, limited to breakdown of skin  E08.621 249.80    L97.411 707.14   2. Chronic venous insufficiency  I87.2 459.81   3. Venous stasis dermatitis of both lower extremities  I87.2 454.1         Plan:   Tissue pathology and/or culture taken:  [] Yes [x] No   Sharp debridement performed:   [x] Yes [] No   Labs ordered this visit:   [] Yes [x] No   Imaging ordered this visit:   [] Yes [x] No           Orders Placed This Encounter   Procedures    Change dressing     Cleanse both lower legs with mild soap and water, apply aquaphor ( get over the counter at [employer] or any pharmacy) to  dry  skin on lower legs ( Do not put on R foot open ulcer)     R foot plantar ulceration  Cleanse with Vashe, pat dry  Primary dressing: Apply Aquacel Ag to plantar ulcer  Secondary dressing: Cover with gauze, wrap with kerlix, secure with tape  Frequency: Change daily     Offloading: Offload extensively, if ambulating, use Darco shoe provided      Edema control: Use Tubigrip G for edema control to both lower legs, elevate legs at rest for control  Compression for control of chronic venous insufficiency has been ordered by the physician with  Tubigrip  G   Apply first thing in the morning when legs are the smallest, may remove for sleeping at night if you are sleeping with legs elevated     If compression isn't performed, blistering and new ulcerations  may / will occur.      Stop taking Doxycycline.   new antibiotic and take as prescribed.   Amoxicillin has been prescribed.   Take 3 times daily until complete  Home [facility]: [facility]     Follow-up: 1 week        Follow up in about 1 week (around 10/8/2024) for MD whittaker.

## 2024-10-03 NOTE — PATIENT INSTRUCTIONS
Cleanse both lower legs with mild soap and water, apply aquaphor ( get over the counter at [employer] or any pharmacy) to  dry skin on lower legs ( Do not put on R foot open ulcer)     R foot plantar ulceration  Cleanse with Vashe, pat dry  Primary dressing: Apply Aquacel Ag to plantar ulcer  Secondary dressing: Cover with gauze, wrap with kerlix, secure with tape  Frequency: Change daily    Ok to apply Band-Aid to open area on left lower leg, to avoid tubi  sticking; change daily     Offloading: Offload extensively, if ambulating, use Darco shoe provided      Edema control: Use Tubigrip G for edema control to both lower legs, elevate legs at rest for control  Compression for control of chronic venous insufficiency has been ordered by the physician with  Tubigrip  G   Apply first thing in the morning when legs are the smallest, may remove for sleeping at night if you are sleeping with legs elevated     If compression isn't performed, blistering and new ulcerations  may / will occur.        Continue taking Amoxicillin until complete    Home health: [facility]     Follow-up: 1 week

## 2024-10-08 ENCOUNTER — HOSPITAL ENCOUNTER (OUTPATIENT)
Dept: WOUND CARE | Facility: HOSPITAL | Age: 64
Discharge: HOME | End: 2024-10-08
Attending: PEDIATRICS
Payer: MEDICARE

## 2024-10-08 VITALS
RESPIRATION RATE: 20 BRPM | HEART RATE: 80 BPM | DIASTOLIC BLOOD PRESSURE: 71 MMHG | OXYGEN SATURATION: 96 % | SYSTOLIC BLOOD PRESSURE: 162 MMHG | TEMPERATURE: 98 F

## 2024-10-08 DIAGNOSIS — I87.2 VENOUS STASIS DERMATITIS OF BOTH LOWER EXTREMITIES: ICD-10-CM

## 2024-10-08 DIAGNOSIS — L97.411 DIABETIC ULCER OF RIGHT MIDFOOT ASSOCIATED WITH DIABETES MELLITUS DUE TO UNDERLYING CONDITION, LIMITED TO BREAKDOWN OF SKIN: Primary | ICD-10-CM

## 2024-10-08 DIAGNOSIS — I87.2 CHRONIC VENOUS INSUFFICIENCY: ICD-10-CM

## 2024-10-08 DIAGNOSIS — E08.621 DIABETIC ULCER OF RIGHT MIDFOOT ASSOCIATED WITH DIABETES MELLITUS DUE TO UNDERLYING CONDITION, LIMITED TO BREAKDOWN OF SKIN: Primary | ICD-10-CM

## 2024-10-08 PROCEDURE — 27000999 HC MEDICAL RECORD PHOTO DOCUMENTATION

## 2024-10-08 PROCEDURE — 99213 OFFICE O/P EST LOW 20 MIN: CPT

## 2024-10-08 PROCEDURE — 99213 OFFICE O/P EST LOW 20 MIN: CPT | Mod: ,,, | Performed by: PEDIATRICS

## 2024-10-08 NOTE — PROGRESS NOTES
Subjective:       Patient ID: Neno Watkins is a 64 y.o. male.    Chief Complaint: Non-healing Wound Follow Up (Right plantar foot diabetic ulcer; right lower leg.  Left lower leg with dry crust/scale. Patient reports taking  Amoxicillin as prescribed. Here for re-evaluation and treatment with MD)    HPI  Review of Systems      Objective:      Temp:  [97.7 °F (36.5 °C)]   Pulse:  [80]   Resp:  [20]   BP: (162)/(71)   SpO2:  [96 %]   Physical Exam       Wound 09/06/24 1030 Other (comment) Right anterior Leg (Active)   09/06/24 1030   Present on Original Admission: Y   Primary Wound Type: Other   Side: Right   Orientation: anterior   Location: Leg   Wound Approximate Age at First Assessment (Weeks):    Wound Number:    Is this injury device related?:    Incision Type:    Closure Method:    Wound Description (Comments):    Type:    Additional Comments:    Ankle-Brachial Index:    Pulses:    Removal Indication and Assessment:    Wound Outcome:    Wound Image     10/08/24 0912   Dressing Appearance Open to air 10/08/24 0912   Drainage Amount None 10/08/24 0912   Appearance Dry;Tan;Closed/resurfaced 10/08/24 0912   Tissue loss description Not applicable 10/08/24 0912   Periwound Area Dry;Hemosiderin Staining;Swelling 10/08/24 0912   Wound Length (cm) 0 cm 10/08/24 0912   Wound Width (cm) 0 cm 10/08/24 0912   Wound Depth (cm) 0 cm 10/08/24 0912   Wound Volume (cm^3) 0 cm^3 10/08/24 0912   Wound Surface Area (cm^2) 0 cm^2 10/08/24 0912   Care Cleansed with:;Soap and water;Applied:;Moisturizing agent;Other (see comments) 10/08/24 0912   Periwound Care Moisturizer applied 10/08/24 0912   Compression Tubular elasticized bandage 10/08/24 0912   Off Loading Off loading shoe 10/08/24 0912            Wound 09/17/24 0835 Diabetic Ulcer Right plantar Foot (Active)   09/17/24 0835   Present on Original Admission:    Primary Wound Type: Diabetic ulc   Side: Right   Orientation: plantar   Location: Foot   Wound Approximate Age at  First Assessment (Weeks): 12 weeks   Wound Number:    Is this injury device related?:    Incision Type:    Closure Method:    Wound Description (Comments):    Type:    Additional Comments:    Ankle-Brachial Index:    Pulses: palpable DP pulse with strong doppler to DP/PT   Removal Indication and Assessment:    Wound Outcome:    Wound Image    10/08/24 0912   Dressing Appearance Intact;Moist drainage 10/08/24 0912   Drainage Amount Moderate 10/08/24 0912   Drainage Characteristics/Odor Serosanguineous;Tan 10/08/24 0912   Appearance Red;Not granulating;Tan;Slough 10/08/24 0912   Tissue loss description Full thickness 10/08/24 0912   Red (%), Wound Tissue Color 100 % 10/08/24 0912   Periwound Area Dry;Edematous 10/08/24 0912   Wound Edges Defined;Callused 10/08/24 0912   Wound Length (cm) 1.7 cm 10/08/24 0912   Wound Width (cm) 2.3 cm 10/08/24 0912   Wound Depth (cm) 0.1 cm 10/08/24 0912   Wound Volume (cm^3) 0.391 cm^3 10/08/24 0912   Wound Surface Area (cm^2) 3.91 cm^2 10/08/24 0912   Care Cleansed with:;Antimicrobial agent;Wound cleanser 10/08/24 0912   Dressing Applied;Hydrofiber;Silver;Gauze;Rolled gauze;Other (comment) 10/08/24 0912   Periwound Care Dry periwound area maintained 10/08/24 0912   Compression Tubular elasticized bandage 10/08/24 0912   Off Loading Off loading shoe 10/08/24 0912            Wound 10/01/24 1100 Venous Ulcer Left lower Leg (Active)   10/01/24 1100   Present on Original Admission: Y   Primary Wound Type: Venous ulcer   Side: Left   Orientation: lower   Location: Leg   Wound Approximate Age at First Assessment (Weeks): 1 weeks   Wound Number:    Is this injury device related?:    Incision Type:    Closure Method:    Wound Description (Comments):    Type:    Additional Comments:    Ankle-Brachial Index:    Pulses: strong doppler signal, non palpable DP/ PT due to edema   Removal Indication and Assessment:    Wound Outcome:    Wound Image      10/08/24 0912   Dressing Appearance Open to  air;Dried drainage 10/08/24 0912   Drainage Amount Scant 10/08/24 0912   Drainage Characteristics/Odor Serosanguineous 10/08/24 0912   Appearance Red;Not granulating 10/08/24 0912   Periwound Area Hemosiderin Staining;Swelling 10/08/24 0912   Wound Edges Defined 10/08/24 0912   Wound Length (cm) 1.1 cm 10/08/24 0912   Wound Width (cm) 1.1 cm 10/08/24 0912   Wound Depth (cm) 0.1 cm 10/08/24 0912   Wound Volume (cm^3) 0.121 cm^3 10/08/24 0912   Wound Surface Area (cm^2) 1.21 cm^2 10/08/24 0912   Care Cleansed with:;Soap and water;Applied:;Moisturizing agent 10/08/24 0912   Periwound Care Moisturizer applied 10/08/24 0912   Compression Tubular elasticized bandage 10/08/24 0912         Assessment:     Today anterior leg is completely closed and epithelialized.  Left lower leg as granulating wound of 1 point cm with a depth of 0.1 cm.  This was cleaned and soap water and moisturizing agent was applied.  Right plantar wound cm by 2.5 cm depth of 0.1 cm.  This is not is some 10 slough applied.  A small amount of stringy fibrin was removed with scissors.  Aquacel Ag was applied to the plantar ulcer.  This was covered with gauze and Kerlix.  Patient should offload as much as possible.  Tubigrip E was applied for edema control both lower legs.  Patient  will continue amoxicillin until completed.  Dressings will be changed daily.    ICD-10-CM ICD-9-CM   1. Diabetic ulcer of right midfoot associated with diabetes mellitus due to underlying condition, limited to breakdown of skin  E08.621 249.80    L97.411 707.14   2. Chronic venous insufficiency  I87.2 459.81   3. Venous stasis dermatitis of both lower extremities  I87.2 454.1         Plan:   Tissue pathology and/or culture taken:  [] Yes [x] No   Sharp debridement performed:   [] Yes [x] No   Labs ordered this visit:   [] Yes [x] No   Imaging ordered this visit:   [] Yes [x] No           Orders Placed This Encounter   Procedures    Change dressing     Cleanse both lower legs  with mild soap and water, apply aquaphor ( get over the counter at [employer] or any pharmacy) to  dry skin on lower legs ( Do not put on R foot open ulcer)     R foot plantar ulceration  Cleanse with Vashe, pat dry  Primary dressing: Apply Aquacel Ag to plantar ulcer  Secondary dressing: Cover with gauze, wrap with kerlix, secure with tape  Frequency: Change daily     Ok to apply Band-Aid to open area on left lower leg, to avoid tubi  sticking     Offloading: Offload extensively, if ambulating, use Darco shoe provided      Edema control: Use Tubigrip G for edema control to both lower legs, elevate legs at rest for control  Compression for control of chronic venous insufficiency has been ordered by the physician with  Tubigrip  G   Apply first thing in the morning when legs are the smallest, may remove for sleeping at night if you are sleeping with legs elevated     If compression isn't performed, blistering and new ulcerations  may / will occur.         Continue taking Amoxicillin until complete     Home [facility]: [facility]     Follow-up: 1 week        Follow up in about 1 week (around 10/15/2024) for MD visit.

## 2024-10-10 NOTE — PATIENT INSTRUCTIONS
Cleanse both lower legs with mild soap and water, apply aquaphor ( get over the counter at [employer] or any pharmacy) to  dry skin on lower legs ( Do not put on R foot open ulcer)     R foot plantar ulceration  Cleanse with Vashe, pat dry  Primary dressing: Apply Aquacel Ag to plantar ulcer  Secondary dressing: Cover with gauze, wrap with kerlix, secure with tape  Frequency: Change daily     Ok to apply Band-Aid to open area on left lower leg, to avoid tubi  sticking; change daily     Offloading: Offload extensively, if ambulating, use Darco shoe provided      Edema control: Use Tubigrip G for edema control to both lower legs, elevate legs at rest for control  Compression for control of chronic venous insufficiency has been ordered by the physician with  Tubigrip  G   Apply first thing in the morning when legs are the smallest, may remove for sleeping at night if you are sleeping with legs elevated     If compression isn't performed, blistering and new ulcerations  may / will occur.            Home [facility]: [facility]     Follow-up: 1 week

## 2024-10-15 ENCOUNTER — HOSPITAL ENCOUNTER (OUTPATIENT)
Dept: WOUND CARE | Facility: HOSPITAL | Age: 64
Discharge: HOME | End: 2024-10-15
Attending: PEDIATRICS
Payer: MEDICARE

## 2024-10-15 VITALS
SYSTOLIC BLOOD PRESSURE: 146 MMHG | HEART RATE: 80 BPM | TEMPERATURE: 98 F | RESPIRATION RATE: 20 BRPM | OXYGEN SATURATION: 96 % | DIASTOLIC BLOOD PRESSURE: 61 MMHG

## 2024-10-15 DIAGNOSIS — L97.411 DIABETIC ULCER OF RIGHT MIDFOOT ASSOCIATED WITH DIABETES MELLITUS DUE TO UNDERLYING CONDITION, LIMITED TO BREAKDOWN OF SKIN: Primary | ICD-10-CM

## 2024-10-15 DIAGNOSIS — I87.2 VENOUS STASIS DERMATITIS OF BOTH LOWER EXTREMITIES: ICD-10-CM

## 2024-10-15 DIAGNOSIS — E08.621 DIABETIC ULCER OF RIGHT MIDFOOT ASSOCIATED WITH DIABETES MELLITUS DUE TO UNDERLYING CONDITION, LIMITED TO BREAKDOWN OF SKIN: Primary | ICD-10-CM

## 2024-10-15 DIAGNOSIS — I87.2 CHRONIC VENOUS INSUFFICIENCY: ICD-10-CM

## 2024-10-15 PROCEDURE — 99213 OFFICE O/P EST LOW 20 MIN: CPT

## 2024-10-15 PROCEDURE — 99213 OFFICE O/P EST LOW 20 MIN: CPT | Mod: ,,, | Performed by: PEDIATRICS

## 2024-10-15 PROCEDURE — 27000999 HC MEDICAL RECORD PHOTO DOCUMENTATION

## 2024-10-15 NOTE — PROGRESS NOTES
Subjective:       Patient ID: Neno Watkins is a 64 y.o. male.    Chief Complaint: Non-healing Wound Follow Up (Right plantar foot diabetic ulcer; right lower leg.  Left lower leg with dry crust/scale. Patient reports taking last dose of antibiotics this morning. Here for re-evaluation and treatment with MD/ /)    HPI  Review of Systems      Objective:      Temp:  [98.3 °F (36.8 °C)]   Pulse:  [80]   Resp:  [20]   BP: (146)/(61)   SpO2:  [96 %]   Physical Exam       Wound 09/06/24 1030 Other (comment) Right anterior Leg (Active)   09/06/24 1030   Present on Original Admission: Y   Primary Wound Type: Other   Side: Right   Orientation: anterior   Location: Leg   Wound Approximate Age at First Assessment (Weeks):    Wound Number:    Is this injury device related?:    Incision Type:    Closure Method:    Wound Description (Comments):    Type:    Additional Comments:    Ankle-Brachial Index:    Pulses:    Removal Indication and Assessment:    Wound Outcome:    Wound Image    10/15/24 0920   Dressing Appearance Open to air 10/15/24 0920   Drainage Amount None 10/15/24 0920   Appearance Evans;Dry;Closed/resurfaced 10/15/24 0920   Tissue loss description Not applicable 10/15/24 0920   Periwound Area Edematous;Dry;Hemosiderin Staining 10/15/24 0920   Wound Length (cm) 0 cm 10/15/24 0920   Wound Width (cm) 0 cm 10/15/24 0920   Wound Depth (cm) 0 cm 10/15/24 0920   Wound Volume (cm^3) 0 cm^3 10/15/24 0920   Wound Surface Area (cm^2) 0 cm^2 10/15/24 0920   Care Cleansed with:;Soap and water;Applied:;Moisturizing agent;Other (see comments) 10/15/24 0920   Periwound Care Moisturizer applied 10/15/24 0920   Compression Tubular elasticized bandage 10/15/24 0920   Off Loading Off loading shoe 10/15/24 0920            Wound 09/17/24 0835 Diabetic Ulcer Right plantar Foot (Active)   09/17/24 0835   Present on Original Admission:    Primary Wound Type: Diabetic ulc   Side: Right   Orientation: plantar   Location: Foot   Wound  Approximate Age at First Assessment (Weeks): 12 weeks   Wound Number:    Is this injury device related?:    Incision Type:    Closure Method:    Wound Description (Comments):    Type:    Additional Comments:    Ankle-Brachial Index:    Pulses: palpable DP pulse with strong doppler to DP/PT   Removal Indication and Assessment:    Wound Outcome:    Wound Image    10/15/24 0920   Dressing Appearance Intact;Moist drainage 10/15/24 0920   Drainage Amount Moderate 10/15/24 0920   Drainage Characteristics/Odor Evans 10/15/24 0920   Appearance Red;Granulating;Tan;Fibrin 10/15/24 0920   Tissue loss description Full thickness 10/15/24 0920   Red (%), Wound Tissue Color 100 % 10/15/24 0920   Periwound Area Dry;Edematous;Other (see comments);Ecchymotic 10/15/24 0920   Wound Edges Defined;Callused 10/15/24 0920   Wound Length (cm) 1.7 cm 10/15/24 0920   Wound Width (cm) 2.2 cm 10/15/24 0920   Wound Depth (cm) 0.1 cm 10/15/24 0920   Wound Volume (cm^3) 0.374 cm^3 10/15/24 0920   Wound Surface Area (cm^2) 3.74 cm^2 10/15/24 0920   Care Cleansed with:;Antimicrobial agent;Wound cleanser 10/15/24 0920   Dressing Applied;Hydrofiber;Silver;Gauze;Rolled gauze;Other (comment) 10/15/24 0920   Periwound Care Dry periwound area maintained 10/15/24 0920   Compression Tubular elasticized bandage 10/15/24 0920   Off Loading Off loading shoe 10/15/24 0920            Wound 10/01/24 1100 Venous Ulcer Left lower Leg (Active)   10/01/24 1100   Present on Original Admission: Y   Primary Wound Type: Venous ulcer   Side: Left   Orientation: lower   Location: Leg   Wound Approximate Age at First Assessment (Weeks): 1 weeks   Wound Number:    Is this injury device related?:    Incision Type:    Closure Method:    Wound Description (Comments):    Type:    Additional Comments:    Ankle-Brachial Index:    Pulses: strong doppler signal, non palpable DP/ PT due to edema   Removal Indication and Assessment:    Wound Outcome:    Wound Image     10/15/24 0920    Dressing Appearance Intact;Dried drainage 10/15/24 0920   Drainage Amount Small 10/15/24 0920   Drainage Characteristics/Odor Sanguineous 10/15/24 0920   Appearance Red;Not granulating 10/15/24 0920   Periwound Area Edematous;Hemosiderin Staining;Other (see comments) 10/15/24 0920   Wound Edges Defined 10/15/24 0920   Wound Length (cm) 0.5 cm 10/15/24 0920   Wound Width (cm) 0.4 cm 10/15/24 0920   Wound Depth (cm) 0.1 cm 10/15/24 0920   Wound Volume (cm^3) 0.02 cm^3 10/15/24 0920   Wound Surface Area (cm^2) 0.2 cm^2 10/15/24 0920   Care Cleansed with:;Soap and water;Applied:;Moisturizing agent 10/15/24 0920   Periwound Care Moisturizer applied 10/15/24 0920   Compression Tubular elasticized bandage 10/15/24 0920         Assessment:     Venous ulcer of left lower leg today is 0.5 cm x 0.4 cm with a depth of 0.1 cm.  There is some dried drainage.  The surrounding area is red with some slight edema.  This was cleaned and Aquaphor was applied..  Band-Aid was also applied.  Right anterior leg wound is completely closed and epithelialized..  Diabetic bright plantar foot ulcer today is 1.7 cm x 2.2 cm with a depth of 0.1 cm.  There is some red and granulating tissue and also 10 fibrin involved.  Because of this a curette was used to scuff the area.  There was some bleeding and Aquacel Ag was applied.  This was covered with gauze.  Patient will continue with Tubigrip G and elevate legs.  Patient will return in 1 week for MD visit.    ICD-10-CM ICD-9-CM   1. Diabetic ulcer of right midfoot associated with diabetes mellitus due to underlying condition, limited to breakdown of skin  E08.621 249.80    L97.411 707.14   2. Chronic venous insufficiency  I87.2 459.81   3. Venous stasis dermatitis of both lower extremities  I87.2 454.1         Plan:   Tissue pathology and/or culture taken:  [] Yes [x] No   Sharp debridement performed:   [] Yes [x] No   Labs ordered this visit:   [] Yes [x] No   Imaging ordered this visit:   [] Yes  [x] No           Orders Placed This Encounter   Procedures    Change dressing     Cleanse both lower legs with mild soap and water, apply aquaphor ( get over the counter at [employer] or any pharmacy) to  dry skin on lower legs ( Do not put on R foot open ulcer)     R foot plantar ulceration  Cleanse with Vashe, pat dry  Primary dressing: Apply Aquacel Ag to plantar ulcer  Secondary dressing: Cover with gauze, wrap with kerlix, secure with tape  Frequency: Change daily     Ok to apply Band-Aid to open area on left lower leg, to avoid tubi  sticking; change daily     Offloading: Offload extensively, if ambulating, use Darco shoe provided      Edema control: Use Tubigrip G for edema control to both lower legs, elevate legs at rest for control  Compression for control of chronic venous insufficiency has been ordered by the physician with  Tubigrip  G   Apply first thing in the morning when legs are the smallest, may remove for sleeping at night if you are sleeping with legs elevated     If compression isn't performed, blistering and new ulcerations  may / will occur.            Home [facility]: [facility]     Follow-up: 1 week        Follow up in about 1 week (around 10/22/2024) for MD visit.

## 2024-10-21 NOTE — PATIENT INSTRUCTIONS
Cleanse both lower legs with mild soap and water, apply aquaphor ( get over the counter at [employer] or any pharmacy) to  dry skin on lower legs ( Do not put on R foot open ulcer)     R foot plantar ulceration  Cleanse with Vashe, pat dry  Primary dressing: Apply Aquacel Ag to plantar ulcer  Secondary dressing: Cover with gauze, wrap with kerlix, secure with tape  Frequency: Change daily    Offloading: Offload extensively, if ambulating, use Darco shoe provided      Edema control: Use Tubigrip G for edema control to both lower legs, elevate legs at rest for control  Compression for control of chronic venous insufficiency has been ordered by the physician with  Tubigrip  G   Apply first thing in the morning when legs are the smallest, may remove for sleeping at night if you are sleeping with legs elevated     If compression isn't performed, blistering and new ulcerations  may / will occur.     Patient and spouse instructed to go directly to [facility] ED for evaluation/treatment due to increased swelling, redness, pain, and odor to right plantar foot wound            Home health: [facility]     Follow-up: 1 week

## 2024-10-22 ENCOUNTER — HOSPITAL ENCOUNTER (OUTPATIENT)
Dept: WOUND CARE | Facility: HOSPITAL | Age: 64
Discharge: HOME | End: 2024-10-22
Attending: PEDIATRICS
Payer: MEDICARE

## 2024-10-22 VITALS
HEART RATE: 80 BPM | TEMPERATURE: 98 F | SYSTOLIC BLOOD PRESSURE: 152 MMHG | DIASTOLIC BLOOD PRESSURE: 85 MMHG | HEIGHT: 70 IN | RESPIRATION RATE: 18 BRPM | WEIGHT: 280 LBS | BODY MASS INDEX: 40.09 KG/M2

## 2024-10-22 DIAGNOSIS — I87.2 CHRONIC VENOUS INSUFFICIENCY: ICD-10-CM

## 2024-10-22 DIAGNOSIS — L97.411 DIABETIC ULCER OF RIGHT MIDFOOT ASSOCIATED WITH DIABETES MELLITUS DUE TO UNDERLYING CONDITION, LIMITED TO BREAKDOWN OF SKIN: ICD-10-CM

## 2024-10-22 DIAGNOSIS — I87.2 VENOUS STASIS DERMATITIS OF BOTH LOWER EXTREMITIES: ICD-10-CM

## 2024-10-22 DIAGNOSIS — L03.119 CELLULITIS AND ABSCESS OF FOOT, EXCEPT TOES: Primary | ICD-10-CM

## 2024-10-22 DIAGNOSIS — L02.619 CELLULITIS AND ABSCESS OF FOOT, EXCEPT TOES: Primary | ICD-10-CM

## 2024-10-22 DIAGNOSIS — E08.621 DIABETIC ULCER OF RIGHT MIDFOOT ASSOCIATED WITH DIABETES MELLITUS DUE TO UNDERLYING CONDITION, LIMITED TO BREAKDOWN OF SKIN: ICD-10-CM

## 2024-10-22 PROCEDURE — 99213 OFFICE O/P EST LOW 20 MIN: CPT | Mod: ,,, | Performed by: PEDIATRICS

## 2024-10-22 PROCEDURE — 27000999 HC MEDICAL RECORD PHOTO DOCUMENTATION

## 2024-10-22 PROCEDURE — 99213 OFFICE O/P EST LOW 20 MIN: CPT

## 2024-10-22 NOTE — PROGRESS NOTES
Subjective:       Patient ID: Neno Watkins is a 64 y.o. male.    Chief Complaint: Diabetic Foot Ulcer (Right plantar foot diabetic ulcer, left lower leg venous ulcer. Here for re-evaluation and treatment with MD/ /)    HPI  Review of Systems      Objective:      Temp:  [98.1 °F (36.7 °C)]   Pulse:  [80]   Resp:  [18]   BP: (152)/(85)   Physical Exam       Wound 09/17/24 0835 Diabetic Ulcer Right plantar Foot (Active)   09/17/24 0835   Present on Original Admission:    Primary Wound Type: Diabetic ulc   Side: Right   Orientation: plantar   Location: Foot   Wound Approximate Age at First Assessment (Weeks): 12 weeks   Wound Number:    Is this injury device related?:    Incision Type:    Closure Method:    Wound Description (Comments):    Type:    Additional Comments:    Ankle-Brachial Index:    Pulses: palpable DP pulse with strong doppler to DP/PT   Removal Indication and Assessment:    Wound Outcome:    Wound Image      10/22/24 1528   Dressing Appearance Intact;Moist drainage 10/22/24 1528   Drainage Amount Moderate 10/22/24 1528   Drainage Characteristics/Odor Evans;Brown;Malodorous 10/22/24 1528   Appearance Red;Granulating;Gray;Tan;Black;Necrotic 10/22/24 1528   Tissue loss description Full thickness 10/22/24 1528   Black (%), Wound Tissue Color 20 % 10/22/24 1528   Red (%), Wound Tissue Color 80 % 10/22/24 1528   Periwound Area Edematous;Redness;Ecchymotic 10/22/24 1528   Wound Edges Defined;Callused 10/22/24 1528   Wound Length (cm) 2 cm 10/22/24 1528   Wound Width (cm) 2 cm 10/22/24 1528   Wound Depth (cm) 0.2 cm 10/22/24 1528   Wound Volume (cm^3) 0.8 cm^3 10/22/24 1528   Wound Surface Area (cm^2) 4 cm^2 10/22/24 1528   Undermining (depth (cm)/location) 360 degrees; 0.3cm 10/22/24 1528   Care Cleansed with:;Antimicrobial agent;Wound cleanser 10/22/24 1528   Dressing Applied;Hydrofiber;Silver;Gauze;Rolled gauze;Other (comment) 10/22/24 1528   Compression Tubular elasticized bandage 10/22/24 1528   Off  Loading Off loading shoe 10/22/24 1528            Wound 10/01/24 1100 Venous Ulcer Left lower Leg (Active)   10/01/24 1100   Present on Original Admission: Y   Primary Wound Type: Venous ulcer   Side: Left   Orientation: lower   Location: Leg   Wound Approximate Age at First Assessment (Weeks): 1 weeks   Wound Number:    Is this injury device related?:    Incision Type:    Closure Method:    Wound Description (Comments):    Type:    Additional Comments:    Ankle-Brachial Index:    Pulses: strong doppler signal, non palpable DP/ PT due to edema   Removal Indication and Assessment:    Wound Outcome:    Wound Image     10/22/24 1528   Dressing Appearance Open to air 10/22/24 1528   Drainage Amount None 10/22/24 1528   Appearance Closed/resurfaced 10/22/24 1528   Periwound Area Edematous;Hemosiderin Staining 10/22/24 1528   Wound Length (cm) 0 cm 10/22/24 1528   Wound Width (cm) 0 cm 10/22/24 1528   Wound Depth (cm) 0 cm 10/22/24 1528   Wound Volume (cm^3) 0 cm^3 10/22/24 1528   Wound Surface Area (cm^2) 0 cm^2 10/22/24 1528   Care Cleansed with:;Soap and water 10/22/24 1528   Compression Tubular elasticized bandage 10/22/24 1528         Assessment:     Patient here today for evaluation of his right plantar wound.  Patient has had more drainage and has an odor today.  Wound goes down to the bone.  There is a great deal of tenderness and drainage which is purulent today.  There is erythema and swelling of the complete plantar surface with tenderness.  There is also swelling of the dorsum of the foot with some erythema.  Wound is 2 cm x 2 cm with a depth of 0.2 cm.  This appears to be cellulitis in nature and might even have fasciitis.  Area was cleaned and Aquacel Ag was applied to the plantar ulcer with secondary dressing of gauze and Kerlix.  Tubigrip G was used for edema.  Because of the cellulitis and possible osteomyelitis and possible fasciitis we suggested strongly at the patient go to the emergency room at  [facility] for evaluation and possible admission and IV antibiotics.  Patient has been seen by podiatry before.  Patient will be seen here in clinic again when he is released from the hospital.    ICD-10-CM ICD-9-CM   1. Diabetic ulcer of right midfoot associated with diabetes mellitus due to underlying condition, limited to breakdown of skin  E08.621 249.80    L97.411 707.14   2. Chronic venous insufficiency  I87.2 459.81   3. Venous stasis dermatitis of both lower extremities  I87.2 454.1         Plan:   Tissue pathology and/or culture taken:  [] Yes [x] No   Sharp debridement performed:   [] Yes [x] No   Labs ordered this visit:   [] Yes [x] No   Imaging ordered this visit:   [] Yes [x] No           Orders Placed This Encounter   Procedures    Change dressing     Cleanse both lower legs with mild soap and water, apply aquaphor ( get over the counter at [employer] [facility] or any pharmacy) to  dry skin on lower legs ( Do not put on R foot open ulcer)     R foot plantar ulceration  Cleanse with Vashe, pat dry  Primary dressing: Apply Aquacel Ag to plantar ulcer  Secondary dressing: Cover with gauze, wrap with kerlix, secure with tape  Frequency: Change daily    Offloading: Offload extensively, if ambulating, use Darco shoe provided      Edema control: Use Tubigrip G for edema control to both lower legs, elevate legs at rest for control  Compression for control of chronic venous insufficiency has been ordered by the physician with  Tubigrip  G   Apply first thing in the morning when legs are the smallest, may remove for sleeping at night if you are sleeping with legs elevated     If compression isn't performed, blistering and new ulcerations  may / will occur.     Patient and spouse instructed to go directly to [facility] ED for evaluation/treatment due to increased swelling, redness, pain, and odor to right plantar foot wound            Home health: [facility]     Follow-up: 1 week        Follow up in about 1 week (around  10/29/2024) for md visit .

## 2024-10-24 NOTE — PATIENT INSTRUCTIONS
Cleanse both lower legs with mild soap and water, apply aquaphor ( get over the counter at [facility] or any pharmacy) to  dry skin on lower legs ( Do not put on R foot open ulcer)     R foot plantar ulceration  Cleanse with Vashe, pat dry  Primary dressing: Apply Aquacel Ag to plantar ulcer  Secondary dressing: Cover with gauze, wrap with kerlix, secure with tape  Frequency: Change daily     Offloading: Offload extensively, if ambulating, use Darco shoe provided      Edema control: Use Tubigrip G for edema control to both lower legs, elevate legs at rest for control  Compression for control of chronic venous insufficiency has been ordered by the physician with  Tubigrip  G   Apply first thing in the morning when legs are the smallest, may remove for sleeping at night if you are sleeping with legs elevated     If compression isn't performed, blistering and new ulcerations  may / will occur.      Patient and spouse instructed to go directly to [facility] ED for evaluation/treatment due to increased swelling, redness, pain, and odor to right plantar foot wound            Home health: [facility]     Follow-up: 1 week

## 2024-10-29 ENCOUNTER — HOSPITAL ENCOUNTER (OUTPATIENT)
Dept: WOUND CARE | Facility: HOSPITAL | Age: 64
Discharge: HOME | End: 2024-10-29
Attending: PEDIATRICS
Payer: MEDICARE

## 2024-10-29 DIAGNOSIS — L03.119 CELLULITIS AND ABSCESS OF FOOT, EXCEPT TOES: ICD-10-CM

## 2024-10-29 DIAGNOSIS — L02.619 CELLULITIS AND ABSCESS OF FOOT, EXCEPT TOES: ICD-10-CM

## 2024-10-29 DIAGNOSIS — E08.621 DIABETIC ULCER OF RIGHT MIDFOOT ASSOCIATED WITH DIABETES MELLITUS DUE TO UNDERLYING CONDITION, LIMITED TO BREAKDOWN OF SKIN: Primary | ICD-10-CM

## 2024-10-29 DIAGNOSIS — L97.411 DIABETIC ULCER OF RIGHT MIDFOOT ASSOCIATED WITH DIABETES MELLITUS DUE TO UNDERLYING CONDITION, LIMITED TO BREAKDOWN OF SKIN: Primary | ICD-10-CM

## 2024-10-31 NOTE — PATIENT INSTRUCTIONS
Cleanse both lower legs with mild soap and water, apply aquaphor ( get over the counter at [facility] or any pharmacy) to  dry skin on lower legs ( Do not put on R foot open ulcer)     R foot plantar ulceration  Cleanse with Vashe, pat dry  Apply skin prep to sherry wound as needed   Primary dressing: Apply Aquacel Ag to plantar ulcer  Secondary dressing: Cover with gauze, wrap with kerlix, secure with tape  Frequency: Change daily     Offloading: Offload extensively, if ambulating, use Darco shoe provided      Edema control: Use Tubigrip G for edema control to both lower legs, elevate legs at rest for control  Compression for control of chronic venous insufficiency has been ordered by the physician with  Tubigrip  G   Apply first thing in the morning when legs are the smallest, may remove for sleeping at night if you are sleeping with legs elevated     If compression isn't performed, blistering and new ulcerations  may / will occur.      A referral will be sent to Dr. Hobbs    A tissue culture was obtained at today's visit.   Antibiotics have been ordered, pickup and take as prescribed.   Antibiotics may be changed if needed when final culture results are obtained.              Home health: [facility]     Follow-up: 1 week

## 2024-11-05 ENCOUNTER — HOSPITAL ENCOUNTER (OUTPATIENT)
Dept: WOUND CARE | Facility: HOSPITAL | Age: 64
Discharge: HOME | End: 2024-11-05
Attending: PEDIATRICS
Payer: MEDICARE

## 2024-11-05 VITALS
OXYGEN SATURATION: 94 % | DIASTOLIC BLOOD PRESSURE: 71 MMHG | SYSTOLIC BLOOD PRESSURE: 139 MMHG | RESPIRATION RATE: 20 BRPM | TEMPERATURE: 99 F | HEART RATE: 95 BPM

## 2024-11-05 DIAGNOSIS — I87.2 VENOUS STASIS DERMATITIS OF BOTH LOWER EXTREMITIES: ICD-10-CM

## 2024-11-05 DIAGNOSIS — I87.2 CHRONIC VENOUS INSUFFICIENCY: ICD-10-CM

## 2024-11-05 DIAGNOSIS — E08.621 DIABETIC ULCER OF RIGHT MIDFOOT ASSOCIATED WITH DIABETES MELLITUS DUE TO UNDERLYING CONDITION, LIMITED TO BREAKDOWN OF SKIN: Primary | ICD-10-CM

## 2024-11-05 DIAGNOSIS — L97.411 DIABETIC ULCER OF RIGHT MIDFOOT ASSOCIATED WITH DIABETES MELLITUS DUE TO UNDERLYING CONDITION, LIMITED TO BREAKDOWN OF SKIN: Primary | ICD-10-CM

## 2024-11-05 PROCEDURE — 99213 OFFICE O/P EST LOW 20 MIN: CPT

## 2024-11-05 PROCEDURE — 87186 SC STD MICRODIL/AGAR DIL: CPT

## 2024-11-05 PROCEDURE — 27000999 HC MEDICAL RECORD PHOTO DOCUMENTATION

## 2024-11-05 PROCEDURE — 99213 OFFICE O/P EST LOW 20 MIN: CPT | Mod: ,,, | Performed by: PEDIATRICS

## 2024-11-05 RX ORDER — AMOXICILLIN 500 MG/1
500 CAPSULE ORAL 3 TIMES DAILY
Qty: 30 CAPSULE | Refills: 0 | Status: SHIPPED | OUTPATIENT
Start: 2024-11-05 | End: 2024-11-15

## 2024-11-05 NOTE — PROGRESS NOTES
Subjective:       Patient ID: Neno Watkins is a 64 y.o. male.    Chief Complaint: Diabetic Foot Ulcer (Right plantar foot diabetic ulcer. Here for re-evaluation and treatment with MD/ /)    HPI  Review of Systems      Objective:      Temp:  [99.1 °F (37.3 °C)]   Pulse:  [95]   Resp:  [20]   BP: (139)/(71)   SpO2:  [94 %]   Physical Exam       Wound 09/17/24 0835 Diabetic Ulcer Right plantar Foot (Active)   09/17/24 0835 Foot   Present on Original Admission:    Primary Wound Type: Diabetic ulc   Side: Right   Orientation: plantar   Wound Approximate Age at First Assessment (Weeks): 12 weeks   Wound Number:    Is this injury device related?:    Incision Type:    Closure Method:    Wound Description (Comments):    Type:    Additional Comments:    Ankle-Brachial Index:    Pulses: palpable DP pulse with strong doppler to DP/PT   Removal Indication and Assessment:    Wound Outcome:    Wound Image      11/05/24 1532   Dressing Appearance Intact;Moist drainage 11/05/24 1532   Drainage Amount Large 11/05/24 1532   Drainage Characteristics/Odor Evans;Brown 11/05/24 1532   Appearance Red;Granulating;Tan;Black;Necrotic;Bone 11/05/24 1532   Tissue loss description Full thickness 11/05/24 1532   Periwound Area Moist;Edematous;Redness 11/05/24 1532   Wound Edges Defined;Callused 11/05/24 1532   Wound Length (cm) 1.9 cm 11/05/24 1532   Wound Width (cm) 2.5 cm 11/05/24 1532   Wound Depth (cm) 0.5 cm 11/05/24 1532   Wound Volume (cm^3) 2.375 cm^3 11/05/24 1532   Wound Surface Area (cm^2) 4.75 cm^2 11/05/24 1532   Undermining (depth (cm)/location) 12-3 o'clock; 0.4cm @ 3 o'clock 11/05/24 1532   Care Cleansed with:;Antimicrobial agent;Wound cleanser;Other (see comments) 11/05/24 1532   Dressing Applied;Hydrofiber;Silver;Gauze;Rolled gauze;Other (comment) 11/05/24 1532   Periwound Care Skin barrier film applied 11/05/24 1532   Compression Tubular elasticized bandage 11/05/24 1532   Off Loading Off loading shoe 11/05/24 1533             Wound 10/01/24 1100 Venous Ulcer Left lower Leg (Active)   10/01/24 1100 Leg   Present on Original Admission: Y   Primary Wound Type: Venous ulcer   Side: Left   Orientation: lower   Wound Approximate Age at First Assessment (Weeks): 1 weeks   Wound Number:    Is this injury device related?:    Incision Type:    Closure Method:    Wound Description (Comments):    Type:    Additional Comments:    Ankle-Brachial Index:    Pulses: strong doppler signal, non palpable DP/ PT due to edema   Removal Indication and Assessment:    Wound Outcome:    Wound Image    11/05/24 1532   Dressing Appearance Open to air 11/05/24 1532   Drainage Amount None 11/05/24 1532   Appearance Closed/resurfaced 11/05/24 1532   Periwound Area Edematous;Hemosiderin Staining;Dry 11/05/24 1532   Wound Length (cm) 0 cm 11/05/24 1532   Wound Width (cm) 0 cm 11/05/24 1532   Wound Depth (cm) 0 cm 11/05/24 1532   Wound Volume (cm^3) 0 cm^3 11/05/24 1532   Wound Surface Area (cm^2) 0 cm^2 11/05/24 1532   Care Cleansed with:;Soap and water 11/05/24 1532   Periwound Care Moisture barrier applied 11/05/24 1532   Compression Tubular elasticized bandage 11/05/24 1532         Assessment:     Today wound is large granulating with black necrotic area and exposed bone.  This is 1.5 cm x 2.5 cm with a depth of 0.5 cm.  Surrounding area was somewhat callus.  There was a large amount of erythema of the distal half of the foot.  Patient does have some pain.  When we saw the patient 2 weeks ago we suggested the patient go to the emergency room because patient was going to need some sort of IV antibiotics because it looked like he was having an osteo myelitis and cellulitis.  Patient had been seen by Dr. Hobbs and we will attempt to get him in appointment.  We also suggested that to make things proceed he would need to go to the emergency room .  We tried to impressed in the patient that if he does have an osteo myelitis he would need a bone biopsy and long-term IV  antibiotics.  Patient had had a enterococcus before in his area and this was sensitive to ampicillin.  We also tried to impressed that he might need to have this bone removed and may have further amputation if something we had not been done in the near future.  Patient also stated he did not want to go to the hospital again.  We told him that this was imperative.  A tissue culture of the wound was done.  Amoxicillin was ordered.  Aquacel was applied and this was covered with gauze and Kerlix.  This was to be changed daily.  Tubigrip G was used for edema control.  Patient will return in 1 week for MD visit.    ICD-10-CM ICD-9-CM   1. Diabetic ulcer of right midfoot associated with diabetes mellitus due to underlying condition, limited to breakdown of skin  E08.621 249.80    L97.411 707.14   2. Chronic venous insufficiency  I87.2 459.81   3. Venous stasis dermatitis of both lower extremities  I87.2 454.1         Plan:   Tissue pathology and/or culture taken:  [x] Yes [] No   Sharp debridement performed:   [] Yes [x] No   Labs ordered this visit:   [] Yes [x] No   Imaging ordered this visit:   [] Yes [x] No           Orders Placed This Encounter   Procedures    Tissue Culture - Aerobic     Right plantar foot    Change dressing     Cleanse both lower legs with mild soap and water, apply aquaphor ( get over the counter at [employer] or any pharmacy) to  dry skin on lower legs ( Do not put on R foot open ulcer)     R foot plantar ulceration  Cleanse with Vashe, pat dry  Apply skin prep to sherry wound as needed   Primary dressing: Apply Aquacel Ag to plantar ulcer  Secondary dressing: Cover with gauze, wrap with kerlix, secure with tape  Frequency: Change daily     Offloading: Offload extensively, if ambulating, use Darco shoe provided      Edema control: Use Tubigrip G for edema control to both lower legs, elevate legs at rest for control  Compression for control of chronic venous insufficiency has been ordered by the  physician with  Tubigrip  G   Apply first thing in the morning when legs are the smallest, may remove for sleeping at night if you are sleeping with legs elevated     If compression isn't performed, blistering and new ulcerations  may / will occur.      A tissue culture was obtained at today's visit.   Antibiotics have been ordered, pickup and take as prescribed.   Antibiotics may be changed if needed when final culture results are obtained.              Home health: [facility]     Follow-up: 1 week        Follow up in about 1 week (around 11/12/2024) for MD visit.

## 2024-11-06 NOTE — PROGRESS NOTES
Received call from Dr. Hobbs regarding referral sent after wound clinic visit yesterday.   Dr. Hobbs's office does not accept his form of insurance, however, urges him to go to the Hospital for evaluation regarding possible osteomyelitis.    Notified Ange his Dialysis nurse at [facility] that pt will not be able to be seen in Dr. Hobbs's office and is being highly urged to go to the hospital by both our clinic physician and Dr. Hobbs.   Notified Татьяна at [facility] of pt's inability to be seen in Dr. Hobbs's office and recommendation to go to the ER.   Unable to reach patient by telephone.   Able to speak to Annmarie, spouse.  Notified her that pt will not be able to be seen in Dr. Hobbs's office, the conversation yesterday where pt was highly urged to go to an ED for evaluation in [place] and the risk to limb and life including potential death from infection if he chose not to.  She verbalized understanding, saying she has been trying to get him to go as well but he has refused.

## 2024-11-09 LAB
BACTERIA TISS AEROBE CULT: ABNORMAL
BACTERIA TISS AEROBE CULT: ABNORMAL

## 2024-11-11 ENCOUNTER — TELEPHONE (OUTPATIENT)
Dept: WOUND CARE | Facility: HOSPITAL | Age: 64
End: 2024-11-11
Payer: MEDICARE

## 2024-11-11 NOTE — TELEPHONE ENCOUNTER
Pt called to cancel his appt for tomorrow.  Reports he fell and hurt himself on Friday, did not go to the hospital.  Per patient report, his leg is cut on same side as his foot wound, his head is cut and he can't move his left arm at all.   Urged pt to go to the ER.   Notified him of culture results and that we would forward those results to his PCP.  He reports having a DrJan Appt tomorrow with Dr. Weston, stating he will not go to the ER today.   Corinne with [facility] notified of culture results.   Reports that they have the ability to give Cefepime with dialysis.  Pt cancelled his dialysis appt today due to pain.

## 2024-11-12 DIAGNOSIS — T14.8XXA WOUND INFECTION: ICD-10-CM

## 2024-11-12 DIAGNOSIS — E08.621 DIABETIC ULCER OF RIGHT MIDFOOT ASSOCIATED WITH DIABETES MELLITUS DUE TO UNDERLYING CONDITION, LIMITED TO BREAKDOWN OF SKIN: Primary | ICD-10-CM

## 2024-11-12 DIAGNOSIS — L08.9 WOUND INFECTION: ICD-10-CM

## 2024-11-12 DIAGNOSIS — L97.411 DIABETIC ULCER OF RIGHT MIDFOOT ASSOCIATED WITH DIABETES MELLITUS DUE TO UNDERLYING CONDITION, LIMITED TO BREAKDOWN OF SKIN: Primary | ICD-10-CM

## 2024-11-15 ENCOUNTER — LAB VISIT (OUTPATIENT)
Dept: LAB | Facility: HOSPITAL | Age: 64
End: 2024-11-15
Attending: INTERNAL MEDICINE
Payer: MEDICARE

## 2024-11-15 DIAGNOSIS — N18.6 END STAGE RENAL DISEASE: Primary | ICD-10-CM

## 2024-11-15 LAB
ANION GAP SERPL CALC-SCNC: 7 MEQ/L
BASOPHILS # BLD AUTO: 0.02 X10(3)/MCL
BASOPHILS NFR BLD AUTO: 0.2 %
BUN SERPL-MCNC: 28.2 MG/DL (ref 8.4–25.7)
CALCIUM SERPL-MCNC: 8.6 MG/DL (ref 8.8–10)
CHLORIDE SERPL-SCNC: 107 MMOL/L (ref 98–107)
CO2 SERPL-SCNC: 22 MMOL/L (ref 23–31)
CREAT SERPL-MCNC: 2.15 MG/DL (ref 0.72–1.25)
CREAT/UREA NIT SERPL: 13
EOSINOPHIL # BLD AUTO: 0.19 X10(3)/MCL (ref 0–0.9)
EOSINOPHIL NFR BLD AUTO: 1.7 %
ERYTHROCYTE [DISTWIDTH] IN BLOOD BY AUTOMATED COUNT: 13.8 % (ref 11.5–17)
GFR SERPLBLD CREATININE-BSD FMLA CKD-EPI: 33 ML/MIN/1.73/M2
GLUCOSE SERPL-MCNC: 160 MG/DL (ref 82–115)
HCT VFR BLD AUTO: 35.4 % (ref 42–52)
HGB BLD-MCNC: 11.5 G/DL (ref 14–18)
IMM GRANULOCYTES # BLD AUTO: 0.03 X10(3)/MCL (ref 0–0.04)
IMM GRANULOCYTES NFR BLD AUTO: 0.3 %
LYMPHOCYTES # BLD AUTO: 2.17 X10(3)/MCL (ref 0.6–4.6)
LYMPHOCYTES NFR BLD AUTO: 19.5 %
MCH RBC QN AUTO: 30.4 PG (ref 27–31)
MCHC RBC AUTO-ENTMCNC: 32.5 G/DL (ref 33–36)
MCV RBC AUTO: 93.7 FL (ref 80–94)
MONOCYTES # BLD AUTO: 0.75 X10(3)/MCL (ref 0.1–1.3)
MONOCYTES NFR BLD AUTO: 6.7 %
NEUTROPHILS # BLD AUTO: 7.98 X10(3)/MCL (ref 2.1–9.2)
NEUTROPHILS NFR BLD AUTO: 71.6 %
PLATELET # BLD AUTO: 242 X10(3)/MCL (ref 130–400)
PMV BLD AUTO: 9.2 FL (ref 7.4–10.4)
POTASSIUM SERPL-SCNC: 5 MMOL/L (ref 3.5–5.1)
RBC # BLD AUTO: 3.78 X10(6)/MCL (ref 4.7–6.1)
SODIUM SERPL-SCNC: 136 MMOL/L (ref 136–145)
WBC # BLD AUTO: 11.14 X10(3)/MCL (ref 4.5–11.5)

## 2024-11-15 PROCEDURE — 36415 COLL VENOUS BLD VENIPUNCTURE: CPT

## 2024-11-15 PROCEDURE — 85025 COMPLETE CBC W/AUTO DIFF WBC: CPT

## 2024-11-15 PROCEDURE — 80048 BASIC METABOLIC PNL TOTAL CA: CPT

## 2024-11-18 ENCOUNTER — HOSPITAL ENCOUNTER (EMERGENCY)
Facility: HOSPITAL | Age: 64
Discharge: HOME | End: 2024-11-18
Attending: STUDENT IN AN ORGANIZED HEALTH CARE EDUCATION/TRAINING PROGRAM
Payer: MEDICARE

## 2024-11-18 VITALS
WEIGHT: 288 LBS | DIASTOLIC BLOOD PRESSURE: 81 MMHG | OXYGEN SATURATION: 98 % | BODY MASS INDEX: 41.32 KG/M2 | TEMPERATURE: 98 F | HEART RATE: 60 BPM | SYSTOLIC BLOOD PRESSURE: 165 MMHG | RESPIRATION RATE: 19 BRPM

## 2024-11-18 DIAGNOSIS — L97.519 ULCER OF RIGHT FOOT, UNSPECIFIED ULCER STAGE: ICD-10-CM
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
## 2024-11-18 DIAGNOSIS — N18.6 ESRD ON DIALYSIS: ICD-10-CM

## 2024-11-18 DIAGNOSIS — Z99.2 ESRD ON DIALYSIS: ICD-10-CM

## 2024-11-18 DIAGNOSIS — M54.9 ACUTE LEFT-SIDED BACK PAIN, UNSPECIFIED BACK LOCATION: Primary | ICD-10-CM

## 2024-11-18 LAB
ALBUMIN SERPL-MCNC: 3.3 G/DL (ref 3.4–4.8)
ALBUMIN/GLOB SERPL: 0.8 RATIO (ref 1.1–2)
ALP SERPL-CCNC: 123 UNIT/L (ref 40–150)
ALT SERPL-CCNC: 24 UNIT/L (ref 0–55)
ANION GAP SERPL CALC-SCNC: 8 MEQ/L
AST SERPL-CCNC: 23 UNIT/L (ref 5–34)
BACTERIA #/AREA URNS AUTO: ABNORMAL /HPF
BASOPHILS # BLD AUTO: 0.04 X10(3)/MCL
BASOPHILS NFR BLD AUTO: 0.4 %
BILIRUB SERPL-MCNC: 0.7 MG/DL
BILIRUB UR QL STRIP.AUTO: NEGATIVE
BUN SERPL-MCNC: 23.5 MG/DL (ref 8.4–25.7)
CALCIUM SERPL-MCNC: 8.8 MG/DL (ref 8.8–10)
CHLORIDE SERPL-SCNC: 109 MMOL/L (ref 98–107)
CLARITY UR: CLEAR
CO2 SERPL-SCNC: 19 MMOL/L (ref 23–31)
COLOR UR AUTO: ABNORMAL
CREAT SERPL-MCNC: 2.17 MG/DL (ref 0.72–1.25)
CREAT/UREA NIT SERPL: 11
EOSINOPHIL # BLD AUTO: 0.15 X10(3)/MCL (ref 0–0.9)
EOSINOPHIL NFR BLD AUTO: 1.4 %
ERYTHROCYTE [DISTWIDTH] IN BLOOD BY AUTOMATED COUNT: 13.9 % (ref 11.5–17)
GFR SERPLBLD CREATININE-BSD FMLA CKD-EPI: 33 ML/MIN/1.73/M2
GLOBULIN SER-MCNC: 3.9 GM/DL (ref 2.4–3.5)
GLUCOSE SERPL-MCNC: 164 MG/DL (ref 82–115)
GLUCOSE UR QL STRIP: NORMAL
HCT VFR BLD AUTO: 33 % (ref 42–52)
HGB BLD-MCNC: 10.8 G/DL (ref 14–18)
HGB UR QL STRIP: NEGATIVE
IMM GRANULOCYTES # BLD AUTO: 0.07 X10(3)/MCL (ref 0–0.04)
IMM GRANULOCYTES NFR BLD AUTO: 0.6 %
KETONES UR QL STRIP: NEGATIVE
LEUKOCYTE ESTERASE UR QL STRIP: NEGATIVE
LYMPHOCYTES # BLD AUTO: 2.03 X10(3)/MCL (ref 0.6–4.6)
LYMPHOCYTES NFR BLD AUTO: 18.7 %
MCH RBC QN AUTO: 30.1 PG (ref 27–31)
MCHC RBC AUTO-ENTMCNC: 32.7 G/DL (ref 33–36)
MCV RBC AUTO: 91.9 FL (ref 80–94)
MONOCYTES # BLD AUTO: 0.77 X10(3)/MCL (ref 0.1–1.3)
MONOCYTES NFR BLD AUTO: 7.1 %
NEUTROPHILS # BLD AUTO: 7.81 X10(3)/MCL (ref 2.1–9.2)
NEUTROPHILS NFR BLD AUTO: 71.8 %
NITRITE UR QL STRIP: NEGATIVE
NRBC BLD AUTO-RTO: 0 %
PH UR STRIP: 6 [PH]
PLATELET # BLD AUTO: 235 X10(3)/MCL (ref 130–400)
PMV BLD AUTO: 9.3 FL (ref 7.4–10.4)
POTASSIUM SERPL-SCNC: 4.9 MMOL/L (ref 3.5–5.1)
PROT SERPL-MCNC: 7.2 GM/DL (ref 5.8–7.6)
PROT UR QL STRIP: NEGATIVE
RBC # BLD AUTO: 3.59 X10(6)/MCL (ref 4.7–6.1)
RBC #/AREA URNS AUTO: ABNORMAL /HPF
SODIUM SERPL-SCNC: 136 MMOL/L (ref 136–145)
SP GR UR STRIP.AUTO: 1.01 (ref 1–1.03)
SQUAMOUS #/AREA URNS LPF: ABNORMAL /HPF
UROBILINOGEN UR STRIP-ACNC: NORMAL
WBC # BLD AUTO: 10.87 X10(3)/MCL (ref 4.5–11.5)
WBC #/AREA URNS AUTO: ABNORMAL /HPF

## 2024-11-18 PROCEDURE — 99284 EMERGENCY DEPT VISIT MOD MDM: CPT | Mod: 25

## 2024-11-18 PROCEDURE — 85025 COMPLETE CBC W/AUTO DIFF WBC: CPT

## 2024-11-18 PROCEDURE — 81015 MICROSCOPIC EXAM OF URINE: CPT

## 2024-11-18 PROCEDURE — 80053 COMPREHEN METABOLIC PANEL: CPT

## 2024-11-18 RX ORDER — TRAMADOL HYDROCHLORIDE 50 MG/1
50 TABLET ORAL EVERY 8 HOURS PRN
Qty: 15 TABLET | Refills: 0 | Status: SHIPPED | OUTPATIENT
Start: 2024-11-18 | End: 2024-11-23

## 2024-11-18 NOTE — ED PROVIDER NOTES
"Encounter Date: 11/18/2024       History     Chief Complaint   Patient presents with    Back Pain     Pt c/o lower back pain since fall x 2 weeks ago, reports hitting head. -BT. Went to PCP and had negative xrays. Ambulatory in triage with walker. Also reports has not been to dialysis in 2 weeks due to back pain and concerned about R foot wound.     64-year-old male presents to ED for evaluation of low back pain after a fall 2 weeks ago.  Patient reports hitting his head.  States that he tripped and fell while using his walker.  Patient reports that he has chronic wounds to his right foot.  States he was not been going to wound care or dialysis due to pain in his back.    The history is provided by the patient. No  was used.     Review of patient's allergies indicates:   Allergen Reactions    Butorphanol Swelling     "it almost killed me"    Hydrocodone-acetaminophen Other (See Comments)     "They mess with my heart"    Povidone-iodine Shortness Of Breath and Rash     Past Medical History:   Diagnosis Date    BPH (benign prostatic hyperplasia)     Essential (primary) hypertension     Obesity, unspecified     PAD (peripheral artery disease)      Past Surgical History:   Procedure Laterality Date    FISTULOGRAM Left 9/25/2023    Procedure: Fistulogram;  Surgeon: Rodrigo Soliman DO;  Location: [facility] CATH LAB;  Service: Nephrology;  Laterality: Left;    INSERTION OF TUNNELED CENTRAL VENOUS HEMODIALYSIS CATHETER Right 6/27/2022    Procedure: INSERTION, CATHETER, HEMODIALYSIS, DUAL LUMEN;  Surgeon: Molly Garcia MD;  Location: [facility];  Service: General;  Laterality: Right;     No family history on file.  Social History     Tobacco Use    Smoking status: Never    Smokeless tobacco: Never   Substance Use Topics    Alcohol use: Never    Drug use: Never     Review of Systems   Constitutional:  Negative for chills, fatigue and fever.   HENT:  Negative for sore throat.    Respiratory:  Negative for cough " and shortness of breath.    Cardiovascular:  Negative for chest pain.   Gastrointestinal:  Negative for abdominal pain, nausea and vomiting.   Genitourinary:  Negative for dysuria and frequency.   Musculoskeletal:  Positive for back pain and myalgias. Negative for neck pain.   Skin:  Positive for wound. Negative for rash.   Neurological:  Negative for dizziness, weakness and headaches.   Hematological:  Does not bruise/bleed easily.   All other systems reviewed and are negative.      Physical Exam     Initial Vitals [11/18/24 1147]   BP Pulse Resp Temp SpO2   (!) 141/62 78 20 98.3 °F (36.8 °C) 95 %      MAP       --         Physical Exam    Nursing note and vitals reviewed.  Constitutional: He appears well-developed and well-nourished. He is cooperative.   HENT:   Head: Normocephalic and atraumatic.   Right Ear: Tympanic membrane and external ear normal.   Left Ear: Tympanic membrane and external ear normal. Mouth/Throat: Uvula is midline, oropharynx is clear and moist and mucous membranes are normal. No trismus in the jaw. No uvula swelling.   Eyes: Conjunctivae are normal. Pupils are equal, round, and reactive to light.   Neck: Neck supple.   Normal range of motion.  Cardiovascular:  Normal rate, regular rhythm and normal heart sounds.           Pulmonary/Chest: Breath sounds normal. No respiratory distress. He has no wheezes. He has no rhonchi. He has no rales.   Abdominal: Abdomen is soft. Bowel sounds are normal. There is no abdominal tenderness.   Musculoskeletal:         General: Normal range of motion.      Cervical back: Normal, normal range of motion and neck supple.      Thoracic back: Normal.      Lumbar back: Tenderness present. No swelling, deformity, spasms or bony tenderness.        Back:      Neurological: He is alert and oriented to person, place, and time. He has normal strength. No cranial nerve deficit or sensory deficit. GCS score is 15. GCS eye subscore is 4. GCS verbal subscore is 5. GCS  motor subscore is 6.   Skin: Skin is warm and dry. Capillary refill takes less than 2 seconds.   Psychiatric: He has a normal mood and affect.               ED Course   Procedures  Labs Reviewed   COMPREHENSIVE METABOLIC PANEL - Abnormal       Result Value    Sodium 136      Potassium 4.9      Chloride 109 (*)     CO2 19 (*)     Glucose 164 (*)     Blood Urea Nitrogen 23.5      Creatinine 2.17 (*)     Calcium 8.8      Protein Total 7.2      Albumin 3.3 (*)     Globulin 3.9 (*)     Albumin/Globulin Ratio 0.8 (*)     Bilirubin Total 0.7            ALT 24      AST 23      eGFR 33      Anion Gap 8.0      BUN/Creatinine Ratio 11     URINALYSIS, REFLEX TO URINE CULTURE - Abnormal    Color, UA Light-Yellow      Appearance, UA Clear      Specific Gravity, UA 1.015      pH, UA 6.0      Protein, UA Negative      Glucose, UA Normal      Ketones, UA Negative      Blood, UA Negative      Bilirubin, UA Negative      Urobilinogen, UA Normal      Nitrites, UA Negative      Leukocyte Esterase, UA Negative      RBC, UA 0-5      WBC, UA 6-10 (*)     Bacteria, UA None Seen      Squamous Epithelial Cells, UA None Seen     CBC WITH DIFFERENTIAL - Abnormal    WBC 10.87      RBC 3.59 (*)     Hgb 10.8 (*)     Hct 33.0 (*)     MCV 91.9      MCH 30.1      MCHC 32.7 (*)     RDW 13.9      Platelet 235      MPV 9.3      Neut % 71.8      Lymph % 18.7      Mono % 7.1      Eos % 1.4      Basophil % 0.4      Lymph # 2.03      Neut # 7.81      Mono # 0.77      Eos # 0.15      Baso # 0.04      IG# 0.07 (*)     IG% 0.6      NRBC% 0.0     CBC W/ AUTO DIFFERENTIAL    Narrative:     The following orders were created for panel order CBC auto differential.  Procedure                               Abnormality         Status                     ---------                               -----------         ------                     CBC with Differential[[id number]]       Abnormal            Final result                 Please view results for these tests  on the individual orders.          Imaging Results              CT Lumbar Spine Without Contrast (Final result)  Result time 11/18/24 13:32:03      Final result by Sarai Calle MD (11/18/24 13:32:03)                   Impression:      No acute fracture identified.      Electronically signed by: Sarai Calle  Date:    11/18/2024  Time:    13:32               Narrative:    EXAMINATION:  CT LUMBAR SPINE WITHOUT CONTRAST    CLINICAL HISTORY:  Low back pain, trauma;    TECHNIQUE:  Noncontrast CT images of the lumbar spine. Axial, coronal, and sagittal reformatted images were obtained. Dose length product is 1504 mGycm. Automatic exposure control, adjustment of mA/kV or iterative reconstruction technique was used to limit radiation dose.    COMPARISON:  X-rays dated 06/21/2022    FINDINGS:  There are 5 non-rib-bearing lumbar type vertebral bodies.  There is a mild rightward curvature of the lumbar spine.  There are Schmorl's nodes along the superior endplate of L4 with mild loss of height.  The remaining vertebral body heights are preserved.  There is no acute fracture identified.  There are mild degenerative changes with marginal osteophytes and facet arthropathy.  There is no paraspinal hematoma.                                       Medications - No data to display  Medical Decision Making  64-year-old male presents to ED for evaluation of low back pain after a fall 2 weeks ago.  Patient reports hitting his head.  States that he tripped and fell while using his walker.  Patient reports that he has chronic wounds to his right foot.  States he was not been going to wound care or dialysis due to pain in his back.    Differential diagnosis includes but isn't limited to fall, contusion, fracture, wound, infection, electrolyte abnormality    Amount and/or Complexity of Data Reviewed  Independent Historian: caregiver     Details: Family reports that patient fell 2 weeks ago and has been complaining of low back  pain since.  States that patient was not going to wound care or dialysis since.  Concerned for possible infection of the wound.  States it for told that he needed debridement.  Currently on amoxicillin  Labs: ordered. Decision-making details documented in ED Course.  Radiology: ordered.  Discussion of management or test interpretation with external provider(s): Patient presents to ED for evaluation of back pain following a fall 2 weeks ago.  States that he was not been to wound care or dialysis since.  Patient has a chronic wound to his right foot and has been evaluated with home health.  Reports pain worse with walking and moving.  CT lumbar obtained without any acute findings.  Labs obtained for possible emergent dialysis needs.  White blood cell count normal.  Potassium stable.  Creatinine of 2.17 with an anion gap of 8 no indication for emergent dialysis at this time.  Wound evaluated on foot clean without any purulent drainage.  Currently on antibiotics  Discussed need for follow up with wound care for further evaluation of treatment and debridement.  Discussed return ED precautions.  Will give short course of pain medication.  All questions answered.  Patient and family verbalized understanding and agree with plan of care    Risk  OTC drugs.  Prescription drug management.  Parenteral controlled substances.               ED Course as of 11/18/24 1708   Mon Nov 18, 2024   1405 WBC: 10.87 [SL]   1405 Hemoglobin(!): 10.8 [SL]   1405 Hematocrit(!): 33.0 [SL]   1405 CO2(!): 19 [SL]   1405 Creatinine(!): 2.17  Anion gap of 8 [SL]      ED Course User Index  [SL] Yolis Rosario PA                           Clinical Impression:  Final diagnoses:  [M54.9] Acute left-sided back pain, unspecified back location (Primary)  [L97.519] Ulcer of right foot, unspecified ulcer stage  [W19.XXXD] Fall, subsequent encounter  [N18.6, Z99.2] ESRD on dialysis          ED Disposition Condition    Discharge Stable          ED Prescriptions        Medication Sig Dispense Start Date End Date Auth. Provider    traMADoL (ULTRAM) 50 mg tablet Take 1 tablet (50 mg total) by mouth every 8 (eight) hours as needed for Pain. 15 tablet 11/18/2024 11/23/2024 Yolis Rosario PA          Follow-up Information       Follow up With Specialties Details Why Contact Info    Messi Weston MD Family Medicine   [address] [facility] [address]  [phone]               Yolis Rosario PA  11/18/24 0269

## 2024-11-18 NOTE — FIRST PROVIDER EVALUATION
Medical screening examination initiated.  I have conducted a focused provider triage encounter, findings are as follows:    Brief history of present illness:  64 year old male presents to ER with c/o low back pain since fall 2 weeks ago. Patient reports he had negative XR. Patient also reports he has not been to dialysis in 2 weeks    Vitals:    11/18/24 1147   BP: (!) 141/62   Pulse: 78   Resp: 20   Temp: 98.3 °F (36.8 °C)   TempSrc: Oral   SpO2: 95%       Pertinent physical exam:  Awake and alert, NAD    Brief workup plan:  Labs, imaging     Preliminary workup initiated; this workup will be continued and followed by the physician or advanced practice provider that is assigned to the patient when roomed.

## 2024-11-18 NOTE — DISCHARGE INSTRUCTIONS
Use ice and heat therapy, 20 minutes on and 20 minutes off.    Use tramadol for pain and swelling.  Follow up with wound care for further evaluation of foot wound

## 2024-11-21 NOTE — PATIENT INSTRUCTIONS
Cleanse both lower legs with mild soap and water, apply aquaphor (get over the counter at [employer] or any pharmacy) to  dry skin on lower legs ( Do not put on R foot open ulcer)     R foot plantar ulceration  Cleanse with Vashe, pat dry  Apply skin prep to sherry wound as needed   Primary dressing: Apply Aquacel Ag to plantar ulcer  Secondary dressing: Cover with gauze, wrap with kerlix, secure with tape  Frequency: Change daily     Offloading: Offload extensively, if ambulating, use Darco shoe provided      Edema control: Use Tubigrip G for edema control to both lower legs, elevate legs at rest for control  Compression for control of chronic venous insufficiency has been ordered by the physician with  Tubigrip  G   Apply first thing in the morning when legs are the smallest, may remove for sleeping at night if you are sleeping with legs elevated     If compression isn't performed, blistering and new ulcerations  may / will occur.      Home health: [facility]     Follow-up: 2 weeks    Go to outpatient services for xray of Right foot.     A wound culture was obtained at today's visit.      Antibiotics may be changed if needed when final culture results are obtained.

## 2024-11-22 DIAGNOSIS — E08.621 DIABETIC ULCER OF RIGHT MIDFOOT ASSOCIATED WITH DIABETES MELLITUS DUE TO UNDERLYING CONDITION, LIMITED TO BREAKDOWN OF SKIN: Primary | ICD-10-CM

## 2024-11-22 DIAGNOSIS — T14.8XXA WOUND INFECTION: ICD-10-CM

## 2024-11-22 DIAGNOSIS — L08.9 WOUND INFECTION: ICD-10-CM

## 2024-11-22 DIAGNOSIS — L97.411 DIABETIC ULCER OF RIGHT MIDFOOT ASSOCIATED WITH DIABETES MELLITUS DUE TO UNDERLYING CONDITION, LIMITED TO BREAKDOWN OF SKIN: Primary | ICD-10-CM

## 2024-11-22 NOTE — PROGRESS NOTES
Telephone orders received from Dr. Maria for xray order of R foot to be sent to [facility] outpatient radiology department due to prolonged wound infection with concern for osteomyelitis.  Sent order electronically.   Unable to reach patient per telephone due to dialysis schedule.   Notified Olivia Benton RN home health nurse of new order for xray.   She will notify the patient.

## 2024-11-26 ENCOUNTER — HOSPITAL ENCOUNTER (OUTPATIENT)
Dept: RADIOLOGY | Facility: HOSPITAL | Age: 64
Discharge: HOME | End: 2024-11-26
Attending: PEDIATRICS
Payer: MEDICARE

## 2024-11-26 ENCOUNTER — HOSPITAL ENCOUNTER (OUTPATIENT)
Dept: WOUND CARE | Facility: HOSPITAL | Age: 64
Discharge: HOME | End: 2024-11-26
Attending: PEDIATRICS
Payer: MEDICARE

## 2024-11-26 VITALS
OXYGEN SATURATION: 97 % | SYSTOLIC BLOOD PRESSURE: 170 MMHG | HEART RATE: 73 BPM | TEMPERATURE: 99 F | DIASTOLIC BLOOD PRESSURE: 78 MMHG | RESPIRATION RATE: 20 BRPM

## 2024-11-26 DIAGNOSIS — E08.621 DIABETIC ULCER OF RIGHT MIDFOOT ASSOCIATED WITH DIABETES MELLITUS DUE TO UNDERLYING CONDITION, LIMITED TO BREAKDOWN OF SKIN: Primary | ICD-10-CM

## 2024-11-26 DIAGNOSIS — E08.621 DIABETIC ULCER OF RIGHT MIDFOOT ASSOCIATED WITH DIABETES MELLITUS DUE TO UNDERLYING CONDITION, LIMITED TO BREAKDOWN OF SKIN: ICD-10-CM

## 2024-11-26 DIAGNOSIS — L97.411 DIABETIC ULCER OF RIGHT MIDFOOT ASSOCIATED WITH DIABETES MELLITUS DUE TO UNDERLYING CONDITION, LIMITED TO BREAKDOWN OF SKIN: Primary | ICD-10-CM

## 2024-11-26 DIAGNOSIS — L97.411 DIABETIC ULCER OF RIGHT MIDFOOT ASSOCIATED WITH DIABETES MELLITUS DUE TO UNDERLYING CONDITION, LIMITED TO BREAKDOWN OF SKIN: ICD-10-CM

## 2024-11-26 DIAGNOSIS — T14.8XXA WOUND INFECTION: ICD-10-CM

## 2024-11-26 DIAGNOSIS — L08.9 WOUND INFECTION: ICD-10-CM

## 2024-11-26 PROCEDURE — 87077 CULTURE AEROBIC IDENTIFY: CPT | Mod: 91

## 2024-11-26 PROCEDURE — 99214 OFFICE O/P EST MOD 30 MIN: CPT

## 2024-11-26 PROCEDURE — 27000999 HC MEDICAL RECORD PHOTO DOCUMENTATION

## 2024-11-26 PROCEDURE — 99213 OFFICE O/P EST LOW 20 MIN: CPT | Mod: ,,, | Performed by: PEDIATRICS

## 2024-11-26 PROCEDURE — 73630 X-RAY EXAM OF FOOT: CPT | Mod: TC,RT

## 2024-11-26 NOTE — PROGRESS NOTES
Subjective:       Patient ID: Neno Watkins is a 64 y.o. male.    Chief Complaint: Diabetic Foot Ulcer (Neuropathic ulceration.  Pt here for follow up with md.    Reports received from Dialysis that they never started iv antibiotics.   Pt report he was seen in the ER in [place] but they didn't do anything for his foot.    Here for evaluation with md. )    HPI  Review of Systems      Objective:      Temp:  [98.6 °F (37 °C)]   Pulse:  [73]   Resp:  [20]   BP: (170)/(78)   SpO2:  [97 %]   Physical Exam       Wound 09/17/24 0835 Diabetic Ulcer Right plantar Foot (Active)   09/17/24 0835 Foot   Present on Original Admission:    Primary Wound Type: Diabetic ulc   Side: Right   Orientation: plantar   Wound Approximate Age at First Assessment (Weeks): 12 weeks   Wound Number:    Is this injury device related?:    Incision Type:    Closure Method:    Wound Description (Comments):    Type:    Additional Comments:    Ankle-Brachial Index:    Pulses: palpable DP pulse with strong doppler to DP/PT   Removal Indication and Assessment:    Wound Outcome:    Wound Image     11/26/24 1448   Dressing Appearance Intact;Moist drainage 11/26/24 1448   Drainage Amount Large 11/26/24 1448   Drainage Characteristics/Odor Brown;Malodorous 11/26/24 1448   Appearance Pink;Red;Gray;Tan;Granulating;Necrotic 11/26/24 1448   Tissue loss description Full thickness 11/26/24 1448   Black (%), Wound Tissue Color 20 % 11/26/24 1448   Red (%), Wound Tissue Color 80 % 11/26/24 1448   Periwound Area Macerated 11/26/24 1448   Wound Edges Defined;Callused 11/26/24 1448   Wound Length (cm) 2 cm 11/26/24 1448   Wound Width (cm) 2.3 cm 11/26/24 1448   Wound Depth (cm) 0.3 cm 11/26/24 1448   Wound Volume (cm^3) 1.38 cm^3 11/26/24 1448   Wound Surface Area (cm^2) 4.6 cm^2 11/26/24 1448   Care Cleansed with:;Antimicrobial agent;Wound cleanser 11/26/24 1448   Dressing Applied;Island/border 11/26/24 1448   Periwound Care Skin barrier film applied 11/26/24  1448   Compression Tubular elasticized bandage 11/26/24 1448   Off Loading Off loading shoe 11/26/24 1448         Assessment:     Today wound is necrotic red gray and tan.  Depth is to the bone.  Bartlett stage III.  Drainage is foul.  Because of his kidney disease and medications we are not able to treat him with oral antibiotics.  Patient has osteomyelitis.  Was explained to the patient that he has infection of the bone of his foot.  He has been to the hospital but this has not been addressed.  Told the patient we would Call Dr. Hobbs.  The doctor stated that the patient could transferred to [facility] and he would perform surgery on his foot.  Was explained to the patient that this was the only curative course we could take.  All other treatments would be palliative.  We told the patient that he could lose his foot leg and possibly become septic and die.  Understood everything and decided that he would not go to the hospital.  The wound was cleaned and a tissue culture was taken.  Patient was sent to the hospital for x-rays of the foot.  Aquacel Ag was applied to the plantar ulcer and covered with gauze and Kerlix and tape.  This was to be changed daily and patient was to continue to offload.  Patient had Tubigrip G applied.  Patient would return in 2 weeks for MD visit.    ICD-10-CM ICD-9-CM   1. Diabetic ulcer of right midfoot associated with diabetes mellitus due to underlying condition, limited to breakdown of skin  E08.621 249.80    L97.411 707.14         Plan:   Tissue pathology and/or culture taken:  [x] Yes [] No   Sharp debridement performed:   [] Yes [x] No   Labs ordered this visit:   [] Yes [x] No   Imaging ordered this visit:   [x] Yes [] No           Orders Placed This Encounter   Procedures    Tissue Culture - Aerobic    Change dressing     Cleanse both lower legs with mild soap and water, apply aquaphor (get over the counter at [employer] or any pharmacy) to  dry skin on lower legs (  Do not put on R foot open ulcer)     R foot plantar ulceration  Cleanse with Vashe, pat dry  Apply skin prep to sherry wound as needed   Primary dressing: Apply Aquacel Ag to plantar ulcer  Secondary dressing: Cover with gauze, wrap with kerlix, secure with tape  Frequency: Change daily     Offloading: Offload extensively, if ambulating, use Darco shoe provided      Edema control: Use Tubigrip G for edema control to both lower legs, elevate legs at rest for control  Compression for control of chronic venous insufficiency has been ordered by the physician with  Tubigrip  G   Apply first thing in the morning when legs are the smallest, may remove for sleeping at night if you are sleeping with legs elevated     If compression isn't performed, blistering and new ulcerations  may / will occur.      Home health: [facility]     Follow-up: 2 weeks    Go to outpatient services for xray of Right foot.     A wound culture was obtained at today's visit.      Antibiotics may be changed if needed when final culture results are obtained.        Follow up in about 2 weeks (around 12/10/2024) for md visit .

## 2024-11-30 LAB
BACTERIA TISS AEROBE CULT: ABNORMAL

## 2024-12-05 NOTE — PATIENT INSTRUCTIONS
Cleanse both lower legs with mild soap and water, apply aquaphor (get over the counter at [employer] or any pharmacy) to  dry skin on lower legs ( Do not put on R foot open ulcer)     R foot plantar ulceration  Cleanse with Vashe, pat dry  Apply skin prep to sherry wound as needed   Primary dressing: Apply gentamicin to ulceration, then Aquacel  to plantar ulcer  Secondary dressing: Cover with gauze, wrap with kerlix, secure with tape  Frequency: Change daily     Offloading: Offload extensively, if ambulating, use Darco shoe provided      Edema control: Use Tubigrip G for edema control to both lower legs, elevate legs at rest for control  Compression for control of chronic venous insufficiency has been ordered by the physician with  Tubigrip  G   Apply first thing in the morning when legs are the smallest, may remove for sleeping at night if you are sleeping with legs elevated     If compression isn't performed, blistering and new ulcerations  may / will occur.      Home health: [facility]     Follow-up: 3 weeks

## 2024-12-10 ENCOUNTER — HOSPITAL ENCOUNTER (OUTPATIENT)
Dept: WOUND CARE | Facility: HOSPITAL | Age: 64
Discharge: HOME | End: 2024-12-10
Attending: PEDIATRICS
Payer: MEDICARE

## 2024-12-10 VITALS
DIASTOLIC BLOOD PRESSURE: 77 MMHG | TEMPERATURE: 98 F | RESPIRATION RATE: 22 BRPM | HEART RATE: 89 BPM | SYSTOLIC BLOOD PRESSURE: 158 MMHG | OXYGEN SATURATION: 96 %

## 2024-12-10 DIAGNOSIS — L97.411 DIABETIC ULCER OF RIGHT MIDFOOT ASSOCIATED WITH DIABETES MELLITUS DUE TO UNDERLYING CONDITION, LIMITED TO BREAKDOWN OF SKIN: Primary | ICD-10-CM

## 2024-12-10 DIAGNOSIS — E08.621 DIABETIC ULCER OF RIGHT MIDFOOT ASSOCIATED WITH DIABETES MELLITUS DUE TO UNDERLYING CONDITION, LIMITED TO BREAKDOWN OF SKIN: Primary | ICD-10-CM

## 2024-12-10 PROCEDURE — 99213 OFFICE O/P EST LOW 20 MIN: CPT

## 2024-12-10 PROCEDURE — 27000999 HC MEDICAL RECORD PHOTO DOCUMENTATION

## 2024-12-10 PROCEDURE — 99213 OFFICE O/P EST LOW 20 MIN: CPT | Mod: ,,, | Performed by: PEDIATRICS

## 2024-12-10 RX ORDER — GENTAMICIN SULFATE 1 MG/G
OINTMENT TOPICAL DAILY
Qty: 30 G | Refills: 1 | Status: SHIPPED | OUTPATIENT
Start: 2024-12-10 | End: 2024-12-24

## 2024-12-10 NOTE — PROGRESS NOTES
Subjective:       Patient ID: Neno Watkins is a 64 y.o. male.    Chief Complaint: Non-healing Wound Follow Up (R foot plantar diabetic/neuropathic ulceration. Patient reports not wearing Tubi  regularly. Patient states that he only wants palliative care to right foot. Here for re-evaluation with md. )    HPI  Review of Systems      Objective:      Temp:  [98.3 °F (36.8 °C)]   Pulse:  [89]   Resp:  [22]   BP: (158)/(77)   SpO2:  [96 %]   Physical Exam       Wound 09/17/24 0835 Diabetic Ulcer Right plantar Foot (Active)   09/17/24 0835 Foot   Present on Original Admission:    Primary Wound Type: Diabetic ulc   Side: Right   Orientation: plantar   Wound Approximate Age at First Assessment (Weeks): 12 weeks   Wound Number:    Is this injury device related?:    Incision Type:    Closure Method:    Wound Description (Comments):    Type:    Additional Comments:    Ankle-Brachial Index:    Pulses: palpable DP pulse with strong doppler to DP/PT   Removal Indication and Assessment:    Wound Outcome:    Wound Image      12/10/24 1452   Dressing Appearance Intact;Moist drainage 12/10/24 1452   Drainage Amount Moderate 12/10/24 1452   Drainage Characteristics/Odor Brown;Malodorous 12/10/24 1452   Appearance Red;Granulating;Prater;Tan 12/10/24 1452   Tissue loss description Full thickness 12/10/24 1452   Black (%), Wound Tissue Color 20 % 12/10/24 1452   Red (%), Wound Tissue Color 80 % 12/10/24 1452   Periwound Area Edematous;Moist 12/10/24 1452   Wound Edges Defined;Callused 12/10/24 1452   Wound Length (cm) 1.8 cm 12/10/24 1452   Wound Width (cm) 2.1 cm 12/10/24 1452   Wound Depth (cm) 0.3 cm 12/10/24 1452   Wound Volume (cm^3) 1.134 cm^3 12/10/24 1452   Wound Surface Area (cm^2) 3.78 cm^2 12/10/24 1452   Care Cleansed with:;Antimicrobial agent;Wound cleanser 12/10/24 1452   Dressing Applied;Hydrofiber;Gauze;Rolled gauze;Other (comment) 12/10/24 1452   Periwound Care Skin barrier film applied 12/10/24 5471    Compression Tubular elasticized bandage 12/10/24 3719         Assessment:     Today wound is 1.8 cm x 2.1 cm with a depth of 0.3 cm.  This is red and granulating today.  Is some gray and 10 areas.  This was cleaned and Aquacel Ag was applied to the ulcer and covered with gauze.  Patient will continue with Tubigrip E for edema control.  We will start to add gentamicin ointment daily with the Aquacel because of positive cultures that can not be treated orally.  Patient will return in 1 week for MD visit.    ICD-10-CM ICD-9-CM   1. Diabetic ulcer of right midfoot associated with diabetes mellitus due to underlying condition, limited to breakdown of skin  E08.621 249.80    L97.411 707.14         Plan:   Tissue pathology and/or culture taken:  [] Yes [x] No   Sharp debridement performed:   [] Yes [x] No   Labs ordered this visit:   [] Yes [x] No   Imaging ordered this visit:   [] Yes [x] No           Orders Placed This Encounter   Procedures    Change dressing     Cleanse both lower legs with mild soap and water, apply aquaphor (get over the counter at [employer] or any pharmacy) to  dry skin on lower legs ( Do not put on R foot open ulcer)     R foot plantar ulceration  Cleanse with Vashe, pat dry  Apply skin prep to sherry wound as needed   Primary dressing: Apply gentamicin to ulceration, then Aquacel  to plantar ulcer  Secondary dressing: Cover with gauze, wrap with kerlix, secure with tape  Frequency: Change daily     Offloading: Offload extensively, if ambulating, use Darco shoe provided      Edema control: Use Tubigrip G for edema control to both lower legs, elevate legs at rest for control  Compression for control of chronic venous insufficiency has been ordered by the physician with  Tubigrip  G   Apply first thing in the morning when legs are the smallest, may remove for sleeping at night if you are sleeping with legs elevated     If compression isn't performed, blistering and new ulcerations  may / will occur.       Home health: [facility]     Follow-up: 3 weeks        Follow up in about 3 weeks (around 12/31/2024) for md visit .

## 2024-12-15 ENCOUNTER — HOSPITAL ENCOUNTER (EMERGENCY)
Facility: HOSPITAL | Age: 64
Discharge: HOME | End: 2024-12-15
Attending: FAMILY MEDICINE
Payer: MEDICARE

## 2024-12-15 VITALS
HEIGHT: 70 IN | SYSTOLIC BLOOD PRESSURE: 162 MMHG | RESPIRATION RATE: 18 BRPM | HEART RATE: 87 BPM | DIASTOLIC BLOOD PRESSURE: 80 MMHG | BODY MASS INDEX: 40.66 KG/M2 | WEIGHT: 284 LBS | OXYGEN SATURATION: 99 % | TEMPERATURE: 98 F

## 2024-12-15 DIAGNOSIS — L02.619 CELLULITIS AND ABSCESS OF FOOT: Primary | ICD-10-CM

## 2024-12-15 DIAGNOSIS — L03.119 CELLULITIS AND ABSCESS OF FOOT: Primary | ICD-10-CM

## 2024-12-15 LAB
ALBUMIN SERPL-MCNC: 3.1 G/DL (ref 3.4–4.8)
ALBUMIN/GLOB SERPL: 0.7 RATIO (ref 1.1–2)
ALP SERPL-CCNC: 129 UNIT/L (ref 40–150)
ALT SERPL-CCNC: 29 UNIT/L (ref 0–55)
ANION GAP SERPL CALC-SCNC: 8 MEQ/L
AST SERPL-CCNC: 31 UNIT/L (ref 5–34)
BASOPHILS # BLD AUTO: 0.01 X10(3)/MCL
BASOPHILS NFR BLD AUTO: 0.1 %
BILIRUB SERPL-MCNC: 1.7 MG/DL
BUN SERPL-MCNC: 27.2 MG/DL (ref 8.4–25.7)
CALCIUM SERPL-MCNC: 9 MG/DL (ref 8.8–10)
CHLORIDE SERPL-SCNC: 105 MMOL/L (ref 98–107)
CO2 SERPL-SCNC: 23 MMOL/L (ref 23–31)
CREAT SERPL-MCNC: 2.36 MG/DL (ref 0.72–1.25)
CREAT/UREA NIT SERPL: 12
EOSINOPHIL # BLD AUTO: 0.03 X10(3)/MCL (ref 0–0.9)
EOSINOPHIL NFR BLD AUTO: 0.2 %
ERYTHROCYTE [DISTWIDTH] IN BLOOD BY AUTOMATED COUNT: 14.4 % (ref 11.5–17)
GFR SERPLBLD CREATININE-BSD FMLA CKD-EPI: 30 ML/MIN/1.73/M2
GLOBULIN SER-MCNC: 4.6 GM/DL (ref 2.4–3.5)
GLUCOSE SERPL-MCNC: 170 MG/DL (ref 82–115)
HCT VFR BLD AUTO: 39.2 % (ref 42–52)
HGB BLD-MCNC: 12.5 G/DL (ref 14–18)
IMM GRANULOCYTES # BLD AUTO: 0.03 X10(3)/MCL (ref 0–0.04)
IMM GRANULOCYTES NFR BLD AUTO: 0.2 %
LYMPHOCYTES # BLD AUTO: 1.52 X10(3)/MCL (ref 0.6–4.6)
LYMPHOCYTES NFR BLD AUTO: 11.6 %
MCH RBC QN AUTO: 29.8 PG (ref 27–31)
MCHC RBC AUTO-ENTMCNC: 31.9 G/DL (ref 33–36)
MCV RBC AUTO: 93.3 FL (ref 80–94)
MONOCYTES # BLD AUTO: 0.96 X10(3)/MCL (ref 0.1–1.3)
MONOCYTES NFR BLD AUTO: 7.3 %
NEUTROPHILS # BLD AUTO: 10.61 X10(3)/MCL (ref 2.1–9.2)
NEUTROPHILS NFR BLD AUTO: 80.6 %
PLATELET # BLD AUTO: 158 X10(3)/MCL (ref 130–400)
PMV BLD AUTO: 9.5 FL (ref 7.4–10.4)
POTASSIUM SERPL-SCNC: 5.1 MMOL/L (ref 3.5–5.1)
PROT SERPL-MCNC: 7.7 GM/DL (ref 5.8–7.6)
RBC # BLD AUTO: 4.2 X10(6)/MCL (ref 4.7–6.1)
SODIUM SERPL-SCNC: 136 MMOL/L (ref 136–145)
TSH SERPL-ACNC: 1.54 UIU/ML (ref 0.35–4.94)
WBC # BLD AUTO: 13.16 X10(3)/MCL (ref 4.5–11.5)

## 2024-12-15 PROCEDURE — 85025 COMPLETE CBC W/AUTO DIFF WBC: CPT | Performed by: FAMILY MEDICINE

## 2024-12-15 PROCEDURE — 84443 ASSAY THYROID STIM HORMONE: CPT | Performed by: FAMILY MEDICINE

## 2024-12-15 PROCEDURE — 99284 EMERGENCY DEPT VISIT MOD MDM: CPT | Mod: 25

## 2024-12-15 PROCEDURE — 80053 COMPREHEN METABOLIC PANEL: CPT | Performed by: FAMILY MEDICINE

## 2024-12-15 RX ORDER — CLINDAMYCIN HYDROCHLORIDE 300 MG/1
300 CAPSULE ORAL 3 TIMES DAILY
Qty: 21 CAPSULE | Refills: 0 | Status: SHIPPED | OUTPATIENT
Start: 2024-12-15 | End: 2024-12-22

## 2024-12-15 NOTE — DISCHARGE INSTRUCTIONS
Please follow up with your PCP in 1 week. Continue wound care HH. Take 1 capsule clindamycin 3 times per day for 7 days. If you develop or have worsening fever, shortness of breath, chest pain, please return to the ED.

## 2024-12-15 NOTE — ED PROVIDER NOTES
"Encounter Date: 12/15/2024       History     Chief Complaint   Patient presents with    Fall     Fell 2 days in a row -c/o generalized weakness -c/o R shoulder pain -presents A/A  GCS 15/15     Pt is 63yo F with PMH HTN, PAD with chronic R foot wound, getting HH wound care at home. Pt reports that he fell this AM because his legs felt weak. He endorses pain in R foot, which has a nonhealing wound on the lateral R side. He reports good PO intake at home. He normally takes a "fluid pill" but did not take any meds this AM. He denies f/c.      Review of patient's allergies indicates:   Allergen Reactions    Butorphanol Swelling     "it almost killed me"    Hydrocodone-acetaminophen Other (See Comments)     "They mess with my heart"    Povidone-iodine Shortness Of Breath and Rash     Past Medical History:   Diagnosis Date    BPH (benign prostatic hyperplasia)     Essential (primary) hypertension     Obesity, unspecified     PAD (peripheral artery disease)      Past Surgical History:   Procedure Laterality Date    FISTULOGRAM Left 9/25/2023    Procedure: Fistulogram;  Surgeon: Rodrigo Soliman DO;  Location: [facility] CATH LAB;  Service: Nephrology;  Laterality: Left;    INSERTION OF TUNNELED CENTRAL VENOUS HEMODIALYSIS CATHETER Right 6/27/2022    Procedure: INSERTION, CATHETER, HEMODIALYSIS, DUAL LUMEN;  Surgeon: Molly Garcia MD;  Location: [facility] OR;  Service: General;  Laterality: Right;     No family history on file.  Social History     Tobacco Use    Smoking status: Never    Smokeless tobacco: Never   Substance Use Topics    Alcohol use: Never    Drug use: Never     Review of Systems   All other systems reviewed and are negative.      Physical Exam     Initial Vitals [12/15/24 0813]   BP Pulse Resp Temp SpO2   (!) 143/67 89 18 97.9 °F (36.6 °C) 98 %      MAP       --         Physical Exam    Nursing note and vitals reviewed.  Constitutional: He appears well-developed and well-nourished.   HENT:   Head: Normocephalic and " atraumatic.   Eyes: EOM are normal.   Neck:   Normal range of motion.  Musculoskeletal:      Cervical back: Normal range of motion.      Right lower leg: 3+ Pitting Edema present.      Left lower le+ Pitting Edema present.     Neurological: He is alert.   Skin: Skin is warm. Lesion noted.   1.5cm well-circumscribed stage 2 wound on R lateral foot with surrounding erythema, edema, calor, TTP to foot   Psychiatric: He has a normal mood and affect. Thought content normal.         ED Course   Procedures  Labs Reviewed   COMPREHENSIVE METABOLIC PANEL - Abnormal       Result Value    Sodium 136      Potassium 5.1      Chloride 105      CO2 23      Glucose 170 (*)     Blood Urea Nitrogen 27.2 (*)     Creatinine 2.36 (*)     Calcium 9.0      Protein Total 7.7 (*)     Albumin 3.1 (*)     Globulin 4.6 (*)     Albumin/Globulin Ratio 0.7 (*)     Bilirubin Total 1.7 (*)           ALT 29      AST 31      eGFR 30      Anion Gap 8.0      BUN/Creatinine Ratio 12     CBC WITH DIFFERENTIAL - Abnormal    WBC 13.16 (*)     RBC 4.20 (*)     Hgb 12.5 (*)     Hct 39.2 (*)     MCV 93.3      MCH 29.8      MCHC 31.9 (*)     RDW 14.4      Platelet 158      MPV 9.5      Neut % 80.6      Lymph % 11.6      Mono % 7.3      Eos % 0.2      Basophil % 0.1      Lymph # 1.52      Neut # 10.61 (*)     Mono # 0.96      Eos # 0.03      Baso # 0.01      IG# 0.03      IG% 0.2     TSH - Normal    TSH 1.539     CBC W/ AUTO DIFFERENTIAL    Narrative:     The following orders were created for panel order CBC auto differential.  Procedure                               Abnormality         Status                     ---------                               -----------         ------                     CBC with Differential[[id number]]       Abnormal            Final result                 Please view results for these tests on the individual orders.          Imaging Results              X-Ray Foot Complete Right (Final result)  Result time 12/15/24  09:16:51      Final result by Hussain Bingham MD (12/15/24 09:16:51)                   Impression:      Diffuse soft tissue swelling along the dorsum of the forefoot without evidence for osteomyelitis.  No fracture dislocation      Electronically signed by: Hussain Bingham MD  Date:    12/15/2024  Time:    09:16               Narrative:    EXAMINATION:  XR FOOT COMPLETE 3 VIEW RIGHT    CLINICAL HISTORY:  . Cellulitis of unspecified part of limb    TECHNIQUE:  AP, lateral, and oblique views of the right foot were performed.    COMPARISON:  11/26/2024    FINDINGS:  No fracture dislocation.  The alignment is maintained.  Joint space narrowing of the 1st MTP joint space.  Like E tendinous injury of the base of the 5th metatarsal with ossification/calcification.  Diffuse soft tissue swelling is identified without evidence for subcutaneous emphysema or erosion.                                       Medications - No data to display  Medical Decision Making  Overall 63 yo M with BL Le weakness and fall this AM, chronic wound on R foot and receiving wound care HH. On PE, chronic wound on R foot with surrounding erythema, edema, calor, TTP.  Differential Diagnosis:   Cellulitis, osteomyelitis, electrolyte abnormality  ED Management:  VSSAF  On PE, chronic wound on R foot with surrounding erythema, edema, calor, TTP, foul-smelling.  Pt appears to be in no distress   CBC, CMP, XR R foot ordered to ro osteo and systemic infection. WBC 13, otherwise labs WNL. XR without evidence of osteo. Pt with cellulitis on RLE. Will treat with with clindamycin for MRSA and strep coverage given nonhealing wound. Continue wound care.  Return precautions discussed and follow up with PCP is recommended      Amount and/or Complexity of Data Reviewed  Labs: ordered.     Details: CBC, CMP  Radiology: ordered.     Details: XR R foot    Risk  Prescription drug management.                                      Clinical Impression:  Final  diagnoses:  [L03.119, L02.619] Cellulitis and abscess of foot (Primary)          ED Disposition Condition    Discharge Stable          ED Prescriptions       Medication Sig Dispense Start Date End Date Auth. Provider    clindamycin (CLEOCIN) 300 MG capsule Take 1 capsule (300 mg total) by mouth 3 (three) times daily. for 7 days 21 capsule 12/15/2024 12/22/2024 Lisa Petty MD          Follow-up Information    None          Lisa Petty MD  12/15/24 1003

## 2024-12-25 ENCOUNTER — HOSPITAL ENCOUNTER (INPATIENT)
Facility: HOSPITAL | Age: 64
LOS: 14 days | Discharge: NURSING, REHAB OR LONG-TERM CARE | DRG: 853 | End: 2025-01-08
Attending: STUDENT IN AN ORGANIZED HEALTH CARE EDUCATION/TRAINING PROGRAM | Admitting: INTERNAL MEDICINE
Payer: MEDICARE
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
## 2024-12-25 DIAGNOSIS — Z99.2 ESRD ON HEMODIALYSIS: Primary | ICD-10-CM

## 2024-12-25 DIAGNOSIS — Z99.2 HEMODIALYSIS STATUS: Chronic | ICD-10-CM

## 2024-12-25 DIAGNOSIS — E11.59 TYPE 2 DIABETES MELLITUS WITH OTHER CIRCULATORY COMPLICATION, WITHOUT LONG-TERM CURRENT USE OF INSULIN: Chronic | ICD-10-CM

## 2024-12-25 DIAGNOSIS — N18.6 ESRD ON HEMODIALYSIS: Primary | ICD-10-CM

## 2024-12-25 DIAGNOSIS — I63.9 CVA (CEREBRAL VASCULAR ACCIDENT): ICD-10-CM

## 2024-12-25 DIAGNOSIS — L08.9 WOUND INFECTION: ICD-10-CM

## 2024-12-25 DIAGNOSIS — N18.6 ESRD (END STAGE RENAL DISEASE): ICD-10-CM

## 2024-12-25 DIAGNOSIS — T14.8XXA WOUND INFECTION: ICD-10-CM

## 2024-12-25 DIAGNOSIS — A41.9 SEPSIS, DUE TO UNSPECIFIED ORGANISM, UNSPECIFIED WHETHER ACUTE ORGAN DYSFUNCTION PRESENT: ICD-10-CM

## 2024-12-25 DIAGNOSIS — I73.9 PAD (PERIPHERAL ARTERY DISEASE): ICD-10-CM

## 2024-12-25 LAB
ALBUMIN SERPL-MCNC: 3.2 G/DL (ref 3.4–4.8)
ALBUMIN/GLOB SERPL: 0.6 RATIO (ref 1.1–2)
ALP SERPL-CCNC: 269 UNIT/L (ref 40–150)
ALT SERPL-CCNC: 103 UNIT/L (ref 0–55)
ANION GAP SERPL CALC-SCNC: 10 MEQ/L
APTT PPP: 34.4 SECONDS (ref 23.2–33.7)
AST SERPL-CCNC: 70 UNIT/L (ref 5–34)
BASOPHILS # BLD AUTO: 0.04 X10(3)/MCL
BASOPHILS NFR BLD AUTO: 0.2 %
BILIRUB SERPL-MCNC: 1.3 MG/DL
BNP BLD-MCNC: 26.7 PG/ML
BUN SERPL-MCNC: 34.9 MG/DL (ref 8.4–25.7)
CALCIUM SERPL-MCNC: 9.1 MG/DL (ref 8.8–10)
CHLORIDE SERPL-SCNC: 105 MMOL/L (ref 98–107)
CO2 SERPL-SCNC: 20 MMOL/L (ref 23–31)
CREAT SERPL-MCNC: 2.38 MG/DL (ref 0.72–1.25)
CREAT/UREA NIT SERPL: 15
EOSINOPHIL # BLD AUTO: 0.02 X10(3)/MCL (ref 0–0.9)
EOSINOPHIL NFR BLD AUTO: 0.1 %
ERYTHROCYTE [DISTWIDTH] IN BLOOD BY AUTOMATED COUNT: 13.9 % (ref 11.5–17)
FLUAV AG UPPER RESP QL IA.RAPID: NOT DETECTED
FLUBV AG UPPER RESP QL IA.RAPID: NOT DETECTED
GFR SERPLBLD CREATININE-BSD FMLA CKD-EPI: 30 ML/MIN/1.73/M2
GLOBULIN SER-MCNC: 5.1 GM/DL (ref 2.4–3.5)
GLUCOSE SERPL-MCNC: 154 MG/DL (ref 82–115)
HCT VFR BLD AUTO: 36.9 % (ref 42–52)
HGB BLD-MCNC: 11.9 G/DL (ref 14–18)
IMM GRANULOCYTES # BLD AUTO: 0.11 X10(3)/MCL (ref 0–0.04)
IMM GRANULOCYTES NFR BLD AUTO: 0.5 %
INR PPP: 1.2
LACTATE SERPL-SCNC: 1.6 MMOL/L (ref 0.5–2.2)
LYMPHOCYTES # BLD AUTO: 1.37 X10(3)/MCL (ref 0.6–4.6)
LYMPHOCYTES NFR BLD AUTO: 6.8 %
MCH RBC QN AUTO: 29.5 PG (ref 27–31)
MCHC RBC AUTO-ENTMCNC: 32.2 G/DL (ref 33–36)
MCV RBC AUTO: 91.6 FL (ref 80–94)
MONOCYTES # BLD AUTO: 0.94 X10(3)/MCL (ref 0.1–1.3)
MONOCYTES NFR BLD AUTO: 4.7 %
NEUTROPHILS # BLD AUTO: 17.55 X10(3)/MCL (ref 2.1–9.2)
NEUTROPHILS NFR BLD AUTO: 87.7 %
NRBC BLD AUTO-RTO: 0 %
PLATELET # BLD AUTO: 397 X10(3)/MCL (ref 130–400)
PMV BLD AUTO: 8.9 FL (ref 7.4–10.4)
POTASSIUM SERPL-SCNC: 4.6 MMOL/L (ref 3.5–5.1)
PROT SERPL-MCNC: 8.3 GM/DL (ref 5.8–7.6)
PROTHROMBIN TIME: 15.4 SECONDS (ref 12.5–14.5)
RBC # BLD AUTO: 4.03 X10(6)/MCL (ref 4.7–6.1)
SARS-COV-2 RNA RESP QL NAA+PROBE: NOT DETECTED
SODIUM SERPL-SCNC: 135 MMOL/L (ref 136–145)
TROPONIN I SERPL-MCNC: 0.03 NG/ML (ref 0–0.04)
WBC # BLD AUTO: 20.03 X10(3)/MCL (ref 4.5–11.5)

## 2024-12-25 PROCEDURE — 99285 EMERGENCY DEPT VISIT HI MDM: CPT | Mod: 25

## 2024-12-25 PROCEDURE — 25000003 PHARM REV CODE 250: Performed by: STUDENT IN AN ORGANIZED HEALTH CARE EDUCATION/TRAINING PROGRAM

## 2024-12-25 PROCEDURE — 93005 ELECTROCARDIOGRAM TRACING: CPT

## 2024-12-25 PROCEDURE — 11000001 HC ACUTE MED/SURG PRIVATE ROOM

## 2024-12-25 PROCEDURE — 96374 THER/PROPH/DIAG INJ IV PUSH: CPT

## 2024-12-25 PROCEDURE — 83880 ASSAY OF NATRIURETIC PEPTIDE: CPT | Performed by: STUDENT IN AN ORGANIZED HEALTH CARE EDUCATION/TRAINING PROGRAM

## 2024-12-25 PROCEDURE — 84484 ASSAY OF TROPONIN QUANT: CPT | Performed by: STUDENT IN AN ORGANIZED HEALTH CARE EDUCATION/TRAINING PROGRAM

## 2024-12-25 PROCEDURE — 87040 BLOOD CULTURE FOR BACTERIA: CPT | Performed by: STUDENT IN AN ORGANIZED HEALTH CARE EDUCATION/TRAINING PROGRAM

## 2024-12-25 PROCEDURE — 63600175 PHARM REV CODE 636 W HCPCS: Performed by: STUDENT IN AN ORGANIZED HEALTH CARE EDUCATION/TRAINING PROGRAM

## 2024-12-25 PROCEDURE — 0240U COVID/FLU A&B PCR: CPT | Performed by: STUDENT IN AN ORGANIZED HEALTH CARE EDUCATION/TRAINING PROGRAM

## 2024-12-25 PROCEDURE — 93010 ELECTROCARDIOGRAM REPORT: CPT | Mod: ,,, | Performed by: INTERNAL MEDICINE

## 2024-12-25 PROCEDURE — 85730 THROMBOPLASTIN TIME PARTIAL: CPT | Performed by: STUDENT IN AN ORGANIZED HEALTH CARE EDUCATION/TRAINING PROGRAM

## 2024-12-25 PROCEDURE — 85610 PROTHROMBIN TIME: CPT | Performed by: STUDENT IN AN ORGANIZED HEALTH CARE EDUCATION/TRAINING PROGRAM

## 2024-12-25 PROCEDURE — 85025 COMPLETE CBC W/AUTO DIFF WBC: CPT | Performed by: STUDENT IN AN ORGANIZED HEALTH CARE EDUCATION/TRAINING PROGRAM

## 2024-12-25 PROCEDURE — 83605 ASSAY OF LACTIC ACID: CPT | Performed by: STUDENT IN AN ORGANIZED HEALTH CARE EDUCATION/TRAINING PROGRAM

## 2024-12-25 PROCEDURE — 80053 COMPREHEN METABOLIC PANEL: CPT | Performed by: STUDENT IN AN ORGANIZED HEALTH CARE EDUCATION/TRAINING PROGRAM

## 2024-12-25 RX ADMIN — PIPERACILLIN SODIUM AND TAZOBACTAM SODIUM 4.5 G: 4; .5 INJECTION, POWDER, LYOPHILIZED, FOR SOLUTION INTRAVENOUS at 10:12

## 2024-12-25 RX ADMIN — VANCOMYCIN HYDROCHLORIDE 1000 MG: 1 INJECTION, POWDER, LYOPHILIZED, FOR SOLUTION INTRAVENOUS at 11:12

## 2024-12-25 NOTE — Clinical Note
Diagnosis: Wound infection [831414]   Future Attending Provider: JACK GAGE [[id number]]   Admit to which facility:: [facility] [[id number]]   Reason for IP Medical Treatment  (Clinical interventions that can only be accomplished in the IP setting? ) :: wound infection, ESRD on HD

## 2024-12-26 PROBLEM — T14.8XXA WOUND INFECTION: Status: ACTIVE | Noted: 2024-12-26

## 2024-12-26 PROBLEM — L08.9 WOUND INFECTION: Status: ACTIVE | Noted: 2024-12-26

## 2024-12-26 LAB
ALBUMIN SERPL-MCNC: 2.7 G/DL (ref 3.4–4.8)
ALBUMIN/GLOB SERPL: 0.6 RATIO (ref 1.1–2)
ALP SERPL-CCNC: 230 UNIT/L (ref 40–150)
ALT SERPL-CCNC: 84 UNIT/L (ref 0–55)
ANION GAP SERPL CALC-SCNC: 8 MEQ/L
APTT PPP: 36.7 SECONDS (ref 23.2–33.7)
AST SERPL-CCNC: 54 UNIT/L (ref 5–34)
AV INDEX (PROSTH): 0.83
AV MEAN GRADIENT: 3 MMHG
AV PEAK GRADIENT: 6.8 MMHG
AV VALVE AREA BY VELOCITY RATIO: 2.7 CM²
AV VALVE AREA: 2.9 CM²
AV VELOCITY RATIO: 0.77
BACTERIA #/AREA URNS AUTO: ABNORMAL /HPF
BASOPHILS # BLD AUTO: 0.05 X10(3)/MCL
BASOPHILS NFR BLD AUTO: 0.3 %
BILIRUB SERPL-MCNC: 1.2 MG/DL
BILIRUB UR QL STRIP.AUTO: NEGATIVE
BSA FOR ECHO PROCEDURE: 2.59 M2
BUN SERPL-MCNC: 35 MG/DL (ref 8.4–25.7)
CALCIUM SERPL-MCNC: 8.5 MG/DL (ref 8.8–10)
CHLORIDE SERPL-SCNC: 107 MMOL/L (ref 98–107)
CHOLEST SERPL-MCNC: 112 MG/DL
CHOLEST/HDLC SERPL: 5 {RATIO} (ref 0–5)
CLARITY UR: CLEAR
CO2 SERPL-SCNC: 19 MMOL/L (ref 23–31)
COLOR UR AUTO: YELLOW
CREAT SERPL-MCNC: 2.17 MG/DL (ref 0.72–1.25)
CREAT/UREA NIT SERPL: 16
CV ECHO LV RWT: 0.42 CM
DOP CALC AO PEAK VEL: 1.3 M/S
DOP CALC AO VTI: 22.3 CM
DOP CALC LVOT AREA: 3.5 CM2
DOP CALC LVOT DIAMETER: 2.1 CM
DOP CALC LVOT PEAK VEL: 1 M/S
DOP CALC LVOT STROKE VOLUME: 64.4 CM3
DOP CALC MV VTI: 22.6 CM
DOP CALCLVOT PEAK VEL VTI: 18.6 CM
E WAVE DECELERATION TIME: 132 MSEC
E/A RATIO: 1.41
E/E' RATIO: 11.89 M/S
ECHO LV POSTERIOR WALL: 1.1 CM (ref 0.6–1.1)
EOSINOPHIL # BLD AUTO: 0.17 X10(3)/MCL (ref 0–0.9)
EOSINOPHIL NFR BLD AUTO: 0.9 %
ERYTHROCYTE [DISTWIDTH] IN BLOOD BY AUTOMATED COUNT: 14 % (ref 11.5–17)
EST. AVERAGE GLUCOSE BLD GHB EST-MCNC: 125.5 MG/DL
FERRITIN SERPL-MCNC: 1418.37 NG/ML (ref 21.81–274.66)
FRACTIONAL SHORTENING: 26.9 % (ref 28–44)
GFR SERPLBLD CREATININE-BSD FMLA CKD-EPI: 33 ML/MIN/1.73/M2
GLOBULIN SER-MCNC: 4.5 GM/DL (ref 2.4–3.5)
GLUCOSE SERPL-MCNC: 128 MG/DL (ref 82–115)
GLUCOSE UR QL STRIP: NORMAL
HBA1C MFR BLD: 6 %
HBV SURFACE AG SERPL QL IA: NONREACTIVE
HCT VFR BLD AUTO: 35.6 % (ref 42–52)
HDLC SERPL-MCNC: 24 MG/DL (ref 35–60)
HGB BLD-MCNC: 11.6 G/DL (ref 14–18)
HGB UR QL STRIP: ABNORMAL
IMM GRANULOCYTES # BLD AUTO: 0.1 X10(3)/MCL (ref 0–0.04)
IMM GRANULOCYTES NFR BLD AUTO: 0.5 %
INR PPP: 1.2
INTERVENTRICULAR SEPTUM: 1.2 CM (ref 0.6–1.1)
IRON SATN MFR SERPL: 20 % (ref 20–50)
IRON SERPL-MCNC: 27 UG/DL (ref 65–175)
KETONES UR QL STRIP: NEGATIVE
LDLC SERPL CALC-MCNC: 69 MG/DL (ref 50–140)
LEFT ATRIUM SIZE: 4.6 CM
LEFT CCA DIST DIAS: 0 CM/S
LEFT CCA DIST SYS: 89 CM/S
LEFT CCA PROX DIAS: 7 CM/S
LEFT CCA PROX SYS: 116 CM/S
LEFT ECA DIAS: 0 CM/S
LEFT ECA SYS: 61 CM/S
LEFT ICA DIST DIAS: 7 CM/S
LEFT ICA DIST SYS: 114 CM/S
LEFT ICA MID DIAS: 0 CM/S
LEFT ICA MID SYS: 118 CM/S
LEFT ICA PROX DIAS: 8 CM/S
LEFT ICA PROX SYS: 103 CM/S
LEFT INTERNAL DIMENSION IN SYSTOLE: 3.8 CM (ref 2.1–4)
LEFT VENTRICLE DIASTOLIC VOLUME INDEX: 51.61 ML/M2
LEFT VENTRICLE DIASTOLIC VOLUME: 128 ML
LEFT VENTRICLE MASS INDEX: 94.6 G/M2
LEFT VENTRICLE SYSTOLIC VOLUME INDEX: 25.1 ML/M2
LEFT VENTRICLE SYSTOLIC VOLUME: 62.3 ML
LEFT VENTRICULAR INTERNAL DIMENSION IN DIASTOLE: 5.2 CM (ref 3.5–6)
LEFT VENTRICULAR MASS: 234.6 G
LEFT VERTEBRAL DIAS: 0 CM/S
LEFT VERTEBRAL SYS: 29 CM/S
LEUKOCYTE ESTERASE UR QL STRIP: NEGATIVE
LV LATERAL E/E' RATIO: 9.73 M/S
LV SEPTAL E/E' RATIO: 15.29 M/S
LVED V (TEICH): 128 ML
LVES V (TEICH): 62.3 ML
LVOT MG: 2 MMHG
LVOT MV: 0.65 CM/S
LYMPHOCYTES # BLD AUTO: 1.66 X10(3)/MCL (ref 0.6–4.6)
LYMPHOCYTES NFR BLD AUTO: 8.5 %
MCH RBC QN AUTO: 29.9 PG (ref 27–31)
MCHC RBC AUTO-ENTMCNC: 32.6 G/DL (ref 33–36)
MCV RBC AUTO: 91.8 FL (ref 80–94)
MONOCYTES # BLD AUTO: 1.19 X10(3)/MCL (ref 0.1–1.3)
MONOCYTES NFR BLD AUTO: 6.1 %
MV MEAN GRADIENT: 2 MMHG
MV PEAK A VEL: 0.76 M/S
MV PEAK E VEL: 1.07 M/S
MV PEAK GRADIENT: 5 MMHG
MV STENOSIS PRESSURE HALF TIME: 65 MS
MV VALVE AREA BY CONTINUITY EQUATION: 2.85 CM2
MV VALVE AREA P 1/2 METHOD: 3.38 CM2
NEUTROPHILS # BLD AUTO: 16.32 X10(3)/MCL (ref 2.1–9.2)
NEUTROPHILS NFR BLD AUTO: 83.7 %
NITRITE UR QL STRIP: NEGATIVE
NRBC BLD AUTO-RTO: 0 %
OHS CV CAROTID RIGHT ICA EDV HIGHEST: 0
OHS CV CAROTID ULTRASOUND LEFT ICA/CCA RATIO: 1.33
OHS CV CAROTID ULTRASOUND RIGHT ICA/CCA RATIO: 1.7
OHS CV PV CAROTID LEFT HIGHEST CCA: 116
OHS CV PV CAROTID LEFT HIGHEST ICA: 118
OHS CV PV CAROTID RIGHT HIGHEST CCA: 77
OHS CV PV CAROTID RIGHT HIGHEST ICA: 112
OHS CV RV/LV RATIO: 0.58 CM
OHS CV US CAROTID LEFT HIGHEST EDV: 8
OHS QRS DURATION: 80 MS
OHS QTC CALCULATION: 430 MS
PH UR STRIP: 6 [PH]
PISA TR MAX VEL: 1.56 M/S
PLATELET # BLD AUTO: 341 X10(3)/MCL (ref 130–400)
PMV BLD AUTO: 8.7 FL (ref 7.4–10.4)
POTASSIUM SERPL-SCNC: 4.3 MMOL/L (ref 3.5–5.1)
PROT SERPL-MCNC: 7.2 GM/DL (ref 5.8–7.6)
PROT UR QL STRIP: ABNORMAL
PROTHROMBIN TIME: 15.7 SECONDS (ref 12.5–14.5)
PV PEAK GRADIENT: 3 MMHG
PV PEAK VELOCITY: 0.9 M/S
RA PRESSURE ESTIMATED: 3 MMHG
RBC # BLD AUTO: 3.88 X10(6)/MCL (ref 4.7–6.1)
RBC #/AREA URNS AUTO: ABNORMAL /HPF
RIGHT CCA DIST DIAS: 0 CM/S
RIGHT CCA DIST SYS: 66 CM/S
RIGHT CCA PROX DIAS: 0 CM/S
RIGHT CCA PROX SYS: 77 CM/S
RIGHT ECA SYS: 45 CM/S
RIGHT ICA DIST DIAS: 0 CM/S
RIGHT ICA DIST SYS: 112 CM/S
RIGHT ICA MID DIAS: 0 CM/S
RIGHT ICA MID SYS: 107 CM/S
RIGHT ICA PROX DIAS: 0 CM/S
RIGHT ICA PROX SYS: 54 CM/S
RIGHT VENTRICLE DIASTOLIC BASEL DIMENSION: 3 CM
RIGHT VENTRICULAR END-DIASTOLIC DIMENSION: 2.96 CM
RIGHT VERTEBRAL DIAS: 11 CM/S
RIGHT VERTEBRAL SYS: 54 CM/S
RV TB RVSP: 5 MMHG
SODIUM SERPL-SCNC: 134 MMOL/L (ref 136–145)
SP GR UR STRIP.AUTO: 1.02 (ref 1–1.03)
SQUAMOUS #/AREA URNS LPF: ABNORMAL /HPF
TDI LATERAL: 0.11 M/S
TDI SEPTAL: 0.07 M/S
TDI: 0.09 M/S
TIBC SERPL-MCNC: 107 UG/DL (ref 60–240)
TIBC SERPL-MCNC: 134 UG/DL (ref 250–450)
TR MAX PG: 10 MMHG
TRANSFERRIN SERPL-MCNC: 126 MG/DL (ref 163–344)
TRIGL SERPL-MCNC: 94 MG/DL (ref 34–140)
TROPONIN I SERPL-MCNC: <0.01 NG/ML (ref 0–0.04)
TSH SERPL-ACNC: 1.17 UIU/ML (ref 0.35–4.94)
TV REST PULMONARY ARTERY PRESSURE: 13 MMHG
UROBILINOGEN UR STRIP-ACNC: NORMAL
VLDLC SERPL CALC-MCNC: 19 MG/DL
WBC # BLD AUTO: 19.49 X10(3)/MCL (ref 4.5–11.5)
WBC #/AREA URNS AUTO: ABNORMAL /HPF
Z-SCORE OF LEFT VENTRICULAR DIMENSION IN END DIASTOLE: -9.34
Z-SCORE OF LEFT VENTRICULAR DIMENSION IN END SYSTOLE: -5.75

## 2024-12-26 PROCEDURE — 83540 ASSAY OF IRON: CPT | Performed by: INTERNAL MEDICINE

## 2024-12-26 PROCEDURE — 36415 COLL VENOUS BLD VENIPUNCTURE: CPT | Performed by: INTERNAL MEDICINE

## 2024-12-26 PROCEDURE — 25000003 PHARM REV CODE 250: Performed by: INTERNAL MEDICINE

## 2024-12-26 PROCEDURE — 97162 PT EVAL MOD COMPLEX 30 MIN: CPT

## 2024-12-26 PROCEDURE — 63600175 PHARM REV CODE 636 W HCPCS: Performed by: INTERNAL MEDICINE

## 2024-12-26 PROCEDURE — 80053 COMPREHEN METABOLIC PANEL: CPT | Performed by: INTERNAL MEDICINE

## 2024-12-26 PROCEDURE — 11000001 HC ACUTE MED/SURG PRIVATE ROOM

## 2024-12-26 PROCEDURE — 85730 THROMBOPLASTIN TIME PARTIAL: CPT | Performed by: INTERNAL MEDICINE

## 2024-12-26 PROCEDURE — 21400001 HC TELEMETRY ROOM

## 2024-12-26 PROCEDURE — 80061 LIPID PANEL: CPT | Performed by: INTERNAL MEDICINE

## 2024-12-26 PROCEDURE — 83036 HEMOGLOBIN GLYCOSYLATED A1C: CPT | Performed by: INTERNAL MEDICINE

## 2024-12-26 PROCEDURE — 85610 PROTHROMBIN TIME: CPT | Performed by: INTERNAL MEDICINE

## 2024-12-26 PROCEDURE — 86706 HEP B SURFACE ANTIBODY: CPT | Performed by: INTERNAL MEDICINE

## 2024-12-26 PROCEDURE — 5A1D70Z PERFORMANCE OF URINARY FILTRATION, INTERMITTENT, LESS THAN 6 HOURS PER DAY: ICD-10-PCS | Performed by: INTERNAL MEDICINE

## 2024-12-26 PROCEDURE — 92523 SPEECH SOUND LANG COMPREHEN: CPT

## 2024-12-26 PROCEDURE — 84484 ASSAY OF TROPONIN QUANT: CPT | Performed by: INTERNAL MEDICINE

## 2024-12-26 PROCEDURE — 87340 HEPATITIS B SURFACE AG IA: CPT | Performed by: INTERNAL MEDICINE

## 2024-12-26 PROCEDURE — 82728 ASSAY OF FERRITIN: CPT | Performed by: INTERNAL MEDICINE

## 2024-12-26 PROCEDURE — 84443 ASSAY THYROID STIM HORMONE: CPT | Performed by: INTERNAL MEDICINE

## 2024-12-26 PROCEDURE — 97166 OT EVAL MOD COMPLEX 45 MIN: CPT

## 2024-12-26 PROCEDURE — 85025 COMPLETE CBC W/AUTO DIFF WBC: CPT | Performed by: INTERNAL MEDICINE

## 2024-12-26 PROCEDURE — 81001 URINALYSIS AUTO W/SCOPE: CPT | Performed by: INTERNAL MEDICINE

## 2024-12-26 RX ORDER — GABAPENTIN 300 MG/1
300 CAPSULE ORAL 2 TIMES DAILY
Status: DISCONTINUED | OUTPATIENT
Start: 2024-12-26 | End: 2025-01-08 | Stop reason: HOSPADM

## 2024-12-26 RX ORDER — ENOXAPARIN SODIUM 100 MG/ML
40 INJECTION SUBCUTANEOUS EVERY 24 HOURS
Status: DISCONTINUED | OUTPATIENT
Start: 2024-12-26 | End: 2024-12-27

## 2024-12-26 RX ORDER — CARVEDILOL 3.12 MG/1
6.25 TABLET ORAL 2 TIMES DAILY
Status: DISCONTINUED | OUTPATIENT
Start: 2024-12-26 | End: 2024-12-29

## 2024-12-26 RX ORDER — TALC
6 POWDER (GRAM) TOPICAL NIGHTLY PRN
Status: DISCONTINUED | OUTPATIENT
Start: 2024-12-26 | End: 2025-01-08 | Stop reason: HOSPADM

## 2024-12-26 RX ORDER — MUPIROCIN 20 MG/G
OINTMENT TOPICAL 2 TIMES DAILY
Status: DISPENSED | OUTPATIENT
Start: 2024-12-26 | End: 2024-12-31

## 2024-12-26 RX ORDER — BISACODYL 10 MG/1
10 SUPPOSITORY RECTAL DAILY PRN
Status: DISCONTINUED | OUTPATIENT
Start: 2024-12-26 | End: 2025-01-08 | Stop reason: HOSPADM

## 2024-12-26 RX ORDER — ONDANSETRON HYDROCHLORIDE 2 MG/ML
4 INJECTION, SOLUTION INTRAVENOUS EVERY 8 HOURS PRN
Status: DISCONTINUED | OUTPATIENT
Start: 2024-12-26 | End: 2025-01-08 | Stop reason: HOSPADM

## 2024-12-26 RX ORDER — INSULIN ASPART 100 [IU]/ML
0-5 INJECTION, SOLUTION INTRAVENOUS; SUBCUTANEOUS
Status: DISCONTINUED | OUTPATIENT
Start: 2024-12-26 | End: 2025-01-08 | Stop reason: HOSPADM

## 2024-12-26 RX ORDER — ATORVASTATIN CALCIUM 40 MG/1
40 TABLET, FILM COATED ORAL DAILY
Status: DISCONTINUED | OUTPATIENT
Start: 2024-12-26 | End: 2025-01-08 | Stop reason: HOSPADM

## 2024-12-26 RX ORDER — ASPIRIN 81 MG/1
81 TABLET ORAL DAILY
Status: DISCONTINUED | OUTPATIENT
Start: 2024-12-26 | End: 2025-01-08 | Stop reason: HOSPADM

## 2024-12-26 RX ORDER — SODIUM CHLORIDE 0.9 % (FLUSH) 0.9 %
10 SYRINGE (ML) INJECTION
Status: DISCONTINUED | OUTPATIENT
Start: 2024-12-26 | End: 2025-01-08 | Stop reason: HOSPADM

## 2024-12-26 RX ORDER — TAMSULOSIN HYDROCHLORIDE 0.4 MG/1
1 CAPSULE ORAL DAILY
Status: DISCONTINUED | OUTPATIENT
Start: 2024-12-26 | End: 2025-01-08 | Stop reason: HOSPADM

## 2024-12-26 RX ORDER — ACETAMINOPHEN 500 MG
1000 TABLET ORAL EVERY 8 HOURS PRN
Status: DISCONTINUED | OUTPATIENT
Start: 2024-12-26 | End: 2025-01-08 | Stop reason: HOSPADM

## 2024-12-26 RX ORDER — LABETALOL HYDROCHLORIDE 5 MG/ML
10 INJECTION, SOLUTION INTRAVENOUS
Status: DISCONTINUED | OUTPATIENT
Start: 2024-12-26 | End: 2025-01-08 | Stop reason: HOSPADM

## 2024-12-26 RX ORDER — IBUPROFEN 200 MG
16 TABLET ORAL
Status: DISCONTINUED | OUTPATIENT
Start: 2024-12-26 | End: 2025-01-08 | Stop reason: HOSPADM

## 2024-12-26 RX ORDER — IBUPROFEN 200 MG
24 TABLET ORAL
Status: DISCONTINUED | OUTPATIENT
Start: 2024-12-26 | End: 2025-01-08 | Stop reason: HOSPADM

## 2024-12-26 RX ORDER — GLUCAGON 1 MG
1 KIT INJECTION
Status: DISCONTINUED | OUTPATIENT
Start: 2024-12-26 | End: 2025-01-08 | Stop reason: HOSPADM

## 2024-12-26 RX ADMIN — ASPIRIN 81 MG: 81 TABLET, COATED ORAL at 08:12

## 2024-12-26 RX ADMIN — MUPIROCIN: 20 OINTMENT TOPICAL at 09:12

## 2024-12-26 RX ADMIN — ACETAMINOPHEN 1000 MG: 500 TABLET ORAL at 04:12

## 2024-12-26 RX ADMIN — MUPIROCIN: 20 OINTMENT TOPICAL at 08:12

## 2024-12-26 RX ADMIN — ACETAMINOPHEN 1000 MG: 500 TABLET ORAL at 09:12

## 2024-12-26 RX ADMIN — VANCOMYCIN HYDROCHLORIDE 1000 MG: 1 INJECTION, POWDER, LYOPHILIZED, FOR SOLUTION INTRAVENOUS at 05:12

## 2024-12-26 RX ADMIN — GABAPENTIN 300 MG: 300 CAPSULE ORAL at 12:12

## 2024-12-26 RX ADMIN — PIPERACILLIN SODIUM AND TAZOBACTAM SODIUM 4.5 G: 4; .5 INJECTION, POWDER, LYOPHILIZED, FOR SOLUTION INTRAVENOUS at 08:12

## 2024-12-26 RX ADMIN — CARVEDILOL 6.25 MG: 3.12 TABLET, FILM COATED ORAL at 09:12

## 2024-12-26 RX ADMIN — PIPERACILLIN SODIUM AND TAZOBACTAM SODIUM 4.5 G: 4; .5 INJECTION, POWDER, LYOPHILIZED, FOR SOLUTION INTRAVENOUS at 04:12

## 2024-12-26 RX ADMIN — ATORVASTATIN CALCIUM 40 MG: 40 TABLET, FILM COATED ORAL at 08:12

## 2024-12-26 RX ADMIN — GABAPENTIN 300 MG: 300 CAPSULE ORAL at 09:12

## 2024-12-26 RX ADMIN — CARVEDILOL 6.25 MG: 3.12 TABLET, FILM COATED ORAL at 12:12

## 2024-12-26 RX ADMIN — TAMSULOSIN HYDROCHLORIDE 0.4 MG: 0.4 CAPSULE ORAL at 12:12

## 2024-12-26 RX ADMIN — ENOXAPARIN SODIUM 40 MG: 40 INJECTION SUBCUTANEOUS at 04:12

## 2024-12-26 NOTE — PLAN OF CARE
Problem: Occupational Therapy  Goal: Occupational Therapy Goal  Description: Goals to be met by: 1/26/24     Patient will increase functional independence with ADLs by performing:    UE Dressing with Stand-by Assistance.  LE Dressing with Stand-by Assistance.  Grooming while seated with Stand-by Assistance.  Toileting from bed level with Minimal Assistance for hygiene and clothing management.   Toilet transfer TBD  Outcome: Progressing

## 2024-12-26 NOTE — PT/OT/SLP EVAL
Physical Therapy Evaluation    Patient Name:  Neno Watkins   MRN:  [id number]    Recommendations:     Discharge therapy intensity: Moderate Intensity Therapy   Discharge Equipment Recommendations: to be determined by next level of care   Barriers to discharge: Impaired mobility and Ongoing medical needs    Assessment:     Neno Watkins is a 64 y.o. male admitted with a medical diagnosis of weakness and falls, infected chronic R foot open wound, ESRD off dialysis x2 weeks seeking revocation of hospice / desire to re-initiate HD.  He presents with the following impairments/functional limitations: weakness, impaired endurance, impaired functional mobility, impaired self care skills, decreased upper extremity function, decreased lower extremity function, decreased safety awareness, pain, impaired skin, edema . Pt with significant limitations in activity tolerance. Pt required maxAx2 to move partially supine>sit. This took patient approximately 20 minutes to attempt as he c/o severe back pain and was resistant to therapists' efforts to assist. Pt was previously ambulatory at home with RW. Per chart, family no longer able to care for pt at home and requesting placement. Recommend moderate intensity therapy due to current functional deficits.    Rehab Prognosis: Fair; patient would benefit from acute skilled PT services to address these deficits and reach maximum level of function.    Recent Surgery: * No surgery found *      Plan:     During this hospitalization, patient would benefit from acute PT services 5 x/week to address the identified rehab impairments via gait training, therapeutic activities, therapeutic exercises, neuromuscular re-education and progress toward the following goals:    Plan of Care Expires:  01/26/25    Subjective     Chief Complaint: pain  Patient/Family Comments/goals: PLOF  Pain/Comfort:  Pain Rating 1: 10/10  Location - Orientation 1: lower  Location 1: back  Pain Addressed 1: Reposition,  Distraction, Cessation of Activity, Nurse notified    Patients cultural, spiritual, Anabaptist conflicts given the current situation: no    Living Environment:  Pt lives with his spouse in a mobile home with a ramp entry.  Prior to admission, patients level of function was [place] for mobility with RW, some assist at times for ADLS.  Equipment used at home: walker, rolling.  DME owned (not currently used): none.  Upon discharge, patient will have assistance from spouse.    Objective:     Communicated with RN prior to session.  Patient found supine with peripheral IV, telemetry  upon PT entry to room.    General Precautions: Standard, fall  Orthopedic Precautions:    Braces:    Respiratory Status: Room air  Blood Pressure: 138/71      Exams:  RLE Strength: Not cooperating for MMT, at least 2/5 hip flexion and abduction observed functionally  LLE Strength: As above, at least 2/5 hip flexion/abduction  Skin integrity:  (B) LE venous stasis ulcers, RLE weeping edema, malodorous wound      Functional Mobility:  Bed Mobility:     Sit to Supine: maximal assistance and of 2 persons; partially completed task with difficulty getting trunk off bed due to pain and resistance to assistance      AM-PAC 6 CLICK MOBILITY  Total Score:8       Treatment & Education:  Pt educated on importance of sitting upright while eating/drinking to prevent aspiration event. Reports he cannot elevated HOB due to back pain despite repositioning.    Patient provided with verbal education education regarding PT role/goals/POC, fall prevention, safety awareness, and discharge/DME recommendations.  Understanding was verbalized.     Patient left supine with all lines intact, call button in reach, and RN and MD notified.    GOALS:   Multidisciplinary Problems       Physical Therapy Goals          Problem: Physical Therapy    Goal Priority Disciplines Outcome Interventions   Physical Therapy Goal     PT, PT/OT Progressing    Description: Goals to be met by:  24     Patient will increase functional independence with mobility by performin. Supine to sit with minimal assistance  2. Rolling to Left and Right with [place].  3. Transfers TBD  4. Gait TBD                       History:     Past Medical History:   Diagnosis Date    BPH (benign prostatic hyperplasia)     Essential (primary) hypertension     Obesity, unspecified     PAD (peripheral artery disease)        Past Surgical History:   Procedure Laterality Date    FISTULOGRAM Left 2023    Procedure: Fistulogram;  Surgeon: Rodrigo Soliman DO;  Location: [facility] CATH LAB;  Service: Nephrology;  Laterality: Left;    INSERTION OF TUNNELED CENTRAL VENOUS HEMODIALYSIS CATHETER Right 2022    Procedure: INSERTION, CATHETER, HEMODIALYSIS, DUAL LUMEN;  Surgeon: Molly Garcia MD;  Location: [facility];  Service: General;  Laterality: Right;       Time Tracking:     PT Received On: 24  PT Start Time: 59     PT Stop Time: 0930  PT Total Time (min): 31 min     Billable Minutes: Evaluation MOD      2024

## 2024-12-26 NOTE — PLAN OF CARE
Problem: Physical Therapy  Goal: Physical Therapy Goal  Description: Goals to be met by: 24     Patient will increase functional independence with mobility by performin. Supine to sit with minimal assistance  2. Rolling to Left and Right with [place].  3. Transfers TBD  4. Gait TBD  Outcome: Progressing

## 2024-12-26 NOTE — PROGRESS NOTES
"Pharmacokinetic Initial Assessment: IV Vancomycin    Assessment/Plan:    Initiate intravenous vancomycin with loading dose of 2000 mg once followed by a maintenance dose of vancomycin 1750 mg IV every 24 hours  Desired empiric serum trough concentration is 10 to 20 mcg/mL  Draw vancomycin trough level 60 min prior to third dose on 12/28 at approximately 0400  Pharmacy will continue to follow and monitor vancomycin.      Please contact pharmacy at extension [phone] with any questions regarding this assessment.     Thank you for the consult,   Kylah Campa       Patient brief summary:  Neno Watkins is a 64 y.o. male initiated on antimicrobial therapy with IV Vancomycin for treatment of suspected skin & soft tissue infection    Drug Allergies:   Review of patient's allergies indicates:   Allergen Reactions    Butorphanol Swelling     "it almost killed me"    Hydrocodone-acetaminophen Other (See Comments)     "They mess with my heart"    Povidone-iodine Shortness Of Breath and Rash       Actual Body Weight:   Wt Readings from Last 1 Encounters:   12/25/24 136.1 kg (300 lb)       Renal Function:   Estimated Creatinine Clearance: 43 mL/min (A) (based on SCr of 2.38 mg/dL (H)).,     Dialysis Method (if applicable):  N/A    CBC (last 72 hours):  Recent Labs   Lab Result Units 12/25/24  2110   WBC x10(3)/mcL 20.03*   Hgb g/dL 11.9*   Hct % 36.9*   Platelet x10(3)/mcL 397   Mono % % 4.7   Eos % % 0.1   Basophil % % 0.2       Metabolic Panel (last 72 hours):  Recent Labs   Lab Result Units 12/25/24  2110   Sodium mmol/L 135*   Potassium mmol/L 4.6   Chloride mmol/L 105   CO2 mmol/L 20*   Glucose mg/dL 154*   Blood Urea Nitrogen mg/dL 34.9*   Creatinine mg/dL 2.38*   Albumin g/dL 3.2*   Bilirubin Total mg/dL 1.3   ALP unit/L 269*   AST unit/L 70*   ALT unit/L 103*       Drug levels (last 3 results):  No results for input(s): "VANCOMYCINRA", "VANCORANDOM", "VANCOMYCINPE", "VANCOPEAK", "VANCOMYCINTR", "VANCOTROUGH" in the last 72 " hours.    Microbiologic Results:  Microbiology Results (last 7 days)       Procedure Component Value Units Date/Time    Blood culture #1 **CANNOT BE ORDERED STAT** [[id number]] Collected: 12/25/24 2100    Order Status: Resulted Specimen: Blood from Antecubital, Right Updated: 12/25/24 2115    Blood culture #2 **CANNOT BE ORDERED STAT** [[id number]] Collected: 12/25/24 2110    Order Status: Resulted Specimen: Blood from Wrist, Right Updated: 12/25/24 2115

## 2024-12-26 NOTE — CONSULTS
Nephrology Initial Consult Note    Patient Name: Neno Watkins  Age: 64 y.o.  : 1960  MRN: [id number]  Admission Date: 2024    Reason for Consult:      ESRD    HPI:     Neno Watkins is a 64 y.o. male who has been on chronic hemodialysis at [facility] on .  He is being followed by Dr. Kike Alanis.  He has lot of problems on his legs and somehow he got frustrated and decided to stop dialysis and go for hospice but now he has reversed it and wants to be treated and taken care of.  Lately he has fallen a few times at home and now complains of low back pain.  Very poor appetite.  Complains chest pains also.  No nausea no vomiting.  No constipation no diarrhea.  No fever or chills.  No cough cold congestion.  Not weak in any extremities and moves all his extremities but he has lot of back pain which is limiting his mobility.  No cough no congestion.  He has left upper extremity AV graft and also right IJ tunneled dialysis catheter placed by Dr. Lieberman.  Somehow he says the nurses are using the catheter.  He has very poor historian.  He says he does have diabetes and is taking some medicine for it but not insulin but he does not have any medicine in his medicine bag.        Current Facility-Administered Medications   Medication Dose Route Frequency Provider Last Rate Last Admin    acetaminophen tablet 1,000 mg  1,000 mg Oral Q8H PRN Otilio Maurer MD   1,000 mg at 24 0931    aspirin EC tablet 81 mg  81 mg Oral Daily Otilio Maurer MD   81 mg at 24 0856    atorvastatin tablet 40 mg  40 mg Oral Daily Otilio Maurer MD   40 mg at 24 0856    bisacodyL suppository 10 mg  10 mg Rectal Daily PRN Otilio Maurer MD        enoxaparin injection 40 mg  40 mg Subcutaneous Daily Otilio Maurer MD        labetaloL injection 10 mg  10 mg Intravenous Q15 Min PRN Otilio Maurer MD        mupirocin 2 % ointment   Nasal BID  Otilio Maurer MD   Given at 12/26/24 0856    ondansetron injection 4 mg  4 mg Intravenous Q8H PRN Otilio Maurer MD        piperacillin-tazobactam (ZOSYN) 4.5 g in D5W 100 mL IVPB (MB+)  4.5 g Intravenous Q8H Otilio Maurer MD 25 mL/hr at 12/26/24 0856 4.5 g at 12/26/24 0856    sodium chloride 0.9% flush 10 mL  10 mL Intravenous PRN Otilio Maurer MD        [START ON 12/27/2024] vancomycin (VANCOCIN) 1,000 mg in D5W 250 mL IVPB (admixture device)  1,000 mg Intravenous Q24H Otilio Maurer MD        And    [START ON 12/27/2024] vancomycin 750 mg in D5W 250 mL IVPB (admixture device)  750 mg Intravenous Q24H Otilio Maurer MD        vancomycin - pharmacy to dose   Intravenous pharmacy to manage frequency Otilio Maurer MD Daigle, Randy J., MD    Past Medical History:   Diagnosis Date    BPH (benign prostatic hyperplasia)     Essential (primary) hypertension     Obesity, unspecified     PAD (peripheral artery disease)       Past Surgical History:   Procedure Laterality Date    FISTULOGRAM Left 9/25/2023    Procedure: Fistulogram;  Surgeon: Rodrigo Soliman DO;  Location: [facility] CATH LAB;  Service: Nephrology;  Laterality: Left;    INSERTION OF TUNNELED CENTRAL VENOUS HEMODIALYSIS CATHETER Right 6/27/2022    Procedure: INSERTION, CATHETER, HEMODIALYSIS, DUAL LUMEN;  Surgeon: Molly Garcia MD;  Location: [facility] OR;  Service: General;  Laterality: Right;      No family history on file.  Social History     Tobacco Use    Smoking status: Never    Smokeless tobacco: Never   Substance Use Topics    Alcohol use: Never     Medications Prior to Admission   Medication Sig Dispense Refill Last Dose/Taking    tamsulosin (FLOMAX) 0.4 mg Cap Take 1 capsule by mouth once daily.   12/24/2024    atorvastatin (LIPITOR) 80 MG tablet Take 80 mg by mouth every evening.   12/24/2024    carvediloL (COREG) 6.25 MG tablet Take 6.25 mg by mouth 2 (two) times daily.   12/24/2024     "docusate sodium (COLACE) 100 MG capsule Take 100 mg by mouth 2 (two) times daily.       gabapentin (NEURONTIN) 300 MG capsule Take 1 capsule by mouth 2 (two) times a day.   12/24/2024    ibuprofen (MOTRIN IB ORAL) Take 375 mg by mouth daily as needed (as needed). (Patient not taking: Reported on 9/17/2024)       melatonin 5 mg Cap Take 5 mg by mouth daily as needed (as needed).   12/24/2024    multivitamin (THERAGRAN) per tablet Take 1 tablet by mouth once daily.   Unknown    NIFEdipine (PROCARDIA-XL) 60 MG (OSM) 24 hr tablet TAKE 1 TABLET(60 MG) BY MOUTH EVERY DAY 30 tablet 0 Unknown    white petrolatum 41 % Oint Apply topically once daily. 198 g 0      Review of patient's allergies indicates:   Allergen Reactions    Butorphanol Swelling     "it almost killed me"    Hydrocodone-acetaminophen Other (See Comments)     "They mess with my heart"    Povidone-iodine Shortness Of Breath and Rash            Review of Systems:     All 12 point review of system done negative except 1 history of present illness    Objective:       VITAL SIGNS: 24 HR MIN & MAX LAST    Temp  Min: 97.8 °F (36.6 °C)  Max: 99.1 °F (37.3 °C)  98.1 °F (36.7 °C)        BP  Min: 125/66  Max: 180/68  (!) 146/68     Pulse  Min: 95  Max: 105  95     Resp  Min: 19  Max: 24  19    SpO2  Min: 93 %  Max: 98 %  (!) 93 %      GEN:  Well developed and nourished.  Awake alert oriented to time person place.  No acute respiratory distress noted.  HEENT:  Atraumatic normocephalic.  Pupils reactive.  Extraocular movements intact.  Neck supple.  No JVD.  CV: RRR no rub or gallop.  PULM: CTAB, unlabored  ABD: Soft, NT/ND abdomen with NABS  EXT:  Swelling and redness and warmth of both lower extremities below the knee with excoriation of the skin and some blisters suggestive of cellulitis and venous stasis.  SKIN: Warm and dry  PSYCH: Awake, alert, and appropriately conversant  Vascular access:  Left upper extremity AV graft and right IJ tunneled dialysis " catheter          Component Value Date/Time     (L) 12/25/2024 2110     12/15/2024 0839     10/13/2022 0652    K 4.6 12/25/2024 2110    K 5.1 12/15/2024 0839    K 3.0 (L) 10/13/2022 0652    CO2 20 (L) 12/25/2024 2110    CO2 23 12/15/2024 0839    CO2 26 10/13/2022 0652    BUN 34.9 (H) 12/25/2024 2110    BUN 27.2 (H) 12/15/2024 0839    BUN 24 (H) 12/11/2024 0000    BUN 30 (H) 11/24/2024 0000    CREATININE 2.38 (H) 12/25/2024 2110    CREATININE 2.36 (H) 12/15/2024 0839    CREATININE 2.94 (H) 10/13/2022 0652    CALCIUM 9.1 12/25/2024 2110    CALCIUM 9.0 12/15/2024 0839    CALCIUM 9.6 10/13/2022 0652    PHOS 6.4 (H) 08/04/2022 0532            Component Value Date/Time    WBC 19.49 (H) 12/26/2024 0935    WBC 20.03 (H) 12/25/2024 2110    HGB 11.6 (L) 12/26/2024 0935    HGB 11.9 (L) 12/25/2024 2110    HGB 11.6 (L) 12/09/2024 0000    HGB 11.4 (L) 12/02/2024 0000    HCT 35.6 (L) 12/26/2024 0935    HCT 36.9 (L) 12/25/2024 2110     12/26/2024 0935     12/25/2024 2110         X-Ray Chest 1 View   Final Result      Mild cardiomegaly.      No other significant abnormalities         Electronically signed by: Mike Morris   Date:    12/26/2024   Time:    05:23      CT Head Without Contrast   Final Result   Impression:      No acute intracranial traumatic injury identified. Details and other findings as noted above.      No significant discrepancy with overnight report         Electronically signed by: Ravi Ortiz   Date:    12/26/2024   Time:    07:49      MRI Brain Without Contrast    (Results Pending)   X-Ray Lumbar Complete Including Flex And Ext    (Results Pending)       Assessment / Plan:   ESRD.  Dialysis days Monday Wednesday Friday.  He has not dialyzed in more than 1 week but he is still maintaining good electrolytes and BUN and creatinine, so I will go ahead and give him dialysis on Monday Wednesday Friday schedule only  Personal history of diabetes mellitus but not on any medications  will check hemoglobin A1c then decide on the need for medical management  Hypertension  Hyperlipidemia  Cellulitis of both lower extremities right more than left  Anemia of chronic kidney disease  Dialysis noncompliance  Low back pain new    Recommendations  Hemodialysis Monday Wednesday Friday  Serum iron TIBC ferritin to rule out any iron deficiency  Hemoglobin A1c to find out about his diabetes mellitus status  X-rays lumbar spine for his low back pain especially with a history of falls  Patient advised the need for compliance to the dialysis and the consequences of noncompliance include multiorgan failure and death to which he expressed understanding  Wound care nurse consult for the sores on both lower extremities    Thank you for this consultation

## 2024-12-26 NOTE — PT/OT/SLP EVAL
"[facility]  Speech Language Pathology Department  Cognitive-Communication Evaluation    Patient Name:  Neno Watkins   MRN:  [id number]    Recommendations     General recommendations:  SLP follow up x1 regarding cognition  Communication strategies:  go to room if call light pushed    Discharge therapy intensity: Moderate Intensity Therapy  Barriers to safe discharge: level of skilled assistance needed    History     Neno Watkins is a/n 64 y.o. male admitted with a medical diagnosis of weakness and falls, infected chronic R foot open wound, ESRD off dialysis x2 weeks seeking revocation of hospice / desire to re-initiate HD. Patient was on Hospice due to no longer wanting HD or wound care.    Past Medical History:   Diagnosis Date    BPH (benign prostatic hyperplasia)     Essential (primary) hypertension     Obesity, unspecified     PAD (peripheral artery disease)      Past Surgical History:   Procedure Laterality Date    FISTULOGRAM Left 9/25/2023    Procedure: Fistulogram;  Surgeon: Rodrigo Soliman DO;  Location: [facility] CATH LAB;  Service: Nephrology;  Laterality: Left;    INSERTION OF TUNNELED CENTRAL VENOUS HEMODIALYSIS CATHETER Right 6/27/2022    Procedure: INSERTION, CATHETER, HEMODIALYSIS, DUAL LUMEN;  Surgeon: Molly Garcia MD;  Location: [facility];  Service: General;  Laterality: Right;       Previous level of Function  Lives:  with wife and daughter  Handed: Right  Glasses: no  Hearing Aids: no- [facility]  Home Responsibilities: reports he was walking around and cutting grass before coming here, unsure if that is true.    Subjective     Patient awake and alert. Patietn laying flat on back with a cup of water in his hand. SLP attempted to sit patient up, However he began yelling "No, No, No. I can eat and drink like this". SLP educated patient on aspiration risks and importance of sitting up to eat/drink.    Patient goals: to get help   Spiritual/Cultural/Gnosticist Beliefs/Practices " that affect care: no    Pain/Comfort: Pain Rating 1: 0/10    Respiratory Status:  room air    Objective     ORAL MUSCULATURE  Dentition: own teeth    SPEECH PRODUCTION  Phoneme Production: adequate  Voice Quality: adequate  Voice Production: adequate  Speech Rate: appropriate  Loudness: acceptable  Respiration: WFL for speech  Resonance: adequate  Prosody: adequate  Speech Intelligibility  Known Context: Greater that 90%  Unknown Context: Greater that 90%    AUDITORY COMPREHENSION  Following Directions:  1-Step: 100%  2-Step: 100%  Yes/No Questions:  Biographical: 100%  Environmental: 100%  Simple: 100%  Complex: 100%    VERBAL EXPRESSION  Automatic Speech:  Days of the week: 100%  Confrontation Naming  Objects: 100%    COGNITION  Orientation:  x4        SLCE not completed due to transport taking patient for scan.    Assessment     Patient seem to be presenting at cognitive baseline. However, SLP unable to complete evaluation. Follow up x1 tomorrow.       Patient Education     Patient provided with verbal education regarding POC.  Understanding was verbalized.    Plan     SLP Follow-Up:  Yes   Plan of Care reviewed with:  patient      Time Tracking     SLP Treatment Date:   12/26/24  Speech Start Time:  1010  Speech Stop Time:  1023     Speech Total Time (min):  13 min    Billable minutes:  Evaluation of Speech Sound Production with Comprehension and Expression, 13 minutes     12/26/2024

## 2024-12-26 NOTE — PROGRESS NOTES
[facility] - Neurology  Wound Care    Patient Name:  Neno Watkins   MRN:  [id number]  Date: 12/26/2024  Diagnosis: Wound infection    History:     Past Medical History:   Diagnosis Date    BPH (benign prostatic hyperplasia)     Essential (primary) hypertension     Obesity, unspecified     PAD (peripheral artery disease)        Social History     Socioeconomic History    Marital status:    Tobacco Use    Smoking status: Never    Smokeless tobacco: Never   Substance and Sexual Activity    Alcohol use: Never    Drug use: Never     Social Drivers of Health     Financial Resource Strain: Patient Declined (12/26/2024)    Overall Financial Resource Strain (CARDIA)     Difficulty of Paying Living Expenses: Patient declined   Food Insecurity: Patient Declined (12/26/2024)    Hunger Vital Sign     Worried About Running Out of Food in the Last Year: Patient declined     Ran Out of Food in the Last Year: Patient declined   Transportation Needs: Patient Declined (12/26/2024)    TRANSPORTATION NEEDS     Transportation : Patient declined   Physical Activity: Inactive (9/6/2024)    Exercise Vital Sign     Days of Exercise per Week: 0 days     Minutes of Exercise per Session: 0 min   Stress: Patient Declined (12/26/2024)    [employer] - Occupational Stress Questionnaire     Feeling of Stress : Patient declined   Housing Stability: Patient Declined (12/26/2024)    Housing Stability Vital Sign     Unable to Pay for Housing in the Last Year: Patient declined     Homeless in the Last Year: Patient declined       Precautions:     Allergies as of 12/25/2024 - Reviewed 12/25/2024   Allergen Reaction Noted    Butorphanol Swelling 06/21/2022    Hydrocodone-acetaminophen Other (See Comments) 06/21/2022    Povidone-iodine Shortness Of Breath and Rash 06/21/2022       WOC Assessment Details/Treatment   WOCN consulted for legs. Discussed plan of care with nurse Peacokc prior to visiting the  patient. Patient currently off of the unit at this time. Will place patient for follow up tomorrow.   12/26/2024

## 2024-12-26 NOTE — PROGRESS NOTES
Pharmacist Renal Dose Adjustment Note    Neno Watkins is a 64 y.o. male being treated with the medication enoxaparin    Patient Data:    Vital Signs (Most Recent):  Temp: 98 °F (36.7 °C) (12/26/24 0121)  Pulse: 105 (12/26/24 0121)  Resp: 20 (12/26/24 0102)  BP: (!) 140/62 (12/26/24 0121)  SpO2: 95 % (12/26/24 0121) Vital Signs (72h Range):  Temp:  [98 °F (36.7 °C)-99.1 °F (37.3 °C)]   Pulse:  []   Resp:  [20-24]   BP: (125-180)/(60-78)   SpO2:  [93 %-98 %]      Recent Labs   Lab 12/25/24 2110   CREATININE 2.38*     Serum creatinine: 2.38 mg/dL (H) 12/25/24 2110  Estimated creatinine clearance: 43 mL/min (A)    Medication: enoxaparin 30mg subQ daily increased to 40mg subQ daily in accordance with renal function and [facility] Dosing Guidelines.       Pharmacist's Name: Kylah Campa  Pharmacist's Extension: [phone]

## 2024-12-26 NOTE — ED PROVIDER NOTES
"Encounter Date: 12/25/2024    SCRIBE #1 NOTE: I, Madelyn Ramos, am scribing for, and in the presence of,  Mich Valenzuela MD. I have scribed the following portions of the note - Other sections scribed: HPI, ROS, PE.       History     Chief Complaint   Patient presents with    Fall     AASI: multiple falls over the last couple of months and left sided facial droop that family noticed but unable to state chronicity of problem. Patient reports it happens sometimes. Recently stopped dialysis and started hospice care. Equal  intact in both arms. Resp equal and unlabored. -BT     Patient is a 64-year-old male with a history of HTN and PAD presenting to the ED via EMS following a fall. EMS was called to the residence house by family after he slipped and fell while getting out a chair. The pt states that he was on dialysis but stopped and was placed on hospice. He states that he would like to receive any and all medical interventions and does not want to be on hospice. He reports chronic lower extremity edema and a wound to his right foot.    The history is provided by the patient and the EMS personnel. No  was used.     Review of patient's allergies indicates:   Allergen Reactions    Butorphanol Swelling     "it almost killed me"    Hydrocodone-acetaminophen Other (See Comments)     "They mess with my heart"    Povidone-iodine Shortness Of Breath and Rash     Past Medical History:   Diagnosis Date    BPH (benign prostatic hyperplasia)     Essential (primary) hypertension     Obesity, unspecified     PAD (peripheral artery disease)      Past Surgical History:   Procedure Laterality Date    FISTULOGRAM Left 9/25/2023    Procedure: Fistulogram;  Surgeon: Rodrigo Soliman DO;  Location: [facility] CATH LAB;  Service: Nephrology;  Laterality: Left;    INSERTION OF TUNNELED CENTRAL VENOUS HEMODIALYSIS CATHETER Right 6/27/2022    Procedure: INSERTION, CATHETER, HEMODIALYSIS, DUAL LUMEN;  Surgeon: Molly Garcia MD; "  Location: [facility];  Service: General;  Laterality: Right;     No family history on file.  Social History     Tobacco Use    Smoking status: Never    Smokeless tobacco: Never   Substance Use Topics    Alcohol use: Never    Drug use: Never     Review of Systems   Constitutional:  Negative for fever.   HENT:  Negative for sore throat.    Eyes:  Negative for visual disturbance.   Respiratory:  Negative for shortness of breath.    Cardiovascular:  Positive for leg swelling. Negative for chest pain.   Gastrointestinal:  Negative for abdominal pain.   Genitourinary:  Negative for dysuria.   Musculoskeletal:  Negative for joint swelling.   Skin:  Positive for wound (right foot). Negative for rash.   Neurological:  Negative for weakness.   Psychiatric/Behavioral:  Negative for confusion.    All other systems reviewed and are negative.      Physical Exam     Initial Vitals [12/25/24 2026]   BP Pulse Resp Temp SpO2   (!) 180/68 104 20 99.1 °F (37.3 °C) 98 %      MAP       --         Physical Exam    Nursing note and vitals reviewed.  Constitutional: He appears well-developed and well-nourished. He is not diaphoretic. No distress.   The pt is chronically ill-appearing.   HENT:   Head: Normocephalic and atraumatic.   Eyes: Conjunctivae and EOM are normal. Pupils are equal, round, and reactive to light.   Neck:   Normal range of motion.  Cardiovascular:  Normal rate, regular rhythm, normal heart sounds and intact distal pulses.           No murmur heard.  Pulmonary/Chest: Breath sounds normal. No respiratory distress. He has no wheezes. He has no rales.   Crackles at the bases.   Abdominal: Abdomen is soft. He exhibits no distension. There is no abdominal tenderness.   Musculoskeletal:         General: No tenderness or edema. Normal range of motion.      Cervical back: Normal range of motion.      Comments: Wound to the lateral aspect of the right foot.     Neurological: He is alert and oriented to person, place, and time. No  cranial nerve deficit.   Skin: Skin is warm and dry. Capillary refill takes less than 2 seconds. No rash noted. No erythema.   Psychiatric: He has a normal mood and affect.               ED Course   Critical Care    Date/Time: 12/25/2024 9:15 PM    Performed by: Mich Valenzuela MD  Authorized by: Mich Valenzuela MD  Direct patient critical care time: 12 minutes  Additional history critical care time: 5 minutes  Ordering / reviewing critical care time: 6 minutes  Documentation critical care time: 5 minutes  Consulting other physicians critical care time: 5 minutes  Consult with family critical care time: 8 minutes  Total critical care time (exclusive of procedural time) : 41 minutes  Critical care time was exclusive of separately billable procedures and treating other patients and teaching time.  Critical care was necessary to treat or prevent imminent or life-threatening deterioration of the following conditions: cardiac failure, circulatory failure, sepsis and renal failure.  Critical care was time spent personally by me on the following activities: development of treatment plan with patient or surrogate, discussions with consultants, interpretation of cardiac output measurements, evaluation of patient's response to treatment, examination of patient, obtaining history from patient or surrogate, ordering and performing treatments and interventions, ordering and review of laboratory studies, ordering and review of radiographic studies, pulse oximetry, re-evaluation of patient's condition and review of old charts.        Labs Reviewed   COMPREHENSIVE METABOLIC PANEL - Abnormal       Result Value    Sodium 135 (*)     Potassium 4.6      Chloride 105      CO2 20 (*)     Glucose 154 (*)     Blood Urea Nitrogen 34.9 (*)     Creatinine 2.38 (*)     Calcium 9.1      Protein Total 8.3 (*)     Albumin 3.2 (*)     Globulin 5.1 (*)     Albumin/Globulin Ratio 0.6 (*)     Bilirubin Total 1.3       (*)      (*)      AST 70 (*)     eGFR 30      Anion Gap 10.0      BUN/Creatinine Ratio 15     APTT - Abnormal    PTT 34.4 (*)    PROTIME-INR - Abnormal    PT 15.4 (*)     INR 1.2     CBC WITH DIFFERENTIAL - Abnormal    WBC 20.03 (*)     RBC 4.03 (*)     Hgb 11.9 (*)     Hct 36.9 (*)     MCV 91.6      MCH 29.5      MCHC 32.2 (*)     RDW 13.9      Platelet 397      MPV 8.9      Neut % 87.7      Lymph % 6.8      Mono % 4.7      Eos % 0.1      Basophil % 0.2      Lymph # 1.37      Neut # 17.55 (*)     Mono # 0.94      Eos # 0.02      Baso # 0.04      IG# 0.11 (*)     IG% 0.5      NRBC% 0.0     COVID/FLU A&B PCR - Normal    Influenza A PCR Not Detected      Influenza B PCR Not Detected      SARS-CoV-2 PCR Not Detected      Narrative:     The Xpert Xpress SARS-CoV-2/FLU/RSV plus is a rapid, multiplexed real-time PCR test intended for the simultaneous qualitative detection and differentiation of SARS-CoV-2, Influenza A, Influenza B, and respiratory syncytial virus (RSV) viral RNA in either nasopharyngeal swab or nasal swab specimens.         TROPONIN I - Normal    Troponin-I 0.025     B-TYPE NATRIURETIC PEPTIDE - Normal    Natriuretic Peptide 26.7     LACTIC ACID, PLASMA - Normal    Lactic Acid Level 1.6     CBC W/ AUTO DIFFERENTIAL    Narrative:     The following orders were created for panel order CBC auto differential.  Procedure                               Abnormality         Status                     ---------                               -----------         ------                     CBC with Differential[[id number]]       Abnormal            Final result                 Please view results for these tests on the individual orders.     EKG Readings: (Independently Interpreted)   Normal sinus rhythm.  Rate of 100.  No STEMI.     ECG Results              EKG 12-lead (Final result)        Collection Time Result Time QRS Duration OHS QTC Calculation    12/25/24 23:45:15 12/26/24 11:30:38 80 430                     Final result by  Interface, Lab In [facility] (12/26/24 11:30:51)                   Narrative:    Test Reason : W19.XXXA,    Vent. Rate : 100 BPM     Atrial Rate : 100 BPM     P-R Int : 144 ms          QRS Dur :  80 ms      QT Int : 334 ms       P-R-T Axes :  57  51  25 degrees    QTcB Int : 430 ms    Normal sinus rhythm  Septal infarct ,age undetermined  Abnormal ECG  When compared with ECG of 27-Jan-2023 10:27,  No significant change was found  Confirmed by Lasha Castrejon ([id number]) on 12/26/2024 11:30:16 AM    Referred By: GIUSEPPE BAUM           Confirmed By: Lasha Castrejon                                  Imaging Results              CT Head Without Contrast (Final result)  Result time 12/26/24 07:49:53      Final result by Ravi Ortiz MD (12/26/24 07:49:53)                   Impression:    Impression:    No acute intracranial traumatic injury identified. Details and other findings as noted above.    No significant discrepancy with overnight report      Electronically signed by: Ravi Ortiz  Date:    12/26/2024  Time:    07:49               Narrative:      Technique: CT of the head was performed without intravenous contrast with axial as well as coronal and sagittal images.    Comparison: None.    Dosage Information: Automated exposure control was utilized.    Clinical history: Head injury.    Findings:    Hemorrhage: No acute intracranial hemorrhage is seen.    CSF spaces: The ventricles, sulci and basal cisterns all appear moderately prominent consistent with global cerebral atrophy.    Brain parenchyma: No acute large vessel territory infarct is identified.  Chronic microvascular change is seen in portions of the periventricular and deep white matter tracts.    Calvarium: No acute linear or depressed skull fracture is seen.    Maxillofacial Structures:    Paranasal sinuses: The visualized paranasal sinuses appear clear with no significant mucoperiosteal thickening or air fluid levels identified.                         Preliminary result by Ever Kelly Jr., MD (12/25/24 21:07:20)                   Impression:    1. No acute intracranial traumatic injury identified. Details and other findings as noted above.               Narrative:    START OF REPORT:  Technique: CT of the head was performed without intravenous contrast with axial as well as coronal and sagittal images.    Comparison: None.    Dosage Information: Automated exposure control was utilized.    Clinical history: Head injury.    Findings:  Hemorrhage: No acute intracranial hemorrhage is seen.  CSF spaces: The ventricles, sulci and basal cisterns all appear moderately prominent consistent with global cerebral atrophy. The ventricles appear dilated out of proportion to the sulci suggesting an element of concurrent non-obstructive hydrocephalus.  Brain parenchyma: No acute infarct is identified. Moderate microvascular change is seen in portions of the periventricular and deep white matter tracts.  Cerebellum: Unremarkable.  Sella and skull base: The sella appears to be within normal limits for age.  Intracranial calcifications: Incidental note is made of bilateral choroid plexus calcification. Incidental note is made of some pineal region calcification. Incidental note is made of subtle right basal ganglia calcifcation.  Calvarium: No acute linear or depressed skull fracture is seen.    Maxillofacial Structures:  Paranasal sinuses: The visualized paranasal sinuses appear clear with no significant mucoperiosteal thickening or air fluid levels identified.  Orbits: The orbits appear unremarkable.  Zygomatic arches: The zygomatic arches are intact and unremarkable.  Temporal bones and mastoids: The temporal bones and mastoids appear unremarkable.  TMJ: The mandibular condyles appear normally placed with respect to the mandibular fossa.                                         Medications   sodium chloride 0.9% flush 10 mL (has no administration in time range)    labetaloL injection 10 mg (has no administration in time range)   bisacodyL suppository 10 mg (has no administration in time range)   ondansetron injection 4 mg (has no administration in time range)   atorvastatin tablet 40 mg (40 mg Oral Given 12/29/24 0830)   aspirin EC tablet 81 mg (81 mg Oral Given 12/29/24 0830)   vancomycin - pharmacy to dose (has no administration in time range)   acetaminophen tablet 1,000 mg (1,000 mg Oral Given 12/27/24 2015)   mupirocin 2 % ointment ( Nasal Given 12/28/24 2021)   glucose chewable tablet 16 g (has no administration in time range)   glucose chewable tablet 24 g (has no administration in time range)   dextrose 50% injection 12.5 g (has no administration in time range)   dextrose 50% injection 25 g (has no administration in time range)   glucagon (human recombinant) injection 1 mg (has no administration in time range)   insulin aspart U-100 injection 0-5 Units (has no administration in time range)   carvediloL tablet 6.25 mg (6.25 mg Oral Given 12/29/24 0830)   gabapentin capsule 300 mg (300 mg Oral Given 12/29/24 0831)   tamsulosin 24 hr capsule 0.4 mg (0.4 mg Oral Given 12/29/24 0830)   melatonin tablet 6 mg (has no administration in time range)   enoxaparin injection 30 mg (30 mg Subcutaneous Given 12/28/24 1719)   piperacillin-tazobactam (ZOSYN) 4.5 g in D5W 100 mL IVPB (MB+) (4.5 g Intravenous New Bag 12/29/24 0611)   sodium bicarbonate tablet 1,300 mg (1,300 mg Oral Given 12/29/24 0830)   piperacillin-tazobactam (ZOSYN) 4.5 g in D5W 100 mL IVPB (MB+) (0 g Intravenous Stopped 12/25/24 2325)   vancomycin (VANCOCIN) 1,000 mg in D5W 250 mL IVPB (admixture device) (0 mg Intravenous Stopped 12/26/24 0100)   vancomycin (VANCOCIN) 1,000 mg in D5W 250 mL IVPB (admixture device) (0 mg Intravenous Stopped 12/26/24 0706)   vancomycin (VANCOCIN) 500 mg in D5W 100 mL IVPB (MB+) (0 mg Intravenous Stopped 12/27/24 2114)     Medical Decision Making  Judging by the patient's chief  complaint and pertinent history, the patient has the following possible differential diagnoses, including but not limited to the following.  Some of these are deemed to be lower likelihood and some more likely based on my physical exam and history combined with possible lab work and/or imaging studies.   Please see the pertinent studies, and refer to the HPI.  Some of these diagnoses will take further evaluation to fully rule out, perhaps as an outpatient and the patient was encouraged to follow up when discharged for more comprehensive evaluation.    Generalized weakness: anemia, dehydration, electrolyte derangement, hypoglycemia, infection, intoxication, respiratory failure, toxic ingestion, ACS, thyroid disease, medications, psychiatric,    Patient is a 64-year-old male with past medical history of renal disease, was previously on dialysis but discontinued 2 weeks ago and went on hospice yesterday.  He reportedly had a fall at home and was brought to the emergency department today.  Today he is not want to be on hospice.  He states and would like to resume treatment.  He has a wound to his foot which appears to be foul-smelling, draining.  Lab work notable for leukocytosis.  Blood cultures have been obtained patient has been started on broad-spectrum antibiotics.  Does not required 30 cc/kg of IV fluids as he has a dialysis patient.  Counseled extensively.  Discussed with the patient's daughter about the patient's goals of care.  Discussed with hospital medicine for admission for continued management of his infected leg wound, resuming dialysis.    Problems Addressed:  ESRD (end stage renal disease): acute illness or injury that poses a threat to life or bodily functions  Fall: acute illness or injury that poses a threat to life or bodily functions  Sepsis, due to unspecified organism, unspecified whether acute organ dysfunction present: acute illness or injury that poses a threat to life or bodily  "functions  Wound infection: acute illness or injury that poses a threat to life or bodily functions    Amount and/or Complexity of Data Reviewed  External Data Reviewed: notes.  Labs: ordered.  Radiology: ordered and independent interpretation performed.  ECG/medicine tests: ordered and independent interpretation performed.    Risk  Parenteral controlled substances.  Decision regarding hospitalization.  Diagnosis or treatment significantly limited by social determinants of health.            Scribe Attestation:   Scribe #1: I performed the above scribed service and the documentation accurately describes the services I performed. I attest to the accuracy of the note.    Attending Attestation:           Physician Attestation for Scribe:  Physician Attestation Statement for Scribe #1: I, Mich Valenzuela MD, reviewed documentation, as scribed by Madelyn Ramos in my presence, and it is both accurate and complete.             ED Course as of 12/29/24 0833   Thu Dec 26, 2024   0032 Normal sinus rhythm.  Rate of 100.  No STEMI.  Time of 2345 [RP]      ED Course User Index  [RP] Mich Valenzuela MD               Medical Decision Making:   Clinical Tests:   Sepsis Perfusion Assessment: "I attest a sepsis perfusion exam was performed within 6 hours of sepsis, severe sepsis, or septic shock presentation, following fluid resuscitation."    Sepsis Perfusion Assessment Complete: 12/25/2024 9:15 PM               Clinical Impression:  Final diagnoses:  [W19.XXXA] Fall  [T14.8XXA, L08.9] Wound infection  [N18.6] ESRD (end stage renal disease)  [A41.9] Sepsis, due to unspecified organism, unspecified whether acute organ dysfunction present          ED Disposition Condition    Admit Stable                Mich Valenzuela MD  12/29/24 0836    "

## 2024-12-26 NOTE — H&P
[facility] Hospital Medicine History & Physical Examination       Patient Name: Neno Watkins  MRN: [id number]  Patient Class: IP- Inpatient   Admission Date: 12/25/2024  8:30 PM  Length of Stay: 1  Admitting Service: Hospital Medicine   Attending Physician: Otilio Maurer MD   Primary Care Provider: Messi Weston MD  History source: EMR, patient and/or patient's family    CHIEF COMPLAINT   Falls    HISTORY OF PRESENT ILLNESS:   Patient a 64-year-old male with a history of morbid obesity, chronic venous stasis, chronic open right foot wound, ESRD, type 2 diabetes who presented to the ER for multiple falls over the last week.  Daughter gives history (Isidra Watkins [phone]) who explains that 2 weeks ago patient decided he would no longer receive dialysis nor wound care and felt that these providers were only trying to harm him.  He therefore enrolled in hospice and understood completely in the time that without dialysis he would very likely die.  Over the last few days at home he has become progressively weak and having multiple falls, his wife activated EMS tonight after he had a fall and was unable to get up.    He was brought to the ER where on arrival he was afebrile hemodynamically stable maintaining normal sats on room air.  Laboratory work noted leukocytosis of 20 K, creatinine of 2.3/BUN 34, CO2 20 and a normal potassium.  CT head showed no acute process.  He was not found to have focal deficits on exam nor any signs of traumatic injury though was noted to have an infected chronic left foot ulceration.  Blood cultures were obtained and broad-spectrum antibiotics initiated.    PAST MEDICAL HISTORY:     Past Medical History:   Diagnosis Date    BPH (benign prostatic hyperplasia)     Essential (primary) hypertension     Obesity, unspecified     PAD (peripheral artery disease)        PAST SURGICAL HISTORY:     Past Surgical History:   Procedure Laterality Date     FISTULOGRAM Left 9/25/2023    Procedure: Fistulogram;  Surgeon: Rodrigo Soliman DO;  Location: [facility] CATH LAB;  Service: Nephrology;  Laterality: Left;    INSERTION OF TUNNELED CENTRAL VENOUS HEMODIALYSIS CATHETER Right 6/27/2022    Procedure: INSERTION, CATHETER, HEMODIALYSIS, DUAL LUMEN;  Surgeon: Molly Garcia MD;  Location: [facility] OR;  Service: General;  Laterality: Right;       ALLERGIES:   Butorphanol, Hydrocodone-acetaminophen, and Povidone-iodine    FAMILY HISTORY:   Reviewed and non-contributory     SOCIAL HISTORY:   Screening for Social Drivers for health: Patient screened for food insecurity, housing instability, transportation needs, utility   difficulties, and interpersonal safety (select all that apply as identified as concern)  []Housing or Food  []Transportation Needs  []Utility Difficulties  []Interpersonal safety  [x]None  Social History     Tobacco Use    Smoking status: Never    Smokeless tobacco: Never   Substance Use Topics    Alcohol use: Never        HOME MEDICATIONS:     Prior to Admission medications    Medication Sig Start Date End Date Taking? Authorizing Provider   atorvastatin (LIPITOR) 80 MG tablet Take 80 mg by mouth every evening. 2/9/22   Provider, Historical   carvediloL (COREG) 6.25 MG tablet Take 6.25 mg by mouth 2 (two) times daily. 2/9/22   Provider, Historical   docusate sodium (COLACE) 100 MG capsule Take 100 mg by mouth 2 (two) times daily.    Provider, Historical   gabapentin (NEURONTIN) 300 MG capsule Take 1 capsule by mouth 2 (two) times a day. 2/9/22   Provider, Historical   ibuprofen (MOTRIN IB ORAL) Take 375 mg by mouth daily as needed (as needed).  Patient not taking: Reported on 9/17/2024    Provider, Historical   melatonin 5 mg Cap Take 5 mg by mouth daily as needed (as needed).    Provider, Historical   multivitamin (THERAGRAN) per tablet Take 1 tablet by mouth once daily.    Provider, Historical   NIFEdipine (PROCARDIA-XL) 60 MG (OSM) 24 hr tablet TAKE 1 TABLET(60  "MG) BY MOUTH EVERY DAY 8/13/22   Cam Medina MD   tamsulosin (FLOMAX) 0.4 mg Cap Take 1 capsule by mouth once daily. 4/25/22   Provider, Historical   white petrolatum 41 % Oint Apply topically once daily. 9/10/24   William Ramos MD   furosemide (LASIX) 40 MG tablet Take 40 mg by mouth every morning. 6/9/22 7/8/22  Provider, Historical   metFORMIN (GLUCOPHAGE) 500 MG tablet Take 500 mg by mouth 2 (two) times a day. 5/25/21 7/8/22  Provider, Historical       REVIEW OF SYSTEMS:   Except as documented, all other systems reviewed and negative     PHYSICAL EXAM:   T 98 °F (36.7 °C)   BP (!) 140/62   P 105   RR 20   O2 95 %  GENERAL:  Morbidly obese, drowsy  HEENT: normocephalic atraumatic   NECK: supple   LUNGS: Clear bilaterally, no wheezing or rales, no accessory muscle use   CVS: Regular rate and rhythm, normal peripheral perfusion, right chest wall HD cath  ABD: Soft, non-tender, non-distended, bowel sounds present  EXTREMITIES:  Foul-smelling open wound to right lateral foot  SKIN: Warm, dry.  Venous stasis dermatitis  NEURO:  Alert/oriented but drowsy and difficult to converse with, he has decreased strength in his left arm as compared to right and is not cooperative with lower extremity exam due to drowsiness  PSYCHIATRIC:  Occasionally cooperative    LABS AND IMAGING:     Recent Labs     12/25/24 2110   WBC 20.03*   RBC 4.03*   HGB 11.9*   HCT 36.9*   MCV 91.6   MCH 29.5   MCHC 32.2*   RDW 13.9        No results for input(s): "LACTIC" in the last 72 hours.  Recent Labs     12/25/24 2110   INR 1.2   APTT 34.4*     No results for input(s): "HGBA1C", "CHOL", "TRIG", "LDL", "VLDL", "HDL" in the last 72 hours.   Recent Labs     12/25/24 2110   *   K 4.6   CO2 20*   BUN 34.9*   CREATININE 2.38*   GLUCOSE 154*   CALCIUM 9.1   ALBUMIN 3.2*   GLOBULIN 5.1*   ALKPHOS 269*   *   AST 70*   BILITOT 1.3     Recent Labs     12/25/24 2110   BNP 26.7   TROPONINI 0.025          CT Head Without " Contrast  START OF REPORT:  Technique: CT of the head was performed without intravenous contrast with axial as well as coronal and sagittal images.    Comparison: None.    Dosage Information: Automated exposure control was utilized.    Clinical history: Head injury.    Findings:  Hemorrhage: No acute intracranial hemorrhage is seen.  CSF spaces: The ventricles, sulci and basal cisterns all appear moderately prominent consistent with global cerebral atrophy. The ventricles appear dilated out of proportion to the sulci suggesting an element of concurrent non-obstructive hydrocephalus.  Brain parenchyma: No acute infarct is identified. Moderate microvascular change is seen in portions of the periventricular and deep white matter tracts.  Cerebellum: Unremarkable.  Sella and skull base: The sella appears to be within normal limits for age.  Intracranial calcifications: Incidental note is made of bilateral choroid plexus calcification. Incidental note is made of some pineal region calcification. Incidental note is made of subtle right basal ganglia calcifcation.  Calvarium: No acute linear or depressed skull fracture is seen.    Maxillofacial Structures:  Paranasal sinuses: The visualized paranasal sinuses appear clear with no significant mucoperiosteal thickening or air fluid levels identified.  Orbits: The orbits appear unremarkable.  Zygomatic arches: The zygomatic arches are intact and unremarkable.  Temporal bones and mastoids: The temporal bones and mastoids appear unremarkable.  TMJ: The mandibular condyles appear normally placed with respect to the mandibular fossa.    Impression:  1. No acute intracranial traumatic injury identified. Details and other findings as noted above.      ASSESSMENT & PLAN:   Revocation of hospice/desired to re-initiate hemodialysis  Infected chronic right foot open wound  ESRD off dialysis x2 weeks  Left-sided weakness, unknown chronicity    Hx:  morbid obesity, chronic venous stasis,  chronic open right foot wound, ESRD, type 2 diabetes     -no urgent indication for HD tonight, consult Nephrology   -continue broad-spectrum empiric antibiotics, wound care consult   -consultation to Podiatry   -place on stroke protocol for now  -discussed over the phone with the patient's daughter  (Isidra Watkins [phone]), patient lives with his wife who was chronically ill and family is no longer able to care for Neno at home.  They are requesting placement.  They do understand he revoked hospice and wants to be full code, and wants to resume dialysis and respect his wishes; he will however need to be placed in a nursing home facility.    DVT prophylaxis:  Lovenox  Code status:  Full code    If patient was admitted under observational status it is with my approval/permission.     At least 55 min was spent on this history and physical.  Time seen: 1AM 12/26  Otilio Maurer MD

## 2024-12-26 NOTE — PLAN OF CARE
12/26/24 1729   Discharge Assessment   Assessment Type Discharge Planning Assessment   Confirmed/corrected address, phone number and insurance Yes   Confirmed Demographics Correct on Facesheet   Source of Information family  (daughter -Isidra Watkins)   When was your last doctors appointment?   (Messi Weston MD)   People in Home spouse;child(rose), adult   Do you expect to return to your current living situation? No   Do you have help at home or someone to help you manage your care at home? No   Prior to hospitilization cognitive status: Unable to Assess   Current cognitive status: Unable to Assess   Walking or Climbing Stairs Difficulty no   Walking or Climbing Stairs ambulation difficulty, requires equipment   Mobility Management rolling walker to ambulate   Dressing/Bathing Difficulty yes   Dressing/Bathing bathing difficulty, assistance 1 person   Dressing/Bathing Management daughter assist   Home Layout Able to live on 1st floor   Equipment Currently Used at Home walker, rolling   Readmission within 30 days? No   Patient currently being followed by outpatient case management? No   Do you currently have service(s) that help you manage your care at home? No   Do you take prescription medications? Yes   Do you have prescription coverage? Yes   Coverage [employer] Medicare   Do you have any problems affording any of your prescribed medications? No   Is the patient taking medications as prescribed? yes   Who is going to help you get home at discharge? family   How do you get to doctors appointments? family or friend will provide   Are you on dialysis? No   Do you take coumadin? No   Discharge Plan A Skilled Nursing Facility   Discharge Plan B New Nursing Home placement - [facility] care facility   DME Needed Upon Discharge  none   Discharge Plan discussed with: Patient;Adult children;Spouse/sig other   Name(s) and Number(s) Isidra Watkins (daughter) and Annmarie Watkins (spouse)   Transition of Care Barriers DIalysis  placement issues   Physical Activity   On average, how many days per week do you engage in moderate to strenuous exercise (like a brisk walk)? 0 days   On average, how many minutes do you engage in exercise at this level? 0 min   Financial Resource Strain   How hard is it for you to pay for the very basics like food, housing, medical care, and heating? Not hard   Housing Stability   In the last 12 months, was there a time when you were not able to pay the mortgage or rent on time? N   At any time in the past 12 months, were you homeless or living in a shelter (including now)? N   Transportation Needs   Has the lack of transportation kept you from medical appointments, meetings, work or from getting things needed for daily living? No   Food Insecurity   Within the past 12 months, you worried that your food would run out before you got the money to buy more. Never true   Within the past 12 months, the food you bought just didn't last and you didn't have money to get more. Never true   Stress   Do you feel stress - tense, restless, nervous, or anxious, or unable to sleep at night because your mind is troubled all the time - these days? Only a littl   Social Isolation   How often do you feel lonely or isolated from those around you?  Never   Alcohol Use   Q1: How often do you have a drink containing alcohol? Never   Q2: How many drinks containing alcohol do you have on a typical day when you are drinking? None   Q3: How often do you have six or more drinks on one occasion? Never   Utilities   In the past 12 months has the electric, gas, oil, or water company threatened to shut off services in your home? No   Health Literacy   How often do you need to have someone help you when you read instructions, pamphlets, or other written material from your doctor or pharmacy? Never   OTHER   Name(s) of People in Home Isidra Watkins and Annmarie Watkins     Daughter informed CM they would like Nursing home placement for patient unable to  care ffor patient at home . Patient 's spouse is sickly h, has a trach and daughter cannot care for both of them.

## 2024-12-26 NOTE — PLAN OF CARE
Therapy recommending moderate intensity post care. CM printed choice list for skilled nursing facility. CM aspoke with daughter Isidra. Family would like for patient o to SNF to Nursing Home Placement. Spouse uiis sickly at home has a trach and daughter cannot care for both parents . Family would like  a facility close to home . Cm reviewed choice list with Isidra, daughter. First choice is [facility]. Second choice is [facility] and third choice is [facility].   Patient went to dialysis center in  [place] then went hospice care , then patient revoked his hospice care and wanted to return to dialysis. Patient is on Monday, Wednesday, Friday schedule.

## 2024-12-26 NOTE — PT/OT/SLP EVAL
Occupational Therapy  Evaluation    Name: Neno Watkins  MRN: [id number]  Admitting Diagnosis: falls  Recent Surgery: * No surgery found *      Recommendations:     Discharge therapy intensity: Moderate Intensity Therapy   Discharge Equipment Recommendations:  to be determined by next level of care  Barriers to discharge:  Decreased caregiver support, Other (Comment) (severity of deficits, impaired mobility)    Assessment:     Neno Watkins is a 64 y.o. male with a medical diagnosis of weakness and falls, infected chronic R foot open wound, ESRD off dialysis x2 weeks seeking revocation of hospice / desire to re-initiate HD.  He presents with the following performance deficits affecting function: weakness, impaired endurance, impaired self care skills, impaired functional mobility, impaired balance, pain, decreased safety awareness, decreased lower extremity function, impaired skin, edema, decreased upper extremity function.     Pt with limited participation in OT evaluation this date 2/2 lower back pain. Pt attempted transfer from supine>EOB however pt able to partially complete transfer (able to get legs off bed with increased time). Attempted to assist pt with lifting trunk from bed Max A x2 however pt resistive to help from therapist and unable to lift trunk from bed due to back pain. Pt requires max-total A for LB ADLs at this time due to pain, pt able to lift BLEs from bed briefly. Recommending moderate intensity therapy upon d/c as per chart review family can no longer assist pt at home.     Rehab Prognosis: Fair; patient would benefit from acute skilled OT services to address these deficits and reach maximum level of function.       Plan:     Patient to be seen 4 x/week to address the above listed problems via self-care/home management, therapeutic activities, therapeutic exercises  Plan of Care Expires: 01/27/25  Plan of Care Reviewed with: patient    Subjective     Chief Complaint: low back  pain  Patient/Family Comments/goals: to get better    Occupational Profile:  Living Environment: lives with spouse in mobile home with ramp entrance; does not utilize tub or shower for bathing. Competes sponge baths  Previous level of function: mod I with RW for mobility, however recent falls, assist with LB ADLs from wife as needed.   Roles and Routines: , father  Equipment Used at Home: walker, rolling  Assistance upon Discharge: family can no longer assist pt, seeking placement    Pain/Comfort:  Pain Rating 1: 10/10  Location - Orientation 1: lower  Location 1: back  Pain Addressed 1: Reposition, Distraction, Cessation of Activity, Nurse notified  Pain Rating Post-Intervention 1: 10/10    Patients cultural, spiritual, Buddhist conflicts given the current situation: no    Objective:     OT communicated with RN prior to session.      Patient was found supine with telemetry, peripheral IV, PureWick upon OT entry to room.    General Precautions: Standard, fall  Orthopedic Precautions: N/A  Braces: N/A    Vital Signs: Blood Pressure: 138/71  HR: 92    Bed Mobility:    Patient completed Supine to Sit with maximal assistance x2, able to complete partial transfer (BLEs off edge of bed) pt unable to tolerate lifting trunk from bed this date due to pain.     Functional Mobility/Transfers:  Unable to assess    Activities of Daily Living:  Lower Body Dressing: maximal assistance as pt able to lift BLEs from bed.     AMPA 6 Click ADL:  AMPAC Total Score: 13    Functional Cognition:  Orientation: oriented to Person, Place, Time, and Situation  Safety Awareness: Impaired.      Visual Perceptual Skills:  Appears WFL however pt reporting poor vision in R eye (chronic)    Upper Extremity Function:  Right Upper Extremity:   Strength: unable to formally assess, appears WFL as observed functionally, will continue to assess    Left Upper Extremity:  Strength: unable to formally assess, pt with difficulty reaching for bedrail  due to L shoulder pain, will continue to assess      Therapeutic Positioning  Risk for acquired pressure injuries is significantly increased due to impaired mobility, altered skin already present, obesity, and h/o skin breakdown.    OT interventions performed during the course of today's session:   Education was provided on benefits of and recommendations for therapeutic positioning    Skin assessment: pt with impaired skin at BLEs (open wound at R foot with weeping)    OT recommendations for therapeutic positioning throughout hospitalization:   Follow [facility] Pressure Injury Prevention Protocol  Geomat recommended for sacral protection while [facility]  Specialty Mattress    Patient Education:  Patient provided with verbal education education regarding OT role/goals/POC, fall prevention, safety awareness, Discharge/DME recommendations, and pressure ulcer prevention.  Understanding was verbalized, however additional teaching warranted.     Patient left supine with all lines intact, call button in reach, and RN notified.    GOALS:   Multidisciplinary Problems       Occupational Therapy Goals          Problem: Occupational Therapy    Goal Priority Disciplines Outcome Interventions   Occupational Therapy Goal     OT, PT/OT Progressing    Description: Goals to be met by: 1/26/24     Patient will increase functional independence with ADLs by performing:    UE Dressing with Stand-by Assistance.  LE Dressing with Stand-by Assistance.  Grooming while seated with Stand-by Assistance.  Toileting from bed level with Minimal Assistance for hygiene and clothing management.   Toilet transfer TBD                       History:     Past Medical History:   Diagnosis Date    BPH (benign prostatic hyperplasia)     Essential (primary) hypertension     Obesity, unspecified     PAD (peripheral artery disease)          Past Surgical History:   Procedure Laterality Date    FISTULOGRAM Left 9/25/2023    Procedure: Fistulogram;  Surgeon: Jourdan  DO Rodrigo;  Location: [facility] CATH LAB;  Service: Nephrology;  Laterality: Left;    INSERTION OF TUNNELED CENTRAL VENOUS HEMODIALYSIS CATHETER Right 6/27/2022    Procedure: INSERTION, CATHETER, HEMODIALYSIS, DUAL LUMEN;  Surgeon: Molly Garcia MD;  Location: [facility];  Service: General;  Laterality: Right;       Time Tracking:     OT Date of Treatment: 12/26/24  OT Start Time: 0857  OT Stop Time: 0930  OT Total Time (min): 33 min    Billable Minutes:Evaluation mod complexity    12/26/2024

## 2024-12-27 LAB
ALBUMIN SERPL-MCNC: 2.3 G/DL (ref 3.4–4.8)
ALBUMIN/GLOB SERPL: 0.6 RATIO (ref 1.1–2)
ALP SERPL-CCNC: 215 UNIT/L (ref 40–150)
ALT SERPL-CCNC: 66 UNIT/L (ref 0–55)
ANION GAP SERPL CALC-SCNC: 9 MEQ/L
AST SERPL-CCNC: 53 UNIT/L (ref 5–34)
BASOPHILS # BLD AUTO: 0.05 X10(3)/MCL
BASOPHILS NFR BLD AUTO: 0.3 %
BILIRUB SERPL-MCNC: 1.1 MG/DL
BUN SERPL-MCNC: 39.4 MG/DL (ref 8.4–25.7)
CALCIUM SERPL-MCNC: 8 MG/DL (ref 8.8–10)
CHLORIDE SERPL-SCNC: 109 MMOL/L (ref 98–107)
CO2 SERPL-SCNC: 16 MMOL/L (ref 23–31)
CREAT SERPL-MCNC: 2.34 MG/DL (ref 0.72–1.25)
CREAT/UREA NIT SERPL: 17
EOSINOPHIL # BLD AUTO: 0.2 X10(3)/MCL (ref 0–0.9)
EOSINOPHIL NFR BLD AUTO: 1.2 %
ERYTHROCYTE [DISTWIDTH] IN BLOOD BY AUTOMATED COUNT: 14.2 % (ref 11.5–17)
GFR SERPLBLD CREATININE-BSD FMLA CKD-EPI: 30 ML/MIN/1.73/M2
GLOBULIN SER-MCNC: 4.1 GM/DL (ref 2.4–3.5)
GLUCOSE SERPL-MCNC: 116 MG/DL (ref 82–115)
HCT VFR BLD AUTO: 31.8 % (ref 42–52)
HGB BLD-MCNC: 10.2 G/DL (ref 14–18)
IMM GRANULOCYTES # BLD AUTO: 0.09 X10(3)/MCL (ref 0–0.04)
IMM GRANULOCYTES NFR BLD AUTO: 0.6 %
LEFT CFA PSV: 160 CM/S
LEFT DORSALIS PEDIS PSV: 123 CM/S
LEFT POST TIBIAL SYS PSV: 57 CM/S
LEFT PROFUNDA SYS PSV: 114 CM/S
LEFT SUPER FEMORAL DIST SYS PSV: 127 CM/S
LEFT SUPER FEMORAL MID SYS PSV: 167 CM/S
LEFT SUPER FEMORAL PROX SYS PSV: 133 CM/S
LEFT TIB/PER TRUNK SYS PSV: 143 CM/S
LYMPHOCYTES # BLD AUTO: 1.56 X10(3)/MCL (ref 0.6–4.6)
LYMPHOCYTES NFR BLD AUTO: 9.7 %
MAGNESIUM SERPL-MCNC: 2.3 MG/DL (ref 1.6–2.6)
MCH RBC QN AUTO: 29.7 PG (ref 27–31)
MCHC RBC AUTO-ENTMCNC: 32.1 G/DL (ref 33–36)
MCV RBC AUTO: 92.7 FL (ref 80–94)
MONOCYTES # BLD AUTO: 1.04 X10(3)/MCL (ref 0.1–1.3)
MONOCYTES NFR BLD AUTO: 6.5 %
NEUTROPHILS # BLD AUTO: 13.08 X10(3)/MCL (ref 2.1–9.2)
NEUTROPHILS NFR BLD AUTO: 81.7 %
NRBC BLD AUTO-RTO: 0 %
PHOSPHATE SERPL-MCNC: 4.1 MG/DL (ref 2.3–4.7)
PLATELET # BLD AUTO: 278 X10(3)/MCL (ref 130–400)
PMV BLD AUTO: 9.2 FL (ref 7.4–10.4)
POCT GLUCOSE: 118 MG/DL (ref 70–110)
POTASSIUM SERPL-SCNC: 4.4 MMOL/L (ref 3.5–5.1)
PROT SERPL-MCNC: 6.4 GM/DL (ref 5.8–7.6)
RBC # BLD AUTO: 3.43 X10(6)/MCL (ref 4.7–6.1)
RIGHT CFA PSV: 146 CM/S
RIGHT DORSALIS PEDIS PSV: 118 CM/S
RIGHT POST TIBIAL SYS PSV: 96 CM/S
RIGHT PROFUNDA SYS PSV: 86 CM/S
RIGHT SUPER FEMORAL DIST SYS PSV: 171 CM/S
RIGHT SUPER FEMORAL MID SYS PSV: 185 CM/S
RIGHT SUPER FEMORAL PROX SYS PSV: 146 CM/S
SODIUM SERPL-SCNC: 134 MMOL/L (ref 136–145)
VANCOMYCIN SERPL-MCNC: 12.1 UG/ML (ref 15–20)
WBC # BLD AUTO: 16.02 X10(3)/MCL (ref 4.5–11.5)

## 2024-12-27 PROCEDURE — 80202 ASSAY OF VANCOMYCIN: CPT | Performed by: INTERNAL MEDICINE

## 2024-12-27 PROCEDURE — 85025 COMPLETE CBC W/AUTO DIFF WBC: CPT | Performed by: INTERNAL MEDICINE

## 2024-12-27 PROCEDURE — 63600175 PHARM REV CODE 636 W HCPCS: Performed by: INTERNAL MEDICINE

## 2024-12-27 PROCEDURE — 87077 CULTURE AEROBIC IDENTIFY: CPT | Performed by: PODIATRIST

## 2024-12-27 PROCEDURE — 25000003 PHARM REV CODE 250: Performed by: INTERNAL MEDICINE

## 2024-12-27 PROCEDURE — 99223 1ST HOSP IP/OBS HIGH 75: CPT | Mod: FS,,, | Performed by: NEUROLOGICAL SURGERY

## 2024-12-27 PROCEDURE — 80053 COMPREHEN METABOLIC PANEL: CPT | Performed by: INTERNAL MEDICINE

## 2024-12-27 PROCEDURE — 87075 CULTR BACTERIA EXCEPT BLOOD: CPT | Performed by: PODIATRIST

## 2024-12-27 PROCEDURE — 21400001 HC TELEMETRY ROOM

## 2024-12-27 PROCEDURE — 11000001 HC ACUTE MED/SURG PRIVATE ROOM

## 2024-12-27 PROCEDURE — 80100016 HC MAINTENANCE HEMODIALYSIS

## 2024-12-27 PROCEDURE — 83735 ASSAY OF MAGNESIUM: CPT | Performed by: INTERNAL MEDICINE

## 2024-12-27 PROCEDURE — 84100 ASSAY OF PHOSPHORUS: CPT | Performed by: INTERNAL MEDICINE

## 2024-12-27 PROCEDURE — 36415 COLL VENOUS BLD VENIPUNCTURE: CPT | Performed by: INTERNAL MEDICINE

## 2024-12-27 RX ORDER — ENOXAPARIN SODIUM 100 MG/ML
30 INJECTION SUBCUTANEOUS EVERY 24 HOURS
Status: DISCONTINUED | OUTPATIENT
Start: 2024-12-27 | End: 2025-01-08 | Stop reason: HOSPADM

## 2024-12-27 RX ADMIN — ENOXAPARIN SODIUM 30 MG: 30 INJECTION SUBCUTANEOUS at 04:12

## 2024-12-27 RX ADMIN — GABAPENTIN 300 MG: 300 CAPSULE ORAL at 07:12

## 2024-12-27 RX ADMIN — VANCOMYCIN HYDROCHLORIDE 500 MG: 500 INJECTION, POWDER, LYOPHILIZED, FOR SOLUTION INTRAVENOUS at 08:12

## 2024-12-27 RX ADMIN — CARVEDILOL 6.25 MG: 3.12 TABLET, FILM COATED ORAL at 07:12

## 2024-12-27 RX ADMIN — PIPERACILLIN SODIUM AND TAZOBACTAM SODIUM 4.5 G: 4; .5 INJECTION, POWDER, LYOPHILIZED, FOR SOLUTION INTRAVENOUS at 09:12

## 2024-12-27 RX ADMIN — TAMSULOSIN HYDROCHLORIDE 0.4 MG: 0.4 CAPSULE ORAL at 07:12

## 2024-12-27 RX ADMIN — GABAPENTIN 300 MG: 300 CAPSULE ORAL at 08:12

## 2024-12-27 RX ADMIN — PIPERACILLIN SODIUM AND TAZOBACTAM SODIUM 4.5 G: 4; .5 INJECTION, POWDER, LYOPHILIZED, FOR SOLUTION INTRAVENOUS at 12:12

## 2024-12-27 RX ADMIN — ATORVASTATIN CALCIUM 40 MG: 40 TABLET, FILM COATED ORAL at 07:12

## 2024-12-27 RX ADMIN — PIPERACILLIN SODIUM AND TAZOBACTAM SODIUM 4.5 G: 4; .5 INJECTION, POWDER, LYOPHILIZED, FOR SOLUTION INTRAVENOUS at 01:12

## 2024-12-27 RX ADMIN — ASPIRIN 81 MG: 81 TABLET, COATED ORAL at 07:12

## 2024-12-27 RX ADMIN — ACETAMINOPHEN 1000 MG: 500 TABLET ORAL at 07:12

## 2024-12-27 RX ADMIN — CARVEDILOL 6.25 MG: 3.12 TABLET, FILM COATED ORAL at 08:12

## 2024-12-27 RX ADMIN — MUPIROCIN: 20 OINTMENT TOPICAL at 08:12

## 2024-12-27 RX ADMIN — ACETAMINOPHEN 1000 MG: 500 TABLET ORAL at 08:12

## 2024-12-27 NOTE — PLAN OF CARE
Problem: Adult Inpatient Plan of Care  Goal: Plan of Care Review  Outcome: Progressing  Goal: Patient-Specific Goal (Individualized)  Outcome: Progressing  Goal: Absence of Hospital-Acquired Illness or Injury  Outcome: Progressing  Goal: Optimal Comfort and Wellbeing  Outcome: Progressing  Goal: Readiness for Transition of Care  Outcome: Progressing     Problem: Bariatric Environmental Safety  Goal: Safety Maintained with Care  Outcome: Progressing     Problem: Infection  Goal: Absence of Infection Signs and Symptoms  Outcome: Progressing     Problem: Wound  Goal: Optimal Coping  Outcome: Progressing  Goal: Optimal Functional Ability  Outcome: Progressing  Goal: Absence of Infection Signs and Symptoms  Outcome: Progressing  Goal: Improved Oral Intake  Outcome: Progressing  Goal: Optimal Pain Control and Function  Outcome: Progressing  Goal: Skin Health and Integrity  Outcome: Progressing  Goal: Optimal Wound Healing  Outcome: Progressing     Problem: Acute Kidney Injury/Impairment  Goal: Fluid and Electrolyte Balance  Outcome: Progressing  Goal: Improved Oral Intake  Outcome: Progressing  Goal: Effective Renal Function  Outcome: Progressing     Problem: Stroke, Ischemic (Includes Transient Ischemic Attack)  Goal: Optimal Coping  Outcome: Progressing  Goal: Effective Bowel Elimination  Outcome: Progressing  Goal: Optimal Cerebral Tissue Perfusion  Outcome: Progressing  Goal: Optimal Cognitive Function  Outcome: Progressing  Goal: Improved Communication Skills  Outcome: Progressing  Goal: Optimal Functional Ability  Outcome: Progressing  Goal: Optimal Nutrition Intake  Outcome: Progressing  Goal: Effective Oxygenation and Ventilation  Outcome: Progressing  Goal: Improved Sensorimotor Function  Outcome: Progressing  Goal: Safe and Effective Swallow  Outcome: Progressing  Goal: Effective Urinary Elimination  Outcome: Progressing     Problem: Diabetes Comorbidity  Goal: Blood Glucose Level Within Targeted  Range  Outcome: Progressing

## 2024-12-27 NOTE — PLAN OF CARE
Referral for SNF to long term placement sent to [facility], and Jaylon delgado [facility]. Awaiting response at this time.    Awaiting pasrr from LATANYA Gould to be able to call in the Locet to receive 142.

## 2024-12-27 NOTE — PROGRESS NOTES
[facility] Hospital Medicine Progress Note        Chief Complaint: Inpatient Follow-up for     HPI: 64-year-old male with a history of morbid obesity, chronic venous stasis, chronic open right foot wound, ESRD, type 2 diabetes who presented to the ER for multiple falls over the last week.  Daughter gives history (Isidra Watkins [phone]) who explains that 2 weeks ago patient decided he would no longer receive dialysis nor wound care and felt that these providers were only trying to harm him.  He therefore enrolled in hospice and understood completely in the time that without dialysis he would very likely die.  Over the last few days at home he has become progressively weak and having multiple falls, his wife activated EMS tonight after he had a fall and was unable to get up.     He was brought to the ER where on arrival he was afebrile hemodynamically stable maintaining normal sats on room air.  Laboratory work noted leukocytosis of 20 K, creatinine of 2.3/BUN 34, CO2 20 and a normal potassium.  CT head showed no acute process.  He was not found to have focal deficits on exam nor any signs of traumatic injury though was noted to have an infected chronic left foot ulceration.  Blood cultures were obtained and broad-spectrum antibiotics initiated.    Interval Hx:   Patient seen and examined this morning still complains of significant back pain  Case was discussed with patient's nurse and  on the floor.    Objective/physical exam:  General: In no acute distress, afebrile  Chest: Clear to auscultation bilaterally  Heart: RRR, +S1, S2, no appreciable murmur  Abdomen: Soft, nontender, BS +  MSK: Warm,  Neurologic: Alert and oriented x4,   VITAL SIGNS: 24 HRS MIN & MAX LAST   Temp  Min: 98.1 °F (36.7 °C)  Max: 99.5 °F (37.5 °C) 99.5 °F (37.5 °C)   BP  Min: 109/60  Max: 151/64 124/60   Pulse  Min: 79  Max: 95  82   Resp  Min: 18  Max: 20 18   SpO2  Min: 93 %  Max: 96 % 96 %      I have reviewed the following labs:  Recent Labs   Lab 12/25/24 2110 12/26/24  0935 12/27/24  0455   WBC 20.03* 19.49* 16.02*   RBC 4.03* 3.88* 3.43*   HGB 11.9* 11.6* 10.2*   HCT 36.9* 35.6* 31.8*   MCV 91.6 91.8 92.7   MCH 29.5 29.9 29.7   MCHC 32.2* 32.6* 32.1*   RDW 13.9 14.0 14.2    341 278   MPV 8.9 8.7 9.2     Recent Labs   Lab 12/25/24 2110 12/26/24  0935   * 134*   K 4.6 4.3    107   CO2 20* 19*   BUN 34.9* 35.0*   CREATININE 2.38* 2.17*   CALCIUM 9.1 8.5*   ALBUMIN 3.2* 2.7*   ALKPHOS 269* 230*   * 84*   AST 70* 54*   BILITOT 1.3 1.2     Microbiology Results (last 7 days)       Procedure Component Value Units Date/Time    Blood culture #1 **CANNOT BE ORDERED STAT** [[id number]]  (Normal) Collected: 12/25/24 2100    Order Status: Completed Specimen: Blood from Antecubital, Right Updated: 12/26/24 2201     Blood Culture No Growth At 24 Hours    Blood culture #2 **CANNOT BE ORDERED STAT** [[id number]]  (Normal) Collected: 12/25/24 2110    Order Status: Completed Specimen: Blood from Wrist, Right Updated: 12/26/24 2201     Blood Culture No Growth At 24 Hours    Wound Culture [[id number]]     Order Status: Sent Specimen: Wound from Foot, Right     Anaerobic Culture [[id number]]     Order Status: Sent Specimen: Wound from Foot, Right              See below for Radiology    Assessment/Plan:  End-stage renal disease off dialysis for 2 weeks now re initiated   Revocation of hospice  Infected chronic right foot wound  Sepsis secondary to above  L1 superior endplate compression fracture and chronic L4 superior endplate compression deformity  Severe back pain   Morbid obesity   Chronic venous stasis   Diabetes mellitus type 2  Essential hypertension  Anemia of chronic disease    MRI of the T-spine showed L1 superior endplate compression fracture no significant spinal canal or neural foraminal stenosis no cord abnormality.  MRI of the brain was negative  CT of the L-spine had shown  chronic L4 superior endplate compression deformity with mild loss of height  I have ordered MRI of the L-spine since patient had significant pain, will consult Neurosurgery  Blood cultures have been negative patient is on vancomycin and Zosyn podiatry following  Nephrology's consulted for dialysis needs  Last HbA1c of 6  White cell count of 16.02  Will order arterial ultrasound of bilateral lower extremities    VTE prophylaxis: lovenox    Patient condition:  Stable/Fair/Guarded/ Serious/ Critical    Anticipated discharge and Disposition:         All diagnosis and differential diagnosis have been reviewed; assessment and plan has been documented; I have personally reviewed the labs and test results that are presently available; I have reviewed the patients medication list; I have reviewed the consulting providers response and recommendations. I have reviewed or attempted to review medical records based upon their availability    All of the patient's questions have been  addressed and answered. Patient's is agreeable to the above stated plan. I will continue to monitor closely and make adjustments to medical management as needed.    Portions of this note dictated using EMR integrated voice recognition software, and may be subject to voice recognition errors not corrected at proofreading. Please contact writer for clarification if needed.   _____________________________________________________________________    Malnutrition Status:  Nutrition consulted. Most recent weight and BMI monitored-     Measurements:  Wt Readings from Last 1 Encounters:   12/26/24 (!) 136.1 kg (300 lb 0.7 oz)   Body mass index is 43.05 kg/m².    Patient has been screened and assessed by RD.    Malnutrition Type:  Context:    Level:      Malnutrition Characteristic Summary:       Interventions/Recommendations (treatment strategy):        Scheduled Med:   aspirin  81 mg Oral Daily    atorvastatin  40 mg Oral Daily    carvediloL  6.25 mg Oral BID     enoxparin  40 mg Subcutaneous Daily    gabapentin  300 mg Oral BID    mupirocin   Nasal BID    piperacillin-tazobactam (Zosyn) IV (PEDS and ADULTS) (extended infusion is not appropriate)  4.5 g Intravenous Q8H    tamsulosin  1 capsule Oral Daily      Continuous Infusions:     PRN Meds:    Current Facility-Administered Medications:     acetaminophen, 1,000 mg, Oral, Q8H PRN    bisacodyL, 10 mg, Rectal, Daily PRN    dextrose 50%, 12.5 g, Intravenous, PRN    dextrose 50%, 25 g, Intravenous, PRN    glucagon (human recombinant), 1 mg, Intramuscular, PRN    glucose, 16 g, Oral, PRN    glucose, 24 g, Oral, PRN    insulin aspart U-100, 0-5 Units, Subcutaneous, QID (AC + HS) PRN    labetalol, 10 mg, Intravenous, Q15 Min PRN    melatonin, 6 mg, Oral, Nightly PRN    ondansetron, 4 mg, Intravenous, Q8H PRN    sodium chloride 0.9%, 10 mL, Intravenous, PRN    Pharmacy to dose Vancomycin consult, , , Once **AND** vancomycin - pharmacy to dose, , Intravenous, pharmacy to manage frequency     Radiology:  I have personally reviewed the following imaging and agree with the radiologist.     X-Ray Foot Complete Right  Narrative: EXAMINATION:  XR FOOT COMPLETE 3 VIEW RIGHT    CLINICAL HISTORY:  5th MPJ wound;.    TECHNIQUE:  AP, lateral, and oblique views of the right foot were performed.    COMPARISON:  12/15/2024    FINDINGS:  There is fragmentation of the head of the 5th digit metatarsal and base of the 5th digit proximal phalanx.  There is soft tissue gas.  Forefoot soft tissue swelling.    Degenerative calcifications at the Achilles tendon and plantar fascia.  Impression: Fragmentation at the 5th digit about the metatarsophalangeal joint with soft tissue gas.  This could be related to penetrating injury with fracture or infection.  Correlate clinically.    Electronically signed by: Bernice Salazar  Date:    12/26/2024  Time:    16:19  MRI Thoracic Spine Without Contrast  Narrative: EXAMINATION:  MRI THORACIC SPINE WITHOUT  CONTRAST    CLINICAL HISTORY:  fall;    TECHNIQUE:  Multiplanar multisequence MR images of the thoracic spine are obtained without contrast.    COMPARISON:  None.    FINDINGS:  Thoracic alignment is normal.  There is an L2 superior endplate compression fracture with edema.  There is no significant loss of height or bony retropulsion.  The remaining vertebral body heights are preserved.    There is no definite cord signal abnormality.  There is no epidural fluid collection.    There are multiple degenerative changes with small marginal osteophytes and facet arthropathy.  The there is no large disc herniation.  The spinal canal and neural foramina are patent.    There is minimal prevertebral edema at T11-L1.  Impression: 1. L1 superior endplate compression fracture without significant loss of height.  No bony retropulsion or canal stenosis.  2. No significant spinal canal or neural foraminal stenosis.  3. No cord signal abnormality.    Electronically signed by: Sarai Calle  Date:    12/26/2024  Time:    16:17  MRI Brain Without Contrast  Narrative: EXAMINATION:  MRI BRAIN WITHOUT CONTRAST    CLINICAL HISTORY:  Stroke, follow up;Left-sided weakness and fall;    TECHNIQUE:  Multiplanar, multisequence MR images of the brain were obtained without the administration of intravenous contrast.    COMPARISON:  CT head dated 12/25/2024    FINDINGS:  Evaluation is limited by motion artifact.  There is no restricted diffusion, hemorrhage or edema.  Moderate patchy T2/FLAIR hyperintense in the subcortical and periventricular white matter likely represent chronic microvascular ischemic changes.    There is no mass effect or midline shift.  The basal cisterns are patent.  There is moderate diffuse parenchymal volume loss.  There is no hydrocephalus or abnormal extra-axial fluid collection.  The major intracranial flow voids are patent.  There is trace paranasal sinus mucosal thickening.  Impression: 1. No acute intracranial  abnormality.  2. Moderate chronic microvascular ischemic changes.    Electronically signed by: Sarai Calle  Date:    12/26/2024  Time:    15:51  Echo    Left Ventricle: The left ventricle is normal in size. Normal wall   thickness. There is normal systolic function with a visually estimated   ejection fraction of 55 - 60%. There is normal diastolic function.    Right Ventricle: Normal right ventricular cavity size. Wall thickness   is normal. Systolic function is normal.    Left Atrium: Left atrium is moderately dilated. Agitated saline study   of the atrial septum is negative after vasalva maneuver, suggesting   absence of intracardiac shunt at the atrial level.    IVC/SVC: Normal venous pressure at 3 mmHg.  X-Ray Lumbar Complete Including Flex And Ext  Narrative: EXAMINATION:  XR LUMBAR SPINE 5 VIEW WITH FLEX AND EXT    CLINICAL HISTORY:  Back pains, h/o falls at home;  Unspecified fall, initial encounter    TECHNIQUE:  Five views of the lumbar spine plus flexion extension views were performed.    COMPARISON:  06/21/2022    FINDINGS:  Unchanged superior endplate deformity at L4.  Otherwise vertebral body heights are maintained without appreciable fracture.  There are flowing anterior disc osteophytes of the thoracolumbar spine.  Mild multilevel disc space narrowing.  No appreciable pars defects.  No abnormal motion with flexion extension maneuvers.    Aortic atherosclerosis.  Impression: No appreciable acute osseous abnormality by plain radiographic evaluation.    Electronically signed by: Bernice Salazar  Date:    12/26/2024  Time:    15:16  CV Ultrasound Bilateral Doppler Carotid  The right internal carotid artery is patent with less than 50% stenosis.  The left internal carotid artery is patent with less than 50% stenosis.  Bilateral vertebral arteries are patent with antegrade flow.  CT Lumbar Spine Without Contrast  Narrative: EXAMINATION:  CT LUMBAR SPINE WITHOUT CONTRAST    CLINICAL HISTORY:  Low back  pain, trauma;    TECHNIQUE:  Noncontrast CT images of the lumbar spine. Axial, coronal, and sagittal reformatted images were obtained. Dose length product is 1426 mGycm. Automatic exposure control, adjustment of mA/kV or iterative reconstruction technique was used to limit radiation dose.    COMPARISON:  CT lumbar spine dated 11/18/2024    FINDINGS:  There are 5 non-rib-bearing lumbar type vertebral bodies.  There is a chronic L4 superior endplate compression deformity with Schmorl's node and mild loss of height.  The remaining vertebral body heights are preserved.  There is no acute fracture identified.  There are mild degenerative changes with marginal osteophytes and facet arthropathy.  There is no paraspinal hematoma.  Impression: 1. No acute fracture identified.  2. Multilevel degenerative changes of the lumbar spine.    Electronically signed by: Sarai Calle  Date:    12/26/2024  Time:    11:04  CT Head Without Contrast  Narrative: Technique: CT of the head was performed without intravenous contrast with axial as well as coronal and sagittal images.    Comparison: None.    Dosage Information: Automated exposure control was utilized.    Clinical history: Head injury.    Findings:    Hemorrhage: No acute intracranial hemorrhage is seen.    CSF spaces: The ventricles, sulci and basal cisterns all appear moderately prominent consistent with global cerebral atrophy.    Brain parenchyma: No acute large vessel territory infarct is identified.  Chronic microvascular change is seen in portions of the periventricular and deep white matter tracts.    Calvarium: No acute linear or depressed skull fracture is seen.    Maxillofacial Structures:    Paranasal sinuses: The visualized paranasal sinuses appear clear with no significant mucoperiosteal thickening or air fluid levels identified.  Impression: Impression:    No acute intracranial traumatic injury identified. Details and other findings as noted above.    No  significant discrepancy with overnight report    Electronically signed by: Ravi Ortiz  Date:    12/26/2024  Time:    07:49  X-Ray Chest 1 View  Narrative: EXAMINATION:  XR CHEST 1 VIEW    CPT 01896    CLINICAL HISTORY:  sob;    COMPARISON:  January 27, 2023    FINDINGS:  Examination reveals mediastinal silhouette to be within normal limits cardiac silhouette is mildly enlarged.    Lung fields are clear and free of gross infiltrates atelectasis or effusions.    Minimal increase interstitial markings likely related to poor inspiratory effort.    Dialysis type catheter remains in place  Impression: Mild cardiomegaly.    No other significant abnormalities    Electronically signed by: Mike Morris  Date:    12/26/2024  Time:    05:23      Luma Weber MD  Department of Hospital Medicine   [facility]   12/27/2024

## 2024-12-27 NOTE — PROGRESS NOTES
[facility] - Neurology  Wound Care    Patient Name:  Neno Watkins   MRN:  [id number]  Date: 12/27/2024  Diagnosis: Wound infection    History:     Past Medical History:   Diagnosis Date    BPH (benign prostatic hyperplasia)     Essential (primary) hypertension     Obesity, unspecified     PAD (peripheral artery disease)        Social History     Socioeconomic History    Marital status:    Tobacco Use    Smoking status: Never    Smokeless tobacco: Never   Substance and Sexual Activity    Alcohol use: Never    Drug use: Never     Social Drivers of Health     Financial Resource Strain: Low Risk  (12/26/2024)    Overall Financial Resource Strain (CARDIA)     Difficulty of Paying Living Expenses: Not hard at all   Food Insecurity: No Food Insecurity (12/26/2024)    Hunger Vital Sign     Worried About Running Out of Food in the Last Year: Never true     Ran Out of Food in the Last Year: Never true   Transportation Needs: No Transportation Needs (12/26/2024)    TRANSPORTATION NEEDS     Transportation : No   Physical Activity: Inactive (12/26/2024)    Exercise Vital Sign     Days of Exercise per Week: 0 days     Minutes of Exercise per Session: 0 min   Stress: No Stress Concern Present (12/26/2024)    [employer] [place] [employer] - Occupational Stress Questionnaire     Feeling of Stress : Only a little   Housing Stability: Low Risk  (12/26/2024)    Housing Stability Vital Sign     Unable to Pay for Housing in the Last Year: No     Homeless in the Last Year: No       Precautions:     Allergies as of 12/25/2024 - Reviewed 12/25/2024   Allergen Reaction Noted    Butorphanol Swelling 06/21/2022    Hydrocodone-acetaminophen Other (See Comments) 06/21/2022    Povidone-iodine Shortness Of Breath and Rash 06/21/2022       WOC Assessment Details/Treatment   WOCN consulted for foot. Follow up from yesterday. Patient is currently off of the unit in dialysis. JUAN PABLO Hobbs consulted, will defer all  treatment recommendations and questions to him unless stated otherwise by him. Reconsult WOCN if needed.   12/27/2024

## 2024-12-27 NOTE — PROGRESS NOTES
"Pharmacokinetic Assessment Follow Up: IV Vancomycin    Vancomycin serum concentration assessment(s):    The random level was drawn correctly and can be used to guide therapy at this time. The measurement is within the desired definitive target range of 10 to 20 mcg/mL.    Vancomycin Regimen Plan:    Re-dose with vancomycin 500mg x1 post HD.  Re-dose when the random level is less than 20 mcg/mL, next level to be drawn at 04:30 on 12/30 prior to next hemodialysis.      Drug levels (last 3 results):  Recent Labs   Lab Result Units 12/27/24  0455   Vancomycin Random ug/ml 12.1*       Pharmacy will continue to follow and monitor vancomycin.    Please contact pharmacy at extension [phone] for questions regarding this assessment.    Thank you for the consult,   Bairon Campos       Patient brief summary:  Neno Watkins is a 64 y.o. male initiated on antimicrobial therapy with IV Vancomycin for treatment of skin & soft tissue infection    The patient's current regimen is pulse dose.    Drug Allergies:   Review of patient's allergies indicates:   Allergen Reactions    Butorphanol Swelling     "it almost killed me"    Hydrocodone-acetaminophen Other (See Comments)     "They mess with my heart"    Povidone-iodine Shortness Of Breath and Rash       Actual Body Weight:   136.1kg    Renal Function:   Estimated Creatinine Clearance: 43.7 mL/min (A) (based on SCr of 2.34 mg/dL (H)).,     Dialysis Method (if applicable):  intermittent HD    CBC (last 72 hours):  Recent Labs   Lab Result Units 12/25/24 2110 12/26/24  0935 12/27/24  0455   WBC x10(3)/mcL 20.03* 19.49* 16.02*   Hgb g/dL 11.9* 11.6* 10.2*   Hemoglobin A1c %  --  6.0  --    Hct % 36.9* 35.6* 31.8*   Platelet x10(3)/mcL 397 341 278   Mono % % 4.7 6.1 6.5   Eos % % 0.1 0.9 1.2   Basophil % % 0.2 0.3 0.3       Metabolic Panel (last 72 hours):  Recent Labs   Lab Result Units 12/25/24 2110 12/26/24  0935 12/26/24  1631 12/27/24  0455   Sodium mmol/L 135* 134*  --  134* "   Potassium mmol/L 4.6 4.3  --  4.4   Chloride mmol/L 105 107  --  109*   CO2 mmol/L 20* 19*  --  16*   Glucose mg/dL 154* 128*  --  116*   Glucose, UA   --   --  Normal  --    Blood Urea Nitrogen mg/dL 34.9* 35.0*  --  39.4*   Creatinine mg/dL 2.38* 2.17*  --  2.34*   Albumin g/dL 3.2* 2.7*  --  2.3*   Bilirubin Total mg/dL 1.3 1.2  --  1.1   ALP unit/L 269* 230*  --  215*   AST unit/L 70* 54*  --  53*   ALT unit/L 103* 84*  --  66*   Magnesium Level mg/dL  --   --   --  2.30   Phosphorus Level mg/dL  --   --   --  4.1       Vancomycin Administrations:  vancomycin given in the last 96 hours                     vancomycin (VANCOCIN) 1,000 mg in D5W 250 mL IVPB (admixture device) (mg) 1,000 mg New Bag 12/26/24 0536    vancomycin (VANCOCIN) 1,000 mg in D5W 250 mL IVPB (admixture device) (mg) 1,000 mg New Bag 12/25/24 2330                    Microbiologic Results:  Microbiology Results (last 7 days)       Procedure Component Value Units Date/Time    Blood culture #1 **CANNOT BE ORDERED STAT** [[id number]]  (Normal) Collected: 12/25/24 2100    Order Status: Completed Specimen: Blood from Antecubital, Right Updated: 12/26/24 2201     Blood Culture No Growth At 24 Hours    Blood culture #2 **CANNOT BE ORDERED STAT** [[id number]]  (Normal) Collected: 12/25/24 2110    Order Status: Completed Specimen: Blood from Wrist, Right Updated: 12/26/24 2201     Blood Culture No Growth At 24 Hours    Wound Culture [[id number]]     Order Status: Sent Specimen: Wound from Foot, Right     Anaerobic Culture [[id number]]     Order Status: Sent Specimen: Wound from Foot, Right

## 2024-12-27 NOTE — PT/OT/SLP PROGRESS
Occupational Therapy      Patient Name:  Neno Watkins   MRN:  [id number]    Patient not seen today secondary to pt off floor for dialysis and pending neurosurgery consult for lumbar compression fracture. Will follow-up as schedule allows and pending neuro recs.     *Ordered CHRIS mattress for pt for pressure sore prevention.     12/27/2024

## 2024-12-27 NOTE — PROGRESS NOTES
Nephrology consult follow up note    HPI:      Neno Watkins is a 64 y.o. male  who has been on chronic hemodialysis at [facility] on Monday Wednesday Friday.  He is being followed by Dr. Kike Alanis.  He has lot of problems on his legs and somehow he got frustrated and decided to stop dialysis and go for hospice but now he has reversed it and wants to be treated and taken care of.  Nephrology consulted for ESRD management.    Interval history:     12/27/2024  Seen on hemodialysis.     Review of Systems:       Past medical, family, surgical, and social history reviewed and unchanged from initial consult note.     Objective:       VITAL SIGNS: 24 HR MIN & MAX LAST    Temp  Min: 98.2 °F (36.8 °C)  Max: 99.5 °F (37.5 °C)  99.1 °F (37.3 °C)        BP  Min: 109/60  Max: 151/64  (!) 146/57     Pulse  Min: 79  Max: 87  82     Resp  Min: 18  Max: 20  19    SpO2  Min: 93 %  Max: 96 %  (!) 94 %      GEN: Chronically ill appearing WM in NAD  CV: RRR +S1,S2 without murmur  PULM: CTAB, unlabored  ABD: Soft, NT/ND abdomen with NABS  EXT: No cyanosis or edema  SKIN: Warm and dry  PSYCH: Awake, alert and appropriately conversant.   Dialysis access: Left upper extremity AV graft and right IJ tunneled dialysis catheter             Component Value Date/Time     (L) 12/27/2024 0455     (L) 12/26/2024 0935     10/13/2022 0652    K 4.4 12/27/2024 0455    K 4.3 12/26/2024 0935    K 3.0 (L) 10/13/2022 0652    CO2 16 (L) 12/27/2024 0455    CO2 19 (L) 12/26/2024 0935    CO2 26 10/13/2022 0652    BUN 39.4 (H) 12/27/2024 0455    BUN 35.0 (H) 12/26/2024 0935    BUN 24 (H) 12/11/2024 0000    BUN 30 (H) 11/24/2024 0000    CREATININE 2.34 (H) 12/27/2024 0455    CREATININE 2.17 (H) 12/26/2024 0935    CREATININE 2.94 (H) 10/13/2022 0652    CALCIUM 8.0 (L) 12/27/2024 0455    CALCIUM 8.5 (L) 12/26/2024 0935    CALCIUM 9.6 10/13/2022 0652    PHOS 4.1 12/27/2024 0455            Component Value Date/Time    WBC 16.02 (H)  12/27/2024 0455    WBC 19.49 (H) 12/26/2024 0935    HGB 10.2 (L) 12/27/2024 0455    HGB 11.6 (L) 12/26/2024 0935    HGB 11.6 (L) 12/09/2024 0000    HGB 11.4 (L) 12/02/2024 0000    HCT 31.8 (L) 12/27/2024 0455    HCT 35.6 (L) 12/26/2024 0935     12/27/2024 0455     12/26/2024 0935         Imaging reviewed      Assessment / Plan:       Active Hospital Problems    Diagnosis  POA    *Wound infection [T14.8XXA, L08.9]  Yes      Resolved Hospital Problems   No resolved problems to display.       ESRD.  Dialysis days Monday Wednesday Friday.    Noncomplaince with medical treatment  Personal history of diabetes mellitus but not on any medications will check hemoglobin A1c then decide on the need for medical management  Hypertension  Hyperlipidemia  Cellulitis of both lower extremities right more than left  Anemia of chronic kidney disease  Dialysis noncompliance  Low back pain new    Plan:  Seen on hemodialysis at 9:40 a.m..  Continue MWF hemodialysis.    Oumar Marks DO  Nephrology  [facility]  Clinic number: [phone]      Note was created with the assistance of electronic Dictation Services.  Note was reviewed to decrease errors, however, these may still be present.  Please contact me about questions or concerns with the dictation.

## 2024-12-27 NOTE — PT/OT/SLP PROGRESS
Physical Therapy      Patient Name:  Neno Watkins   MRN:  [id number]    Patient not seen today secondary to off the floor for HD this AM. Pending neuro consult for lumbar compression fracture . Will follow-up as able.    Per Dr. Hobbs, WBAT for R foot in flat Darco shoe (ordered), ambulation in room ONLY.    12/27/24

## 2024-12-27 NOTE — CONSULTS
[facility]                       [address] [address]    PATIENT NAME:       RUFINO REAL  YOB: 1960  CSN:                [id number]   MRN:                [id number]  ADMIT DATE:         12/25/2024 20:30:00  PHYSICIAN:          Rolf Hobbs DPM                            CONSULTATION    DATE OF CONSULT:  12/26/2024 06:37:41    HISTORY OF PRESENT ILLNESS:  Mr. Real is a 64-year-old gentleman admitted to   the hospital from what appears his wife activated EMS after he sustained a fall   and was unable to get up.  He had enrolled in hospice, decided he no longer   wanted to receive dialysis or wound care.  I had seen him when he was   hospitalized previously and at the time he was given specific orders.    Unfortunately, he continued to ambulate on the foot, not staying off it.  He was   followed up with Wound Care at [facility].  I had received a call   about a month ago from them stating that the patient probably needs surgical   evaluation for at least metatarsal head resection or 1st ray amputation.    Apparently, the patient decided against surgical options choosing only   palliative type care for the wound itself.  They have been using gentamicin   cream on the area due to some recent cultures in which there were no options for   treatment.  Again, he has gotten progressively weak and having multiple falls.    I have been asked to see him concerning further evaluation of his right foot.    Wound Care has also been consulted.    PAST MEDICAL HISTORY:  Notable for end-stage renal disease, morbid obesity,   peripheral artery disease, chronic venous insufficiency, hypertension, BPH.    PAST SURGICAL HISTORY:  He has had fistulogram, tunnel cath.    MEDICATIONS:  As per the chart.    ALLERGIES:  TO BUTORPHANOL, POVIDINE, HYDROCODONE.     SOCIAL HISTORY:  No tobacco, alcohol, or IV drug  abuse.    FAMILY HISTORY:  Noncontributory.    PHYSICAL EXAMINATION:  Obese gentleman.  In the process being scanned.    I was able to evaluate the wound via pictures, photos that were taken.  He has   persistent wound subjacent the 5th metatarsal head on the foot with quite a bit   of necrotic nonviable looking tissue, drainage, surrounding maceration, and   inflammatory change.  He has also got breakdown around the 5th metatarsal base   laterally.  Trophic skin changes.  Xerosis of the skin, lesions noted on the   legs.    X-rays of his right foot from the 15th and compared to the ones on the 26th   reveals no destructive changes to the 5th MPJ area other than some soft tissue   swelling.    Again, he is getting MRIs of the brain and thoracic spine today and x-rays of   his back revealed no appreciable abnormalities.  CT scan of the same area   revealed only degenerative changes.    ASSESSMENT:  Chronic neuropathic wound, right foot with no evidence of clinical   infection, osteomyelitis on previous scans, but most recent cultures on 11/26   grew out Enterobacter, Providencia and Proteus, pansensitive.    He is currently on Zosyn.  We will repeat cultures of the foot when he comes   back up to the floor.  We will continue to follow, but he was not inclined to   proceed with any sort of surgical intervention previously.  We will discuss this   further with him tomorrow.        ______________________________  JUAN PABLO Oleary/CALIXTO  DD:  12/26/2024  Time:  03:47PM  DT:  12/26/2024  Time:  07:29PM  Job #:  [id number]      CONSULTATION

## 2024-12-27 NOTE — NURSING
12/27/24 1224 12/27/24 1225        Hemodialysis Catheter 06/27/22 2025 right internal jugular   Placement Date/Time: 06/27/22 2025   Present Prior to Hospital Arrival?: No  Skin Antisepsis: (c)   Hemodialysis Catheter Type: Temporary catheter  Location: right internal jugular  Insertion attempts (enter comment if more than 2 attempts): 1  Patien...   Line Necessity Review  --  CRRT/HD   Site Assessment  --  No drainage;No redness;No swelling;No warmth   Line Securement Device  --  Secured with sutures   Dressing Type  --  CHG impregnated dressing/sponge;Central line dressing;Transparent (Tegaderm)   Dressing Status  --  Clean;Dry;Intact   Dressing Intervention  --  Integrity maintained   Date on Dressing  --  12/24/24   Dressing Due to be Changed  --  12/31/24   Venous Patency/Care  --  normal saline locked   Arterial Patency/Care  --  normal saline locked   Waveform  --  Not being transduced   Post-Hemodialysis Assessment   Rinseback Volume (mL) 250 mL  --    Blood Volume Processed (Liters) 62 L  --    Dialyzer Clearance Clear  --    Duration of Treatment 180 minutes  --    Total UF (mL) 1500 mL  --    Net Fluid Removal 1000  --    Patient Response to Treatment Tolerated well  --    Post-Hemodialysis Comments Tx ended, Pt reinfused.  --

## 2024-12-27 NOTE — CONSULTS
[facility] - Neurology  Neurosurgery  Consult Note    Inpatient consult to Neurosurgery  Consult performed by: Chichi Reed PA  Consult ordered by: Otilio Maurer MD        Subjective:     Chief Complaint/Reason for Admission: Back pain    History of Present Illness: Patient is a 64-year-old male with a history of morbid obesity, chronic venous stasis, chronic open right foot wound, ESRD, and type 2 diabetes, who presented to the ER for multiple falls over the last week.  Daughter gives history (Isidra Watkins [phone]) who explains that 2 weeks ago patient decided he would no longer receive dialysis nor wound care and felt that these providers were only trying to harm him.  He therefore enrolled in hospice and understood completely at that time that without dialysis he would very likely die.  Over the last few days at home he has become progressively weak and having multiple falls. His wife activated EMS on 12/25 after he had a fall and was unable to get up.     He was brought to the ER where on arrival he was afebrile, hemodynamically stable, maintaining normal sats on room air.  Laboratory work noted leukocytosis of 20 K, creatinine of 2.3/BUN 34, CO2 20 and a normal potassium.  CT head showed no acute process.  He was not found to have focal deficits on exam nor any signs of traumatic injury though was noted to have an infected chronic left foot ulceration.  Blood cultures were obtained and broad-spectrum antibiotics initiated.    During his hospital stay patient c/o low back pain. CT L spine was completed and negative for acute fracture. MRI brain showed no acute intracranial abnormalities. MRI T spine significant for L1 superior endplate fx. Dr. Gaspar was consulted for evaluation and treatment recommendations.    On PE today he is sitting up in bed, NAD. He c/o generalized low back pain. He denies new numbness in bilateral LE (chronic numbness from DM and venous stasis). He  "denies bladder and bowel dysfunction.    PTA Medications   Medication Sig    tamsulosin (FLOMAX) 0.4 mg Cap Take 1 capsule by mouth once daily.    atorvastatin (LIPITOR) 80 MG tablet Take 80 mg by mouth every evening.    carvediloL (COREG) 6.25 MG tablet Take 6.25 mg by mouth 2 (two) times daily.    docusate sodium (COLACE) 100 MG capsule Take 100 mg by mouth 2 (two) times daily.    gabapentin (NEURONTIN) 300 MG capsule Take 1 capsule by mouth 2 (two) times a day.    ibuprofen (MOTRIN IB ORAL) Take 375 mg by mouth daily as needed (as needed). (Patient not taking: Reported on 9/17/2024)    melatonin 5 mg Cap Take 5 mg by mouth daily as needed (as needed).    multivitamin (THERAGRAN) per tablet Take 1 tablet by mouth once daily.    NIFEdipine (PROCARDIA-XL) 60 MG (OSM) 24 hr tablet TAKE 1 TABLET(60 MG) BY MOUTH EVERY DAY    white petrolatum 41 % Oint Apply topically once daily.       Review of patient's allergies indicates:   Allergen Reactions    Butorphanol Swelling     "it almost killed me"    Hydrocodone-acetaminophen Other (See Comments)     "They mess with my heart"    Povidone-iodine Shortness Of Breath and Rash       Past Medical History:   Diagnosis Date    BPH (benign prostatic hyperplasia)     Essential (primary) hypertension     Obesity, unspecified     PAD (peripheral artery disease)      Past Surgical History:   Procedure Laterality Date    FISTULOGRAM Left 9/25/2023    Procedure: Fistulogram;  Surgeon: Rodrigo Soliman DO;  Location: [facility] CATH LAB;  Service: Nephrology;  Laterality: Left;    INSERTION OF TUNNELED CENTRAL VENOUS HEMODIALYSIS CATHETER Right 6/27/2022    Procedure: INSERTION, CATHETER, HEMODIALYSIS, DUAL LUMEN;  Surgeon: Molly Garcia MD;  Location: [facility] OR;  Service: General;  Laterality: Right;     Family History    None       Tobacco Use    Smoking status: Never    Smokeless tobacco: Never   Substance and Sexual Activity    Alcohol use: Never    Drug use: Never    Sexual activity: Not " on file     Review of Systems  Objective:     Weight: (!) 136.1 kg (300 lb 0.7 oz)  Body mass index is 43.05 kg/m².  Vital Signs (Most Recent):  Temp: 97.5 °F (36.4 °C) (12/27/24 1300)  Pulse: 73 (12/27/24 1300)  Resp: 18 (12/27/24 1300)  BP: (!) 143/84 (12/27/24 1300)  SpO2: 96 % (12/27/24 1300) Vital Signs (24h Range):  Temp:  [97.5 °F (36.4 °C)-99.5 °F (37.5 °C)] 97.5 °F (36.4 °C)  Pulse:  [73-87] 73  Resp:  [18-20] 18  SpO2:  [93 %-96 %] 96 %  BP: (109-151)/(57-84) 143/84     Date 12/27/24 0700 - 12/28/24 0659   Shift 9936-2550 3259-2271 0577-7467 24 Hour Total   INTAKE   Shift Total(mL/kg)       OUTPUT   Urine(mL/kg/hr) 750   750   Other 1500   1500   Shift Total(mL/kg) 2250(16.5)   2250(16.5)   Weight (kg) 136.1 136.1 136.1 136.1                       Male External Urinary Catheter 12/25/24 2300 (Active)   Skin no redness;no breakdown 12/26/24 2000   Tolerance no signs/symptoms of discomfort 12/26/24 2000            Hemodialysis Catheter 06/27/22 2025 right internal jugular (Active)   Line Necessity Review CRRT/HD 12/27/24 1225   Site Assessment No drainage;No redness;No swelling;No warmth 12/27/24 1225   Line Securement Device Secured with sutures 12/27/24 1225   Dressing Type CHG impregnated dressing/sponge;Central line dressing;Transparent (Tegaderm) 12/27/24 1225   Dressing Status Clean;Dry;Intact 12/27/24 1225   Dressing Intervention Integrity maintained 12/27/24 1225   Date on Dressing 12/24/24 12/27/24 1225   Dressing Due to be Changed 12/31/24 12/27/24 1225   Venous Patency/Care normal saline locked 12/27/24 1225   Arterial Patency/Care normal saline locked 12/27/24 1225   Waveform Not being transduced 12/27/24 1225            Hemodialysis AV Fistula Left upper arm (Active)       Physical Exam:  Nursing note and vitals reviewed.    Constitutional:   Morbidly obese     Eyes: Pupils are equal, round, and reactive to light. EOM are normal.     Psych/Behavior: He is alert. He is oriented to person, place,  and time. He has a normal mood and affect.     Musculoskeletal:        Neck: Range of motion is full. There is no tenderness.        Back: Range of motion is limited. ROM severity is Diffusely tender along the lower thoracic and upper lumbar spine.        Right Upper Extremities: Range of motion is full. Muscle strength is 5/5.        Left Upper Extremities: Range of motion is full. Muscle strength is 5/5.      Comments: Edematous bilateral LE with venous stasis. Able to lift both legs off of bed. 5/5 DF/PF/EHL     Neurological:        Sensory: There is sensory deficit in the extremities. Sensory deficit is located Chronic numbness in bilateral shins and feet.        DTRs: He displays no Babinski's sign on the right side. He displays no Babinski's sign on the left side.        Cranial nerves: Cranial nerve(s) II, III, IV, V, VI, VII, VIII, IX, X, XI and XII are intact.       Significant Labs:  Recent Labs   Lab 12/25/24 2110 12/26/24  0935 12/27/24  0455   * 134* 134*   K 4.6 4.3 4.4    107 109*   CO2 20* 19* 16*   BUN 34.9* 35.0* 39.4*   CREATININE 2.38* 2.17* 2.34*   CALCIUM 9.1 8.5* 8.0*   MG  --   --  2.30     Recent Labs   Lab 12/25/24 2110 12/26/24  0935 12/27/24  0455   WBC 20.03* 19.49* 16.02*   HGB 11.9* 11.6* 10.2*   HCT 36.9* 35.6* 31.8*    341 278     Recent Labs   Lab 12/25/24 2110 12/26/24  0424   INR 1.2 1.2   APTT 34.4* 36.7*     Microbiology Results (last 7 days)       Procedure Component Value Units Date/Time    Blood culture #1 **CANNOT BE ORDERED STAT** [[id number]]  (Normal) Collected: 12/25/24 2100    Order Status: Completed Specimen: Blood from Antecubital, Right Updated: 12/26/24 2201     Blood Culture No Growth At 24 Hours    Blood culture #2 **CANNOT BE ORDERED STAT** [[id number]]  (Normal) Collected: 12/25/24 2110    Order Status: Completed Specimen: Blood from Wrist, Right Updated: 12/26/24 2201     Blood Culture No Growth At 24 Hours    Wound Culture [[id number]]      Order Status: Sent Specimen: Wound from Foot, Right     Anaerobic Culture [[id number]]     Order Status: Sent Specimen: Wound from Foot, Right           Significant Diagnostics:  MRI Thoracic Spine Without Contrast [[id number]] Resulted: 12/26/24 1617   Order Status: Completed Updated: 12/26/24 1620   Narrative:     EXAMINATION:  MRI THORACIC SPINE WITHOUT CONTRAST    CLINICAL HISTORY:  fall;    TECHNIQUE:  Multiplanar multisequence MR images of the thoracic spine are obtained without contrast.    COMPARISON:  None.    FINDINGS:  Thoracic alignment is normal.  There is an L2 superior endplate compression fracture with edema.  There is no significant loss of height or bony retropulsion.  The remaining vertebral body heights are preserved.    There is no definite cord signal abnormality.  There is no epidural fluid collection.    There are multiple degenerative changes with small marginal osteophytes and facet arthropathy.  The there is no large disc herniation.  The spinal canal and neural foramina are patent.    There is minimal prevertebral edema at T11-L1.   Impression:       1. L1 superior endplate compression fracture without significant loss of height.  No bony retropulsion or canal stenosis.  2. No significant spinal canal or neural foraminal stenosis.  3. No cord signal abnormality.      Electronically signed by: Sarai Calle  Date: 12/26/2024  Time: 16:17   MRI Brain Without Contrast [[id number]] Resulted: 12/26/24 1551   Order Status: Completed Updated: 12/26/24 1554   Narrative:     EXAMINATION:  MRI BRAIN WITHOUT CONTRAST    CLINICAL HISTORY:  Stroke, follow up;Left-sided weakness and fall;    TECHNIQUE:  Multiplanar, multisequence MR images of the brain were obtained without the administration of intravenous contrast.    COMPARISON:  CT head dated 12/25/2024    FINDINGS:  Evaluation is limited by motion artifact.  There is no restricted diffusion, hemorrhage or edema.  Moderate patchy T2/FLAIR  hyperintense in the subcortical and periventricular white matter likely represent chronic microvascular ischemic changes.    There is no mass effect or midline shift.  The basal cisterns are patent.  There is moderate diffuse parenchymal volume loss.  There is no hydrocephalus or abnormal extra-axial fluid collection.  The major intracranial flow voids are patent.  There is trace paranasal sinus mucosal thickening.   Impression:       1. No acute intracranial abnormality.  2. Moderate chronic microvascular ischemic changes.      Electronically signed by: Sarai Calle  Date: 12/26/2024  Time: 15:51   X-Ray Lumbar Complete Including Flex And Ext [[id number]] Resulted: 12/26/24 1516   Order Status: Completed Updated: 12/26/24 1519   Narrative:     EXAMINATION:  XR LUMBAR SPINE 5 VIEW WITH FLEX AND EXT    CLINICAL HISTORY:  Back pains, h/o falls at home;  Unspecified fall, initial encounter    TECHNIQUE:  Five views of the lumbar spine plus flexion extension views were performed.    COMPARISON:  06/21/2022    FINDINGS:  Unchanged superior endplate deformity at L4.  Otherwise vertebral body heights are maintained without appreciable fracture.  There are flowing anterior disc osteophytes of the thoracolumbar spine.  Mild multilevel disc space narrowing.  No appreciable pars defects.  No abnormal motion with flexion extension maneuvers.    Aortic atherosclerosis.   Impression:       No appreciable acute osseous abnormality by plain radiographic evaluation.      Electronically signed by: Bernice Salazar  Date: 12/26/2024  Time: 15:16   CT Lumbar Spine Without Contrast [[id number]] Resulted: 12/26/24 1104   Order Status: Completed Updated: 12/26/24 1107   Narrative:     EXAMINATION:  CT LUMBAR SPINE WITHOUT CONTRAST    CLINICAL HISTORY:  Low back pain, trauma;    TECHNIQUE:  Noncontrast CT images of the lumbar spine. Axial, coronal, and sagittal reformatted images were obtained. Dose length product is 1426 mGycm.  Automatic exposure control, adjustment of mA/kV or iterative reconstruction technique was used to limit radiation dose.    COMPARISON:  CT lumbar spine dated 11/18/2024    FINDINGS:  There are 5 non-rib-bearing lumbar type vertebral bodies.  There is a chronic L4 superior endplate compression deformity with Schmorl's node and mild loss of height.  The remaining vertebral body heights are preserved.  There is no acute fracture identified.  There are mild degenerative changes with marginal osteophytes and facet arthropathy.  There is no paraspinal hematoma.   Impression:       1. No acute fracture identified.  2. Multilevel degenerative changes of the lumbar spine.       Assessment/Plan:     Active Diagnoses:    Diagnosis Date Noted POA    PRINCIPAL PROBLEM:  Wound infection [T14.8XXA, L08.9] 12/26/2024 Yes      Problems Resolved During this Admission:     Patient with back pain and new L1 VCF s/p multiple falls.  MRI T spine significant for L1 fx without loss of height and no retropulsion  No plans for neurosurgical intervention  Will treat conservatively in a TLSO brace for OOB  OK to ambulate with brace  We will obtain upright xrays in brace once received  Further recs to follow per Dr. Gaspar once imaging complete    Thank you for your consult. I will follow-up with patient. Please contact us if you have any additional questions.    MARY Meadows  Neurosurgery  [facility] - Neurology

## 2024-12-27 NOTE — NURSING
1428 Patient needs back brace for the new Xray of the lumbar that was ordered. Please call X ray when patient has brace available for scan.     1608 patient received back brace, but refusing x-ray and MRI. Dr. Weber informed.

## 2024-12-27 NOTE — CARE UPDATE
Patient in dialysis at time of rounds  MRI T spine reviewed; L1 fx in acceptable alignment without significant PAULINA, no retropulsion  Plan to treat conservatively in a brace  Will get upright xrays once brace obtained

## 2024-12-27 NOTE — PLAN OF CARE
Locet completed. Pasrr faxed. Awaiting 142.    142 received. Uploaded a copy of the [id number] into the  portion of this patient's chart. A physical copy of the pasrr/142 placed in the patient's red physical chart.

## 2024-12-28 LAB
ALBUMIN SERPL-MCNC: 2.2 G/DL (ref 3.4–4.8)
ALBUMIN/GLOB SERPL: 0.5 RATIO (ref 1.1–2)
ALP SERPL-CCNC: 215 UNIT/L (ref 40–150)
ALT SERPL-CCNC: 65 UNIT/L (ref 0–55)
ANION GAP SERPL CALC-SCNC: 9 MEQ/L
AST SERPL-CCNC: 50 UNIT/L (ref 5–34)
BASOPHILS # BLD AUTO: 0.04 X10(3)/MCL
BASOPHILS NFR BLD AUTO: 0.3 %
BILIRUB SERPL-MCNC: 0.8 MG/DL
BUN SERPL-MCNC: 32.8 MG/DL (ref 8.4–25.7)
CALCIUM SERPL-MCNC: 8.1 MG/DL (ref 8.8–10)
CHLORIDE SERPL-SCNC: 107 MMOL/L (ref 98–107)
CO2 SERPL-SCNC: 18 MMOL/L (ref 23–31)
CREAT SERPL-MCNC: 1.84 MG/DL (ref 0.72–1.25)
CREAT/UREA NIT SERPL: 18
EOSINOPHIL # BLD AUTO: 0.27 X10(3)/MCL (ref 0–0.9)
EOSINOPHIL NFR BLD AUTO: 1.9 %
ERYTHROCYTE [DISTWIDTH] IN BLOOD BY AUTOMATED COUNT: 14 % (ref 11.5–17)
GFR SERPLBLD CREATININE-BSD FMLA CKD-EPI: 40 ML/MIN/1.73/M2
GLOBULIN SER-MCNC: 4.3 GM/DL (ref 2.4–3.5)
GLUCOSE SERPL-MCNC: 97 MG/DL (ref 82–115)
HCT VFR BLD AUTO: 33 % (ref 42–52)
HGB BLD-MCNC: 10.6 G/DL (ref 14–18)
IMM GRANULOCYTES # BLD AUTO: 0.06 X10(3)/MCL (ref 0–0.04)
IMM GRANULOCYTES NFR BLD AUTO: 0.4 %
LYMPHOCYTES # BLD AUTO: 2.09 X10(3)/MCL (ref 0.6–4.6)
LYMPHOCYTES NFR BLD AUTO: 14.7 %
MCH RBC QN AUTO: 29.3 PG (ref 27–31)
MCHC RBC AUTO-ENTMCNC: 32.1 G/DL (ref 33–36)
MCV RBC AUTO: 91.2 FL (ref 80–94)
MONOCYTES # BLD AUTO: 1.18 X10(3)/MCL (ref 0.1–1.3)
MONOCYTES NFR BLD AUTO: 8.3 %
NEUTROPHILS # BLD AUTO: 10.55 X10(3)/MCL (ref 2.1–9.2)
NEUTROPHILS NFR BLD AUTO: 74.4 %
NRBC BLD AUTO-RTO: 0 %
PLATELET # BLD AUTO: 341 X10(3)/MCL (ref 130–400)
PMV BLD AUTO: 9.1 FL (ref 7.4–10.4)
POCT GLUCOSE: 112 MG/DL (ref 70–110)
POCT GLUCOSE: 90 MG/DL (ref 70–110)
POCT GLUCOSE: 98 MG/DL (ref 70–110)
POTASSIUM SERPL-SCNC: 4 MMOL/L (ref 3.5–5.1)
PROT SERPL-MCNC: 6.5 GM/DL (ref 5.8–7.6)
RBC # BLD AUTO: 3.62 X10(6)/MCL (ref 4.7–6.1)
SODIUM SERPL-SCNC: 134 MMOL/L (ref 136–145)
WBC # BLD AUTO: 14.19 X10(3)/MCL (ref 4.5–11.5)

## 2024-12-28 PROCEDURE — 25000003 PHARM REV CODE 250: Performed by: INTERNAL MEDICINE

## 2024-12-28 PROCEDURE — 63600175 PHARM REV CODE 636 W HCPCS: Performed by: INTERNAL MEDICINE

## 2024-12-28 PROCEDURE — 21400001 HC TELEMETRY ROOM

## 2024-12-28 PROCEDURE — 36415 COLL VENOUS BLD VENIPUNCTURE: CPT | Performed by: INTERNAL MEDICINE

## 2024-12-28 PROCEDURE — 85025 COMPLETE CBC W/AUTO DIFF WBC: CPT | Performed by: INTERNAL MEDICINE

## 2024-12-28 PROCEDURE — 80053 COMPREHEN METABOLIC PANEL: CPT | Performed by: INTERNAL MEDICINE

## 2024-12-28 RX ADMIN — MUPIROCIN: 20 OINTMENT TOPICAL at 08:12

## 2024-12-28 RX ADMIN — ATORVASTATIN CALCIUM 40 MG: 40 TABLET, FILM COATED ORAL at 08:12

## 2024-12-28 RX ADMIN — PIPERACILLIN SODIUM AND TAZOBACTAM SODIUM 4.5 G: 4; .5 INJECTION, POWDER, LYOPHILIZED, FOR SOLUTION INTRAVENOUS at 09:12

## 2024-12-28 RX ADMIN — ENOXAPARIN SODIUM 30 MG: 30 INJECTION SUBCUTANEOUS at 05:12

## 2024-12-28 RX ADMIN — PIPERACILLIN SODIUM AND TAZOBACTAM SODIUM 4.5 G: 4; .5 INJECTION, POWDER, LYOPHILIZED, FOR SOLUTION INTRAVENOUS at 01:12

## 2024-12-28 RX ADMIN — TAMSULOSIN HYDROCHLORIDE 0.4 MG: 0.4 CAPSULE ORAL at 08:12

## 2024-12-28 RX ADMIN — CARVEDILOL 6.25 MG: 3.12 TABLET, FILM COATED ORAL at 08:12

## 2024-12-28 RX ADMIN — GABAPENTIN 300 MG: 300 CAPSULE ORAL at 08:12

## 2024-12-28 RX ADMIN — ASPIRIN 81 MG: 81 TABLET, COATED ORAL at 08:12

## 2024-12-28 RX ADMIN — PIPERACILLIN SODIUM AND TAZOBACTAM SODIUM 4.5 G: 4; .5 INJECTION, POWDER, LYOPHILIZED, FOR SOLUTION INTRAVENOUS at 05:12

## 2024-12-28 NOTE — PROGRESS NOTES
[facility]                       [address] [address]    PATIENT NAME:       RUFINO REAL  YOB: 1960  CSN:                [id number]   MRN:                [id number]  ADMIT DATE:         12/25/2024 20:30:00  PHYSICIAN:          Rolf Hobbs DPM                            PROGRESS NOTE    DATE:  12/28/2024 00:00:00    SUBJECTIVE:  The patient was seen today, status remains about the same.  No   major complaints.    PHYSICAL EXAMINATION:  VITAL SIGNS:  Stable and afebrile.    LABORATORY DATA:  Labs reviewed.  White cell count is down to 14, H and H 10.6   and 33, BUN and creatinine 32 and 1.84.    Preliminary culture is growing out gram-negative rods.    Dressings are clean, dry, and intact.  Underlying wound is unchanged.    X-rays reveal destructive changes to the 5th MPJ region.    ASSESSMENT:  Chronic infected right foot wound with underlying osteomyelitis.    PLAN:  Continue with current care, antibiotics, wait for cultures.  Again   reviewed the options with the patient, which I believe is going to require a 5th   ray amputation.  We will make some tentative plans for Tuesday unless he   decides against it.        ______________________________  Rolf Hobbs DPM    GAS/AQS  DD:  12/28/2024  Time:  09:51AM  DT:  12/28/2024  Time:  10:00AM  Job #:  [id number]      PROGRESS NOTE

## 2024-12-28 NOTE — PROGRESS NOTES
[facility] Hospital Medicine Progress Note        Chief Complaint: Inpatient Follow-up for     HPI: 64-year-old male with a history of morbid obesity, chronic venous stasis, chronic open right foot wound, ESRD, type 2 diabetes who presented to the ER for multiple falls over the last week.  Daughter gives history (Isidra Watkins [phone]) who explains that 2 weeks ago patient decided he would no longer receive dialysis nor wound care and felt that these providers were only trying to harm him.  He therefore enrolled in hospice and understood completely in the time that without dialysis he would very likely die.  Over the last few days at home he has become progressively weak and having multiple falls, his wife activated EMS tonight after he had a fall and was unable to get up.     He was brought to the ER where on arrival he was afebrile hemodynamically stable maintaining normal sats on room air.  Laboratory work noted leukocytosis of 20 K, creatinine of 2.3/BUN 34, CO2 20 and a normal potassium.  CT head showed no acute process.  He was not found to have focal deficits on exam nor any signs of traumatic injury though was noted to have an infected chronic left foot ulceration.  Blood cultures were obtained and broad-spectrum antibiotics initiated.    Interval Hx:   Patient seen and examined this morning patient had refused the x-ray and the MRI     Case was discussed with patient's nurse and  on the floor.    Objective/physical exam:  General: In no acute distress, afebrile  Chest: Clear to auscultation bilaterally  Heart: RRR, +S1, S2, no appreciable murmur  Abdomen: Soft, nontender, BS +  MSK: Warm,  Neurologic: Alert and oriented x4,   VITAL SIGNS: 24 HRS MIN & MAX LAST   Temp  Min: 97.5 °F (36.4 °C)  Max: 99.1 °F (37.3 °C) 98.3 °F (36.8 °C)   BP  Min: 143/84  Max: 173/77 (!) 151/70   Pulse  Min: 73  Max: 81  76   Resp  Min: 17  Max: 20 18   SpO2  Min: 93 %  Max: 96 %  (!) 93 %     I have reviewed the following labs:  Recent Labs   Lab 12/26/24  0935 12/27/24 0455 12/28/24  0406   WBC 19.49* 16.02* 14.19*   RBC 3.88* 3.43* 3.62*   HGB 11.6* 10.2* 10.6*   HCT 35.6* 31.8* 33.0*   MCV 91.8 92.7 91.2   MCH 29.9 29.7 29.3   MCHC 32.6* 32.1* 32.1*   RDW 14.0 14.2 14.0    278 341   MPV 8.7 9.2 9.1     Recent Labs   Lab 12/26/24  0935 12/27/24 0455 12/28/24  0406   * 134* 134*   K 4.3 4.4 4.0    109* 107   CO2 19* 16* 18*   BUN 35.0* 39.4* 32.8*   CREATININE 2.17* 2.34* 1.84*   CALCIUM 8.5* 8.0* 8.1*   MG  --  2.30  --    ALBUMIN 2.7* 2.3* 2.2*   ALKPHOS 230* 215* 215*   ALT 84* 66* 65*   AST 54* 53* 50*   BILITOT 1.2 1.1 0.8     Microbiology Results (last 7 days)       Procedure Component Value Units Date/Time    Wound Culture [[id number]]  (Abnormal) Collected: 12/27/24 1620    Order Status: Completed Specimen: Wound from Foot, Right Updated: 12/28/24 0644     Wound Culture Few Gram-negative Rods    Blood culture #1 **CANNOT BE ORDERED STAT** [[id number]]  (Normal) Collected: 12/25/24 2100    Order Status: Completed Specimen: Blood from Antecubital, Right Updated: 12/27/24 2201     Blood Culture No Growth At 48 Hours    Blood culture #2 **CANNOT BE ORDERED STAT** [[id number]]  (Normal) Collected: 12/25/24 2110    Order Status: Completed Specimen: Blood from Wrist, Right Updated: 12/27/24 2201     Blood Culture No Growth At 48 Hours    Anaerobic Culture [[id number]] Collected: 12/27/24 1620    Order Status: Sent Specimen: Wound from Foot, Right Updated: 12/27/24 1620             See below for Radiology    Assessment/Plan:  End-stage renal disease off dialysis for 2 weeks now re initiated   Revocation of hospice  Infected chronic right foot wound  Sepsis secondary to above  L1 superior endplate compression fracture and chronic L4 superior endplate compression deformity  Severe back pain   Morbid obesity   Chronic venous stasis   Diabetes mellitus type 2  Essential  hypertension  Anemia of chronic disease      Patient had refused x-ray and MRI of the L-spine neurosurgery has signed off and they are recommending bracing   Podiatry following continue with antibiotics cultures were ordered possibly will need amputation  MRI of the T-spine showed L1 superior endplate compression fracture no significant spinal canal or neural foraminal stenosis no cord abnormality.  MRI of the brain was negative  CT of the L-spine had shown chronic L4 superior endplate compression deformity with mild loss of height  Nephrology's consulted for dialysis needs  Last HbA1c of 6  Arterial ultrasound as such did not show any blockage   White cell count is improving this morning it is 14.19  VTE prophylaxis: lovenox    Patient condition:  Stable/Fair/Guarded/ Serious/ Critical    Anticipated discharge and Disposition:         All diagnosis and differential diagnosis have been reviewed; assessment and plan has been documented; I have personally reviewed the labs and test results that are presently available; I have reviewed the patients medication list; I have reviewed the consulting providers response and recommendations. I have reviewed or attempted to review medical records based upon their availability    All of the patient's questions have been  addressed and answered. Patient's is agreeable to the above stated plan. I will continue to monitor closely and make adjustments to medical management as needed.    Portions of this note dictated using EMR integrated voice recognition software, and may be subject to voice recognition errors not corrected at proofreading. Please contact writer for clarification if needed.   _____________________________________________________________________    Malnutrition Status:  Nutrition consulted. Most recent weight and BMI monitored-     Measurements:  Wt Readings from Last 1 Encounters:   12/26/24 (!) 136.1 kg (300 lb 0.7 oz)   Body mass index is 43.05 kg/m².    Patient  has been screened and assessed by RD.    Malnutrition Type:  Context:    Level:      Malnutrition Characteristic Summary:       Interventions/Recommendations (treatment strategy):        Scheduled Med:   aspirin  81 mg Oral Daily    atorvastatin  40 mg Oral Daily    carvediloL  6.25 mg Oral BID    enoxparin  30 mg Subcutaneous Daily    gabapentin  300 mg Oral BID    mupirocin   Nasal BID    piperacillin-tazobactam (Zosyn) IV (PEDS and ADULTS) (extended infusion is not appropriate)  4.5 g Intravenous Q8H    tamsulosin  1 capsule Oral Daily      Continuous Infusions:     PRN Meds:    Current Facility-Administered Medications:     acetaminophen, 1,000 mg, Oral, Q8H PRN    bisacodyL, 10 mg, Rectal, Daily PRN    dextrose 50%, 12.5 g, Intravenous, PRN    dextrose 50%, 25 g, Intravenous, PRN    glucagon (human recombinant), 1 mg, Intramuscular, PRN    glucose, 16 g, Oral, PRN    glucose, 24 g, Oral, PRN    insulin aspart U-100, 0-5 Units, Subcutaneous, QID (AC + HS) PRN    labetalol, 10 mg, Intravenous, Q15 Min PRN    melatonin, 6 mg, Oral, Nightly PRN    ondansetron, 4 mg, Intravenous, Q8H PRN    sodium chloride 0.9%, 10 mL, Intravenous, PRN    Pharmacy to dose Vancomycin consult, , , Once **AND** vancomycin - pharmacy to dose, , Intravenous, pharmacy to manage frequency     Radiology:  I have personally reviewed the following imaging and agree with the radiologist.     CV Ultrasound doppler arterial legs bilat  The right lower extremity arterial system is patent with no evidence of   focal stenosis or occlusion.  The left lower extremity arterial system is patent with no evidence of   focal stenosis or occlusion.      Luma Weber MD  Department of Hospital Medicine   [facility]   12/28/2024

## 2024-12-28 NOTE — PLAN OF CARE
Problem: Adult Inpatient Plan of Care  Goal: Plan of Care Review  Outcome: Progressing  Goal: Patient-Specific Goal (Individualized)  Outcome: Progressing  Goal: Absence of Hospital-Acquired Illness or Injury  Outcome: Progressing     Problem: Wound  Goal: Optimal Functional Ability  Outcome: Progressing  Goal: Improved Oral Intake  Outcome: Progressing

## 2024-12-28 NOTE — CARE UPDATE
Awaiting upright xrays in brace  Previous order for the xrays was canceled   I re-ordered today  OK to mobilize in brace  Further recs pending xrays

## 2024-12-28 NOTE — PLAN OF CARE
Problem: Adult Inpatient Plan of Care  Goal: Plan of Care Review  Outcome: Progressing  Goal: Patient-Specific Goal (Individualized)  Outcome: Progressing  Goal: Absence of Hospital-Acquired Illness or Injury  Outcome: Progressing  Goal: Optimal Comfort and Wellbeing  Outcome: Progressing  Goal: Readiness for Transition of Care  Outcome: Progressing     Problem: Bariatric Environmental Safety  Goal: Safety Maintained with Care  Outcome: Progressing     Problem: Infection  Goal: Absence of Infection Signs and Symptoms  Outcome: Progressing     Problem: Wound  Goal: Optimal Coping  Outcome: Progressing  Goal: Optimal Functional Ability  Outcome: Progressing  Goal: Absence of Infection Signs and Symptoms  Outcome: Progressing  Goal: Improved Oral Intake  Outcome: Progressing  Goal: Optimal Pain Control and Function  Outcome: Progressing  Goal: Skin Health and Integrity  Outcome: Progressing  Goal: Optimal Wound Healing  Outcome: Progressing     Problem: Acute Kidney Injury/Impairment  Goal: Fluid and Electrolyte Balance  Outcome: Progressing  Goal: Improved Oral Intake  Outcome: Progressing  Goal: Effective Renal Function  Outcome: Progressing     Problem: Diabetes Comorbidity  Goal: Blood Glucose Level Within Targeted Range  Outcome: Progressing     Problem: Stroke, Ischemic (Includes Transient Ischemic Attack)  Goal: Optimal Coping  Outcome: Progressing  Goal: Effective Bowel Elimination  Outcome: Progressing  Goal: Optimal Cerebral Tissue Perfusion  Outcome: Progressing  Goal: Optimal Cognitive Function  Outcome: Progressing  Goal: Improved Communication Skills  Outcome: Progressing  Goal: Optimal Functional Ability  Outcome: Progressing  Goal: Optimal Nutrition Intake  Outcome: Progressing  Goal: Effective Oxygenation and Ventilation  Outcome: Progressing  Goal: Improved Sensorimotor Function  Outcome: Progressing  Goal: Safe and Effective Swallow  Outcome: Progressing  Goal: Effective Urinary  Elimination  Outcome: Progressing     Problem: Skin Injury Risk Increased  Goal: Skin Health and Integrity  Outcome: Progressing     Problem: Hemodialysis  Goal: Safe, Effective Therapy Delivery  Outcome: Progressing  Goal: Effective Tissue Perfusion  Outcome: Progressing  Goal: Absence of Infection Signs and Symptoms  Outcome: Progressing

## 2024-12-29 LAB
ALBUMIN SERPL-MCNC: 2.3 G/DL (ref 3.4–4.8)
ALBUMIN/GLOB SERPL: 0.6 RATIO (ref 1.1–2)
ALP SERPL-CCNC: 227 UNIT/L (ref 40–150)
ALT SERPL-CCNC: 66 UNIT/L (ref 0–55)
ANION GAP SERPL CALC-SCNC: 11 MEQ/L
AST SERPL-CCNC: 54 UNIT/L (ref 5–34)
BASOPHILS # BLD AUTO: 0.06 X10(3)/MCL
BASOPHILS NFR BLD AUTO: 0.5 %
BILIRUB SERPL-MCNC: 0.8 MG/DL
BUN SERPL-MCNC: 36.6 MG/DL (ref 8.4–25.7)
CALCIUM SERPL-MCNC: 7.7 MG/DL (ref 8.8–10)
CHLORIDE SERPL-SCNC: 108 MMOL/L (ref 98–107)
CO2 SERPL-SCNC: 17 MMOL/L (ref 23–31)
CREAT SERPL-MCNC: 1.95 MG/DL (ref 0.72–1.25)
CREAT/UREA NIT SERPL: 19
EOSINOPHIL # BLD AUTO: 0.27 X10(3)/MCL (ref 0–0.9)
EOSINOPHIL NFR BLD AUTO: 2.1 %
ERYTHROCYTE [DISTWIDTH] IN BLOOD BY AUTOMATED COUNT: 14 % (ref 11.5–17)
GFR SERPLBLD CREATININE-BSD FMLA CKD-EPI: 37 ML/MIN/1.73/M2
GLOBULIN SER-MCNC: 4 GM/DL (ref 2.4–3.5)
GLUCOSE SERPL-MCNC: 81 MG/DL (ref 70–110)
GLUCOSE SERPL-MCNC: 88 MG/DL (ref 82–115)
HCT VFR BLD AUTO: 36 % (ref 42–52)
HGB BLD-MCNC: 11.3 G/DL (ref 14–18)
IMM GRANULOCYTES # BLD AUTO: 0.08 X10(3)/MCL (ref 0–0.04)
IMM GRANULOCYTES NFR BLD AUTO: 0.6 %
LYMPHOCYTES # BLD AUTO: 2.3 X10(3)/MCL (ref 0.6–4.6)
LYMPHOCYTES NFR BLD AUTO: 18.1 %
MCH RBC QN AUTO: 29.4 PG (ref 27–31)
MCHC RBC AUTO-ENTMCNC: 31.4 G/DL (ref 33–36)
MCV RBC AUTO: 93.8 FL (ref 80–94)
MONOCYTES # BLD AUTO: 1.02 X10(3)/MCL (ref 0.1–1.3)
MONOCYTES NFR BLD AUTO: 8 %
NEUTROPHILS # BLD AUTO: 8.96 X10(3)/MCL (ref 2.1–9.2)
NEUTROPHILS NFR BLD AUTO: 70.7 %
NRBC BLD AUTO-RTO: 0 %
PLATELET # BLD AUTO: 331 X10(3)/MCL (ref 130–400)
PMV BLD AUTO: 9.3 FL (ref 7.4–10.4)
POCT GLUCOSE: 81 MG/DL (ref 70–110)
POCT GLUCOSE: 87 MG/DL (ref 70–110)
POCT GLUCOSE: 96 MG/DL (ref 70–110)
POTASSIUM SERPL-SCNC: 4.5 MMOL/L (ref 3.5–5.1)
PROT SERPL-MCNC: 6.3 GM/DL (ref 5.8–7.6)
RBC # BLD AUTO: 3.84 X10(6)/MCL (ref 4.7–6.1)
SODIUM SERPL-SCNC: 136 MMOL/L (ref 136–145)
WBC # BLD AUTO: 12.69 X10(3)/MCL (ref 4.5–11.5)

## 2024-12-29 PROCEDURE — 25000003 PHARM REV CODE 250: Performed by: INTERNAL MEDICINE

## 2024-12-29 PROCEDURE — 63600175 PHARM REV CODE 636 W HCPCS: Performed by: INTERNAL MEDICINE

## 2024-12-29 PROCEDURE — 80053 COMPREHEN METABOLIC PANEL: CPT | Performed by: NURSE PRACTITIONER

## 2024-12-29 PROCEDURE — 21400001 HC TELEMETRY ROOM

## 2024-12-29 PROCEDURE — 25000003 PHARM REV CODE 250: Performed by: NURSE PRACTITIONER

## 2024-12-29 PROCEDURE — 36415 COLL VENOUS BLD VENIPUNCTURE: CPT | Performed by: NURSE PRACTITIONER

## 2024-12-29 PROCEDURE — 85025 COMPLETE CBC W/AUTO DIFF WBC: CPT | Performed by: NURSE PRACTITIONER

## 2024-12-29 RX ORDER — SODIUM BICARBONATE 650 MG/1
1300 TABLET ORAL 2 TIMES DAILY
Status: COMPLETED | OUTPATIENT
Start: 2024-12-29 | End: 2024-12-29

## 2024-12-29 RX ORDER — CARVEDILOL 12.5 MG/1
12.5 TABLET ORAL 2 TIMES DAILY
Status: DISCONTINUED | OUTPATIENT
Start: 2024-12-29 | End: 2025-01-08 | Stop reason: HOSPADM

## 2024-12-29 RX ADMIN — ASPIRIN 81 MG: 81 TABLET, COATED ORAL at 08:12

## 2024-12-29 RX ADMIN — SODIUM BICARBONATE 650 MG TABLET 1300 MG: at 08:12

## 2024-12-29 RX ADMIN — ENOXAPARIN SODIUM 30 MG: 30 INJECTION SUBCUTANEOUS at 04:12

## 2024-12-29 RX ADMIN — CARVEDILOL 6.25 MG: 3.12 TABLET, FILM COATED ORAL at 08:12

## 2024-12-29 RX ADMIN — ATORVASTATIN CALCIUM 40 MG: 40 TABLET, FILM COATED ORAL at 08:12

## 2024-12-29 RX ADMIN — CARVEDILOL 12.5 MG: 12.5 TABLET, FILM COATED ORAL at 08:12

## 2024-12-29 RX ADMIN — TAMSULOSIN HYDROCHLORIDE 0.4 MG: 0.4 CAPSULE ORAL at 08:12

## 2024-12-29 RX ADMIN — MUPIROCIN: 20 OINTMENT TOPICAL at 08:12

## 2024-12-29 RX ADMIN — PIPERACILLIN SODIUM AND TAZOBACTAM SODIUM 4.5 G: 4; .5 INJECTION, POWDER, LYOPHILIZED, FOR SOLUTION INTRAVENOUS at 06:12

## 2024-12-29 RX ADMIN — PIPERACILLIN SODIUM AND TAZOBACTAM SODIUM 4.5 G: 4; .5 INJECTION, POWDER, LYOPHILIZED, FOR SOLUTION INTRAVENOUS at 09:12

## 2024-12-29 RX ADMIN — GABAPENTIN 300 MG: 300 CAPSULE ORAL at 08:12

## 2024-12-29 RX ADMIN — Medication 6 MG: at 08:12

## 2024-12-29 RX ADMIN — PIPERACILLIN SODIUM AND TAZOBACTAM SODIUM 4.5 G: 4; .5 INJECTION, POWDER, LYOPHILIZED, FOR SOLUTION INTRAVENOUS at 02:12

## 2024-12-29 NOTE — PROGRESS NOTES
[facility] Hospital Medicine Progress Note        Chief Complaint: Inpatient Follow-up for     HPI: 64-year-old male with a history of morbid obesity, chronic venous stasis, chronic open right foot wound, ESRD, type 2 diabetes who presented to the ER for multiple falls over the last week.  Daughter gives history (Isidra Watkins [phone]) who explains that 2 weeks ago patient decided he would no longer receive dialysis nor wound care and felt that these providers were only trying to harm him.  He therefore enrolled in hospice and understood completely in the time that without dialysis he would very likely die.  Over the last few days at home he has become progressively weak and having multiple falls, his wife activated EMS tonight after he had a fall and was unable to get up.     He was brought to the ER where on arrival he was afebrile hemodynamically stable maintaining normal sats on room air.  Laboratory work noted leukocytosis of 20 K, creatinine of 2.3/BUN 34, CO2 20 and a normal potassium.  CT head showed no acute process.  He was not found to have focal deficits on exam nor any signs of traumatic injury though was noted to have an infected chronic left foot ulceration.  Blood cultures were obtained and broad-spectrum antibiotics initiated.MRI of the T-spine showed L1 superior endplate compression fracture no significant spinal canal or neural foraminal stenosis no cord abnormality.  MRI of the brain was negative  CT of the L-spine had shown chronic L4 superior endplate compression deformity with mild loss of height.Patient had refused x-ray and MRI of the L-spine neurosurgery has signed off and they are recommending bracing     Interval Hx:   Patient seen and examined this morning blood pressure is slightly on the higher side otherwise saturating well on room air   Case was discussed with patient's nurse and  on the floor.    Objective/physical exam:  General: In  no acute distress, afebrile  Chest: Clear to auscultation bilaterally  Heart: RRR, +S1, S2, no appreciable murmur  Abdomen: Soft, nontender, BS +  MSK: Warm,  Neurologic: Alert and oriented x4,   VITAL SIGNS: 24 HRS MIN & MAX LAST   Temp  Min: 98 °F (36.7 °C)  Max: 98.7 °F (37.1 °C) 98 °F (36.7 °C)   BP  Min: 137/73  Max: 164/76 (!) 161/76   Pulse  Min: 69  Max: 80  80   Resp  Min: 18  Max: 20 20   SpO2  Min: 94 %  Max: 96 % 96 %     I have reviewed the following labs:  Recent Labs   Lab 12/27/24 0455 12/28/24 0406 12/29/24 0448   WBC 16.02* 14.19* 12.69*   RBC 3.43* 3.62* 3.84*   HGB 10.2* 10.6* 11.3*   HCT 31.8* 33.0* 36.0*   MCV 92.7 91.2 93.8   MCH 29.7 29.3 29.4   MCHC 32.1* 32.1* 31.4*   RDW 14.2 14.0 14.0    341 331   MPV 9.2 9.1 9.3     Recent Labs   Lab 12/27/24 0455 12/28/24 0406 12/29/24 0448   * 134* 136   K 4.4 4.0 4.5   * 107 108*   CO2 16* 18* 17*   BUN 39.4* 32.8* 36.6*   CREATININE 2.34* 1.84* 1.95*   CALCIUM 8.0* 8.1* 7.7*   MG 2.30  --   --    ALBUMIN 2.3* 2.2* 2.3*   ALKPHOS 215* 215* 227*   ALT 66* 65* 66*   AST 53* 50* 54*   BILITOT 1.1 0.8 0.8     Microbiology Results (last 7 days)       Procedure Component Value Units Date/Time    Wound Culture [[id number]]  (Abnormal) Collected: 12/27/24 1620    Order Status: Completed Specimen: Wound from Foot, Right Updated: 12/29/24 1004     Wound Culture Moderate Proteus mirabilis      Moderate Enterobacter cloacae complex    Anaerobic Culture [[id number]] Collected: 12/27/24 1620    Order Status: Completed Specimen: Wound from Foot, Right Updated: 12/29/24 0708     Anaerobe Culture No Anaerobes Isolated    Blood culture #1 **CANNOT BE ORDERED STAT** [[id number]]  (Normal) Collected: 12/25/24 2100    Order Status: Completed Specimen: Blood from Antecubital, Right Updated: 12/28/24 2201     Blood Culture No Growth At 72 Hours    Blood culture #2 **CANNOT BE ORDERED STAT** [[id number]]  (Normal) Collected: 12/25/24 2110    Order  Status: Completed Specimen: Blood from Wrist, Right Updated: 12/28/24 2201     Blood Culture No Growth At 72 Hours             See below for Radiology    Assessment/Plan:  End-stage renal disease off dialysis for 2 weeks now re initiated   Revocation of hospice  Infected chronic right foot wound with wound culture growing Proteus and Enterobacter  Sepsis secondary to above  L1 superior endplate compression fracture and chronic L4 superior endplate compression deformity  Severe back pain   Morbid obesity   Chronic venous stasis   Diabetes mellitus type 2  Essential hypertension  Anemia of chronic disease    Wound culture is growing Proteus and Enterobacter  Continue with vanc and Zosyn  Podiatry following tentative plans for 5th ray amputation as per their note  Patient had refused x-ray and MRI of the L-spine neurosurgery has signed off and they are recommending bracing   MRI of the T-spine showed L1 superior endplate compression fracture no significant spinal canal or neural foraminal stenosis no cord abnormality.  MRI of the brain was negative  CT of the L-spine had shown chronic L4 superior endplate compression deformity with mild loss of height  Nephrology's consulted for dialysis needs  Last HbA1c of 6  Arterial ultrasound as such did not show any blockage   Blood pressure is on the higher side we will increase the dose of Coreg to 12.5 p.o. b.i.d.  Nephrology's following for dialysis needs      VTE prophylaxis: lovenox    Patient condition:  Stable/Fair/Guarded/ Serious/ Critical    Anticipated discharge and Disposition:         All diagnosis and differential diagnosis have been reviewed; assessment and plan has been documented; I have personally reviewed the labs and test results that are presently available; I have reviewed the patients medication list; I have reviewed the consulting providers response and recommendations. I have reviewed or attempted to review medical records based upon their  availability    All of the patient's questions have been  addressed and answered. Patient's is agreeable to the above stated plan. I will continue to monitor closely and make adjustments to medical management as needed.    Portions of this note dictated using EMR integrated voice recognition software, and may be subject to voice recognition errors not corrected at proofreading. Please contact writer for clarification if needed.   _____________________________________________________________________    Malnutrition Status:  Nutrition consulted. Most recent weight and BMI monitored-     Measurements:  Wt Readings from Last 1 Encounters:   12/26/24 (!) 136.1 kg (300 lb 0.7 oz)   Body mass index is 43.05 kg/m².    Patient has been screened and assessed by RD.    Malnutrition Type:  Context:    Level:      Malnutrition Characteristic Summary:       Interventions/Recommendations (treatment strategy):        Scheduled Med:   aspirin  81 mg Oral Daily    atorvastatin  40 mg Oral Daily    carvediloL  6.25 mg Oral BID    enoxparin  30 mg Subcutaneous Daily    gabapentin  300 mg Oral BID    mupirocin   Nasal BID    piperacillin-tazobactam (Zosyn) IV (PEDS and ADULTS) (extended infusion is not appropriate)  4.5 g Intravenous Q8H    sodium bicarbonate  1,300 mg Oral BID    tamsulosin  1 capsule Oral Daily      Continuous Infusions:     PRN Meds:    Current Facility-Administered Medications:     acetaminophen, 1,000 mg, Oral, Q8H PRN    bisacodyL, 10 mg, Rectal, Daily PRN    dextrose 50%, 12.5 g, Intravenous, PRN    dextrose 50%, 25 g, Intravenous, PRN    glucagon (human recombinant), 1 mg, Intramuscular, PRN    glucose, 16 g, Oral, PRN    glucose, 24 g, Oral, PRN    insulin aspart U-100, 0-5 Units, Subcutaneous, QID (AC + HS) PRN    labetalol, 10 mg, Intravenous, Q15 Min PRN    melatonin, 6 mg, Oral, Nightly PRN    ondansetron, 4 mg, Intravenous, Q8H PRN    sodium chloride 0.9%, 10 mL, Intravenous, PRN    Pharmacy to dose  Vancomycin consult, , , Once **AND** vancomycin - pharmacy to dose, , Intravenous, pharmacy to manage frequency     Radiology:  I have personally reviewed the following imaging and agree with the radiologist.     CV Ultrasound doppler arterial legs bilat  The right lower extremity arterial system is patent with no evidence of   focal stenosis or occlusion.  The left lower extremity arterial system is patent with no evidence of   focal stenosis or occlusion.      Luma Weber MD  Department of Hospital Medicine   [facility]   12/29/2024

## 2024-12-29 NOTE — PROGRESS NOTES
[facility]                       [address] [place] [id number]    PATIENT NAME:       RUFINO REAL  YOB: 1960  CSN:                [id number]   MRN:                [id number]  ADMIT DATE:         12/25/2024 20:30:00  PHYSICIAN:          Rolf Hobbs DPM                            PROGRESS NOTE    DATE:  12/29/2024 00:00:00    SUBJECTIVE:  The patient was seen today.  He is awake, alert, conversive.    Family is not present.  Apparently, they are going to show up later.  No other   issues reported.  No fevers or chills.    PHYSICAL EXAMINATION:  VITAL SIGNS:  Stable and afebrile.    LABORATORY DATA:  Reviewed.  White cell counts down to 12, H and H 11.3 and 36,   BUN and creatinine 36 and 1.94.    Preliminary cultures growing out Proteus and Enterobacter.  Sensitivities are   pending.  Anaerobes are also pending.    Dressings are intact.  No change in the wound.  Still with fibrotic debris to   the area with some drainage.  Inflammatory changes remain about the same.    ASSESSMENT:  Chronic right foot wound infection with underlying osteomyelitis.    PLAN:  Continue with current care.  Antibiotics.  Plan for surgery on Tuesday   only 5th ray amputation.        ______________________________  Rolf Hobbs DPM    GAS/AQS  DD:  12/29/2024  Time:  11:30AM  DT:  12/29/2024  Time:  11:39AM  Job #:  [id number]      PROGRESS NOTE

## 2024-12-30 LAB
ANION GAP SERPL CALC-SCNC: 8 MEQ/L
BACTERIA BLD CULT: NORMAL
BACTERIA BLD CULT: NORMAL
BACTERIA SPEC ANAEROBE CULT: ABNORMAL
BACTERIA WND CULT: ABNORMAL
BACTERIA WND CULT: ABNORMAL
BASOPHILS # BLD AUTO: 0.05 X10(3)/MCL
BASOPHILS NFR BLD AUTO: 0.4 %
BUN SERPL-MCNC: 35.6 MG/DL (ref 8.4–25.7)
CALCIUM SERPL-MCNC: 8.2 MG/DL (ref 8.8–10)
CHLORIDE SERPL-SCNC: 108 MMOL/L (ref 98–107)
CO2 SERPL-SCNC: 19 MMOL/L (ref 23–31)
CREAT SERPL-MCNC: 2.03 MG/DL (ref 0.72–1.25)
CREAT/UREA NIT SERPL: 18
EOSINOPHIL # BLD AUTO: 0.26 X10(3)/MCL (ref 0–0.9)
EOSINOPHIL NFR BLD AUTO: 2.2 %
ERYTHROCYTE [DISTWIDTH] IN BLOOD BY AUTOMATED COUNT: 13.7 % (ref 11.5–17)
GFR SERPLBLD CREATININE-BSD FMLA CKD-EPI: 36 ML/MIN/1.73/M2
GLUCOSE SERPL-MCNC: 83 MG/DL (ref 82–115)
HCT VFR BLD AUTO: 35.6 % (ref 42–52)
HGB BLD-MCNC: 11.4 G/DL (ref 14–18)
IMM GRANULOCYTES # BLD AUTO: 0.08 X10(3)/MCL (ref 0–0.04)
IMM GRANULOCYTES NFR BLD AUTO: 0.7 %
LYMPHOCYTES # BLD AUTO: 2.05 X10(3)/MCL (ref 0.6–4.6)
LYMPHOCYTES NFR BLD AUTO: 17.3 %
MCH RBC QN AUTO: 28.7 PG (ref 27–31)
MCHC RBC AUTO-ENTMCNC: 32 G/DL (ref 33–36)
MCV RBC AUTO: 89.7 FL (ref 80–94)
MONOCYTES # BLD AUTO: 0.94 X10(3)/MCL (ref 0.1–1.3)
MONOCYTES NFR BLD AUTO: 7.9 %
NEUTROPHILS # BLD AUTO: 8.49 X10(3)/MCL (ref 2.1–9.2)
NEUTROPHILS NFR BLD AUTO: 71.5 %
NRBC BLD AUTO-RTO: 0 %
PLATELET # BLD AUTO: 391 X10(3)/MCL (ref 130–400)
PMV BLD AUTO: 9.1 FL (ref 7.4–10.4)
POCT GLUCOSE: 103 MG/DL (ref 70–110)
POCT GLUCOSE: 96 MG/DL (ref 70–110)
POTASSIUM SERPL-SCNC: 4.3 MMOL/L (ref 3.5–5.1)
RBC # BLD AUTO: 3.97 X10(6)/MCL (ref 4.7–6.1)
SODIUM SERPL-SCNC: 135 MMOL/L (ref 136–145)
VANCOMYCIN SERPL-MCNC: 3.7 UG/ML (ref 15–20)
WBC # BLD AUTO: 11.87 X10(3)/MCL (ref 4.5–11.5)

## 2024-12-30 PROCEDURE — 21400001 HC TELEMETRY ROOM

## 2024-12-30 PROCEDURE — 63600175 PHARM REV CODE 636 W HCPCS: Performed by: INTERNAL MEDICINE

## 2024-12-30 PROCEDURE — 25000003 PHARM REV CODE 250: Performed by: INTERNAL MEDICINE

## 2024-12-30 PROCEDURE — 80202 ASSAY OF VANCOMYCIN: CPT | Performed by: INTERNAL MEDICINE

## 2024-12-30 PROCEDURE — 97535 SELF CARE MNGMENT TRAINING: CPT

## 2024-12-30 PROCEDURE — 80048 BASIC METABOLIC PNL TOTAL CA: CPT | Performed by: INTERNAL MEDICINE

## 2024-12-30 PROCEDURE — 97530 THERAPEUTIC ACTIVITIES: CPT

## 2024-12-30 PROCEDURE — 85025 COMPLETE CBC W/AUTO DIFF WBC: CPT | Performed by: INTERNAL MEDICINE

## 2024-12-30 PROCEDURE — 80100016 HC MAINTENANCE HEMODIALYSIS

## 2024-12-30 PROCEDURE — 36415 COLL VENOUS BLD VENIPUNCTURE: CPT | Performed by: INTERNAL MEDICINE

## 2024-12-30 RX ADMIN — CARVEDILOL 12.5 MG: 12.5 TABLET, FILM COATED ORAL at 09:12

## 2024-12-30 RX ADMIN — PIPERACILLIN SODIUM AND TAZOBACTAM SODIUM 4.5 G: 4; .5 INJECTION, POWDER, LYOPHILIZED, FOR SOLUTION INTRAVENOUS at 05:12

## 2024-12-30 RX ADMIN — ENOXAPARIN SODIUM 30 MG: 30 INJECTION SUBCUTANEOUS at 05:12

## 2024-12-30 RX ADMIN — TAMSULOSIN HYDROCHLORIDE 0.4 MG: 0.4 CAPSULE ORAL at 08:12

## 2024-12-30 RX ADMIN — VANCOMYCIN HYDROCHLORIDE 1500 MG: 1.5 INJECTION, POWDER, LYOPHILIZED, FOR SOLUTION INTRAVENOUS at 12:12

## 2024-12-30 RX ADMIN — MUPIROCIN: 20 OINTMENT TOPICAL at 09:12

## 2024-12-30 RX ADMIN — GABAPENTIN 300 MG: 300 CAPSULE ORAL at 08:12

## 2024-12-30 RX ADMIN — ATORVASTATIN CALCIUM 40 MG: 40 TABLET, FILM COATED ORAL at 08:12

## 2024-12-30 RX ADMIN — CARVEDILOL 12.5 MG: 12.5 TABLET, FILM COATED ORAL at 08:12

## 2024-12-30 RX ADMIN — GABAPENTIN 300 MG: 300 CAPSULE ORAL at 09:12

## 2024-12-30 RX ADMIN — ASPIRIN 81 MG: 81 TABLET, COATED ORAL at 08:12

## 2024-12-30 NOTE — PLAN OF CARE
Clinical updates sent to [facility] and [facility]. Awaiting response. Will follow up following the facilities' morning meetings.    [facility] has accepted this patient and spoke to the patient's daughter regarding changing the patient's insurance back to traditional medicare since they are not in network with his managed policy. Because of this switch, they will not be able to accept this patient for skilled placement until after 1/1.

## 2024-12-30 NOTE — PROGRESS NOTES
[facility] - Neurology  Wound Care    Patient Name:  Neno Watkins   MRN:  [id number]  Date: 12/30/2024  Diagnosis: Wound infection    History:     Past Medical History:   Diagnosis Date    BPH (benign prostatic hyperplasia)     Essential (primary) hypertension     Obesity, unspecified     PAD (peripheral artery disease)        Social History     Socioeconomic History    Marital status:    Tobacco Use    Smoking status: Never    Smokeless tobacco: Never   Substance and Sexual Activity    Alcohol use: Never    Drug use: Never     Social Drivers of Health     Financial Resource Strain: Low Risk  (12/26/2024)    Overall Financial Resource Strain (CARDIA)     Difficulty of Paying Living Expenses: Not hard at all   Food Insecurity: No Food Insecurity (12/26/2024)    Hunger Vital Sign     Worried About Running Out of Food in the Last Year: Never true     Ran Out of Food in the Last Year: Never true   Transportation Needs: No Transportation Needs (12/26/2024)    TRANSPORTATION NEEDS     Transportation : No   Physical Activity: Inactive (12/26/2024)    Exercise Vital Sign     Days of Exercise per Week: 0 days     Minutes of Exercise per Session: 0 min   Stress: No Stress Concern Present (12/26/2024)    [employer] [place] - Occupational Stress Questionnaire     Feeling of Stress : Only a little   Housing Stability: Low Risk  (12/26/2024)    Housing Stability Vital Sign     Unable to Pay for Housing in the Last Year: No     Homeless in the Last Year: No       Precautions:     Allergies as of 12/25/2024 - Reviewed 12/25/2024   Allergen Reaction Noted    Butorphanol Swelling 06/21/2022    Hydrocodone-acetaminophen Other (See Comments) 06/21/2022    Povidone-iodine Shortness Of Breath and Rash 06/21/2022       WOC Assessment Details/Treatment   WOCN consulted for sacrum. Patient is currently off of the unit in dialysis. EMR reviewed. Will follow up tomorrow.   12/30/2024

## 2024-12-30 NOTE — PROGRESS NOTES
Inpatient Nutrition Assessment    Admit Date: 12/25/2024   Total duration of encounter: 5 days   Patient Age: 64 y.o.    Nutrition Recommendation/Prescription     Continue Diet diabetic 2000 Calories (up to 75 gm per meal); Standard Tray as tolerated.   Add supplements:   Novasource Renal (provides 475 kcal, 22 g protein per serving) BID for additional nutrition   Eloy (provides 90 kcal, 2.5 g protein per serving) BID for wound healing    Communication of Recommendations: reviewed with nurse    Nutrition Assessment     Malnutrition Assessment/Nutrition-Focused Physical Exam  Deferred                                                               A minimum of two characteristics is recommended for diagnosis of either severe or non-severe malnutrition.    Chart Review    Reason Seen: length of stay    Malnutrition Screening Tool Results   Have you recently lost weight without trying?: No  Have you been eating poorly because of a decreased appetite?: No   MST Score: 0   Diagnosis:  End-stage renal disease off dialysis for 2 weeks now re initiated   Revocation of hospice  Infected chronic right foot wound with wound culture growing Proteus and Enterobacter  Sepsis secondary to above  L1 superior endplate compression fracture and chronic L4 superior endplate compression deformity  Severe back pain   Morbid obesity   Chronic venous stasis   Diabetes mellitus type 2  Essential hypertension  Anemia of chronic disease    Relevant Medical History:   Past Medical History:   Diagnosis Date    BPH (benign prostatic hyperplasia)     Essential (primary) hypertension     Obesity, unspecified     PAD (peripheral artery disease)       Scheduled Medications:  aspirin, 81 mg, Daily  atorvastatin, 40 mg, Daily  carvediloL, 12.5 mg, BID  enoxparin, 30 mg, Daily  gabapentin, 300 mg, BID  mupirocin, , BID  piperacillin-tazobactam (Zosyn) IV (PEDS and ADULTS) (extended infusion is not appropriate), 4.5 g, Q8H  tamsulosin, 1 capsule,  Daily    Continuous Infusions:   PRN Medications:  acetaminophen, 1,000 mg, Q8H PRN  bisacodyL, 10 mg, Daily PRN  dextrose 50%, 12.5 g, PRN  dextrose 50%, 25 g, PRN  glucagon (human recombinant), 1 mg, PRN  glucose, 16 g, PRN  glucose, 24 g, PRN  insulin aspart U-100, 0-5 Units, QID (AC + HS) PRN  labetalol, 10 mg, Q15 Min PRN  melatonin, 6 mg, Nightly PRN  ondansetron, 4 mg, Q8H PRN  sodium chloride 0.9%, 10 mL, PRN  vancomycin - pharmacy to dose, , pharmacy to manage frequency    Calorie Containing IV Medications: no significant kcals from medications at this time    Recent Labs   Lab 12/25/24  2110 12/26/24  0424 12/26/24  0935 12/27/24  0455 12/28/24  0406 12/29/24  0448 12/30/24  0439   *  --  134* 134* 134* 136 135*   K 4.6  --  4.3 4.4 4.0 4.5 4.3   CALCIUM 9.1  --  8.5* 8.0* 8.1* 7.7* 8.2*   PHOS  --   --   --  4.1  --   --   --    MG  --   --   --  2.30  --   --   --      --  107 109* 107 108* 108*   CO2 20*  --  19* 16* 18* 17* 19*   BUN 34.9*  --  35.0* 39.4* 32.8* 36.6* 35.6*   CREATININE 2.38*  --  2.17* 2.34* 1.84* 1.95* 2.03*   EGFRNORACEVR 30  --  33 30 40 37 36   GLUCOSE 154*  --  128* 116* 97 88 83   BILITOT 1.3  --  1.2 1.1 0.8 0.8  --    ALKPHOS 269*  --  230* 215* 215* 227*  --    *  --  84* 66* 65* 66*  --    AST 70*  --  54* 53* 50* 54*  --    ALBUMIN 3.2*  --  2.7* 2.3* 2.2* 2.3*  --    TRIG  --  94  --   --   --   --   --    HGBA1C  --   --  6.0  --   --   --   --    WBC 20.03*  --  19.49* 16.02* 14.19* 12.69* 11.87*   HGB 11.9*  --  11.6* 10.2* 10.6* 11.3* 11.4*   HCT 36.9*  --  35.6* 31.8* 33.0* 36.0* 35.6*     Nutrition Orders:  Diet diabetic 2000 Calories (up to 75 gm per meal); Standard Tray  Dietary nutrition supplements BID; Novasource Renal - Any flavor,Dietary nutrition supplements BID; Eloy - Any flavor    Appetite/Oral Intake: fair/not applicable  Factors Affecting Nutritional Intake: decreased appetite  Social Needs Impacting Access to Food: unable to assess  "at this time; will attempt on follow-up  Food/Latter-day/Cultural Preferences: none reported  Food Allergies: none reported  Last Bowel Movement: 12/29/24  Wound(s):  podiatry assessment (12/29/24): Chronic right foot wound infection with underlying osteomyelitis; Wound care nurse assessing tomorrow     Comments    12/30/24 Pt in dialysis. Per RN, decreased appetite and intake of meals since admit. Noted wound, not assessed by wound care yet. Will add supplements and follow-up early.     Anthropometrics    Height: 5' 10" (177.8 cm), Height Method: Stated  Last Weight: (!) 136.1 kg (300 lb 0.7 oz) (12/26/24 0400), Weight Method: Bed Scale  BMI (Calculated): 43.1  BMI Classification: obese grade III (BMI >/=40)        Ideal Body Weight (IBW), Male: 166 lb     % Ideal Body Weight, Male (lb): 180.75 %                          Usual Weight Provided By: unable to obtain usual weight    Wt Readings from Last 5 Encounters:   12/26/24 (!) 136.1 kg (300 lb 0.7 oz)   12/15/24 128.8 kg (284 lb)   11/18/24 130.6 kg (288 lb)   10/22/24 127 kg (279 lb 15.8 oz)   09/10/24 127 kg (279 lb 15.8 oz)     Weight Change(s) Since Admission:   Wt Readings from Last 1 Encounters:   12/26/24 0400 (!) 136.1 kg (300 lb 0.7 oz)   12/25/24 2026 136.1 kg (300 lb)   Admit Weight: 136.1 kg (300 lb) (12/25/24 2026), Weight Method: Estimated    Estimated Needs    Weight Used For Calorie Calculations: (!) 136.1 kg (300 lb 0.7 oz)  Energy Calorie Requirements (kcal): 2582kcals/d (MSJ x 1.2SF)  Energy Need Method: [facility]  Weight Used For Protein Calculations: 75.4 kg (166 lb 5.4 oz) (IBW)  Protein Requirements: 113-136g/d (1.5-1.8g/kg IBW)  Fluid Requirements (mL): 1000mL + UOP (on hemodialysis)  CHO Requirement: 258gm CHO daily (40% EER)     Enteral Nutrition     Patient not receiving enteral nutrition at this time.    Parenteral Nutrition     Patient not receiving parenteral nutrition support at this time.    Evaluation of Received Nutrient " Intake    Calories: not meeting estimated needs  Protein: not meeting estimated needs    Patient Education     Not applicable.    Nutrition Diagnosis     PES: Inadequate oral intake related to acute illness as evidenced by </=75% EER since admit. (new)     PES:            Nutrition Interventions     Intervention(s): modified composition of meals/snacks, commercial beverage, multivitamin/mineral supplement therapy, and collaboration with other providers  Intervention(s):      Goal: Meet greater than 80% of nutritional needs by follow-up. (new)  Goal: Maintain weight throughout hospitalization. (new)    Nutrition Goals & Monitoring     Dietitian will monitor: food and beverage intake, weight change, electrolyte/renal panel, and gastrointestinal profile  Discharge planning: continue renal, dialysis diet with renal, dialysis oral supplements  Nutrition Risk/Follow-Up: moderate (follow-up in 3-5 days)   Please consult if re-assessment needed sooner.

## 2024-12-30 NOTE — NURSING
12/30/24 1630   Post-Hemodialysis Assessment   Rinseback Volume (mL) 250 mL   Blood Volume Processed (Liters) 64.1 L   Dialyzer Clearance Moderately streaked   Duration of Treatment 180 minutes   Total UF (mL) 3500 mL   Net Fluid Removal 3000   Patient Response to Treatment Tolerated well   Post-Hemodialysis Comments Blood rinsed back per P&P. Lines capped and secured.

## 2024-12-30 NOTE — PLAN OF CARE
Problem: Physical Therapy  Goal: Physical Therapy Goal  Description: Goals to be met by: 24     Patient will increase functional independence with mobility by performin. Supine to sit with minimal assistance  2. Rolling to Left and Right with [place].  3. Sit to stand t/f standby assist with RW.  4. Bed<>chair transfer standby assist with RW.  5. Gait x 150ft with RW with standby assist.  Outcome: Progressing

## 2024-12-30 NOTE — PROGRESS NOTES
"Pharmacokinetic Assessment Follow Up: IV Vancomycin    Vancomycin serum concentration assessment(s):    The random level was drawn correctly and can be used to guide therapy at this time. The measurement is below the desired definitive target range of 10 to 20 mcg/mL.    Vancomycin Regimen Plan:    Re-dose with vancomycin 1500mg x1  Re-dose when the random level is less than 10 mcg/mL, next level to be drawn at 0430 on 12/31.    Drug levels (last 3 results):  Recent Labs   Lab Result Units 12/30/24  0439   Vancomycin Random ug/ml 3.7*       Pharmacy will continue to follow and monitor vancomycin.    Please contact pharmacy at extension [phone] for questions regarding this assessment.    Thank you for the consult,   Bairon Campos       Patient brief summary:  Neno Watkins is a 64 y.o. male initiated on antimicrobial therapy with IV Vancomycin for treatment of skin & soft tissue infection    The patient's current regimen is pulse dose.    Drug Allergies:   Review of patient's allergies indicates:   Allergen Reactions    Butorphanol Swelling     "it almost killed me"    Hydrocodone-acetaminophen Other (See Comments)     "They mess with my heart"    Povidone-iodine Shortness Of Breath and Rash       Actual Body Weight:   136.1kg    Renal Function:   Estimated Creatinine Clearance: 50.4 mL/min (A) (based on SCr of 2.03 mg/dL (H)).,     Dialysis Method (if applicable):  intermittent HD    CBC (last 72 hours):  Recent Labs   Lab Result Units 12/28/24  0406 12/29/24  0448 12/30/24  0439   WBC x10(3)/mcL 14.19* 12.69* 11.87*   Hgb g/dL 10.6* 11.3* 11.4*   Hct % 33.0* 36.0* 35.6*   Platelet x10(3)/mcL 341 331 391   Mono % % 8.3 8.0 7.9   Eos % % 1.9 2.1 2.2   Basophil % % 0.3 0.5 0.4       Metabolic Panel (last 72 hours):  Recent Labs   Lab Result Units 12/28/24  0406 12/29/24  0448 12/30/24  0439   Sodium mmol/L 134* 136 135*   Potassium mmol/L 4.0 4.5 4.3   Chloride mmol/L 107 108* 108*   CO2 mmol/L 18* 17* 19*   Glucose " mg/dL 97 88 83   Blood Urea Nitrogen mg/dL 32.8* 36.6* 35.6*   Creatinine mg/dL 1.84* 1.95* 2.03*   Albumin g/dL 2.2* 2.3*  --    Bilirubin Total mg/dL 0.8 0.8  --    ALP unit/L 215* 227*  --    AST unit/L 50* 54*  --    ALT unit/L 65* 66*  --        Vancomycin Administrations:  vancomycin given in the last 96 hours                     vancomycin 1,500 mg in 0.9% NaCl 250 mL IVPB (admixture device) (mg) 1,500 mg New Bag 12/30/24 1225    vancomycin (VANCOCIN) 500 mg in D5W 100 mL IVPB (MB+) (mg) 500 mg New Bag 12/27/24 2016                    Microbiologic Results:  Microbiology Results (last 7 days)       Procedure Component Value Units Date/Time    Anaerobic Culture [[id number]]  (Abnormal) Collected: 12/27/24 1620    Order Status: Completed Specimen: Wound from Foot, Right Updated: 12/30/24 1140     Anaerobe Culture Bacteroides fragilis     Comment: Anaerobic sensitivity is not usually indicated except on single isolates from sterile body sites.       Wound Culture [[id number]]  (Abnormal)  (Susceptibility) Collected: 12/27/24 1620    Order Status: Completed Specimen: Wound from Foot, Right Updated: 12/30/24 0737     Wound Culture Moderate Proteus mirabilis      Moderate Enterobacter cloacae complex    Blood culture #1 **CANNOT BE ORDERED STAT** [[id number]]  (Normal) Collected: 12/25/24 2100    Order Status: Completed Specimen: Blood from Antecubital, Right Updated: 12/29/24 2201     Blood Culture No Growth At 96 Hours    Blood culture #2 **CANNOT BE ORDERED STAT** [[id number]]  (Normal) Collected: 12/25/24 2110    Order Status: Completed Specimen: Blood from Wrist, Right Updated: 12/29/24 2201     Blood Culture No Growth At 96 Hours

## 2024-12-30 NOTE — PROGRESS NOTES
[facility] Hospital Medicine Progress Note        Chief Complaint: Inpatient Follow-up for     HPI: 64-year-old male with a history of morbid obesity, chronic venous stasis, chronic open right foot wound, ESRD, type 2 diabetes who presented to the ER for multiple falls over the last week.  Daughter gives history (Isidra Watkins [phone]) who explains that 2 weeks ago patient decided he would no longer receive dialysis nor wound care and felt that these providers were only trying to harm him.  He therefore enrolled in hospice and understood completely in the time that without dialysis he would very likely die.  Over the last few days at home he has become progressively weak and having multiple falls, his wife activated EMS tonight after he had a fall and was unable to get up.     He was brought to the ER where on arrival he was afebrile hemodynamically stable maintaining normal sats on room air.  Laboratory work noted leukocytosis of 20 K, creatinine of 2.3/BUN 34, CO2 20 and a normal potassium.  CT head showed no acute process.  He was not found to have focal deficits on exam nor any signs of traumatic injury though was noted to have an infected chronic left foot ulceration.  Blood cultures were obtained and broad-spectrum antibiotics initiated.MRI of the T-spine showed L1 superior endplate compression fracture no significant spinal canal or neural foraminal stenosis no cord abnormality.  MRI of the brain was negative  CT of the L-spine had shown chronic L4 superior endplate compression deformity with mild loss of height.Patient had refused x-ray and MRI of the L-spine neurosurgery has signed off and they are recommending bracing     Interval Hx:   Patient seen and examined this morning blood pressure better Case was discussed with patient's nurse and  on the floor.    Objective/physical exam:  General: In no acute distress, afebrile  Chest: Clear to auscultation  bilaterally  Heart: RRR, +S1, S2, no appreciable murmur  Abdomen: Soft, nontender, BS +  MSK: Warm,  Neurologic: Alert and oriented x4,   VITAL SIGNS: 24 HRS MIN & MAX LAST   Temp  Min: 97.3 °F (36.3 °C)  Max: 98.5 °F (36.9 °C) 97.9 °F (36.6 °C)   BP  Min: 131/54  Max: 163/75 (!) 147/73   Pulse  Min: 58  Max: 74  (!) 58   Resp  Min: 15  Max: 19 19   SpO2  Min: 95 %  Max: 97 % 96 %     I have reviewed the following labs:  Recent Labs   Lab 12/28/24  0406 12/29/24 0448 12/30/24  0439   WBC 14.19* 12.69* 11.87*   RBC 3.62* 3.84* 3.97*   HGB 10.6* 11.3* 11.4*   HCT 33.0* 36.0* 35.6*   MCV 91.2 93.8 89.7   MCH 29.3 29.4 28.7   MCHC 32.1* 31.4* 32.0*   RDW 14.0 14.0 13.7    331 391   MPV 9.1 9.3 9.1     Recent Labs   Lab 12/27/24 0455 12/28/24 0406 12/29/24 0448 12/30/24  0439   * 134* 136 135*   K 4.4 4.0 4.5 4.3   * 107 108* 108*   CO2 16* 18* 17* 19*   BUN 39.4* 32.8* 36.6* 35.6*   CREATININE 2.34* 1.84* 1.95* 2.03*   CALCIUM 8.0* 8.1* 7.7* 8.2*   MG 2.30  --   --   --    ALBUMIN 2.3* 2.2* 2.3*  --    ALKPHOS 215* 215* 227*  --    ALT 66* 65* 66*  --    AST 53* 50* 54*  --    BILITOT 1.1 0.8 0.8  --      Microbiology Results (last 7 days)       Procedure Component Value Units Date/Time    Wound Culture [[id number]]  (Abnormal)  (Susceptibility) Collected: 12/27/24 1620    Order Status: Completed Specimen: Wound from Foot, Right Updated: 12/30/24 0737     Wound Culture Moderate Proteus mirabilis      Moderate Enterobacter cloacae complex    Blood culture #1 **CANNOT BE ORDERED STAT** [[id number]]  (Normal) Collected: 12/25/24 2100    Order Status: Completed Specimen: Blood from Antecubital, Right Updated: 12/29/24 2201     Blood Culture No Growth At 96 Hours    Blood culture #2 **CANNOT BE ORDERED STAT** [[id number]]  (Normal) Collected: 12/25/24 2110    Order Status: Completed Specimen: Blood from Wrist, Right Updated: 12/29/24 2201     Blood Culture No Growth At 96 Hours    Anaerobic Culture  [[id number]] Collected: 12/27/24 1620    Order Status: Completed Specimen: Wound from Foot, Right Updated: 12/29/24 0741     Anaerobe Culture No Anaerobes Isolated             See below for Radiology    Assessment/Plan:  End-stage renal disease off dialysis for 2 weeks now re initiated   Revocation of hospice  Infected chronic right foot wound with wound culture growing Proteus and Enterobacter  Sepsis secondary to above  L1 superior endplate compression fracture and chronic L4 superior endplate compression deformity  Severe back pain   Morbid obesity   Chronic venous stasis   Diabetes mellitus type 2  Essential hypertension  Anemia of chronic disease    Wound culture is growing Proteus and Enterobacter  Continue with vanc and Zosyn  Podiatry following tentative plans for 5th ray amputation on Tuesday   Patient had refused x-ray and MRI of the L-spine neurosurgery has signed off and they are recommending bracing   MRI of the T-spine showed L1 superior endplate compression fracture no significant spinal canal or neural foraminal stenosis no cord abnormality.  MRI of the brain was negative  CT of the L-spine had shown chronic L4 superior endplate compression deformity with mild loss of height  Nephrology's consulted for dialysis needs  Last HbA1c of 6  Arterial ultrasound as such did not show any blockage   Blood pressure is on the higher side we will increase the dose of Coreg to 12.5 p.o. b.i.d.  Nephrology's following for dialysis needs      VTE prophylaxis: lovenox    Patient condition:  Stable/Fair/Guarded/ Serious/ Critical    Anticipated discharge and Disposition:         All diagnosis and differential diagnosis have been reviewed; assessment and plan has been documented; I have personally reviewed the labs and test results that are presently available; I have reviewed the patients medication list; I have reviewed the consulting providers response and recommendations. I have reviewed or attempted to review  medical records based upon their availability    All of the patient's questions have been  addressed and answered. Patient's is agreeable to the above stated plan. I will continue to monitor closely and make adjustments to medical management as needed.    Portions of this note dictated using EMR integrated voice recognition software, and may be subject to voice recognition errors not corrected at proofreading. Please contact writer for clarification if needed.   _____________________________________________________________________    Malnutrition Status:  Nutrition consulted. Most recent weight and BMI monitored-     Measurements:  Wt Readings from Last 1 Encounters:   12/26/24 (!) 136.1 kg (300 lb 0.7 oz)   Body mass index is 43.05 kg/m².    Patient has been screened and assessed by RD.    Malnutrition Type:  Context:    Level:      Malnutrition Characteristic Summary:       Interventions/Recommendations (treatment strategy):        Scheduled Med:   aspirin  81 mg Oral Daily    atorvastatin  40 mg Oral Daily    carvediloL  12.5 mg Oral BID    enoxparin  30 mg Subcutaneous Daily    gabapentin  300 mg Oral BID    mupirocin   Nasal BID    piperacillin-tazobactam (Zosyn) IV (PEDS and ADULTS) (extended infusion is not appropriate)  4.5 g Intravenous Q8H    tamsulosin  1 capsule Oral Daily      Continuous Infusions:     PRN Meds:    Current Facility-Administered Medications:     acetaminophen, 1,000 mg, Oral, Q8H PRN    bisacodyL, 10 mg, Rectal, Daily PRN    dextrose 50%, 12.5 g, Intravenous, PRN    dextrose 50%, 25 g, Intravenous, PRN    glucagon (human recombinant), 1 mg, Intramuscular, PRN    glucose, 16 g, Oral, PRN    glucose, 24 g, Oral, PRN    insulin aspart U-100, 0-5 Units, Subcutaneous, QID (AC + HS) PRN    labetalol, 10 mg, Intravenous, Q15 Min PRN    melatonin, 6 mg, Oral, Nightly PRN    ondansetron, 4 mg, Intravenous, Q8H PRN    sodium chloride 0.9%, 10 mL, Intravenous, PRN    Pharmacy to dose Vancomycin  consult, , , Once **AND** vancomycin - pharmacy to dose, , Intravenous, pharmacy to manage frequency     Radiology:  I have personally reviewed the following imaging and agree with the radiologist.     CV Ultrasound doppler arterial legs bilat  The right lower extremity arterial system is patent with no evidence of   focal stenosis or occlusion.  The left lower extremity arterial system is patent with no evidence of   focal stenosis or occlusion.      Luma Weber MD  Department of Hospital Medicine   [facility]   12/30/2024

## 2024-12-30 NOTE — PT/OT/SLP PROGRESS
Physical Therapy Treatment    Patient Name:  Neno Watkins   MRN:  [id number]    Recommendations:     Discharge therapy intensity: Moderate Intensity Therapy   Discharge Equipment Recommendations: to be determined by next level of care  Barriers to discharge: Impaired mobility and Ongoing medical needs    Assessment:     Neno Watkins is a 64 y.o. male admitted with a medical diagnosis of  weakness and falls, infected chronic R foot open wound, ESRD off dialysis x2 weeks seeking revocation of hospice / desire to re-initiate HD.  He presents with the following impairments/functional limitations: weakness, impaired endurance, impaired functional mobility, impaired self care skills, decreased upper extremity function, decreased lower extremity function, decreased safety awareness, pain, impaired skin, edema . Pt with improved mobility this date, maxAx2 for bed mob, min-modAx2 for transfers with RW. Able to stand pivot with R darco shoe on, minimal c/o pain. Refused TLSO. Cont to recommend moderate intensity therapy at this time.    Rehab Prognosis: Good; patient would benefit from acute skilled PT services to address these deficits and reach maximum level of function.    Recent Surgery: Procedure(s) (LRB):  AMPUTATION, TOE (Right)      Plan:     During this hospitalization, patient would benefit from acute PT services 5 x/week to address the identified rehab impairments via gait training, therapeutic activities, therapeutic exercises, neuromuscular re-education and progress toward the following goals:    Plan of Care Expires:  01/26/25    Subjective     Chief Complaint: L flank pain  Patient/Family Comments/goals: get stronger  Pain/Comfort:  Pain Rating 1:  (c/o pain on his L side/flank, no rating given)      Objective:     Communicated with RN prior to session.  Patient found HOB elevated with peripheral IV, telemetry, PureWick upon PT entry to room.     General Precautions: Standard, fall  Orthopedic Precautions:     Braces:  TLSO PRN for pain per neurosx  Respiratory Status: Room air  Blood Pressure: NT  Skin Integrity:  known R foot wound; now with new open sore at sacrum. RN called into room. Reports pt is refusing to turn. Therapy had placed order for CHRIS mattress several days ago however pt was not transferred to bed. OT to re-order.      Functional Mobility:  Bed Mobility:     Rolling Right: moderate assistance  Supine to Sit: maximal assistance and of 2 persons  Transfers:     Sit to Stand:  moderate assistance, maximal assistance, and of 2 persons with rolling walker  Bed to Chair: minimum assistance and of 2 persons with  rolling walker  using  Stand Pivot  Balance: sitting balance = modA initially due to R lean ,progressing to SBA    Therapeutic Activities/Exercises:  3 total STS performed ,maAx2 initially progressing to modAx2    Education:  Patient provided with verbal education education regarding PT role/goals/POC, fall prevention, safety awareness, and discharge/DME recommendations.  Understanding was verbalized, however additional teaching warranted.     Patient left up in chair with all lines intact, call button in reach, chair alarm on, geomat cushion, thais pad in place, and RN notified    GOALS:   Multidisciplinary Problems       Physical Therapy Goals          Problem: Physical Therapy    Goal Priority Disciplines Outcome Interventions   Physical Therapy Goal     PT, PT/OT Progressing    Description: Goals to be met by: 24     Patient will increase functional independence with mobility by performin. Supine to sit with minimal assistance  2. Rolling to Left and Right with [place].  3. Transfers TBD  4. Gait TBD                       Time Tracking:     PT Received On: 24  PT Start Time: 1112     PT Stop Time: 1150  PT Total Time (min): 38 min     Billable Minutes: Therapeutic Activity 38 min    Treatment Type: Treatment  PT/PTA: PT     Number of PTA visits since last PT visit: 1      12/30/2024

## 2024-12-30 NOTE — PROGRESS NOTES
Nephrology consult follow up note    HPI:      Neno Watkins is a 64 y.o. male  who has been on chronic hemodialysis at [facility] on Monday Wednesday Friday.  He is being followed by Dr. Kike Alanis.  He has lot of problems on his legs and somehow he got frustrated and decided to stop dialysis and go for hospice but now he has reversed it and wants to be treated and taken care of.  Nephrology consulted for ESRD management.    Interval history:     12/27/2024  Seen on hemodialysis.    12/30/2024  Seen on hemodialysis     Review of Systems:       Past medical, family, surgical, and social history reviewed and unchanged from initial consult note.     Objective:       VITAL SIGNS: 24 HR MIN & MAX LAST    Temp  Min: 97.5 °F (36.4 °C)  Max: 98.5 °F (36.9 °C)  98.4 °F (36.9 °C)        BP  Min: 126/68  Max: 163/75  126/68     Pulse  Min: 58  Max: 74  63     Resp  Min: 15  Max: 19  19    SpO2  Min: 95 %  Max: 98 %  98 %      GEN: Chronically ill appearing WM in NAD  CV: RRR +S1,S2 without murmur  PULM: CTAB, unlabored  ABD: Soft, NT/ND abdomen with NABS  EXT: No cyanosis or edema  SKIN: Warm and dry  PSYCH: Awake, alert and appropriately conversant.   Dialysis access: Left upper extremity AV graft and right IJ tunneled dialysis catheter             Component Value Date/Time     (L) 12/30/2024 0439     12/29/2024 0448     10/13/2022 0652    K 4.3 12/30/2024 0439    K 4.5 12/29/2024 0448    K 3.0 (L) 10/13/2022 0652    CO2 19 (L) 12/30/2024 0439    CO2 17 (L) 12/29/2024 0448    CO2 26 10/13/2022 0652    BUN 35.6 (H) 12/30/2024 0439    BUN 36.6 (H) 12/29/2024 0448    BUN 24 (H) 12/11/2024 0000    BUN 30 (H) 11/24/2024 0000    CREATININE 2.03 (H) 12/30/2024 0439    CREATININE 1.95 (H) 12/29/2024 0448    CREATININE 2.94 (H) 10/13/2022 0652    CALCIUM 8.2 (L) 12/30/2024 0439    CALCIUM 7.7 (L) 12/29/2024 0448    CALCIUM 9.6 10/13/2022 0652    PHOS 4.1 12/27/2024 0455            Component Value  Date/Time    WBC 11.87 (H) 12/30/2024 0439    WBC 12.69 (H) 12/29/2024 0448    HGB 11.4 (L) 12/30/2024 0439    HGB 11.3 (L) 12/29/2024 0448    HGB 11.6 (L) 12/09/2024 0000    HGB 11.4 (L) 12/02/2024 0000    HCT 35.6 (L) 12/30/2024 0439    HCT 36.0 (L) 12/29/2024 0448     12/30/2024 0439     12/29/2024 0448         Imaging reviewed      Assessment / Plan:       Active Hospital Problems    Diagnosis  POA    *Wound infection [T14.8XXA, L08.9]  Yes      Resolved Hospital Problems   No resolved problems to display.       ESRD.  Dialysis days Monday Wednesday Friday.    Noncomplaince with medical treatment  Personal history of diabetes mellitus but not on any medications will check hemoglobin A1c then decide on the need for medical management  Hypertension  Hyperlipidemia  Cellulitis of both lower extremities right more than left  Anemia of chronic kidney disease  Dialysis noncompliance  Low back pain new    Plan:  Seen on hemodialysis at 1:30 p.m..  3 L UF as tolerated.  Continue MWF hemodialysis.    Oumar Marks DO  Nephrology  [facility]  Clinic number: [phone]      Note was created with the assistance of electronic Dictation Services.  Note was reviewed to decrease errors, however, these may still be present.  Please contact me about questions or concerns with the dictation.

## 2024-12-30 NOTE — PLAN OF CARE
Problem: Occupational Therapy  Goal: Occupational Therapy Goal  Description: Goals to be met by: 1/26/24     Patient will increase functional independence with ADLs by performing:    UE Dressing with Stand-by Assistance.  LE Dressing with Stand-by Assistance.  Grooming while seated with Stand-by Assistance.  Toileting from toilet with CGA for hygiene and clothing management.   Toilet transfer with CGA  Outcome: Progressing

## 2024-12-30 NOTE — PT/OT/SLP PROGRESS
Occupational Therapy   Treatment    Name: Neno Watkins  MRN: [id number]  Admitting Diagnosis:  Wound infection    Recommendations:     Recommended therapy intensity at discharge: Moderate Intensity Therapy   Discharge Equipment Recommendations:  to be determined by next level of care  Barriers to discharge:  Decreased caregiver support    Assessment:     Neno Watkins is a 64 y.o. male with a medical diagnosis of weakness and falls, infected chronic R foot open wound, ESRD off dialysis x2 weeks seeking revocation of hospice / desire to re-initiate HD, L1 superior endplate compression fx. He presents with the following performance deficits affecting function: weakness, impaired endurance, impaired self care skills, impaired functional mobility, gait instability, impaired balance, decreased safety awareness, pain, impaired skin, decreased lower extremity function, decreased upper extremity function.     Pt with improved mobility this date, completed bed mobility with max A x2 and able to transfer to bedside chair with min-mod A x2 using RW with R Darco shoe donned. Pt with no back pain this date, refusing TLSO brace (per neuro brace is PRN for pain). Pt requires max A for LB dressing, RUE strength WFL, minimal L shoulder AROM due to pain (pt reports from fall months ago and imaging was negative). Pt remains appropriate for moderate intensity therapy upon d/c.     Rehab Prognosis:  Good; patient would benefit from acute skilled OT services to address these deficits and reach maximum level of function.       Plan:     Patient to be seen 4 x/week to address the above listed problems via self-care/home management, therapeutic activities, therapeutic exercises  Plan of Care Expires: 01/27/25  Plan of Care Reviewed with: patient    Subjective     Pain/Comfort:  Pain Rating 1: other (see comments) (pain in L abdomen, did not rate)  Pain Addressed 1: Reposition, Distraction    Objective:     Communicated with: RN prior to  session.  Patient found HOB elevated with peripheral IV, PureWick, telemetry upon OT entry to room.    General Precautions: Standard, fall    Orthopedic Precautions:N/A  Braces: TLSO (as needed for pain per neuro)  Respiratory Status: Room air     Occupational Performance:     Bed Mobility:    Patient completed Rolling/Turning to Right with moderate assistance  Patient completed Supine to Sit with maximal assistance x2    Functional Mobility/Transfers:  Patient completed Sit <> Stand Transfer with maximal assistance x2 from EOB, progressing to Mod A x2 from bedside chair with  rolling walker   Patient completed Bed <> Chair Transfer using Step Transfer technique with minimum assistance x2 with rolling walker    Activities of Daily Living:  Lower Body Dressing: maximal assistance doffing/donning brief  Toileting: total assistance   BUE strength assessed due to limited last session: RUE strength WFL, LUE AROM ~30 degrees and PROM ~90 degrees at shoulder due to pain, distal WFL    Therapeutic Positioning    OT interventions performed during the course of today's session in an effort to prevent and/or reduce acquired pressure injuries:   Education was provided on benefits of and recommendations for therapeutic positioning  Therapeutic positioning was provided at the conclusion of session to offload all bony prominences for the prevention and/or reduction of pressure injuries    Skin assessment: all bony prominences were assessed    Findings: known area of altered skin integrity at R foot new area of altered skin integrity discovered at sacrum, pt with open wound. RN called into room to assess and document. Reports pt refusing to turn with wedge in bed. CHRIS bed OT ordered on 12/27 not present in room as pt was never transferred to it. Re-ordered today.     [facility] 6 Click ADL: 15    Patient Education:  Patient provided with verbal education education regarding OT role/goals/POC, fall prevention, safety awareness,  Discharge/DME recommendations, and pressure ulcer prevention.  Understanding was verbalized, however additional teaching warranted.    Patient left up in chair with all lines intact, call button in reach, chair alarm on, geomat cushion, thais pad in place, and RN notified.    GOALS:   Multidisciplinary Problems       Occupational Therapy Goals          Problem: Occupational Therapy    Goal Priority Disciplines Outcome Interventions   Occupational Therapy Goal     OT, PT/OT Progressing    Description: Goals to be met by: 1/26/24     Patient will increase functional independence with ADLs by performing:    UE Dressing with Stand-by Assistance.  LE Dressing with Stand-by Assistance.  Grooming while seated with Stand-by Assistance.  Toileting from toilet with CGA for hygiene and clothing management.   Toilet transfer with CGA                       Time Tracking:     OT Date of Treatment: 12/30/24  OT Start Time: 1112  OT Stop Time: 1150  OT Total Time (min): 38 min    Billable Minutes:Self Care/Home Management 38 mins    OT/PAUL: OT     Number of PAUL visits since last OT visit: 0    12/30/2024

## 2024-12-30 NOTE — PLAN OF CARE
Problem: Adult Inpatient Plan of Care  Goal: Optimal Comfort and Wellbeing  Outcome: Progressing     Problem: Bariatric Environmental Safety  Goal: Safety Maintained with Care  Outcome: Progressing     Problem: Infection  Goal: Absence of Infection Signs and Symptoms  Outcome: Progressing     Problem: Wound  Goal: Optimal Coping  Outcome: Progressing     Problem: Wound  Goal: Optimal Pain Control and Function  Outcome: Progressing     Problem: Wound  Goal: Skin Health and Integrity  Outcome: Progressing     Problem: Wound  Goal: Optimal Wound Healing  Outcome: Progressing     Problem: Diabetes Comorbidity  Goal: Blood Glucose Level Within Targeted Range  Outcome: Progressing     Problem: Stroke, Ischemic (Includes Transient Ischemic Attack)  Goal: Optimal Coping  Outcome: Progressing     Problem: Stroke, Ischemic (Includes Transient Ischemic Attack)  Goal: Optimal Cognitive Function  Outcome: Progressing     Problem: Stroke, Ischemic (Includes Transient Ischemic Attack)  Goal: Optimal Functional Ability  Outcome: Progressing

## 2024-12-31 ENCOUNTER — ANESTHESIA EVENT (OUTPATIENT)
Dept: SURGERY | Facility: HOSPITAL | Age: 64
End: 2024-12-31
Payer: MEDICARE

## 2024-12-31 ENCOUNTER — ANESTHESIA (OUTPATIENT)
Dept: SURGERY | Facility: HOSPITAL | Age: 64
End: 2024-12-31
Payer: MEDICARE

## 2024-12-31 LAB
ANION GAP SERPL CALC-SCNC: 10 MEQ/L
BUN SERPL-MCNC: 26.2 MG/DL (ref 8.4–25.7)
CALCIUM SERPL-MCNC: 8.3 MG/DL (ref 8.8–10)
CHLORIDE SERPL-SCNC: 100 MMOL/L (ref 98–107)
CO2 SERPL-SCNC: 25 MMOL/L (ref 23–31)
CREAT SERPL-MCNC: 2.01 MG/DL (ref 0.72–1.25)
CREAT/UREA NIT SERPL: 13
GFR SERPLBLD CREATININE-BSD FMLA CKD-EPI: 36 ML/MIN/1.73/M2
GLUCOSE SERPL-MCNC: 92 MG/DL (ref 82–115)
HBV SURFACE AB SER QL IA: POSITIVE
HBV SURFACE AB SERPL IA-ACNC: >1000 MIU/ML
POCT GLUCOSE: 106 MG/DL (ref 70–110)
POCT GLUCOSE: 118 MG/DL (ref 70–110)
POCT GLUCOSE: 94 MG/DL (ref 70–110)
POTASSIUM SERPL-SCNC: 3.5 MMOL/L (ref 3.5–5.1)
SODIUM SERPL-SCNC: 135 MMOL/L (ref 136–145)
VANCOMYCIN SERPL-MCNC: 14.9 UG/ML (ref 15–20)

## 2024-12-31 PROCEDURE — 87075 CULTR BACTERIA EXCEPT BLOOD: CPT | Performed by: PODIATRIST

## 2024-12-31 PROCEDURE — 63600175 PHARM REV CODE 636 W HCPCS: Performed by: INTERNAL MEDICINE

## 2024-12-31 PROCEDURE — 25000003 PHARM REV CODE 250

## 2024-12-31 PROCEDURE — 80048 BASIC METABOLIC PNL TOTAL CA: CPT | Performed by: PODIATRIST

## 2024-12-31 PROCEDURE — 97530 THERAPEUTIC ACTIVITIES: CPT

## 2024-12-31 PROCEDURE — 37000009 HC ANESTHESIA EA ADD 15 MINS: Performed by: PODIATRIST

## 2024-12-31 PROCEDURE — 25000003 PHARM REV CODE 250: Performed by: PODIATRIST

## 2024-12-31 PROCEDURE — 88311 DECALCIFY TISSUE: CPT

## 2024-12-31 PROCEDURE — 63600175 PHARM REV CODE 636 W HCPCS

## 2024-12-31 PROCEDURE — 63600175 PHARM REV CODE 636 W HCPCS: Performed by: PODIATRIST

## 2024-12-31 PROCEDURE — 37000008 HC ANESTHESIA 1ST 15 MINUTES: Performed by: PODIATRIST

## 2024-12-31 PROCEDURE — 87070 CULTURE OTHR SPECIMN AEROBIC: CPT | Performed by: PODIATRIST

## 2024-12-31 PROCEDURE — 25000003 PHARM REV CODE 250: Performed by: INTERNAL MEDICINE

## 2024-12-31 PROCEDURE — 36000706: Performed by: PODIATRIST

## 2024-12-31 PROCEDURE — 80202 ASSAY OF VANCOMYCIN: CPT | Performed by: INTERNAL MEDICINE

## 2024-12-31 PROCEDURE — 63600175 PHARM REV CODE 636 W HCPCS: Mod: JZ,JG | Performed by: PODIATRIST

## 2024-12-31 PROCEDURE — 97535 SELF CARE MNGMENT TRAINING: CPT

## 2024-12-31 PROCEDURE — 0Y6M0ZF DETACHMENT AT RIGHT FOOT, PARTIAL 5TH RAY, OPEN APPROACH: ICD-10-PCS | Performed by: PODIATRIST

## 2024-12-31 PROCEDURE — 36000707: Performed by: PODIATRIST

## 2024-12-31 PROCEDURE — 27201423 OPTIME MED/SURG SUP & DEVICES STERILE SUPPLY: Performed by: PODIATRIST

## 2024-12-31 PROCEDURE — 88307 TISSUE EXAM BY PATHOLOGIST: CPT | Performed by: PODIATRIST

## 2024-12-31 PROCEDURE — 21400001 HC TELEMETRY ROOM

## 2024-12-31 PROCEDURE — 97116 GAIT TRAINING THERAPY: CPT

## 2024-12-31 PROCEDURE — 36415 COLL VENOUS BLD VENIPUNCTURE: CPT | Performed by: PODIATRIST

## 2024-12-31 RX ORDER — LIDOCAINE HYDROCHLORIDE 20 MG/ML
INJECTION INTRAVENOUS
Status: DISCONTINUED | OUTPATIENT
Start: 2024-12-31 | End: 2024-12-31

## 2024-12-31 RX ORDER — MIDAZOLAM HYDROCHLORIDE 2 MG/2ML
INJECTION, SOLUTION INTRAMUSCULAR; INTRAVENOUS
Status: COMPLETED
Start: 2024-12-31 | End: 2024-12-31

## 2024-12-31 RX ORDER — MIDAZOLAM HYDROCHLORIDE 1 MG/ML
INJECTION INTRAMUSCULAR; INTRAVENOUS
Status: DISCONTINUED | OUTPATIENT
Start: 2024-12-31 | End: 2024-12-31

## 2024-12-31 RX ORDER — PROPOFOL 10 MG/ML
VIAL (ML) INTRAVENOUS
Status: DISCONTINUED | OUTPATIENT
Start: 2024-12-31 | End: 2024-12-31

## 2024-12-31 RX ORDER — PROPOFOL 10 MG/ML
INJECTION, EMULSION INTRAVENOUS CONTINUOUS PRN
Status: DISCONTINUED | OUTPATIENT
Start: 2024-12-31 | End: 2024-12-31

## 2024-12-31 RX ORDER — BUPIVACAINE HYDROCHLORIDE 5 MG/ML
INJECTION, SOLUTION EPIDURAL; INTRACAUDAL
Status: DISCONTINUED | OUTPATIENT
Start: 2024-12-31 | End: 2024-12-31 | Stop reason: HOSPADM

## 2024-12-31 RX ADMIN — GABAPENTIN 300 MG: 300 CAPSULE ORAL at 08:12

## 2024-12-31 RX ADMIN — MIDAZOLAM HYDROCHLORIDE 2 MG: 1 INJECTION, SOLUTION INTRAMUSCULAR; INTRAVENOUS at 01:12

## 2024-12-31 RX ADMIN — TAMSULOSIN HYDROCHLORIDE 0.4 MG: 0.4 CAPSULE ORAL at 08:12

## 2024-12-31 RX ADMIN — SODIUM CHLORIDE: 9 INJECTION, SOLUTION INTRAVENOUS at 01:12

## 2024-12-31 RX ADMIN — CARVEDILOL 12.5 MG: 12.5 TABLET, FILM COATED ORAL at 08:12

## 2024-12-31 RX ADMIN — ACETAMINOPHEN 1000 MG: 500 TABLET ORAL at 08:12

## 2024-12-31 RX ADMIN — PIPERACILLIN SODIUM AND TAZOBACTAM SODIUM 4.5 G: 4; .5 INJECTION, POWDER, LYOPHILIZED, FOR SOLUTION INTRAVENOUS at 01:12

## 2024-12-31 RX ADMIN — PIPERACILLIN SODIUM AND TAZOBACTAM SODIUM 4.5 G: 4; .5 INJECTION, POWDER, LYOPHILIZED, FOR SOLUTION INTRAVENOUS at 08:12

## 2024-12-31 RX ADMIN — Medication: at 04:12

## 2024-12-31 RX ADMIN — ENOXAPARIN SODIUM 30 MG: 30 INJECTION SUBCUTANEOUS at 04:12

## 2024-12-31 RX ADMIN — ATORVASTATIN CALCIUM 40 MG: 40 TABLET, FILM COATED ORAL at 08:12

## 2024-12-31 RX ADMIN — Medication 6 MG: at 08:12

## 2024-12-31 RX ADMIN — LIDOCAINE HYDROCHLORIDE 120 MG: 20 INJECTION INTRAVENOUS at 01:12

## 2024-12-31 RX ADMIN — PROPOFOL 50 MCG/KG/MIN: 10 INJECTION, EMULSION INTRAVENOUS at 01:12

## 2024-12-31 RX ADMIN — PROPOFOL 40 MG: 10 INJECTION, EMULSION INTRAVENOUS at 01:12

## 2024-12-31 RX ADMIN — ASPIRIN 81 MG: 81 TABLET, COATED ORAL at 08:12

## 2024-12-31 RX ADMIN — Medication: at 08:12

## 2024-12-31 NOTE — PT/OT/SLP PROGRESS
Occupational Therapy   Treatment    Name: Neno Watkins  MRN: [id number]  Admitting Diagnosis:  Wound infection  Day of Surgery    Recommendations:     Recommended therapy intensity at discharge: Moderate Intensity Therapy   Discharge Equipment Recommendations:  to be determined by next level of care  Barriers to discharge:  Decreased caregiver support    Assessment:     Neno Watkins is a 64 y.o. male with a medical diagnosis of weakness and falls, infected chronic R foot open wound, ESRD off dialysis x2 weeks seeking revocation of hospice / desire to re-initiate HD, L1 superior endplate compression fx. He presents with the following performance deficits affecting function: weakness, impaired endurance, impaired self care skills, impaired functional mobility, gait instability, impaired balance, decreased safety awareness, pain, decreased lower extremity function, decreased upper extremity function.     Pt seen prior to surgery this date, improved activity tolerance this date as pt seen following PT session.     Rehab Prognosis:  Good; patient would benefit from acute skilled OT services to address these deficits and reach maximum level of function.       Plan:     Patient to be seen 4 x/week to address the above listed problems via self-care/home management, therapeutic activities, therapeutic exercises  Plan of Care Expires: 01/27/25  Plan of Care Reviewed with: patient    Subjective     Pain/Comfort:  Pain Rating 1: other (see comments) (back pain in sitting; did not rate)  Pain Addressed 1: Reposition, Distraction, Cessation of Activity    Objective:     Communicated with: RN prior to session.  Patient found sitting edge of bed with PT, telemetry, peripheral IV, PureWick upon OT entry to room.    General Precautions: Standard, fall    Orthopedic Precautions:N/A  Braces: TLSO (as needed for pain per neuro; DARCO shoe R foot)  Respiratory Status: Room air    Occupational Performance:     Bed Mobility:    Patient  completed Sit to Supine with maximal assistance x2    Functional Mobility/Transfers:  Patient completed Sit <> Stand Transfer with minimum assistance x2 with rolling walker from EOB, mod A x2 from bedside chair  Patient completed Bed <> Chair Transfer using Step Transfer technique with minimum assistance x2 with rolling walker    Activities of Daily Living:  Grooming: maximal assistance for washing hair with shampoo cap while seated at sink; SBA for washing face, mod A for combing hair  Lower Body Dressing: maximal assistance doffing shoes    Therapeutic Positioning    OT interventions performed during the course of today's session in an effort to prevent and/or reduce acquired pressure injuries:   Education was provided on benefits of and recommendations for therapeutic positioning  Therapeutic positioning was provided at the conclusion of session to offload all bony prominences for the prevention and/or reduction of pressure injuries    Skin assessment: all bony prominences were assessed    Findings: known area of altered skin integrity at sacrum and R foot    AMPA 6 Click ADL: 15    Patient Education:  Patient and daughter/s were provided with verbal education education regarding OT role/goals/POC, fall prevention, safety awareness, and pressure ulcer prevention.  Understanding was verbalized, however additional teaching warranted.    Patient left left sidelying with all lines intact, call button in reach, wedge under R side, pressure relief boots, RN notified, and daughter present.    GOALS:   Multidisciplinary Problems       Occupational Therapy Goals          Problem: Occupational Therapy    Goal Priority Disciplines Outcome Interventions   Occupational Therapy Goal     OT, PT/OT Progressing    Description: Goals to be met by: 1/26/24     Patient will increase functional independence with ADLs by performing:    UE Dressing with Stand-by Assistance.  LE Dressing with Stand-by Assistance.  Grooming while seated  with Stand-by Assistance.  Toileting from toilet with CGA for hygiene and clothing management.   Toilet transfer with CGA                       Time Tracking:     OT Date of Treatment: 12/31/24  OT Start Time: 1125  OT Stop Time: 1156  OT Total Time (min): 31 min    Billable Minutes:Self Care/Home Management 31 mins    OT/PAUL: OT     Number of PAUL visits since last OT visit: 1    12/31/2024

## 2024-12-31 NOTE — PROGRESS NOTES
Pharmacist Renal Dose Adjustment Note    Neno Watkins is a 64 y.o. male being treated with the medication Zosyn    Patient Data:    Vital Signs (Most Recent):  Temp: 97.6 °F (36.4 °C) (12/31/24 1158)  Pulse: 64 (12/31/24 1158)  Resp: 18 (12/31/24 0349)  BP: 105/65 (12/31/24 1158)  SpO2: 98 % (12/31/24 1158) Vital Signs (72h Range):  Temp:  [97.3 °F (36.3 °C)-98.7 °F (37.1 °C)]   Pulse:  [58-80]   Resp:  [15-20]   BP: (105-175)/(54-80)   SpO2:  [94 %-98 %]      Recent Labs   Lab 12/29/24  0448 12/30/24  0439 12/31/24  0344   CREATININE 1.95* 2.03* 2.01*     Serum creatinine: 2.01 mg/dL (H) 12/31/24 0344  Estimated creatinine clearance: 50.9 mL/min (A)    Zosyn 4.5 gm IVPB Q8H will be changed to Zosyn 4.5 gm IVPB Q12H based on patient's dialysis status per pharmacy protocol    Pharmacist's Name: Dana Weston  Pharmacist's Extension: [id number]

## 2024-12-31 NOTE — PROGRESS NOTES
[facility]                       [address] [place] [id number]    PATIENT NAME:       RUFINO REAL  YOB: 1960  CSN:                [id number]   MRN:                [id number]  ADMIT DATE:         12/25/2024 20:30:00  PHYSICIAN:          Rolf Hobbs DPM                            PROGRESS NOTE    DATE:  12/30/2024 00:00:00    SUBJECTIVE:  The patient was seen today, doing well, no complaints.  Awake and   alert.  No fevers or chills.    PHYSICAL EXAMINATION:  VITAL SIGNS:  Stable and he is afebrile.  EXTREMITIES: No change in the wound.  Again, he has the large fiber necrotic   wound distally with a little bit of some sclerotic tissue proximally.  Still   with some drainage from the area.  Toes are viable.    LABORATORY DATA:  Reviewed.  White cell count is down to 11, H and H are 11 and   35.  BUN and creatinine are 35 and 2.    Cultures finalized growing out Proteus and Enterobacter, and anaerobes with   Bacteroides.    ASSESSMENT:  Infected right foot wound, chronic with underlying osteomyelitis.    PLAN:  We will continue the current care.  The plan is for 5th ray amputation   tomorrow. If I am not able to close some of this wound, I will.  May require   long-term antibiotics based upon current findings.  I was contacted by pharmacy   concerning potential alteration in antibiotics.  Unfortunately, the Flagyl is   contraindicated secondary to some inactive Povidine and iodine related compound   within the drug itself, and he is allergic to such.  I will continue the Zosyn   for now.        ______________________________  Rolf Hobbs DPM    GAS/AQS  DD:  12/30/2024  Time:  06:42PM  DT:  12/30/2024  Time:  07:09PM  Job #:  [id number]      PROGRESS NOTE

## 2024-12-31 NOTE — PROGRESS NOTES
[facility] - Neurology  Wound Care    Patient Name:  Neno Watkins   MRN:  [id number]  Date: 12/31/2024  Diagnosis: Wound infection    History:     Past Medical History:   Diagnosis Date    BPH (benign prostatic hyperplasia)     Essential (primary) hypertension     Obesity, unspecified     PAD (peripheral artery disease)        Social History     Socioeconomic History    Marital status:    Tobacco Use    Smoking status: Never    Smokeless tobacco: Never   Substance and Sexual Activity    Alcohol use: Never    Drug use: Never     Social Drivers of Health     Financial Resource Strain: Low Risk  (12/26/2024)    Overall Financial Resource Strain (CARDIA)     Difficulty of Paying Living Expenses: Not hard at all   Food Insecurity: No Food Insecurity (12/26/2024)    Hunger Vital Sign     Worried About Running Out of Food in the Last Year: Never true     Ran Out of Food in the Last Year: Never true   Transportation Needs: No Transportation Needs (12/26/2024)    TRANSPORTATION NEEDS     Transportation : No   Physical Activity: Inactive (12/26/2024)    Exercise Vital Sign     Days of Exercise per Week: 0 days     Minutes of Exercise per Session: 0 min   Stress: No Stress Concern Present (12/26/2024)    [employer] - Occupational Stress Questionnaire     Feeling of Stress : Only a little   Housing Stability: Low Risk  (12/26/2024)    Housing Stability Vital Sign     Unable to Pay for Housing in the Last Year: No     Homeless in the Last Year: No       Precautions:     Allergies as of 12/25/2024 - Reviewed 12/25/2024   Allergen Reaction Noted    Butorphanol Swelling 06/21/2022    Hydrocodone-acetaminophen Other (See Comments) 06/21/2022    Povidone-iodine Shortness Of Breath and Rash 06/21/2022       WO Assessment Details/Treatment      12/31/24 1204   WO Assessment   Visit Date 12/31/24   Visit Time 1204   Consult Type New   [facility] Speciality Wound   Intervention chart  review;assessed;applied;orders   Teaching on-going        Wound 12/30/24 1127 Skin Tear Coccyx #2   Date First Assessed/Time First Assessed: 12/30/24 1127   Present on Original Admission: No  Primary Wound Type: Skin Tear  Location: Coccyx  Wound Number: #2   Wound Image    Dressing Appearance Open to air   Drainage Amount Scant   Drainage Characteristics/Odor Serous;No odor   Appearance Red;Moist   Tissue loss description Partial thickness   Black (%), Wound Tissue Color 0 %   Red (%), Wound Tissue Color 100 %   Yellow (%), Wound Tissue Color 0 %   Periwound Area Dry   Wound Edges Defined;Irregular   Wound Length (cm) 4.3 cm   Wound Width (cm) 1 cm   Wound Depth (cm) 0.1 cm   Wound Volume (cm^3) 0.43 cm^3   Wound Surface Area (cm^2) 4.3 cm^2   Care Cleansed with:;Soap and water   Dressing Applied;Absorptive Pad;Other (comment)  (Desitin)     WOCN consulted for coccyx. Discussed plan of care with nurse Peacock prior to visiting the patient. Daughter at bedside. Treatment recommendations put in place. Coccyx: Cleanse with soap and water, dry well. Apply Desitin, cover with abd pad and secure with tape BID and PRN. Nursing to continue with treatment recommendations and other preventative measures. Nursing reports patient refusal to mobilize with therapy and turn while in bed, PT concurs. Answered all questions to the satisfaction of the patient and family. Will follow up.   12/31/2024

## 2024-12-31 NOTE — TRANSFER OF CARE
"Anesthesia Transfer of Care Note    Patient: Neno Watkins    Procedure(s) Performed: Procedure(s) (LRB):  AMPUTATION, TOE (Right)    Patient location: [facility] Surgical Floor    Anesthesia Type: MAC    Transport from OR: Transported from OR on room air with adequate spontaneous ventilation    Post pain: adequate analgesia    Post assessment: no apparent anesthetic complications    Post vital signs: stable    Level of consciousness: awake, alert and oriented    Nausea/Vomiting: no nausea/vomiting    Complications: none    Transfer of care protocol was followed      Last vitals: Visit Vitals  /64 (BP Location: Right arm, Patient Position: Lying)   Pulse 68   Temp 36.4 °C (97.6 °F) (Oral)   Resp 18   Ht 5' 10" (1.778 m)   Wt (!) 136.1 kg (300 lb 0.7 oz)   SpO2 99%   BMI 43.05 kg/m²     "

## 2024-12-31 NOTE — PROGRESS NOTES
"Pharmacokinetic Assessment Follow Up: IV Vancomycin    Vancomycin serum concentration assessment(s):  The random level was drawn correctly and can be used to guide therapy at this time. The measurement is slightly below the desired definitive target range of 15 to 20 mcg/mL.    Vancomycin Regimen Plan:  Continue to pulse-dose  Hold vancomycin for today  Re-dose when the random level is less than 20 mcg/mL, next level to be drawn at 0430 on 01/01      Drug levels (last 3 results):  Recent Labs   Lab Result Units 12/30/24  0439 12/31/24  0344   Vancomycin Random ug/ml 3.7* 14.9*       Vancomycin Administrations:  vancomycin given in the last 96 hours                     vancomycin 1,500 mg in 0.9% NaCl 250 mL IVPB (admixture device) (mg) 1,500 mg New Bag 12/30/24 1225    vancomycin (VANCOCIN) 500 mg in D5W 100 mL IVPB (MB+) (mg) 500 mg New Bag 12/27/24 2016                    Pharmacy will continue to follow and monitor vancomycin.    Please contact pharmacy at extension [phone] for questions regarding this assessment.    Thank you for the consult,   Dana Weston       Patient brief summary:  Neno Watkins is a 64 y.o. male initiated on antimicrobial therapy with IV Vancomycin for treatment of skin & soft tissue infection    The patient's current regimen is pulse-dosed    Drug Allergies:   Review of patient's allergies indicates:   Allergen Reactions    Butorphanol Swelling     "it almost killed me"    Hydrocodone-acetaminophen Other (See Comments)     "They mess with my heart"    Povidone-iodine Shortness Of Breath and Rash       Actual Body Weight:  Wt Readings from Last 1 Encounters:   12/26/24 (!) 136.1 kg (300 lb 0.7 oz)       Renal Function:   Estimated Creatinine Clearance: 50.9 mL/min (A) (based on SCr of 2.01 mg/dL (H)).,     Dialysis Method (if applicable):  intermittent HD - MWF    CBC (last 72 hours):  Recent Labs   Lab Result Units 12/29/24  0448 12/30/24  0439   WBC x10(3)/mcL 12.69* 11.87*   Hgb g/dL " 11.3* 11.4*   Hct % 36.0* 35.6*   Platelet x10(3)/mcL 331 391   Mono % % 8.0 7.9   Eos % % 2.1 2.2   Basophil % % 0.5 0.4       Metabolic Panel (last 72 hours):  Recent Labs   Lab Result Units 12/29/24  0448 12/30/24  0439 12/31/24  0344   Sodium mmol/L 136 135* 135*   Potassium mmol/L 4.5 4.3 3.5   Chloride mmol/L 108* 108* 100   CO2 mmol/L 17* 19* 25   Glucose mg/dL 88 83 92   Blood Urea Nitrogen mg/dL 36.6* 35.6* 26.2*   Creatinine mg/dL 1.95* 2.03* 2.01*   Albumin g/dL 2.3*  --   --    Bilirubin Total mg/dL 0.8  --   --    ALP unit/L 227*  --   --    AST unit/L 54*  --   --    ALT unit/L 66*  --   --        Microbiologic Results:  Microbiology Results (last 7 days)       Procedure Component Value Units Date/Time    Blood culture #1 **CANNOT BE ORDERED STAT** [[id number]]  (Normal) Collected: 12/25/24 2100    Order Status: Completed Specimen: Blood from Antecubital, Right Updated: 12/30/24 2201     Blood Culture No Growth at 5 days    Blood culture #2 **CANNOT BE ORDERED STAT** [[id number]]  (Normal) Collected: 12/25/24 2110    Order Status: Completed Specimen: Blood from Wrist, Right Updated: 12/30/24 2201     Blood Culture No Growth at 5 days    Anaerobic Culture [[id number]]  (Abnormal) Collected: 12/27/24 1620    Order Status: Completed Specimen: Wound from Foot, Right Updated: 12/30/24 1140     Anaerobe Culture Bacteroides fragilis     Comment: Anaerobic sensitivity is not usually indicated except on single isolates from sterile body sites.       Wound Culture [[id number]]  (Abnormal)  (Susceptibility) Collected: 12/27/24 1620    Order Status: Completed Specimen: Wound from Foot, Right Updated: 12/30/24 0737     Wound Culture Moderate Proteus mirabilis      Moderate Enterobacter cloacae complex

## 2024-12-31 NOTE — PT/OT/SLP PROGRESS
Physical Therapy Treatment    Patient Name:  Neno Watkins   MRN:  [id number]    Recommendations:     Discharge therapy intensity: Moderate Intensity Therapy   Discharge Equipment Recommendations: to be determined by next level of care  Barriers to discharge: Impaired mobility and Ongoing medical needs    Assessment:     Neno Watkins is a 64 y.o. male admitted with a medical diagnosis of weakness and falls, infected chronic R foot open wound, ESRD off dialysis x2 weeks seeking revocation of hospice / desire to re-initiate HD.  He presents with the following impairments/functional limitations: weakness, impaired endurance, impaired functional mobility, impaired self care skills, decreased upper extremity function, decreased lower extremity function, decreased safety awareness, pain, impaired skin, edema . Seen today prior to surgery with podiatry, improved activity tolerance with ability to ambulate short distance in room with Tramaine. Remains deconditioned and appropriate for mod intensity therapy at discharge.    Rehab Prognosis: Good; patient would benefit from acute skilled PT services to address these deficits and reach maximum level of function.    Recent Surgery: Procedure(s) (LRB):  AMPUTATION, TOE (Right)      Plan:     During this hospitalization, patient would benefit from acute PT services 5 x/week to address the identified rehab impairments via gait training, therapeutic activities, therapeutic exercises, neuromuscular re-education and progress toward the following goals:    Plan of Care Expires:  01/26/25    Subjective     Chief Complaint: pain  Patient/Family Comments/goals: walk better  Pain/Comfort:  Pain Rating 1:  (c/o L sided flank pain no rating given)      Objective:     Communicated with RN prior to session.  Patient found HOB elevated with peripheral IV, telemetry, PureWick upon PT entry to room.     General Precautions: Standard, fall  Orthopedic Precautions:    Braces:    Respiratory Status:  Room air  Blood Pressure: NT  Skin Integrity:  known R foot wound      Functional Mobility:  Bed Mobility:     Rolling Right: moderate assistance  Supine to Sit: maximal assistance and of 2 persons  Transfers:     Sit to Stand:  minimum assistance and of 2 persons with rolling walker  Gait: 12ft with RW with Tramaine, cues to remain within RW and increase hip flexion in swing. 2 standing rest breaks due to fatigue.   Balance: Sitting balance = modA initially progressing to SBA    Therapeutic Activities/Exercises:  Upon standing pt c/o need to urinate, attempted to place urinal however unable due to purewick and pt urinated onto floor. Pt stood with RW during hygiene cleaning.   Direct handoff to OT while pt sat EOB to continue with session.    Education:  Patient provided with verbal education education regarding PT role/goals/POC, fall prevention, and safety awareness.  Understanding was verbalized.     Patient left sitting edge of bed with all lines intact, call button in reach, RN notified, and OT and pt's daughter present    GOALS:   Multidisciplinary Problems       Physical Therapy Goals          Problem: Physical Therapy    Goal Priority Disciplines Outcome Interventions   Physical Therapy Goal     PT, PT/OT Progressing    Description: Goals to be met by: 24     Patient will increase functional independence with mobility by performin. Supine to sit with minimal assistance  2. Rolling to Left and Right with George.  3. Sit to stand t/f standby assist with RW.  4. Bed<>chair transfer standby assist with RW.  5. Gait x 150ft with RW with standby assist.                       Time Tracking:     PT Received On: 24  PT Start Time: 1107     PT Stop Time: 1130  PT Total Time (min): 23 min     Billable Minutes: Gait Training 8 and Therapeutic Activity 15    Treatment Type: Treatment  PT/PTA: PT     Number of PTA visits since last PT visit: 2     2024

## 2024-12-31 NOTE — PROGRESS NOTES
Nephrology consult follow up note    HPI:      Neno Watkins is a 64 y.o. male  who has been on chronic hemodialysis at [facility] on Monday Wednesday Friday.  He is being followed by Dr. Kike Alanis.  He has lot of problems on his legs and somehow he got frustrated and decided to stop dialysis and go for hospice but now he has reversed it and wants to be treated and taken care of.  Nephrology consulted for ESRD management.    Interval history:     12/27/2024  Seen on hemodialysis.    12/30/2024  Seen on hemodialysis    12/31/2024  No acute events overnight.  No new complaints.  Tolerated dialysis yesterday without problem.  Continues to have mild lower extremity edema.  No chest pain, shortness of breath, nausea, vomiting.     Review of Systems:       Past medical, family, surgical, and social history reviewed and unchanged from initial consult note.     Objective:       VITAL SIGNS: 24 HR MIN & MAX LAST    Temp  Min: 97.6 °F (36.4 °C)  Max: 98.4 °F (36.9 °C)  98.1 °F (36.7 °C)        BP  Min: 121/60  Max: 175/80  (!) 164/68     Pulse  Min: 61  Max: 74  71     Resp  Min: 18  Max: 19  18    SpO2  Min: 95 %  Max: 98 %  97 %      GEN: Chronically ill appearing WM in NAD  CV: RRR +S1,S2 without murmur  PULM: CTAB, unlabored  ABD: Soft, NT/ND abdomen with NABS  EXT: No cyanosis or edema  SKIN: Warm and dry  PSYCH: Awake, alert and appropriately conversant.   Dialysis access: Left upper extremity AV graft and right IJ tunneled dialysis catheter             Component Value Date/Time     (L) 12/31/2024 0344     (L) 12/30/2024 0439     10/13/2022 0652    K 3.5 12/31/2024 0344    K 4.3 12/30/2024 0439    K 3.0 (L) 10/13/2022 0652    CO2 25 12/31/2024 0344    CO2 19 (L) 12/30/2024 0439    CO2 26 10/13/2022 0652    BUN 26.2 (H) 12/31/2024 0344    BUN 35.6 (H) 12/30/2024 0439    BUN 24 (H) 12/11/2024 0000    BUN 30 (H) 11/24/2024 0000    CREATININE 2.01 (H) 12/31/2024 0344    CREATININE 2.03 (H)  12/30/2024 0439    CREATININE 2.94 (H) 10/13/2022 0652    CALCIUM 8.3 (L) 12/31/2024 0344    CALCIUM 8.2 (L) 12/30/2024 0439    CALCIUM 9.6 10/13/2022 0652    PHOS 4.1 12/27/2024 0455            Component Value Date/Time    WBC 11.87 (H) 12/30/2024 0439    WBC 12.69 (H) 12/29/2024 0448    HGB 11.4 (L) 12/30/2024 0439    HGB 11.3 (L) 12/29/2024 0448    HGB 11.6 (L) 12/09/2024 0000    HGB 11.4 (L) 12/02/2024 0000    HCT 35.6 (L) 12/30/2024 0439    HCT 36.0 (L) 12/29/2024 0448     12/30/2024 0439     12/29/2024 0448         Imaging reviewed      Assessment / Plan:       Active Hospital Problems    Diagnosis  POA    *Wound infection [T14.8XXA, L08.9]  Yes      Resolved Hospital Problems   No resolved problems to display.       ESRD.  Dialysis days Monday Wednesday Friday.    Noncomplaince with medical treatment  Personal history of diabetes mellitus but not on any medications will check hemoglobin A1c then decide on the need for medical management  Hypertension  Hyperlipidemia  Cellulitis of both lower extremities right more than left  Anemia of chronic kidney disease  Dialysis noncompliance  Low back pain new    Plan:  No acute indication for off schedule hemodialysis today.  We will plan to dialyze tomorrow on regular MWF schedule.  He is okay to discharge from a Nephrology standpoint whenever placement has been arranged.    Oumar Marks DO  Nephrology  [facility]  Clinic number: [phone]      Note was created with the assistance of electronic Dictation Services.  Note was reviewed to decrease errors, however, these may still be present.  Please contact me about questions or concerns with the dictation.

## 2024-12-31 NOTE — ANESTHESIA PREPROCEDURE EVALUATION
12/31/2024  Neno Watkins is a 64 y.o., male.      Pre-op Assessment    I have reviewed the Patient Summary Reports.     I have reviewed the Nursing Notes. I have reviewed the NPO Status.   I have reviewed the Medications.     Review of Systems  Cardiovascular:     Hypertension          PVD hyperlipidemia                         Hypertension         Renal/:  Chronic Renal Disease        Kidney Function/Disease             Hepatic/GI:     GERD         Gerd          Endocrine:  Diabetes    Diabetes                          Physical Exam  General: Well nourished, Cooperative and Alert    Airway:  Mallampati: II   Neck ROM: Normal ROM    Chest/Lungs:  Clear to auscultation, Normal Respiratory Rate    Heart:  Rate: Normal  Rhythm: Regular Rhythm  Sounds: Normal        Anesthesia Plan  Type of Anesthesia, risks & benefits discussed:    Anesthesia Type: MAC  Intra-op Monitoring Plan: Standard ASA Monitors  Induction:  IV  Informed Consent: Informed consent signed with the Patient and all parties understand the risks and agree with anesthesia plan.  All questions answered.   ASA Score: 3  Day of Surgery Review of History & Physical: H&P Update referred to the surgeon/provider.    Ready For Surgery From Anesthesia Perspective.     .  BMP  Lab Results   Component Value Date     (L) 12/31/2024    K 3.5 12/31/2024     12/31/2024    CO2 25 12/31/2024    BUN 26.2 (H) 12/31/2024    CREATININE 2.01 (H) 12/31/2024    CALCIUM 8.3 (L) 12/31/2024    ANIONGAP 9 10/13/2022    EGFRNORACEVR 36 12/31/2024

## 2024-12-31 NOTE — PLAN OF CARE
Clinical updates sent to [facility]. Someone from [facility] is expected to come by to assess the patient today.

## 2024-12-31 NOTE — PROGRESS NOTES
[facility] Hospital Medicine Progress Note        Chief Complaint: Inpatient Follow-up for     HPI: 64-year-old male with a history of morbid obesity, chronic venous stasis, chronic open right foot wound, ESRD, type 2 diabetes who presented to the ER for multiple falls over the last week.  Daughter gives history (Isidra Watkins [phone]) who explains that 2 weeks ago patient decided he would no longer receive dialysis nor wound care and felt that these providers were only trying to harm him.  He therefore enrolled in hospice and understood completely in the time that without dialysis he would very likely die.  Over the last few days at home he has become progressively weak and having multiple falls, his wife activated EMS tonight after he had a fall and was unable to get up.     He was brought to the ER where on arrival he was afebrile hemodynamically stable maintaining normal sats on room air.  Laboratory work noted leukocytosis of 20 K, creatinine of 2.3/BUN 34, CO2 20 and a normal potassium.  CT head showed no acute process.  He was not found to have focal deficits on exam nor any signs of traumatic injury though was noted to have an infected chronic left foot ulceration.  Blood cultures were obtained and broad-spectrum antibiotics initiated.MRI of the T-spine showed L1 superior endplate compression fracture no significant spinal canal or neural foraminal stenosis no cord abnormality.  MRI of the brain was negative  CT of the L-spine had shown chronic L4 superior endplate compression deformity with mild loss of height.Patient had refused x-ray and MRI of the L-spine neurosurgery has signed off and they are recommending bracing     Interval Hx:   Patient seen and examined this morning blood pressure better Case was discussed with patient's nurse and  on the floor.    Objective/physical exam:  General: In no acute distress, afebrile  Chest: Clear to auscultation  bilaterally  Heart: RRR, +S1, S2, no appreciable murmur  Abdomen: Soft, nontender, BS +  MSK: Warm,  Neurologic: Alert and oriented x4,   VITAL SIGNS: 24 HRS MIN & MAX LAST   Temp  Min: 97.6 °F (36.4 °C)  Max: 98.4 °F (36.9 °C) 98.1 °F (36.7 °C)   BP  Min: 121/60  Max: 175/80 (!) 164/68   Pulse  Min: 61  Max: 74  71   Resp  Min: 18  Max: 19 18   SpO2  Min: 95 %  Max: 98 % 97 %     I have reviewed the following labs:  Recent Labs   Lab 12/28/24  0406 12/29/24  0448 12/30/24  0439   WBC 14.19* 12.69* 11.87*   RBC 3.62* 3.84* 3.97*   HGB 10.6* 11.3* 11.4*   HCT 33.0* 36.0* 35.6*   MCV 91.2 93.8 89.7   MCH 29.3 29.4 28.7   MCHC 32.1* 31.4* 32.0*   RDW 14.0 14.0 13.7    331 391   MPV 9.1 9.3 9.1     Recent Labs   Lab 12/27/24  0455 12/28/24  0406 12/29/24 0448 12/30/24  0439 12/31/24  0344   * 134* 136 135* 135*   K 4.4 4.0 4.5 4.3 3.5   * 107 108* 108* 100   CO2 16* 18* 17* 19* 25   BUN 39.4* 32.8* 36.6* 35.6* 26.2*   CREATININE 2.34* 1.84* 1.95* 2.03* 2.01*   CALCIUM 8.0* 8.1* 7.7* 8.2* 8.3*   MG 2.30  --   --   --   --    ALBUMIN 2.3* 2.2* 2.3*  --   --    ALKPHOS 215* 215* 227*  --   --    ALT 66* 65* 66*  --   --    AST 53* 50* 54*  --   --    BILITOT 1.1 0.8 0.8  --   --      Microbiology Results (last 7 days)       Procedure Component Value Units Date/Time    Blood culture #1 **CANNOT BE ORDERED STAT** [[id number]]  (Normal) Collected: 12/25/24 2100    Order Status: Completed Specimen: Blood from Antecubital, Right Updated: 12/30/24 2201     Blood Culture No Growth at 5 days    Blood culture #2 **CANNOT BE ORDERED STAT** [[id number]]  (Normal) Collected: 12/25/24 2110    Order Status: Completed Specimen: Blood from Wrist, Right Updated: 12/30/24 2201     Blood Culture No Growth at 5 days    Anaerobic Culture [[id number]]  (Abnormal) Collected: 12/27/24 1620    Order Status: Completed Specimen: Wound from Foot, Right Updated: 12/30/24 1140     Anaerobe Culture Bacteroides fragilis     Comment:  Anaerobic sensitivity is not usually indicated except on single isolates from sterile body sites.       Wound Culture [[id number]]  (Abnormal)  (Susceptibility) Collected: 12/27/24 1620    Order Status: Completed Specimen: Wound from Foot, Right Updated: 12/30/24 9213     Wound Culture Moderate Proteus mirabilis      Moderate Enterobacter cloacae complex             See below for Radiology    Assessment/Plan:  End-stage renal disease off dialysis for 2 weeks now re initiated   Revocation of hospice  Infected chronic right foot wound with wound culture growing Proteus and Enterobacter and Bacteroides  Sepsis secondary to above  L1 superior endplate compression fracture and chronic L4 superior endplate compression deformity  Severe back pain   Morbid obesity   Chronic venous stasis   Diabetes mellitus type 2  Essential hypertension  Anemia of chronic disease    Wound culture is growing Proteus and Enterobacter and Bacteroides  Continue with vanc and Zosyn  Will consult ID for antibiotic recommendations  Podiatry following tentative plans for 5th ray amputation on tomorrow Patient had refused x-ray and MRI of the L-spine neurosurgery has signed off and they are recommending bracing   MRI of the T-spine showed L1 superior endplate compression fracture no significant spinal canal or neural foraminal stenosis no cord abnormality.  MRI of the brain was negative  CT of the L-spine had shown chronic L4 superior endplate compression deformity with mild loss of height  Nephrology's consulted for dialysis needs  Last HbA1c of 6  Arterial ultrasound as such did not show any blockage   Nephrology's following for dialysis needs  Continue with home blood pressure medications  Continue with insulin sliding scale with Accu-Cheks AC and HS  Blood sugars are stable    VTE prophylaxis: lovenox    Patient condition:  Stable/Fair/Guarded/ Serious/ Critical    Anticipated discharge and Disposition:         All diagnosis and differential  diagnosis have been reviewed; assessment and plan has been documented; I have personally reviewed the labs and test results that are presently available; I have reviewed the patients medication list; I have reviewed the consulting providers response and recommendations. I have reviewed or attempted to review medical records based upon their availability    All of the patient's questions have been  addressed and answered. Patient's is agreeable to the above stated plan. I will continue to monitor closely and make adjustments to medical management as needed.    Portions of this note dictated using EMR integrated voice recognition software, and may be subject to voice recognition errors not corrected at proofreading. Please contact writer for clarification if needed.   _____________________________________________________________________    Malnutrition Status:  Nutrition consulted. Most recent weight and BMI monitored-     Measurements:  Wt Readings from Last 1 Encounters:   12/26/24 (!) 136.1 kg (300 lb 0.7 oz)   Body mass index is 43.05 kg/m².    Patient has been screened and assessed by RD.    Malnutrition Type:  Context:    Level:      Malnutrition Characteristic Summary:       Interventions/Recommendations (treatment strategy):        Scheduled Med:   aspirin  81 mg Oral Daily    atorvastatin  40 mg Oral Daily    carvediloL  12.5 mg Oral BID    enoxparin  30 mg Subcutaneous Daily    gabapentin  300 mg Oral BID    piperacillin-tazobactam (Zosyn) IV (PEDS and ADULTS) (extended infusion is not appropriate)  4.5 g Intravenous Q8H    tamsulosin  1 capsule Oral Daily      Continuous Infusions:     PRN Meds:    Current Facility-Administered Medications:     acetaminophen, 1,000 mg, Oral, Q8H PRN    bisacodyL, 10 mg, Rectal, Daily PRN    dextrose 50%, 12.5 g, Intravenous, PRN    dextrose 50%, 25 g, Intravenous, PRN    glucagon (human recombinant), 1 mg, Intramuscular, PRN    glucose, 16 g, Oral, PRN    glucose, 24 g,  Oral, PRN    insulin aspart U-100, 0-5 Units, Subcutaneous, QID (AC + HS) PRN    labetalol, 10 mg, Intravenous, Q15 Min PRN    melatonin, 6 mg, Oral, Nightly PRN    ondansetron, 4 mg, Intravenous, Q8H PRN    sodium chloride 0.9%, 10 mL, Intravenous, PRN    Pharmacy to dose Vancomycin consult, , , Once **AND** vancomycin - pharmacy to dose, , Intravenous, pharmacy to manage frequency     Radiology:  I have personally reviewed the following imaging and agree with the radiologist.     CV Ultrasound doppler arterial legs bilat  The right lower extremity arterial system is patent with no evidence of   focal stenosis or occlusion.  The left lower extremity arterial system is patent with no evidence of   focal stenosis or occlusion.      Luma Weber MD  Department of Hospital Medicine   [facility]   12/31/2024

## 2025-01-01 LAB
ALBUMIN SERPL-MCNC: 2.4 G/DL (ref 3.4–4.8)
BASOPHILS # BLD AUTO: 0.06 X10(3)/MCL
BASOPHILS NFR BLD AUTO: 0.4 %
BUN SERPL-MCNC: 34.4 MG/DL (ref 8.4–25.7)
CALCIUM SERPL-MCNC: 8.3 MG/DL (ref 8.8–10)
CHLORIDE SERPL-SCNC: 101 MMOL/L (ref 98–107)
CO2 SERPL-SCNC: 23 MMOL/L (ref 23–31)
CREAT SERPL-MCNC: 2.76 MG/DL (ref 0.72–1.25)
EOSINOPHIL # BLD AUTO: 0.25 X10(3)/MCL (ref 0–0.9)
EOSINOPHIL NFR BLD AUTO: 1.6 %
ERYTHROCYTE [DISTWIDTH] IN BLOOD BY AUTOMATED COUNT: 13.7 % (ref 11.5–17)
GFR SERPLBLD CREATININE-BSD FMLA CKD-EPI: 25 ML/MIN/1.73/M2
GLUCOSE SERPL-MCNC: 94 MG/DL (ref 82–115)
HCT VFR BLD AUTO: 35.2 % (ref 42–52)
HGB BLD-MCNC: 11.4 G/DL (ref 14–18)
IMM GRANULOCYTES # BLD AUTO: 0.09 X10(3)/MCL (ref 0–0.04)
IMM GRANULOCYTES NFR BLD AUTO: 0.6 %
LYMPHOCYTES # BLD AUTO: 2.21 X10(3)/MCL (ref 0.6–4.6)
LYMPHOCYTES NFR BLD AUTO: 14.6 %
MCH RBC QN AUTO: 29.2 PG (ref 27–31)
MCHC RBC AUTO-ENTMCNC: 32.4 G/DL (ref 33–36)
MCV RBC AUTO: 90.3 FL (ref 80–94)
MONOCYTES # BLD AUTO: 1.28 X10(3)/MCL (ref 0.1–1.3)
MONOCYTES NFR BLD AUTO: 8.4 %
NEUTROPHILS # BLD AUTO: 11.29 X10(3)/MCL (ref 2.1–9.2)
NEUTROPHILS NFR BLD AUTO: 74.4 %
NRBC BLD AUTO-RTO: 0 %
PHOSPHATE SERPL-MCNC: 5.3 MG/DL (ref 2.3–4.7)
PLATELET # BLD AUTO: 370 X10(3)/MCL (ref 130–400)
PMV BLD AUTO: 9.2 FL (ref 7.4–10.4)
POCT GLUCOSE: 122 MG/DL (ref 70–110)
POTASSIUM SERPL-SCNC: 3.7 MMOL/L (ref 3.5–5.1)
RBC # BLD AUTO: 3.9 X10(6)/MCL (ref 4.7–6.1)
SODIUM SERPL-SCNC: 137 MMOL/L (ref 136–145)
WBC # BLD AUTO: 15.18 X10(3)/MCL (ref 4.5–11.5)

## 2025-01-01 PROCEDURE — 99223 1ST HOSP IP/OBS HIGH 75: CPT | Mod: 95,,, | Performed by: HOSPITALIST

## 2025-01-01 PROCEDURE — 25000003 PHARM REV CODE 250: Performed by: PODIATRIST

## 2025-01-01 PROCEDURE — 36415 COLL VENOUS BLD VENIPUNCTURE: CPT | Performed by: PODIATRIST

## 2025-01-01 PROCEDURE — 63600175 PHARM REV CODE 636 W HCPCS: Performed by: PODIATRIST

## 2025-01-01 PROCEDURE — 85025 COMPLETE CBC W/AUTO DIFF WBC: CPT | Performed by: PODIATRIST

## 2025-01-01 PROCEDURE — 80069 RENAL FUNCTION PANEL: CPT | Performed by: PODIATRIST

## 2025-01-01 PROCEDURE — 21400001 HC TELEMETRY ROOM

## 2025-01-01 PROCEDURE — 25000003 PHARM REV CODE 250: Performed by: INTERNAL MEDICINE

## 2025-01-01 PROCEDURE — 25000003 PHARM REV CODE 250: Performed by: HOSPITALIST

## 2025-01-01 PROCEDURE — 80100016 HC MAINTENANCE HEMODIALYSIS

## 2025-01-01 RX ORDER — AMOXICILLIN AND CLAVULANATE POTASSIUM 875; 125 MG/1; MG/1
1 TABLET, FILM COATED ORAL EVERY 12 HOURS
Status: DISCONTINUED | OUTPATIENT
Start: 2025-01-01 | End: 2025-01-03

## 2025-01-01 RX ORDER — LEVOFLOXACIN 500 MG/1
500 TABLET, FILM COATED ORAL DAILY
Status: DISCONTINUED | OUTPATIENT
Start: 2025-01-01 | End: 2025-01-03

## 2025-01-01 RX ADMIN — CARVEDILOL 12.5 MG: 12.5 TABLET, FILM COATED ORAL at 12:01

## 2025-01-01 RX ADMIN — ASPIRIN 81 MG: 81 TABLET, COATED ORAL at 12:01

## 2025-01-01 RX ADMIN — GABAPENTIN 300 MG: 300 CAPSULE ORAL at 09:01

## 2025-01-01 RX ADMIN — CARVEDILOL 12.5 MG: 12.5 TABLET, FILM COATED ORAL at 09:01

## 2025-01-01 RX ADMIN — LEVOFLOXACIN 500 MG: 500 TABLET, FILM COATED ORAL at 04:01

## 2025-01-01 RX ADMIN — GABAPENTIN 300 MG: 300 CAPSULE ORAL at 12:01

## 2025-01-01 RX ADMIN — PIPERACILLIN SODIUM AND TAZOBACTAM SODIUM 4.5 G: 4; .5 INJECTION, POWDER, LYOPHILIZED, FOR SOLUTION INTRAVENOUS at 12:01

## 2025-01-01 RX ADMIN — Medication: at 12:01

## 2025-01-01 RX ADMIN — AMOXICILLIN AND CLAVULANATE POTASSIUM 1 TABLET: 875; 125 TABLET, FILM COATED ORAL at 04:01

## 2025-01-01 RX ADMIN — Medication 6 MG: at 09:01

## 2025-01-01 RX ADMIN — ACETAMINOPHEN 1000 MG: 500 TABLET ORAL at 09:01

## 2025-01-01 RX ADMIN — ENOXAPARIN SODIUM 30 MG: 30 INJECTION SUBCUTANEOUS at 04:01

## 2025-01-01 RX ADMIN — ACETAMINOPHEN 1000 MG: 500 TABLET ORAL at 12:01

## 2025-01-01 RX ADMIN — TAMSULOSIN HYDROCHLORIDE 0.4 MG: 0.4 CAPSULE ORAL at 12:01

## 2025-01-01 RX ADMIN — ATORVASTATIN CALCIUM 40 MG: 40 TABLET, FILM COATED ORAL at 12:01

## 2025-01-01 RX ADMIN — Medication: at 09:01

## 2025-01-01 NOTE — PROGRESS NOTES
[facility]                       [address] [place] [address]    PATIENT NAME:       RUFINO REAL  YOB: 1960  CSN:                [id number]   MRN:                [id number]  ADMIT DATE:         12/25/2024 20:30:00  PHYSICIAN:          Rolf Hobbs DPM                            PROGRESS NOTE    DATE:  01/01/2025 00:00:00    SUBJECTIVE:  The patient is seen today in dialysis.  Doing well.  Pains have   been controlled.  No other issues reported.    PHYSICAL EXAMINATION:  VITAL SIGNS:  Stable and afebrile.    LABORATORY DATA:  Reviewed.  White cell count bumped up to 15.    Dressings are clean, dry, intact.  No wound exposure today.    ASSESSMENT:  Status post 5th ray amputation right foot secondary to infected   wound, osteomyelitis.    PLAN:  We will continue with current care, antibiotics for now.  ID has been   consulted.  There were some other options.  Unfortunately, the Flagyl contains   an inactive ingredient consistent with povidone iodine which the patient has had   severe reaction to in the past and thus I have avoided placing him on this to   cover the Bacteroides.  Wait for ID input.  Continue with all other care.  We   will reassess wounds tomorrow.        ______________________________  Rolf Hobbs DPM    GAS/AQS  DD:  01/01/2025  Time:  11:14AM  DT:  01/01/2025  Time:  11:26AM  Job #:  [id number]      PROGRESS NOTE

## 2025-01-01 NOTE — CONSULTS
Infectious Disease  Patient Name Neno Watkins  64 y.o. male.  MRN: [id number]   Length of stay: 7  Chief Complaint: Fall (AASI: multiple falls over the last couple of months and left sided facial droop that family noticed but unable to state chronicity of problem. Patient reports it happens sometimes. Recently stopped dialysis and started hospice care. Equal  intact in both arms. Resp equal and unlabored. -BT)    Tele-Infectious Diseases Consultation   Audio-visual consultation  Patient Location: [facility]  Time: 85 mins      I am evaluating patient for infection. Consent obtained, including potential limitations and risks of virtual health.  Physical exam limited by the virtual care delivered. I will be using an audio and video telecommunication application via secured cellular connection  located at  [facility] to evaluate the patient. A Nurse is present in the room with the patient to assist with the evaluation.        Interval history:   1/1 - afebrile. S/p  5th ray amputation secondary to OM. Superifical cx obtained 12/27 from right alteral foot wound have several gram negatives including anaerobes. These are likely surface colonizers. The 5th ray has now been amputated and removed. Would change to amox/clav + levofloxacin which will cover all the isolates from the superficial cx and provide adequate general coverage. Patient has tolerated amoxil before. Will switch and observe for tolerance.   Assessment and Plan:   1) Sepsis  - present on admission   - leukocytosis, acute renal injury  - COVID/RSV/FLU negative  - UA not consistent with infection   - CXR 12/25 no acute process  - BCX 12/25 - ngtd  - s/p vancomycin 12/25 to 1230   - currently on pip/tazo 12/25 to present, discontinue    2) Osteomyelitis  - right 5th MT  - Tissue cx right foot 11/05 - Providencia (R-amp, cefazolin; _levofloxacin) and Proetues spp (pan-S)  - Wound Cx 12/26 - Enterobacter spp (pan-S) +  "Providencia spp (R-amp) + Proteus spp (pan-S)  - XR foot 12/26 - Fragmentation at the 5th digit about the metatarsophalangeal joint with soft tissue gas.  This could be related to penetrating injury with fracture or infection.  Correlate clinically.   - Wound Cx 12/27 - Enterobacter cloacase complex (S-cefepime, gent, levo, tmp/smx) (, Proteus mirabilis I-cefazolin)  - Podiatry consulted  - now s/p 5th ray amputation on 12/31   - patient cannot have metronidazole due to allergy  - currently on pip/tazo, discontinue  - start amox/clav bid + levofloxacin 500mg for #14 days post amputation, 12/31 to 1/13    3) Leukocytosis   - in setting of acute infection   - trending down     Discussed with patient  Discussed and seen with RN    Gracie Aguilar MD, MPH  [facility] Infectious Diseases    Thank you for this consultation. I will follow up with the patient. Please contact via Epic secure chat with any questions.       HPI:   Patient is Neno Watkins a 64 y.o. male admitted on       Patient has known  Infectious diseases consulted for evaluation and management.     Past Medical History:   Diagnosis Date    BPH (benign prostatic hyperplasia)     Essential (primary) hypertension     Obesity, unspecified     PAD (peripheral artery disease)      Past Surgical History:   Procedure Laterality Date    FISTULOGRAM Left 9/25/2023    Procedure: Fistulogram;  Surgeon: Rodrigo Soliman DO;  Location: [facility] CATH LAB;  Service: Nephrology;  Laterality: Left;    INSERTION OF TUNNELED CENTRAL VENOUS HEMODIALYSIS CATHETER Right 6/27/2022    Procedure: INSERTION, CATHETER, HEMODIALYSIS, DUAL LUMEN;  Surgeon: Molly Garcia MD;  Location: [facility];  Service: General;  Laterality: Right;     Review of patient's allergies indicates:   Allergen Reactions    Butorphanol Swelling     "it almost killed me"    Hydrocodone-acetaminophen Other (See Comments)     "They mess with my heart"    Povidone-iodine Shortness Of Breath and Rash "     Current Outpatient Medications   Medication Instructions    atorvastatin (LIPITOR) 80 mg, Nightly    carvediloL (COREG) 6.25 mg, 2 times daily    docusate sodium (COLACE) 100 mg, 2 times daily    gabapentin (NEURONTIN) 300 MG capsule 1 capsule, 2 times daily    ibuprofen (MOTRIN IB ORAL) 375 mg, Daily PRN    melatonin 5 mg, Daily PRN    multivitamin (THERAGRAN) per tablet 1 tablet, Daily    NIFEdipine (PROCARDIA-XL) 60 MG (OSM) 24 hr tablet TAKE 1 TABLET(60 MG) BY MOUTH EVERY DAY    tamsulosin (FLOMAX) 0.4 mg Cap 1 capsule, Daily    white petrolatum 41 % Oint Topical (Top), Daily       Current Facility-Administered Medications:     acetaminophen tablet 1,000 mg, 1,000 mg, Oral, Q8H PRN, Rolf Hobbs, DPM, 1,000 mg at 12/31/24 2039    aspirin EC tablet 81 mg, 81 mg, Oral, Daily, Rolf Hobbs, DPM, 81 mg at 12/31/24 0805    atorvastatin tablet 40 mg, 40 mg, Oral, Daily, Rolf Hobbs, DPM, 40 mg at 12/31/24 0805    bisacodyL suppository 10 mg, 10 mg, Rectal, Daily PRN, Rolf Hobbs, DPM    carvediloL tablet 12.5 mg, 12.5 mg, Oral, BID, Rolf Hobbs, DPM, 12.5 mg at 12/31/24 2040    dextrose 50% injection 12.5 g, 12.5 g, Intravenous, PRN, Rolf Hobbs, DPM    dextrose 50% injection 25 g, 25 g, Intravenous, PRN, Rolf Hobbs, DPM    enoxaparin injection 30 mg, 30 mg, Subcutaneous, Daily, Rolf Hobbs, DPM, 30 mg at 12/31/24 1626    gabapentin capsule 300 mg, 300 mg, Oral, BID, Luma Weber MD, 300 mg at 12/31/24 2040    glucagon (human recombinant) injection 1 mg, 1 mg, Intramuscular, PRN, Rolf Hobbs, DPM    glucose chewable tablet 16 g, 16 g, Oral, PRN, Rolf Hobbs, DPM    glucose chewable tablet 24 g, 24 g, Oral, PRN, Rolf Hobbs, JUAN PABLO    insulin aspart U-100 injection 0-5 Units, 0-5 Units, Subcutaneous, QID (AC + HS) PRN, Rolf Hobbs DPM    labetaloL injection 10 mg, 10 mg, Intravenous, Q15 Min PRN, Rolf Hobbs DPM    melatonin tablet 6  mg, 6 mg, Oral, Nightly PRN, Luma Weber MD, 6 mg at 12/31/24 2040    ondansetron injection 4 mg, 4 mg, Intravenous, Q8H PRN, Rolf Hobbs, JUAN PABLO    piperacillin-tazobactam (ZOSYN) 4.5 g in D5W 100 mL IVPB (MB+), 4.5 g, Intravenous, Q12H, Rolf Hobbs DPM, Stopped at 01/01/25 0049    sodium chloride 0.9% flush 10 mL, 10 mL, Intravenous, PRN, Rolf Hobbs, DPM    tamsulosin 24 hr capsule 0.4 mg, 1 capsule, Oral, Daily, Luma Weber MD, 0.4 mg at 12/31/24 0805    zinc oxide-cod liver oil 40 % paste, , Topical (Top), BID, Rolf Hobbs DPM, Given at 12/31/24 2041  Review of Systems   Constitutional:  Negative for chills and fever.   HENT:  Negative for congestion, ear discharge, ear pain, facial swelling, mouth sores, postnasal drip, rhinorrhea, sinus pressure, sinus pain, sneezing, sore throat and trouble swallowing.    Eyes:  Negative for discharge, redness and itching.   Respiratory:  Negative for cough, chest tightness, shortness of breath and wheezing.    Cardiovascular:  Negative for chest pain, palpitations and leg swelling.   Gastrointestinal:  Negative for abdominal distention, abdominal pain, diarrhea, nausea and vomiting.   Genitourinary:  Negative for dysuria, flank pain, frequency and urgency.   Musculoskeletal:  Negative for back pain, myalgias and neck stiffness.   Skin:  Positive for wound. Negative for rash.   Allergic/Immunologic: Negative for immunocompromised state.   Neurological:  Negative for dizziness, light-headedness and headaches.   Hematological:  Negative for adenopathy.   Psychiatric/Behavioral:  Negative for agitation, confusion and suicidal ideas. The patient is not nervous/anxious.        Objective:   Temp:  [97.6 °F (36.4 °C)-99.1 °F (37.3 °C)] 99.1 °F (37.3 °C)  Pulse:  [] 155  Resp:  [18-24] 24  SpO2:  [91 %-99 %] 94 %  BP: (105-159)/(63-77) 123/68     Physical Exam  Vitals and nursing note reviewed.   Constitutional:       General: He is not in  acute distress.     Appearance: Normal appearance.   HENT:      Head: Normocephalic and atraumatic.      Nose: Nose normal.      Mouth/Throat:      Mouth: Mucous membranes are moist.   Eyes:      General: No scleral icterus.     Conjunctiva/sclera: Conjunctivae normal.   Pulmonary:      Effort: Pulmonary effort is normal.   Musculoskeletal:         General: No swelling or deformity.   Skin:     Coloration: Skin is not pale.      Findings: Lesion present. No erythema or rash.   Neurological:      Mental Status: He is alert. Mental status is at baseline.   Psychiatric:         Mood and Affect: Mood normal.         Behavior: Behavior normal.                               Estimated Creatinine Clearance: 37.1 mL/min (A) (based on SCr of 2.76 mg/dL (H)).  Recent Labs   Lab 01/01/25  0408   WBC 15.18*        Microbiology Results (last 7 days)       Procedure Component Value Units Date/Time    Tissue Culture - Aerobic [[id number]]  (Abnormal) Collected: 12/31/24 1406    Order Status: Completed Specimen: Bone from Toe, Right Foot Updated: 01/01/25 0745     Tissue - Aerobic Culture Few Gram-negative Rods    Anaerobic Culture [[id number]] Collected: 12/31/24 1406    Order Status: Sent Specimen: Bone from Toe, Right Foot Updated: 12/31/24 1418    Blood culture #1 **CANNOT BE ORDERED STAT** [[id number]]  (Normal) Collected: 12/25/24 2100    Order Status: Completed Specimen: Blood from Antecubital, Right Updated: 12/30/24 2201     Blood Culture No Growth at 5 days    Blood culture #2 **CANNOT BE ORDERED STAT** [[id number]]  (Normal) Collected: 12/25/24 2110    Order Status: Completed Specimen: Blood from Wrist, Right Updated: 12/30/24 2201     Blood Culture No Growth at 5 days    Anaerobic Culture [[id number]]  (Abnormal) Collected: 12/27/24 1620    Order Status: Completed Specimen: Wound from Foot, Right Updated: 12/30/24 1140     Anaerobe Culture Bacteroides fragilis     Comment: Anaerobic sensitivity is not usually  indicated except on single isolates from sterile body sites.       Wound Culture [[id number]]  (Abnormal)  (Susceptibility) Collected: 12/27/24 1620    Order Status: Completed Specimen: Wound from Foot, Right Updated: 12/30/24 0737     Wound Culture Moderate Proteus mirabilis      Moderate Enterobacter cloacae complex            Significant Labs: All pertinent labs within the past 24 hours have been reviewed.    Significant Imaging: I have reviewed all relevant and available imaging results/findings within the past 24 hours.      Plan -- see top of note

## 2025-01-01 NOTE — PROGRESS NOTES
Nephrology consult follow up note    HPI:      Neno Watkins is a 64 y.o. male  who has been on chronic hemodialysis at [facility] on Monday Wednesday Friday.  He is being followed by Dr. Kike Alanis.  He has lot of problems on his legs and somehow he got frustrated and decided to stop dialysis and go for hospice but now he has reversed it and wants to be treated and taken care of.  Nephrology consulted for ESRD management.    Interval history:     12/27/2024  Seen on hemodialysis.    12/30/2024  Seen on hemodialysis    12/31/2024  No acute events overnight.  No new complaints.  Tolerated dialysis yesterday without problem.  Continues to have mild lower extremity edema.  No chest pain, shortness of breath, nausea, vomiting.    1/1/25  Seen on hemodialysis.     Review of Systems:       Past medical, family, surgical, and social history reviewed and unchanged from initial consult note.     Objective:       VITAL SIGNS: 24 HR MIN & MAX LAST    Temp  Min: 97.6 °F (36.4 °C)  Max: 99.1 °F (37.3 °C)  99.1 °F (37.3 °C)        BP  Min: 105/65  Max: 175/80  123/68     Pulse  Min: 62  Max: 181  (!) 155     Resp  Min: 18  Max: 24  (!) 24    SpO2  Min: 91 %  Max: 99 %  (!) 94 %      GEN: Chronically ill appearing WM in NAD  CV: RRR +S1,S2 without murmur  PULM: CTAB, unlabored  ABD: Soft, NT/ND abdomen with NABS  EXT: No cyanosis or edema  SKIN: Warm and dry  PSYCH: Awake, alert and appropriately conversant.   Dialysis access: Left upper extremity AV graft and right IJ tunneled dialysis catheter             Component Value Date/Time     01/01/2025 0408     (L) 12/31/2024 0344     10/13/2022 0652    K 3.7 01/01/2025 0408    K 3.5 12/31/2024 0344    K 3.0 (L) 10/13/2022 0652    CO2 23 01/01/2025 0408    CO2 25 12/31/2024 0344    CO2 26 10/13/2022 0652    BUN 34.4 (H) 01/01/2025 0408    BUN 26.2 (H) 12/31/2024 0344    BUN 24 (H) 12/11/2024 0000    BUN 30 (H) 11/24/2024 0000    CREATININE 2.76 (H)  01/01/2025 0408    CREATININE 2.01 (H) 12/31/2024 0344    CREATININE 2.94 (H) 10/13/2022 0652    CALCIUM 8.3 (L) 01/01/2025 0408    CALCIUM 8.3 (L) 12/31/2024 0344    CALCIUM 9.6 10/13/2022 0652    PHOS 5.3 (H) 01/01/2025 0408            Component Value Date/Time    WBC 15.18 (H) 01/01/2025 0408    WBC 11.87 (H) 12/30/2024 0439    HGB 11.4 (L) 01/01/2025 0408    HGB 11.4 (L) 12/30/2024 0439    HGB 11.6 (L) 12/09/2024 0000    HGB 11.4 (L) 12/02/2024 0000    HCT 35.2 (L) 01/01/2025 0408    HCT 35.6 (L) 12/30/2024 0439     01/01/2025 0408     12/30/2024 0439         Imaging reviewed      Assessment / Plan:       Active Hospital Problems    Diagnosis  POA    *Wound infection [T14.8XXA, L08.9]  Yes      Resolved Hospital Problems   No resolved problems to display.       ESRD.  Dialysis days Monday Wednesday Friday.    Noncomplaince with medical treatment  Personal history of diabetes mellitus but not on any medications will check hemoglobin A1c then decide on the need for medical management  Hypertension  Hyperlipidemia  Cellulitis of both lower extremities right more than left  Anemia of chronic kidney disease  Dialysis noncompliance  Low back pain new    Plan:  Seen on hemodialysis at 9:40 a.m..  Tolerating well.  3 L UF as tolerated.  Continue MWF hemodialysis.

## 2025-01-01 NOTE — OP NOTE
[facility]                       [address] [place] [address]    PATIENT NAME:      RUFINO REAL  YOB: 1960  CSN:               [id number]  MRN:               [id number]  ADMIT DATE:        12/25/2024 20:30:00  PHYSICIAN:         Rolf Hbobs DPM                          OPERATIVE REPORT      DATE OF SURGERY:    12/31/2024 07:39:13    SURGEON:  Rolf Hobbs DPM    PREOPERATIVE DIAGNOSIS:  Infected right foot wound with underlying   osteomyelitis.    POSTOPERATIVE DIAGNOSIS:  Infected right foot wound with underlying   osteomyelitis.    PROCEDURE:  Right 5th ray amputation.    ANESTHESIA:  Local with MAC.    HEMOSTASIS:  None.    ESTIMATED BLOOD LOSS:  Less than 20 cc.    MATERIALS:  None.    INJECTABLES:  None.    PATHOLOGY:  Debridement products and amputation products sent for gross and   micro along with bone sent for culture.    COMPLICATIONS:  None.    DESCRIPTION OF PROCEDURE:  The patient was transferred from the floor to   holding, having been n.p.o. past midnight.  History, physical, preop studies   reviewed and there were no contraindications noted.  While in holding,   appropriate IVs were started.  The patient was taken to the OR and placed in   supine position, after which appropriate anesthetics administered.  This was   supplemented with a total of 10 cc of 0.5% Marcaine plain infiltrated in a right   5th metatarsal block.  The extremity was then prepped and draped aseptically.    Attention was directed to the right foot where there was a wound subjacent the   5th metatarsal head with a small lesion proximal.  We proceeded with making a   circumferential incision around the midportion of the 5th digit and then this   extended proximal lateral along the base of the toe and then inferior to   accommodate for the plantar wound inferior margin.  We dissected out the 5th   digit,  disarticulated this.  Metatarsal head was obliterated.  We then proceeded   to make a proximal incision along the 5th metatarsal back to the proximal 1/3.    We then incised circumferentially around the plantar wound site with clean   margins full-thickness through the subcutaneous layer.  All this nonviable   tissue was removed completely.  We dissected out the 5th metatarsal.  Once we   had adequate exposure, utilizing a sagittal saw, the bone was transected in an   oblique manner.  Distal portions of the bone were sent for culture.  There was   good viability throughout.  No evidence of any retained abscess proximally.    Tissue margins appeared to be viable.  Wounds were copiously irrigated normal   saline.  Hemostasis was obtained throughout.  We were able to easily fashion a   flap from the dorsal tissues including the toe to approximate the inferior   wound.  Because of the previous infection, I did not want to closed the distal   portion of the wound, but at least the areas from the central flap area going   proximal to cover up the proximal tissues.  Once we had adequate hemostasis,   wounds were irrigated once again.  Then, the wounds were closed utilizing 2-0   nylon in simple interrupted-type fashion.  We closed proximal to distal rotating   the flap inferior and this was extended all the way to an area that we left   open, which measured approximately 1 cm.  This was then packed with a wick   drain.  There was good viability to the flap margins.  No active bleeding.    Sterile dressings were then placed.    The patient tolerated the procedure and anesthesia well and left the OR to   recovery room with vital signs stable and vascular status intact to the right   lower extremity.  Once recovered, the patient will be transferred back to the   floor for postop management.        ______________________________  JUAN PABLO Oleary/AQS  DD:  12/31/2024  Time:  02:42PM  DT:  12/31/2024  Time:   08:27PM  Job #:  [id number]      OPERATIVE REPORT

## 2025-01-01 NOTE — PROGRESS NOTES
[facility] Hospital Medicine Progress Note        Chief Complaint: Inpatient Follow-up for     HPI: 64-year-old male with a history of morbid obesity, chronic venous stasis, chronic open right foot wound, ESRD, type 2 diabetes who presented to the ER for multiple falls over the last week.  Daughter gives history (Isidra Watkins [phone]) who explains that 2 weeks ago patient decided he would no longer receive dialysis nor wound care and felt that these providers were only trying to harm him.  He therefore enrolled in hospice and understood completely in the time that without dialysis he would very likely die.  Over the last few days at home he has become progressively weak and having multiple falls, his wife activated EMS tonight after he had a fall and was unable to get up.     He was brought to the ER where on arrival he was afebrile hemodynamically stable maintaining normal sats on room air.  Laboratory work noted leukocytosis of 20 K, creatinine of 2.3/BUN 34, CO2 20 and a normal potassium.  CT head showed no acute process.  He was not found to have focal deficits on exam nor any signs of traumatic injury though was noted to have an infected chronic left foot ulceration.  Blood cultures were obtained and broad-spectrum antibiotics initiated.MRI of the T-spine showed L1 superior endplate compression fracture no significant spinal canal or neural foraminal stenosis no cord abnormality.  MRI of the brain was negative  CT of the L-spine had shown chronic L4 superior endplate compression deformity with mild loss of height.Patient had refused x-ray and MRI of the L-spine neurosurgery has signed off and they are recommending bracing   Patient is status post 5th ray amputation secondary to infected wound and osteomyelitis id has been consulted for antibiotic recommendations    Interval Hx:   Patient seen and examined this morning had just come back from dialysis  Objective/physical  exam:  General: In no acute distress, afebrile  Chest: Clear to auscultation bilaterally  Heart: RRR, +S1, S2, no appreciable murmur  Abdomen: Soft, nontender, BS +  MSK: Warm,  Neurologic: Alert and oriented x4,   VITAL SIGNS: 24 HRS MIN & MAX LAST   Temp  Min: 97.8 °F (36.6 °C)  Max: 99.1 °F (37.3 °C) 99.1 °F (37.3 °C)   BP  Min: 108/64  Max: 159/77 (!) 157/73   Pulse  Min: 62  Max: 181  (!) 155   Resp  Min: 18  Max: 24 (!) 24   SpO2  Min: 91 %  Max: 99 % (!) 94 %     I have reviewed the following labs:  Recent Labs   Lab 12/29/24  0448 12/30/24  0439 01/01/25  0408   WBC 12.69* 11.87* 15.18*   RBC 3.84* 3.97* 3.90*   HGB 11.3* 11.4* 11.4*   HCT 36.0* 35.6* 35.2*   MCV 93.8 89.7 90.3   MCH 29.4 28.7 29.2   MCHC 31.4* 32.0* 32.4*   RDW 14.0 13.7 13.7    391 370   MPV 9.3 9.1 9.2     Recent Labs   Lab 12/27/24  0455 12/28/24  0406 12/29/24  0448 12/30/24  0439 12/31/24  0344 01/01/25  0408   * 134* 136 135* 135* 137   K 4.4 4.0 4.5 4.3 3.5 3.7   * 107 108* 108* 100 101   CO2 16* 18* 17* 19* 25 23   BUN 39.4* 32.8* 36.6* 35.6* 26.2* 34.4*   CREATININE 2.34* 1.84* 1.95* 2.03* 2.01* 2.76*   CALCIUM 8.0* 8.1* 7.7* 8.2* 8.3* 8.3*   MG 2.30  --   --   --   --   --    ALBUMIN 2.3* 2.2* 2.3*  --   --  2.4*   ALKPHOS 215* 215* 227*  --   --   --    ALT 66* 65* 66*  --   --   --    AST 53* 50* 54*  --   --   --    BILITOT 1.1 0.8 0.8  --   --   --      Microbiology Results (last 7 days)       Procedure Component Value Units Date/Time    Tissue Culture - Aerobic [[id number]]  (Abnormal) Collected: 12/31/24 1406    Order Status: Completed Specimen: Bone from Toe, Right Foot Updated: 01/01/25 0745     Tissue - Aerobic Culture Few Gram-negative Rods    Anaerobic Culture [[id number]] Collected: 12/31/24 1406    Order Status: Sent Specimen: Bone from Toe, Right Foot Updated: 12/31/24 1418    Blood culture #1 **CANNOT BE ORDERED STAT** [[id number]]  (Normal) Collected: 12/25/24 2100    Order Status: Completed  Specimen: Blood from Antecubital, Right Updated: 12/30/24 2201     Blood Culture No Growth at 5 days    Blood culture #2 **CANNOT BE ORDERED STAT** [[id number]]  (Normal) Collected: 12/25/24 2110    Order Status: Completed Specimen: Blood from Wrist, Right Updated: 12/30/24 2201     Blood Culture No Growth at 5 days    Anaerobic Culture [[id number]]  (Abnormal) Collected: 12/27/24 1620    Order Status: Completed Specimen: Wound from Foot, Right Updated: 12/30/24 1140     Anaerobe Culture Bacteroides fragilis     Comment: Anaerobic sensitivity is not usually indicated except on single isolates from sterile body sites.       Wound Culture [[id number]]  (Abnormal)  (Susceptibility) Collected: 12/27/24 1620    Order Status: Completed Specimen: Wound from Foot, Right Updated: 12/30/24 0737     Wound Culture Moderate Proteus mirabilis      Moderate Enterobacter cloacae complex             See below for Radiology    Assessment/Plan:  End-stage renal disease off dialysis for 2 weeks now re initiated   Revocation of hospice  Infected chronic right foot wound with wound culture growing Proteus and Enterobacter and Bacteroides status post 5th ray amputation of the right foot  Sepsis secondary to above  L1 superior endplate compression fracture and chronic L4 superior endplate compression deformity  Severe back pain   Morbid obesity   Chronic venous stasis   Diabetes mellitus type 2  Essential hypertension  Anemia of chronic disease    Awaiting ID recommendations patient is status post 5th ray amputation of the right foot  Wound culture is growing Proteus and Enterobacter and Bacteroides  Continue with vanc and Zosyn  Patient had refused x-ray and MRI of the L-spine neurosurgery has signed off and they are recommending bracing   MRI of the T-spine showed L1 superior endplate compression fracture no significant spinal canal or neural foraminal stenosis no cord abnormality.  MRI of the brain was negative  CT of the L-spine had  shown chronic L4 superior endplate compression deformity with mild loss of height  Nephrology's consulted for dialysis needs  Last HbA1c of 6  Arterial ultrasound as such did not show any blockage   Nephrology's following for dialysis needs  Continue with home blood pressure medications  Continue with insulin sliding scale with Accu-Cheks AC and HS  Blood sugars are stable    PT and OT following they are recommending moderate intensity  VTE prophylaxis: lovenox    Patient condition:  Stable/Fair/Guarded/ Serious/ Critical    Anticipated discharge and Disposition:         All diagnosis and differential diagnosis have been reviewed; assessment and plan has been documented; I have personally reviewed the labs and test results that are presently available; I have reviewed the patients medication list; I have reviewed the consulting providers response and recommendations. I have reviewed or attempted to review medical records based upon their availability    All of the patient's questions have been  addressed and answered. Patient's is agreeable to the above stated plan. I will continue to monitor closely and make adjustments to medical management as needed.    Portions of this note dictated using EMR integrated voice recognition software, and may be subject to voice recognition errors not corrected at proofreading. Please contact writer for clarification if needed.   _____________________________________________________________________    Malnutrition Status:  Nutrition consulted. Most recent weight and BMI monitored-     Measurements:  Wt Readings from Last 1 Encounters:   12/26/24 (!) 136.1 kg (300 lb 0.7 oz)   Body mass index is 43.05 kg/m².    Patient has been screened and assessed by RD.    Malnutrition Type:  Context:    Level:      Malnutrition Characteristic Summary:       Interventions/Recommendations (treatment strategy):        Scheduled Med:   aspirin  81 mg Oral Daily    atorvastatin  40 mg Oral Daily     carvediloL  12.5 mg Oral BID    enoxparin  30 mg Subcutaneous Daily    gabapentin  300 mg Oral BID    piperacillin-tazobactam (Zosyn) IV (PEDS and ADULTS) (extended infusion is not appropriate)  4.5 g Intravenous Q12H    tamsulosin  1 capsule Oral Daily    zinc oxide-cod liver oil   Topical (Top) BID      Continuous Infusions:     PRN Meds:    Current Facility-Administered Medications:     acetaminophen, 1,000 mg, Oral, Q8H PRN    bisacodyL, 10 mg, Rectal, Daily PRN    dextrose 50%, 12.5 g, Intravenous, PRN    dextrose 50%, 25 g, Intravenous, PRN    glucagon (human recombinant), 1 mg, Intramuscular, PRN    glucose, 16 g, Oral, PRN    glucose, 24 g, Oral, PRN    insulin aspart U-100, 0-5 Units, Subcutaneous, QID (AC + HS) PRN    labetalol, 10 mg, Intravenous, Q15 Min PRN    melatonin, 6 mg, Oral, Nightly PRN    ondansetron, 4 mg, Intravenous, Q8H PRN    sodium chloride 0.9%, 10 mL, Intravenous, PRN     Radiology:  I have personally reviewed the following imaging and agree with the radiologist.     CV Ultrasound doppler arterial legs bilat  The right lower extremity arterial system is patent with no evidence of   focal stenosis or occlusion.  The left lower extremity arterial system is patent with no evidence of   focal stenosis or occlusion.      Luma Weber MD  Department of Hospital Medicine   [facility]   01/01/2025

## 2025-01-01 NOTE — NURSING
12/27/24 2000        Hemodialysis Catheter 06/27/22 2025 right internal jugular   Placement Date/Time: 06/27/22 2025   Present Prior to Hospital Arrival?: No  Skin Antisepsis: (c)   Hemodialysis Catheter Type: Temporary catheter  Location: right internal jugular  Insertion attempts (enter comment if more than 2 attempts): 1  Patien...   Line Necessity Review CRRT/HD   Site Assessment No drainage;No redness;No swelling;No warmth   Line Securement Device Secured with sutures   Dressing Type CHG impregnated dressing/sponge;Central line dressing;Transparent (Tegaderm)   Dressing Status Clean;Dry;Intact   Dressing Intervention Sterile dressing change   Date on Dressing 01/01/25   Dressing Due to be Changed 01/08/25   Venous Patency/Care normal saline locked   Arterial Patency/Care normal saline locked   Waveform Not being transduced   Post-Hemodialysis Assessment   Rinseback Volume (mL) 250 mL   Blood Volume Processed (Liters) 62 L   Dialyzer Clearance Clear   Duration of Treatment 180 minutes   Total UF (mL) 3500 mL   Net Fluid Removal 3000   Patient Response to Treatment Tolerated well   Post-Hemodialysis Comments Tx ended, Pt reinfused.

## 2025-01-02 LAB
POCT GLUCOSE: 101 MG/DL (ref 70–110)
POCT GLUCOSE: 103 MG/DL (ref 70–110)

## 2025-01-02 PROCEDURE — 21400001 HC TELEMETRY ROOM

## 2025-01-02 PROCEDURE — 25000003 PHARM REV CODE 250: Performed by: PODIATRIST

## 2025-01-02 PROCEDURE — 97530 THERAPEUTIC ACTIVITIES: CPT

## 2025-01-02 PROCEDURE — 63600175 PHARM REV CODE 636 W HCPCS: Performed by: PODIATRIST

## 2025-01-02 PROCEDURE — 99232 SBSQ HOSP IP/OBS MODERATE 35: CPT | Mod: 95,,, | Performed by: HOSPITALIST

## 2025-01-02 PROCEDURE — 97535 SELF CARE MNGMENT TRAINING: CPT

## 2025-01-02 PROCEDURE — 25000003 PHARM REV CODE 250: Performed by: INTERNAL MEDICINE

## 2025-01-02 PROCEDURE — 25000003 PHARM REV CODE 250: Performed by: HOSPITALIST

## 2025-01-02 RX ADMIN — CARVEDILOL 12.5 MG: 12.5 TABLET, FILM COATED ORAL at 09:01

## 2025-01-02 RX ADMIN — AMOXICILLIN AND CLAVULANATE POTASSIUM 1 TABLET: 875; 125 TABLET, FILM COATED ORAL at 08:01

## 2025-01-02 RX ADMIN — GABAPENTIN 300 MG: 300 CAPSULE ORAL at 08:01

## 2025-01-02 RX ADMIN — ATORVASTATIN CALCIUM 40 MG: 40 TABLET, FILM COATED ORAL at 09:01

## 2025-01-02 RX ADMIN — ENOXAPARIN SODIUM 30 MG: 30 INJECTION SUBCUTANEOUS at 05:01

## 2025-01-02 RX ADMIN — GABAPENTIN 300 MG: 300 CAPSULE ORAL at 09:01

## 2025-01-02 RX ADMIN — Medication: at 08:01

## 2025-01-02 RX ADMIN — ASPIRIN 81 MG: 81 TABLET, COATED ORAL at 09:01

## 2025-01-02 RX ADMIN — Medication: at 09:01

## 2025-01-02 RX ADMIN — TAMSULOSIN HYDROCHLORIDE 0.4 MG: 0.4 CAPSULE ORAL at 09:01

## 2025-01-02 RX ADMIN — AMOXICILLIN AND CLAVULANATE POTASSIUM 1 TABLET: 875; 125 TABLET, FILM COATED ORAL at 09:01

## 2025-01-02 RX ADMIN — CARVEDILOL 12.5 MG: 12.5 TABLET, FILM COATED ORAL at 08:01

## 2025-01-02 RX ADMIN — LEVOFLOXACIN 500 MG: 500 TABLET, FILM COATED ORAL at 09:01

## 2025-01-02 NOTE — NURSING
Patient began yelling at me to hurry and give his medications, I educated him on the importance of waiting until medications are due. I then informed him that it was time to turn on his side, he proceeds to yell that he didn't ask me to come in his room and turn him, he just wants his meds. I educated him on the importance of turning every 2 hours, he continues to refuse turns.

## 2025-01-02 NOTE — PLAN OF CARE
Problem: Physical Therapy  Goal: Physical Therapy Goal  Description: Goals to be met by: 24     Patient will increase functional independence with mobility by performin. Supine to sit with minimal assistance  2. Rolling to Left and Right with [place].  3. Sit to stand t/f standby assist with RW.  4. Bed<>chair transfer with slideboard vs. RW with minimal assistance.  5. Gait x 150ft with RW with standby assist.  6. Pt will propel MWC x 100ft with BUE with independence. (As appropriate)  Outcome: Progressing

## 2025-01-02 NOTE — ANESTHESIA POSTPROCEDURE EVALUATION
Anesthesia Post Evaluation    Patient: Neno Watkins    Procedure(s) Performed: Procedure(s) (LRB):  AMPUTATION, TOE (Right)    Final Anesthesia Type: MAC      Patient location during evaluation: PACU  Patient participation: Yes- Able to Participate  Level of consciousness: awake and alert  Post-procedure vital signs: reviewed and stable  Pain management: adequate  Airway patency: patent    PONV status at discharge: No PONV  Anesthetic complications: no      Cardiovascular status: hemodynamically stable  Respiratory status: unassisted and spontaneous ventilation  Hydration status: euvolemic  Follow-up not needed.              Vitals Value Taken Time   /69 01/02/25 0833   Temp 37.3 °C (99.1 °F) 01/02/25 0833   Pulse 79 01/02/25 0833   Resp 19 01/02/25 0833   SpO2 95 % 01/02/25 0833         No case tracking events are documented in the log.      Pain/Aylin Score: Pain Rating Prior to Med Admin: 8 (1/1/2025  9:45 PM)  Pain Rating Post Med Admin: 3 (1/1/2025 10:45 PM)

## 2025-01-02 NOTE — PT/OT/SLP PROGRESS
Occupational Therapy   Treatment    Name: Neno Watkins  MRN: [id number]  Admitting Diagnosis:  Wound infection  2 Days Post-Op    Recommendations:     Recommended therapy intensity at discharge: Moderate Intensity Therapy   Discharge Equipment Recommendations:  to be determined by next level of care  Barriers to discharge:       Assessment:     Neno Watkins is a 64 y.o. male with a medical diagnosis of weakness and falls, infected chronic R foot open wound, ESRD off dialysis x2 weeks seeking revocation of hospice / desire to re-initiate HD. Performance deficits affecting function are weakness, impaired endurance, impaired self care skills, impaired functional mobility, gait instability, impaired balance, decreased safety awareness, pain, decreased lower extremity function, decreased upper extremity function.     Pt now s/p R 5th ray amputation. Per podiatry, pt is now NWB RLE until able to re-evaluate wound healing. All original POC goals remain appropriate. Pt unable to stand from EOB while maintaining NWB precautions.     Rehab Prognosis:  Good; patient would benefit from acute skilled OT services to address these deficits and reach maximum level of function.       Plan:     Patient to be seen 4 x/week to address the above listed problems via self-care/home management, therapeutic activities, therapeutic exercises  Plan of Care Expires: 01/27/25  Plan of Care Reviewed with: patient    Subjective     Pain/Comfort:  Pain Rating 1: other (see comments) (c/o back pain but did not rate)  Pain Addressed 1: Distraction    Objective:     Communicated with: nursing prior to session.  Patient found HOB elevated with PureWick, peripheral IV, pulse ox (continuous), telemetry (PUREWICK TAPED TO SKIN) upon OT entry to room.    General Precautions: Standard, fall    Orthopedic Precautions:RLE non weight bearing  Braces: TLSO (as needed for pain per neuro; DARCO shoe R foot)  Respiratory Status: Room air     Occupational  Performance:     Bed Mobility:    Patient completed Rolling/Turning to Left with  moderate assistance  Patient completed Rolling/Turning to Right with moderate assistance  Patient completed Supine to Sit with maximal assistance and 2 persons  Patient completed Sit to Supine with maximal assistance and 2 persons     Functional Mobility/Transfers:  Attempted sit to stand from EOB, pt scooting forward instead of leaning anteriorly. Unable to maintain NWB precautions at this time.     Activities of Daily Living:  Toileting: total assistance to perform toileting hygiene after +BM    Therapeutic Positioning    OT interventions performed during the course of today's session in an effort to prevent and/or reduce acquired pressure injuries:   Education was provided on benefits of and recommendations for therapeutic positioning  Therapeutic positioning was provided at the conclusion of session to offload all bony prominences for the prevention and/or reduction of pressure injuries    Skin assessment:  pt with known redness around scrotum and groin area. Pt found with PureWick Taped to skin. Removed and discarded. Replaced purwick and placed blue chux between legs to keep PureWick in place.Desitin applied to groin/scrotum after pericare       AMPAC 6 Click ADL: 12    Patient Education:  Patient provided with verbal education education regarding post op precautions, fall prevention, safety awareness, and pressure ulcer prevention.  Understanding was verbalized, however additional teaching warranted.      Patient left  wedge under R side  with all lines intact, call button in reach, pressure relief boots, and visitor present.    GOALS:   Multidisciplinary Problems       Occupational Therapy Goals          Problem: Occupational Therapy    Goal Priority Disciplines Outcome Interventions   Occupational Therapy Goal     OT, PT/OT Progressing    Description: Goals to be met by: 1/26/24     Patient will increase functional  independence with ADLs by performing:    UE Dressing with Stand-by Assistance.  LE Dressing with Stand-by Assistance.  Grooming while seated with Stand-by Assistance.  Toileting from toilet with CGA for hygiene and clothing management.   Toilet transfer with CGA                       Time Tracking:     OT Date of Treatment: 01/02/25  OT Start Time: 1349  OT Stop Time: 1435  OT Total Time (min): 46 min    Billable Minutes:Self Care/Home Management 3 units    OT/PAUL: OT     Number of PAUL visits since last OT visit: 2 1/2/2025

## 2025-01-02 NOTE — PT/OT/SLP PROGRESS
Physical Therapy Treatment    Patient Name:  Neno Watkins   MRN:  [id number]    Recommendations:     Discharge therapy intensity: Moderate Intensity Therapy   Discharge Equipment Recommendations: to be determined by next level of care  Barriers to discharge: Impaired mobility and Ongoing medical needs    Assessment:     Neno Watkins is a 64 y.o. male admitted with a medical diagnosis of weakness and falls, infected chronic R foot open wound, ESRD off dialysis x2 weeks seeking revocation of hospice / desire to re-initiate HD.  He presents with the following impairments/functional limitations: weakness, impaired endurance, impaired functional mobility, impaired self care skills, decreased upper extremity function, decreased lower extremity function, decreased safety awareness, pain, impaired skin, edema . Pt seen today after 5th ray amputation. Per podiatry, pt now NWB RLE until able to re-evaluate wound healing. Pt unable to stand from bed this date without use of R leg. Updated POC to include slideboard t/f and wheelchair mobility if pt will remain NWB. Will continue to follow.    Rehab Prognosis: Good; patient would benefit from acute skilled PT services to address these deficits and reach maximum level of function.    Recent Surgery: Procedure(s) (LRB):  AMPUTATION, TOE (Right) 2 Days Post-Op    Plan:     During this hospitalization, patient would benefit from acute PT services 5 x/week to address the identified rehab impairments via gait training, therapeutic activities, therapeutic exercises, neuromuscular re-education and progress toward the following goals:    Plan of Care Expires:  01/26/25    Subjective     Chief Complaint: back pain  Patient/Family Comments/goals: get stronger  Pain/Comfort:  Pain Rating 1:  (c/o back pain, no rating given)      Objective:     Communicated with RN prior to session.  Patient found HOB elevated with peripheral IV, telemetry, PureWick upon PT entry to room.     General  Precautions: Standard, fall  Orthopedic Precautions:    Braces:    Respiratory Status: Room air  Blood Pressure: NT  Skin Integrity:  wound of sacrum      Functional Mobility:  Bed Mobility:     Rolling Left:  moderate assistance  Rolling Right: moderate assistance  Supine to Sit: maximal assistance and of 2 persons  Sit to Supine: maximal assistance and of 2 persons  Transfers:     Sit to Stand:  attempted, pt unable to anterior lean and push through LLE only, began sliding forward on bed with rolling walker  Balance: sitting balance = min-modA due to posterior lean    Therapeutic Activities/Exercises:  Pt found soiled with BM on arrival. Mod-maxA to roll side to side with cues for spinal pxn, totalA for pericare in sidelying  Attempted to have pt scoot to HOB in sitting but unable    Education:  Patient provided with verbal education education regarding PT role/goals/POC, post-op precautions, fall prevention, safety awareness, and pressure ulcer prevention.  Understanding was verbalized, however additional teaching warranted.     Patient left HOB elevated with all lines intact, call button in reach, wedge under R side, pressure relief boots, and RN notified    GOALS:   Multidisciplinary Problems       Physical Therapy Goals          Problem: Physical Therapy    Goal Priority Disciplines Outcome Interventions   Physical Therapy Goal     PT, PT/OT Progressing    Description: Goals to be met by: 24     Patient will increase functional independence with mobility by performin. Supine to sit with minimal assistance  2. Rolling to Left and Right with [place].  3. Sit to stand t/f standby assist with RW.  4. Bed<>chair transfer with slideboard vs. RW with minimal assistance.  5. Gait x 150ft with RW with standby assist.  6. Pt will propel MWC x 100ft with BUE with independence. (As appropriate)                       Time Tracking:     PT Received On: 25  PT Start Time: 7039     PT Stop Time:  1434  PT Total Time (min): 45 min     Billable Minutes: Therapeutic Activity 45 min    Treatment Type: Treatment  PT/PTA: PT     Number of PTA visits since last PT visit: 3     01/02/2025

## 2025-01-02 NOTE — PROGRESS NOTES
[facility] Hospital Medicine Progress Note        Chief Complaint: Inpatient Follow-up for     HPI:   64-year-old male with a history of morbid obesity, chronic venous stasis, chronic open right foot wound, ESRD, type 2 diabetes who presented to the ER for multiple falls over the last week.  Daughter gives history (Isidra Watkins [phone]) who explains that 2 weeks ago patient decided he would no longer receive dialysis nor wound care and felt that these providers were only trying to harm him.  He therefore enrolled in hospice and understood completely in the time that without dialysis he would very likely die.  Over the last few days at home he has become progressively weak and having multiple falls, his wife activated EMS tonight after he had a fall and was unable to get up.     He was brought to the ER where on arrival he was afebrile hemodynamically stable maintaining normal sats on room air.  Laboratory work noted leukocytosis of 20 K, creatinine of 2.3/BUN 34, CO2 20 and a normal potassium.  CT head showed no acute process.  He was not found to have focal deficits on exam nor any signs of traumatic injury though was noted to have an infected chronic left foot ulceration.  Blood cultures were obtained and broad-spectrum antibiotics initiated.MRI of the T-spine showed L1 superior endplate compression fracture no significant spinal canal or neural foraminal stenosis no cord abnormality.  MRI of the brain was negative      CT of the L-spine had shown chronic L4 superior endplate compression deformity with mild loss of height.Patient had refused x-ray and MRI of the L-spine neurosurgery has signed off and they are recommending bracing . MRI of the T-spine showed L1 superior endplate compression fracture no significant spinal canal or neural foraminal stenosis no cord abnormality. MRI of the brain was negative     Patient is status post 5th ray amputation on 12/31/2024 secondary to  infected wound and osteomyelitis id has been consulted for antibiotic recommendations      Interval Hx:   Alert, oriented.  T-max 99.9° last 24 hours.  He denies any pain, headaches, new complaints or concerns.  Tolerating p.o..  No family members at bedside.  At baseline he reports being independent with ADLs and IADLs. CBGs looking good    Tissue cultures from OR sample 12/31/2024 showing few Gram-negative rods      Objective/physical exam:  Vitals:    01/01/25 2004 01/01/25 2145 01/01/25 2329 01/02/25 0327   BP: (!) 138/57 (!) 138/57 113/63 132/67   Pulse: 78  73 71   Resp:       Temp: 98 °F (36.7 °C)  99.9 °F (37.7 °C) 98.1 °F (36.7 °C)   TempSrc: Oral  Oral Oral   SpO2: 96%  (!) 94% (!) 94%   Weight:       Height:         General: In no acute distress, afebrile  Respiratory: Clear to auscultation bilaterally  Cardiovascular: S1, S2, no appreciable murmur  Abdomen: Soft, nontender, BS +  Skin: Surgical site dressed  Neurologic: Alert and oriented x4, cooperate       Lab Results   Component Value Date     01/01/2025    K 3.7 01/01/2025     01/01/2025    CO2 23 01/01/2025    BUN 34.4 (H) 01/01/2025    CREATININE 2.76 (H) 01/01/2025    CALCIUM 8.3 (L) 01/01/2025    ANIONGAP 9 10/13/2022    ESTGFRAFRICA 23 10/13/2022    EGFRNONAA 9 08/06/2022      Lab Results   Component Value Date    ALT 66 (H) 12/29/2024    AST 54 (H) 12/29/2024     (H) 06/08/2021    ALKPHOS 227 (H) 12/29/2024    BILITOT 0.8 12/29/2024      Lab Results   Component Value Date    WBC 15.18 (H) 01/01/2025    HGB 11.4 (L) 01/01/2025    HCT 35.2 (L) 01/01/2025    MCV 90.3 01/01/2025     01/01/2025           Medications:   amoxicillin-clavulanate 875-125mg  1 tablet Oral Q12H    aspirin  81 mg Oral Daily    atorvastatin  40 mg Oral Daily    carvediloL  12.5 mg Oral BID    enoxparin  30 mg Subcutaneous Daily    gabapentin  300 mg Oral BID    levoFLOXacin  500 mg Oral Daily    tamsulosin  1 capsule Oral Daily    zinc oxide-cod  liver oil   Topical (Top) BID        Current Facility-Administered Medications:     acetaminophen, 1,000 mg, Oral, Q8H PRN    bisacodyL, 10 mg, Rectal, Daily PRN    dextrose 50%, 12.5 g, Intravenous, PRN    dextrose 50%, 25 g, Intravenous, PRN    glucagon (human recombinant), 1 mg, Intramuscular, PRN    glucose, 16 g, Oral, PRN    glucose, 24 g, Oral, PRN    insulin aspart U-100, 0-5 Units, Subcutaneous, QID (AC + HS) PRN    labetalol, 10 mg, Intravenous, Q15 Min PRN    melatonin, 6 mg, Oral, Nightly PRN    ondansetron, 4 mg, Intravenous, Q8H PRN    sodium chloride 0.9%, 10 mL, Intravenous, PRN     Assessment/Plan:    End-stage renal disease off dialysis for 2 weeks now re-initiated   Revocation of hospice  Infected chronic right foot wound with wound culture growing Proteus and Enterobacter and Bacteroides status post 5th ray amputation of the right foot 12/31/24  Sepsis secondary to above  L1 superior endplate compression fracture and chronic L4 superior endplate compression deformity  Severe back pain due to above    HX: Monitor obesity, chronic venous stasis, T2 dm, HTN, chronic anemia       Plan:   -tissue cultures from 12/30 showing gnr. ollow until finalized.  Prior wound culture showed Proteus and Enterobacter with bacteria rods.  Id adjusted antibiotics.  Appreciate assistance  -MRI T-spine, CT lumbar spine reviewed.  Patient reportedly refused MRI lumbar spine.  Neurosurgery had no further recommendations.  Continue analgesics as needed.  PT OT as tolerated  -HD as per schedule  -otherwise continue current medical management, insulin regimen. Monitor CBGs  - PT, OT, CM working on placement     Lovenox      Kristofer Forrest MD

## 2025-01-02 NOTE — PROGRESS NOTES
Nephrology consult follow up note    HPI:      Neno Watkins is a 64 y.o. male  who has been on chronic hemodialysis at [facility] on Monday Wednesday Friday.  He is being followed by Dr. Kike Alanis.  He has lot of problems on his legs and somehow he got frustrated and decided to stop dialysis and go for hospice but now he has reversed it and wants to be treated and taken care of.  Nephrology consulted for ESRD management.    Interval history:     12/27/2024  Seen on hemodialysis.    12/30/2024  Seen on hemodialysis    12/31/2024  No acute events overnight.  No new complaints.  Tolerated dialysis yesterday without problem.  Continues to have mild lower extremity edema.  No chest pain, shortness of breath, nausea, vomiting.    1/1/25  Seen on hemodialysis.    1/2/25  Pt is in NAD, aaox4, in bed watching tv. Denies SOB, CP, n/v/d.      Review of Systems:       Past medical, family, surgical, and social history reviewed and unchanged from initial consult note.     Objective:       VITAL SIGNS: 24 HR MIN & MAX LAST    Temp  Min: 97.9 °F (36.6 °C)  Max: 99.9 °F (37.7 °C)  99.1 °F (37.3 °C)        BP  Min: 113/63  Max: 157/73  (!) 142/69     Pulse  Min: 71  Max: 79  79     Resp  Min: 19  Max: 19  19    SpO2  Min: 92 %  Max: 96 %  95 %      GEN: Chronically ill appearing WM in NAD  CV: RRR +S1,S2 without murmur  PULM: CTAB, unlabored  ABD: Soft, NT/ND abdomen with NABS  EXT: No cyanosis or edema  SKIN: Warm and dry  PSYCH: Awake, alert and appropriately conversant.   Dialysis access: Left upper extremity AV graft with palpable pulse and right IJ tunneled dialysis catheter             Component Value Date/Time     01/01/2025 0408     (L) 12/31/2024 0344     10/13/2022 0652    K 3.7 01/01/2025 0408    K 3.5 12/31/2024 0344    K 3.0 (L) 10/13/2022 0652    CO2 23 01/01/2025 0408    CO2 25 12/31/2024 0344    CO2 26 10/13/2022 0652    BUN 34.4 (H) 01/01/2025 0408    BUN 26.2 (H) 12/31/2024 0344     BUN 24 (H) 12/11/2024 0000    BUN 30 (H) 11/24/2024 0000    CREATININE 2.76 (H) 01/01/2025 0408    CREATININE 2.01 (H) 12/31/2024 0344    CREATININE 2.94 (H) 10/13/2022 0652    CALCIUM 8.3 (L) 01/01/2025 0408    CALCIUM 8.3 (L) 12/31/2024 0344    CALCIUM 9.6 10/13/2022 0652    PHOS 5.3 (H) 01/01/2025 0408            Component Value Date/Time    WBC 15.18 (H) 01/01/2025 0408    WBC 11.87 (H) 12/30/2024 0439    HGB 11.4 (L) 01/01/2025 0408    HGB 11.4 (L) 12/30/2024 0439    HGB 11.6 (L) 12/09/2024 0000    HGB 11.4 (L) 12/02/2024 0000    HCT 35.2 (L) 01/01/2025 0408    HCT 35.6 (L) 12/30/2024 0439     01/01/2025 0408     12/30/2024 0439         Imaging reviewed      Assessment / Plan:       Active Hospital Problems    Diagnosis  POA    *Wound infection [T14.8XXA, L08.9]  Yes      Resolved Hospital Problems   No resolved problems to display.       ESRD.  Dialysis days Monday Wednesday Friday.    Noncomplaince with medical treatment  Personal history of diabetes mellitus but not on any medications will check hemoglobin A1c then decide on the need for medical management  Hypertension  Hyperlipidemia  Cellulitis of both lower extremities right more than left - s/p toe amputation 12/31/24  Anemia of chronic kidney disease  Dialysis noncompliance  Low back pain new      Plan:    Continue MWF hemodialysis while inpatient  - no indication for off schedule HD today   - labs in AM

## 2025-01-02 NOTE — PLAN OF CARE
CM received a call from Kaitlynn Mendoza, Nurse  with [facility].  Patient receives dialysis from at their facility.  Kaitlynn asked for CM to call Dialysis  Center [phone] when patient is discharged so they will know when to resume dialysis at their facility. Can speak to any nurse.  Kaitlynn also asked for H &P and DC summary be faxed to dialysis center at time of discharge. Fax number is [phone]

## 2025-01-02 NOTE — PROGRESS NOTES
Infectious Disease  Patient Name Neno Watkins  64 y.o. male.  MRN: [id number]   Length of stay: 8  Chief Complaint: Fall (AASI: multiple falls over the last couple of months and left sided facial droop that family noticed but unable to state chronicity of problem. Patient reports it happens sometimes. Recently stopped dialysis and started hospice care. Equal  intact in both arms. Resp equal and unlabored. -BT)        Tele-Infectious Diseases Consultation   Audio-visual consultation  Patient Location: [facility]  Time: 55 mins      I am evaluating patient for infection. Consent obtained, including potential limitations and risks of virtual health.  Physical exam limited by the virtual care delivered. I will be using an audio and video telecommunication application via secured cellular connection  located at  [facility] to evaluate the patient. A Nurse is present in the room with the patient to assist with the evaluation.  Tele-Infectious Diseases Consultation   Audio-visual consultation  Patient Location: [facility]  Time: 85 mins       I am evaluating patient for infection. Consent obtained, including potential limitations and risks of virtual health.  Physical exam limited by the virtual care delivered. I will be using an audio and video telecommunication application via secured cellular connection  located at  [facility] to evaluate the patient. A Nurse is present in the room with the patient to assist with the evaluation.          Interval history:   1/1 - afebrile. S/p  5th ray amputation secondary to OM. Superifical cx obtained 12/27 from right alteral foot wound have several gram negatives including anaerobes. These are likely surface colonizers. The 5th ray has now been amputated and removed. Would change to amox/clav + levofloxacin which will cover all the isolates from the superficial cx and provide adequate general coverage. Patient has  tolerated amoxil before. Will switch and observe for tolerance.   1/2 - afebrile. Tolerating oral abx without adverse effects. Continue as outlined. ID will sign off.   Assessment and Plan:   1) Sepsis  - present on admission   - leukocytosis, acute renal injury  - COVID/RSV/FLU negative  - UA not consistent with infection   - CXR 12/25 no acute process  - BCX 12/25 - ngtd  - s/p vancomycin 12/25 to 1230   - currently on pip/tazo 12/25 to present, discontinue     2) Osteomyelitis  - right 5th MT  - Tissue cx right foot 11/05 - Providencia (R-amp, cefazolin; _levofloxacin) and Proetues spp (pan-S)  - Wound Cx 12/26 - Enterobacter spp (pan-S) + Providencia spp (R-amp) + Proteus spp (pan-S)  - XR foot 12/26 - Fragmentation at the 5th digit about the metatarsophalangeal joint with soft tissue gas.  This could be related to penetrating injury with fracture or infection.  Correlate clinically.   - Wound Cx 12/27 - Enterobacter cloacase complex (S-cefepime, gent, levo, tmp/smx) (, Proteus mirabilis I-cefazolin)  - note these are superficial cultures and likely surface contaminants   - Podiatry consulted  - now s/p 5th ray amputation on 12/31   - patient cannot have metronidazole due to allergy  - currently on  amox/clav bid + levofloxacin 500mg for #14 days post amputation, 12/31 to 1/13     3) Leukocytosis   - in setting of acute infection   - trending down      Discussed with patient  Discussed and seen with ELVIRA Aguilar MD, MPH  [facility]     Thank you for this consultation. I will follow up with the patient. Please contact via Epic secure chat with any questions.         HPI:   Patient is Neno Watkins a 64 y.o. male admitted on 12/25 with complaint of weakness,  slip and fall while getting out of chair. Patient was reportedly on home hospice after stopping HD 2 weeks prior, he has now reversed that decision. Upon evaluation. Patient with leukocytosis, infected right foot  "wound. He was initiated on empiric antimicrobials. Podiatry consulted and taken to OR on 12/31 for 5th ray amputation. Patient has drug allergies and this limits which antibiotics he can take. Patient has known hypertension, PAD, ESRD on HD and obesity. Infectious diseases consulted for evaluation and management.     Past Medical History:   Diagnosis Date    BPH (benign prostatic hyperplasia)     Essential (primary) hypertension     Obesity, unspecified     PAD (peripheral artery disease)      Past Surgical History:   Procedure Laterality Date    FISTULOGRAM Left 9/25/2023    Procedure: Fistulogram;  Surgeon: Rodrigo Soliman DO;  Location: [facility] CATH LAB;  Service: Nephrology;  Laterality: Left;    INSERTION OF TUNNELED CENTRAL VENOUS HEMODIALYSIS CATHETER Right 6/27/2022    Procedure: INSERTION, CATHETER, HEMODIALYSIS, DUAL LUMEN;  Surgeon: Molly aGrcia MD;  Location: [facility] OR;  Service: General;  Laterality: Right;    TOE AMPUTATION Right 12/31/2024    Procedure: AMPUTATION, TOE;  Surgeon: Rolf Hobbs DPM;  Location: [facility] OR;  Service: Podiatry;  Laterality: Right;     Review of patient's allergies indicates:   Allergen Reactions    Butorphanol Swelling     "it almost killed me"    Hydrocodone-acetaminophen Other (See Comments)     "They mess with my heart"    Povidone-iodine Shortness Of Breath and Rash     Current Outpatient Medications   Medication Instructions    atorvastatin (LIPITOR) 80 mg, Nightly    carvediloL (COREG) 6.25 mg, 2 times daily    docusate sodium (COLACE) 100 mg, 2 times daily    gabapentin (NEURONTIN) 300 MG capsule 1 capsule, 2 times daily    ibuprofen (MOTRIN IB ORAL) 375 mg, Daily PRN    melatonin 5 mg, Daily PRN    multivitamin (THERAGRAN) per tablet 1 tablet, Daily    NIFEdipine (PROCARDIA-XL) 60 MG (OSM) 24 hr tablet TAKE 1 TABLET(60 MG) BY MOUTH EVERY DAY    tamsulosin (FLOMAX) 0.4 mg Cap 1 capsule, Daily    white petrolatum 41 % Oint Topical (Top), Daily       Current " Facility-Administered Medications:     acetaminophen tablet 1,000 mg, 1,000 mg, Oral, Q8H PRN, Rolf Hobbs, DPM, 1,000 mg at 01/01/25 2145    amoxicillin-clavulanate 875-125mg per tablet 1 tablet, 1 tablet, Oral, Q12H, Gracie Aguilar MD, 1 tablet at 01/01/25 1632    aspirin EC tablet 81 mg, 81 mg, Oral, Daily, Rolf Hobbs, DPM, 81 mg at 01/01/25 1211    atorvastatin tablet 40 mg, 40 mg, Oral, Daily, Rolf Hobbs, DPM, 40 mg at 01/01/25 1211    bisacodyL suppository 10 mg, 10 mg, Rectal, Daily PRN, Rolf Hobbs, DPM    carvediloL tablet 12.5 mg, 12.5 mg, Oral, BID, Rolf Hobbs, DPM, 12.5 mg at 01/01/25 2145    dextrose 50% injection 12.5 g, 12.5 g, Intravenous, PRN, Rolf Hobbs A, DPM    dextrose 50% injection 25 g, 25 g, Intravenous, PRN, Rolf Hobbs A, DPM    enoxaparin injection 30 mg, 30 mg, Subcutaneous, Daily, Rolf Hobbs, DPM, 30 mg at 01/01/25 1633    gabapentin capsule 300 mg, 300 mg, Oral, BID, Luma Weber MD, 300 mg at 01/01/25 2145    glucagon (human recombinant) injection 1 mg, 1 mg, Intramuscular, PRN, Rolf Hobbs, DPM    glucose chewable tablet 16 g, 16 g, Oral, PRN, Rolf Hobbs A, DPM    glucose chewable tablet 24 g, 24 g, Oral, PRN, Rolf Hobbs A, DPM    insulin aspart U-100 injection 0-5 Units, 0-5 Units, Subcutaneous, QID (AC + HS) PRN, Rolf Hobbs, DPM    labetaloL injection 10 mg, 10 mg, Intravenous, Q15 Min PRN, Rolf Hobbs, DPM    levoFLOXacin tablet 500 mg, 500 mg, Oral, Daily, Gracie Aguilar MD, 500 mg at 01/01/25 1633    melatonin tablet 6 mg, 6 mg, Oral, Nightly PRN, Luma Weber MD, 6 mg at 01/01/25 2145    ondansetron injection 4 mg, 4 mg, Intravenous, Q8H PRN, Rolf Hobbs DPM    sodium chloride 0.9% flush 10 mL, 10 mL, Intravenous, PRN, Rolf Hobbs DPM    tamsulosin 24 hr capsule 0.4 mg, 1 capsule, Oral, Daily, Luma Weber MD, 0.4 mg at 01/01/25 1212    zinc  oxide-cod liver oil 40 % paste, , Topical (Top), BID, Rolf HobbsJUAN PABLO, Given at 01/01/25 2100  Review of Systems   Constitutional:  Negative for chills and fever.   HENT:  Negative for congestion, ear discharge, ear pain, facial swelling, mouth sores, postnasal drip, rhinorrhea, sinus pressure, sinus pain, sneezing, sore throat and trouble swallowing.    Eyes:  Negative for discharge, redness and itching.   Respiratory:  Negative for cough, chest tightness, shortness of breath and wheezing.    Cardiovascular:  Negative for chest pain, palpitations and leg swelling.   Gastrointestinal:  Negative for abdominal distention, abdominal pain, diarrhea, nausea and vomiting.   Genitourinary:  Negative for dysuria, flank pain, frequency and urgency.   Musculoskeletal:  Negative for back pain, myalgias and neck stiffness.   Skin:  Positive for wound. Negative for rash.   Allergic/Immunologic: Negative for immunocompromised state.   Neurological:  Negative for dizziness, light-headedness and headaches.   Hematological:  Negative for adenopathy.   Psychiatric/Behavioral:  Negative for agitation, confusion and suicidal ideas. The patient is not nervous/anxious.        Objective:   Temp:  [97.9 °F (36.6 °C)-99.9 °F (37.7 °C)] 98.1 °F (36.7 °C)  Pulse:  [71-78] 71  SpO2:  [92 %-96 %] 94 %  BP: (113-157)/(57-76) 132/67     Physical Exam  Vitals and nursing note reviewed.   Constitutional:       General: He is not in acute distress.     Appearance: Normal appearance.   HENT:      Head: Normocephalic and atraumatic.      Nose: Nose normal.      Mouth/Throat:      Mouth: Mucous membranes are moist.   Eyes:      General: No scleral icterus.     Conjunctiva/sclera: Conjunctivae normal.   Pulmonary:      Effort: Pulmonary effort is normal.   Musculoskeletal:         General: No swelling or deformity.   Skin:     Coloration: Skin is not pale.      Findings: No erythema, lesion or rash.      Comments: Dressing c/d/I    Neurological:       Mental Status: He is alert. Mental status is at baseline.   Psychiatric:         Mood and Affect: Mood normal.         Behavior: Behavior normal.         Estimated Creatinine Clearance: 37.1 mL/min (A) (based on SCr of 2.76 mg/dL (H)).  Recent Labs   Lab 01/01/25  0408   WBC 15.18*        Microbiology Results (last 7 days)       Procedure Component Value Units Date/Time    Tissue Culture - Aerobic [[id number]]  (Abnormal) Collected: 12/31/24 1406    Order Status: Completed Specimen: Bone from Toe, Right Foot Updated: 01/01/25 0745     Tissue - Aerobic Culture Few Gram-negative Rods    Anaerobic Culture [[id number]] Collected: 12/31/24 1406    Order Status: Sent Specimen: Bone from Toe, Right Foot Updated: 12/31/24 1418    Blood culture #1 **CANNOT BE ORDERED STAT** [[id number]]  (Normal) Collected: 12/25/24 2100    Order Status: Completed Specimen: Blood from Antecubital, Right Updated: 12/30/24 2201     Blood Culture No Growth at 5 days    Blood culture #2 **CANNOT BE ORDERED STAT** [[id number]]  (Normal) Collected: 12/25/24 2110    Order Status: Completed Specimen: Blood from Wrist, Right Updated: 12/30/24 2201     Blood Culture No Growth at 5 days    Anaerobic Culture [[id number]]  (Abnormal) Collected: 12/27/24 1620    Order Status: Completed Specimen: Wound from Foot, Right Updated: 12/30/24 1140     Anaerobe Culture Bacteroides fragilis     Comment: Anaerobic sensitivity is not usually indicated except on single isolates from sterile body sites.       Wound Culture [[id number]]  (Abnormal)  (Susceptibility) Collected: 12/27/24 1620    Order Status: Completed Specimen: Wound from Foot, Right Updated: 12/30/24 0737     Wound Culture Moderate Proteus mirabilis      Moderate Enterobacter cloacae complex            Significant Labs: All pertinent labs within the past 24 hours have been reviewed.    Significant Imaging: I have reviewed all relevant and available imaging results/findings within the past  24 hours.      Plan -- see top of note

## 2025-01-02 NOTE — PLAN OF CARE
Clinical updates sent to [facility]. Facility's DON is coming to assess this patient today.

## 2025-01-03 LAB
ALBUMIN SERPL-MCNC: 2.5 G/DL (ref 3.4–4.8)
ALBUMIN/GLOB SERPL: 0.6 RATIO (ref 1.1–2)
ALP SERPL-CCNC: 233 UNIT/L (ref 40–150)
ALT SERPL-CCNC: 44 UNIT/L (ref 0–55)
ANION GAP SERPL CALC-SCNC: 14 MEQ/L
AST SERPL-CCNC: 35 UNIT/L (ref 5–34)
BACTERIA TISS AEROBE CULT: ABNORMAL
BASOPHILS # BLD AUTO: 0.06 X10(3)/MCL
BASOPHILS NFR BLD AUTO: 0.5 %
BILIRUB SERPL-MCNC: 0.6 MG/DL
BUN SERPL-MCNC: 53.4 MG/DL (ref 8.4–25.7)
CALCIUM SERPL-MCNC: 8.4 MG/DL (ref 8.8–10)
CHLORIDE SERPL-SCNC: 95 MMOL/L (ref 98–107)
CO2 SERPL-SCNC: 24 MMOL/L (ref 23–31)
CREAT SERPL-MCNC: 3.77 MG/DL (ref 0.72–1.25)
CREAT/UREA NIT SERPL: 14
EOSINOPHIL # BLD AUTO: 0.18 X10(3)/MCL (ref 0–0.9)
EOSINOPHIL NFR BLD AUTO: 1.4 %
ERYTHROCYTE [DISTWIDTH] IN BLOOD BY AUTOMATED COUNT: 13.9 % (ref 11.5–17)
GFR SERPLBLD CREATININE-BSD FMLA CKD-EPI: 17 ML/MIN/1.73/M2
GLOBULIN SER-MCNC: 4.4 GM/DL (ref 2.4–3.5)
GLUCOSE SERPL-MCNC: 78 MG/DL (ref 82–115)
HCT VFR BLD AUTO: 35.7 % (ref 42–52)
HGB BLD-MCNC: 11.7 G/DL (ref 14–18)
IMM GRANULOCYTES # BLD AUTO: 0.07 X10(3)/MCL (ref 0–0.04)
IMM GRANULOCYTES NFR BLD AUTO: 0.5 %
LYMPHOCYTES # BLD AUTO: 2.79 X10(3)/MCL (ref 0.6–4.6)
LYMPHOCYTES NFR BLD AUTO: 21.2 %
MAGNESIUM SERPL-MCNC: 2.5 MG/DL (ref 1.6–2.6)
MCH RBC QN AUTO: 29.3 PG (ref 27–31)
MCHC RBC AUTO-ENTMCNC: 32.8 G/DL (ref 33–36)
MCV RBC AUTO: 89.5 FL (ref 80–94)
MONOCYTES # BLD AUTO: 1.07 X10(3)/MCL (ref 0.1–1.3)
MONOCYTES NFR BLD AUTO: 8.1 %
NEUTROPHILS # BLD AUTO: 8.96 X10(3)/MCL (ref 2.1–9.2)
NEUTROPHILS NFR BLD AUTO: 68.3 %
NRBC BLD AUTO-RTO: 0 %
PLATELET # BLD AUTO: 332 X10(3)/MCL (ref 130–400)
PMV BLD AUTO: 9.4 FL (ref 7.4–10.4)
POCT GLUCOSE: 112 MG/DL (ref 70–110)
POCT GLUCOSE: 115 MG/DL (ref 70–110)
POCT GLUCOSE: 90 MG/DL (ref 70–110)
POTASSIUM SERPL-SCNC: 3.9 MMOL/L (ref 3.5–5.1)
PROT SERPL-MCNC: 6.9 GM/DL (ref 5.8–7.6)
PSYCHE PATHOLOGY RESULT: NORMAL
RBC # BLD AUTO: 3.99 X10(6)/MCL (ref 4.7–6.1)
SODIUM SERPL-SCNC: 133 MMOL/L (ref 136–145)
WBC # BLD AUTO: 13.13 X10(3)/MCL (ref 4.5–11.5)

## 2025-01-03 PROCEDURE — 63600175 PHARM REV CODE 636 W HCPCS: Performed by: STUDENT IN AN ORGANIZED HEALTH CARE EDUCATION/TRAINING PROGRAM

## 2025-01-03 PROCEDURE — 85025 COMPLETE CBC W/AUTO DIFF WBC: CPT | Performed by: INTERNAL MEDICINE

## 2025-01-03 PROCEDURE — 83735 ASSAY OF MAGNESIUM: CPT | Performed by: INTERNAL MEDICINE

## 2025-01-03 PROCEDURE — 36415 COLL VENOUS BLD VENIPUNCTURE: CPT | Performed by: INTERNAL MEDICINE

## 2025-01-03 PROCEDURE — 25000003 PHARM REV CODE 250: Performed by: INTERNAL MEDICINE

## 2025-01-03 PROCEDURE — 25000003 PHARM REV CODE 250: Performed by: HOSPITALIST

## 2025-01-03 PROCEDURE — 80053 COMPREHEN METABOLIC PANEL: CPT | Performed by: INTERNAL MEDICINE

## 2025-01-03 PROCEDURE — 21400001 HC TELEMETRY ROOM

## 2025-01-03 PROCEDURE — 25000003 PHARM REV CODE 250: Performed by: PODIATRIST

## 2025-01-03 PROCEDURE — 63600175 PHARM REV CODE 636 W HCPCS: Performed by: PODIATRIST

## 2025-01-03 RX ORDER — AMOXICILLIN AND CLAVULANATE POTASSIUM 500; 125 MG/1; MG/1
1 TABLET, FILM COATED ORAL EVERY 24 HOURS
Status: DISCONTINUED | OUTPATIENT
Start: 2025-01-03 | End: 2025-01-08 | Stop reason: HOSPADM

## 2025-01-03 RX ORDER — HEPARIN SODIUM 1000 [USP'U]/ML
3300 INJECTION, SOLUTION INTRAVENOUS; SUBCUTANEOUS
Status: DISCONTINUED | OUTPATIENT
Start: 2025-01-03 | End: 2025-01-08 | Stop reason: HOSPADM

## 2025-01-03 RX ORDER — LEVOFLOXACIN 500 MG/1
500 TABLET, FILM COATED ORAL EVERY OTHER DAY
Status: DISCONTINUED | OUTPATIENT
Start: 2025-01-05 | End: 2025-01-08 | Stop reason: HOSPADM

## 2025-01-03 RX ADMIN — TAMSULOSIN HYDROCHLORIDE 0.4 MG: 0.4 CAPSULE ORAL at 07:01

## 2025-01-03 RX ADMIN — GABAPENTIN 300 MG: 300 CAPSULE ORAL at 07:01

## 2025-01-03 RX ADMIN — AMOXICILLIN AND CLAVULANATE POTASSIUM 500 MG: 500; 125 TABLET, FILM COATED ORAL at 11:01

## 2025-01-03 RX ADMIN — ASPIRIN 81 MG: 81 TABLET, COATED ORAL at 07:01

## 2025-01-03 RX ADMIN — LEVOFLOXACIN 500 MG: 500 TABLET, FILM COATED ORAL at 07:01

## 2025-01-03 RX ADMIN — Medication 6 MG: at 08:01

## 2025-01-03 RX ADMIN — HEPARIN SODIUM 3300 UNITS: 1000 INJECTION, SOLUTION INTRAVENOUS; SUBCUTANEOUS at 10:01

## 2025-01-03 RX ADMIN — ATORVASTATIN CALCIUM 40 MG: 40 TABLET, FILM COATED ORAL at 07:01

## 2025-01-03 RX ADMIN — CARVEDILOL 12.5 MG: 12.5 TABLET, FILM COATED ORAL at 07:01

## 2025-01-03 RX ADMIN — ENOXAPARIN SODIUM 30 MG: 30 INJECTION SUBCUTANEOUS at 05:01

## 2025-01-03 RX ADMIN — GABAPENTIN 300 MG: 300 CAPSULE ORAL at 08:01

## 2025-01-03 RX ADMIN — Medication: at 08:01

## 2025-01-03 RX ADMIN — CARVEDILOL 12.5 MG: 12.5 TABLET, FILM COATED ORAL at 08:01

## 2025-01-03 RX ADMIN — Medication: at 11:01

## 2025-01-03 NOTE — PROGRESS NOTES
Pharmacist Renal Dose Adjustment Note    Neno Watkins is a 64 y.o. male being treated with the medication augmentin    Patient Data:    Vital Signs (Most Recent):  Temp: 97.5 °F (36.4 °C) (01/03/25 0754)  Pulse: 71 (01/03/25 0754)  Resp: 19 (01/02/25 0833)  BP: (!) 192/91 (01/03/25 0754)  SpO2: (!) 94 % (01/03/25 0754) Vital Signs (72h Range):  Temp:  [97.4 °F (36.3 °C)-99.9 °F (37.7 °C)]   Pulse:  []   Resp:  [18-24]   BP: (105-192)/(50-91)   SpO2:  [91 %-99 %]      Recent Labs   Lab 12/31/24  0344 01/01/25  0408 01/03/25  0348   CREATININE 2.01* 2.76* 3.77*     Serum creatinine: 3.77 mg/dL (H) 01/03/25 0348  Estimated creatinine clearance: 27.1 mL/min (A)    Medication:augmentin 875mg dose: q12h frequency augmentin will be changed to medication:augmentin dose frequency:500mg    Pharmacist's Name: Bairon Campos  Pharmacist's Extension: q24h based on Pt is on HD.

## 2025-01-03 NOTE — PROGRESS NOTES
[facility]                       [address] [id number]    PATIENT NAME:       RUFINO REAL  YOB: 1960  CSN:                [id number]   MRN:                [id number]  ADMIT DATE:         12/25/2024 20:30:00  PHYSICIAN:          Rolf Hobbs DPM                            PROGRESS NOTE    DATE:  01/02/2025 00:00:00    SUBJECTIVE:  The patient is seen today.  He is status post right fifth ray   amputation.  He is currently doing well.  Not voicing any complaints.  No fevers   or chills.    OBJECTIVE:  VITAL SIGNS:  Stable.  He is afebrile.  EXTREMITIES:  Again surgical site looks great, well approximated.  Scant   exudate.  No compromise.  No flap failure.  No significant inflammatory changes,   fluctuance, or crepitation.    LABORATORY DATA:  Reviewed white cell counts are 15 yesterday, nothing posted   for today.    Updates on his intraoperative cultures currently growing out a few Proteus.    Anaerobes are pending.    ASSESSMENT:  Status post right fifth ray amputation, currently doing well.    PLAN:  Dressings were placed.  Continue with current care.  Wait for   intraoperative cultures.  ID on board.  Continue with current antibiotics.        ______________________________  Rolf Hobbs DPM    GAS/AQS  DD:  01/02/2025  Time:  04:48PM  DT:  01/02/2025  Time:  06:09PM  Job #:  [id number]      PROGRESS NOTE

## 2025-01-03 NOTE — PROGRESS NOTES
Inpatient Nutrition Assessment    Admit Date: 12/25/2024   Total duration of encounter: 9 days   Patient Age: 64 y.o.    Nutrition Recommendation/Prescription     Continue Diet diabetic 2000 Calories (up to 75 gm per meal) as tolerated.     Add supplements:   Novasource Renal (provides 475 kcal, 22 g protein per serving) BID for additional nutrition   Eloy (provides 90 kcal, 2.5 g protein per serving) BID for wound healing    Communication of Recommendations: reviewed with nurse    Nutrition Assessment     Malnutrition Assessment/Nutrition-Focused Physical Exam  Deferred                                                               A minimum of two characteristics is recommended for diagnosis of either severe or non-severe malnutrition.    Chart Review    Reason Seen: length of stay and follow-up    Malnutrition Screening Tool Results   Have you recently lost weight without trying?: No  Have you been eating poorly because of a decreased appetite?: No   MST Score: 0   Diagnosis:  End-stage renal disease off dialysis for 2 weeks now re initiated   Revocation of hospice  Infected chronic right foot wound with wound culture growing Proteus and Enterobacter  Sepsis secondary to above  L1 superior endplate compression fracture and chronic L4 superior endplate compression deformity  Severe back pain   Morbid obesity   Chronic venous stasis   Diabetes mellitus type 2  Essential hypertension  Anemia of chronic disease    Relevant Medical History:   Past Medical History:   Diagnosis Date    BPH (benign prostatic hyperplasia)     Essential (primary) hypertension     Obesity, unspecified     PAD (peripheral artery disease)       Scheduled Medications:  amoxicillin-clavulanate 875-125mg, 1 tablet, Q12H  aspirin, 81 mg, Daily  atorvastatin, 40 mg, Daily  carvediloL, 12.5 mg, BID  enoxparin, 30 mg, Daily  gabapentin, 300 mg, BID  levoFLOXacin, 500 mg, Daily  tamsulosin, 1 capsule, Daily  zinc oxide-cod liver oil, ,  BID    Continuous Infusions:   PRN Medications:  acetaminophen, 1,000 mg, Q8H PRN  bisacodyL, 10 mg, Daily PRN  dextrose 50%, 12.5 g, PRN  dextrose 50%, 25 g, PRN  glucagon (human recombinant), 1 mg, PRN  glucose, 16 g, PRN  glucose, 24 g, PRN  insulin aspart U-100, 0-5 Units, QID (AC + HS) PRN  labetalol, 10 mg, Q15 Min PRN  melatonin, 6 mg, Nightly PRN  ondansetron, 4 mg, Q8H PRN  sodium chloride 0.9%, 10 mL, PRN    Calorie Containing IV Medications: no significant kcals from medications at this time    Recent Labs   Lab 12/28/24  0406 12/29/24  0448 12/30/24  0439 12/31/24  0344 01/01/25  0408 01/03/25  0348   * 136 135* 135* 137 133*   K 4.0 4.5 4.3 3.5 3.7 3.9   CALCIUM 8.1* 7.7* 8.2* 8.3* 8.3* 8.4*   PHOS  --   --   --   --  5.3*  --    MG  --   --   --   --   --  2.50    108* 108* 100 101 95*   CO2 18* 17* 19* 25 23 24   BUN 32.8* 36.6* 35.6* 26.2* 34.4* 53.4*   CREATININE 1.84* 1.95* 2.03* 2.01* 2.76* 3.77*   EGFRNORACEVR 40 37 36 36 25 17   GLUCOSE 97 88 83 92 94 78*   BILITOT 0.8 0.8  --   --   --  0.6   ALKPHOS 215* 227*  --   --   --  233*   ALT 65* 66*  --   --   --  44   AST 50* 54*  --   --   --  35*   ALBUMIN 2.2* 2.3*  --   --  2.4* 2.5*   WBC 14.19* 12.69* 11.87*  --  15.18* 13.13*   HGB 10.6* 11.3* 11.4*  --  11.4* 11.7*   HCT 33.0* 36.0* 35.6*  --  35.2* 35.7*     Nutrition Orders:  Diet diabetic 2000 Calories (up to 75 gm per meal)  Dietary nutrition supplements BID; Novasource Renal - Any flavor,Dietary nutrition supplements All Meals; Novasource Renal - Cafe Mocha    Appetite/Oral Intake: fair/50-75% of meals  Factors Affecting Nutritional Intake: none identified  Social Needs Impacting Access to Food: unable to assess at this time; will attempt on follow-up  Food/Hoahaoism/Cultural Preferences: none reported  Food Allergies: none reported  Last Bowel Movement: 12/29/25  Wound(s):     Wound 12/30/24 1127 Skin Tear Coccyx #2-Tissue loss description: Partial thickness  "    Comments    12/30/24 Pt in dialysis. Per RN, decreased appetite and intake of meals since admit. Noted wound, not assessed by wound care yet. Will add supplements and follow-up early.     1/3/24 Spoke to RN, pt in dialysis. Fair intake of meals. Prefers cafe mocha oral supplement; modified order. Noted partial thickness wound, added Eloy BID.     Anthropometrics    Height: 5' 10" (177.8 cm), Height Method: Stated  Last Weight: (!) 136.1 kg (300 lb 0.7 oz) (12/26/24 0400), Weight Method: Bed Scale  BMI (Calculated): 43.1  BMI Classification: obese grade III (BMI >/=40)        Ideal Body Weight (IBW), Male: 166 lb     % Ideal Body Weight, Male (lb): 180.75 %                          Usual Weight Provided By: unable to obtain usual weight    Wt Readings from Last 5 Encounters:   12/26/24 (!) 136.1 kg (300 lb 0.7 oz)   12/15/24 128.8 kg (284 lb)   11/18/24 130.6 kg (288 lb)   10/22/24 127 kg (279 lb 15.8 oz)   09/10/24 127 kg (279 lb 15.8 oz)     Weight Change(s) Since Admission:   1/3/25 no updated weights  Wt Readings from Last 1 Encounters:   12/26/24 0400 (!) 136.1 kg (300 lb 0.7 oz)   12/25/24 2026 136.1 kg (300 lb)   Admit Weight: 136.1 kg (300 lb) (12/25/24 2026), Weight Method: Estimated    Estimated Needs    Weight Used For Calorie Calculations: (!) 136.1 kg (300 lb 0.7 oz)  Energy Calorie Requirements (kcal): 2582kcals/d (MSJ x 1.2SF)  [facility] Need Method: [facility]  Weight Used For Protein Calculations: 75.4 kg (166 lb 5.4 oz) (IBW)  Protein Requirements: 113-136g/d (1.5-1.8g/kg IBW)  Fluid Requirements (mL): 1000mL + UOP (on hemodialysis)  CHO Requirement: 258gm CHO daily (40% EER)     Enteral Nutrition     Patient not receiving enteral nutrition at this time.    Parenteral Nutrition     Patient not receiving parenteral nutrition support at this time.    Evaluation of Received Nutrient Intake    Calories: not meeting estimated needs  Protein: not meeting estimated needs    Patient Education     Not " applicable.    Nutrition Diagnosis     PES: Inadequate oral intake related to acute illness as evidenced by </=75% EER since admit. (active)     PES:            Nutrition Interventions     Intervention(s): modified composition of meals/snacks, commercial beverage, multivitamin/mineral supplement therapy, and collaboration with other providers  Intervention(s):      Goal: Meet greater than 80% of nutritional needs by follow-up. (goal progressing)  Goal: Maintain weight throughout hospitalization. (goal progressing)    Nutrition Goals & Monitoring     Dietitian will monitor: food and beverage intake, weight change, electrolyte/renal panel, glucose/endocrine profile, and gastrointestinal profile  Discharge planning: continue renal, dialysis diet with novasource oral supplements  Nutrition Risk/Follow-Up: moderate (follow-up in 3-5 days)   Please consult if re-assessment needed sooner.

## 2025-01-03 NOTE — PLAN OF CARE
[facility] is unable to meet this patient's needs. Awaiting additional facility choices at this time.

## 2025-01-03 NOTE — PLAN OF CARE
CM phoned daughter Isidra Watkins , notified her that [facility] could not accept patient for placement. CM needs more choices to sent for long term placement.Isidra will review choice list for more choices and discuss with family and will give CM more choices on Monday.

## 2025-01-03 NOTE — PROGRESS NOTES
[facility] Hospital Medicine Progress Note        Chief Complaint: Inpatient Follow-up for     HPI:   64-year-old male with a history of morbid obesity, chronic venous stasis, chronic open right foot wound, ESRD, type 2 diabetes who presented to the ER for multiple falls over the last week.  Daughter gives history (Isidra Watkins [phone]) who explains that 2 weeks ago patient decided he would no longer receive dialysis nor wound care and felt that these providers were only trying to harm him.  He therefore enrolled in hospice and understood completely in the time that without dialysis he would very likely die.  Over the last few days at home he has become progressively weak and having multiple falls, his wife activated EMS tonight after he had a fall and was unable to get up.     He was brought to the ER where on arrival he was afebrile hemodynamically stable maintaining normal sats on room air.  Laboratory work noted leukocytosis of 20 K, creatinine of 2.3/BUN 34, CO2 20 and a normal potassium.  CT head showed no acute process.  He was not found to have focal deficits on exam nor any signs of traumatic injury though was noted to have an infected chronic left foot ulceration.  Blood cultures were obtained and broad-spectrum antibiotics initiated.MRI of the T-spine showed L1 superior endplate compression fracture no significant spinal canal or neural foraminal stenosis no cord abnormality.  MRI of the brain was negative      CT of the L-spine had shown chronic L4 superior endplate compression deformity with mild loss of height.Patient had refused x-ray and MRI of the L-spine neurosurgery has signed off and they are recommending bracing . MRI of the T-spine showed L1 superior endplate compression fracture no significant spinal canal or neural foraminal stenosis no cord abnormality. MRI of the brain was negative     Patient is status post 5th ray amputation on 12/31/2024 secondary to  infected wound and osteomyelitis id has been consulted for antibiotic recommendations.    Tissue cultures from OR sample 12/31/2024 showing few Gram-negative rods      Interval Hx:   Hemodynamically stable.  Scheduled for HD today.  Patient has no new complaints or concerns.  Labs show mild hyponatremia.  Tissue cultures confirmed as Proteus mirabilis      Objective/physical exam:  Vitals:    01/02/25 2000 01/02/25 2330 01/03/25 0335 01/03/25 0754   BP:  (!) 147/65 135/85 (!) 192/91   Pulse: 75 73 66 71   Resp:       Temp:  99.5 °F (37.5 °C) 97.4 °F (36.3 °C) 97.5 °F (36.4 °C)   TempSrc:  Oral Oral Oral   SpO2:  (!) 93% 98% (!) 94%   Weight:       Height:         General: In no acute distress, afebrile  Respiratory: Clear to auscultation bilaterally  Cardiovascular: S1, S2, no appreciable murmur  Abdomen: Soft, nontender, BS +  Skin: Surgical site dressed  Neurologic: Alert and oriented x4, cooperate       Lab Results   Component Value Date     (L) 01/03/2025    K 3.9 01/03/2025    CL 95 (L) 01/03/2025    CO2 24 01/03/2025    BUN 53.4 (H) 01/03/2025    CREATININE 3.77 (H) 01/03/2025    CALCIUM 8.4 (L) 01/03/2025    ANIONGAP 9 10/13/2022    ESTGFRAFRICA 23 10/13/2022    EGFRNONAA 9 08/06/2022      Lab Results   Component Value Date    ALT 44 01/03/2025    AST 35 (H) 01/03/2025     (H) 06/08/2021    ALKPHOS 233 (H) 01/03/2025    BILITOT 0.6 01/03/2025      Lab Results   Component Value Date    WBC 13.13 (H) 01/03/2025    HGB 11.7 (L) 01/03/2025    HCT 35.7 (L) 01/03/2025    MCV 89.5 01/03/2025     01/03/2025           Medications:   amoxicillin-clavulanate 875-125mg  1 tablet Oral Q12H    aspirin  81 mg Oral Daily    atorvastatin  40 mg Oral Daily    carvediloL  12.5 mg Oral BID    enoxparin  30 mg Subcutaneous Daily    gabapentin  300 mg Oral BID    levoFLOXacin  500 mg Oral Daily    tamsulosin  1 capsule Oral Daily    zinc oxide-cod liver oil   Topical (Top) BID        Current  Facility-Administered Medications:     acetaminophen, 1,000 mg, Oral, Q8H PRN    bisacodyL, 10 mg, Rectal, Daily PRN    dextrose 50%, 12.5 g, Intravenous, PRN    dextrose 50%, 25 g, Intravenous, PRN    glucagon (human recombinant), 1 mg, Intramuscular, PRN    glucose, 16 g, Oral, PRN    glucose, 24 g, Oral, PRN    insulin aspart U-100, 0-5 Units, Subcutaneous, QID (AC + HS) PRN    labetalol, 10 mg, Intravenous, Q15 Min PRN    melatonin, 6 mg, Oral, Nightly PRN    ondansetron, 4 mg, Intravenous, Q8H PRN    sodium chloride 0.9%, 10 mL, Intravenous, PRN     Assessment/Plan:    End-stage renal disease off dialysis for 2 weeks now re-initiated   Revocation of hospice  Infected chronic right foot wound with wound culture growing Proteus and Enterobacter and Bacteroides status post 5th ray amputation of the right foot 12/31/24  Sepsis secondary to above  L1 superior endplate compression fracture and chronic L4 superior endplate compression deformity  Severe back pain due to above    HX: Monitor obesity, chronic venous stasis, T2 dm, HTN, chronic anemia       Plan:   -tissue cultures from 12/30 showing Proteus mirabilis.  Prior wound culture showed Proteus and Enterobacter with bacteria rods.  Continue Augmentin and levofloxacin until 01/13/2025.  -wound care  -MRI T-spine, CT lumbar spine reviewed.  Patient reportedly refused MRI lumbar spine.  Neurosurgery had no further recommendations.  Continue analgesics as needed.  PT OT as tolerated  -HD as per schedule  -otherwise continue current medical management, insulin regimen. Monitor CBGs  - PT, OT, CM working on placement     [facility]      Kristofer Forrest MD

## 2025-01-03 NOTE — NURSING
01/03/25 1135   Post-Hemodialysis Assessment   Blood Volume Processed (Liters) 63 L   Dialyzer Clearance Lightly streaked   Duration of Treatment 180 minutes   Total UF (mL) 3000 mL   Post-Hemodialysis Comments Tx completed as orderd. Net 3L fluid removed. Tolerated well. CVC packed with heparin.

## 2025-01-03 NOTE — PROGRESS NOTES
Pharmacist Renal Dose Adjustment Note    Neno Watkins is a 64 y.o. male being treated with the medication Levaquin    Patient Data:    Vital Signs (Most Recent):  Temp: 97.5 °F (36.4 °C) (01/03/25 0754)  Pulse: 71 (01/03/25 0754)  Resp: 19 (01/02/25 0833)  BP: (!) 192/91 (01/03/25 0754)  SpO2: (!) 94 % (01/03/25 0754) Vital Signs (72h Range):  Temp:  [97.4 °F (36.3 °C)-99.9 °F (37.7 °C)]   Pulse:  []   Resp:  [18-24]   BP: (105-192)/(50-91)   SpO2:  [91 %-99 %]      Recent Labs   Lab 12/31/24  0344 01/01/25  0408 01/03/25 0348   CREATININE 2.01* 2.76* 3.77*     Serum creatinine: 3.77 mg/dL (H) 01/03/25 0348  Estimated creatinine clearance: 27.1 mL/min (A)    Medication:levaquin  dose : 500mg: frequency q24h x total of 13 days  will be changed to medication:levaquin dose:500mg frequency:Q48H x total of 13 days    Pharmacist's Name: Bairon Campso  Pharmacist's Extension: [phone]

## 2025-01-03 NOTE — PROGRESS NOTES
Nephrology consult follow up note    HPI:      Neno Watkins is a 64 y.o. male  who has been on chronic hemodialysis at [facility] on Monday Wednesday Friday.  He is being followed by Dr. Kike Alanis.  He has lot of problems on his legs and somehow he got frustrated and decided to stop dialysis and go for hospice but now he has reversed it and wants to be treated and taken care of.  Nephrology consulted for ESRD management.    Interval history:     12/27/2024  Seen on hemodialysis.    12/30/2024  Seen on hemodialysis    12/31/2024  No acute events overnight.  No new complaints.  Tolerated dialysis yesterday without problem.  Continues to have mild lower extremity edema.  No chest pain, shortness of breath, nausea, vomiting.    1/1/25  Seen on hemodialysis.    1/2/25  Pt is in NAD, aaox4, in bed watching tv. Denies SOB, CP, n/v/d.     01/03/2025  Seen on hemodialysis     Review of Systems:       Past medical, family, surgical, and social history reviewed and unchanged from initial consult note.     Objective:       VITAL SIGNS: 24 HR MIN & MAX LAST    Temp  Min: 97.4 °F (36.3 °C)  Max: 99.5 °F (37.5 °C)  97.5 °F (36.4 °C)        BP  Min: 117/50  Max: 192/91  (!) 192/91     Pulse  Min: 66  Max: 75  71     No data recorded  19    SpO2  Min: 93 %  Max: 98 %  (!) 94 %      GEN: Chronically ill appearing WM in NAD  CV: RRR +S1,S2 without murmur  PULM: CTAB, unlabored  ABD: Soft, NT/ND abdomen with NABS  EXT: No cyanosis or edema  SKIN: Warm and dry  PSYCH: Awake, alert and appropriately conversant.   Dialysis access: Left upper extremity AV graft with palpable pulse and right IJ tunneled dialysis catheter             Component Value Date/Time     (L) 01/03/2025 0348     01/01/2025 0408     10/13/2022 0652    K 3.9 01/03/2025 0348    K 3.7 01/01/2025 0408    K 3.0 (L) 10/13/2022 0652    CO2 24 01/03/2025 0348    CO2 23 01/01/2025 0408    CO2 26 10/13/2022 0652    BUN 53.4 (H) 01/03/2025 0348     BUN 34.4 (H) 01/01/2025 0408    BUN 24 (H) 12/11/2024 0000    BUN 30 (H) 11/24/2024 0000    CREATININE 3.77 (H) 01/03/2025 0348    CREATININE 2.76 (H) 01/01/2025 0408    CREATININE 2.94 (H) 10/13/2022 0652    CALCIUM 8.4 (L) 01/03/2025 0348    CALCIUM 8.3 (L) 01/01/2025 0408    CALCIUM 9.6 10/13/2022 0652    PHOS 5.3 (H) 01/01/2025 0408            Component Value Date/Time    WBC 13.13 (H) 01/03/2025 0348    WBC 15.18 (H) 01/01/2025 0408    HGB 11.7 (L) 01/03/2025 0348    HGB 11.4 (L) 01/01/2025 0408    HGB 11.6 (L) 12/09/2024 0000    HGB 11.4 (L) 12/02/2024 0000    HCT 35.7 (L) 01/03/2025 0348    HCT 35.2 (L) 01/01/2025 0408     01/03/2025 0348     01/01/2025 0408         Imaging reviewed      Assessment / Plan:       Active Hospital Problems    Diagnosis  POA    *Wound infection [T14.8XXA, L08.9]  Yes      Resolved Hospital Problems   No resolved problems to display.       ESRD.  Dialysis days Monday Wednesday Friday.    Noncomplaince with medical treatment  Personal history of diabetes mellitus but not on any medications will check hemoglobin A1c then decide on the need for medical management  Hypertension  Hyperlipidemia  Cellulitis of both lower extremities right more than left - s/p toe amputation 12/31/24  Anemia of chronic kidney disease  Dialysis noncompliance  Low back pain new      Plan:  Seen on hemodialysis at 9:45 a.m..  Tolerating well.  3 L UF as tolerated.  Continue MWF hemodialysis.  He is okay to discharge from Nephrology standpoint whenever ready.

## 2025-01-04 LAB
BACTERIA SPEC ANAEROBE CULT: ABNORMAL
BACTERIA SPEC ANAEROBE CULT: ABNORMAL
POCT GLUCOSE: 105 MG/DL (ref 70–110)
POCT GLUCOSE: 117 MG/DL (ref 70–110)
POCT GLUCOSE: 118 MG/DL (ref 70–110)

## 2025-01-04 PROCEDURE — 25000003 PHARM REV CODE 250: Performed by: PODIATRIST

## 2025-01-04 PROCEDURE — 21400001 HC TELEMETRY ROOM

## 2025-01-04 PROCEDURE — 25000003 PHARM REV CODE 250: Performed by: INTERNAL MEDICINE

## 2025-01-04 PROCEDURE — 63600175 PHARM REV CODE 636 W HCPCS: Performed by: PODIATRIST

## 2025-01-04 RX ADMIN — ENOXAPARIN SODIUM 30 MG: 30 INJECTION SUBCUTANEOUS at 04:01

## 2025-01-04 RX ADMIN — GABAPENTIN 300 MG: 300 CAPSULE ORAL at 08:01

## 2025-01-04 RX ADMIN — ASPIRIN 81 MG: 81 TABLET, COATED ORAL at 09:01

## 2025-01-04 RX ADMIN — ACETAMINOPHEN 1000 MG: 500 TABLET ORAL at 08:01

## 2025-01-04 RX ADMIN — Medication: at 08:01

## 2025-01-04 RX ADMIN — CARVEDILOL 12.5 MG: 12.5 TABLET, FILM COATED ORAL at 08:01

## 2025-01-04 RX ADMIN — TAMSULOSIN HYDROCHLORIDE 0.4 MG: 0.4 CAPSULE ORAL at 09:01

## 2025-01-04 RX ADMIN — ATORVASTATIN CALCIUM 40 MG: 40 TABLET, FILM COATED ORAL at 09:01

## 2025-01-04 RX ADMIN — GABAPENTIN 300 MG: 300 CAPSULE ORAL at 09:01

## 2025-01-04 RX ADMIN — CARVEDILOL 12.5 MG: 12.5 TABLET, FILM COATED ORAL at 09:01

## 2025-01-04 RX ADMIN — AMOXICILLIN AND CLAVULANATE POTASSIUM 500 MG: 500; 125 TABLET, FILM COATED ORAL at 09:01

## 2025-01-04 RX ADMIN — Medication: at 09:01

## 2025-01-04 NOTE — PROGRESS NOTES
[facility]                       [address] [address]    PATIENT NAME:       RUFINO REAL  YOB: 1960  CSN:                [id number]   MRN:                [id number]  ADMIT DATE:         12/25/2024 20:30:00  PHYSICIAN:          Rolf Hobbs DPM                            PROGRESS NOTE    DATE:  01/03/2025 07:17:20    SUBJECTIVE:  The patient is seen today.  He is doing well.  He is not voicing   any complaints.  He is up eating dinner.  ID has converted him to oral   antibiotics.  He has had no fevers or chills.  Currently working on placement.    PHYSICAL EXAMINATION:  Vital signs are stable and afebrile.    Dressings are clean, dry, intact.  Surgical site remains stable.  No signs of   compromise or substantial infectious process.  No active bleeding or drainage   from the areas.  Remainder of integument is intact.  His boots are in place.    LABORATORY DATA:  Reviewed; white cell count 13, H and H 11 and 35, BUN and   creatinine 53 and 3.7.    ASSESSMENT:  Status post right 5th ray amputation, currently doing well.    PLAN:  We will continue with the current care.  Antibiotics as per ID.    Placement pending.        ______________________________  Rolf Hobbs DPM    GAS/AQS  DD:  01/03/2025  Time:  06:31PM  DT:  01/03/2025  Time:  07:52PM  Job #:  [id number]      PROGRESS NOTE

## 2025-01-04 NOTE — PROGRESS NOTES
[facility]                       [address] [address]    PATIENT NAME:       RUFINO REAL  YOB: 1960  CSN:                [id number]   MRN:                [id number]  ADMIT DATE:         12/25/2024 20:30:00  PHYSICIAN:          Rolf Hobbs DPM                            PROGRESS NOTE    DATE:  01/04/2025 00:00:00    SUBJECTIVE:  The patient was seen today.  He is doing well, not voicing any   complaints.  No fevers or chills.    OBJECTIVE:  VITAL SIGNS:  Stable and afebrile.    LABORATORY DATA:  No labs posted for today.    Surgical site continues to do well.  No signs of compromise or infection.  No   significant bleeding or drainage.  Good capillary refill to the adjacent toes.    ASSESSMENT:  Status post right 5th ray amputation, doing well.    PLAN:  Dressings were placed.  Continue with current care and antibiotics.    Placement pending.        ______________________________  Rolf Hobbs DPM    GAS/AQS  DD:  01/04/2025  Time:  10:58AM  DT:  01/04/2025  Time:  11:11AM  Job #:  [id number]      PROGRESS NOTE

## 2025-01-04 NOTE — PROGRESS NOTES
[facility] Hospital Medicine Progress Note        Chief Complaint: Inpatient Follow-up for reinitiating hemodialysis after hooking hospice    HPI per admitting team: 64-year-old male with a history of morbid obesity, chronic venous stasis, chronic open right foot wound, ESRD, type 2 diabetes who presented to the ER for multiple falls over the last week.  Daughter gives history (Isidra Watkins [phone]) who explains that 2 weeks ago patient decided he would no longer receive dialysis nor wound care and felt that these providers were only trying to harm him.  He therefore enrolled in hospice and understood completely in the time that without dialysis he would very likely die.  Over the last few days at home he has become progressively weak and having multiple falls, his wife activated EMS tonight after he had a fall and was unable to get up.  He was brought to the ER where on arrival he was afebrile hemodynamically stable maintaining normal sats on room air.  Laboratory work noted leukocytosis of 20 K, creatinine of 2.3/BUN 34, CO2 20 and a normal potassium.  CT head showed no acute process.  He was not found to have focal deficits on exam nor any signs of traumatic injury though was noted to have an infected chronic left foot ulceration.  Blood cultures were obtained and broad-spectrum antibiotics initiated.MRI of the T-spine showed L1 superior endplate compression fracture no significant spinal canal or neural foraminal stenosis no cord abnormality.  MRI of the brain was negative  CT of the L-spine had shown chronic L4 superior endplate compression deformity with mild loss of height.Patient had refused x-ray and MRI of the L-spine neurosurgery has signed off and they are recommending bracing . MRI of the T-spine showed L1 superior endplate compression fracture no significant spinal canal or neural foraminal stenosis no cord abnormality. MRI of the brain was negative   Patient is  status post 5th ray amputation on 12/31/2024 secondary to infected wound and osteomyelitis id has been consulted for antibiotic recommendations.  Tissue cultures from OR sample 12/31/2024 showing few Gram-negative rods    Interval Hx:   Patient is laying, sleeping and snoring.  OxyMask was on his forehead, I placed it back on his face.  He did not wake up to my calling on exam  No family is at bedside  Chart reviewed, patient revoked his hospice, wants to be full code and re-initiate his hemodialysis on admission.  Family has requested nursing home placement as family is unable to meet his needs    Objective/physical exam:  General: In no acute distress, afebrile, morbidly obese, large neck circumference  Chest:  Snoring, on OxyMask at 2 liters/minute  Heart: RRR, +S1, S2, no appreciable murmur  Abdomen: Soft, nontender, BS +  MSK: Warm, dressing to the right foot  Neurologic:  Asleep    VITAL SIGNS: 24 HRS MIN & MAX LAST   Temp  Min: 97.5 °F (36.4 °C)  Max: 99.3 °F (37.4 °C) 98 °F (36.7 °C)   BP  Min: 129/71  Max: 182/66 129/71   Pulse  Min: 68  Max: 164  68   Resp  Min: 18  Max: 18 18   SpO2  Min: 94 %  Max: 98 % 98 %     I reviewed the labs below:  Recent Labs   Lab 12/30/24  0439 01/01/25 0408 01/03/25 0348   WBC 11.87* 15.18* 13.13*   RBC 3.97* 3.90* 3.99*   HGB 11.4* 11.4* 11.7*   HCT 35.6* 35.2* 35.7*   MCV 89.7 90.3 89.5   MCH 28.7 29.2 29.3   MCHC 32.0* 32.4* 32.8*   RDW 13.7 13.7 13.9    370 332   MPV 9.1 9.2 9.4     Recent Labs   Lab 12/29/24  0448 12/30/24  0439 12/31/24 0344 01/01/25 0408 01/03/25 0348      < > 135* 137 133*   K 4.5   < > 3.5 3.7 3.9   *   < > 100 101 95*   CO2 17*   < > 25 23 24   BUN 36.6*   < > 26.2* 34.4* 53.4*   CREATININE 1.95*   < > 2.01* 2.76* 3.77*   CALCIUM 7.7*   < > 8.3* 8.3* 8.4*   MG  --   --   --   --  2.50   ALBUMIN 2.3*  --   --  2.4* 2.5*   ALKPHOS 227*  --   --   --  233*   ALT 66*  --   --   --  44   AST 54*  --   --   --  35*   BILITOT 0.8   --   --   --  0.6    < > = values in this interval not displayed.     Microbiology Results (last 7 days)       Procedure Component Value Units Date/Time    Tissue Culture - Aerobic [[id number]]  (Abnormal)  (Susceptibility) Collected: 12/31/24 1406    Order Status: Completed Specimen: Bone from Toe, Right Foot Updated: 01/03/25 0914     Tissue - Aerobic Culture Few Proteus mirabilis    Anaerobic Culture [[id number]] Collected: 12/31/24 1406    Order Status: Completed Specimen: Bone from Toe, Right Foot Updated: 01/03/25 0819     Anaerobe Culture No Anaerobes Isolated    Blood culture #1 **CANNOT BE ORDERED STAT** [[id number]]  (Normal) Collected: 12/25/24 2100    Order Status: Completed Specimen: Blood from Antecubital, Right Updated: 12/30/24 2201     Blood Culture No Growth at 5 days    Blood culture #2 **CANNOT BE ORDERED STAT** [[id number]]  (Normal) Collected: 12/25/24 2110    Order Status: Completed Specimen: Blood from Wrist, Right Updated: 12/30/24 2201     Blood Culture No Growth at 5 days    Anaerobic Culture [[id number]]  (Abnormal) Collected: 12/27/24 1620    Order Status: Completed Specimen: Wound from Foot, Right Updated: 12/30/24 1140     Anaerobe Culture Bacteroides fragilis     Comment: Anaerobic sensitivity is not usually indicated except on single isolates from sterile body sites.       Wound Culture [[id number]]  (Abnormal)  (Susceptibility) Collected: 12/27/24 1620    Order Status: Completed Specimen: Wound from Foot, Right Updated: 12/30/24 0737     Wound Culture Moderate Proteus mirabilis      Moderate Enterobacter cloacae complex           See below for Radiology    Intake and Output:  Intake/Output Summary (Last 24 hours) at 1/4/2025 1114  Last data filed at 1/3/2025 1700  Gross per 24 hour   Intake --   Output 3400 ml   Net -3400 ml       Assessment/Plan:  End-stage renal disease off dialysis for 2 weeks now re-initiated   Revocation of hospice, now full code  Infected chronic right foot  wound with wound culture growing Proteus, Enterobacter and Bacteroides S/P 5th ray amputation of the right foot 12/31/24  Sepsis secondary to above- resolved   L1 superior endplate compression fracture and chronic L4 superior endplate compression deformity  Severe back pain due to above  Elevated liver enzymes (AST and alk-phos)  HX:  Morbid obesity with BMI 43.0, chronic venous stasis, T2DM- well controlled, HTN, anemia of chronic disease, hypoalbuminemia          Infectious disease now signed off.  Tissue cultures from 12/30 showing Proteus mirabilis.  Prior wound culture showed Proteus and Enterobacter with bacteria rods.  Continue Augmentin and levofloxacin until 01/13/2025.  Podiatry Dr Hobbs on board, continue current care.  Antibiotics per ID.  Cont wound care  MRI T-spine, CT lumbar spine reviewed.  Patient reportedly refused MRI lumbar spine.  Neurosurgery had no further recommendations.  Continue analgesics as needed.    Nephrology on board, continue hemodialysis Monday Wednesday Friday  Insulin regimen. Monitor CBGs a.c. and HS  12/26- A1C 6.0, well controlled   PT OT - moderate intensity  Case management on board, [facility] could not accept the patient, daughter to give more choices Monday, plan for SNF to transitioned to nursing home per family's request  Repeat CBC, CMP on Monday for hemodialysis day    VTE prophylaxis:   Lovenox 30 mg subQ                      Patient condition:  Fair    Anticipated discharge and Disposition:   SNF--> NH       All diagnosis and differential diagnosis have been reviewed; assessment and plan has been documented; I have personally reviewed the labs and test results that are presently available; I have reviewed the patients medication list; I have reviewed the consulting providers response and recommendations. I have reviewed or attempted to review medical records based upon their availability    All of the patient's questions have been  addressed and answered.  Patient's is agreeable to the above stated plan. I will continue to monitor closely and make adjustments to medical management as needed.    Portions of this note dictated using EMR integrated voice recognition software, and may be subject to voice recognition errors not corrected at proofreading. Please contact writer for clarification if needed.   _____________________________________________________________________    Nutrition Status:  Nutrition consulted. Most recent weight and BMI monitored-     Measurements:  Wt Readings from Last 1 Encounters:   12/26/24 (!) 136.1 kg (300 lb 0.7 oz)   Body mass index is 43.05 kg/m².    Patient has been screened and assessed by RD.    Malnutrition Type:  Context:    Level:      Malnutrition Characteristic Summary:       Interventions/Recommendations (treatment strategy):         A minimum of two characteristics is recommended for diagnosis of either severe or non-severe malnutrition.     Current Med:   amoxicillin-clavulanate 500-125mg  1 tablet Oral Daily    aspirin  81 mg Oral Daily    atorvastatin  40 mg Oral Daily    carvediloL  12.5 mg Oral BID    enoxparin  30 mg Subcutaneous Daily    gabapentin  300 mg Oral BID    [START ON 1/5/2025] levoFLOXacin  500 mg Oral Every other day    tamsulosin  1 capsule Oral Daily    zinc oxide-cod liver oil   Topical (Top) BID      PRN meds:  Current Facility-Administered Medications:     acetaminophen, 1,000 mg, Oral, Q8H PRN    bisacodyL, 10 mg, Rectal, Daily PRN    dextrose 50%, 12.5 g, Intravenous, PRN    dextrose 50%, 25 g, Intravenous, PRN    glucagon (human recombinant), 1 mg, Intramuscular, PRN    glucose, 16 g, Oral, PRN    glucose, 24 g, Oral, PRN    heparin (porcine), 3,300 Units, Intravenous, PRN    insulin aspart U-100, 0-5 Units, Subcutaneous, QID (AC + HS) PRN    labetalol, 10 mg, Intravenous, Q15 Min PRN    melatonin, 6 mg, Oral, Nightly PRN    ondansetron, 4 mg, Intravenous, Q8H PRN    sodium chloride 0.9%, 10 mL,  Intravenous, PRN    Continuous infusion:       Radiology:   I have personally reviewed the images and agree with radiologist report  CV Ultrasound doppler arterial legs bilat  The right lower extremity arterial system is patent with no evidence of   focal stenosis or occlusion.  The left lower extremity arterial system is patent with no evidence of   focal stenosis or occlusion.        Delano Cortez MD  Department of Hospital Medicine   [facility]   01/04/2025

## 2025-01-04 NOTE — PLAN OF CARE
Problem: Adult Inpatient Plan of Care  Goal: Plan of Care Review  Outcome: Progressing  Goal: Patient-Specific Goal (Individualized)  Outcome: Progressing  Goal: Absence of Hospital-Acquired Illness or Injury  Outcome: Progressing  Goal: Optimal Comfort and Wellbeing  Outcome: Progressing  Goal: Readiness for Transition of Care  Outcome: Progressing     Problem: Wound  Goal: Optimal Coping  Outcome: Progressing  Goal: Optimal Functional Ability  Outcome: Progressing  Goal: Absence of Infection Signs and Symptoms  Outcome: Progressing  Goal: Improved Oral Intake  Outcome: Progressing  Goal: Optimal Pain Control and Function  Outcome: Progressing  Goal: Skin Health and Integrity  Outcome: Progressing  Goal: Optimal Wound Healing  Outcome: Progressing     Problem: Bariatric Environmental Safety  Goal: Safety Maintained with Care  Outcome: Progressing     Problem: Diabetes Comorbidity  Goal: Blood Glucose Level Within Targeted Range  Outcome: Progressing     Problem: Acute Kidney Injury/Impairment  Goal: Fluid and Electrolyte Balance  Outcome: Progressing  Goal: Improved Oral Intake  Outcome: Progressing  Goal: Effective Renal Function  Outcome: Progressing     Problem: Stroke, Ischemic (Includes Transient Ischemic Attack)  Goal: Improved Communication Skills  Outcome: Progressing     Problem: Stroke, Ischemic (Includes Transient Ischemic Attack)  Goal: Optimal Cognitive Function  Outcome: Progressing     Problem: Stroke, Ischemic (Includes Transient Ischemic Attack)  Goal: Optimal Nutrition Intake  Outcome: Progressing     Problem: Stroke, Ischemic (Includes Transient Ischemic Attack)  Goal: Effective Urinary Elimination  Outcome: Progressing

## 2025-01-05 LAB
ALBUMIN SERPL-MCNC: 2.5 G/DL (ref 3.4–4.8)
ALBUMIN/GLOB SERPL: 0.5 RATIO (ref 1.1–2)
ALP SERPL-CCNC: 221 UNIT/L (ref 40–150)
ALT SERPL-CCNC: 46 UNIT/L (ref 0–55)
ANION GAP SERPL CALC-SCNC: 16 MEQ/L
AST SERPL-CCNC: 44 UNIT/L (ref 5–34)
BASOPHILS # BLD AUTO: 0.08 X10(3)/MCL
BASOPHILS NFR BLD AUTO: 0.7 %
BILIRUB SERPL-MCNC: 0.5 MG/DL
BUN SERPL-MCNC: 78.1 MG/DL (ref 8.4–25.7)
CALCIUM SERPL-MCNC: 8.7 MG/DL (ref 8.8–10)
CHLORIDE SERPL-SCNC: 92 MMOL/L (ref 98–107)
CO2 SERPL-SCNC: 24 MMOL/L (ref 23–31)
CREAT SERPL-MCNC: 5.51 MG/DL (ref 0.72–1.25)
CREAT/UREA NIT SERPL: 14
EOSINOPHIL # BLD AUTO: 0.26 X10(3)/MCL (ref 0–0.9)
EOSINOPHIL NFR BLD AUTO: 2.2 %
ERYTHROCYTE [DISTWIDTH] IN BLOOD BY AUTOMATED COUNT: 13.7 % (ref 11.5–17)
GFR SERPLBLD CREATININE-BSD FMLA CKD-EPI: 11 ML/MIN/1.73/M2
GLOBULIN SER-MCNC: 5 GM/DL (ref 2.4–3.5)
GLUCOSE SERPL-MCNC: 98 MG/DL (ref 82–115)
HCT VFR BLD AUTO: 36.1 % (ref 42–52)
HGB BLD-MCNC: 11.6 G/DL (ref 14–18)
IMM GRANULOCYTES # BLD AUTO: 0.06 X10(3)/MCL (ref 0–0.04)
IMM GRANULOCYTES NFR BLD AUTO: 0.5 %
LYMPHOCYTES # BLD AUTO: 3.21 X10(3)/MCL (ref 0.6–4.6)
LYMPHOCYTES NFR BLD AUTO: 27.7 %
MCH RBC QN AUTO: 28.9 PG (ref 27–31)
MCHC RBC AUTO-ENTMCNC: 32.1 G/DL (ref 33–36)
MCV RBC AUTO: 90 FL (ref 80–94)
MONOCYTES # BLD AUTO: 1.08 X10(3)/MCL (ref 0.1–1.3)
MONOCYTES NFR BLD AUTO: 9.3 %
NEUTROPHILS # BLD AUTO: 6.89 X10(3)/MCL (ref 2.1–9.2)
NEUTROPHILS NFR BLD AUTO: 59.6 %
NRBC BLD AUTO-RTO: 0 %
PLATELET # BLD AUTO: 346 X10(3)/MCL (ref 130–400)
PMV BLD AUTO: 9.3 FL (ref 7.4–10.4)
POCT GLUCOSE: 112 MG/DL (ref 70–110)
POCT GLUCOSE: 119 MG/DL (ref 70–110)
POCT GLUCOSE: 132 MG/DL (ref 70–110)
POTASSIUM SERPL-SCNC: 3.7 MMOL/L (ref 3.5–5.1)
PROT SERPL-MCNC: 7.5 GM/DL (ref 5.8–7.6)
RBC # BLD AUTO: 4.01 X10(6)/MCL (ref 4.7–6.1)
SODIUM SERPL-SCNC: 132 MMOL/L (ref 136–145)
WBC # BLD AUTO: 11.58 X10(3)/MCL (ref 4.5–11.5)

## 2025-01-05 PROCEDURE — 25000003 PHARM REV CODE 250: Performed by: INTERNAL MEDICINE

## 2025-01-05 PROCEDURE — 21400001 HC TELEMETRY ROOM

## 2025-01-05 PROCEDURE — 97530 THERAPEUTIC ACTIVITIES: CPT | Mod: CQ

## 2025-01-05 PROCEDURE — 25000003 PHARM REV CODE 250: Performed by: PODIATRIST

## 2025-01-05 PROCEDURE — 97110 THERAPEUTIC EXERCISES: CPT | Mod: CQ

## 2025-01-05 PROCEDURE — 85025 COMPLETE CBC W/AUTO DIFF WBC: CPT | Performed by: NURSE PRACTITIONER

## 2025-01-05 PROCEDURE — 63600175 PHARM REV CODE 636 W HCPCS: Performed by: PODIATRIST

## 2025-01-05 PROCEDURE — 36415 COLL VENOUS BLD VENIPUNCTURE: CPT | Performed by: NURSE PRACTITIONER

## 2025-01-05 PROCEDURE — 80053 COMPREHEN METABOLIC PANEL: CPT | Performed by: NURSE PRACTITIONER

## 2025-01-05 RX ADMIN — AMOXICILLIN AND CLAVULANATE POTASSIUM 500 MG: 500; 125 TABLET, FILM COATED ORAL at 09:01

## 2025-01-05 RX ADMIN — TAMSULOSIN HYDROCHLORIDE 0.4 MG: 0.4 CAPSULE ORAL at 09:01

## 2025-01-05 RX ADMIN — CARVEDILOL 12.5 MG: 12.5 TABLET, FILM COATED ORAL at 09:01

## 2025-01-05 RX ADMIN — CARVEDILOL 12.5 MG: 12.5 TABLET, FILM COATED ORAL at 08:01

## 2025-01-05 RX ADMIN — GABAPENTIN 300 MG: 300 CAPSULE ORAL at 08:01

## 2025-01-05 RX ADMIN — ATORVASTATIN CALCIUM 40 MG: 40 TABLET, FILM COATED ORAL at 09:01

## 2025-01-05 RX ADMIN — ASPIRIN 81 MG: 81 TABLET, COATED ORAL at 09:01

## 2025-01-05 RX ADMIN — ENOXAPARIN SODIUM 30 MG: 30 INJECTION SUBCUTANEOUS at 05:01

## 2025-01-05 RX ADMIN — GABAPENTIN 300 MG: 300 CAPSULE ORAL at 09:01

## 2025-01-05 RX ADMIN — LEVOFLOXACIN 500 MG: 500 TABLET, FILM COATED ORAL at 09:01

## 2025-01-05 RX ADMIN — Medication: at 09:01

## 2025-01-05 RX ADMIN — ACETAMINOPHEN 1000 MG: 500 TABLET ORAL at 08:01

## 2025-01-05 NOTE — PROGRESS NOTES
[facility] Hospital Medicine Progress Note        Chief Complaint: Inpatient Follow-up for reinitiating hemodialysis after hooking hospice    HPI per admitting team: 64-year-old male with a history of morbid obesity, chronic venous stasis, chronic open right foot wound, ESRD, type 2 diabetes who presented to the ER for multiple falls over the last week.  Daughter gives history (Isidra Watkins [phone]) who explains that 2 weeks ago patient decided he would no longer receive dialysis nor wound care and felt that these providers were only trying to harm him.  He therefore enrolled in hospice and understood completely in the time that without dialysis he would very likely die.  Over the last few days at home he has become progressively weak and having multiple falls, his wife activated EMS tonight after he had a fall and was unable to get up.  He was brought to the ER where on arrival he was afebrile hemodynamically stable maintaining normal sats on room air.  Laboratory work noted leukocytosis of 20 K, creatinine of 2.3/BUN 34, CO2 20 and a normal potassium.  CT head showed no acute process.  He was not found to have focal deficits on exam nor any signs of traumatic injury though was noted to have an infected chronic left foot ulceration.  Blood cultures were obtained and broad-spectrum antibiotics initiated.MRI of the T-spine showed L1 superior endplate compression fracture no significant spinal canal or neural foraminal stenosis no cord abnormality.  MRI of the brain was negative  CT of the L-spine had shown chronic L4 superior endplate compression deformity with mild loss of height.Patient had refused x-ray and MRI of the L-spine neurosurgery has signed off and they are recommending bracing . MRI of the T-spine showed L1 superior endplate compression fracture no significant spinal canal or neural foraminal stenosis no cord abnormality. MRI of the brain was negative   Patient is  "status post 5th ray amputation on 12/31/2024 secondary to infected wound and osteomyelitis id has been consulted for antibiotic recommendations.  Tissue cultures from OR sample 12/31/2024 showing few Gram-negative rods    Interval Hx:   Patient is laying in bed, easily awakened today.  No major complaints  Informed  is working on SNF placement, patient reported " I guess so"  No family is at bedside  Chart reviewed, patient revoked his hospice, wants to be full code and re-initiate his hemodialysis on admission.  Family has requested nursing home placement as family is unable to meet his needs, case management on board    Objective/physical exam:  General: In no acute distress, afebrile, morbidly obese, large neck circumference  Chest:  Snoring, on OxyMask at 2 liters/minute  Heart: RRR, +S1, S2, no appreciable murmur  Abdomen: Soft, nontender, BS +  MSK: Warm, dressing to the right foot  Neurologic:  Awake, globally weak    VITAL SIGNS: 24 HRS MIN & MAX LAST   Temp  Min: 97.5 °F (36.4 °C)  Max: 98.5 °F (36.9 °C) 98.3 °F (36.8 °C)   BP  Min: 113/62  Max: 166/69 (!) 147/80   Pulse  Min: 61  Max: 78  75   Resp  Min: 18  Max: 18 18   SpO2  Min: 93 %  Max: 99 % 99 %     I reviewed the labs below:  Recent Labs   Lab 01/01/25  0408 01/03/25  0348 01/05/25  0400   WBC 15.18* 13.13* 11.58*   RBC 3.90* 3.99* 4.01*   HGB 11.4* 11.7* 11.6*   HCT 35.2* 35.7* 36.1*   MCV 90.3 89.5 90.0   MCH 29.2 29.3 28.9   MCHC 32.4* 32.8* 32.1*   RDW 13.7 13.9 13.7    332 346   MPV 9.2 9.4 9.3     Recent Labs   Lab 01/01/25  0408 01/03/25  0348 01/05/25  0400    133* 132*   K 3.7 3.9 3.7    95* 92*   CO2 23 24 24   BUN 34.4* 53.4* 78.1*   CREATININE 2.76* 3.77* 5.51*   CALCIUM 8.3* 8.4* 8.7*   MG  --  2.50  --    ALBUMIN 2.4* 2.5* 2.5*   ALKPHOS  --  233* 221*   ALT  --  44 46   AST  --  35* 44*   BILITOT  --  0.6 0.5     Microbiology Results (last 7 days)       Procedure Component Value Units Date/Time    " Anaerobic Culture [[id number]]  (Abnormal) Collected: 12/31/24 1406    Order Status: Completed Specimen: Bone from Toe, Right Foot Updated: 01/04/25 1259     Anaerobe Culture Bacteroides fragilis      Parvimonas micra     Comment: Anaerobic sensitivity is not usually indicated except on single isolates from sterile body sites.       Tissue Culture - Aerobic [[id number]]  (Abnormal)  (Susceptibility) Collected: 12/31/24 1406    Order Status: Completed Specimen: Bone from Toe, Right Foot Updated: 01/03/25 0914     Tissue - Aerobic Culture Few Proteus mirabilis    Blood culture #1 **CANNOT BE ORDERED STAT** [[id number]]  (Normal) Collected: 12/25/24 2100    Order Status: Completed Specimen: Blood from Antecubital, Right Updated: 12/30/24 2201     Blood Culture No Growth at 5 days    Blood culture #2 **CANNOT BE ORDERED STAT** [[id number]]  (Normal) Collected: 12/25/24 2110    Order Status: Completed Specimen: Blood from Wrist, Right Updated: 12/30/24 2201     Blood Culture No Growth at 5 days    Anaerobic Culture [[id number]]  (Abnormal) Collected: 12/27/24 1620    Order Status: Completed Specimen: Wound from Foot, Right Updated: 12/30/24 1140     Anaerobe Culture Bacteroides fragilis     Comment: Anaerobic sensitivity is not usually indicated except on single isolates from sterile body sites.       Wound Culture [[id number]]  (Abnormal)  (Susceptibility) Collected: 12/27/24 1620    Order Status: Completed Specimen: Wound from Foot, Right Updated: 12/30/24 0737     Wound Culture Moderate Proteus mirabilis      Moderate Enterobacter cloacae complex           See below for Radiology    Intake and Output:  Intake/Output Summary (Last 24 hours) at 1/5/2025 1040  Last data filed at 1/5/2025 0636  Gross per 24 hour   Intake --   Output 300 ml   Net -300 ml       Assessment/Plan:  End-stage renal disease off dialysis for 2 weeks now re-initiated on this admission  Revocation of hospice, now full code  Infected chronic right  foot wound with wound culture growing Proteus, Enterobacter and Bacteroides S/P 5th ray amputation of the right foot 12/31/24  Sepsis secondary to above- resolved   L1 superior endplate compression fracture and chronic L4 superior endplate compression deformity  Severe back pain due to above  Elevated liver enzymes (AST and alk-phos)  HX:  Morbid obesity with BMI 43.0, chronic venous stasis, T2DM- well controlled, HTN, anemia of chronic disease, hypoalbuminemia          Infectious disease now signed off.  Tissue cultures from 12/30 showing Proteus mirabilis.  Prior wound culture showed Proteus and Enterobacter with bacteria rods.  Continue Augmentin and levofloxacin until 01/13/2025.  Podiatry Dr Hobbs on board, continue current care.  Antibiotics per ID.  Cont wound care  MRI T-spine, CT lumbar spine reviewed.  Patient reportedly refused MRI lumbar spine.  Neurosurgery had no further recommendations.  Continue analgesics as needed.    Nephrology on board, continue hemodialysis Monday Wednesday Friday  Insulin regimen. Monitor CBGs a.c. and HS  12/26- A1C 6.0, well controlled   PT OT - moderate intensity  Case management on board, [facility] could not accept the patient, daughter to give more choices Monday, plan for SNF to transitioned to nursing home per family's request  Morning CBC, CMP, phosphorus ordered    VTE prophylaxis:   Lovenox 30 mg subQ                      Patient condition:  Fair    Anticipated discharge and Disposition:   SNF--> NH       All diagnosis and differential diagnosis have been reviewed; assessment and plan has been documented; I have personally reviewed the labs and test results that are presently available; I have reviewed the patients medication list; I have reviewed the consulting providers response and recommendations. I have reviewed or attempted to review medical records based upon their availability    All of the patient's questions have been  addressed and answered. Patient's  is agreeable to the above stated plan. I will continue to monitor closely and make adjustments to medical management as needed.    Portions of this note dictated using EMR integrated voice recognition software, and may be subject to voice recognition errors not corrected at proofreading. Please contact writer for clarification if needed.   _____________________________________________________________________    Nutrition Status:  Nutrition consulted. Most recent weight and BMI monitored-     Measurements:  Wt Readings from Last 1 Encounters:   12/26/24 (!) 136.1 kg (300 lb 0.7 oz)   Body mass index is 43.05 kg/m².    Patient has been screened and assessed by RD.    Malnutrition Type:  Context:    Level:      Malnutrition Characteristic Summary:       Interventions/Recommendations (treatment strategy):         A minimum of two characteristics is recommended for diagnosis of either severe or non-severe malnutrition.     Current Med:   amoxicillin-clavulanate 500-125mg  1 tablet Oral Daily    aspirin  81 mg Oral Daily    atorvastatin  40 mg Oral Daily    carvediloL  12.5 mg Oral BID    enoxparin  30 mg Subcutaneous Daily    gabapentin  300 mg Oral BID    levoFLOXacin  500 mg Oral Every other day    tamsulosin  1 capsule Oral Daily    zinc oxide-cod liver oil   Topical (Top) BID      PRN meds:  Current Facility-Administered Medications:     acetaminophen, 1,000 mg, Oral, Q8H PRN    bisacodyL, 10 mg, Rectal, Daily PRN    dextrose 50%, 12.5 g, Intravenous, PRN    dextrose 50%, 25 g, Intravenous, PRN    glucagon (human recombinant), 1 mg, Intramuscular, PRN    glucose, 16 g, Oral, PRN    glucose, 24 g, Oral, PRN    heparin (porcine), 3,300 Units, Intravenous, PRN    insulin aspart U-100, 0-5 Units, Subcutaneous, QID (AC + HS) PRN    labetalol, 10 mg, Intravenous, Q15 Min PRN    melatonin, 6 mg, Oral, Nightly PRN    ondansetron, 4 mg, Intravenous, Q8H PRN    sodium chloride 0.9%, 10 mL, Intravenous, PRN    Continuous  infusion:       Radiology:   I have personally reviewed the images and agree with radiologist report  CV Ultrasound doppler arterial legs bilat  The right lower extremity arterial system is patent with no evidence of   focal stenosis or occlusion.  The left lower extremity arterial system is patent with no evidence of   focal stenosis or occlusion.        Delaon Cortez MD  Department of Hospital Medicine   [facility]   01/05/2025

## 2025-01-05 NOTE — PLAN OF CARE
Problem: Adult Inpatient Plan of Care  Goal: Plan of Care Review  Outcome: Progressing  Goal: Patient-Specific Goal (Individualized)  Outcome: Progressing  Goal: Absence of Hospital-Acquired Illness or Injury  Outcome: Progressing  Goal: Optimal Comfort and Wellbeing  Outcome: Progressing  Goal: Readiness for Transition of Care  Outcome: Progressing     Problem: Bariatric Environmental Safety  Goal: Safety Maintained with Care  Outcome: Progressing     Problem: Wound  Goal: Optimal Coping  Outcome: Progressing  Goal: Optimal Functional Ability  Outcome: Progressing  Goal: Absence of Infection Signs and Symptoms  Outcome: Progressing  Goal: Improved Oral Intake  Outcome: Progressing  Goal: Optimal Pain Control and Function  Outcome: Progressing  Goal: Skin Health and Integrity  Outcome: Progressing  Goal: Optimal Wound Healing  Outcome: Progressing     Problem: Diabetes Comorbidity  Goal: Blood Glucose Level Within Targeted Range  Outcome: Progressing

## 2025-01-05 NOTE — PT/OT/SLP PROGRESS
Physical Therapy Treatment    Patient Name:  Neno Watkins   MRN:  [id number]    Recommendations:     Discharge therapy intensity: Moderate Intensity Therapy   Discharge Equipment Recommendations: to be determined by next level of care  Barriers to discharge: Decreased caregiver support, Impaired mobility, and Ongoing medical needs    Assessment:     Neno Watkins is a 64 y.o. male admitted with a medical diagnosis of weakness and falls, infected chronic R foot open wound, ESRD off dialysis x2 weeks seeking revocation of hospice / desire to re-initiate HD.  He presents with the following impairments/functional limitations: weakness, impaired endurance, impaired functional mobility, impaired self care skills, decreased upper extremity function, decreased lower extremity function, decreased safety awareness, pain, impaired skin, edema . Pt seen today after 5th ray amputation. Per podiatry, pt now NWB RLE until able to re-evaluate wound healing. Pt unable to stand from bed this date without use of R leg. Updated POC to include slideboard t/f and wheelchair mobility if pt will remain NWB. Will continue to follow.     Rehab Prognosis: Fair; patient would benefit from acute skilled PT services to address these deficits and reach maximum level of function.    Recent Surgery: Procedure(s) (LRB):  AMPUTATION, TOE (Right) 5 Days Post-Op    Plan:     During this hospitalization, patient would benefit from acute PT services 5 x/week to address the identified rehab impairments via gait training, therapeutic activities, therapeutic exercises, neuromuscular re-education and progress toward the following goals:    Plan of Care Expires:  01/26/25    Subjective     Chief Complaint: none    Objective:     Communicated with nurse prior to session.  Patient found supine with peripheral IV, telemetry, PureWick upon PT entry to room.     General Precautions: Standard, fall  Orthopedic Precautions:    Braces:    Respiratory Status: Room  air  Blood Pressure:   Skin Integrity:  dressing to right foot      Functional Mobility:  Max assist to get EOB and min assist to maintain sit balance but SBA @ times.   Static and dynamic sit balance activities to improve righting reactions and midline orientation.   Pt performed LE PRE's to increase strenth, ROM, and endurance to improve overall independence.    Education Provided:  Role and goals of PT, transfer training, bed mobility, gait training, balance training, safety awareness, assistive device, strengthening exercises, and importance of participating in PT to return to PLOF.     Patient left with bed in chair position with  breakfast.     GOALS:   Multidisciplinary Problems       Physical Therapy Goals          Problem: Physical Therapy    Goal Priority Disciplines Outcome Interventions   Physical Therapy Goal     PT, PT/OT Progressing    Description: Goals to be met by: 24     Patient will increase functional independence with mobility by performin. Supine to sit with minimal assistance  2. Rolling to Left and Right with [place].  3. Sit to stand t/f standby assist with RW.  4. Bed<>chair transfer with slideboard vs. RW with minimal assistance.  5. Gait x 150ft with RW with standby assist.  6. Pt will propel MWC x 100ft with BUE with independence. (As appropriate)                       Time Tracking:       Billable Minutes: Therapeutic Activity 12 and Therapeutic Exercise 11    Treatment Type: Treatment  PT/PTA: PTA     Number of PTA visits since last PT visit: 4     2025

## 2025-01-06 LAB
ALBUMIN SERPL-MCNC: 2.5 G/DL (ref 3.4–4.8)
ALBUMIN/GLOB SERPL: 0.5 RATIO (ref 1.1–2)
ALP SERPL-CCNC: 202 UNIT/L (ref 40–150)
ALT SERPL-CCNC: 45 UNIT/L (ref 0–55)
ANION GAP SERPL CALC-SCNC: 14 MEQ/L
AST SERPL-CCNC: 43 UNIT/L (ref 5–34)
BASOPHILS # BLD AUTO: 0.07 X10(3)/MCL
BASOPHILS NFR BLD AUTO: 0.6 %
BILIRUB SERPL-MCNC: 0.5 MG/DL
BUN SERPL-MCNC: 86.7 MG/DL (ref 8.4–25.7)
CALCIUM SERPL-MCNC: 8.7 MG/DL (ref 8.8–10)
CHLORIDE SERPL-SCNC: 93 MMOL/L (ref 98–107)
CO2 SERPL-SCNC: 23 MMOL/L (ref 23–31)
CREAT SERPL-MCNC: 5.81 MG/DL (ref 0.72–1.25)
CREAT/UREA NIT SERPL: 15
EOSINOPHIL # BLD AUTO: 0.2 X10(3)/MCL (ref 0–0.9)
EOSINOPHIL NFR BLD AUTO: 1.8 %
ERYTHROCYTE [DISTWIDTH] IN BLOOD BY AUTOMATED COUNT: 13.4 % (ref 11.5–17)
GFR SERPLBLD CREATININE-BSD FMLA CKD-EPI: 10 ML/MIN/1.73/M2
GLOBULIN SER-MCNC: 4.9 GM/DL (ref 2.4–3.5)
GLUCOSE SERPL-MCNC: 107 MG/DL (ref 82–115)
HCT VFR BLD AUTO: 37.2 % (ref 42–52)
HGB BLD-MCNC: 12.6 G/DL (ref 14–18)
IMM GRANULOCYTES # BLD AUTO: 0.15 X10(3)/MCL (ref 0–0.04)
IMM GRANULOCYTES NFR BLD AUTO: 1.3 %
LYMPHOCYTES # BLD AUTO: 2.97 X10(3)/MCL (ref 0.6–4.6)
LYMPHOCYTES NFR BLD AUTO: 26 %
MCH RBC QN AUTO: 29.5 PG (ref 27–31)
MCHC RBC AUTO-ENTMCNC: 33.9 G/DL (ref 33–36)
MCV RBC AUTO: 87.1 FL (ref 80–94)
MONOCYTES # BLD AUTO: 0.88 X10(3)/MCL (ref 0.1–1.3)
MONOCYTES NFR BLD AUTO: 7.7 %
NEUTROPHILS # BLD AUTO: 7.14 X10(3)/MCL (ref 2.1–9.2)
NEUTROPHILS NFR BLD AUTO: 62.6 %
NRBC BLD AUTO-RTO: 0 %
PHOSPHATE SERPL-MCNC: 6.6 MG/DL (ref 2.3–4.7)
PLATELET # BLD AUTO: 282 X10(3)/MCL (ref 130–400)
PMV BLD AUTO: 9 FL (ref 7.4–10.4)
POCT GLUCOSE: 125 MG/DL (ref 70–110)
POCT GLUCOSE: 145 MG/DL (ref 70–110)
POCT GLUCOSE: 145 MG/DL (ref 70–110)
POCT GLUCOSE: 170 MG/DL (ref 70–110)
POTASSIUM SERPL-SCNC: 3.7 MMOL/L (ref 3.5–5.1)
PROT SERPL-MCNC: 7.4 GM/DL (ref 5.8–7.6)
RBC # BLD AUTO: 4.27 X10(6)/MCL (ref 4.7–6.1)
SODIUM SERPL-SCNC: 130 MMOL/L (ref 136–145)
WBC # BLD AUTO: 11.41 X10(3)/MCL (ref 4.5–11.5)

## 2025-01-06 PROCEDURE — 21400001 HC TELEMETRY ROOM

## 2025-01-06 PROCEDURE — 63600175 PHARM REV CODE 636 W HCPCS: Performed by: PODIATRIST

## 2025-01-06 PROCEDURE — 97530 THERAPEUTIC ACTIVITIES: CPT

## 2025-01-06 PROCEDURE — 80053 COMPREHEN METABOLIC PANEL: CPT | Performed by: NURSE PRACTITIONER

## 2025-01-06 PROCEDURE — 97535 SELF CARE MNGMENT TRAINING: CPT

## 2025-01-06 PROCEDURE — 25000003 PHARM REV CODE 250: Performed by: PODIATRIST

## 2025-01-06 PROCEDURE — 36415 COLL VENOUS BLD VENIPUNCTURE: CPT | Performed by: NURSE PRACTITIONER

## 2025-01-06 PROCEDURE — 25000003 PHARM REV CODE 250: Performed by: INTERNAL MEDICINE

## 2025-01-06 PROCEDURE — 80100016 HC MAINTENANCE HEMODIALYSIS

## 2025-01-06 PROCEDURE — 85025 COMPLETE CBC W/AUTO DIFF WBC: CPT | Performed by: NURSE PRACTITIONER

## 2025-01-06 PROCEDURE — 84100 ASSAY OF PHOSPHORUS: CPT | Performed by: NURSE PRACTITIONER

## 2025-01-06 RX ADMIN — CARVEDILOL 12.5 MG: 12.5 TABLET, FILM COATED ORAL at 09:01

## 2025-01-06 RX ADMIN — GABAPENTIN 300 MG: 300 CAPSULE ORAL at 09:01

## 2025-01-06 RX ADMIN — ACETAMINOPHEN 1000 MG: 500 TABLET ORAL at 09:01

## 2025-01-06 RX ADMIN — CARVEDILOL 12.5 MG: 12.5 TABLET, FILM COATED ORAL at 07:01

## 2025-01-06 RX ADMIN — ENOXAPARIN SODIUM 30 MG: 30 INJECTION SUBCUTANEOUS at 05:01

## 2025-01-06 RX ADMIN — ATORVASTATIN CALCIUM 40 MG: 40 TABLET, FILM COATED ORAL at 07:01

## 2025-01-06 RX ADMIN — ASPIRIN 81 MG: 81 TABLET, COATED ORAL at 07:01

## 2025-01-06 RX ADMIN — GABAPENTIN 300 MG: 300 CAPSULE ORAL at 07:01

## 2025-01-06 RX ADMIN — AMOXICILLIN AND CLAVULANATE POTASSIUM 500 MG: 500; 125 TABLET, FILM COATED ORAL at 07:01

## 2025-01-06 RX ADMIN — Medication: at 07:01

## 2025-01-06 RX ADMIN — TAMSULOSIN HYDROCHLORIDE 0.4 MG: 0.4 CAPSULE ORAL at 07:01

## 2025-01-06 NOTE — PROGRESS NOTES
Nephrology consult follow up note    HPI:      Neno Watkins is a 64 y.o. male  who has been on chronic hemodialysis at [facility] on Monday Wednesday Friday.  He is being followed by Dr. Kike Alanis.  He has lot of problems on his legs and somehow he got frustrated and decided to stop dialysis and go for hospice but now he has reversed it and wants to be treated and taken care of.  Nephrology consulted for ESRD management.    Interval history:     12/27/2024  Seen on hemodialysis.    12/30/2024  Seen on hemodialysis    12/31/2024  No acute events overnight.  No new complaints.  Tolerated dialysis yesterday without problem.  Continues to have mild lower extremity edema.  No chest pain, shortness of breath, nausea, vomiting.    1/1/25  Seen on hemodialysis.    1/2/25  Pt is in NAD, aaox4, in bed watching tv. Denies SOB, CP, n/v/d.     01/03/2025  Seen on hemodialysis    01/06/2025   Currently tolerating dialysis.  No complaints of any shortness of breath.  Patient is on oxygen.  No chest pains no abdominal pains.     Review of Systems:       Past medical, family, surgical, and social history reviewed and unchanged from initial consult note.     Objective:       VITAL SIGNS: 24 HR MIN & MAX LAST    Temp  Min: 97.7 °F (36.5 °C)  Max: 98.8 °F (37.1 °C)  98.8 °F (37.1 °C)        BP  Min: 132/76  Max: 152/71  132/76     Pulse  Min: 63  Max: 76  69     Resp  Min: 19  Max: 19  19    SpO2  Min: 95 %  Max: 98 %  98 %      GEN: Chronically ill appearing WM in NAD  CV: RRR +S1,S2 without murmur  PULM: CTAB, unlabored  ABD: Soft, NT/ND abdomen with NABS  EXT: No cyanosis or edema  SKIN: Warm and dry  PSYCH: Awake, alert and appropriately conversant.   Dialysis access: Left upper extremity AV graft with palpable pulse and right IJ tunneled dialysis catheter             Component Value Date/Time     (L) 01/06/2025 0428     (L) 01/05/2025 0400     10/13/2022 0652    K 3.7 01/06/2025 0428    K 3.7  01/05/2025 0400    K 3.0 (L) 10/13/2022 0652    CO2 23 01/06/2025 0428    CO2 24 01/05/2025 0400    CO2 26 10/13/2022 0652    BUN 86.7 (H) 01/06/2025 0428    BUN 78.1 (H) 01/05/2025 0400    BUN 24 (H) 12/11/2024 0000    BUN 30 (H) 11/24/2024 0000    CREATININE 5.81 (H) 01/06/2025 0428    CREATININE 5.51 (H) 01/05/2025 0400    CREATININE 2.94 (H) 10/13/2022 0652    CALCIUM 8.7 (L) 01/06/2025 0428    CALCIUM 8.7 (L) 01/05/2025 0400    CALCIUM 9.6 10/13/2022 0652    PHOS 6.6 (H) 01/06/2025 0428            Component Value Date/Time    WBC 11.41 01/06/2025 0428    WBC 11.58 (H) 01/05/2025 0400    HGB 12.6 (L) 01/06/2025 0428    HGB 11.6 (L) 01/05/2025 0400    HGB 11.6 (L) 12/09/2024 0000    HGB 11.4 (L) 12/02/2024 0000    HCT 37.2 (L) 01/06/2025 0428    HCT 36.1 (L) 01/05/2025 0400     01/06/2025 0428     01/05/2025 0400         Imaging reviewed      Assessment / Plan:       ESRD.  Dialysis days Monday Wednesday Friday.    Noncomplaince with medical treatment  Personal history of diabetes mellitus but not on any medications will check hemoglobin A1c then decide on the need for medical management  Hypertension  Hyperlipidemia  Cellulitis of both lower extremities right more than left - s/p toe amputation 12/31/24  Anemia of chronic kidney disease  Dialysis noncompliance        Plan:  Will try his left upper extremity fistula on Wednesday.  Patient says it has been tried and it does not work, but he does have good bruit and thrill.  Continue all other medical management

## 2025-01-06 NOTE — PLAN OF CARE
Referrals for SNF to long term placement [facility].    [facility] does not have a bed available at this time.     Awaiting decision from [facility]. Will follow up with their admission's coordinators.     [facility] stated that they are reviewing this patient's referral. Made her aware that he is ready for discharge and just pending placement. She stated she would let me know when acceptance is official and they are ready to submit for ins auth.

## 2025-01-06 NOTE — PT/OT/SLP PROGRESS
Physical Therapy Treatment    Patient Name:  Neno Watkins   MRN:  [id number]    Recommendations:     Discharge therapy intensity: Moderate Intensity Therapy   Discharge Equipment Recommendations: to be determined by next level of care  Barriers to discharge: Impaired mobility and Ongoing medical needs    Assessment:     Neno Watkins is a 64 y.o. male admitted with a medical diagnosis of weakness and falls, infected chronic R foot open wound, ESRD off dialysis x2 weeks seeking revocation of hospice / desire to re-initiate HD.  He presents with the following impairments/functional limitations: weakness, impaired endurance, impaired functional mobility, impaired self care skills, decreased upper extremity function, decreased lower extremity function, decreased safety awareness, pain, impaired skin, edema .     Seen today after dialysis. Attempted to initiate slide board t/f training, however pt with hypotensive episode after sitting EOB several minutes. C/o dizziness then began leaning posteriorly, decreased responsiveness with diaphoresis. Assisted pt to supine, began gagging but no emesis. BP low initially but improved with supine positioning and pt returned to baseline mentation. RN present in room to assess.    Rehab Prognosis: Good; patient would benefit from acute skilled PT services to address these deficits and reach maximum level of function.    Recent Surgery: Procedure(s) (LRB):  AMPUTATION, TOE (Right) 6 Days Post-Op    Plan:     During this hospitalization, patient would benefit from acute PT services 5 x/week to address the identified rehab impairments via gait training, therapeutic activities, therapeutic exercises, neuromuscular re-education and progress toward the following goals:    Plan of Care Expires:  01/26/25    Subjective     Chief Complaint: back pain  Patient/Family Comments/goals: be able to walk again  Pain/Comfort:  Pain Rating 1:  (c/o LBP no rating given)      Objective:      Communicated with RN prior to session.  Patient found supine with peripheral IV, pulse ox (continuous), telemetry upon PT entry to room.     General Precautions: Standard, fall  Orthopedic Precautions: RLE non weight bearing  Braces:  (TLSO PRN, R darco shoe)  Respiratory Status: Room air  Blood Pressure:   Supine after sitting = 82/51 MAP 61  Supine after 1 min = 114/76  Skin Integrity:  known R foot wound      Functional Mobility:  Bed Mobility:     Rolling Left:  maximal assistance  Rolling Right: maximal assistance  Supine to Sit: maximal assistance and of 2 persons  Sit to Supine: maximal assistance and of 2 persons  Balance: sitting balance = SBA-modA due to posterior lean    Therapeutic Activities/Exercises:  Pt rolled R and L several times maxA with cues for spinal pxn for hygiene     Education:  Patient and daughter/s were provided with verbal education education regarding PT role/goals/POC, fall prevention, safety awareness, and discharge/DME recommendations.  Understanding was verbalized, however additional teaching warranted.     Patient left HOB elevated with all lines intact, call button in reach, wedge under L side, pressure relief boots, RN notified, and daughter present    GOALS:   Multidisciplinary Problems       Physical Therapy Goals          Problem: Physical Therapy    Goal Priority Disciplines Outcome Interventions   Physical Therapy Goal     PT, PT/OT Progressing    Description: Goals to be met by: 24     Patient will increase functional independence with mobility by performin. Supine to sit with minimal assistance  2. Rolling to Left and Right with [place].  3. Sit to stand t/f standby assist with RW.  4. Bed<>chair transfer with slideboard vs. RW with minimal assistance.  5. Gait x 150ft with RW with standby assist.  6. Pt will propel MWC x 100ft with BUE with independence. (As appropriate)                       Time Tracking:     PT Received On: 25  PT Start  Time: 1336     PT Stop Time: 1410  PT Total Time (min): 34 min     Billable Minutes: Therapeutic Activity 34 min    Treatment Type: Treatment  PT/PTA: PT     Number of PTA visits since last PT visit: 5 01/06/2025

## 2025-01-06 NOTE — PLAN OF CARE
Problem: Adult Inpatient Plan of Care  Goal: Plan of Care Review  Outcome: Progressing  Goal: Patient-Specific Goal (Individualized)  Outcome: Progressing  Goal: Absence of Hospital-Acquired Illness or Injury  Outcome: Progressing  Goal: Optimal Comfort and Wellbeing  Outcome: Progressing  Goal: Readiness for Transition of Care  Outcome: Progressing     Problem: Bariatric Environmental Safety  Goal: Safety Maintained with Care  Outcome: Progressing     Problem: Diabetes Comorbidity  Goal: Blood Glucose Level Within Targeted Range  Outcome: Progressing     Problem: Stroke, Ischemic (Includes Transient Ischemic Attack)  Goal: Optimal Coping  Outcome: Progressing  Goal: Effective Bowel Elimination  Outcome: Progressing  Goal: Optimal Cerebral Tissue Perfusion  Outcome: Progressing  Goal: Optimal Cognitive Function  Outcome: Progressing  Goal: Improved Communication Skills  Outcome: Progressing  Goal: Optimal Functional Ability  Outcome: Progressing  Goal: Optimal Nutrition Intake  Outcome: Progressing  Goal: Effective Oxygenation and Ventilation  Outcome: Progressing  Goal: Improved Sensorimotor Function  Outcome: Progressing  Goal: Safe and Effective Swallow  Outcome: Progressing  Goal: Effective Urinary Elimination  Outcome: Progressing

## 2025-01-06 NOTE — NURSING
Patient had an episode with PT, as they tried to sit him up, patient started vomiting, B/P dropped to 80/51 patient felt light headed and dizzy. once I went into the room, symptoms had resolved B/P 114/76. . Patients daughter was in the room and stated thus usually happens after dialysis.

## 2025-01-06 NOTE — PROGRESS NOTES
[facility] Hospital Medicine Progress Note        Chief Complaint: Inpatient Follow-up for reinitiating hemodialysis after hooking hospice    HPI per admitting team: 64-year-old male with a history of morbid obesity, chronic venous stasis, chronic open right foot wound, ESRD, type 2 diabetes who presented to the ER for multiple falls over the last week.  Daughter gives history (Isidra Watkins [phone]) who explains that 2 weeks ago patient decided he would no longer receive dialysis nor wound care and felt that these providers were only trying to harm him.  He therefore enrolled in hospice and understood completely in the time that without dialysis he would very likely die.  Over the last few days at home he has become progressively weak and having multiple falls, his wife activated EMS tonight after he had a fall and was unable to get up.  He was brought to the ER where on arrival he was afebrile hemodynamically stable maintaining normal sats on room air.  Laboratory work noted leukocytosis of 20 K, creatinine of 2.3/BUN 34, CO2 20 and a normal potassium.  CT head showed no acute process.  He was not found to have focal deficits on exam nor any signs of traumatic injury though was noted to have an infected chronic left foot ulceration.  Blood cultures were obtained and broad-spectrum antibiotics initiated.MRI of the T-spine showed L1 superior endplate compression fracture no significant spinal canal or neural foraminal stenosis no cord abnormality.  MRI of the brain was negative  CT of the L-spine had shown chronic L4 superior endplate compression deformity with mild loss of height.Patient had refused x-ray and MRI of the L-spine neurosurgery has signed off and they are recommending bracing . MRI of the T-spine showed L1 superior endplate compression fracture no significant spinal canal or neural foraminal stenosis no cord abnormality. MRI of the brain was negative   Patient is  status post 5th ray amputation on 12/31/2024 secondary to infected wound and osteomyelitis id has been consulted for antibiotic recommendations.  Tissue cultures from OR sample 12/31/2024 showing few Gram-negative rods    Interval Hx:   Patient undergoing hemodialysis, easily awakened. No major complaints  Informed  is working on SNF/NH placement, informed waiting for his daughter to give 3 more choices as [facility] could not acceptance  No family is at bedside  Case was with patient's nurse and  on the floor,  is waiting on patient's daughter to give 3 more choices for placement [facility] his unable to meet his needs    Objective/physical exam:  General: In no acute distress, afebrile, morbidly obese, large neck circumference, looks chronically ill  Chest:  Snoring, on OxyMask at 2 liters/minute during hemodialysis   Heart: RRR, +S1, S2, no appreciable murmur  Abdomen: Soft, obese, nontender, BS +  MSK: Warm, dressing to the right foot  Neurologic:  Awake, globally weak    VITAL SIGNS: 24 HRS MIN & MAX LAST   Temp  Min: 97.7 °F (36.5 °C)  Max: 98.8 °F (37.1 °C) 98.3 °F (36.8 °C)   BP  Min: 111/78  Max: 152/71 111/78   Pulse  Min: 63  Max: 97  97   Resp  Min: 18  Max: 19 18   SpO2  Min: 93 %  Max: 98 % (!) 93 %     I reviewed the labs below:  Recent Labs   Lab 01/03/25  0348 01/05/25  0400 01/06/25  0428   WBC 13.13* 11.58* 11.41   RBC 3.99* 4.01* 4.27*   HGB 11.7* 11.6* 12.6*   HCT 35.7* 36.1* 37.2*   MCV 89.5 90.0 87.1   MCH 29.3 28.9 29.5   MCHC 32.8* 32.1* 33.9   RDW 13.9 13.7 13.4    346 282   MPV 9.4 9.3 9.0     Recent Labs   Lab 01/03/25  0348 01/05/25  0400 01/06/25  0428   * 132* 130*   K 3.9 3.7 3.7   CL 95* 92* 93*   CO2 24 24 23   BUN 53.4* 78.1* 86.7*   CREATININE 3.77* 5.51* 5.81*   CALCIUM 8.4* 8.7* 8.7*   MG 2.50  --   --    ALBUMIN 2.5* 2.5* 2.5*   ALKPHOS 233* 221* 202*   ALT 44 46 45   AST 35* 44* 43*   BILITOT 0.6 0.5 0.5     Microbiology Results  (last 7 days)       Procedure Component Value Units Date/Time    Anaerobic Culture [[id number]]  (Abnormal) Collected: 12/31/24 1406    Order Status: Completed Specimen: Bone from Toe, Right Foot Updated: 01/04/25 1259     Anaerobe Culture Bacteroides fragilis      Parvimonas micra     Comment: Anaerobic sensitivity is not usually indicated except on single isolates from sterile body sites.       Tissue Culture - Aerobic [[id number]]  (Abnormal)  (Susceptibility) Collected: 12/31/24 1406    Order Status: Completed Specimen: Bone from Toe, Right Foot Updated: 01/03/25 0914     Tissue - Aerobic Culture Few Proteus mirabilis    Blood culture #1 **CANNOT BE ORDERED STAT** [[id number]]  (Normal) Collected: 12/25/24 2100    Order Status: Completed Specimen: Blood from Antecubital, Right Updated: 12/30/24 2201     Blood Culture No Growth at 5 days    Blood culture #2 **CANNOT BE ORDERED STAT** [[id number]]  (Normal) Collected: 12/25/24 2110    Order Status: Completed Specimen: Blood from Wrist, Right Updated: 12/30/24 2201     Blood Culture No Growth at 5 days           See below for Radiology    Intake and Output:  Intake/Output Summary (Last 24 hours) at 1/6/2025 1352  Last data filed at 1/6/2025 1100  Gross per 24 hour   Intake 50 ml   Output 2500 ml   Net -2450 ml       Assessment/Plan:  End-stage renal disease off dialysis for 2 weeks now re-initiated on this admission  Revocation of hospice, now full code  Infected chronic right foot wound with wound culture growing Proteus, Enterobacter and Bacteroides S/P 5th ray amputation of the right foot 12/31/24  Sepsis secondary to above- resolved   L1 superior endplate compression fracture and chronic L4 superior endplate compression deformity  Severe back pain due to above  Elevated liver enzymes (AST and alk-phos)  HX:  Morbid obesity with BMI 43.0, chronic venous stasis, T2DM- well controlled, HTN, anemia of chronic disease, hypoalbuminemia          Infectious disease now  signed off.  Tissue cultures from 12/30 showing Proteus mirabilis.  Prior wound culture showed Proteus and Enterobacter with bacteria rods.  Anaerobic culture shows Bacteroides fragilis and Parvimonas micra  Continue Augmentin and Levofloxacin until 01/13/2025.  Leukocytosis resolved, afebrile  Podiatry Dr Hobbs on board, continue current care.  Antibiotics per ID.  Cont wound care  MRI T-spine, CT lumbar spine reviewed.  Patient reportedly refused MRI lumbar spine.  Neurosurgery had no further recommendations.  Continue analgesics as needed.    Nephrology on board, continue hemodialysis Monday Wednesday Friday  Insulin regimen. Monitor CBGs a.c. and HS  12/26- A1C 6.0, well controlled   Noted patient is requiring oxygen only at nighttime for comfort otherwise per nurse, during the daytime he is on room air  PT OT - moderate intensity  Case management on board, [facility] could not accept the patient, pt's daughter selected [facility], [facility] and [facility], request has been sent to 3 facilities  Repeat labs Wednesday on hemodialysis day    VTE prophylaxis:   Lovenox 30 mg subQ                      Patient condition:  Fair    Anticipated discharge and Disposition:   SNF--> NH, patient is medically ready      All diagnosis and differential diagnosis have been reviewed; assessment and plan has been documented; I have personally reviewed the labs and test results that are presently available; I have reviewed the patients medication list; I have reviewed the consulting providers response and recommendations. I have reviewed or attempted to review medical records based upon their availability    All of the patient's questions have been  addressed and answered. Patient's is agreeable to the above stated plan. I will continue to monitor closely and make adjustments to medical management as needed.    Portions of this note dictated using EMR integrated voice recognition software, and may  be subject to voice recognition errors not corrected at proofreading. Please contact writer for clarification if needed.   _____________________________________________________________________    Nutrition Status:  Nutrition consulted. Most recent weight and BMI monitored-     Measurements:  Wt Readings from Last 1 Encounters:   12/26/24 (!) 136.1 kg (300 lb 0.7 oz)   Body mass index is 43.05 kg/m².    Patient has been screened and assessed by RD.    Malnutrition Type:  Context:    Level:      Malnutrition Characteristic Summary:       Interventions/Recommendations (treatment strategy):         A minimum of two characteristics is recommended for diagnosis of either severe or non-severe malnutrition.     Current Med:   amoxicillin-clavulanate 500-125mg  1 tablet Oral Daily    aspirin  81 mg Oral Daily    atorvastatin  40 mg Oral Daily    carvediloL  12.5 mg Oral BID    enoxparin  30 mg Subcutaneous Daily    gabapentin  300 mg Oral BID    levoFLOXacin  500 mg Oral Every other day    tamsulosin  1 capsule Oral Daily    zinc oxide-cod liver oil   Topical (Top) BID      PRN meds:  Current Facility-Administered Medications:     acetaminophen, 1,000 mg, Oral, Q8H PRN    bisacodyL, 10 mg, Rectal, Daily PRN    dextrose 50%, 12.5 g, Intravenous, PRN    dextrose 50%, 25 g, Intravenous, PRN    glucagon (human recombinant), 1 mg, Intramuscular, PRN    glucose, 16 g, Oral, PRN    glucose, 24 g, Oral, PRN    heparin (porcine), 3,300 Units, Intravenous, PRN    insulin aspart U-100, 0-5 Units, Subcutaneous, QID (AC + HS) PRN    labetalol, 10 mg, Intravenous, Q15 Min PRN    melatonin, 6 mg, Oral, Nightly PRN    ondansetron, 4 mg, Intravenous, Q8H PRN    sodium chloride 0.9%, 10 mL, Intravenous, PRN    Continuous infusion:       Radiology:   I have personally reviewed the images and agree with radiologist report  CV Ultrasound doppler arterial legs bilat  The right lower extremity arterial system is patent with no evidence of   focal  stenosis or occlusion.  The left lower extremity arterial system is patent with no evidence of   focal stenosis or occlusion.        Delano Cortez MD  Department of Hospital Medicine   [facility]   01/06/2025

## 2025-01-06 NOTE — NURSING
01/06/25 1100   Post-Hemodialysis Assessment   Rinseback Volume (mL) 250 mL   Blood Volume Processed (Liters) 62.1 L   Dialyzer Clearance Heavily streaked   Duration of Treatment 180 minutes   Total UF (mL) 2500 mL   Net Fluid Removal 2000   Patient Response to Treatment Tolerated fairly well. Ultrafiltration limited d/t decreased BP. Nephrology aware.   Post-Hemodialysis Comments Blood rinsed back per P&P. Lines capped and secured

## 2025-01-06 NOTE — PT/OT/SLP PROGRESS
Occupational Therapy   Treatment    Name: Neno Watkins  MRN: [id number]  Admitting Diagnosis:  Wound infection  6 Days Post-Op    Recommendations:     Recommended therapy intensity at discharge: Moderate Intensity Therapy   Discharge Equipment Recommendations:  to be determined by next level of care  Barriers to discharge:       Assessment:     Neno Watknis is a 64 y.o. male with a medical diagnosis of Wound infection, weakness and falls, infected chronic R foot open wound, ESRD off dialysis x2 weeks seeking revocation of hospice / desire to re-initiate HD. Performance deficits affecting function are weakness, impaired endurance, impaired self care skills, impaired functional mobility, gait instability, impaired balance, decreased safety awareness, pain, decreased lower extremity function, decreased upper extremity function.     Pt had HD this morning. Attempted to train pt in use of slide board t/f this afternoon however, pt became hypotensive after sitting EOB for several minute, leaning posteriorly, and diaphoretic. Returned pt to supine, pt began gagging but did not vomit. Nursing called to room to assess. Pt appeared to return to baseline mentation. Will continue to progress as able.     Rehab Prognosis:  Good; patient would benefit from acute skilled OT services to address these deficits and reach maximum level of function.       Plan:     Patient to be seen 4 x/week to address the above listed problems via self-care/home management, therapeutic activities, therapeutic exercises  Plan of Care Expires: 01/27/25  Plan of Care Reviewed with: patient    Subjective     Pain/Comfort:  Pain Rating 1: other (see comments) (c/o back pain but did not rate)  Pain Addressed 1: Distraction    Objective:     Communicated with: nursing prior to session.  Patient found HOB elevated with peripheral IV, pulse ox (continuous), telemetry, Other (comments) (soiled brief) upon OT entry to room.    General Precautions: Standard,  fall    Orthopedic Precautions:RLE non weight bearing  Braces: TLSO (as needed for pain per neuro; DARCO shoe R foot)  Respiratory Status: room air  Vital Signs: Blood Pressure:   Supine after sitting = 82/51 MAP 61  Supine after 1 min = 114/76     Occupational Performance:     Bed Mobility:    Patient completed Rolling/Turning to Left with  maximal assistance  Patient completed Rolling/Turning to Right with maximal assistance  Patient completed Supine to Sit with maximal assistance and 2 persons  Patient completed Sit to Supine with maximal assistance and 2 persons     Activities of Daily Living:  Feeding:  set up A pt educated on importance of sitting up in bed for meals to reduce risk of nausea  Lower Body Dressing: total assistance to don/doff shoes  Toileting: total assistance soiled brief upon entrance; removed and performed pericare; reapplied Desitin    Therapeutic Positioning    OT interventions performed during the course of today's session in an effort to prevent and/or reduce acquired pressure injuries:   Education was provided on benefits of and recommendations for therapeutic positioning  Therapeutic positioning was provided at the conclusion of session to offload all bony prominences for the prevention and/or reduction of pressure injuries    Skin assessment:  buttocks visualized during hygiene   Findings: known area of altered skin integrity at groin, buttocks. Desitin applied after hygiene 2/2 broken skin     AMPA 6 Click ADL: 14    Patient Education:  Patient provided with verbal education education regarding fall prevention and safety awareness.  Understanding was verbalized, however additional teaching warranted.      Patient left  with wedge under L side, HOB elevated  with  nursing and family present.    GOALS:   Multidisciplinary Problems       Occupational Therapy Goals          Problem: Occupational Therapy    Goal Priority Disciplines Outcome Interventions   Occupational Therapy Goal      OT, PT/OT Progressing    Description: Goals to be met by: 1/26/24     Patient will increase functional independence with ADLs by performing:    UE Dressing with Stand-by Assistance.  LE Dressing with Stand-by Assistance.  Grooming while seated with Stand-by Assistance.  Toileting from toilet with CGA for hygiene and clothing management.   Toilet transfer with CGA                       Time Tracking:     OT Date of Treatment: 01/06/25  OT Start Time: 1336  OT Stop Time: 1409  OT Total Time (min): 33 min    Billable Minutes:Self Care/Home Management 2 units    OT/PAUL: OT     Number of PAUL visits since last OT visit: 3    1/6/2025

## 2025-01-06 NOTE — PLAN OF CARE
Isidra Watkins , patient's daughter phoned  and gave more choices   for SNF to long term care referral, The next 3 choices are [facility] and [facility] and [facility]. Choices given to REMIGIO Carmen

## 2025-01-07 LAB
POCT GLUCOSE: 141 MG/DL (ref 70–110)
POCT GLUCOSE: 219 MG/DL (ref 70–110)

## 2025-01-07 PROCEDURE — 25000003 PHARM REV CODE 250: Performed by: PODIATRIST

## 2025-01-07 PROCEDURE — 97164 PT RE-EVAL EST PLAN CARE: CPT

## 2025-01-07 PROCEDURE — 25000003 PHARM REV CODE 250: Performed by: INTERNAL MEDICINE

## 2025-01-07 PROCEDURE — 63600175 PHARM REV CODE 636 W HCPCS: Performed by: PODIATRIST

## 2025-01-07 PROCEDURE — 21400001 HC TELEMETRY ROOM

## 2025-01-07 PROCEDURE — 97530 THERAPEUTIC ACTIVITIES: CPT

## 2025-01-07 PROCEDURE — 97535 SELF CARE MNGMENT TRAINING: CPT | Mod: CO

## 2025-01-07 RX ADMIN — TAMSULOSIN HYDROCHLORIDE 0.4 MG: 0.4 CAPSULE ORAL at 09:01

## 2025-01-07 RX ADMIN — LEVOFLOXACIN 500 MG: 500 TABLET, FILM COATED ORAL at 09:01

## 2025-01-07 RX ADMIN — ENOXAPARIN SODIUM 30 MG: 30 INJECTION SUBCUTANEOUS at 05:01

## 2025-01-07 RX ADMIN — CARVEDILOL 12.5 MG: 12.5 TABLET, FILM COATED ORAL at 08:01

## 2025-01-07 RX ADMIN — CARVEDILOL 12.5 MG: 12.5 TABLET, FILM COATED ORAL at 09:01

## 2025-01-07 RX ADMIN — ASPIRIN 81 MG: 81 TABLET, COATED ORAL at 09:01

## 2025-01-07 RX ADMIN — Medication 6 MG: at 08:01

## 2025-01-07 RX ADMIN — GABAPENTIN 300 MG: 300 CAPSULE ORAL at 09:01

## 2025-01-07 RX ADMIN — INSULIN ASPART 2 UNITS: 100 INJECTION, SOLUTION INTRAVENOUS; SUBCUTANEOUS at 11:01

## 2025-01-07 RX ADMIN — ATORVASTATIN CALCIUM 40 MG: 40 TABLET, FILM COATED ORAL at 09:01

## 2025-01-07 RX ADMIN — Medication: at 08:01

## 2025-01-07 RX ADMIN — Medication: at 09:01

## 2025-01-07 RX ADMIN — ACETAMINOPHEN 1000 MG: 500 TABLET ORAL at 12:01

## 2025-01-07 RX ADMIN — GABAPENTIN 300 MG: 300 CAPSULE ORAL at 08:01

## 2025-01-07 RX ADMIN — AMOXICILLIN AND CLAVULANATE POTASSIUM 500 MG: 500; 125 TABLET, FILM COATED ORAL at 09:01

## 2025-01-07 NOTE — PT/OT/SLP RE-EVAL
"Physical Therapy Re-Evaluation    Patient Name:  Neno Watkins   MRN:  [id number]    Recommendations:     Discharge therapy intensity: Moderate Intensity Therapy   Discharge Equipment Recommendations: to be determined by next level of care   Barriers to discharge: Impaired mobility and Ongoing medical needs    Assessment:     Neno Watkins is a 64 y.o. male admitted with a medical diagnosis of  weakness and falls, infected chronic R foot open wound, ESRD off dialysis x2 weeks seeking revocation of hospice / desire to re-initiate HD.  He presents with the following impairments/functional limitations: weakness, impaired endurance, impaired functional mobility, impaired self care skills, decreased upper extremity function, decreased lower extremity function, decreased safety awareness, pain, impaired skin, edema . Pt able to t/f to chair with slideboard, maxAx2. Pt requires max cueing throughout session to increase efforts to gait independence with mobility. Remains appropriate for mod intensity therapy.    Rehab Prognosis: Good; patient would benefit from acute skilled PT services to address these deficits and reach maximum level of function.    Recent Surgery: Procedure(s) (LRB):  AMPUTATION, TOE (Right) 7 Days Post-Op    Plan:     During this hospitalization, patient would benefit from acute PT services 5 x/week to address the identified rehab impairments via gait training, therapeutic activities, therapeutic exercises, neuromuscular re-education and progress toward the following goals:    Plan of Care Expires:  01/26/25      Subjective     Chief Complaint: "I just ate, I don't' have any tummy muscles."  Patient/Family Comments/goals: get stronger, walk  Pain/Comfort:  Pain Rating 1: 0/10    Patients cultural, spiritual, Yazidism conflicts given the current situation: no    Objective:     Communicated with RN prior to session.  Patient found HOB elevated with peripheral IV, pulse ox (continuous), telemetry, " oxygen  upon PT entry to room.    General Precautions: Standard, fall  Orthopedic Precautions:RLE non weight bearing   Braces:  (TLSO PRN, R darco shoe)  Respiratory Status: Nasal cannula, flow 1 L/min, removed for mobility SpO2 remains WNL  Blood Pressure:   Supine = 112/66 HR 67  Sitting = 88/61 with dizziness, MAP 71  Sitting after ankle pumps = 93/60  Sitting after ankle pumps = 98/64 (symptoms resolved)      Exams:  RLE Strength: 3-/5 gross  LLE Strength: 3-/5 gross  Skin integrity:  known sacral wound and R foot wound      Functional Mobility:  Bed Mobility:     Rolling Left:  moderate assistance  Rolling Right: moderate assistance  Supine to Sit: maximal assistance and of 2 persons  Sit to Supine: did not occur  Transfers:     Bed to Chair: maximal assistance and of 2 persons with  no AD  using  Slide Board  Balance: sitting balance =SBA-modA due to posterior lean      AM-PAC 6 CLICK MOBILITY  Total Score:10       Treatment & Education:  Pt with poor efforts to assist with slide board t/f, increased time required for safety and positioning    Patient provided with verbal education education regarding PT role/goals/POC, fall prevention, safety awareness, and discharge/DME recommendations.  Understanding was verbalized.     Patient left up in chair with all lines intact, call button in reach, geomat cushion, thais pad in place, and RN notified.    GOALS:   Multidisciplinary Problems       Physical Therapy Goals          Problem: Physical Therapy    Goal Priority Disciplines Outcome Interventions   Physical Therapy Goal     PT, PT/OT Progressing    Description: Goals to be met by: 24     Patient will increase functional independence with mobility by performin. Supine to sit with minimal assistance  2. Rolling to Left and Right with [place].  3. Sit to stand t/f standby assist with RW.  4. Bed<>chair transfer with slideboard vs. RW with minimal assistance.  5. Gait x 150ft with RW with standby  assist.  6. Pt will propel MWC x 100ft with BUE with independence. (As appropriate)                       History:     Past Medical History:   Diagnosis Date    BPH (benign prostatic hyperplasia)     Essential (primary) hypertension     Obesity, unspecified     PAD (peripheral artery disease)        Past Surgical History:   Procedure Laterality Date    FISTULOGRAM Left 9/25/2023    Procedure: Fistulogram;  Surgeon: Rodrigo Soliman DO;  Location: [facility] CATH LAB;  Service: Nephrology;  Laterality: Left;    INSERTION OF TUNNELED CENTRAL VENOUS HEMODIALYSIS CATHETER Right 6/27/2022    Procedure: INSERTION, CATHETER, HEMODIALYSIS, DUAL LUMEN;  Surgeon: Molly Garcia MD;  Location: [facility] OR;  Service: General;  Laterality: Right;    TOE AMPUTATION Right 12/31/2024    Procedure: AMPUTATION, TOE;  Surgeon: Rolf Hobbs DPM;  Location: [facility];  Service: Podiatry;  Laterality: Right;       Time Tracking:     PT Received On: 01/07/25  PT Start Time: 0859     PT Stop Time: 0945  PT Total Time (min): 46 min     Billable Minutes: Re-eval 20 and Therapeutic Activity 26 01/07/2025

## 2025-01-07 NOTE — PT/OT/SLP PROGRESS
Occupational Therapy   Treatment    Name: Neno Watkins  MRN: [id number]  Admitting Diagnosis:  Wound infection  7 Days Post-Op    Recommendations:     Recommended therapy intensity at discharge: Moderate Intensity Therapy   Discharge Equipment Recommendations:  to be determined by next level of care  Barriers to discharge:       Assessment:     Neno Watkins is a 64 y.o. male with a medical diagnosis of Wound infection, weakness and falls, infected chronic R foot open wound, ESRD.  Performance deficits affecting function are weakness, impaired endurance, impaired self care skills, impaired functional mobility, gait instability, impaired balance, decreased safety awareness, pain, decreased lower extremity function, decreased upper extremity function.     Rehab Prognosis:  Fair; patient would benefit from acute skilled OT services to address these deficits and reach maximum level of function.       Plan:     Patient to be seen 4 x/week to address the above listed problems via self-care/home management, therapeutic activities, therapeutic exercises  Plan of Care Expires: 01/27/25  Plan of Care Reviewed with: patient    Subjective     Pain/Comfort:       Objective:     Communicated with: RN prior to session.  Patient found HOB elevated with   upon OT entry to room.    General Precautions: Standard, fall    Orthopedic Precautions:RLE non weight bearing  Braces: TLSO (as needed for pain per neuro; DARCO shoe R foot)     Occupational Performance:     Bed Mobility:    Patient completed Supine to Sit with moderate assistance     Functional Mobility/Transfers:  Patient completed Bed <> Chair Transfer using Scoot Pivot technique with maximal assistance with slide board    Activities of Daily Living:  Toileting: total assistance pericare performed in sidelying    Patient Education:  Patient provided with verbal education education regarding OT role/goals/POC.  Understanding was verbalized.      Patient left HOB elevated with  all lines intact and call button in reach.    GOALS:   Multidisciplinary Problems       Occupational Therapy Goals          Problem: Occupational Therapy    Goal Priority Disciplines Outcome Interventions   Occupational Therapy Goal     OT, PT/OT Progressing    Description: Goals to be met by: 1/26/24     Patient will increase functional independence with ADLs by performing:    UE Dressing with Stand-by Assistance.  LE Dressing with Stand-by Assistance.  Grooming while seated with Stand-by Assistance.  Toileting from toilet with CGA for hygiene and clothing management.   Toilet transfer with CGA                       Time Tracking:     OT Date of Treatment: 01/07/25  OT Start Time: 0900  OT Stop Time: 0945  OT Total Time (min): 45 min    Billable Minutes:Self Care/Home Management 3    OT/PAUL: PAUL     Number of PAUL visits since last OT visit: 4    1/7/2025

## 2025-01-07 NOTE — PROGRESS NOTES
[facility]                       [address] [id number]    PATIENT NAME:       RUFINO REAL  YOB: 1960  CSN:                [id number]   MRN:                [id number]  ADMIT DATE:         12/25/2024 20:30:00  PHYSICIAN:          Rolf Hobbs DPM                            PROGRESS NOTE    DATE:  01/06/2025 09:07:57    SUBJECTIVE:  The patient was seen earlier.  Placement is pending.  No other   issues reported.  Tolerating antibiotics.  Had some issues with therapy earlier.    PHYSICAL EXAMINATION:  Vital signs stable, currently afebrile.    Dressings are intact.  Scant exudate.  Good capillary refill to the toes.    LABORATORY DATA:  Reviewed; white cell counts are 11, H and H 12.6 and 37.    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    PLAN:  Continue with current care.  Reassess him tomorrow.  PT had asked whether   or not he is still nonweightbearing, at this point he is until he can have   better consolidation of the flap area.        ______________________________  Rolf Hobbs DPM    GAS/AQS  DD:  01/06/2025  Time:  07:17PM  DT:  01/06/2025  Time:  09:42PM  Job #:  [id number]      PROGRESS NOTE

## 2025-01-07 NOTE — PLAN OF CARE
Problem: Physical Therapy  Goal: Physical Therapy Goal  Description: Goals to be met by: 24     Patient will increase functional independence with mobility by performin. Supine to sit with minimal assistance  2. Rolling to Left and Right with [place].  3. Sit to stand t/f standby assist with RW.  4. Bed<>chair transfer with slideboard vs. RW with minimal assistance.  5. Gait x 150ft with RW with standby assist.  6. Pt will propel MWC x 100ft with BUE with independence. (As appropriate)  Outcome: Progressing

## 2025-01-07 NOTE — PROGRESS NOTES
Inpatient Nutrition Assessment    Admit Date: 12/25/2024   Total duration of encounter: 13 days   Patient Age: 64 y.o.    Nutrition Recommendation/Prescription     Continue Diet diabetic 2000 Calories (up to 75 gm per meal)  Encouraged increased oral intake  Novasource Renal (provides 475 kcal, 22 g protein per serving) daily for additional nutrition  [place] Eloy (provides 90 kcal, 2.5 g protein per serving) BID for wound healing    Communication of Recommendations: reviewed with nurse    Nutrition Assessment     Malnutrition Assessment/Nutrition-Focused Physical Exam       Malnutrition Level: other (see comments) (Does not meet criteria) (01/07/25 1308)  Energy Intake (Malnutrition): less than 75% for greater than 7 days (01/07/25 1308)  Weight Loss (Malnutrition): other (see comments) (Does not meet criteria) (01/07/25 1308)     [place] Region (Subcutaneous Fat Loss): well nourished           [place] (Muscle Loss): well nourished                             Hand  Strength, Right (Malnutrition): Unable to assess (01/07/25 1308)  A minimum of two characteristics is recommended for diagnosis of either severe or non-severe malnutrition.    Chart Review    Reason Seen: length of stay and follow-up    Malnutrition Screening Tool Results   Have you recently lost weight without trying?: No  Have you been eating poorly because of a decreased appetite?: No   MST Score: 0   Diagnosis:  End-stage renal disease off dialysis for 2 weeks now re initiated   Revocation of hospice  Infected chronic right foot wound with wound culture growing Proteus and Enterobacter  Sepsis secondary to above  L1 superior endplate compression fracture and chronic L4 superior endplate compression deformity  Severe back pain   Morbid obesity   Chronic venous stasis   Diabetes mellitus type 2  Essential hypertension  Anemia of chronic disease    Relevant Medical History:   Past Medical History:   Diagnosis Date    BPH (benign prostatic  hyperplasia)     Essential (primary) hypertension     Obesity, unspecified     PAD (peripheral artery disease)       Scheduled Medications:  amoxicillin-clavulanate 500-125mg, 1 tablet, Daily  aspirin, 81 mg, Daily  atorvastatin, 40 mg, Daily  carvediloL, 12.5 mg, BID  enoxparin, 30 mg, Daily  gabapentin, 300 mg, BID  levoFLOXacin, 500 mg, Every other day  tamsulosin, 1 capsule, Daily  zinc oxide-cod liver oil, , BID    Continuous Infusions:   PRN Medications:  acetaminophen, 1,000 mg, Q8H PRN  bisacodyL, 10 mg, Daily PRN  dextrose 50%, 12.5 g, PRN  dextrose 50%, 25 g, PRN  glucagon (human recombinant), 1 mg, PRN  glucose, 16 g, PRN  glucose, 24 g, PRN  heparin (porcine), 3,300 Units, PRN  insulin aspart U-100, 0-5 Units, QID (AC + HS) PRN  labetalol, 10 mg, Q15 Min PRN  melatonin, 6 mg, Nightly PRN  ondansetron, 4 mg, Q8H PRN  sodium chloride 0.9%, 10 mL, PRN    Calorie Containing IV Medications: no significant kcals from medications at this time    Recent Labs   Lab 01/01/25  0408 01/03/25  0348 01/05/25  0400 01/06/25  0428    133* 132* 130*   K 3.7 3.9 3.7 3.7   CALCIUM 8.3* 8.4* 8.7* 8.7*   PHOS 5.3*  --   --  6.6*   MG  --  2.50  --   --     95* 92* 93*   CO2 23 24 24 23   BUN 34.4* 53.4* 78.1* 86.7*   CREATININE 2.76* 3.77* 5.51* 5.81*   EGFRNORACEVR 25 17 11 10   GLUCOSE 94 78* 98 107   BILITOT  --  0.6 0.5 0.5   ALKPHOS  --  233* 221* 202*   ALT  --  44 46 45   AST  --  35* 44* 43*   ALBUMIN 2.4* 2.5* 2.5* 2.5*   WBC 15.18* 13.13* 11.58* 11.41   HGB 11.4* 11.7* 11.6* 12.6*   HCT 35.2* 35.7* 36.1* 37.2*     Nutrition Orders:  Diet diabetic 2000 Calories (up to 75 gm per meal)  Dietary nutrition supplements BID; Eloy - Orange,Dietary nutrition supplements Daily; Novasource Renal - Cafe Mocha    Appetite/Oral Intake: fair/50-75% of meals  Factors Affecting Nutritional Intake: none identified  Social Needs Impacting Access to Food: unable to assess at this time; will attempt on  "follow-up  Food/[place]/Cultural Preferences: none reported  Food Allergies: none reported  Last Bowel Movement: 01/06/24  Wound(s):     Wound 12/30/24 1127 Skin Tear Coccyx #2-Tissue loss description: Partial thickness     Comments    12/30/24 Pt in dialysis. Per RN, decreased appetite and intake of meals since admit. Noted wound, not assessed by wound care yet. Will add supplements and follow-up early.     1/3/24 Spoke to RN, pt in dialysis. Fair intake of meals. Prefers cafe mocha oral supplement; modified order. Noted partial thickness wound, added Eloy BID.     1/7/25 Pt sitting in chair, ate ~50% breakfast. Drinking some of Novasource <1 daily. Will decrease frequency. Change flavor of Eloy to orange. Denies any GI complaints.     Anthropometrics    Height: 5' 10" (177.8 cm), Height Method: Stated  Last Weight: (!) 136.1 kg (300 lb 0.7 oz) (12/26/24 0400), Weight Method: Bed Scale  BMI (Calculated): 43.1  BMI Classification: obese grade III (BMI >/=40)        Ideal Body Weight (IBW), Male: 166 lb     % Ideal Body Weight, Male (lb): 180.75 %                          Usual Weight Provided By: unable to obtain usual weight    Wt Readings from Last 5 Encounters:   12/26/24 (!) 136.1 kg (300 lb 0.7 oz)   12/15/24 128.8 kg (284 lb)   11/18/24 130.6 kg (288 lb)   10/22/24 127 kg (279 lb 15.8 oz)   09/10/24 127 kg (279 lb 15.8 oz)     Weight Change(s) Since Admission:   1/3/25 no updated weights  Wt Readings from Last 1 Encounters:   12/26/24 0400 (!) 136.1 kg (300 lb 0.7 oz)   12/25/24 2026 136.1 kg (300 lb)   Admit Weight: 136.1 kg (300 lb) (12/25/24 2026), Weight Method: Estimated    Estimated Needs    Weight Used For Calorie Calculations: (!) 136.1 kg (300 lb 0.7 oz)  Energy Calorie Requirements (kcal): 2582kcals/d (MSJ x 1.2SF)  Energy Need Method: [place]-St Jeor  Weight Used For Protein Calculations: 75.4 kg (166 lb 5.4 oz) (IBW)  Protein Requirements: 113-136g/d (1.5-1.8g/kg IBW)  Fluid Requirements (mL): " 1000mL + UOP (on hemodialysis)  CHO Requirement: 258gm CHO daily (40% EER)     Enteral Nutrition     Patient not receiving enteral nutrition at this time.    Parenteral Nutrition     Patient not receiving parenteral nutrition support at this time.    Evaluation of Received Nutrient Intake    Calories: not meeting estimated needs  Protein: not meeting estimated needs    Patient Education     Not applicable.    Nutrition Diagnosis     PES: Inadequate oral intake related to acute illness as evidenced by </=75% EER since admit. (active)     PES:            Nutrition Interventions     Intervention(s): modified composition of meals/snacks, commercial beverage, multivitamin/mineral supplement therapy, and collaboration with other providers  Intervention(s): Oral nutritional supplement;Oral diet/nutrient modifications    Goal: Meet greater than 80% of nutritional needs by follow-up. (goal not met)  Goal: Maintain weight throughout hospitalization. (goal progressing)    Nutrition Goals & Monitoring     Dietitian will monitor: food and beverage intake, weight change, electrolyte/renal panel, glucose/endocrine profile, and gastrointestinal profile  Discharge planning: continue diabetic + renal, dialysis diet  Nutrition Risk/Follow-Up: moderate (follow-up in 3-5 days)   Please consult if re-assessment needed sooner.

## 2025-01-07 NOTE — PROGRESS NOTES
[facility] Hospital Medicine Progress Note        Chief Complaint: Inpatient Follow-up for reinitiating hemodialysis after hooking hospice    HPI per admitting team: 64-year-old male with a history of morbid obesity, chronic venous stasis, chronic open right foot wound, ESRD, type 2 diabetes who presented to the ER for multiple falls over the last week.  Daughter gives history (Isidra Watkins [phone]) who explains that 2 weeks ago patient decided he would no longer receive dialysis nor wound care and felt that these providers were only trying to harm him.  He therefore enrolled in hospice and understood completely in the time that without dialysis he would very likely die.  Over the last few days at home he has become progressively weak and having multiple falls, his wife activated EMS tonight after he had a fall and was unable to get up.  He was brought to the ER where on arrival he was afebrile hemodynamically stable maintaining normal sats on room air.  Laboratory work noted leukocytosis of 20 K, creatinine of 2.3/BUN 34, CO2 20 and a normal potassium.  CT head showed no acute process.  He was not found to have focal deficits on exam nor any signs of traumatic injury though was noted to have an infected chronic left foot ulceration.  Blood cultures were obtained and broad-spectrum antibiotics initiated.MRI of the T-spine showed L1 superior endplate compression fracture no significant spinal canal or neural foraminal stenosis no cord abnormality.  MRI of the brain was negative  CT of the L-spine had shown chronic L4 superior endplate compression deformity with mild loss of height.Patient had refused x-ray and MRI of the L-spine neurosurgery has signed off and they are recommending bracing . MRI of the T-spine showed L1 superior endplate compression fracture no significant spinal canal or neural foraminal stenosis no cord abnormality. MRI of the brain was negative   Patient is  status post 5th ray amputation on 12/31/2024 secondary to infected wound and osteomyelitis id has been consulted for antibiotic recommendations.  Tissue cultures from OR sample 12/31/2024 showing few Gram-negative rods    Interval Hx:   Patient sitting in chair.  Informed that [facility] his accepting him after his dialysis tomorrow.  Verbalized understanding   No family is at bedside  Case was with patient's nurse and  on the floor,  confirmed patient can be discharged after his hemodialysis in the morning to [facility]    Objective/physical exam:  General: In no acute distress, afebrile, morbidly obese, large neck circumference, looks chronically ill, SITTING IN HIS WHEELCHAIR  Chest:  lear anteriorly, on RA   Heart: RRR, +S1, S2, no appreciable murmur  Abdomen: Soft, obese, nontender, BS +  MSK: Warm, dressing to the right foot  Neurologic:  Awake, globally weak    VITAL SIGNS: 24 HRS MIN & MAX LAST   Temp  Min: 97.5 °F (36.4 °C)  Max: 98.6 °F (37 °C) 97.5 °F (36.4 °C)   BP  Min: 97/63  Max: 124/73 119/70   Pulse  Min: 64  Max: 79  73   Resp  Min: 18  Max: 18 18   SpO2  Min: 94 %  Max: 99 % 98 %     I reviewed the labs below:  Recent Labs   Lab 01/03/25 0348 01/05/25  0400 01/06/25  0428   WBC 13.13* 11.58* 11.41   RBC 3.99* 4.01* 4.27*   HGB 11.7* 11.6* 12.6*   HCT 35.7* 36.1* 37.2*   MCV 89.5 90.0 87.1   MCH 29.3 28.9 29.5   MCHC 32.8* 32.1* 33.9   RDW 13.9 13.7 13.4    346 282   MPV 9.4 9.3 9.0     Recent Labs   Lab 01/03/25 0348 01/05/25  0400 01/06/25  0428   * 132* 130*   K 3.9 3.7 3.7   CL 95* 92* 93*   CO2 24 24 23   BUN 53.4* 78.1* 86.7*   CREATININE 3.77* 5.51* 5.81*   CALCIUM 8.4* 8.7* 8.7*   MG 2.50  --   --    ALBUMIN 2.5* 2.5* 2.5*   ALKPHOS 233* 221* 202*   ALT 44 46 45   AST 35* 44* 43*   BILITOT 0.6 0.5 0.5     Microbiology Results (last 7 days)       Procedure Component Value Units Date/Time    Anaerobic Culture [[id number]]   (Abnormal) Collected: 12/31/24 1406    Order Status: Completed Specimen: Bone from Toe, Right Foot Updated: 01/04/25 1259     Anaerobe Culture Bacteroides fragilis      Parvimonas micra     Comment: Anaerobic sensitivity is not usually indicated except on single isolates from sterile body sites.       Tissue Culture - Aerobic [[id number]]  (Abnormal)  (Susceptibility) Collected: 12/31/24 1406    Order Status: Completed Specimen: Bone from Toe, Right Foot Updated: 01/03/25 0914     Tissue - Aerobic Culture Few Proteus mirabilis           See below for Radiology    Intake and Output:No intake or output data in the 24 hours ending 01/07/25 1446      Assessment/Plan:  End-stage renal disease off dialysis for 2 weeks now re-initiated on this admission  Revocation of hospice, now full code  Infected chronic right foot wound with wound culture growing Proteus, Enterobacter and Bacteroides S/P 5th ray amputation of the right foot 12/31/24  Sepsis secondary to above- resolved   L1 superior endplate compression fracture and chronic L4 superior endplate compression deformity  Severe back pain due to above  Elevated liver enzymes (AST and alk-phos)  HX:  Morbid obesity with BMI 43.0, chronic venous stasis, T2DM- well controlled, HTN, anemia of chronic disease, hypoalbuminemia          Infectious disease now signed off.  Tissue cultures from 12/30 showing Proteus mirabilis.  Prior wound culture showed Proteus and Enterobacter with bacteria rods.  Anaerobic culture shows Bacteroides fragilis and Parvimonas micra  Continue Augmentin and Levofloxacin until 01/13/2025.  Leukocytosis resolved, afebrile  Podiatry Dr Hobbs on board, continue current care.  Antibiotics per ID.  Cont wound care  MRI T-spine, CT lumbar spine reviewed.  Patient reportedly refused MRI lumbar spine.  Neurosurgery had no further recommendations.  Continue analgesics as needed.    Nephrology on board, continue hemodialysis Monday Wednesday Friday  Insulin  regimen. Monitor CBGs a.c. and HS  12/26- A1C 6.0, well controlled   Noted patient is requiring oxygen only at nighttime for comfort otherwise per nurse, during the daytime he is on room air  PT OT - moderate intensity  Case management on board, [facility] can take the patient tomorrow after dialysis   Morning CBC, renal functions ordered for hemodialysis day    VTE prophylaxis:   Lovenox 30 mg subQ                      Patient condition:  Fair    Anticipated discharge and Disposition:  Tomorrow after HD, [facility] --> NH, patient is medically ready      All diagnosis and differential diagnosis have been reviewed; assessment and plan has been documented; I have personally reviewed the labs and test results that are presently available; I have reviewed the patients medication list; I have reviewed the consulting providers response and recommendations. I have reviewed or attempted to review medical records based upon their availability    All of the patient's questions have been  addressed and answered. Patient's is agreeable to the above stated plan. I will continue to monitor closely and make adjustments to medical management as needed.    Portions of this note dictated using EMR integrated voice recognition software, and may be subject to voice recognition errors not corrected at proofreading. Please contact writer for clarification if needed.   _____________________________________________________________________    Nutrition Status:  Nutrition consulted. Most recent weight and BMI monitored-     Measurements:  Wt Readings from Last 1 Encounters:   12/26/24 (!) 136.1 kg (300 lb 0.7 oz)   Body mass index is 43.05 kg/m².    Patient has been screened and assessed by RD.    Malnutrition Type:  Context:    Level: other (see comments) (Does not meet criteria)    Malnutrition Characteristic Summary:  Weight Loss (Malnutrition): other (see comments) (Does not meet criteria)  Energy Intake (Malnutrition): less  than 75% for greater than 7 days  Hand  Strength, Right (Malnutrition): Unable to assess    Interventions/Recommendations (treatment strategy):  Oral nutritional supplement;Oral diet/nutrient modifications      A minimum of two characteristics is recommended for diagnosis of either severe or non-severe malnutrition.     Current Med:   amoxicillin-clavulanate 500-125mg  1 tablet Oral Daily    aspirin  81 mg Oral Daily    atorvastatin  40 mg Oral Daily    carvediloL  12.5 mg Oral BID    enoxparin  30 mg Subcutaneous Daily    gabapentin  300 mg Oral BID    levoFLOXacin  500 mg Oral Every other day    tamsulosin  1 capsule Oral Daily    zinc oxide-cod liver oil   Topical (Top) BID      PRN meds:  Current Facility-Administered Medications:     acetaminophen, 1,000 mg, Oral, Q8H PRN    bisacodyL, 10 mg, Rectal, Daily PRN    dextrose 50%, 12.5 g, Intravenous, PRN    dextrose 50%, 25 g, Intravenous, PRN    glucagon (human recombinant), 1 mg, Intramuscular, PRN    glucose, 16 g, Oral, PRN    glucose, 24 g, Oral, PRN    heparin (porcine), 3,300 Units, Intravenous, PRN    insulin aspart U-100, 0-5 Units, Subcutaneous, QID (AC + HS) PRN    labetalol, 10 mg, Intravenous, Q15 Min PRN    melatonin, 6 mg, Oral, Nightly PRN    ondansetron, 4 mg, Intravenous, Q8H PRN    sodium chloride 0.9%, 10 mL, Intravenous, PRN    Continuous infusion:       Radiology:   I have personally reviewed the images and agree with radiologist report  CV Ultrasound doppler arterial legs bilat  The right lower extremity arterial system is patent with no evidence of   focal stenosis or occlusion.  The left lower extremity arterial system is patent with no evidence of   focal stenosis or occlusion.        Delano Cortez MD  Department of Hospital Medicine   [facility]   01/07/2025

## 2025-01-07 NOTE — PLAN OF CARE
[facility] accepted by [facility] . Nella Bee, Dialysis Coordinator, Patient will need Chair set up closer to [facility]. Patient will Do dialysis in the morning  tomorrow.and Dialysis chair is set up by Mae Dialysis coordinator to start Friday .

## 2025-01-07 NOTE — PROGRESS NOTES
Nephrology consult follow up note    HPI:      Neno Watkins is a 64 y.o. male  who has been on chronic hemodialysis at [facility] on Monday Wednesday Friday.  He is being followed by Dr. Kike Alanis.  He has lot of problems on his legs and somehow he got frustrated and decided to stop dialysis and go for hospice but now he has reversed it and wants to be treated and taken care of.  Nephrology consulted for ESRD management.    Interval history:     12/27/2024  Seen on hemodialysis.    12/30/2024  Seen on hemodialysis    12/31/2024  No acute events overnight.  No new complaints.  Tolerated dialysis yesterday without problem.  Continues to have mild lower extremity edema.  No chest pain, shortness of breath, nausea, vomiting.    1/1/25  Seen on hemodialysis.    1/2/25  Pt is in NAD, aaox4, in bed watching tv. Denies SOB, CP, n/v/d.     01/03/2025  Seen on hemodialysis    01/06/2025   Currently tolerating dialysis.  No complaints of any shortness of breath.  Patient is on oxygen.  No chest pains no abdominal pains.    01/07/2025  No acute events overnight.  Dialyzed yesterday without problem.  Working with therapy this morning.  No chest pain, shortness of breath, nausea, vomiting, or lower extremity edema.     Review of Systems:       Past medical, family, surgical, and social history reviewed and unchanged from initial consult note.     Objective:       VITAL SIGNS: 24 HR MIN & MAX LAST    Temp  Min: 97.5 °F (36.4 °C)  Max: 98.6 °F (37 °C)  97.5 °F (36.4 °C)        BP  Min: 97/63  Max: 124/73  121/74     Pulse  Min: 64  Max: 97  64     Resp  Min: 18  Max: 18  18    SpO2  Min: 93 %  Max: 99 %  99 %      GEN: Chronically ill appearing WM in NAD  CV: RRR +S1,S2 without murmur  PULM: CTAB, unlabored  ABD: Soft, NT/ND abdomen with NABS  EXT: No cyanosis or edema  SKIN: Warm and dry  PSYCH: Awake, alert and appropriately conversant.   Dialysis access: Left upper extremity AV graft with palpable pulse and right  IJ tunneled dialysis catheter             Component Value Date/Time     (L) 01/06/2025 0428     (L) 01/05/2025 0400     10/13/2022 0652    K 3.7 01/06/2025 0428    K 3.7 01/05/2025 0400    K 3.0 (L) 10/13/2022 0652    CO2 23 01/06/2025 0428    CO2 24 01/05/2025 0400    CO2 26 10/13/2022 0652    BUN 86.7 (H) 01/06/2025 0428    BUN 78.1 (H) 01/05/2025 0400    BUN 24 (H) 12/11/2024 0000    BUN 30 (H) 11/24/2024 0000    CREATININE 5.81 (H) 01/06/2025 0428    CREATININE 5.51 (H) 01/05/2025 0400    CREATININE 2.94 (H) 10/13/2022 0652    CALCIUM 8.7 (L) 01/06/2025 0428    CALCIUM 8.7 (L) 01/05/2025 0400    CALCIUM 9.6 10/13/2022 0652    PHOS 6.6 (H) 01/06/2025 0428            Component Value Date/Time    WBC 11.41 01/06/2025 0428    WBC 11.58 (H) 01/05/2025 0400    HGB 12.6 (L) 01/06/2025 0428    HGB 11.6 (L) 01/05/2025 0400    HGB 11.6 (L) 12/09/2024 0000    HGB 11.4 (L) 12/02/2024 0000    HCT 37.2 (L) 01/06/2025 0428    HCT 36.1 (L) 01/05/2025 0400     01/06/2025 0428     01/05/2025 0400         Imaging reviewed      Assessment / Plan:       ESRD.  Dialysis days Monday Wednesday Friday.    Noncomplaince with medical treatment  Personal history of diabetes mellitus but not on any medications will check hemoglobin A1c then decide on the need for medical management  Hypertension  Hyperlipidemia  Cellulitis of both lower extremities right more than left - s/p toe amputation 12/31/24  Anemia of chronic kidney disease  Dialysis noncompliance      Plan:  No acute indication for off schedule hemodialysis today.  Plan for dialysis tomorrow on MWF schedule.  We will use his AV graft for dialysis tomorrow.

## 2025-01-07 NOTE — PLAN OF CARE
Problem: Adult Inpatient Plan of Care  Goal: Plan of Care Review  Outcome: Progressing  Goal: Patient-Specific Goal (Individualized)  Outcome: Progressing  Goal: Absence of Hospital-Acquired Illness or Injury  Outcome: Progressing  Goal: Optimal Comfort and Wellbeing  Outcome: Progressing  Goal: Readiness for Transition of Care  Outcome: Progressing     Problem: Bariatric Environmental Safety  Goal: Safety Maintained with Care  Outcome: Progressing     Problem: Wound  Goal: Optimal Coping  Outcome: Progressing  Goal: Optimal Functional Ability  Outcome: Progressing  Goal: Absence of Infection Signs and Symptoms  Outcome: Progressing  Goal: Improved Oral Intake  Outcome: Progressing  Goal: Optimal Pain Control and Function  Outcome: Progressing  Goal: Skin Health and Integrity  Outcome: Progressing  Goal: Optimal Wound Healing  Outcome: Progressing     Problem: Diabetes Comorbidity  Goal: Blood Glucose Level Within Targeted Range  Outcome: Progressing     Problem: Skin Injury Risk Increased  Goal: Skin Health and Integrity  Outcome: Progressing

## 2025-01-08 VITALS
RESPIRATION RATE: 18 BRPM | WEIGHT: 300.06 LBS | DIASTOLIC BLOOD PRESSURE: 75 MMHG | TEMPERATURE: 98 F | HEIGHT: 70 IN | OXYGEN SATURATION: 100 % | BODY MASS INDEX: 42.96 KG/M2 | SYSTOLIC BLOOD PRESSURE: 120 MMHG | HEART RATE: 82 BPM

## 2025-01-08 PROBLEM — N18.6 ESRD ON HEMODIALYSIS: Status: ACTIVE | Noted: 2022-07-04

## 2025-01-08 LAB
ALBUMIN SERPL-MCNC: 2.6 G/DL (ref 3.4–4.8)
BASOPHILS # BLD AUTO: 0.06 X10(3)/MCL
BASOPHILS NFR BLD AUTO: 0.5 %
BUN SERPL-MCNC: 86.4 MG/DL (ref 8.4–25.7)
CALCIUM SERPL-MCNC: 8.8 MG/DL (ref 8.8–10)
CHLORIDE SERPL-SCNC: 92 MMOL/L (ref 98–107)
CO2 SERPL-SCNC: 27 MMOL/L (ref 23–31)
CREAT SERPL-MCNC: 6.6 MG/DL (ref 0.72–1.25)
EOSINOPHIL # BLD AUTO: 0.22 X10(3)/MCL (ref 0–0.9)
EOSINOPHIL NFR BLD AUTO: 1.8 %
ERYTHROCYTE [DISTWIDTH] IN BLOOD BY AUTOMATED COUNT: 13.4 % (ref 11.5–17)
GFR SERPLBLD CREATININE-BSD FMLA CKD-EPI: 9 ML/MIN/1.73/M2
GLUCOSE SERPL-MCNC: 117 MG/DL (ref 82–115)
HCT VFR BLD AUTO: 34.2 % (ref 42–52)
HGB BLD-MCNC: 11.4 G/DL (ref 14–18)
IMM GRANULOCYTES # BLD AUTO: 0.06 X10(3)/MCL (ref 0–0.04)
IMM GRANULOCYTES NFR BLD AUTO: 0.5 %
LYMPHOCYTES # BLD AUTO: 2.95 X10(3)/MCL (ref 0.6–4.6)
LYMPHOCYTES NFR BLD AUTO: 23.9 %
MCH RBC QN AUTO: 29.2 PG (ref 27–31)
MCHC RBC AUTO-ENTMCNC: 33.3 G/DL (ref 33–36)
MCV RBC AUTO: 87.7 FL (ref 80–94)
MONOCYTES # BLD AUTO: 1.2 X10(3)/MCL (ref 0.1–1.3)
MONOCYTES NFR BLD AUTO: 9.7 %
NEUTROPHILS # BLD AUTO: 7.85 X10(3)/MCL (ref 2.1–9.2)
NEUTROPHILS NFR BLD AUTO: 63.6 %
NRBC BLD AUTO-RTO: 0 %
PHOSPHATE SERPL-MCNC: 7.2 MG/DL (ref 2.3–4.7)
PLATELET # BLD AUTO: 279 X10(3)/MCL (ref 130–400)
PMV BLD AUTO: 9.2 FL (ref 7.4–10.4)
POCT GLUCOSE: 128 MG/DL (ref 70–110)
POCT GLUCOSE: 176 MG/DL (ref 70–110)
POTASSIUM SERPL-SCNC: 3.4 MMOL/L (ref 3.5–5.1)
RBC # BLD AUTO: 3.9 X10(6)/MCL (ref 4.7–6.1)
SODIUM SERPL-SCNC: 132 MMOL/L (ref 136–145)
WBC # BLD AUTO: 12.34 X10(3)/MCL (ref 4.5–11.5)

## 2025-01-08 PROCEDURE — 85025 COMPLETE CBC W/AUTO DIFF WBC: CPT | Performed by: STUDENT IN AN ORGANIZED HEALTH CARE EDUCATION/TRAINING PROGRAM

## 2025-01-08 PROCEDURE — 36415 COLL VENOUS BLD VENIPUNCTURE: CPT | Performed by: STUDENT IN AN ORGANIZED HEALTH CARE EDUCATION/TRAINING PROGRAM

## 2025-01-08 PROCEDURE — 25000003 PHARM REV CODE 250: Performed by: INTERNAL MEDICINE

## 2025-01-08 PROCEDURE — 25000003 PHARM REV CODE 250: Performed by: PODIATRIST

## 2025-01-08 PROCEDURE — 80069 RENAL FUNCTION PANEL: CPT | Performed by: STUDENT IN AN ORGANIZED HEALTH CARE EDUCATION/TRAINING PROGRAM

## 2025-01-08 PROCEDURE — 80100016 HC MAINTENANCE HEMODIALYSIS

## 2025-01-08 RX ORDER — CARVEDILOL 12.5 MG/1
12.5 TABLET ORAL 2 TIMES DAILY
Qty: 60 TABLET | Refills: 0 | Status: SHIPPED | OUTPATIENT
Start: 2025-01-08

## 2025-01-08 RX ORDER — ASPIRIN 81 MG/1
81 TABLET ORAL DAILY
Qty: 30 TABLET | Refills: 0 | Status: SHIPPED | OUTPATIENT
Start: 2025-01-08

## 2025-01-08 RX ORDER — LEVOFLOXACIN 500 MG/1
500 TABLET, FILM COATED ORAL EVERY OTHER DAY
Qty: 2 TABLET | Refills: 0 | Status: SHIPPED | OUTPATIENT
Start: 2025-01-09 | End: 2025-01-13

## 2025-01-08 RX ORDER — AMOXICILLIN AND CLAVULANATE POTASSIUM 500; 125 MG/1; MG/1
1 TABLET, FILM COATED ORAL 2 TIMES DAILY
Qty: 10 TABLET | Refills: 0 | Status: SHIPPED | OUTPATIENT
Start: 2025-01-08 | End: 2025-01-13

## 2025-01-08 RX ADMIN — TAMSULOSIN HYDROCHLORIDE 0.4 MG: 0.4 CAPSULE ORAL at 11:01

## 2025-01-08 RX ADMIN — ATORVASTATIN CALCIUM 40 MG: 40 TABLET, FILM COATED ORAL at 11:01

## 2025-01-08 RX ADMIN — ASPIRIN 81 MG: 81 TABLET, COATED ORAL at 11:01

## 2025-01-08 RX ADMIN — AMOXICILLIN AND CLAVULANATE POTASSIUM 500 MG: 500; 125 TABLET, FILM COATED ORAL at 11:01

## 2025-01-08 RX ADMIN — GABAPENTIN 300 MG: 300 CAPSULE ORAL at 11:01

## 2025-01-08 RX ADMIN — CARVEDILOL 12.5 MG: 12.5 TABLET, FILM COATED ORAL at 11:01

## 2025-01-08 NOTE — NURSING
Patient to be discharged to [facility]. Report was called and given to Shelley. Patients admitting diagnosis as well as patient history was discussed with nurse. No further questions were asked. [facility] for patient transport. Iv was removed without complications. Patient stable upon discharge.

## 2025-01-08 NOTE — PROGRESS NOTES
[facility] - Neurology  Wound Care    Patient Name:  Neno Watkins   MRN:  [id number]  Date: 1/8/2025  Diagnosis: Wound infection    History:     Past Medical History:   Diagnosis Date    BPH (benign prostatic hyperplasia)     Essential (primary) hypertension     Obesity, unspecified     PAD (peripheral artery disease)        Social History     Socioeconomic History    Marital status:    Tobacco Use    Smoking status: Never    Smokeless tobacco: Never   Substance and Sexual Activity    Alcohol use: Never    Drug use: Never     Social Drivers of Health     Financial Resource Strain: Low Risk  (12/26/2024)    Overall Financial Resource Strain (CARDIA)     Difficulty of Paying Living Expenses: Not hard at all   Food Insecurity: No Food Insecurity (12/26/2024)    Hunger Vital Sign     Worried About Running Out of Food in the Last Year: Never true     Ran Out of Food in the Last Year: Never true   Transportation Needs: No Transportation Needs (12/26/2024)    TRANSPORTATION NEEDS     Transportation : No   Physical Activity: Inactive (12/26/2024)    Exercise Vital Sign     Days of Exercise per Week: 0 days     Minutes of Exercise per Session: 0 min   Stress: No Stress Concern Present (12/26/2024)    [employer] - Occupational Stress Questionnaire     Feeling of Stress : Only a little   Housing Stability: Low Risk  (12/26/2024)    Housing Stability Vital Sign     Unable to Pay for Housing in the Last Year: No     Homeless in the Last Year: No       Precautions:     Allergies as of 12/25/2024 - Reviewed 12/25/2024   Allergen Reaction Noted    Butorphanol Swelling 06/21/2022    Hydrocodone-acetaminophen Other (See Comments) 06/21/2022    Povidone-iodine Shortness Of Breath and Rash 06/21/2022       WOC Assessment Details/Treatment   WOCN follow up for sacrum. Discussed plan of care with assigned nurse prior to visiting the patient. Patient is currently off of the unit in  dialysis. Will attempt to see him later on today if applicable.   01/08/2025

## 2025-01-08 NOTE — PROGRESS NOTES
Nephrology consult follow up note    HPI:      Neno Watkins is a 64 y.o. male  who has been on chronic hemodialysis at [facility] on Monday Wednesday Friday.  He is being followed by Dr. Kike Alanis.  He has lot of problems on his legs and somehow he got frustrated and decided to stop dialysis and go for hospice but now he has reversed it and wants to be treated and taken care of.  Nephrology consulted for ESRD management.    Interval history:     12/27/2024  Seen on hemodialysis.    12/30/2024  Seen on hemodialysis    12/31/2024  No acute events overnight.  No new complaints.  Tolerated dialysis yesterday without problem.  Continues to have mild lower extremity edema.  No chest pain, shortness of breath, nausea, vomiting.    1/1/25  Seen on hemodialysis.    1/2/25  Pt is in NAD, aaox4, in bed watching tv. Denies SOB, CP, n/v/d.     01/03/2025  Seen on hemodialysis    01/06/2025   Currently tolerating dialysis.  No complaints of any shortness of breath.  Patient is on oxygen.  No chest pains no abdominal pains.    01/07/2025  No acute events overnight.  Dialyzed yesterday without problem.  Working with therapy this morning.  No chest pain, shortness of breath, nausea, vomiting, or lower extremity edema.    01/08/2025   Patient seen in dialysis on HD machine.  He is currently tolerating treatment well.  Denies any chest pain shortness of breath nausea or vomiting.     Review of Systems:       Past medical, family, surgical, and social history reviewed and unchanged from initial consult note.     Objective:       VITAL SIGNS: 24 HR MIN & MAX LAST    Temp  Min: 97.6 °F (36.4 °C)  Max: 98.3 °F (36.8 °C)  98.1 °F (36.7 °C)        BP  Min: 101/44  Max: 127/61  115/71     Pulse  Min: 69  Max: 80  69     Resp  Min: 25  Max: 29  (!) 29    SpO2  Min: 94 %  Max: 98 %  98 %      GEN: Chronically ill appearing WM in NAD  CV: RRR +S1,S2 without murmur  PULM: CTAB, unlabored  ABD: Soft, NT/ND abdomen with NABS  EXT:  No cyanosis or edema  SKIN: Warm and dry  PSYCH: Awake, alert and appropriately conversant.   Dialysis access: Left upper extremity AV graft with palpable pulse and right IJ tunneled dialysis catheter             Component Value Date/Time     (L) 01/08/2025 0207     (L) 01/06/2025 0428     10/13/2022 0652    K 3.4 (L) 01/08/2025 0207    K 3.7 01/06/2025 0428    K 3.0 (L) 10/13/2022 0652    CO2 27 01/08/2025 0207    CO2 23 01/06/2025 0428    CO2 26 10/13/2022 0652    BUN 86.4 (H) 01/08/2025 0207    BUN 86.7 (H) 01/06/2025 0428    BUN 24 (H) 12/11/2024 0000    BUN 30 (H) 11/24/2024 0000    CREATININE 6.60 (H) 01/08/2025 0207    CREATININE 5.81 (H) 01/06/2025 0428    CREATININE 2.94 (H) 10/13/2022 0652    CALCIUM 8.8 01/08/2025 0207    CALCIUM 8.7 (L) 01/06/2025 0428    CALCIUM 9.6 10/13/2022 0652    PHOS 7.2 (H) 01/08/2025 0207            Component Value Date/Time    WBC 12.34 (H) 01/08/2025 0207    WBC 11.41 01/06/2025 0428    HGB 11.4 (L) 01/08/2025 0207    HGB 12.6 (L) 01/06/2025 0428    HGB 11.6 (L) 12/09/2024 0000    HGB 11.4 (L) 12/02/2024 0000    HCT 34.2 (L) 01/08/2025 0207    HCT 37.2 (L) 01/06/2025 0428     01/08/2025 0207     01/06/2025 0428         Imaging reviewed      Assessment / Plan:       ESRD.  Dialysis days Monday Wednesday Friday.    Noncomplaince with medical treatment  Personal history of diabetes mellitus but not on any medications will check hemoglobin A1c then decide on the need for medical management  Hypertension  Hyperlipidemia  Cellulitis of both lower extremities right more than left - s/p toe amputation 12/31/24  Anemia of chronic kidney disease  Dialysis noncompliance        Plan:  Left upper extremity fistula currently working well for treatment.  We will continue dialysis on routine MWF schedule for now.    Continue all other medical management  Recheck labs in john Madden NP

## 2025-01-08 NOTE — PLAN OF CARE
01/08/25 1613   Final Note   Assessment Type Final Discharge Note   Anticipated Discharge Disposition SNF   Post-Acute Status   Post-Acute Authorization Placement   Post-Acute Placement Status Set-up Complete/Auth obtained   Coverage [facility]   Patient choice form signed by patient/caregiver List from CMS Compare   Discharge Delays None known at this time

## 2025-01-08 NOTE — PLAN OF CARE
Informed per REMIGIO justin [facility] received insurance authorization . Patient is in dialysis. Patient discharged to [facility], LATANYA phoned [facility] to transport patient via ambulance to [facility].. Transportation arranged. CM informed patient's nurse ELVIRA Atwood.

## 2025-01-08 NOTE — DISCHARGE SUMMARY
[facility] Hospital Medicine Discharge Summary    Admit Date: 12/25/2024  Discharge Date and Time: 1/8/20258:57 AM  Admitting Physician:  Team  Discharging Physician: Delano Cortez MD.  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  Daughter gives history (Isidra Watkins [phone]) who explains that 2 weeks ago patient decided he would no longer receive dialysis nor wound care and felt that these providers were only trying to harm him.  He therefore enrolled in hospice and understood completely in the time that without dialysis he would very likely die.  Over the last few days at home he has become progressively weak and having multiple falls, his wife activated EMS tonight after he had a fall and was unable to get up.  He was brought to the ER where on arrival he was afebrile hemodynamically stable maintaining normal sats on room air.  Laboratory work noted leukocytosis of 20 K, creatinine of 2.3/BUN 34, CO2 20 and a normal potassium.  CT head showed no acute process.  He was not  found to have focal deficits on exam nor any signs of traumatic injury though was noted to have an infected chronic left foot ulceration.  Blood cultures were obtained and broad-spectrum antibiotics initiated.MRI of the T-spine showed L1 superior endplate compression fracture no significant spinal canal or neural foraminal stenosis no cord abnormality.  MRI of the brain was negative  CT of the L-spine had shown chronic L4 superior endplate compression deformity with mild loss of height.Patient had refused x-ray and MRI of the L-spine neurosurgery has signed off and they are recommending bracing . MRI of the T-spine showed L1 superior endplate compression fracture no significant spinal canal or neural foraminal stenosis no cord abnormality. MRI of the brain was negative   Patient is status post 5th ray amputation on 12/31/2024 secondary to infected wound and osteomyelitis id has been consulted for antibiotic recommendations.  Tissue cultures from OR sample 12/31/2024 showing few Gram-negative rods  Infectious disease now signed off.  Tissue cultures from 12/30 showing Proteus mirabilis.  Prior wound culture showed Proteus and Enterobacter with bacteria rods.  Anaerobic culture shows Bacteroides fragilis and Parvimonas micra  Continue Augmentin and Levofloxacin until 01/13/2025.  Leukocytosis resolved, afebrile  Podiatry Dr Hobbs on board, continue current care.  Antibiotics per ID.  Cont wound care  MRI T-spine, CT lumbar spine reviewed.  Patient reportedly refused MRI lumbar spine.  Neurosurgery had no further recommendations.  Continue analgesics as needed.    Nephrology on board, continue hemodialysis Monday Wednesday Friday  Insulin regimen. Monitor CBGs a.c. and HS  12/26- A1C 6.0, well controlled   Noted patient is requiring oxygen only at nighttime for comfort otherwise per nurse, during the daytime he is on room air  PT OT - moderate intensity  Case management on board, [facility] can take the  patient today after dialysis , he will be discharge after HD       Pt was seen and examined on the day of discharge  Vitals:  VITAL SIGNS: 24 HRS MIN & MAX LAST   Temp  Min: 97.6 °F (36.4 °C)  Max: 98.3 °F (36.8 °C) 98.1 °F (36.7 °C)   BP  Min: 101/44  Max: 127/61 115/71   Pulse  Min: 69  Max: 80  69   Resp  Min: 25  Max: 29 (!) 29   SpO2  Min: 94 %  Max: 98 % 98 %       Physical Exam:  General: In no acute distress, afebrile, morbidly obese, large neck circumference, looks chronically ill, SITTING IN HIS WHEELCHAIR  Chest:  lear anteriorly, on RA   Heart: RRR, +S1, S2, no appreciable murmur  Abdomen: Soft, obese, nontender, BS +  MSK: Warm, dressing to the right foot  Neurologic:  Awake, globally weak    Recent Labs   Lab 01/05/25  0400 01/06/25  0428 01/08/25  0207   WBC 11.58* 11.41 12.34*   RBC 4.01* 4.27* 3.90*   HGB 11.6* 12.6* 11.4*   HCT 36.1* 37.2* 34.2*   MCV 90.0 87.1 87.7   MCH 28.9 29.5 29.2   MCHC 32.1* 33.9 33.3   RDW 13.7 13.4 13.4    282 279   MPV 9.3 9.0 9.2       Recent Labs   Lab 01/03/25  0348 01/05/25  0400 01/06/25  0428 01/08/25  0207   * 132* 130* 132*   K 3.9 3.7 3.7 3.4*   CL 95* 92* 93* 92*   CO2 24 24 23 27   BUN 53.4* 78.1* 86.7* 86.4*   CREATININE 3.77* 5.51* 5.81* 6.60*   CALCIUM 8.4* 8.7* 8.7* 8.8   MG 2.50  --   --   --    ALBUMIN 2.5* 2.5* 2.5* 2.6*   ALKPHOS 233* 221* 202*  --    ALT 44 46 45  --    AST 35* 44* 43*  --    BILITOT 0.6 0.5 0.5  --         Microbiology Results (last 7 days)       Procedure Component Value Units Date/Time    Anaerobic Culture [[id number]]  (Abnormal) Collected: 12/31/24 1406    Order Status: Completed Specimen: Bone from Toe, Right Foot Updated: 01/04/25 1259     Anaerobe Culture Bacteroides fragilis      Parvimonas micra     Comment: Anaerobic sensitivity is not usually indicated except on single isolates from sterile body sites.       Tissue Culture - Aerobic [[id number]]  (Abnormal)  (Susceptibility) Collected: 12/31/24 1406    Order  Status: Completed Specimen: Bone from Toe, Right Foot Updated: 01/03/25 0914     Tissue - Aerobic Culture Few Proteus mirabilis             CV Ultrasound doppler arterial legs bilat  The right lower extremity arterial system is patent with no evidence of   focal stenosis or occlusion.  The left lower extremity arterial system is patent with no evidence of   focal stenosis or occlusion.         Medication List        START taking these medications      amoxicillin-clavulanate 500-125mg 500-125 mg Tab  Commonly known as: AUGMENTIN  Take 1 tablet (500 mg total) by mouth 2 (two) times daily. for 5 days     aspirin 81 MG EC tablet  Commonly known as: ECOTRIN  Take 1 tablet (81 mg total) by mouth once daily.     levoFLOXacin 500 MG tablet  Commonly known as: LEVAQUIN  Take 1 tablet (500 mg total) by mouth every other day. for 4 days  Start taking on: January 9, 2025     zinc oxide-cod liver oil 40 % Pste paste  Commonly known as: DESITIN  Apply topically 2 (two) times a day.            CHANGE how you take these medications      carvediloL 12.5 MG tablet  Commonly known as: COREG  Take 1 tablet (12.5 mg total) by mouth 2 (two) times daily.  What changed:   medication strength  how much to take            CONTINUE taking these medications      atorvastatin 80 MG tablet  Commonly known as: LIPITOR     docusate sodium 100 MG capsule  Commonly known as: COLACE     gabapentin 300 MG capsule  Commonly known as: NEURONTIN     melatonin 5 mg Cap     multivitamin per tablet  Commonly known as: THERAGRAN     tamsulosin 0.4 mg Cap  Commonly known as: FLOMAX     white petrolatum 41 % Oint  Apply topically once daily.            STOP taking these medications      gentamicin 0.1 % ointment  Commonly known as: GARAMYCIN     MOTRIN IB ORAL     NIFEdipine 60 MG (OSM) 24 hr tablet  Commonly known as: PROCARDIA-XL               Where to Get Your Medications        These medications were sent to [facility] [place] - [address]  [address], [place] [address] [address]      Phone: [phone]   amoxicillin-clavulanate 500-125mg 500-125 mg Tab  aspirin 81 MG EC tablet  carvediloL 12.5 MG tablet  levoFLOXacin 500 MG tablet  zinc oxide-cod liver oil 40 % Pste paste          Explained in detail to the patient about the discharge plan, medications, and follow-up visits. Pt understands and agrees with the treatment plan  [facility]  Discharged Condition: stable  Diet-   Dietary Orders (From admission, onward)       Start     Ordered    01/07/25 1304  Dietary nutrition supplements BID; Eloy - Orange  Continuous        Question Answer Comment   Frequency: BID    Select PO Supplement: Eloy - Orange        01/07/25 1304    01/07/25 1304  Dietary nutrition supplements Daily; Novasource Renal - Cafe Mocha  Continuous        Question Answer Comment   Frequency: Daily    Select PO Supplement: Novasource Renal - Cafe Mocha        01/07/25 1304    12/31/24 1438  Diet diabetic 2000 Calories (up to 75 gm per meal)  Diet effective now        Question:  Total calories / carbs:  Answer:  2000 Calories (up to 75 gm per meal)    12/31/24 1437                   Medications Per DC med rec  Activities as tolerated   Follow-up Information       Messi Weston MD. Schedule an appointment as soon as possible for a visit in 2 week(s).    Specialty: Family Medicine  Why: post discharge  Contact information:  [address] [place] [address]  [phone]                           For further questions contact hospitalist office    Discharge time 34 minutes    For worsening symptoms, chest pain, shortness of breath, increased abdominal pain, high grade fever, stroke or stroke like symptoms, immediately go to the nearest Emergency Room or call 911 as soon as possible.    Portions of this note dictated using EMR integrated voice recognition software, and may be subject to voice recognition errors not corrected at  proofreading. Please contact writer for clarification if needed    Delano Cortez MD  Department of Hospital Medicine   [facility]   01/08/2025 8:57 AM

## 2025-01-08 NOTE — NURSING
01/08/25 1143   Post-Hemodialysis Assessment   Blood Volume Processed (Liters) 63 L   Dialyzer Clearance Heavily streaked   Duration of Treatment 180 minutes   Total UF (mL) 3000 mL   Post-Hemodialysis Comments Tx completed as ordered. 3L net fluid removed. PAWEL AVG used for tx without issue. VSS throughout and post tx. No bleeding at access site on departure.

## 2025-01-08 NOTE — PROGRESS NOTES
[facility]                       [address] [id number]    PATIENT NAME:       RUFINO REAL  YOB: 1960  CSN:                [id number]   MRN:                [id number]  ADMIT DATE:         12/25/2024 20:30:00  PHYSICIAN:          Rolf Hobbs DPM                            PROGRESS NOTE    DATE:  01/07/2025 00:00:00    SUBJECTIVE:  The patient was seen today.  Seems to be a little tired today, but   is conversive.  No reported fevers.    OBJECTIVE:  VITAL SIGNS:  Stable and afebrile.    LABORATORY DATA:  No labs posted.    Heelift boots are in place.  Dressings are intact on the right side.  Surgical   site is healing well.  Margins approximated.  Flap is viable.  No signs of   active infection or drainage.    ASSESSMENT:  Status post right 5th ray amputation, doing well.    PLAN:  Dressings were placed.  The patient has been accepted by [facility].    Discharge pending.        ______________________________  Rolf Hobbs DPM    GAS/AQS  DD:  01/07/2025  Time:  03:27PM  DT:  01/07/2025  Time:  06:21PM  Job #:  [id number]      PROGRESS NOTE

## 2025-04-18 NOTE — Clinical Note
The balloon was inflated with indeflator in the  . The balloon max pressure was 17 luis e for 65 seconds ambulate

## 2025-07-09 ENCOUNTER — LAB REQUISITION (OUTPATIENT)
Dept: LAB | Facility: HOSPITAL | Age: 65
End: 2025-07-09
Payer: MEDICARE

## 2025-07-09 DIAGNOSIS — I10 ESSENTIAL (PRIMARY) HYPERTENSION: ICD-10-CM

## 2025-07-09 DIAGNOSIS — N18.6 END STAGE RENAL DISEASE: ICD-10-CM

## 2025-07-09 DIAGNOSIS — E11.9 TYPE 2 DIABETES MELLITUS WITHOUT COMPLICATIONS: ICD-10-CM

## 2025-07-09 LAB
ALBUMIN SERPL-MCNC: 3.1 G/DL (ref 3.4–4.8)
ALBUMIN/GLOB SERPL: 0.9 RATIO (ref 1.1–2)
ALP SERPL-CCNC: 86 UNIT/L (ref 40–150)
ALT SERPL-CCNC: 27 UNIT/L (ref 0–55)
ANION GAP SERPL CALC-SCNC: 10 MEQ/L
AST SERPL-CCNC: 28 UNIT/L (ref 11–45)
BASOPHILS # BLD AUTO: 0.05 X10(3)/MCL
BASOPHILS NFR BLD AUTO: 0.6 %
BILIRUB SERPL-MCNC: 0.6 MG/DL
BUN SERPL-MCNC: 30.2 MG/DL (ref 8.4–25.7)
CALCIUM SERPL-MCNC: 8.2 MG/DL (ref 8.8–10)
CHLORIDE SERPL-SCNC: 103 MMOL/L (ref 98–107)
CHOLEST SERPL-MCNC: 130 MG/DL
CHOLEST/HDLC SERPL: 5 {RATIO} (ref 0–5)
CO2 SERPL-SCNC: 25 MMOL/L (ref 23–31)
CREAT SERPL-MCNC: 2.71 MG/DL (ref 0.72–1.25)
CREAT/UREA NIT SERPL: 11
EOSINOPHIL # BLD AUTO: 0.29 X10(3)/MCL (ref 0–0.9)
EOSINOPHIL NFR BLD AUTO: 3.5 %
ERYTHROCYTE [DISTWIDTH] IN BLOOD BY AUTOMATED COUNT: 12.8 % (ref 11.5–17)
EST. AVERAGE GLUCOSE BLD GHB EST-MCNC: 122.6 MG/DL
GFR SERPLBLD CREATININE-BSD FMLA CKD-EPI: 25 ML/MIN/1.73/M2
GLOBULIN SER-MCNC: 3.4 GM/DL (ref 2.4–3.5)
GLUCOSE SERPL-MCNC: 111 MG/DL (ref 82–115)
HBA1C MFR BLD: 5.9 %
HCT VFR BLD AUTO: 34 % (ref 42–52)
HDLC SERPL-MCNC: 24 MG/DL (ref 35–60)
HGB BLD-MCNC: 11.4 G/DL (ref 14–18)
IMM GRANULOCYTES # BLD AUTO: 0.02 X10(3)/MCL (ref 0–0.04)
IMM GRANULOCYTES NFR BLD AUTO: 0.2 %
LYMPHOCYTES # BLD AUTO: 2.9 X10(3)/MCL (ref 0.6–4.6)
LYMPHOCYTES NFR BLD AUTO: 34.7 %
MCH RBC QN AUTO: 30.8 PG (ref 27–31)
MCHC RBC AUTO-ENTMCNC: 33.5 G/DL (ref 33–36)
MCV RBC AUTO: 91.9 FL (ref 80–94)
MONOCYTES # BLD AUTO: 0.68 X10(3)/MCL (ref 0.1–1.3)
MONOCYTES NFR BLD AUTO: 8.1 %
NEUTROPHILS # BLD AUTO: 4.41 X10(3)/MCL (ref 2.1–9.2)
NEUTROPHILS NFR BLD AUTO: 52.9 %
NRBC BLD AUTO-RTO: 0 %
PLATELET # BLD AUTO: 165 X10(3)/MCL (ref 130–400)
PMV BLD AUTO: 10.1 FL (ref 7.4–10.4)
POTASSIUM SERPL-SCNC: 3.7 MMOL/L (ref 3.5–5.1)
PROT SERPL-MCNC: 6.5 GM/DL (ref 5.8–7.6)
PSA SERPL-MCNC: 1.15 NG/ML
RBC # BLD AUTO: 3.7 X10(6)/MCL (ref 4.7–6.1)
SODIUM SERPL-SCNC: 138 MMOL/L (ref 136–145)
TRIGL SERPL-MCNC: 404 MG/DL (ref 34–140)
WBC # BLD AUTO: 8.35 X10(3)/MCL (ref 4.5–11.5)

## 2025-07-09 PROCEDURE — 85025 COMPLETE CBC W/AUTO DIFF WBC: CPT | Performed by: FAMILY MEDICINE

## 2025-07-09 PROCEDURE — 80061 LIPID PANEL: CPT | Performed by: FAMILY MEDICINE

## 2025-07-09 PROCEDURE — 83036 HEMOGLOBIN GLYCOSYLATED A1C: CPT | Performed by: FAMILY MEDICINE

## 2025-07-09 PROCEDURE — 80053 COMPREHEN METABOLIC PANEL: CPT | Performed by: FAMILY MEDICINE

## 2025-07-09 PROCEDURE — 84153 ASSAY OF PSA TOTAL: CPT | Performed by: FAMILY MEDICINE

## (undated) DEVICE — GLOVE PROTEXIS NEOPRN BRN SZ7

## (undated) DEVICE — CONNECTOR CLEAR POSITVIVE PRES

## (undated) DEVICE — TRAY BASIN PLACENTA LAFAYETTE

## (undated) DEVICE — SPONGE X-RAY LAP DETCT 18X18IN

## (undated) DEVICE — SUT 2-0 ETHILON 18 FS

## (undated) DEVICE — COVER C-ARM STRAP BAND 44X80IN

## (undated) DEVICE — DRAPE EXTREMITY HVY DTY REINF

## (undated) DEVICE — ELECTRODE REM POLYHESIVE II

## (undated) DEVICE — DRAPE UTILITY W/ TAPE 20X30IN

## (undated) DEVICE — Device

## (undated) DEVICE — DRESSING N ADH OIL EMUL 3X3

## (undated) DEVICE — SUT 3-0 ETHILON 18 FS-1

## (undated) DEVICE — SET CATH 2 LMN 15CM 11.5FR

## (undated) DEVICE — TRAY SKIN SCRUB WET PREMIUM

## (undated) DEVICE — SHEATH BRITE TIP 7FR 5.5CM

## (undated) DEVICE — KIT PROBE COVER GEL 6X72IN

## (undated) DEVICE — GLOVE PROTEXIS HYDROGEL SZ7.5

## (undated) DEVICE — GLOVE PROTEXIS LTX 6.5

## (undated) DEVICE — SYR 10CC LUER LOCK

## (undated) DEVICE — DRESSING TEGADERM CHG 3.5X4.5

## (undated) DEVICE — SUT MONOCRYL 3-0 PS-2 UND

## (undated) DEVICE — KIT MINI STK MAX COAX 5FR 10CM

## (undated) DEVICE — CATH CHARGER .035IN 8X40X75

## (undated) DEVICE — PAD UNIV GROUNDING SPLIT CORD

## (undated) DEVICE — DRESSING GAUZE OIL EMUL 3X8

## (undated) DEVICE — GUIDEWIRE STD .035X180CM ANG

## (undated) DEVICE — BLADE MICRO STR COARSE 9.0MM

## (undated) DEVICE — NDL SAFETY 25G X 1.5 ECLIPSE

## (undated) DEVICE — STOCKINET 4INX48

## (undated) DEVICE — SPONGE COTTON TRAY 4X4IN

## (undated) DEVICE — SOL NACL 0.9% INJ 500ML BG

## (undated) DEVICE — CONTAINER SPECIMEN SCREW 4OZ

## (undated) DEVICE — CANNULA NASAL ADLT EAR 25FT

## (undated) DEVICE — ELECTRODE PATIENT RETURN DISP

## (undated) DEVICE — SHEATH PINNACLE 6FR HIFLO

## (undated) DEVICE — DRESSING TRANS 4X4 TEGADERM

## (undated) DEVICE — CATH CHARGER BLLN 7X60MM 75CM

## (undated) DEVICE — COVER PROBE US 5.5X58L NON LTX

## (undated) DEVICE — PRESTO INFLATION DEVICE

## (undated) DEVICE — KIT SURGICAL TURNOVER

## (undated) DEVICE — BLLN CHAMELEON 7X40X75CM

## (undated) DEVICE — FLOWSWITCH HP 1-W W/O LL

## (undated) DEVICE — APPLICATOR CHLORAPREP ORN 26ML

## (undated) DEVICE — DECANTER BAG FLUID TRANSFER

## (undated) DEVICE — BANDAGE COHES LTX TAN 4INX5YD

## (undated) DEVICE — DRAPE INCISE IOBAN 2 23X17IN